# Patient Record
Sex: MALE | Race: BLACK OR AFRICAN AMERICAN | Employment: OTHER | URBAN - METROPOLITAN AREA
[De-identification: names, ages, dates, MRNs, and addresses within clinical notes are randomized per-mention and may not be internally consistent; named-entity substitution may affect disease eponyms.]

---

## 2017-01-12 RX ORDER — ISOSORBIDE MONONITRATE 30 MG/1
TABLET, EXTENDED RELEASE ORAL
Qty: 60 TABLET | Refills: 0 | Status: SHIPPED | OUTPATIENT
Start: 2017-01-12 | End: 2017-02-13 | Stop reason: SDUPTHER

## 2017-01-12 RX ORDER — ASCORBIC ACID, THIAMINE MONONITRATE,RIBOFLAVIN, NIACINAMIDE, PYRIDOXINE HYDROCHLORIDE, FOLIC ACID, CYANOCOBALAMIN, BIOTIN, CALCIUM PANTOTHENATE, 100; 1.5; 1.7; 20; 10; 1; 6000; 150000; 5 MG/1; MG/1; MG/1; MG/1; MG/1; MG/1; UG/1; UG/1; MG/1
CAPSULE, LIQUID FILLED ORAL
Qty: 90 CAPSULE | Refills: 0 | Status: SHIPPED | OUTPATIENT
Start: 2017-01-12 | End: 2017-04-17 | Stop reason: SDUPTHER

## 2017-01-12 RX ORDER — CARVEDILOL 25 MG/1
TABLET ORAL
Qty: 60 TABLET | Refills: 0 | Status: SHIPPED | OUTPATIENT
Start: 2017-01-12 | End: 2017-02-13 | Stop reason: SDUPTHER

## 2017-02-06 DIAGNOSIS — E78.00 PURE HYPERCHOLESTEROLEMIA: ICD-10-CM

## 2017-02-06 DIAGNOSIS — I25.9 ISCHEMIC HEART DISEASE: Chronic | ICD-10-CM

## 2017-02-06 RX ORDER — ATORVASTATIN CALCIUM 40 MG/1
TABLET, FILM COATED ORAL
Qty: 30 TABLET | Refills: 2 | Status: SHIPPED | OUTPATIENT
Start: 2017-02-06 | End: 2017-05-16 | Stop reason: SDUPTHER

## 2017-02-07 ENCOUNTER — TELEPHONE (OUTPATIENT)
Dept: PRIMARY CARE CLINIC | Age: 72
End: 2017-02-07

## 2017-02-13 ENCOUNTER — OFFICE VISIT (OUTPATIENT)
Dept: PRIMARY CARE CLINIC | Age: 72
End: 2017-02-13

## 2017-02-13 VITALS
OXYGEN SATURATION: 96 % | SYSTOLIC BLOOD PRESSURE: 140 MMHG | BODY MASS INDEX: 28.2 KG/M2 | DIASTOLIC BLOOD PRESSURE: 72 MMHG | HEART RATE: 63 BPM | WEIGHT: 237.8 LBS

## 2017-02-13 DIAGNOSIS — Z99.2 DIALYSIS PATIENT (HCC): ICD-10-CM

## 2017-02-13 DIAGNOSIS — J45.40 REACTIVE AIRWAY DISEASE, MODERATE PERSISTENT, UNCOMPLICATED: Primary | ICD-10-CM

## 2017-02-13 DIAGNOSIS — E34.9 INCREASED PTH LEVEL: ICD-10-CM

## 2017-02-13 DIAGNOSIS — N18.5 CHRONIC KIDNEY DISEASE, STAGE V (VERY SEVERE) (HCC): ICD-10-CM

## 2017-02-13 DIAGNOSIS — Z86.79 ATRIAL FIBRILLATION, CURRENTLY IN SINUS RHYTHM: ICD-10-CM

## 2017-02-13 DIAGNOSIS — E11.319 DIABETIC RETINOPATHY WITHOUT MACULAR EDEMA ASSOCIATED WITH TYPE 2 DIABETES MELLITUS, UNSPECIFIED RETINOPATHY SEVERITY: ICD-10-CM

## 2017-02-13 DIAGNOSIS — E11.21 DIABETIC NEPHROPATHY ASSOCIATED WITH TYPE 2 DIABETES MELLITUS (HCC): ICD-10-CM

## 2017-02-13 PROCEDURE — 99214 OFFICE O/P EST MOD 30 MIN: CPT | Performed by: FAMILY MEDICINE

## 2017-02-13 RX ORDER — ALBUTEROL SULFATE 90 UG/1
2 AEROSOL, METERED RESPIRATORY (INHALATION) EVERY 6 HOURS PRN
Qty: 1 INHALER | Refills: 3 | Status: SHIPPED | OUTPATIENT
Start: 2017-02-13 | End: 2017-09-19

## 2017-02-13 RX ORDER — CARVEDILOL 25 MG/1
TABLET ORAL
Qty: 60 TABLET | Refills: 0 | Status: SHIPPED | OUTPATIENT
Start: 2017-02-13 | End: 2017-03-18 | Stop reason: SDUPTHER

## 2017-02-13 RX ORDER — ISOSORBIDE MONONITRATE 30 MG/1
TABLET, EXTENDED RELEASE ORAL
Qty: 60 TABLET | Refills: 0 | Status: SHIPPED | OUTPATIENT
Start: 2017-02-13 | End: 2017-03-18 | Stop reason: SDUPTHER

## 2017-02-13 RX ORDER — BUDESONIDE AND FORMOTEROL FUMARATE DIHYDRATE 80; 4.5 UG/1; UG/1
2 AEROSOL RESPIRATORY (INHALATION) 2 TIMES DAILY
Qty: 1 INHALER | Refills: 3 | Status: SHIPPED | OUTPATIENT
Start: 2017-02-13 | End: 2017-09-19

## 2017-02-13 ASSESSMENT — ENCOUNTER SYMPTOMS
BACK PAIN: 0
WHEEZING: 0
VOICE CHANGE: 0
SINUS PRESSURE: 0
COUGH: 0
BLOOD IN STOOL: 0
ANAL BLEEDING: 0
CHOKING: 0
EYE DISCHARGE: 0
SHORTNESS OF BREATH: 0
SORE THROAT: 0
RECTAL PAIN: 0
EYE REDNESS: 0
NAUSEA: 0
APNEA: 0
FACIAL SWELLING: 0
CONSTIPATION: 0
ABDOMINAL PAIN: 0
EYE PAIN: 0
COLOR CHANGE: 0
CHEST TIGHTNESS: 0
EYE ITCHING: 0
VOMITING: 0
PHOTOPHOBIA: 0
TROUBLE SWALLOWING: 0

## 2017-03-21 RX ORDER — ISOSORBIDE MONONITRATE 30 MG/1
TABLET, EXTENDED RELEASE ORAL
Qty: 60 TABLET | Refills: 0 | Status: SHIPPED | OUTPATIENT
Start: 2017-03-21 | End: 2017-04-24 | Stop reason: SDUPTHER

## 2017-03-21 RX ORDER — CARVEDILOL 25 MG/1
TABLET ORAL
Qty: 60 TABLET | Refills: 0 | Status: SHIPPED | OUTPATIENT
Start: 2017-03-21 | End: 2017-04-24 | Stop reason: SDUPTHER

## 2017-04-25 RX ORDER — ISOSORBIDE MONONITRATE 30 MG/1
TABLET, EXTENDED RELEASE ORAL
Qty: 60 TABLET | Refills: 0 | Status: SHIPPED | OUTPATIENT
Start: 2017-04-25 | End: 2017-05-19 | Stop reason: SDUPTHER

## 2017-04-25 RX ORDER — CARVEDILOL 25 MG/1
TABLET ORAL
Qty: 60 TABLET | Refills: 0 | Status: SHIPPED | OUTPATIENT
Start: 2017-04-25 | End: 2017-05-19 | Stop reason: SDUPTHER

## 2017-05-16 DIAGNOSIS — E78.00 PURE HYPERCHOLESTEROLEMIA: ICD-10-CM

## 2017-05-16 DIAGNOSIS — I25.9 ISCHEMIC HEART DISEASE: Chronic | ICD-10-CM

## 2017-05-16 RX ORDER — ATORVASTATIN CALCIUM 40 MG/1
TABLET, FILM COATED ORAL
Qty: 30 TABLET | Refills: 1 | Status: SHIPPED | OUTPATIENT
Start: 2017-05-16 | End: 2017-07-25 | Stop reason: SDUPTHER

## 2017-05-30 ENCOUNTER — OFFICE VISIT (OUTPATIENT)
Dept: PRIMARY CARE CLINIC | Age: 72
End: 2017-05-30

## 2017-05-30 VITALS
TEMPERATURE: 97.3 F | DIASTOLIC BLOOD PRESSURE: 93 MMHG | OXYGEN SATURATION: 94 % | RESPIRATION RATE: 18 BRPM | HEART RATE: 71 BPM | BODY MASS INDEX: 28.46 KG/M2 | SYSTOLIC BLOOD PRESSURE: 159 MMHG | WEIGHT: 240 LBS

## 2017-05-30 DIAGNOSIS — N25.81 SECONDARY HYPERPARATHYROIDISM (HCC): ICD-10-CM

## 2017-05-30 DIAGNOSIS — R05.9 COUGH: Primary | ICD-10-CM

## 2017-05-30 DIAGNOSIS — M79.604 PAIN OF RIGHT LOWER EXTREMITY: ICD-10-CM

## 2017-05-30 DIAGNOSIS — I10 ESSENTIAL HYPERTENSION: ICD-10-CM

## 2017-05-30 PROCEDURE — 99214 OFFICE O/P EST MOD 30 MIN: CPT | Performed by: FAMILY MEDICINE

## 2017-05-30 RX ORDER — BENZONATATE 100 MG/1
100 CAPSULE ORAL 3 TIMES DAILY PRN
Qty: 30 CAPSULE | Refills: 1 | Status: SHIPPED | OUTPATIENT
Start: 2017-05-30 | End: 2017-06-06

## 2017-05-30 ASSESSMENT — ENCOUNTER SYMPTOMS
EYE DISCHARGE: 0
CHOKING: 0
SORE THROAT: 0
BLOOD IN STOOL: 0
CHEST TIGHTNESS: 0
ANAL BLEEDING: 0
NAUSEA: 0
BACK PAIN: 0
SINUS PRESSURE: 0
EYE ITCHING: 0
EYE PAIN: 0
VOMITING: 0
RECTAL PAIN: 0
EYE REDNESS: 0
PHOTOPHOBIA: 0
COUGH: 1
ABDOMINAL PAIN: 0
APNEA: 0
CONSTIPATION: 0
TROUBLE SWALLOWING: 0
VOICE CHANGE: 0
FACIAL SWELLING: 0
WHEEZING: 0
SHORTNESS OF BREATH: 0
COLOR CHANGE: 0

## 2017-06-17 RX ORDER — INSULIN GLARGINE 100 [IU]/ML
INJECTION, SOLUTION SUBCUTANEOUS
Qty: 15 ML | Refills: 3 | Status: SHIPPED | OUTPATIENT
Start: 2017-06-17 | End: 2017-12-19 | Stop reason: SDUPTHER

## 2017-08-01 ENCOUNTER — OFFICE VISIT (OUTPATIENT)
Dept: PRIMARY CARE CLINIC | Age: 72
End: 2017-08-01

## 2017-08-01 VITALS
HEART RATE: 64 BPM | WEIGHT: 244 LBS | HEIGHT: 77 IN | TEMPERATURE: 97.6 F | SYSTOLIC BLOOD PRESSURE: 139 MMHG | OXYGEN SATURATION: 94 % | DIASTOLIC BLOOD PRESSURE: 71 MMHG | BODY MASS INDEX: 28.81 KG/M2

## 2017-08-01 DIAGNOSIS — M79.604 RIGHT LEG PAIN: ICD-10-CM

## 2017-08-01 DIAGNOSIS — I73.9 INTERMITTENT CLAUDICATION (HCC): ICD-10-CM

## 2017-08-01 DIAGNOSIS — E11.9 CONTROLLED TYPE 2 DIABETES MELLITUS WITHOUT COMPLICATION, WITH LONG-TERM CURRENT USE OF INSULIN (HCC): ICD-10-CM

## 2017-08-01 DIAGNOSIS — Z23 NEED FOR PROPHYLACTIC VACCINATION AND INOCULATION AGAINST VARICELLA: Primary | ICD-10-CM

## 2017-08-01 DIAGNOSIS — Z79.4 CONTROLLED TYPE 2 DIABETES MELLITUS WITHOUT COMPLICATION, WITH LONG-TERM CURRENT USE OF INSULIN (HCC): ICD-10-CM

## 2017-08-01 PROCEDURE — 99214 OFFICE O/P EST MOD 30 MIN: CPT | Performed by: FAMILY MEDICINE

## 2017-08-01 RX ORDER — SENNOSIDES 8.6 MG
650 CAPSULE ORAL 2 TIMES DAILY
COMMUNITY
End: 2017-12-19 | Stop reason: SDUPTHER

## 2017-08-01 ASSESSMENT — ENCOUNTER SYMPTOMS
BACK PAIN: 0
BLOOD IN STOOL: 0
COLOR CHANGE: 0
EYE DISCHARGE: 0
EYE ITCHING: 0
SORE THROAT: 0
RECTAL PAIN: 0
ABDOMINAL PAIN: 0
ANAL BLEEDING: 0
CHOKING: 0
CONSTIPATION: 0
COUGH: 0
APNEA: 0
TROUBLE SWALLOWING: 0
PHOTOPHOBIA: 0
EYE REDNESS: 0
WHEEZING: 0
NAUSEA: 0
FACIAL SWELLING: 0
SINUS PRESSURE: 0
SHORTNESS OF BREATH: 0
VOMITING: 0
VOICE CHANGE: 0
CHEST TIGHTNESS: 0
EYE PAIN: 0

## 2017-08-02 LAB
ESTIMATED AVERAGE GLUCOSE: 145.6 MG/DL
HBA1C MFR BLD: 6.7 %

## 2017-08-04 RX ORDER — AMIODARONE HYDROCHLORIDE 200 MG/1
TABLET ORAL
Qty: 30 TABLET | Refills: 4 | Status: SHIPPED | OUTPATIENT
Start: 2017-08-04 | End: 2017-12-19 | Stop reason: SDUPTHER

## 2017-08-28 RX ORDER — CARVEDILOL 25 MG/1
TABLET ORAL
Qty: 60 TABLET | Refills: 2 | Status: SHIPPED | OUTPATIENT
Start: 2017-08-28 | End: 2017-12-06 | Stop reason: SDUPTHER

## 2017-08-28 RX ORDER — ISOSORBIDE MONONITRATE 30 MG/1
TABLET, EXTENDED RELEASE ORAL
Qty: 60 TABLET | Refills: 2 | Status: SHIPPED | OUTPATIENT
Start: 2017-08-28 | End: 2017-12-06 | Stop reason: SDUPTHER

## 2017-09-19 ENCOUNTER — OFFICE VISIT (OUTPATIENT)
Dept: PRIMARY CARE CLINIC | Age: 72
End: 2017-09-19

## 2017-09-19 VITALS
HEIGHT: 77 IN | DIASTOLIC BLOOD PRESSURE: 76 MMHG | TEMPERATURE: 97.7 F | SYSTOLIC BLOOD PRESSURE: 153 MMHG | HEART RATE: 61 BPM | OXYGEN SATURATION: 96 % | BODY MASS INDEX: 27.39 KG/M2 | WEIGHT: 232 LBS

## 2017-09-19 DIAGNOSIS — N18.6 ESRD (END STAGE RENAL DISEASE) (HCC): ICD-10-CM

## 2017-09-19 DIAGNOSIS — D64.9 ANEMIA, UNSPECIFIED TYPE: ICD-10-CM

## 2017-09-19 DIAGNOSIS — Z79.4 CONTROLLED TYPE 2 DIABETES MELLITUS WITHOUT COMPLICATION, WITH LONG-TERM CURRENT USE OF INSULIN (HCC): ICD-10-CM

## 2017-09-19 DIAGNOSIS — D64.9 ANEMIA, UNSPECIFIED TYPE: Primary | ICD-10-CM

## 2017-09-19 DIAGNOSIS — I25.9 ISCHEMIC HEART DISEASE: ICD-10-CM

## 2017-09-19 DIAGNOSIS — E11.9 CONTROLLED TYPE 2 DIABETES MELLITUS WITHOUT COMPLICATION, WITH LONG-TERM CURRENT USE OF INSULIN (HCC): ICD-10-CM

## 2017-09-19 LAB
A/G RATIO: 1.4 (ref 1.1–2.2)
ALBUMIN SERPL-MCNC: 4.6 G/DL (ref 3.4–5)
ALP BLD-CCNC: 107 U/L (ref 40–129)
ALT SERPL-CCNC: 16 U/L (ref 10–40)
ANION GAP SERPL CALCULATED.3IONS-SCNC: 20 MMOL/L (ref 3–16)
AST SERPL-CCNC: 11 U/L (ref 15–37)
BASOPHILS ABSOLUTE: 0.1 K/UL (ref 0–0.2)
BASOPHILS RELATIVE PERCENT: 1.3 %
BILIRUB SERPL-MCNC: 0.3 MG/DL (ref 0–1)
BUN BLDV-MCNC: 38 MG/DL (ref 7–20)
CALCIUM SERPL-MCNC: 9.9 MG/DL (ref 8.3–10.6)
CHLORIDE BLD-SCNC: 99 MMOL/L (ref 99–110)
CO2: 25 MMOL/L (ref 21–32)
CREAT SERPL-MCNC: 9.6 MG/DL (ref 0.8–1.3)
EOSINOPHILS ABSOLUTE: 0.1 K/UL (ref 0–0.6)
EOSINOPHILS RELATIVE PERCENT: 2.7 %
GFR AFRICAN AMERICAN: 7
GFR NON-AFRICAN AMERICAN: 5
GLOBULIN: 3.3 G/DL
GLUCOSE BLD-MCNC: 113 MG/DL (ref 70–99)
HBA1C MFR BLD: 6.1 %
HCT VFR BLD CALC: 33.2 % (ref 40.5–52.5)
HEMOGLOBIN: 10.6 G/DL (ref 13.5–17.5)
LYMPHOCYTES ABSOLUTE: 1.6 K/UL (ref 1–5.1)
LYMPHOCYTES RELATIVE PERCENT: 30.5 %
MCH RBC QN AUTO: 28 PG (ref 26–34)
MCHC RBC AUTO-ENTMCNC: 32 G/DL (ref 31–36)
MCV RBC AUTO: 87.4 FL (ref 80–100)
MONOCYTES ABSOLUTE: 0.5 K/UL (ref 0–1.3)
MONOCYTES RELATIVE PERCENT: 9.3 %
NEUTROPHILS ABSOLUTE: 2.9 K/UL (ref 1.7–7.7)
NEUTROPHILS RELATIVE PERCENT: 56.2 %
PDW BLD-RTO: 16.7 % (ref 12.4–15.4)
PLATELET # BLD: 153 K/UL (ref 135–450)
PMV BLD AUTO: 9.3 FL (ref 5–10.5)
POTASSIUM SERPL-SCNC: 5.2 MMOL/L (ref 3.5–5.1)
RBC # BLD: 3.8 M/UL (ref 4.2–5.9)
SODIUM BLD-SCNC: 144 MMOL/L (ref 136–145)
TOTAL PROTEIN: 7.9 G/DL (ref 6.4–8.2)
WBC # BLD: 5.2 K/UL (ref 4–11)

## 2017-09-19 PROCEDURE — 83036 HEMOGLOBIN GLYCOSYLATED A1C: CPT | Performed by: FAMILY MEDICINE

## 2017-09-19 PROCEDURE — 99214 OFFICE O/P EST MOD 30 MIN: CPT | Performed by: FAMILY MEDICINE

## 2017-09-19 ASSESSMENT — ENCOUNTER SYMPTOMS
VISUAL CHANGE: 0
ABDOMINAL PAIN: 0
SORE THROAT: 0
BLURRED VISION: 0
COUGH: 0
CHANGE IN BOWEL HABIT: 0
NAUSEA: 0
VOMITING: 0
SWOLLEN GLANDS: 0

## 2017-12-05 NOTE — TELEPHONE ENCOUNTER
Patient daughter calling patient totally out of medication need a call to [facility] to refill imdur,coreg please advise

## 2017-12-07 RX ORDER — CARVEDILOL 25 MG/1
TABLET ORAL
Qty: 60 TABLET | Refills: 2 | Status: SHIPPED | OUTPATIENT
Start: 2017-12-07 | End: 2017-12-19 | Stop reason: SDUPTHER

## 2017-12-07 RX ORDER — ISOSORBIDE MONONITRATE 30 MG/1
TABLET, EXTENDED RELEASE ORAL
Qty: 60 TABLET | Refills: 2 | Status: SHIPPED | OUTPATIENT
Start: 2017-12-07 | End: 2017-12-19 | Stop reason: SDUPTHER

## 2017-12-19 ENCOUNTER — OFFICE VISIT (OUTPATIENT)
Dept: PRIMARY CARE CLINIC | Age: 72
End: 2017-12-19

## 2017-12-19 VITALS
HEART RATE: 64 BPM | HEIGHT: 77 IN | WEIGHT: 238 LBS | SYSTOLIC BLOOD PRESSURE: 140 MMHG | BODY MASS INDEX: 28.1 KG/M2 | DIASTOLIC BLOOD PRESSURE: 80 MMHG | OXYGEN SATURATION: 97 %

## 2017-12-19 DIAGNOSIS — E78.1 PURE HYPERGLYCERIDEMIA: ICD-10-CM

## 2017-12-19 DIAGNOSIS — I10 ESSENTIAL HYPERTENSION: ICD-10-CM

## 2017-12-19 DIAGNOSIS — N18.6 CKD (CHRONIC KIDNEY DISEASE) STAGE V REQUIRING CHRONIC DIALYSIS (HCC): ICD-10-CM

## 2017-12-19 DIAGNOSIS — G89.4 CHRONIC PAIN SYNDROME: ICD-10-CM

## 2017-12-19 DIAGNOSIS — E11.8 TYPE 2 DIABETES MELLITUS WITH COMPLICATION, UNSPECIFIED LONG TERM INSULIN USE STATUS: Primary | ICD-10-CM

## 2017-12-19 DIAGNOSIS — E78.00 PURE HYPERCHOLESTEROLEMIA: ICD-10-CM

## 2017-12-19 DIAGNOSIS — Z99.2 CKD (CHRONIC KIDNEY DISEASE) STAGE V REQUIRING CHRONIC DIALYSIS (HCC): ICD-10-CM

## 2017-12-19 DIAGNOSIS — I25.9 ISCHEMIC HEART DISEASE: Chronic | ICD-10-CM

## 2017-12-19 LAB
A/G RATIO: 1.5 (ref 1.1–2.2)
ALBUMIN SERPL-MCNC: 4.5 G/DL (ref 3.4–5)
ALP BLD-CCNC: 120 U/L (ref 40–129)
ALT SERPL-CCNC: 20 U/L (ref 10–40)
ANION GAP SERPL CALCULATED.3IONS-SCNC: 17 MMOL/L (ref 3–16)
AST SERPL-CCNC: 15 U/L (ref 15–37)
BASOPHILS ABSOLUTE: 0.1 K/UL (ref 0–0.2)
BASOPHILS RELATIVE PERCENT: 0.9 %
BILIRUB SERPL-MCNC: 0.3 MG/DL (ref 0–1)
BUN BLDV-MCNC: 29 MG/DL (ref 7–20)
CALCIUM SERPL-MCNC: 9.5 MG/DL (ref 8.3–10.6)
CHLORIDE BLD-SCNC: 97 MMOL/L (ref 99–110)
CHOLESTEROL, TOTAL: 163 MG/DL (ref 0–199)
CO2: 27 MMOL/L (ref 21–32)
CREAT SERPL-MCNC: 8.2 MG/DL (ref 0.8–1.3)
EOSINOPHILS ABSOLUTE: 0.2 K/UL (ref 0–0.6)
EOSINOPHILS RELATIVE PERCENT: 2.7 %
GFR AFRICAN AMERICAN: 8
GFR NON-AFRICAN AMERICAN: 6
GLOBULIN: 3 G/DL
GLUCOSE BLD-MCNC: 107 MG/DL (ref 70–99)
HBA1C MFR BLD: 6.2 %
HCT VFR BLD CALC: 33.4 % (ref 40.5–52.5)
HDLC SERPL-MCNC: 60 MG/DL (ref 40–60)
HEMOGLOBIN: 10.6 G/DL (ref 13.5–17.5)
LDL CHOLESTEROL CALCULATED: 87 MG/DL
LYMPHOCYTES ABSOLUTE: 1.6 K/UL (ref 1–5.1)
LYMPHOCYTES RELATIVE PERCENT: 28.6 %
MCH RBC QN AUTO: 27.3 PG (ref 26–34)
MCHC RBC AUTO-ENTMCNC: 31.7 G/DL (ref 31–36)
MCV RBC AUTO: 86 FL (ref 80–100)
MONOCYTES ABSOLUTE: 0.6 K/UL (ref 0–1.3)
MONOCYTES RELATIVE PERCENT: 10.4 %
NEUTROPHILS ABSOLUTE: 3.3 K/UL (ref 1.7–7.7)
NEUTROPHILS RELATIVE PERCENT: 57.4 %
PDW BLD-RTO: 18.6 % (ref 12.4–15.4)
PLATELET # BLD: 187 K/UL (ref 135–450)
PMV BLD AUTO: 9.3 FL (ref 5–10.5)
POTASSIUM SERPL-SCNC: 5.2 MMOL/L (ref 3.5–5.1)
RBC # BLD: 3.88 M/UL (ref 4.2–5.9)
SODIUM BLD-SCNC: 141 MMOL/L (ref 136–145)
TOTAL PROTEIN: 7.5 G/DL (ref 6.4–8.2)
TRIGL SERPL-MCNC: 78 MG/DL (ref 0–150)
TSH SERPL DL<=0.05 MIU/L-ACNC: 3.76 UIU/ML (ref 0.27–4.2)
VLDLC SERPL CALC-MCNC: 16 MG/DL
WBC # BLD: 5.7 K/UL (ref 4–11)

## 2017-12-19 PROCEDURE — 1123F ACP DISCUSS/DSCN MKR DOCD: CPT | Performed by: FAMILY MEDICINE

## 2017-12-19 PROCEDURE — 4040F PNEUMOC VAC/ADMIN/RCVD: CPT | Performed by: FAMILY MEDICINE

## 2017-12-19 PROCEDURE — G8484 FLU IMMUNIZE NO ADMIN: HCPCS | Performed by: FAMILY MEDICINE

## 2017-12-19 PROCEDURE — G8427 DOCREV CUR MEDS BY ELIG CLIN: HCPCS | Performed by: FAMILY MEDICINE

## 2017-12-19 PROCEDURE — 1036F TOBACCO NON-USER: CPT | Performed by: FAMILY MEDICINE

## 2017-12-19 PROCEDURE — G8598 ASA/ANTIPLAT THER USED: HCPCS | Performed by: FAMILY MEDICINE

## 2017-12-19 PROCEDURE — 83036 HEMOGLOBIN GLYCOSYLATED A1C: CPT | Performed by: FAMILY MEDICINE

## 2017-12-19 PROCEDURE — 99214 OFFICE O/P EST MOD 30 MIN: CPT | Performed by: FAMILY MEDICINE

## 2017-12-19 PROCEDURE — G8417 CALC BMI ABV UP PARAM F/U: HCPCS | Performed by: FAMILY MEDICINE

## 2017-12-19 PROCEDURE — 3017F COLORECTAL CA SCREEN DOC REV: CPT | Performed by: FAMILY MEDICINE

## 2017-12-19 RX ORDER — ATORVASTATIN CALCIUM 40 MG/1
TABLET, FILM COATED ORAL
Qty: 90 TABLET | Refills: 2 | Status: SHIPPED | OUTPATIENT
Start: 2017-12-19 | End: 2018-10-09 | Stop reason: SDUPTHER

## 2017-12-19 RX ORDER — ISOSORBIDE MONONITRATE 30 MG/1
TABLET, EXTENDED RELEASE ORAL
Qty: 180 TABLET | Refills: 2 | Status: SHIPPED | OUTPATIENT
Start: 2017-12-19 | End: 2018-10-09 | Stop reason: SDUPTHER

## 2017-12-19 RX ORDER — CARVEDILOL 25 MG/1
TABLET ORAL
Qty: 180 TABLET | Refills: 2 | Status: SHIPPED | OUTPATIENT
Start: 2017-12-19 | End: 2018-10-09 | Stop reason: SDUPTHER

## 2017-12-19 RX ORDER — AMIODARONE HYDROCHLORIDE 200 MG/1
TABLET ORAL
Qty: 90 TABLET | Refills: 4 | Status: SHIPPED | OUTPATIENT
Start: 2017-12-19 | End: 2018-10-09 | Stop reason: SDUPTHER

## 2017-12-19 RX ORDER — ASPIRIN 81 MG/1
81 TABLET, CHEWABLE ORAL DAILY
Qty: 90 TABLET | Refills: 2 | Status: SHIPPED | OUTPATIENT
Start: 2017-12-19 | End: 2018-10-09 | Stop reason: SDUPTHER

## 2017-12-19 RX ORDER — SENNOSIDES 8.6 MG
650 CAPSULE ORAL 2 TIMES DAILY
Qty: 180 TABLET | Refills: 5 | Status: SHIPPED | OUTPATIENT
Start: 2017-12-19 | End: 2018-10-09 | Stop reason: SDUPTHER

## 2017-12-19 ASSESSMENT — ENCOUNTER SYMPTOMS
COLOR CHANGE: 0
APNEA: 0
SINUS PRESSURE: 0
TROUBLE SWALLOWING: 0
VOICE CHANGE: 0
NAUSEA: 0
EYE ITCHING: 0
BACK PAIN: 0
ANAL BLEEDING: 0
RECTAL PAIN: 0
EYE REDNESS: 0
EYE DISCHARGE: 0
FACIAL SWELLING: 0
VOMITING: 0
EYE PAIN: 0
CHOKING: 0
CHEST TIGHTNESS: 0
SORE THROAT: 0
SHORTNESS OF BREATH: 0
WHEEZING: 0
PHOTOPHOBIA: 0
BLOOD IN STOOL: 0
CONSTIPATION: 0
ABDOMINAL PAIN: 0
COUGH: 0

## 2017-12-19 NOTE — PROGRESS NOTES
Negative for agitation, behavioral problems, confusion, decreased concentration, dysphoric mood, hallucinations, self-injury, sleep disturbance and suicidal ideas. The patient is not nervous/anxious and is not hyperactive. Objective:   Physical Exam   Constitutional: He is oriented to person, place, and time. He appears well-developed and well-nourished. No distress. HENT:   Head: Normocephalic and atraumatic. Right Ear: External ear normal.   Left Ear: External ear normal.   Nose: Nose normal.   Mouth/Throat: Oropharynx is clear and moist. No oropharyngeal exudate. Eyes: Conjunctivae and EOM are normal. Pupils are equal, round, and reactive to light. Right eye exhibits no discharge. Left eye exhibits no discharge. No scleral icterus. Neck: Normal range of motion. Neck supple. No JVD present. No tracheal deviation present. No thyromegaly present. Cardiovascular: Normal rate, regular rhythm, normal heart sounds and intact distal pulses. Exam reveals no friction rub. No murmur heard. Pulses:       Carotid pulses are 2+ on the right side, and 2+ on the left side. Radial pulses are 2+ on the right side, and 2+ on the left side. Femoral pulses are 2+ on the right side, and 2+ on the left side. Popliteal pulses are 2+ on the right side, and 2+ on the left side. Dorsalis pedis pulses are 2+ on the right side, and 2+ on the left side. Posterior tibial pulses are 2+ on the right side, and 2+ on the left side. Pulmonary/Chest: Effort normal and breath sounds normal. No stridor. No respiratory distress. He has no wheezes. He has no rales. He exhibits no tenderness. Abdominal: Soft. Bowel sounds are normal. He exhibits no distension and no mass. There is no tenderness. There is no rebound and no guarding. Musculoskeletal: Normal range of motion. He exhibits no edema or tenderness. Lymphadenopathy:     He has no cervical adenopathy.    Neurological: He is oriented to person, place, and time. He displays normal reflexes. No cranial nerve deficit. He exhibits normal muscle tone. Coordination normal.   Skin: Skin is warm and dry. No rash noted. He is not diaphoretic. No pallor. Psychiatric: He has a normal mood and affect. His behavior is normal. Judgment and thought content normal.   Nursing note and vitals reviewed. Assessment:      1. Diabetic retinopathy associated with diabetes mellitus due to underlying condition, macular edema presence unspecified, unspecified laterality, unspecified retinopathy severity (HCC)    - POCT glycosylated hemoglobin (Hb A1C)  - TSH without Reflex  - insulin glargine (LANTUS SOLOSTAR) 100 UNIT/ML injection pen; INJECT 8 UNITS SUBCUTANEOUSLY twice daily  Dispense: 15 mL; Refill: 3    2. Essential hypertension  Goal BP <130/80  Cont. Treatment  Ck labs  - TSH without Reflex  - aspirin 81 MG chewable tablet; Take 1 tablet by mouth daily  Dispense: 90 tablet; Refill: 2  - Comprehensive Metabolic Panel  - CBC Auto Differential; Future  - Lipid Panel    3. Pure hyperglyceridemia  On statin  Ck labs  - TSH without Reflex  - Lipid Panel    4. Ischemic heart disease  No sxs  BB, asa, statin  - carvedilol (COREG) 25 MG tablet; TAKE ONE TABLET BY MOUTH TWICE A DAY  Dispense: 180 tablet; Refill: 2  - isosorbide mononitrate (IMDUR) 30 MG extended release tablet; TAKE ONE TABLET BY MOUTH TWICE A DAY  Dispense: 180 tablet; Refill: 2  - amiodarone (CORDARONE) 200 MG tablet; TAKE ONE TABLET BY MOUTH DAILY  Dispense: 90 tablet; Refill: 4  - atorvastatin (LIPITOR) 40 MG tablet; TAKE ONE TABLET BY MOUTH DAILY. STOP PRAVASTATIN  Dispense: 90 tablet; Refill: 2    5. Pure hypercholesterolemia  See 3 above  - atorvastatin (LIPITOR) 40 MG tablet; TAKE ONE TABLET BY MOUTH DAILY. STOP PRAVASTATIN  Dispense: 90 tablet; Refill: 2    6. Chronic pain syndrome  Tylenol prn  - acetaminophen (TYLENOL ARTHRITIS PAIN) 650 MG extended release tablet;  Take 1 tablet by mouth 2 times daily  Dispense: 180 tablet; Refill: 5    7.  CKD stage V requires HD 3 times a week      Plan:         see me in 3 months

## 2017-12-20 ENCOUNTER — TELEPHONE (OUTPATIENT)
Dept: PRIMARY CARE CLINIC | Age: 72
End: 2017-12-20

## 2018-01-06 DIAGNOSIS — E11.29 DIABETES MELLITUS WITH RENAL MANIFESTATION (HCC): ICD-10-CM

## 2018-01-08 RX ORDER — ASCORBIC ACID, THIAMINE MONONITRATE,RIBOFLAVIN, NIACINAMIDE, PYRIDOXINE HYDROCHLORIDE, FOLIC ACID, CYANOCOBALAMIN, BIOTIN, CALCIUM PANTOTHENATE, 100; 1.5; 1.7; 20; 10; 1; 6000; 150000; 5 MG/1; MG/1; MG/1; MG/1; MG/1; MG/1; UG/1; UG/1; MG/1
CAPSULE, LIQUID FILLED ORAL
Qty: 90 CAPSULE | Refills: 1 | Status: SHIPPED | OUTPATIENT
Start: 2018-01-08 | End: 2018-07-11 | Stop reason: SDUPTHER

## 2018-03-20 ENCOUNTER — OFFICE VISIT (OUTPATIENT)
Dept: PRIMARY CARE CLINIC | Age: 73
End: 2018-03-20

## 2018-03-20 VITALS
HEIGHT: 77 IN | OXYGEN SATURATION: 99 % | TEMPERATURE: 97.7 F | SYSTOLIC BLOOD PRESSURE: 140 MMHG | HEART RATE: 62 BPM | BODY MASS INDEX: 28.34 KG/M2 | DIASTOLIC BLOOD PRESSURE: 90 MMHG | WEIGHT: 240 LBS

## 2018-03-20 DIAGNOSIS — Z79.4 CONTROLLED TYPE 2 DIABETES MELLITUS WITHOUT COMPLICATION, WITH LONG-TERM CURRENT USE OF INSULIN (HCC): ICD-10-CM

## 2018-03-20 DIAGNOSIS — E11.319 DIABETIC RETINOPATHY OF BOTH EYES ASSOCIATED WITH TYPE 2 DIABETES MELLITUS, MACULAR EDEMA PRESENCE UNSPECIFIED, UNSPECIFIED RETINOPATHY SEVERITY (HCC): Primary | ICD-10-CM

## 2018-03-20 DIAGNOSIS — E11.9 CONTROLLED TYPE 2 DIABETES MELLITUS WITHOUT COMPLICATION, WITH LONG-TERM CURRENT USE OF INSULIN (HCC): ICD-10-CM

## 2018-03-20 DIAGNOSIS — E11.21 DM KIDNEY DISEASE (HCC): ICD-10-CM

## 2018-03-20 DIAGNOSIS — Z99.2 STATUS POST DIALYSIS (HCC): ICD-10-CM

## 2018-03-20 DIAGNOSIS — Z23 NEED FOR PROPHYLACTIC VACCINATION AND INOCULATION AGAINST VARICELLA: ICD-10-CM

## 2018-03-20 DIAGNOSIS — Z23 NEED FOR PROPHYLACTIC VACCINATION AGAINST DIPHTHERIA-TETANUS-PERTUSSIS (DTP): ICD-10-CM

## 2018-03-20 DIAGNOSIS — N18.5 CHRONIC KIDNEY DISEASE, STAGE V (VERY SEVERE) (HCC): ICD-10-CM

## 2018-03-20 DIAGNOSIS — I10 ESSENTIAL HYPERTENSION: ICD-10-CM

## 2018-03-20 DIAGNOSIS — I48.91 ATRIAL FIBRILLATION, UNSPECIFIED TYPE (HCC): ICD-10-CM

## 2018-03-20 DIAGNOSIS — I25.9 ISCHEMIC HEART DISEASE: ICD-10-CM

## 2018-03-20 LAB — TSH SERPL DL<=0.05 MIU/L-ACNC: 3.11 UIU/ML (ref 0.27–4.2)

## 2018-03-20 PROCEDURE — 1036F TOBACCO NON-USER: CPT | Performed by: FAMILY MEDICINE

## 2018-03-20 PROCEDURE — 3046F HEMOGLOBIN A1C LEVEL >9.0%: CPT | Performed by: FAMILY MEDICINE

## 2018-03-20 PROCEDURE — G8417 CALC BMI ABV UP PARAM F/U: HCPCS | Performed by: FAMILY MEDICINE

## 2018-03-20 PROCEDURE — 4040F PNEUMOC VAC/ADMIN/RCVD: CPT | Performed by: FAMILY MEDICINE

## 2018-03-20 PROCEDURE — 99214 OFFICE O/P EST MOD 30 MIN: CPT | Performed by: FAMILY MEDICINE

## 2018-03-20 PROCEDURE — G8482 FLU IMMUNIZE ORDER/ADMIN: HCPCS | Performed by: FAMILY MEDICINE

## 2018-03-20 PROCEDURE — G8598 ASA/ANTIPLAT THER USED: HCPCS | Performed by: FAMILY MEDICINE

## 2018-03-20 PROCEDURE — G8427 DOCREV CUR MEDS BY ELIG CLIN: HCPCS | Performed by: FAMILY MEDICINE

## 2018-03-20 PROCEDURE — 1123F ACP DISCUSS/DSCN MKR DOCD: CPT | Performed by: FAMILY MEDICINE

## 2018-03-20 PROCEDURE — 3017F COLORECTAL CA SCREEN DOC REV: CPT | Performed by: FAMILY MEDICINE

## 2018-03-20 RX ORDER — NITROGLYCERIN 0.4 MG/1
0.4 TABLET SUBLINGUAL
COMMUNITY
Start: 2015-01-26 | End: 2018-03-20 | Stop reason: SDUPTHER

## 2018-03-20 RX ORDER — CINACALCET 30 MG/1
30 TABLET, FILM COATED ORAL
COMMUNITY
End: 2018-10-09 | Stop reason: SDUPTHER

## 2018-03-20 ASSESSMENT — ENCOUNTER SYMPTOMS
CHEST TIGHTNESS: 0
BLURRED VISION: 0
SINUS PRESSURE: 0
NAUSEA: 0
BACK PAIN: 0
TROUBLE SWALLOWING: 0
BLOOD IN STOOL: 0
COUGH: 0
EYE DISCHARGE: 0
EYE PAIN: 0
VISUAL CHANGE: 0
VOMITING: 0
CHOKING: 0
SHORTNESS OF BREATH: 0
ORTHOPNEA: 0
FACIAL SWELLING: 0
RECTAL PAIN: 0
CONSTIPATION: 0
PHOTOPHOBIA: 0
VOICE CHANGE: 0
ABDOMINAL PAIN: 0
COLOR CHANGE: 0
EYE REDNESS: 0
WHEEZING: 0
EYE ITCHING: 0
SORE THROAT: 0
ANAL BLEEDING: 0
APNEA: 0

## 2018-03-20 ASSESSMENT — PATIENT HEALTH QUESTIONNAIRE - PHQ9
2. FEELING DOWN, DEPRESSED OR HOPELESS: 0
SUM OF ALL RESPONSES TO PHQ QUESTIONS 1-9: 0
SUM OF ALL RESPONSES TO PHQ9 QUESTIONS 1 & 2: 0
1. LITTLE INTEREST OR PLEASURE IN DOING THINGS: 0

## 2018-03-20 NOTE — PROGRESS NOTES
treatments include beta blockers. The current treatment provides significant improvement. There are no compliance problems. Hypertensive end-organ damage includes kidney disease, CAD/MI and left ventricular hypertrophy. There is no history of angina, CVA, heart failure, PVD, renovascular disease or retinopathy. Identifiable causes of hypertension include chronic renal disease. There is no history of coarctation of the aorta, hyperaldosteronism, hypercortisolism, pheochromocytoma or sleep apnea. He is fu for multiple medical problems including ischemic heart disease without current cp or sob or leg edema. End stage renal on HD 3 times  A week, [facility]. No fatigue or lightheadedness . Hypertension without headaches or sob. Has chronic pain responds to tylenol. He is a diabetic  AM glucose around 90's am no hypogycemia    He currently has not chest pain, sob, leg edema  Sees Dr ASHLEY Murillo at [facility] for heart follow up    h/o atrial fib  Last ekg 2016, sinus bradycardia      Review of Systems   Constitutional: Negative for activity change, appetite change, chills, diaphoresis, fatigue, fever, malaise/fatigue and unexpected weight change. HENT: Negative for congestion, dental problem, drooling, ear discharge, ear pain, facial swelling, hearing loss, mouth sores, nosebleeds, postnasal drip, sinus pressure, sneezing, sore throat, tinnitus, trouble swallowing and voice change. Eyes: Negative for blurred vision, photophobia, pain, discharge, redness, itching and visual disturbance. Respiratory: Negative for apnea, cough, choking, chest tightness, shortness of breath and wheezing. Cardiovascular: Negative for chest pain, palpitations, orthopnea, leg swelling and PND. Gastrointestinal: Negative for abdominal pain, anal bleeding, blood in stool, constipation, nausea, rectal pain and vomiting. Endocrine: Negative for polydipsia, polyphagia and polyuria.    Genitourinary: Negative for decreased urine volume, difficulty urinating, discharge, dysuria, enuresis, flank pain, frequency, hematuria, impotence, penile swelling, scrotal swelling, testicular pain and urgency. Musculoskeletal: Negative for arthralgias, back pain, gait problem, joint swelling, myalgias, neck pain and neck stiffness. Skin: Negative for color change, pallor, rash and wound. Neurological: Negative for dizziness, tremors, seizures, syncope, facial asymmetry, speech difficulty, weakness, light-headedness, numbness and headaches. Hematological: Negative for adenopathy. Does not bruise/bleed easily. Psychiatric/Behavioral: Negative for agitation, behavioral problems, confusion, decreased concentration, dysphoric mood, hallucinations, self-injury, sleep disturbance and suicidal ideas. The patient is not nervous/anxious and is not hyperactive. Objective:   Physical Exam   Constitutional: He is oriented to person, place, and time. He appears well-developed and well-nourished. No distress. HENT:   Head: Normocephalic and atraumatic. Right Ear: External ear normal.   Left Ear: External ear normal.   Nose: Nose normal.   Mouth/Throat: Oropharynx is clear and moist. No oropharyngeal exudate. Eyes: Conjunctivae and EOM are normal. Pupils are equal, round, and reactive to light. Right eye exhibits no discharge. Left eye exhibits no discharge. No scleral icterus. Neck: Normal range of motion. Neck supple. No JVD present. No tracheal deviation present. No thyromegaly present. Cardiovascular: Normal rate, regular rhythm, normal heart sounds and intact distal pulses. Exam reveals no friction rub. No murmur heard. Pulses:       Carotid pulses are 2+ on the right side, and 2+ on the left side. Radial pulses are 2+ on the right side, and 2+ on the left side. Femoral pulses are 2+ on the right side, and 2+ on the left side. Popliteal pulses are 2+ on the right side, and 2+ on the left side.         Dorsalis

## 2018-06-19 ENCOUNTER — OFFICE VISIT (OUTPATIENT)
Dept: PRIMARY CARE CLINIC | Age: 73
End: 2018-06-19

## 2018-06-19 VITALS
OXYGEN SATURATION: 97 % | WEIGHT: 240 LBS | DIASTOLIC BLOOD PRESSURE: 68 MMHG | HEART RATE: 61 BPM | SYSTOLIC BLOOD PRESSURE: 136 MMHG | TEMPERATURE: 97.9 F | HEIGHT: 77 IN | BODY MASS INDEX: 28.34 KG/M2

## 2018-06-19 DIAGNOSIS — N18.4 CKD (CHRONIC KIDNEY DISEASE) STAGE 4, GFR 15-29 ML/MIN (HCC): ICD-10-CM

## 2018-06-19 DIAGNOSIS — I25.9 ISCHEMIC HEART DISEASE: ICD-10-CM

## 2018-06-19 DIAGNOSIS — E13.21 DIABETIC NEPHROPATHY ASSOCIATED WITH OTHER SPECIFIED DIABETES MELLITUS (HCC): ICD-10-CM

## 2018-06-19 DIAGNOSIS — I10 ESSENTIAL HYPERTENSION: ICD-10-CM

## 2018-06-19 DIAGNOSIS — R29.898 WEAKNESS OF RIGHT LOWER EXTREMITY: ICD-10-CM

## 2018-06-19 DIAGNOSIS — Z23 NEED FOR PROPHYLACTIC VACCINATION AND INOCULATION AGAINST VARICELLA: Primary | ICD-10-CM

## 2018-06-19 DIAGNOSIS — E11.319 DIABETIC RETINOPATHY OF BOTH EYES WITHOUT MACULAR EDEMA ASSOCIATED WITH TYPE 2 DIABETES MELLITUS, UNSPECIFIED RETINOPATHY SEVERITY (HCC): ICD-10-CM

## 2018-06-19 DIAGNOSIS — Z23 NEED FOR PROPHYLACTIC VACCINATION AGAINST DIPHTHERIA-TETANUS-PERTUSSIS (DTP): ICD-10-CM

## 2018-06-19 DIAGNOSIS — Z79.4 TYPE 2 DIABETES MELLITUS WITHOUT COMPLICATION, WITH LONG-TERM CURRENT USE OF INSULIN (HCC): ICD-10-CM

## 2018-06-19 DIAGNOSIS — E11.9 TYPE 2 DIABETES MELLITUS WITHOUT COMPLICATION, WITH LONG-TERM CURRENT USE OF INSULIN (HCC): ICD-10-CM

## 2018-06-19 DIAGNOSIS — I48.91 ATRIAL FIBRILLATION, UNSPECIFIED TYPE (HCC): ICD-10-CM

## 2018-06-19 LAB — HBA1C MFR BLD: 6.2 %

## 2018-06-19 PROCEDURE — G8427 DOCREV CUR MEDS BY ELIG CLIN: HCPCS | Performed by: FAMILY MEDICINE

## 2018-06-19 PROCEDURE — 1123F ACP DISCUSS/DSCN MKR DOCD: CPT | Performed by: FAMILY MEDICINE

## 2018-06-19 PROCEDURE — G8598 ASA/ANTIPLAT THER USED: HCPCS | Performed by: FAMILY MEDICINE

## 2018-06-19 PROCEDURE — 1036F TOBACCO NON-USER: CPT | Performed by: FAMILY MEDICINE

## 2018-06-19 PROCEDURE — 2022F DILAT RTA XM EVC RTNOPTHY: CPT | Performed by: FAMILY MEDICINE

## 2018-06-19 PROCEDURE — 99214 OFFICE O/P EST MOD 30 MIN: CPT | Performed by: FAMILY MEDICINE

## 2018-06-19 PROCEDURE — 3017F COLORECTAL CA SCREEN DOC REV: CPT | Performed by: FAMILY MEDICINE

## 2018-06-19 PROCEDURE — 4040F PNEUMOC VAC/ADMIN/RCVD: CPT | Performed by: FAMILY MEDICINE

## 2018-06-19 PROCEDURE — G8417 CALC BMI ABV UP PARAM F/U: HCPCS | Performed by: FAMILY MEDICINE

## 2018-06-19 PROCEDURE — 83036 HEMOGLOBIN GLYCOSYLATED A1C: CPT | Performed by: FAMILY MEDICINE

## 2018-06-19 PROCEDURE — 3046F HEMOGLOBIN A1C LEVEL >9.0%: CPT | Performed by: FAMILY MEDICINE

## 2018-06-19 ASSESSMENT — ENCOUNTER SYMPTOMS
EYE REDNESS: 0
COLOR CHANGE: 0
EYE DISCHARGE: 0
VOICE CHANGE: 0
SORE THROAT: 0
VISUAL CHANGE: 0
ABDOMINAL PAIN: 0
NAUSEA: 0
ANAL BLEEDING: 0
CHEST TIGHTNESS: 0
BACK PAIN: 0
BLURRED VISION: 0
BLOOD IN STOOL: 0
FACIAL SWELLING: 0
EYE PAIN: 0
WHEEZING: 0
APNEA: 0
CHOKING: 0
VOMITING: 0
RECTAL PAIN: 0
TROUBLE SWALLOWING: 0
COUGH: 0
PHOTOPHOBIA: 0
EYE ITCHING: 0
CONSTIPATION: 0
SINUS PRESSURE: 0
ORTHOPNEA: 0
SHORTNESS OF BREATH: 0

## 2018-07-11 DIAGNOSIS — E11.29 DIABETES MELLITUS WITH RENAL MANIFESTATION (HCC): ICD-10-CM

## 2018-07-13 RX ORDER — ASCORBIC ACID, THIAMINE MONONITRATE,RIBOFLAVIN, NIACINAMIDE, PYRIDOXINE HYDROCHLORIDE, FOLIC ACID, CYANOCOBALAMIN, BIOTIN, CALCIUM PANTOTHENATE, 100; 1.5; 1.7; 20; 10; 1; 6000; 150000; 5 MG/1; MG/1; MG/1; MG/1; MG/1; MG/1; UG/1; UG/1; MG/1
CAPSULE, LIQUID FILLED ORAL
Qty: 90 CAPSULE | Refills: 3 | Status: SHIPPED | OUTPATIENT
Start: 2018-07-13 | End: 2018-10-09 | Stop reason: SDUPTHER

## 2018-10-09 ENCOUNTER — OFFICE VISIT (OUTPATIENT)
Dept: PRIMARY CARE CLINIC | Age: 73
End: 2018-10-09
Payer: MEDICARE

## 2018-10-09 VITALS
DIASTOLIC BLOOD PRESSURE: 78 MMHG | HEART RATE: 64 BPM | TEMPERATURE: 98.1 F | WEIGHT: 234.8 LBS | SYSTOLIC BLOOD PRESSURE: 120 MMHG | BODY MASS INDEX: 27.84 KG/M2 | OXYGEN SATURATION: 98 %

## 2018-10-09 DIAGNOSIS — D64.9 ANEMIA, UNSPECIFIED TYPE: ICD-10-CM

## 2018-10-09 DIAGNOSIS — I25.9 ISCHEMIC HEART DISEASE: Chronic | ICD-10-CM

## 2018-10-09 DIAGNOSIS — Z12.11 SCREEN FOR COLON CANCER: ICD-10-CM

## 2018-10-09 DIAGNOSIS — Z23 NEEDS FLU SHOT: ICD-10-CM

## 2018-10-09 DIAGNOSIS — I10 ESSENTIAL HYPERTENSION: ICD-10-CM

## 2018-10-09 DIAGNOSIS — Z79.4 TYPE 2 DIABETES MELLITUS WITH OTHER DIABETIC KIDNEY COMPLICATION, WITH LONG-TERM CURRENT USE OF INSULIN (HCC): ICD-10-CM

## 2018-10-09 DIAGNOSIS — N18.6 ESRD (END STAGE RENAL DISEASE) (HCC): ICD-10-CM

## 2018-10-09 DIAGNOSIS — Z23 NEED FOR DIPHTHERIA-TETANUS-PERTUSSIS (TDAP) VACCINE: ICD-10-CM

## 2018-10-09 DIAGNOSIS — E11.29 TYPE 2 DIABETES MELLITUS WITH OTHER DIABETIC KIDNEY COMPLICATION, WITH LONG-TERM CURRENT USE OF INSULIN (HCC): ICD-10-CM

## 2018-10-09 DIAGNOSIS — N18.6 ESRD (END STAGE RENAL DISEASE) (HCC): Primary | ICD-10-CM

## 2018-10-09 DIAGNOSIS — L60.8 ACQUIRED DEFORMITY OF TOENAIL: ICD-10-CM

## 2018-10-09 DIAGNOSIS — E78.00 PURE HYPERCHOLESTEROLEMIA: ICD-10-CM

## 2018-10-09 DIAGNOSIS — G89.4 CHRONIC PAIN SYNDROME: ICD-10-CM

## 2018-10-09 DIAGNOSIS — Z23 NEED FOR SHINGLES VACCINE: ICD-10-CM

## 2018-10-09 LAB
HCT VFR BLD CALC: 34 % (ref 40.5–52.5)
HEMOGLOBIN: 10.3 G/DL (ref 13.5–17.5)
MCH RBC QN AUTO: 25.9 PG (ref 26–34)
MCHC RBC AUTO-ENTMCNC: 30.3 G/DL (ref 31–36)
MCV RBC AUTO: 85.6 FL (ref 80–100)
PDW BLD-RTO: 19.7 % (ref 12.4–15.4)
PLATELET # BLD: 208 K/UL (ref 135–450)
PMV BLD AUTO: 9.5 FL (ref 5–10.5)
RBC # BLD: 3.96 M/UL (ref 4.2–5.9)
WBC # BLD: 5.5 K/UL (ref 4–11)

## 2018-10-09 PROCEDURE — 90662 IIV NO PRSV INCREASED AG IM: CPT | Performed by: FAMILY MEDICINE

## 2018-10-09 PROCEDURE — 2022F DILAT RTA XM EVC RTNOPTHY: CPT | Performed by: FAMILY MEDICINE

## 2018-10-09 PROCEDURE — G8598 ASA/ANTIPLAT THER USED: HCPCS | Performed by: FAMILY MEDICINE

## 2018-10-09 PROCEDURE — G8428 CUR MEDS NOT DOCUMENT: HCPCS | Performed by: FAMILY MEDICINE

## 2018-10-09 PROCEDURE — 3017F COLORECTAL CA SCREEN DOC REV: CPT | Performed by: FAMILY MEDICINE

## 2018-10-09 PROCEDURE — 1036F TOBACCO NON-USER: CPT | Performed by: FAMILY MEDICINE

## 2018-10-09 PROCEDURE — 99215 OFFICE O/P EST HI 40 MIN: CPT | Performed by: FAMILY MEDICINE

## 2018-10-09 PROCEDURE — G8484 FLU IMMUNIZE NO ADMIN: HCPCS | Performed by: FAMILY MEDICINE

## 2018-10-09 PROCEDURE — G8417 CALC BMI ABV UP PARAM F/U: HCPCS | Performed by: FAMILY MEDICINE

## 2018-10-09 PROCEDURE — 4040F PNEUMOC VAC/ADMIN/RCVD: CPT | Performed by: FAMILY MEDICINE

## 2018-10-09 PROCEDURE — G0008 ADMIN INFLUENZA VIRUS VAC: HCPCS | Performed by: FAMILY MEDICINE

## 2018-10-09 PROCEDURE — 1123F ACP DISCUSS/DSCN MKR DOCD: CPT | Performed by: FAMILY MEDICINE

## 2018-10-09 PROCEDURE — 3044F HG A1C LEVEL LT 7.0%: CPT | Performed by: FAMILY MEDICINE

## 2018-10-09 PROCEDURE — 1101F PT FALLS ASSESS-DOCD LE1/YR: CPT | Performed by: FAMILY MEDICINE

## 2018-10-09 RX ORDER — CARVEDILOL 25 MG/1
TABLET ORAL
Qty: 180 TABLET | Refills: 2 | Status: ON HOLD | OUTPATIENT
Start: 2018-10-09 | End: 2019-01-24 | Stop reason: HOSPADM

## 2018-10-09 RX ORDER — ISOSORBIDE MONONITRATE 30 MG/1
TABLET, EXTENDED RELEASE ORAL
Qty: 180 TABLET | Refills: 2 | Status: SHIPPED | OUTPATIENT
Start: 2018-10-09 | End: 2019-12-05 | Stop reason: CLARIF

## 2018-10-09 RX ORDER — ATORVASTATIN CALCIUM 40 MG/1
TABLET, FILM COATED ORAL
Qty: 90 TABLET | Refills: 2 | Status: SHIPPED | OUTPATIENT
Start: 2018-10-09 | End: 2018-10-10 | Stop reason: SDUPTHER

## 2018-10-09 RX ORDER — SENNOSIDES 8.6 MG
650 CAPSULE ORAL 2 TIMES DAILY
Qty: 180 TABLET | Refills: 5 | Status: SHIPPED | OUTPATIENT
Start: 2018-10-09 | End: 2019-08-21

## 2018-10-09 RX ORDER — AMIODARONE HYDROCHLORIDE 200 MG/1
TABLET ORAL
Qty: 90 TABLET | Refills: 4 | Status: SHIPPED | OUTPATIENT
Start: 2018-10-09 | End: 2019-12-05 | Stop reason: CLARIF

## 2018-10-09 RX ORDER — CINACALCET 30 MG/1
30 TABLET, FILM COATED ORAL DAILY
Qty: 30 TABLET | Refills: 2 | Status: SHIPPED | OUTPATIENT
Start: 2018-10-09 | End: 2019-12-05 | Stop reason: CLARIF

## 2018-10-09 RX ORDER — ASCORBIC ACID, THIAMINE MONONITRATE,RIBOFLAVIN, NIACINAMIDE, PYRIDOXINE HYDROCHLORIDE, FOLIC ACID, CYANOCOBALAMIN, BIOTIN, CALCIUM PANTOTHENATE, 100; 1.5; 1.7; 20; 10; 1; 6000; 150000; 5 MG/1; MG/1; MG/1; MG/1; MG/1; MG/1; UG/1; UG/1; MG/1
CAPSULE, LIQUID FILLED ORAL
Qty: 90 CAPSULE | Refills: 3 | Status: SHIPPED | OUTPATIENT
Start: 2018-10-09 | End: 2019-01-03 | Stop reason: CLARIF

## 2018-10-09 RX ORDER — ASPIRIN 81 MG/1
81 TABLET, CHEWABLE ORAL DAILY
Qty: 90 TABLET | Refills: 2 | Status: SHIPPED | OUTPATIENT
Start: 2018-10-09 | End: 2018-12-04 | Stop reason: DRUGHIGH

## 2018-10-09 ASSESSMENT — ENCOUNTER SYMPTOMS
CHOKING: 0
ORTHOPNEA: 0
BACK PAIN: 0
EYE ITCHING: 0
VOICE CHANGE: 0
COUGH: 0
SORE THROAT: 0
RECTAL PAIN: 0
FACIAL SWELLING: 0
NAUSEA: 0
BLOOD IN STOOL: 0
VISUAL CHANGE: 0
ANAL BLEEDING: 0
SINUS PRESSURE: 0
VOMITING: 0
CONSTIPATION: 0
PHOTOPHOBIA: 0
ABDOMINAL PAIN: 0
TROUBLE SWALLOWING: 0
BLURRED VISION: 0
EYE REDNESS: 0
EYE DISCHARGE: 0
WHEEZING: 0
SHORTNESS OF BREATH: 0
COLOR CHANGE: 0
EYE PAIN: 0
CHEST TIGHTNESS: 0
APNEA: 0

## 2018-10-09 NOTE — PROGRESS NOTES
Femoral pulses are 2+ on the right side, and 2+ on the left side. Popliteal pulses are 2+ on the right side, and 2+ on the left side. Dorsalis pedis pulses are 2+ on the right side, and 2+ on the left side. Posterior tibial pulses are 2+ on the right side, and 2+ on the left side. Pulmonary/Chest: Effort normal and breath sounds normal. No stridor. No respiratory distress. He has no wheezes. He has no rales. He exhibits no tenderness. Abdominal: Soft. Bowel sounds are normal. He exhibits no distension and no mass. There is no tenderness. There is no rebound and no guarding. Musculoskeletal: Normal range of motion. He exhibits no edema or tenderness. Lymphadenopathy:     He has no cervical adenopathy. Neurological: He is oriented to person, place, and time. He displays normal reflexes. No cranial nerve deficit. He exhibits normal muscle tone. Coordination normal.   Skin: Skin is warm and dry. No rash noted. He is not diaphoretic. No pallor. Psychiatric: He has a normal mood and affect. His behavior is normal. Judgment and thought content normal.   Nursing note and vitals reviewed. Visual inspection:  Deformity/amputation: absent  Skin lesions/pre-ulcerative calluses: absent  Edema: right- negative, left- negative    Sensory exam:  Monofilament sensation: normal  (minimum of 5 random plantar locations tested, avoiding callused areas - > 1 area with absence of sensation is + for neuropathy)    Plus at least one of the following:  Pulses: normal,   Pinprick: Intact  Proprioception: Intact  Vibration (128 Hz): Intact      Assessment:      1. Essential hypertension    - aspirin 81 MG chewable tablet; Take 1 tablet by mouth daily  Dispense: 90 tablet; Refill: 2  - Insulin Pen Needle (NOVOFINE) 32G X 6 MM MISC; Use as directed with Novolog Flexpen, 10 units in am and 10 units in pm  Dispense: 100 each; Refill: 10  - Comprehensive Metabolic Panel; Future    2.  Type 2 diabetes mellitus with

## 2018-10-09 NOTE — TELEPHONE ENCOUNTER
Requested Prescriptions     Pending Prescriptions Disp Refills    atorvastatin (LIPITOR) 40 MG tablet [Pharmacy Med Name: ATORVASTATIN 40 MG TABLET] 90 tablet 1     Sig: TAKE ONE TABLET BY MOUTH DAILY. STOP PRAVASTATIN   .     LOV     6/19/18  FOV    10/9/18

## 2018-10-10 LAB
A/G RATIO: 1.5 (ref 1.1–2.2)
ALBUMIN SERPL-MCNC: 4.7 G/DL (ref 3.4–5)
ALP BLD-CCNC: 89 U/L (ref 40–129)
ALT SERPL-CCNC: 21 U/L (ref 10–40)
ANION GAP SERPL CALCULATED.3IONS-SCNC: 18 MMOL/L (ref 3–16)
AST SERPL-CCNC: 17 U/L (ref 15–37)
BILIRUB SERPL-MCNC: 0.3 MG/DL (ref 0–1)
BUN BLDV-MCNC: 30 MG/DL (ref 7–20)
CALCIUM SERPL-MCNC: 10.3 MG/DL (ref 8.3–10.6)
CHLORIDE BLD-SCNC: 97 MMOL/L (ref 99–110)
CO2: 26 MMOL/L (ref 21–32)
CREAT SERPL-MCNC: 8.6 MG/DL (ref 0.8–1.3)
GFR AFRICAN AMERICAN: 7
GFR NON-AFRICAN AMERICAN: 6
GLOBULIN: 3.1 G/DL
GLUCOSE BLD-MCNC: 100 MG/DL (ref 70–99)
POTASSIUM SERPL-SCNC: 5.1 MMOL/L (ref 3.5–5.1)
SODIUM BLD-SCNC: 141 MMOL/L (ref 136–145)
TOTAL PROTEIN: 7.8 G/DL (ref 6.4–8.2)

## 2018-10-10 RX ORDER — ATORVASTATIN CALCIUM 40 MG/1
TABLET, FILM COATED ORAL
Qty: 90 TABLET | Refills: 1 | Status: SHIPPED | OUTPATIENT
Start: 2018-10-10 | End: 2018-12-04 | Stop reason: DRUGHIGH

## 2018-10-15 ENCOUNTER — TELEPHONE (OUTPATIENT)
Dept: PRIMARY CARE CLINIC | Age: 73
End: 2018-10-15

## 2018-10-15 DIAGNOSIS — Z23 NEED FOR SHINGLES VACCINE: ICD-10-CM

## 2018-10-31 ENCOUNTER — TELEPHONE (OUTPATIENT)
Dept: FAMILY MEDICINE CLINIC | Age: 73
End: 2018-10-31

## 2018-11-07 ENCOUNTER — TELEPHONE (OUTPATIENT)
Dept: PRIMARY CARE CLINIC | Age: 73
End: 2018-11-07

## 2018-11-07 ENCOUNTER — NURSE TRIAGE (OUTPATIENT)
Dept: OTHER | Facility: CLINIC | Age: 73
End: 2018-11-07

## 2018-11-07 NOTE — TELEPHONE ENCOUNTER
Reason for Disposition   Patient already left for the hospital/clinic     Pt left to go to dialysis via medicare transport. Protocols used: NO CONTACT OR DUPLICATE CONTACT CALL-ADULT-[place]      Retrieved VM from RN [employer] mailbox and returned call per [facility] message request.  This writer called number provided [phone]) and spoke with Paresh Rosenthal, who states she is pt's caregiver [phone] years. She states that the visiting nurse called the PCP office to report leg swelling and /100. This writer returning phone call, but Paresh Rosenthal states he's being picked up by Medicare transport to be taken to dialysis and can not speak. Paresh Rosenthal was informed to have Raul Montalvo call the office back so needs can be addressed. Jinny further explains that Raul Montalvo can not appropriately talk over the phone because he has memory issues and does not make his own decisions. This writer encouraged a call back, regarding issues above, Jinny verbalizes understanding.

## 2018-11-07 NOTE — TELEPHONE ENCOUNTER
I spoke with pt. He states he feels fine. Pt taking all meds as prescribed. Routing to Dr. Jerri Jules to advise.

## 2018-11-09 ENCOUNTER — TELEPHONE (OUTPATIENT)
Dept: PRIMARY CARE CLINIC | Age: 73
End: 2018-11-09

## 2018-11-21 ENCOUNTER — TELEPHONE (OUTPATIENT)
Dept: PRIMARY CARE CLINIC | Age: 73
End: 2018-11-21

## 2018-11-29 ENCOUNTER — TELEPHONE (OUTPATIENT)
Dept: PRIMARY CARE CLINIC | Age: 73
End: 2018-11-29

## 2018-11-29 DIAGNOSIS — E11.9 TYPE 2 DIABETES MELLITUS WITHOUT COMPLICATION, WITH LONG-TERM CURRENT USE OF INSULIN (HCC): Primary | ICD-10-CM

## 2018-11-29 DIAGNOSIS — Z79.4 TYPE 2 DIABETES MELLITUS WITHOUT COMPLICATION, WITH LONG-TERM CURRENT USE OF INSULIN (HCC): Primary | ICD-10-CM

## 2018-11-29 NOTE — TELEPHONE ENCOUNTER
[facility] call asking that a new glucose monitor be called in the pharmacy [facility] the [facility] [phone]

## 2018-11-30 RX ORDER — BLOOD-GLUCOSE METER
KIT MISCELLANEOUS
Qty: 1 KIT | Refills: 0 | Status: SHIPPED | OUTPATIENT
Start: 2018-11-30 | End: 2019-01-03 | Stop reason: CLARIF

## 2018-11-30 RX ORDER — GLUCOSAMINE HCL/CHONDROITIN SU 500-400 MG
CAPSULE ORAL
Qty: 100 STRIP | Refills: 11 | Status: SHIPPED | OUTPATIENT
Start: 2018-11-30 | End: 2019-01-03 | Stop reason: CLARIF

## 2018-12-04 ENCOUNTER — OFFICE VISIT (OUTPATIENT)
Dept: PRIMARY CARE CLINIC | Age: 73
End: 2018-12-04
Payer: MEDICARE

## 2018-12-04 VITALS
OXYGEN SATURATION: 98 % | WEIGHT: 229 LBS | HEIGHT: 77 IN | TEMPERATURE: 97.3 F | SYSTOLIC BLOOD PRESSURE: 130 MMHG | BODY MASS INDEX: 27.04 KG/M2 | HEART RATE: 57 BPM | DIASTOLIC BLOOD PRESSURE: 80 MMHG

## 2018-12-04 DIAGNOSIS — M79.604 PAIN IN BOTH LOWER EXTREMITIES: ICD-10-CM

## 2018-12-04 DIAGNOSIS — R60.0 LEG EDEMA: ICD-10-CM

## 2018-12-04 DIAGNOSIS — Z23 NEED FOR PROPHYLACTIC VACCINATION AND INOCULATION AGAINST VARICELLA: ICD-10-CM

## 2018-12-04 DIAGNOSIS — M79.605 PAIN IN BOTH LOWER EXTREMITIES: ICD-10-CM

## 2018-12-04 DIAGNOSIS — Z23 NEED FOR PROPHYLACTIC VACCINATION AGAINST DIPHTHERIA-TETANUS-PERTUSSIS (DTP): ICD-10-CM

## 2018-12-04 DIAGNOSIS — E11.9 TYPE 2 DIABETES MELLITUS WITHOUT COMPLICATION, WITH LONG-TERM CURRENT USE OF INSULIN (HCC): ICD-10-CM

## 2018-12-04 DIAGNOSIS — I10 ESSENTIAL HYPERTENSION: ICD-10-CM

## 2018-12-04 DIAGNOSIS — Z79.4 TYPE 2 DIABETES MELLITUS WITHOUT COMPLICATION, WITH LONG-TERM CURRENT USE OF INSULIN (HCC): ICD-10-CM

## 2018-12-04 DIAGNOSIS — I21.4 NON-STEMI (NON-ST ELEVATED MYOCARDIAL INFARCTION) (HCC): Primary | ICD-10-CM

## 2018-12-04 PROBLEM — I21.02 ACUTE ST ELEVATION MYOCARDIAL INFARCTION (STEMI) INVOLVING LEFT ANTERIOR DESCENDING (LAD) CORONARY ARTERY (HCC): Status: ACTIVE | Noted: 2018-12-04

## 2018-12-04 PROBLEM — I21.09 ACUTE ANTEROLATERAL WALL MI (HCC): Status: ACTIVE | Noted: 2018-12-04

## 2018-12-04 LAB
GLUCOSE BLD-MCNC: 89 MG/DL
HBA1C MFR BLD: 6.1 %

## 2018-12-04 PROCEDURE — 4040F PNEUMOC VAC/ADMIN/RCVD: CPT | Performed by: FAMILY MEDICINE

## 2018-12-04 PROCEDURE — G8598 ASA/ANTIPLAT THER USED: HCPCS | Performed by: FAMILY MEDICINE

## 2018-12-04 PROCEDURE — 1123F ACP DISCUSS/DSCN MKR DOCD: CPT | Performed by: FAMILY MEDICINE

## 2018-12-04 PROCEDURE — 3044F HG A1C LEVEL LT 7.0%: CPT | Performed by: FAMILY MEDICINE

## 2018-12-04 PROCEDURE — 82962 GLUCOSE BLOOD TEST: CPT | Performed by: FAMILY MEDICINE

## 2018-12-04 PROCEDURE — 83036 HEMOGLOBIN GLYCOSYLATED A1C: CPT | Performed by: FAMILY MEDICINE

## 2018-12-04 PROCEDURE — 2022F DILAT RTA XM EVC RTNOPTHY: CPT | Performed by: FAMILY MEDICINE

## 2018-12-04 PROCEDURE — 3017F COLORECTAL CA SCREEN DOC REV: CPT | Performed by: FAMILY MEDICINE

## 2018-12-04 PROCEDURE — 1101F PT FALLS ASSESS-DOCD LE1/YR: CPT | Performed by: FAMILY MEDICINE

## 2018-12-04 PROCEDURE — 1036F TOBACCO NON-USER: CPT | Performed by: FAMILY MEDICINE

## 2018-12-04 PROCEDURE — G8482 FLU IMMUNIZE ORDER/ADMIN: HCPCS | Performed by: FAMILY MEDICINE

## 2018-12-04 PROCEDURE — G8417 CALC BMI ABV UP PARAM F/U: HCPCS | Performed by: FAMILY MEDICINE

## 2018-12-04 PROCEDURE — 99214 OFFICE O/P EST MOD 30 MIN: CPT | Performed by: FAMILY MEDICINE

## 2018-12-04 PROCEDURE — G8427 DOCREV CUR MEDS BY ELIG CLIN: HCPCS | Performed by: FAMILY MEDICINE

## 2018-12-04 RX ORDER — CARVEDILOL 25 MG/1
25 TABLET ORAL
COMMUNITY
Start: 2013-11-09 | End: 2018-12-13

## 2018-12-04 RX ORDER — CINACALCET 30 MG/1
30 TABLET, FILM COATED ORAL
COMMUNITY
End: 2018-12-13

## 2018-12-04 RX ORDER — LOSARTAN POTASSIUM 50 MG/1
50 TABLET ORAL DAILY
Qty: 30 TABLET | Refills: 3 | Status: ON HOLD | OUTPATIENT
Start: 2018-12-04 | End: 2019-01-24 | Stop reason: HOSPADM

## 2018-12-04 RX ORDER — LOSARTAN POTASSIUM 25 MG/1
50 TABLET ORAL DAILY
COMMUNITY
End: 2018-12-04 | Stop reason: DRUGHIGH

## 2018-12-04 RX ORDER — ASPIRIN 325 MG
325 TABLET, DELAYED RELEASE (ENTERIC COATED) ORAL DAILY
Qty: 30 TABLET | Refills: 3 | Status: SHIPPED | OUTPATIENT
Start: 2018-12-04 | End: 2019-08-21 | Stop reason: DRUGHIGH

## 2018-12-04 RX ORDER — AMIODARONE HYDROCHLORIDE 200 MG/1
200 TABLET ORAL
COMMUNITY
Start: 2015-01-26 | End: 2018-12-13

## 2018-12-04 RX ORDER — PRASUGREL 10 MG/1
10 TABLET, FILM COATED ORAL DAILY
Qty: 30 TABLET | Refills: 2 | Status: SHIPPED | OUTPATIENT
Start: 2018-12-04 | End: 2019-01-03 | Stop reason: ALTCHOICE

## 2018-12-04 RX ORDER — CLOPIDOGREL BISULFATE 75 MG/1
75 TABLET ORAL DAILY
COMMUNITY
Start: 2018-11-28 | End: 2019-12-05 | Stop reason: CLARIF

## 2018-12-04 RX ORDER — ATORVASTATIN CALCIUM 80 MG/1
80 TABLET, FILM COATED ORAL NIGHTLY
COMMUNITY
End: 2019-12-05 | Stop reason: CLARIF

## 2018-12-04 ASSESSMENT — ENCOUNTER SYMPTOMS
BACK PAIN: 0
SORE THROAT: 0
BLOOD IN STOOL: 0
EYE ITCHING: 0
VOMITING: 0
EYE DISCHARGE: 0
FACIAL SWELLING: 0
CONSTIPATION: 0
RECTAL PAIN: 0
PHOTOPHOBIA: 0
CHOKING: 0
COLOR CHANGE: 0
APNEA: 0
ABDOMINAL PAIN: 0
VOICE CHANGE: 0
SINUS PRESSURE: 0
ANAL BLEEDING: 0
TROUBLE SWALLOWING: 0
CHEST TIGHTNESS: 0
NAUSEA: 0
COUGH: 0
EYE REDNESS: 0
SHORTNESS OF BREATH: 0
EYE PAIN: 0
WHEEZING: 0

## 2018-12-04 NOTE — PATIENT INSTRUCTIONS
sodium\" may still have too much sodium. Be sure to read the label to see how much sodium you are getting. · Buy fresh vegetables, or frozen vegetables without added sauces. Buy low-sodium versions of canned vegetables, soups, and other canned goods. Prepare low-sodium meals  · Cut back on the amount of salt you use in cooking. This will help you adjust to the taste. Do not add salt after cooking. One teaspoon of salt has about 2,300 mg of sodium. · Take the salt shaker off the table. · Flavor your food with garlic, lemon juice, onion, vinegar, herbs, and spices. Do not use soy sauce, lite soy sauce, steak sauce, onion salt, garlic salt, celery salt, mustard, or ketchup on your food. · Use low-sodium salad dressings, sauces, and ketchup. Or make your own salad dressings and sauces without adding salt. · Use less salt (or none) when recipes call for it. You can often use half the salt a recipe calls for without losing flavor. Other foods such as rice, pasta, and grains do not need added salt. · Rinse canned vegetables, and cook them in fresh water. This removes some--but not all--of the salt. · Avoid water that is naturally high in sodium or that has been treated with water softeners, which add sodium. Call your local water company to find out the sodium content of your water supply. If you buy bottled water, read the label and choose a sodium-free brand. Avoid high-sodium foods  · Avoid eating:  ? Smoked, cured, salted, and canned meat, fish, and poultry. ? Ham, batista, hot dogs, and luncheon meats. ? Regular, hard, and processed cheese and regular peanut butter. ? Crackers with salted tops, and other salted snack foods such as pretzels, chips, and salted popcorn. ? Frozen prepared meals, unless labeled low-sodium. ? Canned and dried soups, broths, and bouillon, unless labeled sodium-free or low-sodium. ? Canned vegetables, unless labeled sodium-free or low-sodium. ?  [place] fries, pizza, tacos, and

## 2018-12-13 ENCOUNTER — HOSPITAL ENCOUNTER (OUTPATIENT)
Dept: WOUND CARE | Age: 73
Discharge: HOME | End: 2018-12-13
Payer: MEDICARE

## 2018-12-13 VITALS
TEMPERATURE: 98.2 F | SYSTOLIC BLOOD PRESSURE: 170 MMHG | HEART RATE: 63 BPM | BODY MASS INDEX: 31.06 KG/M2 | RESPIRATION RATE: 20 BRPM | DIASTOLIC BLOOD PRESSURE: 79 MMHG | HEIGHT: 72 IN

## 2018-12-13 DIAGNOSIS — L97.514 RIGHT SECOND TOE ULCER, WITH NECROSIS OF BONE (HCC): ICD-10-CM

## 2018-12-13 DIAGNOSIS — S91.301A OPEN WOUND OF RIGHT FOOT, INITIAL ENCOUNTER: ICD-10-CM

## 2018-12-13 DIAGNOSIS — I73.9 PAD (PERIPHERAL ARTERY DISEASE) (HCC): Primary | ICD-10-CM

## 2018-12-13 LAB
GLUCOSE BLD-MCNC: 138 MG/DL (ref 70–99)
PERFORMED ON: ABNORMAL

## 2018-12-13 PROCEDURE — 87186 SC STD MICRODIL/AGAR DIL: CPT

## 2018-12-13 PROCEDURE — 11044 DBRDMT BONE 1ST 20 SQ CM/<: CPT

## 2018-12-13 PROCEDURE — 87070 CULTURE OTHR SPECIMN AEROBIC: CPT

## 2018-12-13 PROCEDURE — 87205 SMEAR GRAM STAIN: CPT

## 2018-12-13 PROCEDURE — 99213 OFFICE O/P EST LOW 20 MIN: CPT

## 2018-12-13 PROCEDURE — 87077 CULTURE AEROBIC IDENTIFY: CPT

## 2018-12-13 RX ORDER — CIPROFLOXACIN 500 MG/1
500 TABLET, FILM COATED ORAL 2 TIMES DAILY
Qty: 20 TABLET | Refills: 0 | Status: SHIPPED | OUTPATIENT
Start: 2018-12-13 | End: 2018-12-23

## 2018-12-13 RX ORDER — LIDOCAINE HYDROCHLORIDE 40 MG/ML
2.5 SOLUTION TOPICAL ONCE
Status: DISCONTINUED | OUTPATIENT
Start: 2018-12-13 | End: 2018-12-14 | Stop reason: HOSPADM

## 2018-12-15 LAB
GRAM STAIN RESULT: ABNORMAL
ORGANISM: ABNORMAL
WOUND/ABSCESS: ABNORMAL
WOUND/ABSCESS: ABNORMAL

## 2018-12-17 ENCOUNTER — TELEPHONE (OUTPATIENT)
Dept: VASCULAR SURGERY | Age: 73
End: 2018-12-17

## 2018-12-17 ENCOUNTER — HOSPITAL ENCOUNTER (OUTPATIENT)
Dept: VASCULAR LAB | Age: 73
Discharge: HOME | End: 2018-12-17
Payer: MEDICARE

## 2018-12-17 DIAGNOSIS — S91.301A OPEN WOUND OF RIGHT FOOT, INITIAL ENCOUNTER: ICD-10-CM

## 2018-12-17 DIAGNOSIS — I73.9 PAD (PERIPHERAL ARTERY DISEASE) (HCC): ICD-10-CM

## 2018-12-17 PROBLEM — L97.514 RIGHT SECOND TOE ULCER, WITH NECROSIS OF BONE (HCC): Status: ACTIVE | Noted: 2018-12-17

## 2018-12-17 PROCEDURE — 93925 LOWER EXTREMITY STUDY: CPT

## 2018-12-17 NOTE — TELEPHONE ENCOUNTER
----- Message from [facility] sent at 12/17/2018 12:54 PM EST -----  Saini Showers is scheduled to see Dr. Kartik Serna as a new patient on 12- 11:45 am. The patient was referred for PAD (peripheral artery disease) [facility]) and other reasons that are listed on the referral. The patient was referred by Neva Dean MD. The referral is in [address] [place] [address] Rd.

## 2018-12-20 ENCOUNTER — HOSPITAL ENCOUNTER (OUTPATIENT)
Dept: WOUND CARE | Age: 73
Discharge: HOME | End: 2018-12-20
Payer: MEDICARE

## 2018-12-20 VITALS
TEMPERATURE: 97.8 F | HEART RATE: 74 BPM | RESPIRATION RATE: 18 BRPM | DIASTOLIC BLOOD PRESSURE: 76 MMHG | SYSTOLIC BLOOD PRESSURE: 175 MMHG

## 2018-12-20 PROCEDURE — 6370000000 HC RX 637 (ALT 250 FOR IP): Performed by: PODIATRIST

## 2018-12-20 PROCEDURE — 11043 DBRDMT MUSC&/FSCA 1ST 20/<: CPT

## 2018-12-20 RX ORDER — LIDOCAINE HYDROCHLORIDE 40 MG/ML
2.5 SOLUTION TOPICAL ONCE
Status: COMPLETED | OUTPATIENT
Start: 2018-12-20 | End: 2018-12-20

## 2018-12-20 RX ADMIN — LIDOCAINE HYDROCHLORIDE 2.5 ML: 40 SOLUTION TOPICAL at 09:03

## 2018-12-20 NOTE — PROGRESS NOTES
studies 4 days ago- they revealed an CK of 0.47 on the R side; I debrided wound on 2nd toe R and will have agency nurse see him at home; he has an appt with Dr Lili Hartley on 12-27; I suggest he continue with wound care, Cipro and see in 1 week; I recommend we hold off on the amputation of the 2nd toe R until after the vascular consult; they are ok with the plan. Plan:       Treatment Note please see attached Discharge Instructions    New Medication(s) at this visit:   New Prescriptions    No medications on file       Smoking Cessation: Counseling given: Not Answered        In my professional opinion this patient would benefit from HBO Therapy: Yes       Patient is to return to wound care center in:  1 week(s)    Written patient dismissal instructions given to patient and signed by patient or POA. Discharge [facility] Physician Orders and Discharge Instructions  Wayne Grover [id number] [facility], [address] [place] [address]  Telephone: [phone]    NAME:  Bakari Tena OF BIRTH:  1945  MEDICAL RECORD NUMBER:  [id number]  DATE:  12/20/2018    Wash hands with soap and water prior to and after every dressing change. Wound Cleansing:   · Do not scrub or use excessive force. · With each dressing change, rinse wounds with 0.9% Saline. (May use wound wash or soft contact solution. Both can be purchased at a local drug store). · If unable to obtain saline, may use a gentle soap and water. · Keep wounds dry in the shower unless otherwise instructed by the physician. Topical Treatments:  Do not apply lotions, creams, or ointments to wound bed unless directed. [] Apply moisturizing lotion to skin surrounding the wound prior to dressing change.  [] Apply antifungal ointment to skin surrounding the wound prior to dressing change.  [] Apply thin film of moisture barrier ointment to skin immediately around wound.   [] patient and/or responsible adult, by my health care provider(s). I had the opportunity to ask questions regarding this information.      [] Patient unable to sign Discharge Instructions given to ECF/Transportation/POA              Electronically signed by Aline Aguila DPM on 12/20/2018 at 3:04 PM

## 2018-12-27 ENCOUNTER — HOSPITAL ENCOUNTER (OUTPATIENT)
Dept: WOUND CARE | Age: 73
Discharge: HOME | End: 2018-12-27
Payer: MEDICARE

## 2018-12-27 ENCOUNTER — OFFICE VISIT (OUTPATIENT)
Dept: VASCULAR SURGERY | Age: 73
End: 2018-12-27
Payer: MEDICARE

## 2018-12-27 ENCOUNTER — HOSPITAL ENCOUNTER (OUTPATIENT)
Dept: WOUND CARE | Age: 73
Discharge: HOME | End: 2018-12-27

## 2018-12-27 VITALS
WEIGHT: 230.6 LBS | DIASTOLIC BLOOD PRESSURE: 73 MMHG | SYSTOLIC BLOOD PRESSURE: 174 MMHG | BODY MASS INDEX: 27.23 KG/M2 | HEIGHT: 77 IN | TEMPERATURE: 99.2 F

## 2018-12-27 DIAGNOSIS — L97.514 RIGHT SECOND TOE ULCER, WITH NECROSIS OF BONE (HCC): Primary | ICD-10-CM

## 2018-12-27 PROCEDURE — G8598 ASA/ANTIPLAT THER USED: HCPCS | Performed by: SURGERY

## 2018-12-27 PROCEDURE — G8427 DOCREV CUR MEDS BY ELIG CLIN: HCPCS | Performed by: SURGERY

## 2018-12-27 PROCEDURE — 1036F TOBACCO NON-USER: CPT | Performed by: SURGERY

## 2018-12-27 PROCEDURE — G8482 FLU IMMUNIZE ORDER/ADMIN: HCPCS | Performed by: SURGERY

## 2018-12-27 PROCEDURE — 1101F PT FALLS ASSESS-DOCD LE1/YR: CPT | Performed by: SURGERY

## 2018-12-27 PROCEDURE — 3017F COLORECTAL CA SCREEN DOC REV: CPT | Performed by: SURGERY

## 2018-12-27 PROCEDURE — 99205 OFFICE O/P NEW HI 60 MIN: CPT | Performed by: SURGERY

## 2018-12-27 PROCEDURE — 99213 OFFICE O/P EST LOW 20 MIN: CPT

## 2018-12-27 PROCEDURE — 4040F PNEUMOC VAC/ADMIN/RCVD: CPT | Performed by: SURGERY

## 2018-12-27 PROCEDURE — G8417 CALC BMI ABV UP PARAM F/U: HCPCS | Performed by: SURGERY

## 2018-12-27 PROCEDURE — 1123F ACP DISCUSS/DSCN MKR DOCD: CPT | Performed by: SURGERY

## 2018-12-27 NOTE — PROGRESS NOTES
Encounters:   12/27/18 230 lb 9.6 oz (104.6 kg)   12/04/18 229 lb (103.9 kg)   10/09/18 234 lb 12.8 oz (106.5 kg)       PHYSICAL EXAM    General Appearance: alert and oriented to person, place and time, well developed and well- nourished, in no acute distress    Pedal pulses non palpable. Protective sensations are absent. Right 2nd toe is necrotic, but dry to the bone distally. Toe is black in appearance. There is no ascending streaking, warmth, edema, or erythema. Assessment:      Patient Active Problem List   Diagnosis Code    HTN (hypertension) I10    Diabetes mellitus ([facility] E11.9    Anemia D64.9    Diabetic retinopathy ([facility] E11.319    Cataracts, bilateral H26.9    Mixed hyperlipidemia E78.2    Atrial fibrillation ([facility]) I48.91    ESRD (end stage renal disease) ([facility] N18.6    Ischemic heart disease I25.9    Acute anterolateral wall MI ([facility]) I21.09    Acute ST elevation myocardial infarction (STEMI) involving left anterior descending (LAD) coronary artery ([facility]) I21.02    CKD (chronic kidney disease) stage 4, GFR 15-29 ml/min ([facility]) N18.4    DM (diabetes mellitus) type II uncontrolled with renal manifestation E11.29, E11.65    Fungal infection of foot B35.3    Hemodialysis patient ([facility] Z99.2    Malignant essential hypertension 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 Discharge [facility] Physician Orders and Discharge Instructions  Wayne Grover [address] [place] [address]  Telephone: [phone]    NAME:  Emilie George OF BIRTH:  1945  MEDICAL RECORD NUMBER:  [id number]  DATE:  12/27/2018    Wash hands with soap and water prior to and after every dressing change. Wound Cleansing:   · Do not scrub or use excessive force. · With each dressing change, rinse wounds with 0.9% Saline. (May use wound wash or soft contact solution. Both can be purchased at a local drug store). · If unable to obtain saline, may use a gentle soap and water. · Keep wounds dry in the shower unless otherwise instructed by the physician. Topical Treatments:  Do not apply lotions, creams, or ointments to wound bed unless directed. [] Apply moisturizing lotion to skin surrounding the wound prior to dressing change.  [] Apply antifungal ointment to skin surrounding the wound prior to dressing change.  [] Apply thin film of moisture barrier ointment to skin immediately around wound. [] Other:      Dressings:           Wound Location: Right 2nd toe     [x] Apply Primary Dressing to wound:       [] Foam/Foam with Border(i.e Mepilex) [] Non-adherent (i.e.Mepitel)   [] Alginate with Silver (i.e. Silvercel)  [] Alginate (i.e. Aquacel)   [] Collagen (i.e. Puracol)   [] Collagen with Silver(i.e. Juany)     [] Hydrocolloid    [] Hydrafera Blue moistened with saline   [] MediHoney Paste/Gel   [] Hydrogel    [] Endoform      [] Santyl covered with gauze moisten with saline    [] Bactroban/Mupirocin   [] Polysporin/Neosporin  [x] Other: Betadine paint   Pack wound loosely with: [] Iodoform   [] Plain Packing  [] Saline \"wet to dry\"      [x] Cover and Secure with:     [x] Dry Gauze  [] ABD [] Cast padding [x] Kerlix or Jaylene rolled gauze   [] Coban [] Ace Wrap  [] Ace Wrap from forefoot to just below the knee  [] Paper Tape [] Medipore Tape  [] Other:    Avoid contact of tape with skin if possible.     [x] Change dressing: [x] Daily    [] Every Other

## 2018-12-28 ENCOUNTER — TELEPHONE (OUTPATIENT)
Dept: VASCULAR SURGERY | Age: 73
End: 2018-12-28

## 2019-01-02 ENCOUNTER — NURSE TRIAGE (OUTPATIENT)
Dept: OTHER | Facility: CLINIC | Age: 74
End: 2019-01-02

## 2019-01-02 ENCOUNTER — TELEPHONE (OUTPATIENT)
Dept: VASCULAR SURGERY | Age: 74
End: 2019-01-02

## 2019-01-03 ENCOUNTER — APPOINTMENT (OUTPATIENT)
Dept: GENERAL RADIOLOGY | Age: 74
DRG: 239 | End: 2019-01-03
Payer: MEDICARE

## 2019-01-03 ENCOUNTER — HOSPITAL ENCOUNTER (OUTPATIENT)
Dept: WOUND CARE | Age: 74
Discharge: HOME | End: 2019-01-03
Payer: MEDICARE

## 2019-01-03 ENCOUNTER — APPOINTMENT (OUTPATIENT)
Dept: CT IMAGING | Age: 74
DRG: 239 | End: 2019-01-03
Payer: MEDICARE

## 2019-01-03 ENCOUNTER — HOSPITAL ENCOUNTER (INPATIENT)
Age: 74
LOS: 21 days | Discharge: NURSING, REHAB OR LONG-TERM CARE | DRG: 239 | End: 2019-01-24
Attending: EMERGENCY MEDICINE | Admitting: INTERNAL MEDICINE
Payer: MEDICARE

## 2019-01-03 DIAGNOSIS — L97.518 DIABETIC ULCER OF TOE OF RIGHT FOOT ASSOCIATED WITH TYPE 2 DIABETES MELLITUS, WITH OTHER ULCER SEVERITY (HCC): ICD-10-CM

## 2019-01-03 DIAGNOSIS — I96 GANGRENE OF RIGHT FOOT (HCC): ICD-10-CM

## 2019-01-03 DIAGNOSIS — A41.9 SEPSIS DUE TO PNEUMONIA (HCC): Primary | ICD-10-CM

## 2019-01-03 DIAGNOSIS — E11.621 DIABETIC ULCER OF TOE OF RIGHT FOOT ASSOCIATED WITH TYPE 2 DIABETES MELLITUS, WITH OTHER ULCER SEVERITY (HCC): ICD-10-CM

## 2019-01-03 DIAGNOSIS — R40.0 SOMNOLENCE: ICD-10-CM

## 2019-01-03 DIAGNOSIS — J18.9 SEPSIS DUE TO PNEUMONIA (HCC): Primary | ICD-10-CM

## 2019-01-03 DIAGNOSIS — E87.5 HYPERKALEMIA: ICD-10-CM

## 2019-01-03 LAB
ANION GAP SERPL CALCULATED.3IONS-SCNC: 19 MMOL/L (ref 3–16)
ANISOCYTOSIS: ABNORMAL
BASE EXCESS VENOUS: 2 (ref -3–3)
BASOPHILS ABSOLUTE: 0.1 K/UL (ref 0–0.2)
BASOPHILS RELATIVE PERCENT: 0.6 %
BUN BLDV-MCNC: 89 MG/DL (ref 7–20)
CALCIUM SERPL-MCNC: 9.9 MG/DL (ref 8.3–10.6)
CHLORIDE BLD-SCNC: 92 MMOL/L (ref 99–110)
CO2: 21 MMOL/L (ref 21–32)
CREAT SERPL-MCNC: 12.2 MG/DL (ref 0.8–1.3)
EOSINOPHILS ABSOLUTE: 0.1 K/UL (ref 0–0.6)
EOSINOPHILS RELATIVE PERCENT: 0.3 %
GFR AFRICAN AMERICAN: 5
GFR NON-AFRICAN AMERICAN: 4
GLUCOSE BLD-MCNC: 106 MG/DL (ref 70–99)
HCO3 VENOUS: 25.9 MMOL/L (ref 23–29)
HCT VFR BLD CALC: 31.8 % (ref 40.5–52.5)
HEMOGLOBIN: 9.3 G/DL (ref 13.5–17.5)
LACTATE: 0.92 MMOL/L (ref 0.4–2)
LYMPHOCYTES ABSOLUTE: 1.6 K/UL (ref 1–5.1)
LYMPHOCYTES RELATIVE PERCENT: 7.8 %
MCH RBC QN AUTO: 21.8 PG (ref 26–34)
MCHC RBC AUTO-ENTMCNC: 29.3 G/DL (ref 31–36)
MCV RBC AUTO: 74.4 FL (ref 80–100)
MONOCYTES ABSOLUTE: 1.7 K/UL (ref 0–1.3)
MONOCYTES RELATIVE PERCENT: 8.3 %
NEUTROPHILS ABSOLUTE: 16.5 K/UL (ref 1.7–7.7)
NEUTROPHILS RELATIVE PERCENT: 83 %
O2 SAT, VEN: 67 %
PCO2, VEN: 36.7 MM HG (ref 40–50)
PDW BLD-RTO: 22.1 % (ref 12.4–15.4)
PERFORMED ON: ABNORMAL
PH VENOUS: 7.46 (ref 7.35–7.45)
PLATELET # BLD: 318 K/UL (ref 135–450)
PMV BLD AUTO: 8.5 FL (ref 5–10.5)
PO2, VEN: 33 MM HG
POC SAMPLE TYPE: ABNORMAL
POTASSIUM REFLEX MAGNESIUM: 6.3 MMOL/L (ref 3.5–5.1)
PRO-BNP: ABNORMAL PG/ML (ref 0–449)
RBC # BLD: 4.28 M/UL (ref 4.2–5.9)
SLIDE REVIEW: ABNORMAL
SODIUM BLD-SCNC: 132 MMOL/L (ref 136–145)
TCO2 CALC VENOUS: 27 MMOL/L
TROPONIN: 0.1 NG/ML
WBC # BLD: 19.9 K/UL (ref 4–11)

## 2019-01-03 PROCEDURE — 93005 ELECTROCARDIOGRAM TRACING: CPT | Performed by: PHYSICIAN ASSISTANT

## 2019-01-03 PROCEDURE — 6360000002 HC RX W HCPCS

## 2019-01-03 PROCEDURE — 83880 ASSAY OF NATRIURETIC PEPTIDE: CPT

## 2019-01-03 PROCEDURE — 85025 COMPLETE CBC W/AUTO DIFF WBC: CPT

## 2019-01-03 PROCEDURE — 2580000003 HC RX 258: Performed by: PHYSICIAN ASSISTANT

## 2019-01-03 PROCEDURE — 83605 ASSAY OF LACTIC ACID: CPT

## 2019-01-03 PROCEDURE — 87040 BLOOD CULTURE FOR BACTERIA: CPT

## 2019-01-03 PROCEDURE — 36415 COLL VENOUS BLD VENIPUNCTURE: CPT

## 2019-01-03 PROCEDURE — 80048 BASIC METABOLIC PNL TOTAL CA: CPT

## 2019-01-03 PROCEDURE — 90937 HEMODIALYSIS REPEATED EVAL: CPT

## 2019-01-03 PROCEDURE — 70450 CT HEAD/BRAIN W/O DYE: CPT

## 2019-01-03 PROCEDURE — 87340 HEPATITIS B SURFACE AG IA: CPT

## 2019-01-03 PROCEDURE — 71046 X-RAY EXAM CHEST 2 VIEWS: CPT

## 2019-01-03 PROCEDURE — 5A1D70Z PERFORMANCE OF URINARY FILTRATION, INTERMITTENT, LESS THAN 6 HOURS PER DAY: ICD-10-PCS | Performed by: INTERNAL MEDICINE

## 2019-01-03 PROCEDURE — 2000000000 HC ICU R&B

## 2019-01-03 PROCEDURE — 82803 BLOOD GASES ANY COMBINATION: CPT

## 2019-01-03 PROCEDURE — 96374 THER/PROPH/DIAG INJ IV PUSH: CPT

## 2019-01-03 PROCEDURE — 84484 ASSAY OF TROPONIN QUANT: CPT

## 2019-01-03 PROCEDURE — 99285 EMERGENCY DEPT VISIT HI MDM: CPT

## 2019-01-03 PROCEDURE — 6360000002 HC RX W HCPCS: Performed by: PHYSICIAN ASSISTANT

## 2019-01-03 PROCEDURE — 6370000000 HC RX 637 (ALT 250 FOR IP): Performed by: PHYSICIAN ASSISTANT

## 2019-01-03 PROCEDURE — 96375 TX/PRO/DX INJ NEW DRUG ADDON: CPT

## 2019-01-03 RX ORDER — CALCIUM GLUCONATE 94 MG/ML
1 INJECTION, SOLUTION INTRAVENOUS ONCE
Status: COMPLETED | OUTPATIENT
Start: 2019-01-03 | End: 2019-01-03

## 2019-01-03 RX ORDER — SODIUM CHLORIDE 0.9 % (FLUSH) 0.9 %
10 SYRINGE (ML) INJECTION PRN
Status: DISCONTINUED | OUTPATIENT
Start: 2019-01-03 | End: 2019-01-25 | Stop reason: HOSPADM

## 2019-01-03 RX ORDER — AMIODARONE HYDROCHLORIDE 200 MG/1
200 TABLET ORAL DAILY
Status: DISCONTINUED | OUTPATIENT
Start: 2019-01-04 | End: 2019-01-25 | Stop reason: HOSPADM

## 2019-01-03 RX ORDER — CALCIUM GLUCONATE 94 MG/ML
INJECTION, SOLUTION INTRAVENOUS
Status: COMPLETED
Start: 2019-01-03 | End: 2019-01-03

## 2019-01-03 RX ORDER — CINACALCET 30 MG/1
30 TABLET, FILM COATED ORAL DAILY
Status: DISCONTINUED | OUTPATIENT
Start: 2019-01-04 | End: 2019-01-25 | Stop reason: HOSPADM

## 2019-01-03 RX ORDER — CARVEDILOL 25 MG/1
25 TABLET ORAL 2 TIMES DAILY
Status: DISCONTINUED | OUTPATIENT
Start: 2019-01-03 | End: 2019-01-10

## 2019-01-03 RX ORDER — LOSARTAN POTASSIUM 50 MG/1
50 TABLET ORAL DAILY
Status: DISCONTINUED | OUTPATIENT
Start: 2019-01-04 | End: 2019-01-05

## 2019-01-03 RX ORDER — CHOLECALCIFEROL (VITAMIN D3) 10 MCG
1 TABLET ORAL DAILY
Status: DISCONTINUED | OUTPATIENT
Start: 2019-01-04 | End: 2019-01-25 | Stop reason: HOSPADM

## 2019-01-03 RX ORDER — DEXTROSE MONOHYDRATE 25 G/50ML
12.5 INJECTION, SOLUTION INTRAVENOUS PRN
Status: DISCONTINUED | OUTPATIENT
Start: 2019-01-03 | End: 2019-01-25 | Stop reason: HOSPADM

## 2019-01-03 RX ORDER — DEXTROSE MONOHYDRATE 25 G/50ML
50 INJECTION, SOLUTION INTRAVENOUS ONCE
Status: COMPLETED | OUTPATIENT
Start: 2019-01-03 | End: 2019-01-03

## 2019-01-03 RX ORDER — ALBUTEROL SULFATE 2.5 MG/3ML
2.5 SOLUTION RESPIRATORY (INHALATION) ONCE
Status: DISCONTINUED | OUTPATIENT
Start: 2019-01-03 | End: 2019-01-03

## 2019-01-03 RX ORDER — ACETAMINOPHEN 650 MG/1
650 SUPPOSITORY RECTAL EVERY 4 HOURS PRN
Status: DISCONTINUED | OUTPATIENT
Start: 2019-01-03 | End: 2019-01-11

## 2019-01-03 RX ORDER — HEPARIN SODIUM 5000 [USP'U]/ML
5000 INJECTION, SOLUTION INTRAVENOUS; SUBCUTANEOUS EVERY 8 HOURS SCHEDULED
Status: DISCONTINUED | OUTPATIENT
Start: 2019-01-03 | End: 2019-01-05

## 2019-01-03 RX ORDER — NICOTINE POLACRILEX 4 MG
15 LOZENGE BUCCAL PRN
Status: DISCONTINUED | OUTPATIENT
Start: 2019-01-03 | End: 2019-01-25 | Stop reason: HOSPADM

## 2019-01-03 RX ORDER — DEXTROSE MONOHYDRATE 50 MG/ML
100 INJECTION, SOLUTION INTRAVENOUS PRN
Status: DISCONTINUED | OUTPATIENT
Start: 2019-01-03 | End: 2019-01-25 | Stop reason: HOSPADM

## 2019-01-03 RX ORDER — ASCORBIC ACID, THIAMINE MONONITRATE,RIBOFLAVIN, NIACINAMIDE, PYRIDOXINE HYDROCHLORIDE, FOLIC ACID, CYANOCOBALAMIN, BIOTIN, CALCIUM PANTOTHENATE, 100; 1.5; 1.7; 20; 10; 1; 6000; 150000; 5 MG/1; MG/1; MG/1; MG/1; MG/1; MG/1; UG/1; UG/1; MG/1
1 CAPSULE, LIQUID FILLED ORAL DAILY
COMMUNITY
End: 2019-10-08

## 2019-01-03 RX ORDER — CLOPIDOGREL BISULFATE 75 MG/1
75 TABLET ORAL DAILY
Status: DISCONTINUED | OUTPATIENT
Start: 2019-01-04 | End: 2019-01-10

## 2019-01-03 RX ORDER — LEVOFLOXACIN 5 MG/ML
750 INJECTION, SOLUTION INTRAVENOUS ONCE
Status: COMPLETED | OUTPATIENT
Start: 2019-01-03 | End: 2019-01-03

## 2019-01-03 RX ORDER — SODIUM CHLORIDE 0.9 % (FLUSH) 0.9 %
10 SYRINGE (ML) INJECTION EVERY 12 HOURS SCHEDULED
Status: DISCONTINUED | OUTPATIENT
Start: 2019-01-03 | End: 2019-01-25 | Stop reason: HOSPADM

## 2019-01-03 RX ORDER — ISOSORBIDE MONONITRATE 30 MG/1
30 TABLET, EXTENDED RELEASE ORAL 2 TIMES DAILY
Status: DISCONTINUED | OUTPATIENT
Start: 2019-01-03 | End: 2019-01-25 | Stop reason: HOSPADM

## 2019-01-03 RX ORDER — ONDANSETRON 2 MG/ML
4 INJECTION INTRAMUSCULAR; INTRAVENOUS EVERY 6 HOURS PRN
Status: DISCONTINUED | OUTPATIENT
Start: 2019-01-03 | End: 2019-01-25 | Stop reason: HOSPADM

## 2019-01-03 RX ORDER — ATORVASTATIN CALCIUM 80 MG/1
80 TABLET, FILM COATED ORAL DAILY
Status: DISCONTINUED | OUTPATIENT
Start: 2019-01-04 | End: 2019-01-25 | Stop reason: HOSPADM

## 2019-01-03 RX ADMIN — CALCIUM GLUCONATE 1 G: 98 INJECTION, SOLUTION INTRAVENOUS at 20:59

## 2019-01-03 RX ADMIN — LEVOFLOXACIN 750 MG: 5 INJECTION, SOLUTION INTRAVENOUS at 21:10

## 2019-01-03 RX ADMIN — CEFEPIME HYDROCHLORIDE 2 G: 2 INJECTION, POWDER, FOR SOLUTION INTRAVENOUS at 21:11

## 2019-01-03 RX ADMIN — CALCIUM GLUCONATE 1 G: 94 INJECTION, SOLUTION INTRAVENOUS at 20:59

## 2019-01-03 RX ADMIN — DEXTROSE MONOHYDRATE 50 ML: 500 INJECTION PARENTERAL at 20:34

## 2019-01-03 RX ADMIN — INSULIN HUMAN 10 UNITS: 100 INJECTION, SOLUTION PARENTERAL at 20:33

## 2019-01-03 ASSESSMENT — ENCOUNTER SYMPTOMS
VOMITING: 0
WHEEZING: 0
COUGH: 0
ABDOMINAL DISTENTION: 0
COLOR CHANGE: 0
SHORTNESS OF BREATH: 0
ABDOMINAL PAIN: 0
CONSTIPATION: 0
NAUSEA: 0
SORE THROAT: 0
CHOKING: 0
CHEST TIGHTNESS: 0
DIARRHEA: 0

## 2019-01-03 ASSESSMENT — PAIN DESCRIPTION - PAIN TYPE: TYPE: CHRONIC PAIN

## 2019-01-03 ASSESSMENT — PAIN DESCRIPTION - DESCRIPTORS: DESCRIPTORS: SHARP

## 2019-01-03 ASSESSMENT — PAIN DESCRIPTION - ONSET: ONSET: ON-GOING

## 2019-01-03 ASSESSMENT — PAIN DESCRIPTION - LOCATION: LOCATION: FOOT

## 2019-01-03 ASSESSMENT — PAIN SCALES - GENERAL: PAINLEVEL_OUTOF10: 5

## 2019-01-03 ASSESSMENT — PAIN DESCRIPTION - ORIENTATION: ORIENTATION: LEFT

## 2019-01-03 ASSESSMENT — PAIN DESCRIPTION - FREQUENCY: FREQUENCY: CONTINUOUS

## 2019-01-03 ASSESSMENT — PAIN DESCRIPTION - PROGRESSION: CLINICAL_PROGRESSION: NOT CHANGED

## 2019-01-04 ENCOUNTER — ANESTHESIA (OUTPATIENT)
Dept: OPERATING ROOM | Age: 74
DRG: 239 | End: 2019-01-04
Payer: MEDICARE

## 2019-01-04 ENCOUNTER — ANESTHESIA EVENT (OUTPATIENT)
Dept: OPERATING ROOM | Age: 74
DRG: 239 | End: 2019-01-04
Payer: MEDICARE

## 2019-01-04 ENCOUNTER — APPOINTMENT (OUTPATIENT)
Dept: GENERAL RADIOLOGY | Age: 74
DRG: 239 | End: 2019-01-04
Payer: MEDICARE

## 2019-01-04 ENCOUNTER — APPOINTMENT (OUTPATIENT)
Dept: VASCULAR LAB | Age: 74
DRG: 239 | End: 2019-01-04
Payer: MEDICARE

## 2019-01-04 VITALS — TEMPERATURE: 100.4 F | OXYGEN SATURATION: 98 % | SYSTOLIC BLOOD PRESSURE: 92 MMHG | DIASTOLIC BLOOD PRESSURE: 51 MMHG

## 2019-01-04 PROBLEM — I25.10 CAD (CORONARY ARTERY DISEASE): Status: ACTIVE | Noted: 2019-01-04

## 2019-01-04 PROBLEM — I96 GANGRENE OF RIGHT FOOT (HCC): Status: ACTIVE | Noted: 2019-01-04

## 2019-01-04 PROBLEM — G93.41 ACUTE METABOLIC ENCEPHALOPATHY: Status: ACTIVE | Noted: 2019-01-04

## 2019-01-04 LAB
ABO/RH: NORMAL
ANION GAP SERPL CALCULATED.3IONS-SCNC: 18 MMOL/L (ref 3–16)
ANTIBODY SCREEN: NORMAL
BASOPHILS ABSOLUTE: 0 K/UL (ref 0–0.2)
BASOPHILS RELATIVE PERCENT: 0.2 %
BUN BLDV-MCNC: 55 MG/DL (ref 7–20)
CALCIUM SERPL-MCNC: 9.5 MG/DL (ref 8.3–10.6)
CHLORIDE BLD-SCNC: 94 MMOL/L (ref 99–110)
CO2: 23 MMOL/L (ref 21–32)
CREAT SERPL-MCNC: 8.6 MG/DL (ref 0.8–1.3)
EKG ATRIAL RATE: 76 BPM
EKG DIAGNOSIS: NORMAL
EKG P AXIS: 22 DEGREES
EKG P-R INTERVAL: 212 MS
EKG Q-T INTERVAL: 398 MS
EKG QRS DURATION: 96 MS
EKG QTC CALCULATION (BAZETT): 447 MS
EKG R AXIS: 33 DEGREES
EKG T AXIS: 50 DEGREES
EKG VENTRICULAR RATE: 76 BPM
EOSINOPHILS ABSOLUTE: 0 K/UL (ref 0–0.6)
EOSINOPHILS RELATIVE PERCENT: 0.2 %
GFR AFRICAN AMERICAN: 7
GFR NON-AFRICAN AMERICAN: 6
GLUCOSE BLD-MCNC: 79 MG/DL (ref 70–99)
GLUCOSE BLD-MCNC: 84 MG/DL (ref 70–99)
GLUCOSE BLD-MCNC: 85 MG/DL (ref 70–99)
GLUCOSE BLD-MCNC: 86 MG/DL (ref 70–99)
GLUCOSE BLD-MCNC: 91 MG/DL (ref 70–99)
GLUCOSE BLD-MCNC: 98 MG/DL (ref 70–99)
HCT VFR BLD CALC: 28.8 % (ref 40.5–52.5)
HEMOGLOBIN: 8.7 G/DL (ref 13.5–17.5)
HEPATITIS B SURFACE ANTIGEN INTERPRETATION: NORMAL
INR BLD: 1.44 (ref 0.86–1.14)
LYMPHOCYTES ABSOLUTE: 1.3 K/UL (ref 1–5.1)
LYMPHOCYTES RELATIVE PERCENT: 7.7 %
MCH RBC QN AUTO: 22 PG (ref 26–34)
MCHC RBC AUTO-ENTMCNC: 30.4 G/DL (ref 31–36)
MCV RBC AUTO: 72.4 FL (ref 80–100)
MONOCYTES ABSOLUTE: 1.4 K/UL (ref 0–1.3)
MONOCYTES RELATIVE PERCENT: 8.5 %
NEUTROPHILS ABSOLUTE: 13.6 K/UL (ref 1.7–7.7)
NEUTROPHILS RELATIVE PERCENT: 83.4 %
PDW BLD-RTO: 21.8 % (ref 12.4–15.4)
PERFORMED ON: NORMAL
PLATELET # BLD: 284 K/UL (ref 135–450)
PMV BLD AUTO: 8.2 FL (ref 5–10.5)
POTASSIUM REFLEX MAGNESIUM: 4.8 MMOL/L (ref 3.5–5.1)
PROCALCITONIN: 10.17 NG/ML (ref 0–0.15)
PROTHROMBIN TIME: 16.4 SEC (ref 9.8–13)
RAPID INFLUENZA  B AGN: NEGATIVE
RAPID INFLUENZA A AGN: NEGATIVE
RBC # BLD: 3.97 M/UL (ref 4.2–5.9)
SODIUM BLD-SCNC: 135 MMOL/L (ref 136–145)
VANCOMYCIN RANDOM: 15.5 UG/ML
WBC # BLD: 16.4 K/UL (ref 4–11)

## 2019-01-04 PROCEDURE — 6360000002 HC RX W HCPCS

## 2019-01-04 PROCEDURE — 2580000003 HC RX 258: Performed by: PODIATRIST

## 2019-01-04 PROCEDURE — 97166 OT EVAL MOD COMPLEX 45 MIN: CPT

## 2019-01-04 PROCEDURE — 93925 LOWER EXTREMITY STUDY: CPT

## 2019-01-04 PROCEDURE — 0Y6M0ZB DETACHMENT AT RIGHT FOOT, PARTIAL 2ND RAY, OPEN APPROACH: ICD-10-PCS | Performed by: PODIATRIST

## 2019-01-04 PROCEDURE — 3700000001 HC ADD 15 MINUTES (ANESTHESIA): Performed by: PODIATRIST

## 2019-01-04 PROCEDURE — 0Y6M0Z9 DETACHMENT AT RIGHT FOOT, PARTIAL 1ST RAY, OPEN APPROACH: ICD-10-PCS | Performed by: PODIATRIST

## 2019-01-04 PROCEDURE — 2580000003 HC RX 258: Performed by: PHYSICIAN ASSISTANT

## 2019-01-04 PROCEDURE — 3600000004 HC SURGERY LEVEL 4 BASE: Performed by: PODIATRIST

## 2019-01-04 PROCEDURE — 88311 DECALCIFY TISSUE: CPT

## 2019-01-04 PROCEDURE — 6370000000 HC RX 637 (ALT 250 FOR IP): Performed by: STUDENT IN AN ORGANIZED HEALTH CARE EDUCATION/TRAINING PROGRAM

## 2019-01-04 PROCEDURE — 87186 SC STD MICRODIL/AGAR DIL: CPT

## 2019-01-04 PROCEDURE — 87205 SMEAR GRAM STAIN: CPT

## 2019-01-04 PROCEDURE — 86900 BLOOD TYPING SEROLOGIC ABO: CPT

## 2019-01-04 PROCEDURE — 1200000000 HC SEMI PRIVATE

## 2019-01-04 PROCEDURE — 2580000003 HC RX 258

## 2019-01-04 PROCEDURE — 80048 BASIC METABOLIC PNL TOTAL CA: CPT

## 2019-01-04 PROCEDURE — 87070 CULTURE OTHR SPECIMN AEROBIC: CPT

## 2019-01-04 PROCEDURE — 3700000000 HC ANESTHESIA ATTENDED CARE: Performed by: PODIATRIST

## 2019-01-04 PROCEDURE — 0Y6M0ZF DETACHMENT AT RIGHT FOOT, PARTIAL 5TH RAY, OPEN APPROACH: ICD-10-PCS | Performed by: PODIATRIST

## 2019-01-04 PROCEDURE — 6360000002 HC RX W HCPCS: Performed by: NURSE ANESTHETIST, CERTIFIED REGISTERED

## 2019-01-04 PROCEDURE — 94664 DEMO&/EVAL PT USE INHALER: CPT

## 2019-01-04 PROCEDURE — 6360000002 HC RX W HCPCS: Performed by: PHYSICIAN ASSISTANT

## 2019-01-04 PROCEDURE — G8978 MOBILITY CURRENT STATUS: HCPCS

## 2019-01-04 PROCEDURE — 2580000003 HC RX 258: Performed by: STUDENT IN AN ORGANIZED HEALTH CARE EDUCATION/TRAINING PROGRAM

## 2019-01-04 PROCEDURE — 2709999900 HC NON-CHARGEABLE SUPPLY: Performed by: PODIATRIST

## 2019-01-04 PROCEDURE — G8988 SELF CARE GOAL STATUS: HCPCS

## 2019-01-04 PROCEDURE — 84145 PROCALCITONIN (PCT): CPT

## 2019-01-04 PROCEDURE — 2580000003 HC RX 258: Performed by: NURSE ANESTHETIST, CERTIFIED REGISTERED

## 2019-01-04 PROCEDURE — 87176 TISSUE HOMOGENIZATION CULTR: CPT

## 2019-01-04 PROCEDURE — 2500000003 HC RX 250 WO HCPCS: Performed by: NURSE ANESTHETIST, CERTIFIED REGISTERED

## 2019-01-04 PROCEDURE — 99291 CRITICAL CARE FIRST HOUR: CPT | Performed by: INTERNAL MEDICINE

## 2019-01-04 PROCEDURE — 87804 INFLUENZA ASSAY W/OPTIC: CPT

## 2019-01-04 PROCEDURE — 87077 CULTURE AEROBIC IDENTIFY: CPT

## 2019-01-04 PROCEDURE — 94761 N-INVAS EAR/PLS OXIMETRY MLT: CPT

## 2019-01-04 PROCEDURE — S0028 INJECTION, FAMOTIDINE, 20 MG: HCPCS | Performed by: STUDENT IN AN ORGANIZED HEALTH CARE EDUCATION/TRAINING PROGRAM

## 2019-01-04 PROCEDURE — 6360000002 HC RX W HCPCS: Performed by: STUDENT IN AN ORGANIZED HEALTH CARE EDUCATION/TRAINING PROGRAM

## 2019-01-04 PROCEDURE — 0Y6M0ZC DETACHMENT AT RIGHT FOOT, PARTIAL 3RD RAY, OPEN APPROACH: ICD-10-PCS | Performed by: PODIATRIST

## 2019-01-04 PROCEDURE — 85610 PROTHROMBIN TIME: CPT

## 2019-01-04 PROCEDURE — 86850 RBC ANTIBODY SCREEN: CPT

## 2019-01-04 PROCEDURE — 99221 1ST HOSP IP/OBS SF/LOW 40: CPT | Performed by: NURSE PRACTITIONER

## 2019-01-04 PROCEDURE — G8987 SELF CARE CURRENT STATUS: HCPCS

## 2019-01-04 PROCEDURE — 97530 THERAPEUTIC ACTIVITIES: CPT

## 2019-01-04 PROCEDURE — 87075 CULTR BACTERIA EXCEPT BLOOD: CPT

## 2019-01-04 PROCEDURE — 88304 TISSUE EXAM BY PATHOLOGIST: CPT

## 2019-01-04 PROCEDURE — 97163 PT EVAL HIGH COMPLEX 45 MIN: CPT

## 2019-01-04 PROCEDURE — 0Y6M0ZD DETACHMENT AT RIGHT FOOT, PARTIAL 4TH RAY, OPEN APPROACH: ICD-10-PCS | Performed by: PODIATRIST

## 2019-01-04 PROCEDURE — 90937 HEMODIALYSIS REPEATED EVAL: CPT

## 2019-01-04 PROCEDURE — 80202 ASSAY OF VANCOMYCIN: CPT

## 2019-01-04 PROCEDURE — 85025 COMPLETE CBC W/AUTO DIFF WBC: CPT

## 2019-01-04 PROCEDURE — 97535 SELF CARE MNGMENT TRAINING: CPT

## 2019-01-04 PROCEDURE — 73630 X-RAY EXAM OF FOOT: CPT

## 2019-01-04 PROCEDURE — G8979 MOBILITY GOAL STATUS: HCPCS

## 2019-01-04 PROCEDURE — 6360000002 HC RX W HCPCS: Performed by: PODIATRIST

## 2019-01-04 PROCEDURE — 3600000014 HC SURGERY LEVEL 4 ADDTL 15MIN: Performed by: PODIATRIST

## 2019-01-04 PROCEDURE — 89220 SPUTUM SPECIMEN COLLECTION: CPT

## 2019-01-04 PROCEDURE — 86901 BLOOD TYPING SEROLOGIC RH(D): CPT

## 2019-01-04 RX ORDER — ONDANSETRON 2 MG/ML
4 INJECTION INTRAMUSCULAR; INTRAVENOUS
Status: DISCONTINUED | OUTPATIENT
Start: 2019-01-04 | End: 2019-01-04 | Stop reason: HOSPADM

## 2019-01-04 RX ORDER — FENTANYL CITRATE 50 UG/ML
25 INJECTION, SOLUTION INTRAMUSCULAR; INTRAVENOUS EVERY 5 MIN PRN
Status: DISCONTINUED | OUTPATIENT
Start: 2019-01-04 | End: 2019-01-04 | Stop reason: HOSPADM

## 2019-01-04 RX ORDER — ROCURONIUM BROMIDE 10 MG/ML
INJECTION, SOLUTION INTRAVENOUS PRN
Status: DISCONTINUED | OUTPATIENT
Start: 2019-01-04 | End: 2019-01-04 | Stop reason: SDUPTHER

## 2019-01-04 RX ORDER — PROMETHAZINE HYDROCHLORIDE 25 MG/ML
6.25 INJECTION, SOLUTION INTRAMUSCULAR; INTRAVENOUS
Status: DISCONTINUED | OUTPATIENT
Start: 2019-01-04 | End: 2019-01-04 | Stop reason: HOSPADM

## 2019-01-04 RX ORDER — ONDANSETRON 2 MG/ML
INJECTION INTRAMUSCULAR; INTRAVENOUS PRN
Status: DISCONTINUED | OUTPATIENT
Start: 2019-01-04 | End: 2019-01-04 | Stop reason: SDUPTHER

## 2019-01-04 RX ORDER — FENTANYL CITRATE 50 UG/ML
INJECTION, SOLUTION INTRAMUSCULAR; INTRAVENOUS PRN
Status: DISCONTINUED | OUTPATIENT
Start: 2019-01-04 | End: 2019-01-04 | Stop reason: SDUPTHER

## 2019-01-04 RX ORDER — HYDRALAZINE HYDROCHLORIDE 20 MG/ML
10 INJECTION INTRAMUSCULAR; INTRAVENOUS ONCE
Status: COMPLETED | OUTPATIENT
Start: 2019-01-04 | End: 2019-01-04

## 2019-01-04 RX ORDER — LIDOCAINE HYDROCHLORIDE 20 MG/ML
INJECTION, SOLUTION EPIDURAL; INFILTRATION; INTRACAUDAL; PERINEURAL PRN
Status: DISCONTINUED | OUTPATIENT
Start: 2019-01-04 | End: 2019-01-04 | Stop reason: SDUPTHER

## 2019-01-04 RX ORDER — PROPOFOL 10 MG/ML
INJECTION, EMULSION INTRAVENOUS PRN
Status: DISCONTINUED | OUTPATIENT
Start: 2019-01-04 | End: 2019-01-04 | Stop reason: SDUPTHER

## 2019-01-04 RX ORDER — SODIUM CHLORIDE 9 MG/ML
INJECTION, SOLUTION INTRAVENOUS CONTINUOUS PRN
Status: DISCONTINUED | OUTPATIENT
Start: 2019-01-04 | End: 2019-01-04 | Stop reason: SDUPTHER

## 2019-01-04 RX ORDER — MAGNESIUM HYDROXIDE 1200 MG/15ML
LIQUID ORAL CONTINUOUS PRN
Status: COMPLETED | OUTPATIENT
Start: 2019-01-04 | End: 2019-01-04

## 2019-01-04 RX ORDER — DEXAMETHASONE SODIUM PHOSPHATE 4 MG/ML
INJECTION, SOLUTION INTRA-ARTICULAR; INTRALESIONAL; INTRAMUSCULAR; INTRAVENOUS; SOFT TISSUE PRN
Status: DISCONTINUED | OUTPATIENT
Start: 2019-01-04 | End: 2019-01-04 | Stop reason: SDUPTHER

## 2019-01-04 RX ORDER — FENTANYL CITRATE 50 UG/ML
50 INJECTION, SOLUTION INTRAMUSCULAR; INTRAVENOUS EVERY 5 MIN PRN
Status: DISCONTINUED | OUTPATIENT
Start: 2019-01-04 | End: 2019-01-04 | Stop reason: HOSPADM

## 2019-01-04 RX ADMIN — ATORVASTATIN CALCIUM 80 MG: 80 TABLET, FILM COATED ORAL at 08:21

## 2019-01-04 RX ADMIN — ISOSORBIDE MONONITRATE 30 MG: 30 TABLET, EXTENDED RELEASE ORAL at 08:21

## 2019-01-04 RX ADMIN — LIDOCAINE HYDROCHLORIDE 100 MG: 20 INJECTION, SOLUTION EPIDURAL; INFILTRATION; INTRACAUDAL; PERINEURAL at 12:02

## 2019-01-04 RX ADMIN — INSULIN GLARGINE 4 UNITS: 100 INJECTION, SOLUTION SUBCUTANEOUS at 10:05

## 2019-01-04 RX ADMIN — SUGAMMADEX 196 MG: 100 INJECTION, SOLUTION INTRAVENOUS at 12:46

## 2019-01-04 RX ADMIN — AMIODARONE HYDROCHLORIDE 200 MG: 200 TABLET ORAL at 06:00

## 2019-01-04 RX ADMIN — CARVEDILOL 25 MG: 25 TABLET, FILM COATED ORAL at 05:00

## 2019-01-04 RX ADMIN — VANCOMYCIN HYDROCHLORIDE 1000 MG: 10 INJECTION, POWDER, LYOPHILIZED, FOR SOLUTION INTRAVENOUS at 17:59

## 2019-01-04 RX ADMIN — Medication 10 ML: at 00:30

## 2019-01-04 RX ADMIN — Medication 10 ML: at 08:21

## 2019-01-04 RX ADMIN — SODIUM CHLORIDE: 900 INJECTION, SOLUTION INTRAVENOUS at 11:58

## 2019-01-04 RX ADMIN — HEPARIN SODIUM 5000 UNITS: 5000 INJECTION INTRAVENOUS; SUBCUTANEOUS at 05:59

## 2019-01-04 RX ADMIN — PROPOFOL 140 MG: 10 INJECTION, EMULSION INTRAVENOUS at 12:02

## 2019-01-04 RX ADMIN — HEPARIN SODIUM 5000 UNITS: 5000 INJECTION INTRAVENOUS; SUBCUTANEOUS at 17:59

## 2019-01-04 RX ADMIN — MUPIROCIN: 20 OINTMENT TOPICAL at 20:53

## 2019-01-04 RX ADMIN — FAMOTIDINE 20 MG: 10 INJECTION, SOLUTION INTRAVENOUS at 08:21

## 2019-01-04 RX ADMIN — FENTANYL CITRATE 100 MCG: 50 INJECTION INTRAMUSCULAR; INTRAVENOUS at 12:02

## 2019-01-04 RX ADMIN — CLOPIDOGREL BISULFATE 75 MG: 75 TABLET ORAL at 08:21

## 2019-01-04 RX ADMIN — DEXAMETHASONE SODIUM PHOSPHATE 4 MG: 4 INJECTION, SOLUTION INTRAMUSCULAR; INTRAVENOUS at 12:02

## 2019-01-04 RX ADMIN — CARVEDILOL 25 MG: 25 TABLET, FILM COATED ORAL at 20:46

## 2019-01-04 RX ADMIN — ISOSORBIDE MONONITRATE 30 MG: 30 TABLET, EXTENDED RELEASE ORAL at 00:10

## 2019-01-04 RX ADMIN — NEPHROCAP 1 MG: 1 CAP ORAL at 08:21

## 2019-01-04 RX ADMIN — HYDRALAZINE HYDROCHLORIDE 10 MG: 20 INJECTION INTRAMUSCULAR; INTRAVENOUS at 03:48

## 2019-01-04 RX ADMIN — PIPERACILLIN SODIUM,TAZOBACTAM SODIUM 3.38 G: 3; .375 INJECTION, POWDER, FOR SOLUTION INTRAVENOUS at 08:55

## 2019-01-04 RX ADMIN — ISOSORBIDE MONONITRATE 30 MG: 30 TABLET, EXTENDED RELEASE ORAL at 20:46

## 2019-01-04 RX ADMIN — VANCOMYCIN HYDROCHLORIDE 1500 MG: 10 INJECTION, POWDER, LYOPHILIZED, FOR SOLUTION INTRAVENOUS at 00:46

## 2019-01-04 RX ADMIN — Medication 10 ML: at 20:47

## 2019-01-04 RX ADMIN — ONDANSETRON 4 MG: 2 INJECTION INTRAMUSCULAR; INTRAVENOUS at 12:02

## 2019-01-04 RX ADMIN — PHENYLEPHRINE HYDROCHLORIDE 120 MCG: 10 INJECTION, SOLUTION INTRAMUSCULAR; INTRAVENOUS; SUBCUTANEOUS at 12:35

## 2019-01-04 RX ADMIN — CINACALCET HYDROCHLORIDE 30 MG: 30 TABLET, COATED ORAL at 08:22

## 2019-01-04 RX ADMIN — HEPARIN SODIUM 5000 UNITS: 5000 INJECTION INTRAVENOUS; SUBCUTANEOUS at 00:16

## 2019-01-04 RX ADMIN — ASPIRIN 325 MG: 325 TABLET, DELAYED RELEASE ORAL at 08:21

## 2019-01-04 RX ADMIN — LOSARTAN POTASSIUM 50 MG: 50 TABLET ORAL at 08:21

## 2019-01-04 RX ADMIN — PHENYLEPHRINE HYDROCHLORIDE 80 MCG: 10 INJECTION, SOLUTION INTRAMUSCULAR; INTRAVENOUS; SUBCUTANEOUS at 12:23

## 2019-01-04 RX ADMIN — HEPARIN SODIUM 5000 UNITS: 5000 INJECTION INTRAVENOUS; SUBCUTANEOUS at 20:46

## 2019-01-04 RX ADMIN — CARVEDILOL 25 MG: 25 TABLET, FILM COATED ORAL at 00:09

## 2019-01-04 RX ADMIN — MUPIROCIN: 20 OINTMENT TOPICAL at 08:55

## 2019-01-04 RX ADMIN — PIPERACILLIN SODIUM,TAZOBACTAM SODIUM 3.38 G: 3; .375 INJECTION, POWDER, FOR SOLUTION INTRAVENOUS at 20:46

## 2019-01-04 RX ADMIN — PHENYLEPHRINE HYDROCHLORIDE 80 MCG: 10 INJECTION, SOLUTION INTRAMUSCULAR; INTRAVENOUS; SUBCUTANEOUS at 12:32

## 2019-01-04 RX ADMIN — ROCURONIUM BROMIDE 50 MG: 10 INJECTION, SOLUTION INTRAVENOUS at 12:02

## 2019-01-04 RX ADMIN — INSULIN GLARGINE 8 UNITS: 100 INJECTION, SOLUTION SUBCUTANEOUS at 00:16

## 2019-01-04 ASSESSMENT — PULMONARY FUNCTION TESTS
PIF_VALUE: 1
PIF_VALUE: 18
PIF_VALUE: 0
PIF_VALUE: 1
PIF_VALUE: 18
PIF_VALUE: 2
PIF_VALUE: 1
PIF_VALUE: 18
PIF_VALUE: 2
PIF_VALUE: 2
PIF_VALUE: 18
PIF_VALUE: 21
PIF_VALUE: 1
PIF_VALUE: 0
PIF_VALUE: 18
PIF_VALUE: 1
PIF_VALUE: 18
PIF_VALUE: 1
PIF_VALUE: 18
PIF_VALUE: 2
PIF_VALUE: 18
PIF_VALUE: 18
PIF_VALUE: 14
PIF_VALUE: 0
PIF_VALUE: 18
PIF_VALUE: 0
PIF_VALUE: 0
PIF_VALUE: 18
PIF_VALUE: 2
PIF_VALUE: 0
PIF_VALUE: 18
PIF_VALUE: 19
PIF_VALUE: 19
PIF_VALUE: 18
PIF_VALUE: 19
PIF_VALUE: 18
PIF_VALUE: 0
PIF_VALUE: 18
PIF_VALUE: 2

## 2019-01-04 ASSESSMENT — PAIN SCALES - GENERAL
PAINLEVEL_OUTOF10: 0
PAINLEVEL_OUTOF10: 4
PAINLEVEL_OUTOF10: 0

## 2019-01-04 ASSESSMENT — PAIN DESCRIPTION - DESCRIPTORS: DESCRIPTORS: SHARP

## 2019-01-04 ASSESSMENT — PAIN DESCRIPTION - ORIENTATION: ORIENTATION: RIGHT

## 2019-01-04 ASSESSMENT — PAIN DESCRIPTION - PAIN TYPE
TYPE: CHRONIC PAIN;ACUTE PAIN
TYPE: CHRONIC PAIN

## 2019-01-04 ASSESSMENT — PAIN DESCRIPTION - LOCATION
LOCATION: FOOT
LOCATION: FOOT

## 2019-01-05 ENCOUNTER — APPOINTMENT (OUTPATIENT)
Dept: GENERAL RADIOLOGY | Age: 74
DRG: 239 | End: 2019-01-05
Payer: MEDICARE

## 2019-01-05 LAB
ANION GAP SERPL CALCULATED.3IONS-SCNC: 15 MMOL/L (ref 3–16)
ANISOCYTOSIS: ABNORMAL
ATYPICAL LYMPHOCYTE RELATIVE PERCENT: 1 % (ref 0–6)
BASOPHILS ABSOLUTE: 0 K/UL (ref 0–0.2)
BASOPHILS RELATIVE PERCENT: 0 %
BUN BLDV-MCNC: 38 MG/DL (ref 7–20)
CALCIUM SERPL-MCNC: 8.9 MG/DL (ref 8.3–10.6)
CHLORIDE BLD-SCNC: 95 MMOL/L (ref 99–110)
CO2: 26 MMOL/L (ref 21–32)
CREAT SERPL-MCNC: 6.7 MG/DL (ref 0.8–1.3)
EOSINOPHILS ABSOLUTE: 0.2 K/UL (ref 0–0.6)
EOSINOPHILS RELATIVE PERCENT: 1 %
FERRITIN: 1499 NG/ML (ref 30–400)
GFR AFRICAN AMERICAN: 10
GFR NON-AFRICAN AMERICAN: 8
GLUCOSE BLD-MCNC: 125 MG/DL (ref 70–99)
GLUCOSE BLD-MCNC: 131 MG/DL (ref 70–99)
GLUCOSE BLD-MCNC: 95 MG/DL (ref 70–99)
GLUCOSE BLD-MCNC: 96 MG/DL (ref 70–99)
GLUCOSE BLD-MCNC: 98 MG/DL (ref 70–99)
HCT VFR BLD CALC: 27.2 % (ref 40.5–52.5)
HCT VFR BLD CALC: 27.4 % (ref 40.5–52.5)
HEMOGLOBIN: 8 G/DL (ref 13.5–17.5)
HEMOGLOBIN: 8.3 G/DL (ref 13.5–17.5)
IRON SATURATION: 18 % (ref 20–50)
IRON: 17 UG/DL (ref 59–158)
LYMPHOCYTES ABSOLUTE: 2.1 K/UL (ref 1–5.1)
LYMPHOCYTES RELATIVE PERCENT: 11 %
MCH RBC QN AUTO: 22 PG (ref 26–34)
MCHC RBC AUTO-ENTMCNC: 30.5 G/DL (ref 31–36)
MCV RBC AUTO: 72.2 FL (ref 80–100)
MONOCYTES ABSOLUTE: 1.7 K/UL (ref 0–1.3)
MONOCYTES RELATIVE PERCENT: 10 %
NEUTROPHILS ABSOLUTE: 13.3 K/UL (ref 1.7–7.7)
NEUTROPHILS RELATIVE PERCENT: 77 %
OCCULT BLOOD DIAGNOSTIC: NORMAL
PARATHYROID HORMONE INTACT: 187.7 PG/ML (ref 14–72)
PDW BLD-RTO: 22.1 % (ref 12.4–15.4)
PERFORMED ON: ABNORMAL
PERFORMED ON: ABNORMAL
PERFORMED ON: NORMAL
PERFORMED ON: NORMAL
PHOSPHORUS: 4 MG/DL (ref 2.5–4.9)
PLATELET # BLD: 281 K/UL (ref 135–450)
PMV BLD AUTO: 8.2 FL (ref 5–10.5)
POIKILOCYTES: ABNORMAL
POTASSIUM REFLEX MAGNESIUM: 4.7 MMOL/L (ref 3.5–5.1)
RBC # BLD: 3.8 M/UL (ref 4.2–5.9)
SEDIMENTATION RATE, ERYTHROCYTE: 115 MM/HR (ref 0–20)
SODIUM BLD-SCNC: 136 MMOL/L (ref 136–145)
TOTAL IRON BINDING CAPACITY: 96 UG/DL (ref 260–445)
WBC # BLD: 17.3 K/UL (ref 4–11)

## 2019-01-05 PROCEDURE — 92611 MOTION FLUOROSCOPY/SWALLOW: CPT

## 2019-01-05 PROCEDURE — 6360000002 HC RX W HCPCS

## 2019-01-05 PROCEDURE — 84100 ASSAY OF PHOSPHORUS: CPT

## 2019-01-05 PROCEDURE — 6370000000 HC RX 637 (ALT 250 FOR IP): Performed by: STUDENT IN AN ORGANIZED HEALTH CARE EDUCATION/TRAINING PROGRAM

## 2019-01-05 PROCEDURE — 83550 IRON BINDING TEST: CPT

## 2019-01-05 PROCEDURE — 36415 COLL VENOUS BLD VENIPUNCTURE: CPT

## 2019-01-05 PROCEDURE — G8997 SWALLOW GOAL STATUS: HCPCS

## 2019-01-05 PROCEDURE — 85014 HEMATOCRIT: CPT

## 2019-01-05 PROCEDURE — 6360000002 HC RX W HCPCS: Performed by: PODIATRIST

## 2019-01-05 PROCEDURE — S0028 INJECTION, FAMOTIDINE, 20 MG: HCPCS | Performed by: STUDENT IN AN ORGANIZED HEALTH CARE EDUCATION/TRAINING PROGRAM

## 2019-01-05 PROCEDURE — 82728 ASSAY OF FERRITIN: CPT

## 2019-01-05 PROCEDURE — 2580000003 HC RX 258: Performed by: STUDENT IN AN ORGANIZED HEALTH CARE EDUCATION/TRAINING PROGRAM

## 2019-01-05 PROCEDURE — 85652 RBC SED RATE AUTOMATED: CPT

## 2019-01-05 PROCEDURE — 83540 ASSAY OF IRON: CPT

## 2019-01-05 PROCEDURE — 92610 EVALUATE SWALLOWING FUNCTION: CPT

## 2019-01-05 PROCEDURE — G0328 FECAL BLOOD SCRN IMMUNOASSAY: HCPCS

## 2019-01-05 PROCEDURE — 73610 X-RAY EXAM OF ANKLE: CPT

## 2019-01-05 PROCEDURE — G8996 SWALLOW CURRENT STATUS: HCPCS

## 2019-01-05 PROCEDURE — 73630 X-RAY EXAM OF FOOT: CPT

## 2019-01-05 PROCEDURE — 85018 HEMOGLOBIN: CPT

## 2019-01-05 PROCEDURE — 6360000002 HC RX W HCPCS: Performed by: STUDENT IN AN ORGANIZED HEALTH CARE EDUCATION/TRAINING PROGRAM

## 2019-01-05 PROCEDURE — 99233 SBSQ HOSP IP/OBS HIGH 50: CPT | Performed by: INTERNAL MEDICINE

## 2019-01-05 PROCEDURE — 85025 COMPLETE CBC W/AUTO DIFF WBC: CPT

## 2019-01-05 PROCEDURE — C9113 INJ PANTOPRAZOLE SODIUM, VIA: HCPCS | Performed by: STUDENT IN AN ORGANIZED HEALTH CARE EDUCATION/TRAINING PROGRAM

## 2019-01-05 PROCEDURE — 99222 1ST HOSP IP/OBS MODERATE 55: CPT | Performed by: SURGERY

## 2019-01-05 PROCEDURE — 1200000000 HC SEMI PRIVATE

## 2019-01-05 PROCEDURE — 94760 N-INVAS EAR/PLS OXIMETRY 1: CPT

## 2019-01-05 PROCEDURE — 83970 ASSAY OF PARATHORMONE: CPT

## 2019-01-05 PROCEDURE — 2580000003 HC RX 258

## 2019-01-05 PROCEDURE — 80048 BASIC METABOLIC PNL TOTAL CA: CPT

## 2019-01-05 RX ORDER — PANTOPRAZOLE SODIUM 40 MG/10ML
40 INJECTION, POWDER, LYOPHILIZED, FOR SOLUTION INTRAVENOUS 2 TIMES DAILY
Status: DISCONTINUED | OUTPATIENT
Start: 2019-01-05 | End: 2019-01-07

## 2019-01-05 RX ORDER — SACCHAROMYCES BOULARDII 250 MG
250 CAPSULE ORAL 2 TIMES DAILY
Status: DISCONTINUED | OUTPATIENT
Start: 2019-01-05 | End: 2019-01-25 | Stop reason: HOSPADM

## 2019-01-05 RX ORDER — LOSARTAN POTASSIUM 100 MG/1
100 TABLET ORAL DAILY
Status: DISCONTINUED | OUTPATIENT
Start: 2019-01-06 | End: 2019-01-10

## 2019-01-05 RX ORDER — FAMOTIDINE 20 MG/1
20 TABLET, FILM COATED ORAL DAILY
Status: DISCONTINUED | OUTPATIENT
Start: 2019-01-06 | End: 2019-01-05

## 2019-01-05 RX ADMIN — PIPERACILLIN SODIUM,TAZOBACTAM SODIUM 3.38 G: 3; .375 INJECTION, POWDER, FOR SOLUTION INTRAVENOUS at 20:27

## 2019-01-05 RX ADMIN — CLOPIDOGREL BISULFATE 75 MG: 75 TABLET ORAL at 08:03

## 2019-01-05 RX ADMIN — CINACALCET HYDROCHLORIDE 30 MG: 30 TABLET, COATED ORAL at 08:03

## 2019-01-05 RX ADMIN — MUPIROCIN: 20 OINTMENT TOPICAL at 20:23

## 2019-01-05 RX ADMIN — FAMOTIDINE 20 MG: 10 INJECTION, SOLUTION INTRAVENOUS at 08:03

## 2019-01-05 RX ADMIN — Medication 10 ML: at 20:27

## 2019-01-05 RX ADMIN — HYDROMORPHONE HYDROCHLORIDE 0.5 MG: 1 INJECTION, SOLUTION INTRAMUSCULAR; INTRAVENOUS; SUBCUTANEOUS at 07:13

## 2019-01-05 RX ADMIN — NEPHROCAP 1 MG: 1 CAP ORAL at 08:02

## 2019-01-05 RX ADMIN — MUPIROCIN: 20 OINTMENT TOPICAL at 08:03

## 2019-01-05 RX ADMIN — HEPARIN SODIUM 5000 UNITS: 5000 INJECTION INTRAVENOUS; SUBCUTANEOUS at 06:14

## 2019-01-05 RX ADMIN — ISOSORBIDE MONONITRATE 30 MG: 30 TABLET, EXTENDED RELEASE ORAL at 20:24

## 2019-01-05 RX ADMIN — Medication 250 MG: at 23:15

## 2019-01-05 RX ADMIN — CARVEDILOL 25 MG: 25 TABLET, FILM COATED ORAL at 08:03

## 2019-01-05 RX ADMIN — LOSARTAN POTASSIUM 50 MG: 50 TABLET ORAL at 08:03

## 2019-01-05 RX ADMIN — Medication 10 ML: at 08:03

## 2019-01-05 RX ADMIN — ISOSORBIDE MONONITRATE 30 MG: 30 TABLET, EXTENDED RELEASE ORAL at 08:02

## 2019-01-05 RX ADMIN — ATORVASTATIN CALCIUM 80 MG: 80 TABLET, FILM COATED ORAL at 08:02

## 2019-01-05 RX ADMIN — AMIODARONE HYDROCHLORIDE 200 MG: 200 TABLET ORAL at 08:02

## 2019-01-05 RX ADMIN — CARVEDILOL 25 MG: 25 TABLET, FILM COATED ORAL at 20:25

## 2019-01-05 RX ADMIN — INSULIN GLARGINE 4 UNITS: 100 INJECTION, SOLUTION SUBCUTANEOUS at 20:27

## 2019-01-05 RX ADMIN — PANTOPRAZOLE SODIUM 40 MG: 40 INJECTION, POWDER, FOR SOLUTION INTRAVENOUS at 23:18

## 2019-01-05 RX ADMIN — ASPIRIN 325 MG: 325 TABLET, DELAYED RELEASE ORAL at 08:03

## 2019-01-05 RX ADMIN — PIPERACILLIN SODIUM,TAZOBACTAM SODIUM 3.38 G: 3; .375 INJECTION, POWDER, FOR SOLUTION INTRAVENOUS at 08:03

## 2019-01-05 ASSESSMENT — PAIN SCALES - GENERAL
PAINLEVEL_OUTOF10: 0

## 2019-01-06 ENCOUNTER — APPOINTMENT (OUTPATIENT)
Dept: GENERAL RADIOLOGY | Age: 74
DRG: 239 | End: 2019-01-06
Payer: MEDICARE

## 2019-01-06 LAB
ANION GAP SERPL CALCULATED.3IONS-SCNC: 18 MMOL/L (ref 3–16)
ANISOCYTOSIS: ABNORMAL
BASOPHILS ABSOLUTE: 0 K/UL (ref 0–0.2)
BASOPHILS RELATIVE PERCENT: 0 %
BUN BLDV-MCNC: 65 MG/DL (ref 7–20)
CALCIUM SERPL-MCNC: 9.1 MG/DL (ref 8.3–10.6)
CHLORIDE BLD-SCNC: 92 MMOL/L (ref 99–110)
CO2: 23 MMOL/L (ref 21–32)
CREAT SERPL-MCNC: 8.9 MG/DL (ref 0.8–1.3)
EOSINOPHILS ABSOLUTE: 0 K/UL (ref 0–0.6)
EOSINOPHILS RELATIVE PERCENT: 0 %
GFR AFRICAN AMERICAN: 7
GFR NON-AFRICAN AMERICAN: 6
GLUCOSE BLD-MCNC: 152 MG/DL (ref 70–99)
GLUCOSE BLD-MCNC: 189 MG/DL (ref 70–99)
GLUCOSE BLD-MCNC: 198 MG/DL (ref 70–99)
GLUCOSE BLD-MCNC: 207 MG/DL (ref 70–99)
GLUCOSE BLD-MCNC: 233 MG/DL (ref 70–99)
HCT VFR BLD CALC: 24.9 % (ref 40.5–52.5)
HCT VFR BLD CALC: 25 % (ref 40.5–52.5)
HCT VFR BLD CALC: 26.1 % (ref 40.5–52.5)
HCT VFR BLD CALC: 26.6 % (ref 40.5–52.5)
HEMOGLOBIN: 7.5 G/DL (ref 13.5–17.5)
HEMOGLOBIN: 7.5 G/DL (ref 13.5–17.5)
HEMOGLOBIN: 7.6 G/DL (ref 13.5–17.5)
HEMOGLOBIN: 7.8 G/DL (ref 13.5–17.5)
HYPOCHROMIA: ABNORMAL
LYMPHOCYTES ABSOLUTE: 1.1 K/UL (ref 1–5.1)
LYMPHOCYTES RELATIVE PERCENT: 7 %
MCH RBC QN AUTO: 21.8 PG (ref 26–34)
MCHC RBC AUTO-ENTMCNC: 30.1 G/DL (ref 31–36)
MCV RBC AUTO: 72.6 FL (ref 80–100)
METAMYELOCYTES RELATIVE PERCENT: 2 %
MONOCYTES ABSOLUTE: 1.7 K/UL (ref 0–1.3)
MONOCYTES RELATIVE PERCENT: 11 %
MYELOCYTE PERCENT: 1 %
NEUTROPHILS ABSOLUTE: 12.9 K/UL (ref 1.7–7.7)
NEUTROPHILS RELATIVE PERCENT: 79 %
PDW BLD-RTO: 22 % (ref 12.4–15.4)
PERFORMED ON: ABNORMAL
PLATELET # BLD: 262 K/UL (ref 135–450)
PMV BLD AUTO: 8.1 FL (ref 5–10.5)
POTASSIUM REFLEX MAGNESIUM: 4.6 MMOL/L (ref 3.5–5.1)
RBC # BLD: 3.43 M/UL (ref 4.2–5.9)
SODIUM BLD-SCNC: 133 MMOL/L (ref 136–145)
WBC # BLD: 15.7 K/UL (ref 4–11)

## 2019-01-06 PROCEDURE — 2580000003 HC RX 258

## 2019-01-06 PROCEDURE — 85018 HEMOGLOBIN: CPT

## 2019-01-06 PROCEDURE — 2580000003 HC RX 258: Performed by: STUDENT IN AN ORGANIZED HEALTH CARE EDUCATION/TRAINING PROGRAM

## 2019-01-06 PROCEDURE — 6370000000 HC RX 637 (ALT 250 FOR IP): Performed by: STUDENT IN AN ORGANIZED HEALTH CARE EDUCATION/TRAINING PROGRAM

## 2019-01-06 PROCEDURE — 6360000002 HC RX W HCPCS: Performed by: STUDENT IN AN ORGANIZED HEALTH CARE EDUCATION/TRAINING PROGRAM

## 2019-01-06 PROCEDURE — 6360000002 HC RX W HCPCS

## 2019-01-06 PROCEDURE — 80048 BASIC METABOLIC PNL TOTAL CA: CPT

## 2019-01-06 PROCEDURE — 85014 HEMATOCRIT: CPT

## 2019-01-06 PROCEDURE — 71045 X-RAY EXAM CHEST 1 VIEW: CPT

## 2019-01-06 PROCEDURE — 85025 COMPLETE CBC W/AUTO DIFF WBC: CPT

## 2019-01-06 PROCEDURE — C9113 INJ PANTOPRAZOLE SODIUM, VIA: HCPCS | Performed by: STUDENT IN AN ORGANIZED HEALTH CARE EDUCATION/TRAINING PROGRAM

## 2019-01-06 PROCEDURE — 6360000002 HC RX W HCPCS: Performed by: PODIATRIST

## 2019-01-06 PROCEDURE — 6370000000 HC RX 637 (ALT 250 FOR IP): Performed by: INTERNAL MEDICINE

## 2019-01-06 PROCEDURE — 36415 COLL VENOUS BLD VENIPUNCTURE: CPT

## 2019-01-06 PROCEDURE — 1200000000 HC SEMI PRIVATE

## 2019-01-06 PROCEDURE — 92526 ORAL FUNCTION THERAPY: CPT

## 2019-01-06 RX ORDER — SODIUM CHLORIDE 9 MG/ML
INJECTION, SOLUTION INTRAVENOUS CONTINUOUS
Status: DISCONTINUED | OUTPATIENT
Start: 2019-01-07 | End: 2019-01-07

## 2019-01-06 RX ADMIN — Medication 250 MG: at 07:56

## 2019-01-06 RX ADMIN — CLOPIDOGREL BISULFATE 75 MG: 75 TABLET ORAL at 07:56

## 2019-01-06 RX ADMIN — INSULIN LISPRO 2 UNITS: 100 INJECTION, SOLUTION INTRAVENOUS; SUBCUTANEOUS at 18:32

## 2019-01-06 RX ADMIN — INSULIN GLARGINE 4 UNITS: 100 INJECTION, SOLUTION SUBCUTANEOUS at 21:13

## 2019-01-06 RX ADMIN — ASPIRIN 325 MG: 325 TABLET, DELAYED RELEASE ORAL at 07:56

## 2019-01-06 RX ADMIN — PANTOPRAZOLE SODIUM 40 MG: 40 INJECTION, POWDER, FOR SOLUTION INTRAVENOUS at 21:00

## 2019-01-06 RX ADMIN — MUPIROCIN: 20 OINTMENT TOPICAL at 23:24

## 2019-01-06 RX ADMIN — AMIODARONE HYDROCHLORIDE 200 MG: 200 TABLET ORAL at 07:56

## 2019-01-06 RX ADMIN — HYDROMORPHONE HYDROCHLORIDE 0.5 MG: 1 INJECTION, SOLUTION INTRAMUSCULAR; INTRAVENOUS; SUBCUTANEOUS at 09:31

## 2019-01-06 RX ADMIN — NEPHROCAP 1 MG: 1 CAP ORAL at 07:56

## 2019-01-06 RX ADMIN — CINACALCET HYDROCHLORIDE 30 MG: 30 TABLET, COATED ORAL at 07:58

## 2019-01-06 RX ADMIN — LOSARTAN POTASSIUM 100 MG: 100 TABLET ORAL at 07:56

## 2019-01-06 RX ADMIN — INSULIN LISPRO 1 UNITS: 100 INJECTION, SOLUTION INTRAVENOUS; SUBCUTANEOUS at 09:33

## 2019-01-06 RX ADMIN — PIPERACILLIN SODIUM,TAZOBACTAM SODIUM 3.38 G: 3; .375 INJECTION, POWDER, FOR SOLUTION INTRAVENOUS at 20:59

## 2019-01-06 RX ADMIN — INSULIN LISPRO 2 UNITS: 100 INJECTION, SOLUTION INTRAVENOUS; SUBCUTANEOUS at 15:08

## 2019-01-06 RX ADMIN — ISOSORBIDE MONONITRATE 30 MG: 30 TABLET, EXTENDED RELEASE ORAL at 20:59

## 2019-01-06 RX ADMIN — PIPERACILLIN SODIUM,TAZOBACTAM SODIUM 3.38 G: 3; .375 INJECTION, POWDER, FOR SOLUTION INTRAVENOUS at 07:57

## 2019-01-06 RX ADMIN — Medication 10 ML: at 21:00

## 2019-01-06 RX ADMIN — MUPIROCIN: 20 OINTMENT TOPICAL at 07:57

## 2019-01-06 RX ADMIN — PANTOPRAZOLE SODIUM 40 MG: 40 INJECTION, POWDER, FOR SOLUTION INTRAVENOUS at 07:57

## 2019-01-06 RX ADMIN — ATORVASTATIN CALCIUM 80 MG: 80 TABLET, FILM COATED ORAL at 07:56

## 2019-01-06 RX ADMIN — ONDANSETRON 4 MG: 2 INJECTION INTRAMUSCULAR; INTRAVENOUS at 23:23

## 2019-01-06 RX ADMIN — CARVEDILOL 25 MG: 25 TABLET, FILM COATED ORAL at 07:56

## 2019-01-06 RX ADMIN — ISOSORBIDE MONONITRATE 30 MG: 30 TABLET, EXTENDED RELEASE ORAL at 07:56

## 2019-01-06 RX ADMIN — Medication 250 MG: at 20:58

## 2019-01-06 RX ADMIN — INSULIN LISPRO 1 UNITS: 100 INJECTION, SOLUTION INTRAVENOUS; SUBCUTANEOUS at 21:13

## 2019-01-06 RX ADMIN — CARVEDILOL 25 MG: 25 TABLET, FILM COATED ORAL at 20:59

## 2019-01-06 ASSESSMENT — PAIN SCALES - GENERAL
PAINLEVEL_OUTOF10: 0
PAINLEVEL_OUTOF10: 9

## 2019-01-06 ASSESSMENT — PAIN DESCRIPTION - DESCRIPTORS: DESCRIPTORS: SHOOTING;THROBBING

## 2019-01-06 ASSESSMENT — PAIN DESCRIPTION - PROGRESSION: CLINICAL_PROGRESSION: NOT CHANGED

## 2019-01-06 ASSESSMENT — PAIN DESCRIPTION - LOCATION: LOCATION: FOOT

## 2019-01-06 ASSESSMENT — PAIN DESCRIPTION - PAIN TYPE: TYPE: SURGICAL PAIN

## 2019-01-06 ASSESSMENT — PAIN DESCRIPTION - ONSET: ONSET: ON-GOING

## 2019-01-06 ASSESSMENT — PAIN DESCRIPTION - ORIENTATION: ORIENTATION: RIGHT

## 2019-01-06 ASSESSMENT — PAIN DESCRIPTION - FREQUENCY: FREQUENCY: CONTINUOUS

## 2019-01-07 ENCOUNTER — APPOINTMENT (OUTPATIENT)
Dept: GENERAL RADIOLOGY | Age: 74
DRG: 239 | End: 2019-01-07
Payer: MEDICARE

## 2019-01-07 ENCOUNTER — TELEPHONE (OUTPATIENT)
Dept: PRIMARY CARE CLINIC | Age: 74
End: 2019-01-07

## 2019-01-07 PROBLEM — A48.0 GAS GANGRENE (HCC): Status: ACTIVE | Noted: 2019-01-07

## 2019-01-07 LAB
ALBUMIN SERPL-MCNC: 2.8 G/DL (ref 3.4–5)
ALP BLD-CCNC: 88 U/L (ref 40–129)
ALT SERPL-CCNC: 14 U/L (ref 10–40)
ANION GAP SERPL CALCULATED.3IONS-SCNC: 23 MMOL/L (ref 3–16)
ANISOCYTOSIS: ABNORMAL
AST SERPL-CCNC: 14 U/L (ref 15–37)
BASOPHILS ABSOLUTE: 0 K/UL (ref 0–0.2)
BASOPHILS RELATIVE PERCENT: 0 %
BILIRUB SERPL-MCNC: 0.3 MG/DL (ref 0–1)
BILIRUBIN DIRECT: <0.2 MG/DL (ref 0–0.3)
BILIRUBIN, INDIRECT: ABNORMAL MG/DL (ref 0–1)
BUN BLDV-MCNC: 86 MG/DL (ref 7–20)
C DIFFICILE TOXIN, EIA: NORMAL
CALCIUM SERPL-MCNC: 8.8 MG/DL (ref 8.3–10.6)
CHLORIDE BLD-SCNC: 92 MMOL/L (ref 99–110)
CO2: 20 MMOL/L (ref 21–32)
CREAT SERPL-MCNC: 11.3 MG/DL (ref 0.8–1.3)
EOSINOPHILS ABSOLUTE: 0.3 K/UL (ref 0–0.6)
EOSINOPHILS RELATIVE PERCENT: 2 %
GFR AFRICAN AMERICAN: 5
GFR NON-AFRICAN AMERICAN: 4
GLUCOSE BLD-MCNC: 120 MG/DL (ref 70–99)
GLUCOSE BLD-MCNC: 125 MG/DL (ref 70–99)
GLUCOSE BLD-MCNC: 132 MG/DL (ref 70–99)
GLUCOSE BLD-MCNC: 133 MG/DL (ref 70–99)
GLUCOSE BLD-MCNC: 183 MG/DL (ref 70–99)
HCT VFR BLD CALC: 23.3 % (ref 40.5–52.5)
HCT VFR BLD CALC: 24.2 % (ref 40.5–52.5)
HCT VFR BLD CALC: 25 % (ref 40.5–52.5)
HCT VFR BLD CALC: 25.3 % (ref 40.5–52.5)
HCT VFR BLD CALC: 25.3 % (ref 40.5–52.5)
HCT VFR BLD CALC: 26.2 % (ref 40.5–52.5)
HCT VFR BLD CALC: 27.2 % (ref 40.5–52.5)
HEMOGLOBIN: 6.9 G/DL (ref 13.5–17.5)
HEMOGLOBIN: 7.2 G/DL (ref 13.5–17.5)
HEMOGLOBIN: 7.4 G/DL (ref 13.5–17.5)
HEMOGLOBIN: 7.6 G/DL (ref 13.5–17.5)
HEMOGLOBIN: 7.6 G/DL (ref 13.5–17.5)
HEMOGLOBIN: 7.8 G/DL (ref 13.5–17.5)
HEMOGLOBIN: 8.1 G/DL (ref 13.5–17.5)
INR BLD: 1.31 (ref 0.86–1.14)
LYMPHOCYTES ABSOLUTE: 1.4 K/UL (ref 1–5.1)
LYMPHOCYTES RELATIVE PERCENT: 9 %
MCH RBC QN AUTO: 21.6 PG (ref 26–34)
MCHC RBC AUTO-ENTMCNC: 30 G/DL (ref 31–36)
MCV RBC AUTO: 72.1 FL (ref 80–100)
MICROCYTES: ABNORMAL
MONOCYTES ABSOLUTE: 0 K/UL (ref 0–1.3)
MONOCYTES RELATIVE PERCENT: 0 %
NEUTROPHILS ABSOLUTE: 14 K/UL (ref 1.7–7.7)
NEUTROPHILS RELATIVE PERCENT: 89 %
OVALOCYTES: ABNORMAL
PDW BLD-RTO: 22.4 % (ref 12.4–15.4)
PERFORMED ON: ABNORMAL
PLATELET # BLD: 287 K/UL (ref 135–450)
PMV BLD AUTO: 8 FL (ref 5–10.5)
POTASSIUM REFLEX MAGNESIUM: 4.7 MMOL/L (ref 3.5–5.1)
PROTHROMBIN TIME: 14.9 SEC (ref 9.8–13)
RBC # BLD: 3.5 M/UL (ref 4.2–5.9)
SODIUM BLD-SCNC: 135 MMOL/L (ref 136–145)
STOMATOCYTES: ABNORMAL
TEAR DROP CELLS: ABNORMAL
TOTAL PROTEIN: 7.2 G/DL (ref 6.4–8.2)
VANCOMYCIN RANDOM: 20.6 UG/ML
WBC # BLD: 15.7 K/UL (ref 4–11)

## 2019-01-07 PROCEDURE — 85018 HEMOGLOBIN: CPT

## 2019-01-07 PROCEDURE — 87324 CLOSTRIDIUM AG IA: CPT

## 2019-01-07 PROCEDURE — 85610 PROTHROMBIN TIME: CPT

## 2019-01-07 PROCEDURE — 85014 HEMATOCRIT: CPT

## 2019-01-07 PROCEDURE — 2580000003 HC RX 258: Performed by: STUDENT IN AN ORGANIZED HEALTH CARE EDUCATION/TRAINING PROGRAM

## 2019-01-07 PROCEDURE — 6370000000 HC RX 637 (ALT 250 FOR IP): Performed by: STUDENT IN AN ORGANIZED HEALTH CARE EDUCATION/TRAINING PROGRAM

## 2019-01-07 PROCEDURE — 80076 HEPATIC FUNCTION PANEL: CPT

## 2019-01-07 PROCEDURE — 6360000002 HC RX W HCPCS

## 2019-01-07 PROCEDURE — 36415 COLL VENOUS BLD VENIPUNCTURE: CPT

## 2019-01-07 PROCEDURE — 2580000003 HC RX 258

## 2019-01-07 PROCEDURE — 74018 RADEX ABDOMEN 1 VIEW: CPT

## 2019-01-07 PROCEDURE — 87449 NOS EACH ORGANISM AG IA: CPT

## 2019-01-07 PROCEDURE — 6370000000 HC RX 637 (ALT 250 FOR IP): Performed by: INTERNAL MEDICINE

## 2019-01-07 PROCEDURE — 1200000000 HC SEMI PRIVATE

## 2019-01-07 PROCEDURE — 90937 HEMODIALYSIS REPEATED EVAL: CPT

## 2019-01-07 PROCEDURE — 80048 BASIC METABOLIC PNL TOTAL CA: CPT

## 2019-01-07 PROCEDURE — 99223 1ST HOSP IP/OBS HIGH 75: CPT | Performed by: INTERNAL MEDICINE

## 2019-01-07 PROCEDURE — 80202 ASSAY OF VANCOMYCIN: CPT

## 2019-01-07 PROCEDURE — 6360000002 HC RX W HCPCS: Performed by: STUDENT IN AN ORGANIZED HEALTH CARE EDUCATION/TRAINING PROGRAM

## 2019-01-07 PROCEDURE — 85025 COMPLETE CBC W/AUTO DIFF WBC: CPT

## 2019-01-07 RX ORDER — SODIUM CHLORIDE 9 MG/ML
INJECTION, SOLUTION INTRAVENOUS CONTINUOUS
Status: DISCONTINUED | OUTPATIENT
Start: 2019-01-08 | End: 2019-01-08

## 2019-01-07 RX ORDER — PANTOPRAZOLE SODIUM 40 MG/1
40 TABLET, DELAYED RELEASE ORAL
Status: DISCONTINUED | OUTPATIENT
Start: 2019-01-07 | End: 2019-01-10

## 2019-01-07 RX ADMIN — ISOSORBIDE MONONITRATE 30 MG: 30 TABLET, EXTENDED RELEASE ORAL at 13:25

## 2019-01-07 RX ADMIN — Medication 250 MG: at 20:52

## 2019-01-07 RX ADMIN — LOSARTAN POTASSIUM 100 MG: 100 TABLET ORAL at 13:24

## 2019-01-07 RX ADMIN — NEPHROCAP 1 MG: 1 CAP ORAL at 14:56

## 2019-01-07 RX ADMIN — ISOSORBIDE MONONITRATE 30 MG: 30 TABLET, EXTENDED RELEASE ORAL at 20:52

## 2019-01-07 RX ADMIN — INSULIN GLARGINE 4 UNITS: 100 INJECTION, SOLUTION SUBCUTANEOUS at 20:56

## 2019-01-07 RX ADMIN — CARVEDILOL 25 MG: 25 TABLET, FILM COATED ORAL at 13:25

## 2019-01-07 RX ADMIN — Medication 10 ML: at 21:17

## 2019-01-07 RX ADMIN — CLOPIDOGREL BISULFATE 75 MG: 75 TABLET ORAL at 13:25

## 2019-01-07 RX ADMIN — ATORVASTATIN CALCIUM 80 MG: 80 TABLET, FILM COATED ORAL at 14:56

## 2019-01-07 RX ADMIN — PANTOPRAZOLE SODIUM 40 MG: 40 TABLET, DELAYED RELEASE ORAL at 13:25

## 2019-01-07 RX ADMIN — PROCHLORPERAZINE EDISYLATE 5 MG: 5 INJECTION INTRAMUSCULAR; INTRAVENOUS at 04:52

## 2019-01-07 RX ADMIN — ASPIRIN 325 MG: 325 TABLET, DELAYED RELEASE ORAL at 14:57

## 2019-01-07 RX ADMIN — PIPERACILLIN SODIUM,TAZOBACTAM SODIUM 3.38 G: 3; .375 INJECTION, POWDER, FOR SOLUTION INTRAVENOUS at 13:26

## 2019-01-07 RX ADMIN — SODIUM CHLORIDE: 9 INJECTION, SOLUTION INTRAVENOUS at 01:47

## 2019-01-07 RX ADMIN — Medication 10 ML: at 15:05

## 2019-01-07 RX ADMIN — VANCOMYCIN HYDROCHLORIDE 750 MG: 10 INJECTION, POWDER, LYOPHILIZED, FOR SOLUTION INTRAVENOUS at 10:44

## 2019-01-07 RX ADMIN — PIPERACILLIN SODIUM,TAZOBACTAM SODIUM 3.38 G: 3; .375 INJECTION, POWDER, FOR SOLUTION INTRAVENOUS at 21:18

## 2019-01-07 RX ADMIN — SODIUM CHLORIDE: 9 INJECTION, SOLUTION INTRAVENOUS at 23:27

## 2019-01-07 RX ADMIN — AMIODARONE HYDROCHLORIDE 200 MG: 200 TABLET ORAL at 13:25

## 2019-01-07 RX ADMIN — Medication 250 MG: at 14:57

## 2019-01-07 RX ADMIN — MUPIROCIN: 20 OINTMENT TOPICAL at 20:56

## 2019-01-07 RX ADMIN — CARVEDILOL 25 MG: 25 TABLET, FILM COATED ORAL at 20:52

## 2019-01-07 ASSESSMENT — PAIN SCALES - GENERAL
PAINLEVEL_OUTOF10: 0

## 2019-01-08 ENCOUNTER — HOSPITAL ENCOUNTER (OUTPATIENT)
Dept: INTERVENTIONAL RADIOLOGY/VASCULAR | Age: 74
Discharge: HOME | End: 2019-01-08
Payer: MEDICARE

## 2019-01-08 ENCOUNTER — ANESTHESIA EVENT (OUTPATIENT)
Dept: OPERATING ROOM | Age: 74
DRG: 239 | End: 2019-01-08
Payer: MEDICARE

## 2019-01-08 ENCOUNTER — APPOINTMENT (OUTPATIENT)
Dept: VASCULAR LAB | Age: 74
DRG: 239 | End: 2019-01-08
Payer: MEDICARE

## 2019-01-08 LAB
ABO/RH: NORMAL
ANION GAP SERPL CALCULATED.3IONS-SCNC: 18 MMOL/L (ref 3–16)
ANION GAP SERPL CALCULATED.3IONS-SCNC: 20 MMOL/L (ref 3–16)
ANISOCYTOSIS: ABNORMAL
ANTIBODY SCREEN: NORMAL
BASOPHILS ABSOLUTE: 0 K/UL (ref 0–0.2)
BASOPHILS RELATIVE PERCENT: 0 %
BLOOD CULTURE, ROUTINE: NORMAL
BUN BLDV-MCNC: 48 MG/DL (ref 7–20)
BUN BLDV-MCNC: 50 MG/DL (ref 7–20)
CALCIUM SERPL-MCNC: 9.3 MG/DL (ref 8.3–10.6)
CALCIUM SERPL-MCNC: 9.4 MG/DL (ref 8.3–10.6)
CHLORIDE BLD-SCNC: 97 MMOL/L (ref 99–110)
CHLORIDE BLD-SCNC: 98 MMOL/L (ref 99–110)
CO2: 19 MMOL/L (ref 21–32)
CO2: 21 MMOL/L (ref 21–32)
CREAT SERPL-MCNC: 7.7 MG/DL (ref 0.8–1.3)
CREAT SERPL-MCNC: 7.8 MG/DL (ref 0.8–1.3)
CULTURE, BLOOD 2: NORMAL
EOSINOPHILS ABSOLUTE: 0 K/UL (ref 0–0.6)
EOSINOPHILS RELATIVE PERCENT: 0 %
GFR AFRICAN AMERICAN: 8
GFR AFRICAN AMERICAN: 8
GFR NON-AFRICAN AMERICAN: 7
GFR NON-AFRICAN AMERICAN: 7
GLUCOSE BLD-MCNC: 107 MG/DL (ref 70–99)
GLUCOSE BLD-MCNC: 109 MG/DL (ref 70–99)
GLUCOSE BLD-MCNC: 122 MG/DL (ref 70–99)
GLUCOSE BLD-MCNC: 170 MG/DL (ref 70–99)
GLUCOSE BLD-MCNC: 212 MG/DL (ref 70–99)
HCT VFR BLD CALC: 26.6 % (ref 40.5–52.5)
HCT VFR BLD CALC: 26.7 % (ref 40.5–52.5)
HCT VFR BLD CALC: 27.2 % (ref 40.5–52.5)
HEMOGLOBIN: 8 G/DL (ref 13.5–17.5)
HEMOGLOBIN: 8 G/DL (ref 13.5–17.5)
HEMOGLOBIN: 8.1 G/DL (ref 13.5–17.5)
LYMPHOCYTES ABSOLUTE: 3.2 K/UL (ref 1–5.1)
LYMPHOCYTES RELATIVE PERCENT: 18 %
MCH RBC QN AUTO: 22.4 PG (ref 26–34)
MCHC RBC AUTO-ENTMCNC: 30.4 G/DL (ref 31–36)
MCV RBC AUTO: 73.6 FL (ref 80–100)
MICROCYTES: ABNORMAL
MONOCYTES ABSOLUTE: 0.5 K/UL (ref 0–1.3)
MONOCYTES RELATIVE PERCENT: 3 %
NEUTROPHILS ABSOLUTE: 13.8 K/UL (ref 1.7–7.7)
NEUTROPHILS RELATIVE PERCENT: 79 %
OVALOCYTES: ABNORMAL
PDW BLD-RTO: 22.8 % (ref 12.4–15.4)
PERFORMED ON: ABNORMAL
PLATELET # BLD: 239 K/UL (ref 135–450)
PMV BLD AUTO: 8.4 FL (ref 5–10.5)
POTASSIUM REFLEX MAGNESIUM: 6 MMOL/L (ref 3.5–5.1)
POTASSIUM SERPL-SCNC: 4.5 MMOL/L (ref 3.5–5.1)
RBC # BLD: 3.62 M/UL (ref 4.2–5.9)
SODIUM BLD-SCNC: 136 MMOL/L (ref 136–145)
SODIUM BLD-SCNC: 137 MMOL/L (ref 136–145)
TEAR DROP CELLS: ABNORMAL
WBC # BLD: 17.5 K/UL (ref 4–11)

## 2019-01-08 PROCEDURE — 85014 HEMATOCRIT: CPT

## 2019-01-08 PROCEDURE — P9016 RBC LEUKOCYTES REDUCED: HCPCS

## 2019-01-08 PROCEDURE — 6370000000 HC RX 637 (ALT 250 FOR IP): Performed by: STUDENT IN AN ORGANIZED HEALTH CARE EDUCATION/TRAINING PROGRAM

## 2019-01-08 PROCEDURE — 93971 EXTREMITY STUDY: CPT

## 2019-01-08 PROCEDURE — C1887 CATHETER, GUIDING: HCPCS

## 2019-01-08 PROCEDURE — 2709999900 HC NON-CHARGEABLE SUPPLY

## 2019-01-08 PROCEDURE — 2580000003 HC RX 258: Performed by: STUDENT IN AN ORGANIZED HEALTH CARE EDUCATION/TRAINING PROGRAM

## 2019-01-08 PROCEDURE — 99152 MOD SED SAME PHYS/QHP 5/>YRS: CPT | Performed by: SURGERY

## 2019-01-08 PROCEDURE — 86901 BLOOD TYPING SEROLOGIC RH(D): CPT

## 2019-01-08 PROCEDURE — 6360000002 HC RX W HCPCS: Performed by: INTERNAL MEDICINE

## 2019-01-08 PROCEDURE — 6360000002 HC RX W HCPCS

## 2019-01-08 PROCEDURE — 1200000000 HC SEMI PRIVATE

## 2019-01-08 PROCEDURE — 86923 COMPATIBILITY TEST ELECTRIC: CPT

## 2019-01-08 PROCEDURE — C1725 CATH, TRANSLUMIN NON-LASER: HCPCS

## 2019-01-08 PROCEDURE — 99153 MOD SED SAME PHYS/QHP EA: CPT | Performed by: SURGERY

## 2019-01-08 PROCEDURE — 75625 CONTRAST EXAM ABDOMINL AORTA: CPT | Performed by: SURGERY

## 2019-01-08 PROCEDURE — 75710 ARTERY X-RAYS ARM/LEG: CPT | Performed by: SURGERY

## 2019-01-08 PROCEDURE — C1769 GUIDE WIRE: HCPCS

## 2019-01-08 PROCEDURE — 2580000003 HC RX 258

## 2019-01-08 PROCEDURE — C1894 INTRO/SHEATH, NON-LASER: HCPCS

## 2019-01-08 PROCEDURE — 80048 BASIC METABOLIC PNL TOTAL CA: CPT

## 2019-01-08 PROCEDURE — 6370000000 HC RX 637 (ALT 250 FOR IP): Performed by: INTERNAL MEDICINE

## 2019-01-08 PROCEDURE — B41F1ZZ FLUOROSCOPY OF RIGHT LOWER EXTREMITY ARTERIES USING LOW OSMOLAR CONTRAST: ICD-10-PCS | Performed by: SURGERY

## 2019-01-08 PROCEDURE — 37224 PR REVSC OPN/PRG FEM/POP W/ANGIOPLASTY UNI: CPT | Performed by: SURGERY

## 2019-01-08 PROCEDURE — 36415 COLL VENOUS BLD VENIPUNCTURE: CPT

## 2019-01-08 PROCEDURE — 047K3ZZ DILATION OF RIGHT FEMORAL ARTERY, PERCUTANEOUS APPROACH: ICD-10-PCS | Performed by: SURGERY

## 2019-01-08 PROCEDURE — 85018 HEMOGLOBIN: CPT

## 2019-01-08 PROCEDURE — C1760 CLOSURE DEV, VASC: HCPCS

## 2019-01-08 PROCEDURE — 75774 ARTERY X-RAY EACH VESSEL: CPT | Performed by: SURGERY

## 2019-01-08 PROCEDURE — 86850 RBC ANTIBODY SCREEN: CPT

## 2019-01-08 PROCEDURE — 99232 SBSQ HOSP IP/OBS MODERATE 35: CPT | Performed by: INTERNAL MEDICINE

## 2019-01-08 PROCEDURE — 37224 HC FEM POP TERRITORY PLASTY: CPT | Performed by: SURGERY

## 2019-01-08 PROCEDURE — 86900 BLOOD TYPING SEROLOGIC ABO: CPT

## 2019-01-08 PROCEDURE — 85025 COMPLETE CBC W/AUTO DIFF WBC: CPT

## 2019-01-08 PROCEDURE — B4101ZZ FLUOROSCOPY OF ABDOMINAL AORTA USING LOW OSMOLAR CONTRAST: ICD-10-PCS | Performed by: SURGERY

## 2019-01-08 RX ORDER — LACTOBACILLUS RHAMNOSUS GG 10B CELL
1 CAPSULE ORAL DAILY
Status: DISCONTINUED | OUTPATIENT
Start: 2019-01-08 | End: 2019-01-25 | Stop reason: HOSPADM

## 2019-01-08 RX ORDER — HYDRALAZINE HYDROCHLORIDE 20 MG/ML
10 INJECTION INTRAMUSCULAR; INTRAVENOUS EVERY 6 HOURS PRN
Status: DISCONTINUED | OUTPATIENT
Start: 2019-01-08 | End: 2019-01-25 | Stop reason: HOSPADM

## 2019-01-08 RX ORDER — SODIUM CHLORIDE 9 MG/ML
INJECTION, SOLUTION INTRAVENOUS CONTINUOUS
Status: DISCONTINUED | OUTPATIENT
Start: 2019-01-09 | End: 2019-01-09

## 2019-01-08 RX ORDER — 0.9 % SODIUM CHLORIDE 0.9 %
250 INTRAVENOUS SOLUTION INTRAVENOUS ONCE
Status: COMPLETED | OUTPATIENT
Start: 2019-01-08 | End: 2019-01-08

## 2019-01-08 RX ADMIN — CARVEDILOL 25 MG: 25 TABLET, FILM COATED ORAL at 09:25

## 2019-01-08 RX ADMIN — HYDRALAZINE HYDROCHLORIDE 10 MG: 20 INJECTION INTRAMUSCULAR; INTRAVENOUS at 18:39

## 2019-01-08 RX ADMIN — ISOSORBIDE MONONITRATE 30 MG: 30 TABLET, EXTENDED RELEASE ORAL at 22:24

## 2019-01-08 RX ADMIN — PIPERACILLIN SODIUM,TAZOBACTAM SODIUM 3.38 G: 3; .375 INJECTION, POWDER, FOR SOLUTION INTRAVENOUS at 09:21

## 2019-01-08 RX ADMIN — PIPERACILLIN SODIUM,TAZOBACTAM SODIUM 3.38 G: 3; .375 INJECTION, POWDER, FOR SOLUTION INTRAVENOUS at 22:24

## 2019-01-08 RX ADMIN — INSULIN LISPRO 1 UNITS: 100 INJECTION, SOLUTION INTRAVENOUS; SUBCUTANEOUS at 22:29

## 2019-01-08 RX ADMIN — SODIUM CHLORIDE: 900 INJECTION, SOLUTION INTRAVENOUS at 23:54

## 2019-01-08 RX ADMIN — PANTOPRAZOLE SODIUM 40 MG: 40 TABLET, DELAYED RELEASE ORAL at 17:47

## 2019-01-08 RX ADMIN — Medication 10 ML: at 22:26

## 2019-01-08 RX ADMIN — CLOPIDOGREL BISULFATE 75 MG: 75 TABLET ORAL at 10:22

## 2019-01-08 RX ADMIN — MUPIROCIN: 20 OINTMENT TOPICAL at 10:25

## 2019-01-08 RX ADMIN — CARVEDILOL 25 MG: 25 TABLET, FILM COATED ORAL at 22:24

## 2019-01-08 RX ADMIN — SODIUM CHLORIDE 250 ML: 9 INJECTION, SOLUTION INTRAVENOUS at 03:03

## 2019-01-08 RX ADMIN — CINACALCET HYDROCHLORIDE 30 MG: 30 TABLET, COATED ORAL at 10:23

## 2019-01-08 RX ADMIN — Medication 250 MG: at 22:26

## 2019-01-08 RX ADMIN — PANTOPRAZOLE SODIUM 40 MG: 40 TABLET, DELAYED RELEASE ORAL at 05:17

## 2019-01-08 RX ADMIN — LOSARTAN POTASSIUM 100 MG: 100 TABLET ORAL at 09:24

## 2019-01-08 RX ADMIN — ASPIRIN 325 MG: 325 TABLET, DELAYED RELEASE ORAL at 10:22

## 2019-01-08 RX ADMIN — ISOSORBIDE MONONITRATE 30 MG: 30 TABLET, EXTENDED RELEASE ORAL at 09:24

## 2019-01-08 RX ADMIN — ATORVASTATIN CALCIUM 80 MG: 80 TABLET, FILM COATED ORAL at 10:22

## 2019-01-08 RX ADMIN — INSULIN LISPRO 1 UNITS: 100 INJECTION, SOLUTION INTRAVENOUS; SUBCUTANEOUS at 17:49

## 2019-01-08 RX ADMIN — INSULIN GLARGINE 4 UNITS: 100 INJECTION, SOLUTION SUBCUTANEOUS at 22:29

## 2019-01-08 RX ADMIN — Medication 1 CAPSULE: at 10:22

## 2019-01-08 RX ADMIN — AMIODARONE HYDROCHLORIDE 200 MG: 200 TABLET ORAL at 09:24

## 2019-01-08 RX ADMIN — NEPHROCAP 1 MG: 1 CAP ORAL at 10:27

## 2019-01-08 RX ADMIN — MUPIROCIN: 20 OINTMENT TOPICAL at 22:37

## 2019-01-08 ASSESSMENT — PAIN SCALES - GENERAL
PAINLEVEL_OUTOF10: 0

## 2019-01-09 ENCOUNTER — ANESTHESIA (OUTPATIENT)
Dept: OPERATING ROOM | Age: 74
DRG: 239 | End: 2019-01-09
Payer: MEDICARE

## 2019-01-09 VITALS — OXYGEN SATURATION: 97 % | DIASTOLIC BLOOD PRESSURE: 62 MMHG | TEMPERATURE: 97.2 F | SYSTOLIC BLOOD PRESSURE: 132 MMHG

## 2019-01-09 LAB
ALBUMIN SERPL-MCNC: 2.7 G/DL (ref 3.4–5)
ANAEROBIC CULTURE: ABNORMAL
ANION GAP SERPL CALCULATED.3IONS-SCNC: 20 MMOL/L (ref 3–16)
ANION GAP SERPL CALCULATED.3IONS-SCNC: 22 MMOL/L (ref 3–16)
ANISOCYTOSIS: ABNORMAL
ANISOCYTOSIS: ABNORMAL
BASOPHILS ABSOLUTE: 0 K/UL (ref 0–0.2)
BASOPHILS ABSOLUTE: 0 K/UL (ref 0–0.2)
BASOPHILS ABSOLUTE: 0.1 K/UL (ref 0–0.2)
BASOPHILS RELATIVE PERCENT: 0 %
BASOPHILS RELATIVE PERCENT: 0 %
BASOPHILS RELATIVE PERCENT: 0.3 %
BLOOD BANK DISPENSE STATUS: NORMAL
BLOOD BANK PRODUCT CODE: NORMAL
BPU ID: NORMAL
BUN BLDV-MCNC: 63 MG/DL (ref 7–20)
BUN BLDV-MCNC: 68 MG/DL (ref 7–20)
CALCIUM SERPL-MCNC: 8.7 MG/DL (ref 8.3–10.6)
CALCIUM SERPL-MCNC: 8.9 MG/DL (ref 8.3–10.6)
CHLORIDE BLD-SCNC: 100 MMOL/L (ref 99–110)
CHLORIDE BLD-SCNC: 95 MMOL/L (ref 99–110)
CO2: 17 MMOL/L (ref 21–32)
CO2: 19 MMOL/L (ref 21–32)
CREAT SERPL-MCNC: 9.5 MG/DL (ref 0.8–1.3)
CREAT SERPL-MCNC: 9.9 MG/DL (ref 0.8–1.3)
CULTURE SURGICAL: ABNORMAL
DESCRIPTION BLOOD BANK: NORMAL
EOSINOPHILS ABSOLUTE: 0 K/UL (ref 0–0.6)
EOSINOPHILS ABSOLUTE: 0 K/UL (ref 0–0.6)
EOSINOPHILS ABSOLUTE: 0.2 K/UL (ref 0–0.6)
EOSINOPHILS RELATIVE PERCENT: 0 %
EOSINOPHILS RELATIVE PERCENT: 0 %
EOSINOPHILS RELATIVE PERCENT: 1 %
GFR AFRICAN AMERICAN: 6
GFR AFRICAN AMERICAN: 7
GFR NON-AFRICAN AMERICAN: 5
GFR NON-AFRICAN AMERICAN: 5
GLUCOSE BLD-MCNC: 138 MG/DL (ref 70–99)
GLUCOSE BLD-MCNC: 148 MG/DL (ref 70–99)
GLUCOSE BLD-MCNC: 150 MG/DL (ref 70–99)
GLUCOSE BLD-MCNC: 200 MG/DL (ref 70–99)
GLUCOSE BLD-MCNC: 206 MG/DL (ref 70–99)
GRAM STAIN RESULT: ABNORMAL
HCT VFR BLD CALC: 25.3 % (ref 40.5–52.5)
HCT VFR BLD CALC: 25.7 % (ref 40.5–52.5)
HCT VFR BLD CALC: 26 % (ref 40.5–52.5)
HCT VFR BLD CALC: 27.9 % (ref 40.5–52.5)
HCT VFR BLD CALC: 28 % (ref 40.5–52.5)
HEMOGLOBIN: 7.6 G/DL (ref 13.5–17.5)
HEMOGLOBIN: 7.7 G/DL (ref 13.5–17.5)
HEMOGLOBIN: 7.7 G/DL (ref 13.5–17.5)
HEMOGLOBIN: 8.6 G/DL (ref 13.5–17.5)
HEMOGLOBIN: 8.6 G/DL (ref 13.5–17.5)
HYPOCHROMIA: ABNORMAL
INR BLD: 1.35 (ref 0.86–1.14)
INR BLD: 1.4 (ref 0.86–1.14)
LACTIC ACID: 0.9 MMOL/L (ref 0.4–2)
LYMPHOCYTES ABSOLUTE: 0.5 K/UL (ref 1–5.1)
LYMPHOCYTES ABSOLUTE: 1.6 K/UL (ref 1–5.1)
LYMPHOCYTES ABSOLUTE: 1.7 K/UL (ref 1–5.1)
LYMPHOCYTES RELATIVE PERCENT: 10 %
LYMPHOCYTES RELATIVE PERCENT: 3 %
LYMPHOCYTES RELATIVE PERCENT: 9 %
MAGNESIUM: 2.6 MG/DL (ref 1.8–2.4)
MCH RBC QN AUTO: 22.5 PG (ref 26–34)
MCH RBC QN AUTO: 22.6 PG (ref 26–34)
MCH RBC QN AUTO: 24.4 PG (ref 26–34)
MCHC RBC AUTO-ENTMCNC: 30 G/DL (ref 31–36)
MCHC RBC AUTO-ENTMCNC: 30.1 G/DL (ref 31–36)
MCHC RBC AUTO-ENTMCNC: 30.7 G/DL (ref 31–36)
MCV RBC AUTO: 74.8 FL (ref 80–100)
MCV RBC AUTO: 75.2 FL (ref 80–100)
MCV RBC AUTO: 79.3 FL (ref 80–100)
METAMYELOCYTES RELATIVE PERCENT: 2 %
MICROCYTES: ABNORMAL
MICROCYTES: ABNORMAL
MONOCYTES ABSOLUTE: 0.2 K/UL (ref 0–1.3)
MONOCYTES ABSOLUTE: 0.7 K/UL (ref 0–1.3)
MONOCYTES ABSOLUTE: 1 K/UL (ref 0–1.3)
MONOCYTES RELATIVE PERCENT: 1.2 %
MONOCYTES RELATIVE PERCENT: 4 %
MONOCYTES RELATIVE PERCENT: 6 %
NEUTROPHILS ABSOLUTE: 13.4 K/UL (ref 1.7–7.7)
NEUTROPHILS ABSOLUTE: 14.9 K/UL (ref 1.7–7.7)
NEUTROPHILS ABSOLUTE: 16.1 K/UL (ref 1.7–7.7)
NEUTROPHILS RELATIVE PERCENT: 81 %
NEUTROPHILS RELATIVE PERCENT: 87 %
NEUTROPHILS RELATIVE PERCENT: 95.5 %
ORGANISM: ABNORMAL
PDW BLD-RTO: 21.6 % (ref 12.4–15.4)
PDW BLD-RTO: 22.4 % (ref 12.4–15.4)
PDW BLD-RTO: 22.7 % (ref 12.4–15.4)
PERFORMED ON: ABNORMAL
PHOSPHORUS: 10.6 MG/DL (ref 2.5–4.9)
PLATELET # BLD: 212 K/UL (ref 135–450)
PLATELET # BLD: 230 K/UL (ref 135–450)
PLATELET # BLD: 277 K/UL (ref 135–450)
PMV BLD AUTO: 7.8 FL (ref 5–10.5)
PMV BLD AUTO: 8 FL (ref 5–10.5)
PMV BLD AUTO: 8.1 FL (ref 5–10.5)
POTASSIUM REFLEX MAGNESIUM: 4.2 MMOL/L (ref 3.5–5.1)
POTASSIUM SERPL-SCNC: 5.4 MMOL/L (ref 3.5–5.1)
PROTHROMBIN TIME: 15.4 SEC (ref 9.8–13)
PROTHROMBIN TIME: 16 SEC (ref 9.8–13)
RBC # BLD: 3.38 M/UL (ref 4.2–5.9)
RBC # BLD: 3.41 M/UL (ref 4.2–5.9)
RBC # BLD: 3.53 M/UL (ref 4.2–5.9)
SODIUM BLD-SCNC: 134 MMOL/L (ref 136–145)
SODIUM BLD-SCNC: 139 MMOL/L (ref 136–145)
WBC # BLD: 15.7 K/UL (ref 4–11)
WBC # BLD: 16.1 K/UL (ref 4–11)
WBC # BLD: 18.5 K/UL (ref 4–11)

## 2019-01-09 PROCEDURE — 2709999900 HC NON-CHARGEABLE SUPPLY: Performed by: SURGERY

## 2019-01-09 PROCEDURE — 2580000003 HC RX 258

## 2019-01-09 PROCEDURE — 36415 COLL VENOUS BLD VENIPUNCTURE: CPT

## 2019-01-09 PROCEDURE — P9017 PLASMA 1 DONOR FRZ W/IN 8 HR: HCPCS

## 2019-01-09 PROCEDURE — 83605 ASSAY OF LACTIC ACID: CPT

## 2019-01-09 PROCEDURE — 85018 HEMOGLOBIN: CPT

## 2019-01-09 PROCEDURE — 6360000002 HC RX W HCPCS: Performed by: NURSE ANESTHETIST, CERTIFIED REGISTERED

## 2019-01-09 PROCEDURE — 2500000003 HC RX 250 WO HCPCS: Performed by: NURSE ANESTHETIST, CERTIFIED REGISTERED

## 2019-01-09 PROCEDURE — 2580000003 HC RX 258: Performed by: STUDENT IN AN ORGANIZED HEALTH CARE EDUCATION/TRAINING PROGRAM

## 2019-01-09 PROCEDURE — 85025 COMPLETE CBC W/AUTO DIFF WBC: CPT

## 2019-01-09 PROCEDURE — P9016 RBC LEUKOCYTES REDUCED: HCPCS

## 2019-01-09 PROCEDURE — 2000000000 HC ICU R&B

## 2019-01-09 PROCEDURE — 3600000014 HC SURGERY LEVEL 4 ADDTL 15MIN: Performed by: SURGERY

## 2019-01-09 PROCEDURE — 83735 ASSAY OF MAGNESIUM: CPT

## 2019-01-09 PROCEDURE — 85730 THROMBOPLASTIN TIME PARTIAL: CPT

## 2019-01-09 PROCEDURE — 041K09N BYPASS RIGHT FEMORAL ARTERY TO POSTERIOR TIBIAL ARTERY WITH AUTOLOGOUS VENOUS TISSUE, OPEN APPROACH: ICD-10-PCS | Performed by: SURGERY

## 2019-01-09 PROCEDURE — 6360000002 HC RX W HCPCS: Performed by: SURGERY

## 2019-01-09 PROCEDURE — 2580000003 HC RX 258: Performed by: ANESTHESIOLOGY

## 2019-01-09 PROCEDURE — 3600000004 HC SURGERY LEVEL 4 BASE: Performed by: SURGERY

## 2019-01-09 PROCEDURE — 85610 PROTHROMBIN TIME: CPT

## 2019-01-09 PROCEDURE — 6360000002 HC RX W HCPCS: Performed by: ANESTHESIOLOGY

## 2019-01-09 PROCEDURE — 35585 VEIN BYP FEM-TIBIAL PERONEAL: CPT | Performed by: SURGERY

## 2019-01-09 PROCEDURE — C1757 CATH, THROMBECTOMY/EMBOLECT: HCPCS | Performed by: SURGERY

## 2019-01-09 PROCEDURE — 2580000003 HC RX 258: Performed by: SURGERY

## 2019-01-09 PROCEDURE — 2580000003 HC RX 258: Performed by: NURSE ANESTHETIST, CERTIFIED REGISTERED

## 2019-01-09 PROCEDURE — 6360000002 HC RX W HCPCS

## 2019-01-09 PROCEDURE — 7100000001 HC PACU RECOVERY - ADDTL 15 MIN: Performed by: SURGERY

## 2019-01-09 PROCEDURE — 6360000002 HC RX W HCPCS: Performed by: INTERNAL MEDICINE

## 2019-01-09 PROCEDURE — 80069 RENAL FUNCTION PANEL: CPT

## 2019-01-09 PROCEDURE — 3700000001 HC ADD 15 MINUTES (ANESTHESIA): Performed by: SURGERY

## 2019-01-09 PROCEDURE — 3700000000 HC ANESTHESIA ATTENDED CARE: Performed by: SURGERY

## 2019-01-09 PROCEDURE — 6370000000 HC RX 637 (ALT 250 FOR IP): Performed by: SURGERY

## 2019-01-09 PROCEDURE — 2500000003 HC RX 250 WO HCPCS: Performed by: SURGERY

## 2019-01-09 PROCEDURE — 2720000010 HC SURG SUPPLY STERILE: Performed by: SURGERY

## 2019-01-09 PROCEDURE — 90937 HEMODIALYSIS REPEATED EVAL: CPT

## 2019-01-09 PROCEDURE — 7100000000 HC PACU RECOVERY - FIRST 15 MIN: Performed by: SURGERY

## 2019-01-09 PROCEDURE — 86923 COMPATIBILITY TEST ELECTRIC: CPT

## 2019-01-09 PROCEDURE — 85014 HEMATOCRIT: CPT

## 2019-01-09 RX ORDER — 0.9 % SODIUM CHLORIDE 0.9 %
250 INTRAVENOUS SOLUTION INTRAVENOUS ONCE
Status: DISCONTINUED | OUTPATIENT
Start: 2019-01-09 | End: 2019-01-10

## 2019-01-09 RX ORDER — PROPOFOL 10 MG/ML
INJECTION, EMULSION INTRAVENOUS PRN
Status: DISCONTINUED | OUTPATIENT
Start: 2019-01-09 | End: 2019-01-09 | Stop reason: SDUPTHER

## 2019-01-09 RX ORDER — ONDANSETRON 2 MG/ML
4 INJECTION INTRAMUSCULAR; INTRAVENOUS
Status: DISCONTINUED | OUTPATIENT
Start: 2019-01-09 | End: 2019-01-09 | Stop reason: HOSPADM

## 2019-01-09 RX ORDER — ROCURONIUM BROMIDE 10 MG/ML
INJECTION, SOLUTION INTRAVENOUS PRN
Status: DISCONTINUED | OUTPATIENT
Start: 2019-01-09 | End: 2019-01-09 | Stop reason: SDUPTHER

## 2019-01-09 RX ORDER — ONDANSETRON 2 MG/ML
INJECTION INTRAMUSCULAR; INTRAVENOUS PRN
Status: DISCONTINUED | OUTPATIENT
Start: 2019-01-09 | End: 2019-01-09 | Stop reason: SDUPTHER

## 2019-01-09 RX ORDER — LABETALOL HYDROCHLORIDE 5 MG/ML
10 INJECTION, SOLUTION INTRAVENOUS EVERY 4 HOURS PRN
Status: DISCONTINUED | OUTPATIENT
Start: 2019-01-09 | End: 2019-01-25 | Stop reason: HOSPADM

## 2019-01-09 RX ORDER — LABETALOL HYDROCHLORIDE 5 MG/ML
5 INJECTION, SOLUTION INTRAVENOUS EVERY 10 MIN PRN
Status: DISCONTINUED | OUTPATIENT
Start: 2019-01-09 | End: 2019-01-09 | Stop reason: HOSPADM

## 2019-01-09 RX ORDER — FENTANYL CITRATE 50 UG/ML
25 INJECTION, SOLUTION INTRAMUSCULAR; INTRAVENOUS EVERY 5 MIN PRN
Status: DISCONTINUED | OUTPATIENT
Start: 2019-01-09 | End: 2019-01-09 | Stop reason: HOSPADM

## 2019-01-09 RX ORDER — HEPARIN SODIUM 1000 [USP'U]/ML
INJECTION, SOLUTION INTRAVENOUS; SUBCUTANEOUS PRN
Status: DISCONTINUED | OUTPATIENT
Start: 2019-01-09 | End: 2019-01-09 | Stop reason: SDUPTHER

## 2019-01-09 RX ORDER — GLYCOPYRROLATE 0.2 MG/ML
INJECTION INTRAMUSCULAR; INTRAVENOUS PRN
Status: DISCONTINUED | OUTPATIENT
Start: 2019-01-09 | End: 2019-01-09 | Stop reason: SDUPTHER

## 2019-01-09 RX ORDER — HYDROMORPHONE HCL 110MG/55ML
PATIENT CONTROLLED ANALGESIA SYRINGE INTRAVENOUS PRN
Status: DISCONTINUED | OUTPATIENT
Start: 2019-01-09 | End: 2019-01-09 | Stop reason: SDUPTHER

## 2019-01-09 RX ORDER — LIDOCAINE HYDROCHLORIDE 20 MG/ML
INJECTION, SOLUTION INFILTRATION; PERINEURAL PRN
Status: DISCONTINUED | OUTPATIENT
Start: 2019-01-09 | End: 2019-01-09 | Stop reason: SDUPTHER

## 2019-01-09 RX ORDER — HYDRALAZINE HYDROCHLORIDE 25 MG/1
25 TABLET, FILM COATED ORAL EVERY 8 HOURS SCHEDULED
Status: DISCONTINUED | OUTPATIENT
Start: 2019-01-09 | End: 2019-01-13

## 2019-01-09 RX ORDER — FENTANYL CITRATE 50 UG/ML
INJECTION, SOLUTION INTRAMUSCULAR; INTRAVENOUS PRN
Status: DISCONTINUED | OUTPATIENT
Start: 2019-01-09 | End: 2019-01-09 | Stop reason: SDUPTHER

## 2019-01-09 RX ORDER — HYDRALAZINE HYDROCHLORIDE 20 MG/ML
5 INJECTION INTRAMUSCULAR; INTRAVENOUS EVERY 10 MIN PRN
Status: DISCONTINUED | OUTPATIENT
Start: 2019-01-09 | End: 2019-01-09 | Stop reason: HOSPADM

## 2019-01-09 RX ADMIN — ROCURONIUM BROMIDE 50 MG: 10 INJECTION, SOLUTION INTRAVENOUS at 16:41

## 2019-01-09 RX ADMIN — PHENYLEPHRINE HYDROCHLORIDE 80 MCG: 10 INJECTION, SOLUTION INTRAMUSCULAR; INTRAVENOUS; SUBCUTANEOUS at 14:00

## 2019-01-09 RX ADMIN — PHENYLEPHRINE HYDROCHLORIDE 160 MCG: 10 INJECTION, SOLUTION INTRAMUSCULAR; INTRAVENOUS; SUBCUTANEOUS at 12:36

## 2019-01-09 RX ADMIN — HYDRALAZINE HYDROCHLORIDE 10 MG: 20 INJECTION INTRAMUSCULAR; INTRAVENOUS at 00:00

## 2019-01-09 RX ADMIN — PHENYLEPHRINE HYDROCHLORIDE 80 MCG: 10 INJECTION, SOLUTION INTRAMUSCULAR; INTRAVENOUS; SUBCUTANEOUS at 12:48

## 2019-01-09 RX ADMIN — SODIUM CHLORIDE: 900 INJECTION, SOLUTION INTRAVENOUS at 18:53

## 2019-01-09 RX ADMIN — PIPERACILLIN SODIUM,TAZOBACTAM SODIUM 3.38 G: 3; .375 INJECTION, POWDER, FOR SOLUTION INTRAVENOUS at 08:42

## 2019-01-09 RX ADMIN — HYDROMORPHONE HYDROCHLORIDE 0.5 MG: 2 INJECTION, SOLUTION INTRAMUSCULAR; INTRAVENOUS; SUBCUTANEOUS at 19:08

## 2019-01-09 RX ADMIN — FENTANYL CITRATE 100 MCG: 50 INJECTION INTRAMUSCULAR; INTRAVENOUS at 12:17

## 2019-01-09 RX ADMIN — PROPOFOL 200 MG: 10 INJECTION, EMULSION INTRAVENOUS at 12:17

## 2019-01-09 RX ADMIN — DESMOPRESSIN ACETATE 29.48 MCG: 4 SOLUTION INTRAVENOUS at 20:51

## 2019-01-09 RX ADMIN — PHENYLEPHRINE HYDROCHLORIDE 20 MCG/MIN: 10 INJECTION, SOLUTION INTRAMUSCULAR; INTRAVENOUS; SUBCUTANEOUS at 14:02

## 2019-01-09 RX ADMIN — Medication 10 ML: at 23:27

## 2019-01-09 RX ADMIN — FENTANYL CITRATE 25 MCG: 50 INJECTION INTRAMUSCULAR; INTRAVENOUS at 17:35

## 2019-01-09 RX ADMIN — HYDRALAZINE HYDROCHLORIDE 10 MG: 20 INJECTION INTRAMUSCULAR; INTRAVENOUS at 09:36

## 2019-01-09 RX ADMIN — ROCURONIUM BROMIDE 40 MG: 10 INJECTION, SOLUTION INTRAVENOUS at 14:04

## 2019-01-09 RX ADMIN — DARBEPOETIN ALFA 60 MCG: 60 INJECTION, SOLUTION INTRAVENOUS; SUBCUTANEOUS at 09:37

## 2019-01-09 RX ADMIN — DESMOPRESSIN ACETATE 29.48 MCG: 4 SOLUTION INTRAVENOUS at 18:22

## 2019-01-09 RX ADMIN — FENTANYL CITRATE 50 MCG: 50 INJECTION INTRAMUSCULAR; INTRAVENOUS at 14:54

## 2019-01-09 RX ADMIN — PHENYLEPHRINE HYDROCHLORIDE 80 MCG: 10 INJECTION, SOLUTION INTRAMUSCULAR; INTRAVENOUS; SUBCUTANEOUS at 12:53

## 2019-01-09 RX ADMIN — HEPARIN SODIUM 2000 UNITS: 1000 INJECTION, SOLUTION INTRAVENOUS; SUBCUTANEOUS at 16:59

## 2019-01-09 RX ADMIN — LIDOCAINE HYDROCHLORIDE 100 MG: 20 INJECTION, SOLUTION INFILTRATION; PERINEURAL at 13:21

## 2019-01-09 RX ADMIN — FENTANYL CITRATE 25 MCG: 50 INJECTION INTRAMUSCULAR; INTRAVENOUS at 19:08

## 2019-01-09 RX ADMIN — ROCURONIUM BROMIDE 60 MG: 10 INJECTION, SOLUTION INTRAVENOUS at 12:17

## 2019-01-09 RX ADMIN — HEPARIN SODIUM 7000 UNITS: 1000 INJECTION, SOLUTION INTRAVENOUS; SUBCUTANEOUS at 15:02

## 2019-01-09 RX ADMIN — SUGAMMADEX 200 MG: 100 INJECTION, SOLUTION INTRAVENOUS at 19:02

## 2019-01-09 RX ADMIN — GLYCOPYRROLATE 0.1 MG: 0.2 INJECTION INTRAMUSCULAR; INTRAVENOUS at 13:40

## 2019-01-09 RX ADMIN — GLYCOPYRROLATE 0.1 MG: 0.2 INJECTION INTRAMUSCULAR; INTRAVENOUS at 12:50

## 2019-01-09 RX ADMIN — FENTANYL CITRATE 50 MCG: 50 INJECTION INTRAMUSCULAR; INTRAVENOUS at 13:21

## 2019-01-09 RX ADMIN — ONDANSETRON 8 MG: 2 INJECTION INTRAMUSCULAR; INTRAVENOUS at 12:17

## 2019-01-09 ASSESSMENT — PULMONARY FUNCTION TESTS
PIF_VALUE: 20
PIF_VALUE: 19
PIF_VALUE: 20
PIF_VALUE: 18
PIF_VALUE: 19
PIF_VALUE: 13
PIF_VALUE: 18
PIF_VALUE: 4
PIF_VALUE: 20
PIF_VALUE: 19
PIF_VALUE: 20
PIF_VALUE: 19
PIF_VALUE: 19
PIF_VALUE: 20
PIF_VALUE: 19
PIF_VALUE: 20
PIF_VALUE: 19
PIF_VALUE: 19
PIF_VALUE: 20
PIF_VALUE: 19
PIF_VALUE: 20
PIF_VALUE: 19
PIF_VALUE: 20
PIF_VALUE: 19
PIF_VALUE: 18
PIF_VALUE: 19
PIF_VALUE: 19
PIF_VALUE: 20
PIF_VALUE: 19
PIF_VALUE: 3
PIF_VALUE: 4
PIF_VALUE: 20
PIF_VALUE: 19
PIF_VALUE: 19
PIF_VALUE: 18
PIF_VALUE: 20
PIF_VALUE: 19
PIF_VALUE: 20
PIF_VALUE: 19
PIF_VALUE: 18
PIF_VALUE: 19
PIF_VALUE: 20
PIF_VALUE: 19
PIF_VALUE: 20
PIF_VALUE: 20
PIF_VALUE: 19
PIF_VALUE: 1
PIF_VALUE: 19
PIF_VALUE: 1
PIF_VALUE: 18
PIF_VALUE: 23
PIF_VALUE: 19
PIF_VALUE: 0
PIF_VALUE: 4
PIF_VALUE: 19
PIF_VALUE: 19
PIF_VALUE: 3
PIF_VALUE: 20
PIF_VALUE: 19
PIF_VALUE: 20
PIF_VALUE: 19
PIF_VALUE: 20
PIF_VALUE: 19
PIF_VALUE: 19
PIF_VALUE: 20
PIF_VALUE: 19
PIF_VALUE: 20
PIF_VALUE: 4
PIF_VALUE: 19
PIF_VALUE: 20
PIF_VALUE: 20
PIF_VALUE: 19
PIF_VALUE: 1
PIF_VALUE: 20
PIF_VALUE: 19
PIF_VALUE: 20
PIF_VALUE: 19
PIF_VALUE: 1
PIF_VALUE: 19
PIF_VALUE: 18
PIF_VALUE: 20
PIF_VALUE: 19
PIF_VALUE: 19
PIF_VALUE: 20
PIF_VALUE: 19
PIF_VALUE: 25
PIF_VALUE: 19
PIF_VALUE: 19
PIF_VALUE: 1
PIF_VALUE: 18
PIF_VALUE: 19
PIF_VALUE: 20
PIF_VALUE: 1
PIF_VALUE: 19
PIF_VALUE: 18
PIF_VALUE: 27
PIF_VALUE: 20
PIF_VALUE: 18
PIF_VALUE: 19
PIF_VALUE: 19
PIF_VALUE: 20
PIF_VALUE: 20
PIF_VALUE: 19
PIF_VALUE: 20
PIF_VALUE: 20
PIF_VALUE: 19
PIF_VALUE: 20
PIF_VALUE: 19
PIF_VALUE: 18
PIF_VALUE: 19
PIF_VALUE: 18
PIF_VALUE: 19
PIF_VALUE: 19
PIF_VALUE: 20
PIF_VALUE: 19
PIF_VALUE: 4
PIF_VALUE: 19
PIF_VALUE: 20
PIF_VALUE: 19
PIF_VALUE: 19
PIF_VALUE: 20
PIF_VALUE: 19
PIF_VALUE: 19
PIF_VALUE: 1
PIF_VALUE: 1
PIF_VALUE: 16
PIF_VALUE: 20
PIF_VALUE: 1
PIF_VALUE: 19
PIF_VALUE: 18
PIF_VALUE: 19
PIF_VALUE: 19
PIF_VALUE: 3
PIF_VALUE: 19
PIF_VALUE: 18
PIF_VALUE: 20
PIF_VALUE: 19
PIF_VALUE: 20
PIF_VALUE: 16
PIF_VALUE: 19
PIF_VALUE: 19
PIF_VALUE: 20
PIF_VALUE: 19
PIF_VALUE: 20
PIF_VALUE: 16
PIF_VALUE: 19
PIF_VALUE: 20
PIF_VALUE: 19
PIF_VALUE: 19
PIF_VALUE: 18
PIF_VALUE: 20
PIF_VALUE: 20
PIF_VALUE: 19
PIF_VALUE: 20
PIF_VALUE: 19
PIF_VALUE: 20
PIF_VALUE: 18
PIF_VALUE: 19
PIF_VALUE: 20
PIF_VALUE: 18
PIF_VALUE: 19
PIF_VALUE: 20
PIF_VALUE: 19
PIF_VALUE: 20
PIF_VALUE: 19
PIF_VALUE: 20
PIF_VALUE: 19
PIF_VALUE: 3
PIF_VALUE: 19
PIF_VALUE: 20
PIF_VALUE: 19
PIF_VALUE: 20
PIF_VALUE: 20
PIF_VALUE: 19
PIF_VALUE: 20
PIF_VALUE: 19
PIF_VALUE: 19
PIF_VALUE: 20
PIF_VALUE: 16
PIF_VALUE: 19
PIF_VALUE: 20
PIF_VALUE: 19
PIF_VALUE: 20
PIF_VALUE: 19
PIF_VALUE: 19
PIF_VALUE: 18
PIF_VALUE: 20
PIF_VALUE: 19
PIF_VALUE: 19
PIF_VALUE: 18
PIF_VALUE: 19
PIF_VALUE: 20
PIF_VALUE: 19
PIF_VALUE: 19
PIF_VALUE: 20
PIF_VALUE: 20
PIF_VALUE: 19
PIF_VALUE: 20
PIF_VALUE: 16
PIF_VALUE: 19
PIF_VALUE: 18
PIF_VALUE: 19
PIF_VALUE: 19
PIF_VALUE: 3
PIF_VALUE: 1
PIF_VALUE: 1
PIF_VALUE: 19
PIF_VALUE: 20
PIF_VALUE: 19
PIF_VALUE: 20
PIF_VALUE: 19
PIF_VALUE: 20
PIF_VALUE: 20
PIF_VALUE: 19
PIF_VALUE: 19
PIF_VALUE: 3
PIF_VALUE: 19
PIF_VALUE: 19
PIF_VALUE: 18
PIF_VALUE: 19
PIF_VALUE: 20
PIF_VALUE: 19
PIF_VALUE: 19
PIF_VALUE: 20
PIF_VALUE: 19
PIF_VALUE: 20
PIF_VALUE: 19
PIF_VALUE: 19
PIF_VALUE: 4
PIF_VALUE: 20
PIF_VALUE: 20
PIF_VALUE: 19
PIF_VALUE: 19
PIF_VALUE: 16
PIF_VALUE: 19
PIF_VALUE: 19
PIF_VALUE: 20
PIF_VALUE: 19
PIF_VALUE: 20
PIF_VALUE: 16
PIF_VALUE: 19
PIF_VALUE: 19
PIF_VALUE: 16
PIF_VALUE: 3
PIF_VALUE: 18
PIF_VALUE: 18
PIF_VALUE: 20
PIF_VALUE: 19
PIF_VALUE: 19
PIF_VALUE: 4
PIF_VALUE: 18
PIF_VALUE: 20
PIF_VALUE: 19
PIF_VALUE: 20
PIF_VALUE: 19
PIF_VALUE: 19
PIF_VALUE: 16
PIF_VALUE: 19
PIF_VALUE: 26
PIF_VALUE: 20
PIF_VALUE: 19
PIF_VALUE: 19
PIF_VALUE: 16
PIF_VALUE: 19
PIF_VALUE: 20
PIF_VALUE: 19
PIF_VALUE: 20
PIF_VALUE: 19
PIF_VALUE: 20
PIF_VALUE: 19
PIF_VALUE: 20
PIF_VALUE: 20
PIF_VALUE: 19
PIF_VALUE: 20
PIF_VALUE: 20
PIF_VALUE: 19
PIF_VALUE: 20
PIF_VALUE: 19
PIF_VALUE: 18
PIF_VALUE: 19
PIF_VALUE: 20
PIF_VALUE: 19
PIF_VALUE: 20
PIF_VALUE: 18
PIF_VALUE: 20
PIF_VALUE: 20
PIF_VALUE: 19
PIF_VALUE: 20
PIF_VALUE: 19
PIF_VALUE: 28
PIF_VALUE: 3
PIF_VALUE: 19
PIF_VALUE: 3
PIF_VALUE: 1
PIF_VALUE: 19
PIF_VALUE: 1
PIF_VALUE: 18
PIF_VALUE: 20
PIF_VALUE: 19
PIF_VALUE: 20
PIF_VALUE: 20
PIF_VALUE: 19
PIF_VALUE: 20

## 2019-01-09 ASSESSMENT — PAIN SCALES - GENERAL
PAINLEVEL_OUTOF10: 0

## 2019-01-10 LAB
ANION GAP SERPL CALCULATED.3IONS-SCNC: 22 MMOL/L (ref 3–16)
APTT: 28.2 SEC (ref 26–36)
BASOPHILS ABSOLUTE: 0 K/UL (ref 0–0.2)
BASOPHILS ABSOLUTE: 0.1 K/UL (ref 0–0.2)
BASOPHILS RELATIVE PERCENT: 0.1 %
BASOPHILS RELATIVE PERCENT: 0.3 %
BUN BLDV-MCNC: 46 MG/DL (ref 7–20)
CALCIUM SERPL-MCNC: 8.8 MG/DL (ref 8.3–10.6)
CHLORIDE BLD-SCNC: 99 MMOL/L (ref 99–110)
CO2: 18 MMOL/L (ref 21–32)
CREAT SERPL-MCNC: 6.4 MG/DL (ref 0.8–1.3)
EOSINOPHILS ABSOLUTE: 0 K/UL (ref 0–0.6)
EOSINOPHILS ABSOLUTE: 0.1 K/UL (ref 0–0.6)
EOSINOPHILS RELATIVE PERCENT: 0 %
EOSINOPHILS RELATIVE PERCENT: 0.3 %
GFR AFRICAN AMERICAN: 10
GFR NON-AFRICAN AMERICAN: 9
GLUCOSE BLD-MCNC: 132 MG/DL (ref 70–99)
GLUCOSE BLD-MCNC: 133 MG/DL (ref 70–99)
GLUCOSE BLD-MCNC: 197 MG/DL (ref 70–99)
GLUCOSE BLD-MCNC: 200 MG/DL (ref 70–99)
GLUCOSE BLD-MCNC: 283 MG/DL (ref 70–99)
GLUCOSE BLD-MCNC: 316 MG/DL (ref 70–99)
HCT VFR BLD CALC: 20.7 % (ref 40.5–52.5)
HCT VFR BLD CALC: 24 % (ref 40.5–52.5)
HCT VFR BLD CALC: 28.6 % (ref 40.5–52.5)
HEMOGLOBIN: 6.5 G/DL (ref 13.5–17.5)
HEMOGLOBIN: 7.2 G/DL (ref 13.5–17.5)
HEMOGLOBIN: 8.8 G/DL (ref 13.5–17.5)
LACTIC ACID: 0.9 MMOL/L (ref 0.4–2)
LYMPHOCYTES ABSOLUTE: 0.9 K/UL (ref 1–5.1)
LYMPHOCYTES ABSOLUTE: 1.3 K/UL (ref 1–5.1)
LYMPHOCYTES RELATIVE PERCENT: 4.6 %
LYMPHOCYTES RELATIVE PERCENT: 7.4 %
MCH RBC QN AUTO: 24.1 PG (ref 26–34)
MCH RBC QN AUTO: 24.3 PG (ref 26–34)
MCHC RBC AUTO-ENTMCNC: 30 G/DL (ref 31–36)
MCHC RBC AUTO-ENTMCNC: 30.8 G/DL (ref 31–36)
MCV RBC AUTO: 78.7 FL (ref 80–100)
MCV RBC AUTO: 80.4 FL (ref 80–100)
MONOCYTES ABSOLUTE: 0.7 K/UL (ref 0–1.3)
MONOCYTES ABSOLUTE: 1.2 K/UL (ref 0–1.3)
MONOCYTES RELATIVE PERCENT: 4 %
MONOCYTES RELATIVE PERCENT: 6.5 %
NEUTROPHILS ABSOLUTE: 15.2 K/UL (ref 1.7–7.7)
NEUTROPHILS ABSOLUTE: 16.9 K/UL (ref 1.7–7.7)
NEUTROPHILS RELATIVE PERCENT: 85.7 %
NEUTROPHILS RELATIVE PERCENT: 91.1 %
OCCULT BLOOD DIAGNOSTIC: ABNORMAL
PDW BLD-RTO: 21.2 % (ref 12.4–15.4)
PDW BLD-RTO: 21.6 % (ref 12.4–15.4)
PERFORMED ON: ABNORMAL
PLATELET # BLD: 183 K/UL (ref 135–450)
PLATELET # BLD: 186 K/UL (ref 135–450)
PMV BLD AUTO: 8.1 FL (ref 5–10.5)
PMV BLD AUTO: 8.2 FL (ref 5–10.5)
POTASSIUM REFLEX MAGNESIUM: 4.4 MMOL/L (ref 3.5–5.1)
RBC # BLD: 2.99 M/UL (ref 4.2–5.9)
RBC # BLD: 3.64 M/UL (ref 4.2–5.9)
SODIUM BLD-SCNC: 139 MMOL/L (ref 136–145)
WBC # BLD: 17.7 K/UL (ref 4–11)
WBC # BLD: 18.6 K/UL (ref 4–11)

## 2019-01-10 PROCEDURE — 85014 HEMATOCRIT: CPT

## 2019-01-10 PROCEDURE — 2580000003 HC RX 258: Performed by: STUDENT IN AN ORGANIZED HEALTH CARE EDUCATION/TRAINING PROGRAM

## 2019-01-10 PROCEDURE — 85018 HEMOGLOBIN: CPT

## 2019-01-10 PROCEDURE — 6370000000 HC RX 637 (ALT 250 FOR IP): Performed by: STUDENT IN AN ORGANIZED HEALTH CARE EDUCATION/TRAINING PROGRAM

## 2019-01-10 PROCEDURE — 2000000000 HC ICU R&B

## 2019-01-10 PROCEDURE — 85025 COMPLETE CBC W/AUTO DIFF WBC: CPT

## 2019-01-10 PROCEDURE — 80048 BASIC METABOLIC PNL TOTAL CA: CPT

## 2019-01-10 PROCEDURE — 6360000002 HC RX W HCPCS

## 2019-01-10 PROCEDURE — 36415 COLL VENOUS BLD VENIPUNCTURE: CPT

## 2019-01-10 PROCEDURE — G0328 FECAL BLOOD SCRN IMMUNOASSAY: HCPCS

## 2019-01-10 PROCEDURE — 2580000003 HC RX 258

## 2019-01-10 PROCEDURE — 6360000002 HC RX W HCPCS: Performed by: STUDENT IN AN ORGANIZED HEALTH CARE EDUCATION/TRAINING PROGRAM

## 2019-01-10 PROCEDURE — 90937 HEMODIALYSIS REPEATED EVAL: CPT

## 2019-01-10 PROCEDURE — 83605 ASSAY OF LACTIC ACID: CPT

## 2019-01-10 PROCEDURE — 6370000000 HC RX 637 (ALT 250 FOR IP): Performed by: INTERNAL MEDICINE

## 2019-01-10 PROCEDURE — 51702 INSERT TEMP BLADDER CATH: CPT

## 2019-01-10 PROCEDURE — 99232 SBSQ HOSP IP/OBS MODERATE 35: CPT | Performed by: INTERNAL MEDICINE

## 2019-01-10 RX ORDER — 0.9 % SODIUM CHLORIDE 0.9 %
500 INTRAVENOUS SOLUTION INTRAVENOUS ONCE
Status: DISCONTINUED | OUTPATIENT
Start: 2019-01-10 | End: 2019-01-10

## 2019-01-10 RX ORDER — 0.9 % SODIUM CHLORIDE 0.9 %
250 INTRAVENOUS SOLUTION INTRAVENOUS ONCE
Status: DISCONTINUED | OUTPATIENT
Start: 2019-01-10 | End: 2019-01-10

## 2019-01-10 RX ADMIN — NEPHROCAP 1 MG: 1 CAP ORAL at 10:17

## 2019-01-10 RX ADMIN — INSULIN GLARGINE 4 UNITS: 100 INJECTION, SOLUTION SUBCUTANEOUS at 20:42

## 2019-01-10 RX ADMIN — INSULIN LISPRO 3 UNITS: 100 INJECTION, SOLUTION INTRAVENOUS; SUBCUTANEOUS at 17:05

## 2019-01-10 RX ADMIN — INSULIN LISPRO 1 UNITS: 100 INJECTION, SOLUTION INTRAVENOUS; SUBCUTANEOUS at 12:41

## 2019-01-10 RX ADMIN — Medication 1 CAPSULE: at 10:16

## 2019-01-10 RX ADMIN — Medication 250 MG: at 20:48

## 2019-01-10 RX ADMIN — CARVEDILOL 25 MG: 25 TABLET, FILM COATED ORAL at 10:17

## 2019-01-10 RX ADMIN — LOSARTAN POTASSIUM 100 MG: 100 TABLET ORAL at 10:17

## 2019-01-10 RX ADMIN — ASPIRIN 325 MG: 325 TABLET, DELAYED RELEASE ORAL at 17:04

## 2019-01-10 RX ADMIN — ATORVASTATIN CALCIUM 80 MG: 80 TABLET, FILM COATED ORAL at 11:29

## 2019-01-10 RX ADMIN — CLOPIDOGREL BISULFATE 75 MG: 75 TABLET ORAL at 17:04

## 2019-01-10 RX ADMIN — PIPERACILLIN SODIUM,TAZOBACTAM SODIUM 3.38 G: 3; .375 INJECTION, POWDER, FOR SOLUTION INTRAVENOUS at 20:40

## 2019-01-10 RX ADMIN — PIPERACILLIN SODIUM,TAZOBACTAM SODIUM 3.38 G: 3; .375 INJECTION, POWDER, FOR SOLUTION INTRAVENOUS at 00:11

## 2019-01-10 RX ADMIN — ISOSORBIDE MONONITRATE 30 MG: 30 TABLET, EXTENDED RELEASE ORAL at 10:17

## 2019-01-10 RX ADMIN — PANTOPRAZOLE SODIUM 40 MG: 40 TABLET, DELAYED RELEASE ORAL at 06:34

## 2019-01-10 RX ADMIN — PANTOPRAZOLE SODIUM 40 MG: 40 TABLET, DELAYED RELEASE ORAL at 17:04

## 2019-01-10 RX ADMIN — AMIODARONE HYDROCHLORIDE 200 MG: 200 TABLET ORAL at 10:16

## 2019-01-10 RX ADMIN — Medication 10 ML: at 10:18

## 2019-01-10 RX ADMIN — INSULIN LISPRO 2 UNITS: 100 INJECTION, SOLUTION INTRAVENOUS; SUBCUTANEOUS at 20:41

## 2019-01-10 RX ADMIN — CINACALCET HYDROCHLORIDE 30 MG: 30 TABLET, COATED ORAL at 10:18

## 2019-01-10 RX ADMIN — Medication 10 ML: at 20:48

## 2019-01-10 RX ADMIN — PIPERACILLIN SODIUM,TAZOBACTAM SODIUM 3.38 G: 3; .375 INJECTION, POWDER, FOR SOLUTION INTRAVENOUS at 10:18

## 2019-01-10 RX ADMIN — Medication 250 MG: at 10:16

## 2019-01-10 RX ADMIN — VANCOMYCIN HYDROCHLORIDE 750 MG: 10 INJECTION, POWDER, LYOPHILIZED, FOR SOLUTION INTRAVENOUS at 10:38

## 2019-01-10 ASSESSMENT — PAIN DESCRIPTION - FREQUENCY
FREQUENCY: CONTINUOUS
FREQUENCY: CONTINUOUS

## 2019-01-10 ASSESSMENT — PAIN DESCRIPTION - LOCATION
LOCATION: FOOT
LOCATION: FOOT

## 2019-01-10 ASSESSMENT — PAIN DESCRIPTION - ORIENTATION
ORIENTATION: RIGHT
ORIENTATION: RIGHT

## 2019-01-10 ASSESSMENT — PAIN SCALES - GENERAL
PAINLEVEL_OUTOF10: 0
PAINLEVEL_OUTOF10: 5
PAINLEVEL_OUTOF10: 0

## 2019-01-10 ASSESSMENT — PAIN DESCRIPTION - ONSET
ONSET: ON-GOING
ONSET: ON-GOING

## 2019-01-10 ASSESSMENT — PAIN DESCRIPTION - PROGRESSION
CLINICAL_PROGRESSION: RESOLVED
CLINICAL_PROGRESSION: GRADUALLY WORSENING

## 2019-01-10 ASSESSMENT — PAIN DESCRIPTION - PAIN TYPE
TYPE: SURGICAL PAIN
TYPE: SURGICAL PAIN

## 2019-01-11 ENCOUNTER — APPOINTMENT (OUTPATIENT)
Dept: CT IMAGING | Age: 74
DRG: 239 | End: 2019-01-11
Payer: MEDICARE

## 2019-01-11 PROBLEM — E44.0 MODERATE MALNUTRITION (HCC): Chronic | Status: ACTIVE | Noted: 2019-01-11

## 2019-01-11 LAB
ANAEROBIC CULTURE: ABNORMAL
ANION GAP SERPL CALCULATED.3IONS-SCNC: 14 MMOL/L (ref 3–16)
BASOPHILS ABSOLUTE: 0 K/UL (ref 0–0.2)
BASOPHILS RELATIVE PERCENT: 0 %
BLOOD BANK DISPENSE STATUS: NORMAL
BLOOD BANK PRODUCT CODE: NORMAL
BPU ID: NORMAL
BUN BLDV-MCNC: 51 MG/DL (ref 7–20)
CALCIUM SERPL-MCNC: 9.1 MG/DL (ref 8.3–10.6)
CHLORIDE BLD-SCNC: 101 MMOL/L (ref 99–110)
CO2: 22 MMOL/L (ref 21–32)
CREAT SERPL-MCNC: 6.6 MG/DL (ref 0.8–1.3)
CULTURE SURGICAL: ABNORMAL
DESCRIPTION BLOOD BANK: NORMAL
EOSINOPHILS ABSOLUTE: 0.2 K/UL (ref 0–0.6)
EOSINOPHILS RELATIVE PERCENT: 1 %
GFR AFRICAN AMERICAN: 10
GFR NON-AFRICAN AMERICAN: 8
GLUCOSE BLD-MCNC: 141 MG/DL (ref 70–99)
GLUCOSE BLD-MCNC: 175 MG/DL (ref 70–99)
GLUCOSE BLD-MCNC: 178 MG/DL (ref 70–99)
GLUCOSE BLD-MCNC: 179 MG/DL (ref 70–99)
GLUCOSE BLD-MCNC: 212 MG/DL (ref 70–99)
GRAM STAIN RESULT: ABNORMAL
HCT VFR BLD CALC: 22.4 % (ref 40.5–52.5)
HCT VFR BLD CALC: 22.4 % (ref 40.5–52.5)
HCT VFR BLD CALC: 28.4 % (ref 40.5–52.5)
HEMOGLOBIN: 7 G/DL (ref 13.5–17.5)
HEMOGLOBIN: 7 G/DL (ref 13.5–17.5)
HEMOGLOBIN: 9.2 G/DL (ref 13.5–17.5)
INR BLD: 1.41 (ref 0.86–1.14)
LYMPHOCYTES ABSOLUTE: 1.8 K/UL (ref 1–5.1)
LYMPHOCYTES RELATIVE PERCENT: 10 %
MAGNESIUM: 2.4 MG/DL (ref 1.8–2.4)
MCH RBC QN AUTO: 24.4 PG (ref 26–34)
MCHC RBC AUTO-ENTMCNC: 31.2 G/DL (ref 31–36)
MCV RBC AUTO: 78.3 FL (ref 80–100)
MONOCYTES ABSOLUTE: 1.8 K/UL (ref 0–1.3)
MONOCYTES RELATIVE PERCENT: 10 %
NEUTROPHILS ABSOLUTE: 14.5 K/UL (ref 1.7–7.7)
NEUTROPHILS RELATIVE PERCENT: 79 %
ORGANISM: ABNORMAL
PDW BLD-RTO: 21.8 % (ref 12.4–15.4)
PERFORMED ON: ABNORMAL
PLATELET # BLD: 194 K/UL (ref 135–450)
PMV BLD AUTO: 8.3 FL (ref 5–10.5)
POTASSIUM REFLEX MAGNESIUM: 4.1 MMOL/L (ref 3.5–5.1)
PROTHROMBIN TIME: 16.1 SEC (ref 9.8–13)
RBC # BLD: 2.86 M/UL (ref 4.2–5.9)
SODIUM BLD-SCNC: 137 MMOL/L (ref 136–145)
VANCOMYCIN RANDOM: 21 UG/ML
WBC # BLD: 18.3 K/UL (ref 4–11)

## 2019-01-11 PROCEDURE — 6360000002 HC RX W HCPCS

## 2019-01-11 PROCEDURE — 80202 ASSAY OF VANCOMYCIN: CPT

## 2019-01-11 PROCEDURE — 6370000000 HC RX 637 (ALT 250 FOR IP): Performed by: STUDENT IN AN ORGANIZED HEALTH CARE EDUCATION/TRAINING PROGRAM

## 2019-01-11 PROCEDURE — 90937 HEMODIALYSIS REPEATED EVAL: CPT

## 2019-01-11 PROCEDURE — 99232 SBSQ HOSP IP/OBS MODERATE 35: CPT | Performed by: INTERNAL MEDICINE

## 2019-01-11 PROCEDURE — 85610 PROTHROMBIN TIME: CPT

## 2019-01-11 PROCEDURE — 80048 BASIC METABOLIC PNL TOTAL CA: CPT

## 2019-01-11 PROCEDURE — 83735 ASSAY OF MAGNESIUM: CPT

## 2019-01-11 PROCEDURE — 1200000000 HC SEMI PRIVATE

## 2019-01-11 PROCEDURE — 6370000000 HC RX 637 (ALT 250 FOR IP): Performed by: INTERNAL MEDICINE

## 2019-01-11 PROCEDURE — 6360000002 HC RX W HCPCS: Performed by: STUDENT IN AN ORGANIZED HEALTH CARE EDUCATION/TRAINING PROGRAM

## 2019-01-11 PROCEDURE — 74176 CT ABD & PELVIS W/O CONTRAST: CPT

## 2019-01-11 PROCEDURE — P9016 RBC LEUKOCYTES REDUCED: HCPCS

## 2019-01-11 PROCEDURE — 2580000003 HC RX 258: Performed by: STUDENT IN AN ORGANIZED HEALTH CARE EDUCATION/TRAINING PROGRAM

## 2019-01-11 PROCEDURE — 85025 COMPLETE CBC W/AUTO DIFF WBC: CPT

## 2019-01-11 PROCEDURE — 2580000003 HC RX 258

## 2019-01-11 PROCEDURE — 85018 HEMOGLOBIN: CPT

## 2019-01-11 PROCEDURE — 85014 HEMATOCRIT: CPT

## 2019-01-11 PROCEDURE — C9113 INJ PANTOPRAZOLE SODIUM, VIA: HCPCS | Performed by: STUDENT IN AN ORGANIZED HEALTH CARE EDUCATION/TRAINING PROGRAM

## 2019-01-11 PROCEDURE — 86923 COMPATIBILITY TEST ELECTRIC: CPT

## 2019-01-11 PROCEDURE — 36415 COLL VENOUS BLD VENIPUNCTURE: CPT

## 2019-01-11 RX ORDER — SODIUM CHLORIDE 0.9 % (FLUSH) 0.9 %
10 SYRINGE (ML) INJECTION PRN
Status: DISCONTINUED | OUTPATIENT
Start: 2019-01-11 | End: 2019-01-15 | Stop reason: SDUPTHER

## 2019-01-11 RX ORDER — CLOPIDOGREL BISULFATE 75 MG/1
75 TABLET ORAL DAILY
Status: DISCONTINUED | OUTPATIENT
Start: 2019-01-11 | End: 2019-01-25 | Stop reason: HOSPADM

## 2019-01-11 RX ORDER — 0.9 % SODIUM CHLORIDE 0.9 %
250 INTRAVENOUS SOLUTION INTRAVENOUS ONCE
Status: COMPLETED | OUTPATIENT
Start: 2019-01-11 | End: 2019-01-12

## 2019-01-11 RX ORDER — PANTOPRAZOLE SODIUM 40 MG/10ML
40 INJECTION, POWDER, LYOPHILIZED, FOR SOLUTION INTRAVENOUS 2 TIMES DAILY
Status: DISCONTINUED | OUTPATIENT
Start: 2019-01-11 | End: 2019-01-12

## 2019-01-11 RX ORDER — CLOPIDOGREL BISULFATE 75 MG/1
75 TABLET ORAL DAILY
Status: DISCONTINUED | OUTPATIENT
Start: 2019-01-11 | End: 2019-01-11

## 2019-01-11 RX ADMIN — HYDRALAZINE HYDROCHLORIDE 25 MG: 25 TABLET, FILM COATED ORAL at 22:15

## 2019-01-11 RX ADMIN — ISOSORBIDE MONONITRATE 30 MG: 30 TABLET, EXTENDED RELEASE ORAL at 22:15

## 2019-01-11 RX ADMIN — HYDRALAZINE HYDROCHLORIDE 25 MG: 25 TABLET, FILM COATED ORAL at 18:46

## 2019-01-11 RX ADMIN — INSULIN LISPRO 1 UNITS: 100 INJECTION, SOLUTION INTRAVENOUS; SUBCUTANEOUS at 08:57

## 2019-01-11 RX ADMIN — PIPERACILLIN SODIUM,TAZOBACTAM SODIUM 3.38 G: 3; .375 INJECTION, POWDER, FOR SOLUTION INTRAVENOUS at 08:37

## 2019-01-11 RX ADMIN — Medication 250 MG: at 22:17

## 2019-01-11 RX ADMIN — VANCOMYCIN HYDROCHLORIDE 750 MG: 10 INJECTION, POWDER, LYOPHILIZED, FOR SOLUTION INTRAVENOUS at 16:30

## 2019-01-11 RX ADMIN — PANTOPRAZOLE SODIUM 40 MG: 40 INJECTION, POWDER, FOR SOLUTION INTRAVENOUS at 22:19

## 2019-01-11 RX ADMIN — PANTOPRAZOLE SODIUM 40 MG: 40 INJECTION, POWDER, FOR SOLUTION INTRAVENOUS at 01:51

## 2019-01-11 RX ADMIN — SODIUM CHLORIDE 250 ML: 9 INJECTION, SOLUTION INTRAVENOUS at 12:30

## 2019-01-11 RX ADMIN — PANTOPRAZOLE SODIUM 40 MG: 40 INJECTION, POWDER, FOR SOLUTION INTRAVENOUS at 08:38

## 2019-01-11 RX ADMIN — ASPIRIN 325 MG: 325 TABLET, DELAYED RELEASE ORAL at 18:47

## 2019-01-11 RX ADMIN — CLOPIDOGREL BISULFATE 75 MG: 75 TABLET ORAL at 18:46

## 2019-01-11 RX ADMIN — Medication 10 ML: at 22:20

## 2019-01-11 RX ADMIN — PIPERACILLIN SODIUM,TAZOBACTAM SODIUM 3.38 G: 3; .375 INJECTION, POWDER, FOR SOLUTION INTRAVENOUS at 22:19

## 2019-01-11 RX ADMIN — Medication 10 ML: at 08:38

## 2019-01-11 RX ADMIN — INSULIN LISPRO 1 UNITS: 100 INJECTION, SOLUTION INTRAVENOUS; SUBCUTANEOUS at 12:18

## 2019-01-11 ASSESSMENT — PAIN SCALES - GENERAL
PAINLEVEL_OUTOF10: 0
PAINLEVEL_OUTOF10: 0
PAINLEVEL_OUTOF10: 5
PAINLEVEL_OUTOF10: 0

## 2019-01-12 LAB
ANION GAP SERPL CALCULATED.3IONS-SCNC: 13 MMOL/L (ref 3–16)
ANISOCYTOSIS: ABNORMAL
BASOPHILS ABSOLUTE: 0 K/UL (ref 0–0.2)
BASOPHILS RELATIVE PERCENT: 0 %
BUN BLDV-MCNC: 23 MG/DL (ref 7–20)
CALCIUM SERPL-MCNC: 9 MG/DL (ref 8.3–10.6)
CHLORIDE BLD-SCNC: 98 MMOL/L (ref 99–110)
CO2: 24 MMOL/L (ref 21–32)
CREAT SERPL-MCNC: 4.2 MG/DL (ref 0.8–1.3)
EOSINOPHILS ABSOLUTE: 0 K/UL (ref 0–0.6)
EOSINOPHILS RELATIVE PERCENT: 0 %
GFR AFRICAN AMERICAN: 17
GFR NON-AFRICAN AMERICAN: 14
GLUCOSE BLD-MCNC: 106 MG/DL (ref 70–99)
GLUCOSE BLD-MCNC: 133 MG/DL (ref 70–99)
GLUCOSE BLD-MCNC: 157 MG/DL (ref 70–99)
GLUCOSE BLD-MCNC: 196 MG/DL (ref 70–99)
GLUCOSE BLD-MCNC: 237 MG/DL (ref 70–99)
GLUCOSE BLD-MCNC: 261 MG/DL (ref 70–99)
HCT VFR BLD CALC: 28.8 % (ref 40.5–52.5)
HCT VFR BLD CALC: 29.8 % (ref 40.5–52.5)
HCT VFR BLD CALC: 30.2 % (ref 40.5–52.5)
HCT VFR BLD CALC: 30.8 % (ref 40.5–52.5)
HEMOGLOBIN: 9.2 G/DL (ref 13.5–17.5)
HEMOGLOBIN: 9.4 G/DL (ref 13.5–17.5)
HEMOGLOBIN: 9.6 G/DL (ref 13.5–17.5)
HEMOGLOBIN: 9.7 G/DL (ref 13.5–17.5)
LYMPHOCYTES ABSOLUTE: 1.8 K/UL (ref 1–5.1)
LYMPHOCYTES RELATIVE PERCENT: 10 %
MCH RBC QN AUTO: 25 PG (ref 26–34)
MCHC RBC AUTO-ENTMCNC: 31.8 G/DL (ref 31–36)
MCV RBC AUTO: 78.7 FL (ref 80–100)
MICROCYTES: ABNORMAL
MONOCYTES ABSOLUTE: 1.9 K/UL (ref 0–1.3)
MONOCYTES RELATIVE PERCENT: 11 %
NEUTROPHILS ABSOLUTE: 13.8 K/UL (ref 1.7–7.7)
NEUTROPHILS RELATIVE PERCENT: 79 %
OVALOCYTES: ABNORMAL
PDW BLD-RTO: 20.4 % (ref 12.4–15.4)
PERFORMED ON: ABNORMAL
PLATELET # BLD: 195 K/UL (ref 135–450)
PMV BLD AUTO: 8.1 FL (ref 5–10.5)
POLYCHROMASIA: ABNORMAL
POTASSIUM REFLEX MAGNESIUM: 3.7 MMOL/L (ref 3.5–5.1)
RBC # BLD: 3.84 M/UL (ref 4.2–5.9)
SODIUM BLD-SCNC: 135 MMOL/L (ref 136–145)
TEAR DROP CELLS: ABNORMAL
WBC # BLD: 17.5 K/UL (ref 4–11)

## 2019-01-12 PROCEDURE — 80048 BASIC METABOLIC PNL TOTAL CA: CPT

## 2019-01-12 PROCEDURE — 51798 US URINE CAPACITY MEASURE: CPT

## 2019-01-12 PROCEDURE — 6360000002 HC RX W HCPCS

## 2019-01-12 PROCEDURE — 85018 HEMOGLOBIN: CPT

## 2019-01-12 PROCEDURE — 85014 HEMATOCRIT: CPT

## 2019-01-12 PROCEDURE — 6370000000 HC RX 637 (ALT 250 FOR IP): Performed by: STUDENT IN AN ORGANIZED HEALTH CARE EDUCATION/TRAINING PROGRAM

## 2019-01-12 PROCEDURE — 6370000000 HC RX 637 (ALT 250 FOR IP): Performed by: INTERNAL MEDICINE

## 2019-01-12 PROCEDURE — 1200000000 HC SEMI PRIVATE

## 2019-01-12 PROCEDURE — 2580000003 HC RX 258

## 2019-01-12 PROCEDURE — 6360000002 HC RX W HCPCS: Performed by: STUDENT IN AN ORGANIZED HEALTH CARE EDUCATION/TRAINING PROGRAM

## 2019-01-12 PROCEDURE — C9113 INJ PANTOPRAZOLE SODIUM, VIA: HCPCS | Performed by: STUDENT IN AN ORGANIZED HEALTH CARE EDUCATION/TRAINING PROGRAM

## 2019-01-12 PROCEDURE — 2580000003 HC RX 258: Performed by: STUDENT IN AN ORGANIZED HEALTH CARE EDUCATION/TRAINING PROGRAM

## 2019-01-12 PROCEDURE — 85025 COMPLETE CBC W/AUTO DIFF WBC: CPT

## 2019-01-12 PROCEDURE — 36415 COLL VENOUS BLD VENIPUNCTURE: CPT

## 2019-01-12 RX ORDER — PANTOPRAZOLE SODIUM 40 MG/1
40 TABLET, DELAYED RELEASE ORAL
Status: DISCONTINUED | OUTPATIENT
Start: 2019-01-13 | End: 2019-01-25 | Stop reason: HOSPADM

## 2019-01-12 RX ADMIN — ISOSORBIDE MONONITRATE 30 MG: 30 TABLET, EXTENDED RELEASE ORAL at 10:13

## 2019-01-12 RX ADMIN — Medication 10 ML: at 09:22

## 2019-01-12 RX ADMIN — ATORVASTATIN CALCIUM 80 MG: 80 TABLET, FILM COATED ORAL at 10:13

## 2019-01-12 RX ADMIN — HYDRALAZINE HYDROCHLORIDE 25 MG: 25 TABLET, FILM COATED ORAL at 05:06

## 2019-01-12 RX ADMIN — PIPERACILLIN SODIUM,TAZOBACTAM SODIUM 3.38 G: 3; .375 INJECTION, POWDER, FOR SOLUTION INTRAVENOUS at 09:21

## 2019-01-12 RX ADMIN — AMIODARONE HYDROCHLORIDE 200 MG: 200 TABLET ORAL at 10:14

## 2019-01-12 RX ADMIN — ISOSORBIDE MONONITRATE 30 MG: 30 TABLET, EXTENDED RELEASE ORAL at 21:24

## 2019-01-12 RX ADMIN — PIPERACILLIN SODIUM,TAZOBACTAM SODIUM 3.38 G: 3; .375 INJECTION, POWDER, FOR SOLUTION INTRAVENOUS at 21:25

## 2019-01-12 RX ADMIN — INSULIN LISPRO 1 UNITS: 100 INJECTION, SOLUTION INTRAVENOUS; SUBCUTANEOUS at 00:15

## 2019-01-12 RX ADMIN — INSULIN GLARGINE 4 UNITS: 100 INJECTION, SOLUTION SUBCUTANEOUS at 22:26

## 2019-01-12 RX ADMIN — INSULIN LISPRO 2 UNITS: 100 INJECTION, SOLUTION INTRAVENOUS; SUBCUTANEOUS at 22:26

## 2019-01-12 RX ADMIN — INSULIN LISPRO 1 UNITS: 100 INJECTION, SOLUTION INTRAVENOUS; SUBCUTANEOUS at 14:54

## 2019-01-12 RX ADMIN — ASPIRIN 325 MG: 325 TABLET, DELAYED RELEASE ORAL at 10:14

## 2019-01-12 RX ADMIN — Medication 10 ML: at 21:25

## 2019-01-12 RX ADMIN — HYDRALAZINE HYDROCHLORIDE 25 MG: 25 TABLET, FILM COATED ORAL at 21:24

## 2019-01-12 RX ADMIN — HYDRALAZINE HYDROCHLORIDE 25 MG: 25 TABLET, FILM COATED ORAL at 14:54

## 2019-01-12 RX ADMIN — Medication 1 CAPSULE: at 10:13

## 2019-01-12 RX ADMIN — INSULIN GLARGINE 4 UNITS: 100 INJECTION, SOLUTION SUBCUTANEOUS at 00:15

## 2019-01-12 RX ADMIN — CINACALCET HYDROCHLORIDE 30 MG: 30 TABLET, COATED ORAL at 10:13

## 2019-01-12 RX ADMIN — Medication 250 MG: at 21:24

## 2019-01-12 RX ADMIN — CLOPIDOGREL BISULFATE 75 MG: 75 TABLET ORAL at 10:13

## 2019-01-12 RX ADMIN — PANTOPRAZOLE SODIUM 40 MG: 40 INJECTION, POWDER, FOR SOLUTION INTRAVENOUS at 09:21

## 2019-01-12 RX ADMIN — Medication 250 MG: at 10:13

## 2019-01-12 RX ADMIN — NEPHROCAP 1 MG: 1 CAP ORAL at 10:13

## 2019-01-12 ASSESSMENT — PAIN SCALES - GENERAL
PAINLEVEL_OUTOF10: 0

## 2019-01-13 LAB
ANION GAP SERPL CALCULATED.3IONS-SCNC: 16 MMOL/L (ref 3–16)
ANISOCYTOSIS: ABNORMAL
BASOPHILS ABSOLUTE: 0.3 K/UL (ref 0–0.2)
BASOPHILS RELATIVE PERCENT: 2 %
BUN BLDV-MCNC: 44 MG/DL (ref 7–20)
BURR CELLS: ABNORMAL
CALCIUM SERPL-MCNC: 8.9 MG/DL (ref 8.3–10.6)
CHLORIDE BLD-SCNC: 100 MMOL/L (ref 99–110)
CO2: 22 MMOL/L (ref 21–32)
CREAT SERPL-MCNC: 7 MG/DL (ref 0.8–1.3)
EOSINOPHILS ABSOLUTE: 0.3 K/UL (ref 0–0.6)
EOSINOPHILS RELATIVE PERCENT: 2 %
GFR AFRICAN AMERICAN: 9
GFR NON-AFRICAN AMERICAN: 8
GLUCOSE BLD-MCNC: 149 MG/DL (ref 70–99)
GLUCOSE BLD-MCNC: 157 MG/DL (ref 70–99)
GLUCOSE BLD-MCNC: 209 MG/DL (ref 70–99)
GLUCOSE BLD-MCNC: 221 MG/DL (ref 70–99)
HCT VFR BLD CALC: 26.8 % (ref 40.5–52.5)
HCT VFR BLD CALC: 27.3 % (ref 40.5–52.5)
HCT VFR BLD CALC: 27.8 % (ref 40.5–52.5)
HCT VFR BLD CALC: 29.1 % (ref 40.5–52.5)
HEMOGLOBIN: 8.4 G/DL (ref 13.5–17.5)
HEMOGLOBIN: 8.6 G/DL (ref 13.5–17.5)
HEMOGLOBIN: 8.8 G/DL (ref 13.5–17.5)
HEMOGLOBIN: 9.3 G/DL (ref 13.5–17.5)
LYMPHOCYTES ABSOLUTE: 2.9 K/UL (ref 1–5.1)
LYMPHOCYTES RELATIVE PERCENT: 18 %
MAGNESIUM: 2.3 MG/DL (ref 1.8–2.4)
MCH RBC QN AUTO: 25.2 PG (ref 26–34)
MCHC RBC AUTO-ENTMCNC: 31.4 G/DL (ref 31–36)
MCV RBC AUTO: 80.3 FL (ref 80–100)
METAMYELOCYTES RELATIVE PERCENT: 1 %
MICROCYTES: ABNORMAL
MONOCYTES ABSOLUTE: 0.5 K/UL (ref 0–1.3)
MONOCYTES RELATIVE PERCENT: 3 %
NEUTROPHILS ABSOLUTE: 11.9 K/UL (ref 1.7–7.7)
NEUTROPHILS RELATIVE PERCENT: 74 %
OVALOCYTES: ABNORMAL
PDW BLD-RTO: 21 % (ref 12.4–15.4)
PERFORMED ON: ABNORMAL
PLATELET # BLD: 182 K/UL (ref 135–450)
PMV BLD AUTO: 8.4 FL (ref 5–10.5)
POLYCHROMASIA: ABNORMAL
POTASSIUM REFLEX MAGNESIUM: 3.5 MMOL/L (ref 3.5–5.1)
RBC # BLD: 3.41 M/UL (ref 4.2–5.9)
SCHISTOCYTES: ABNORMAL
SODIUM BLD-SCNC: 138 MMOL/L (ref 136–145)
TEAR DROP CELLS: ABNORMAL
WBC # BLD: 15.9 K/UL (ref 4–11)

## 2019-01-13 PROCEDURE — 6370000000 HC RX 637 (ALT 250 FOR IP): Performed by: STUDENT IN AN ORGANIZED HEALTH CARE EDUCATION/TRAINING PROGRAM

## 2019-01-13 PROCEDURE — 85025 COMPLETE CBC W/AUTO DIFF WBC: CPT

## 2019-01-13 PROCEDURE — 2580000003 HC RX 258: Performed by: STUDENT IN AN ORGANIZED HEALTH CARE EDUCATION/TRAINING PROGRAM

## 2019-01-13 PROCEDURE — 99231 SBSQ HOSP IP/OBS SF/LOW 25: CPT | Performed by: INTERNAL MEDICINE

## 2019-01-13 PROCEDURE — 6370000000 HC RX 637 (ALT 250 FOR IP): Performed by: INTERNAL MEDICINE

## 2019-01-13 PROCEDURE — 92526 ORAL FUNCTION THERAPY: CPT

## 2019-01-13 PROCEDURE — 85018 HEMOGLOBIN: CPT

## 2019-01-13 PROCEDURE — 85014 HEMATOCRIT: CPT

## 2019-01-13 PROCEDURE — 6360000002 HC RX W HCPCS

## 2019-01-13 PROCEDURE — 80048 BASIC METABOLIC PNL TOTAL CA: CPT

## 2019-01-13 PROCEDURE — 2580000003 HC RX 258

## 2019-01-13 PROCEDURE — 83735 ASSAY OF MAGNESIUM: CPT

## 2019-01-13 PROCEDURE — 1200000000 HC SEMI PRIVATE

## 2019-01-13 PROCEDURE — 36415 COLL VENOUS BLD VENIPUNCTURE: CPT

## 2019-01-13 RX ORDER — HYDRALAZINE HYDROCHLORIDE 50 MG/1
50 TABLET, FILM COATED ORAL EVERY 8 HOURS SCHEDULED
Status: DISCONTINUED | OUTPATIENT
Start: 2019-01-13 | End: 2019-01-14

## 2019-01-13 RX ADMIN — HYDRALAZINE HYDROCHLORIDE 25 MG: 25 TABLET, FILM COATED ORAL at 05:33

## 2019-01-13 RX ADMIN — PIPERACILLIN SODIUM,TAZOBACTAM SODIUM 3.38 G: 3; .375 INJECTION, POWDER, FOR SOLUTION INTRAVENOUS at 08:34

## 2019-01-13 RX ADMIN — Medication 10 ML: at 20:34

## 2019-01-13 RX ADMIN — HYDRALAZINE HYDROCHLORIDE 50 MG: 50 TABLET, FILM COATED ORAL at 20:34

## 2019-01-13 RX ADMIN — ASPIRIN 325 MG: 325 TABLET, DELAYED RELEASE ORAL at 09:06

## 2019-01-13 RX ADMIN — Medication 250 MG: at 20:34

## 2019-01-13 RX ADMIN — ISOSORBIDE MONONITRATE 30 MG: 30 TABLET, EXTENDED RELEASE ORAL at 20:34

## 2019-01-13 RX ADMIN — CINACALCET HYDROCHLORIDE 30 MG: 30 TABLET, COATED ORAL at 09:06

## 2019-01-13 RX ADMIN — NEPHROCAP 1 MG: 1 CAP ORAL at 09:06

## 2019-01-13 RX ADMIN — Medication 250 MG: at 09:06

## 2019-01-13 RX ADMIN — Medication 1 CAPSULE: at 09:06

## 2019-01-13 RX ADMIN — CLOPIDOGREL BISULFATE 75 MG: 75 TABLET ORAL at 09:06

## 2019-01-13 RX ADMIN — PIPERACILLIN SODIUM,TAZOBACTAM SODIUM 3.38 G: 3; .375 INJECTION, POWDER, FOR SOLUTION INTRAVENOUS at 20:34

## 2019-01-13 RX ADMIN — Medication 10 ML: at 09:07

## 2019-01-13 RX ADMIN — AMIODARONE HYDROCHLORIDE 200 MG: 200 TABLET ORAL at 09:06

## 2019-01-13 RX ADMIN — ISOSORBIDE MONONITRATE 30 MG: 30 TABLET, EXTENDED RELEASE ORAL at 09:06

## 2019-01-13 RX ADMIN — ATORVASTATIN CALCIUM 80 MG: 80 TABLET, FILM COATED ORAL at 09:06

## 2019-01-13 RX ADMIN — INSULIN LISPRO 2 UNITS: 100 INJECTION, SOLUTION INTRAVENOUS; SUBCUTANEOUS at 13:53

## 2019-01-13 RX ADMIN — PANTOPRAZOLE SODIUM 40 MG: 40 TABLET, DELAYED RELEASE ORAL at 05:33

## 2019-01-13 RX ADMIN — HYDRALAZINE HYDROCHLORIDE 50 MG: 50 TABLET, FILM COATED ORAL at 16:13

## 2019-01-13 ASSESSMENT — PAIN SCALES - GENERAL
PAINLEVEL_OUTOF10: 0
PAINLEVEL_OUTOF10: 0

## 2019-01-14 ENCOUNTER — ANESTHESIA EVENT (OUTPATIENT)
Dept: OPERATING ROOM | Age: 74
DRG: 239 | End: 2019-01-14
Payer: MEDICARE

## 2019-01-14 ENCOUNTER — APPOINTMENT (OUTPATIENT)
Dept: GENERAL RADIOLOGY | Age: 74
DRG: 239 | End: 2019-01-14
Payer: MEDICARE

## 2019-01-14 ENCOUNTER — ANESTHESIA (OUTPATIENT)
Dept: OPERATING ROOM | Age: 74
DRG: 239 | End: 2019-01-14
Payer: MEDICARE

## 2019-01-14 VITALS — SYSTOLIC BLOOD PRESSURE: 136 MMHG | OXYGEN SATURATION: 100 % | DIASTOLIC BLOOD PRESSURE: 63 MMHG

## 2019-01-14 LAB
ABO/RH: NORMAL
ANION GAP SERPL CALCULATED.3IONS-SCNC: 17 MMOL/L (ref 3–16)
ANISOCYTOSIS: ABNORMAL
ANTIBODY SCREEN: NORMAL
BASOPHILS ABSOLUTE: 0 K/UL (ref 0–0.2)
BASOPHILS RELATIVE PERCENT: 0 %
BUN BLDV-MCNC: 68 MG/DL (ref 7–20)
CALCIUM SERPL-MCNC: 8.7 MG/DL (ref 8.3–10.6)
CHLORIDE BLD-SCNC: 99 MMOL/L (ref 99–110)
CO2: 22 MMOL/L (ref 21–32)
CREAT SERPL-MCNC: 9.5 MG/DL (ref 0.8–1.3)
EOSINOPHILS ABSOLUTE: 0.2 K/UL (ref 0–0.6)
EOSINOPHILS RELATIVE PERCENT: 1 %
GFR AFRICAN AMERICAN: 7
GFR NON-AFRICAN AMERICAN: 5
GLUCOSE BLD-MCNC: 103 MG/DL (ref 70–99)
GLUCOSE BLD-MCNC: 141 MG/DL (ref 70–99)
GLUCOSE BLD-MCNC: 143 MG/DL (ref 70–99)
GLUCOSE BLD-MCNC: 221 MG/DL (ref 70–99)
HCT VFR BLD CALC: 26.3 % (ref 40.5–52.5)
HCT VFR BLD CALC: 26.8 % (ref 40.5–52.5)
HCT VFR BLD CALC: 27.6 % (ref 40.5–52.5)
HEMOGLOBIN: 8.3 G/DL (ref 13.5–17.5)
HEMOGLOBIN: 8.3 G/DL (ref 13.5–17.5)
HEMOGLOBIN: 8.7 G/DL (ref 13.5–17.5)
INR BLD: 1.25 (ref 0.86–1.14)
LYMPHOCYTES ABSOLUTE: 2.1 K/UL (ref 1–5.1)
LYMPHOCYTES RELATIVE PERCENT: 13 %
MCH RBC QN AUTO: 25.3 PG (ref 26–34)
MCHC RBC AUTO-ENTMCNC: 31.6 G/DL (ref 31–36)
MCV RBC AUTO: 80 FL (ref 80–100)
MONOCYTES ABSOLUTE: 1 K/UL (ref 0–1.3)
MONOCYTES RELATIVE PERCENT: 6 %
NEUTROPHILS ABSOLUTE: 13.2 K/UL (ref 1.7–7.7)
NEUTROPHILS RELATIVE PERCENT: 80 %
PDW BLD-RTO: 21.7 % (ref 12.4–15.4)
PERFORMED ON: ABNORMAL
PHOSPHORUS: 5.6 MG/DL (ref 2.5–4.9)
PLATELET # BLD: 196 K/UL (ref 135–450)
PMV BLD AUTO: 8.3 FL (ref 5–10.5)
POLYCHROMASIA: ABNORMAL
POTASSIUM REFLEX MAGNESIUM: 4.3 MMOL/L (ref 3.5–5.1)
PROTHROMBIN TIME: 14.3 SEC (ref 9.8–13)
RBC # BLD: 3.29 M/UL (ref 4.2–5.9)
SODIUM BLD-SCNC: 138 MMOL/L (ref 136–145)
TEAR DROP CELLS: ABNORMAL
VANCOMYCIN RANDOM: 18.6 UG/ML
WBC # BLD: 16.5 K/UL (ref 4–11)

## 2019-01-14 PROCEDURE — 80048 BASIC METABOLIC PNL TOTAL CA: CPT

## 2019-01-14 PROCEDURE — 6360000002 HC RX W HCPCS: Performed by: NURSE ANESTHETIST, CERTIFIED REGISTERED

## 2019-01-14 PROCEDURE — 2580000003 HC RX 258: Performed by: PODIATRIST

## 2019-01-14 PROCEDURE — 6360000002 HC RX W HCPCS: Performed by: STUDENT IN AN ORGANIZED HEALTH CARE EDUCATION/TRAINING PROGRAM

## 2019-01-14 PROCEDURE — 73630 X-RAY EXAM OF FOOT: CPT

## 2019-01-14 PROCEDURE — 2580000003 HC RX 258: Performed by: STUDENT IN AN ORGANIZED HEALTH CARE EDUCATION/TRAINING PROGRAM

## 2019-01-14 PROCEDURE — 2500000003 HC RX 250 WO HCPCS: Performed by: PODIATRIST

## 2019-01-14 PROCEDURE — 86900 BLOOD TYPING SEROLOGIC ABO: CPT

## 2019-01-14 PROCEDURE — 6370000000 HC RX 637 (ALT 250 FOR IP): Performed by: STUDENT IN AN ORGANIZED HEALTH CARE EDUCATION/TRAINING PROGRAM

## 2019-01-14 PROCEDURE — 99232 SBSQ HOSP IP/OBS MODERATE 35: CPT | Performed by: INTERNAL MEDICINE

## 2019-01-14 PROCEDURE — 3600000003 HC SURGERY LEVEL 3 BASE: Performed by: PODIATRIST

## 2019-01-14 PROCEDURE — 6370000000 HC RX 637 (ALT 250 FOR IP): Performed by: INTERNAL MEDICINE

## 2019-01-14 PROCEDURE — 85610 PROTHROMBIN TIME: CPT

## 2019-01-14 PROCEDURE — 84100 ASSAY OF PHOSPHORUS: CPT

## 2019-01-14 PROCEDURE — 3700000000 HC ANESTHESIA ATTENDED CARE: Performed by: PODIATRIST

## 2019-01-14 PROCEDURE — 80202 ASSAY OF VANCOMYCIN: CPT

## 2019-01-14 PROCEDURE — 85014 HEMATOCRIT: CPT

## 2019-01-14 PROCEDURE — 2709999900 HC NON-CHARGEABLE SUPPLY: Performed by: PODIATRIST

## 2019-01-14 PROCEDURE — 2580000003 HC RX 258

## 2019-01-14 PROCEDURE — 2580000003 HC RX 258: Performed by: NURSE ANESTHETIST, CERTIFIED REGISTERED

## 2019-01-14 PROCEDURE — 3600000013 HC SURGERY LEVEL 3 ADDTL 15MIN: Performed by: PODIATRIST

## 2019-01-14 PROCEDURE — 90937 HEMODIALYSIS REPEATED EVAL: CPT

## 2019-01-14 PROCEDURE — 85025 COMPLETE CBC W/AUTO DIFF WBC: CPT

## 2019-01-14 PROCEDURE — 2500000003 HC RX 250 WO HCPCS: Performed by: NURSE ANESTHETIST, CERTIFIED REGISTERED

## 2019-01-14 PROCEDURE — 6360000002 HC RX W HCPCS

## 2019-01-14 PROCEDURE — 3700000001 HC ADD 15 MINUTES (ANESTHESIA): Performed by: PODIATRIST

## 2019-01-14 PROCEDURE — 0Y6M0Z0 DETACHMENT AT RIGHT FOOT, COMPLETE, OPEN APPROACH: ICD-10-PCS | Performed by: PODIATRIST

## 2019-01-14 PROCEDURE — 7100000000 HC PACU RECOVERY - FIRST 15 MIN: Performed by: PODIATRIST

## 2019-01-14 PROCEDURE — 1200000000 HC SEMI PRIVATE

## 2019-01-14 PROCEDURE — 86850 RBC ANTIBODY SCREEN: CPT

## 2019-01-14 PROCEDURE — 85018 HEMOGLOBIN: CPT

## 2019-01-14 PROCEDURE — 6360000002 HC RX W HCPCS: Performed by: PODIATRIST

## 2019-01-14 PROCEDURE — 7100000001 HC PACU RECOVERY - ADDTL 15 MIN: Performed by: PODIATRIST

## 2019-01-14 PROCEDURE — 86901 BLOOD TYPING SEROLOGIC RH(D): CPT

## 2019-01-14 RX ORDER — FENTANYL CITRATE 50 UG/ML
50 INJECTION, SOLUTION INTRAMUSCULAR; INTRAVENOUS EVERY 5 MIN PRN
Status: DISCONTINUED | OUTPATIENT
Start: 2019-01-14 | End: 2019-01-14 | Stop reason: HOSPADM

## 2019-01-14 RX ORDER — LIDOCAINE HYDROCHLORIDE 20 MG/ML
INJECTION, SOLUTION INFILTRATION; PERINEURAL PRN
Status: DISCONTINUED | OUTPATIENT
Start: 2019-01-14 | End: 2019-01-14 | Stop reason: SDUPTHER

## 2019-01-14 RX ORDER — FENTANYL CITRATE 50 UG/ML
INJECTION, SOLUTION INTRAMUSCULAR; INTRAVENOUS PRN
Status: DISCONTINUED | OUTPATIENT
Start: 2019-01-14 | End: 2019-01-14 | Stop reason: SDUPTHER

## 2019-01-14 RX ORDER — SODIUM CHLORIDE 9 MG/ML
INJECTION, SOLUTION INTRAVENOUS CONTINUOUS PRN
Status: DISCONTINUED | OUTPATIENT
Start: 2019-01-14 | End: 2019-01-14 | Stop reason: SDUPTHER

## 2019-01-14 RX ORDER — OXYCODONE HYDROCHLORIDE AND ACETAMINOPHEN 5; 325 MG/1; MG/1
1 TABLET ORAL
Status: DISCONTINUED | OUTPATIENT
Start: 2019-01-14 | End: 2019-01-14 | Stop reason: HOSPADM

## 2019-01-14 RX ORDER — MAGNESIUM HYDROXIDE 1200 MG/15ML
LIQUID ORAL CONTINUOUS PRN
Status: COMPLETED | OUTPATIENT
Start: 2019-01-14 | End: 2019-01-14

## 2019-01-14 RX ORDER — ONDANSETRON 2 MG/ML
4 INJECTION INTRAMUSCULAR; INTRAVENOUS
Status: DISCONTINUED | OUTPATIENT
Start: 2019-01-14 | End: 2019-01-14 | Stop reason: HOSPADM

## 2019-01-14 RX ORDER — HYDROCODONE BITARTRATE AND ACETAMINOPHEN 5; 325 MG/1; MG/1
1 TABLET ORAL EVERY 6 HOURS PRN
Status: DISCONTINUED | OUTPATIENT
Start: 2019-01-14 | End: 2019-01-25 | Stop reason: HOSPADM

## 2019-01-14 RX ORDER — LABETALOL HYDROCHLORIDE 5 MG/ML
5 INJECTION, SOLUTION INTRAVENOUS EVERY 10 MIN PRN
Status: DISCONTINUED | OUTPATIENT
Start: 2019-01-14 | End: 2019-01-14 | Stop reason: HOSPADM

## 2019-01-14 RX ORDER — LIDOCAINE HYDROCHLORIDE 20 MG/ML
INJECTION, SOLUTION EPIDURAL; INFILTRATION; INTRACAUDAL; PERINEURAL PRN
Status: DISCONTINUED | OUTPATIENT
Start: 2019-01-14 | End: 2019-01-14 | Stop reason: HOSPADM

## 2019-01-14 RX ORDER — ACETAMINOPHEN 325 MG/1
650 TABLET ORAL EVERY 4 HOURS PRN
Status: DISCONTINUED | OUTPATIENT
Start: 2019-01-14 | End: 2019-01-25 | Stop reason: HOSPADM

## 2019-01-14 RX ORDER — PROMETHAZINE HYDROCHLORIDE 25 MG/ML
6.25 INJECTION, SOLUTION INTRAMUSCULAR; INTRAVENOUS
Status: DISCONTINUED | OUTPATIENT
Start: 2019-01-14 | End: 2019-01-14 | Stop reason: HOSPADM

## 2019-01-14 RX ORDER — HYDRALAZINE HYDROCHLORIDE 100 MG/1
100 TABLET, FILM COATED ORAL EVERY 8 HOURS SCHEDULED
Status: DISCONTINUED | OUTPATIENT
Start: 2019-01-14 | End: 2019-01-19

## 2019-01-14 RX ORDER — HYDRALAZINE HYDROCHLORIDE 20 MG/ML
5 INJECTION INTRAMUSCULAR; INTRAVENOUS EVERY 10 MIN PRN
Status: DISCONTINUED | OUTPATIENT
Start: 2019-01-14 | End: 2019-01-14 | Stop reason: HOSPADM

## 2019-01-14 RX ORDER — FENTANYL CITRATE 50 UG/ML
25 INJECTION, SOLUTION INTRAMUSCULAR; INTRAVENOUS EVERY 5 MIN PRN
Status: DISCONTINUED | OUTPATIENT
Start: 2019-01-14 | End: 2019-01-14 | Stop reason: HOSPADM

## 2019-01-14 RX ORDER — PROPOFOL 10 MG/ML
INJECTION, EMULSION INTRAVENOUS PRN
Status: DISCONTINUED | OUTPATIENT
Start: 2019-01-14 | End: 2019-01-14 | Stop reason: SDUPTHER

## 2019-01-14 RX ADMIN — FENTANYL CITRATE 100 MCG: 50 INJECTION INTRAMUSCULAR; INTRAVENOUS at 17:05

## 2019-01-14 RX ADMIN — HYDRALAZINE HYDROCHLORIDE 100 MG: 100 TABLET, FILM COATED ORAL at 20:21

## 2019-01-14 RX ADMIN — PANTOPRAZOLE SODIUM 40 MG: 40 TABLET, DELAYED RELEASE ORAL at 05:55

## 2019-01-14 RX ADMIN — HYDRALAZINE HYDROCHLORIDE 50 MG: 50 TABLET, FILM COATED ORAL at 05:55

## 2019-01-14 RX ADMIN — PHENYLEPHRINE HYDROCHLORIDE 40 MCG: 10 INJECTION, SOLUTION INTRAMUSCULAR; INTRAVENOUS; SUBCUTANEOUS at 17:58

## 2019-01-14 RX ADMIN — PROPOFOL 100 MG: 10 INJECTION, EMULSION INTRAVENOUS at 17:16

## 2019-01-14 RX ADMIN — LIDOCAINE HYDROCHLORIDE 100 MG: 20 INJECTION, SOLUTION INFILTRATION; PERINEURAL at 17:16

## 2019-01-14 RX ADMIN — Medication 250 MG: at 20:24

## 2019-01-14 RX ADMIN — Medication 10 ML: at 20:24

## 2019-01-14 RX ADMIN — SODIUM CHLORIDE: 900 INJECTION, SOLUTION INTRAVENOUS at 17:16

## 2019-01-14 RX ADMIN — PHENYLEPHRINE HYDROCHLORIDE 80 MCG: 10 INJECTION, SOLUTION INTRAMUSCULAR; INTRAVENOUS; SUBCUTANEOUS at 18:05

## 2019-01-14 RX ADMIN — ISOSORBIDE MONONITRATE 30 MG: 30 TABLET, EXTENDED RELEASE ORAL at 20:21

## 2019-01-14 RX ADMIN — PHENYLEPHRINE HYDROCHLORIDE 80 MCG: 10 INJECTION, SOLUTION INTRAMUSCULAR; INTRAVENOUS; SUBCUTANEOUS at 18:15

## 2019-01-14 RX ADMIN — PHENYLEPHRINE HYDROCHLORIDE 80 MCG: 10 INJECTION, SOLUTION INTRAMUSCULAR; INTRAVENOUS; SUBCUTANEOUS at 18:25

## 2019-01-14 RX ADMIN — INSULIN LISPRO 1 UNITS: 100 INJECTION, SOLUTION INTRAVENOUS; SUBCUTANEOUS at 00:19

## 2019-01-14 RX ADMIN — VANCOMYCIN HYDROCHLORIDE 750 MG: 10 INJECTION, POWDER, LYOPHILIZED, FOR SOLUTION INTRAVENOUS at 09:43

## 2019-01-14 RX ADMIN — INSULIN GLARGINE 4 UNITS: 100 INJECTION, SOLUTION SUBCUTANEOUS at 00:18

## 2019-01-14 RX ADMIN — PROPOFOL 50 MG: 10 INJECTION, EMULSION INTRAVENOUS at 17:36

## 2019-01-14 RX ADMIN — PIPERACILLIN SODIUM,TAZOBACTAM SODIUM 3.38 G: 3; .375 INJECTION, POWDER, FOR SOLUTION INTRAVENOUS at 20:31

## 2019-01-14 ASSESSMENT — PULMONARY FUNCTION TESTS
PIF_VALUE: 4
PIF_VALUE: 3
PIF_VALUE: 4
PIF_VALUE: 4
PIF_VALUE: 0
PIF_VALUE: 0
PIF_VALUE: 1
PIF_VALUE: 4
PIF_VALUE: 2
PIF_VALUE: 4
PIF_VALUE: 0
PIF_VALUE: 4
PIF_VALUE: 0
PIF_VALUE: 4
PIF_VALUE: 3
PIF_VALUE: 4
PIF_VALUE: 3
PIF_VALUE: 1
PIF_VALUE: 4
PIF_VALUE: 4
PIF_VALUE: 0
PIF_VALUE: 0
PIF_VALUE: 4
PIF_VALUE: 0
PIF_VALUE: 4
PIF_VALUE: 18
PIF_VALUE: 4
PIF_VALUE: 4
PIF_VALUE: 1
PIF_VALUE: 4
PIF_VALUE: 0
PIF_VALUE: 3
PIF_VALUE: 4
PIF_VALUE: 0
PIF_VALUE: 1
PIF_VALUE: 4
PIF_VALUE: 7
PIF_VALUE: 5
PIF_VALUE: 4
PIF_VALUE: 0
PIF_VALUE: 4
PIF_VALUE: 4
PIF_VALUE: 0
PIF_VALUE: 3
PIF_VALUE: 8
PIF_VALUE: 3
PIF_VALUE: 4
PIF_VALUE: 1
PIF_VALUE: 4
PIF_VALUE: 4
PIF_VALUE: 1
PIF_VALUE: 3
PIF_VALUE: 2
PIF_VALUE: 4
PIF_VALUE: 0
PIF_VALUE: 0
PIF_VALUE: 4
PIF_VALUE: 0
PIF_VALUE: 4
PIF_VALUE: 0
PIF_VALUE: 2
PIF_VALUE: 1
PIF_VALUE: 4
PIF_VALUE: 4
PIF_VALUE: 5
PIF_VALUE: 4
PIF_VALUE: 0
PIF_VALUE: 4
PIF_VALUE: 4
PIF_VALUE: 3
PIF_VALUE: 4
PIF_VALUE: 1
PIF_VALUE: 4
PIF_VALUE: 3
PIF_VALUE: 4
PIF_VALUE: 2
PIF_VALUE: 3
PIF_VALUE: 5
PIF_VALUE: 1
PIF_VALUE: 4
PIF_VALUE: 4
PIF_VALUE: 3
PIF_VALUE: 4
PIF_VALUE: 3
PIF_VALUE: 4
PIF_VALUE: 3
PIF_VALUE: 11
PIF_VALUE: 3
PIF_VALUE: 1
PIF_VALUE: 0

## 2019-01-14 ASSESSMENT — PAIN SCALES - GENERAL
PAINLEVEL_OUTOF10: 0

## 2019-01-15 LAB
BASOPHILS ABSOLUTE: 0.1 K/UL (ref 0–0.2)
BASOPHILS RELATIVE PERCENT: 0.7 %
BLOOD BANK DISPENSE STATUS: NORMAL
BLOOD BANK DISPENSE STATUS: NORMAL
BLOOD BANK PRODUCT CODE: NORMAL
BLOOD BANK PRODUCT CODE: NORMAL
BPU ID: NORMAL
BPU ID: NORMAL
DESCRIPTION BLOOD BANK: NORMAL
DESCRIPTION BLOOD BANK: NORMAL
EOSINOPHILS ABSOLUTE: 0.1 K/UL (ref 0–0.6)
EOSINOPHILS RELATIVE PERCENT: 0.7 %
GLUCOSE BLD-MCNC: 112 MG/DL (ref 70–99)
GLUCOSE BLD-MCNC: 121 MG/DL (ref 70–99)
GLUCOSE BLD-MCNC: 181 MG/DL (ref 70–99)
GLUCOSE BLD-MCNC: 185 MG/DL (ref 70–99)
GLUCOSE BLD-MCNC: 205 MG/DL (ref 70–99)
HCT VFR BLD CALC: 22.2 % (ref 40.5–52.5)
HCT VFR BLD CALC: 24.8 % (ref 40.5–52.5)
HCT VFR BLD CALC: 27.7 % (ref 40.5–52.5)
HEMOGLOBIN: 7 G/DL (ref 13.5–17.5)
HEMOGLOBIN: 8 G/DL (ref 13.5–17.5)
HEMOGLOBIN: 8.7 G/DL (ref 13.5–17.5)
LYMPHOCYTES ABSOLUTE: 1.7 K/UL (ref 1–5.1)
LYMPHOCYTES RELATIVE PERCENT: 9 %
MCH RBC QN AUTO: 25.5 PG (ref 26–34)
MCHC RBC AUTO-ENTMCNC: 32.1 G/DL (ref 31–36)
MCV RBC AUTO: 79.6 FL (ref 80–100)
MONOCYTES ABSOLUTE: 1.3 K/UL (ref 0–1.3)
MONOCYTES RELATIVE PERCENT: 6.7 %
NEUTROPHILS ABSOLUTE: 15.8 K/UL (ref 1.7–7.7)
NEUTROPHILS RELATIVE PERCENT: 82.9 %
PDW BLD-RTO: 21.5 % (ref 12.4–15.4)
PERFORMED ON: ABNORMAL
PLATELET # BLD: 244 K/UL (ref 135–450)
PMV BLD AUTO: 8.1 FL (ref 5–10.5)
RBC # BLD: 3.12 M/UL (ref 4.2–5.9)
WBC # BLD: 19 K/UL (ref 4–11)

## 2019-01-15 PROCEDURE — 6370000000 HC RX 637 (ALT 250 FOR IP): Performed by: STUDENT IN AN ORGANIZED HEALTH CARE EDUCATION/TRAINING PROGRAM

## 2019-01-15 PROCEDURE — 36430 TRANSFUSION BLD/BLD COMPNT: CPT

## 2019-01-15 PROCEDURE — 85025 COMPLETE CBC W/AUTO DIFF WBC: CPT

## 2019-01-15 PROCEDURE — 85014 HEMATOCRIT: CPT

## 2019-01-15 PROCEDURE — 86923 COMPATIBILITY TEST ELECTRIC: CPT

## 2019-01-15 PROCEDURE — 2580000003 HC RX 258: Performed by: STUDENT IN AN ORGANIZED HEALTH CARE EDUCATION/TRAINING PROGRAM

## 2019-01-15 PROCEDURE — 99232 SBSQ HOSP IP/OBS MODERATE 35: CPT | Performed by: INTERNAL MEDICINE

## 2019-01-15 PROCEDURE — 36415 COLL VENOUS BLD VENIPUNCTURE: CPT

## 2019-01-15 PROCEDURE — 1200000000 HC SEMI PRIVATE

## 2019-01-15 PROCEDURE — 92526 ORAL FUNCTION THERAPY: CPT

## 2019-01-15 PROCEDURE — P9016 RBC LEUKOCYTES REDUCED: HCPCS

## 2019-01-15 PROCEDURE — 6360000002 HC RX W HCPCS

## 2019-01-15 PROCEDURE — 6370000000 HC RX 637 (ALT 250 FOR IP): Performed by: INTERNAL MEDICINE

## 2019-01-15 PROCEDURE — 85018 HEMOGLOBIN: CPT

## 2019-01-15 PROCEDURE — 2580000003 HC RX 258

## 2019-01-15 RX ORDER — 0.9 % SODIUM CHLORIDE 0.9 %
250 INTRAVENOUS SOLUTION INTRAVENOUS ONCE
Status: COMPLETED | OUTPATIENT
Start: 2019-01-15 | End: 2019-01-16

## 2019-01-15 RX ADMIN — NEPHROCAP 1 MG: 1 CAP ORAL at 08:22

## 2019-01-15 RX ADMIN — AMIODARONE HYDROCHLORIDE 200 MG: 200 TABLET ORAL at 08:23

## 2019-01-15 RX ADMIN — INSULIN LISPRO 1 UNITS: 100 INJECTION, SOLUTION INTRAVENOUS; SUBCUTANEOUS at 22:48

## 2019-01-15 RX ADMIN — ISOSORBIDE MONONITRATE 30 MG: 30 TABLET, EXTENDED RELEASE ORAL at 08:22

## 2019-01-15 RX ADMIN — HYDRALAZINE HYDROCHLORIDE 100 MG: 100 TABLET, FILM COATED ORAL at 06:24

## 2019-01-15 RX ADMIN — HYDRALAZINE HYDROCHLORIDE 100 MG: 100 TABLET, FILM COATED ORAL at 13:29

## 2019-01-15 RX ADMIN — ASPIRIN 325 MG: 325 TABLET, DELAYED RELEASE ORAL at 08:22

## 2019-01-15 RX ADMIN — INSULIN LISPRO 1 UNITS: 100 INJECTION, SOLUTION INTRAVENOUS; SUBCUTANEOUS at 18:06

## 2019-01-15 RX ADMIN — Medication 10 ML: at 08:23

## 2019-01-15 RX ADMIN — CINACALCET HYDROCHLORIDE 30 MG: 30 TABLET, COATED ORAL at 08:22

## 2019-01-15 RX ADMIN — HYDRALAZINE HYDROCHLORIDE 100 MG: 100 TABLET, FILM COATED ORAL at 20:15

## 2019-01-15 RX ADMIN — CLOPIDOGREL BISULFATE 75 MG: 75 TABLET ORAL at 08:22

## 2019-01-15 RX ADMIN — Medication 10 ML: at 20:16

## 2019-01-15 RX ADMIN — INSULIN GLARGINE 4 UNITS: 100 INJECTION, SOLUTION SUBCUTANEOUS at 00:39

## 2019-01-15 RX ADMIN — PANTOPRAZOLE SODIUM 40 MG: 40 TABLET, DELAYED RELEASE ORAL at 06:24

## 2019-01-15 RX ADMIN — INSULIN GLARGINE 4 UNITS: 100 INJECTION, SOLUTION SUBCUTANEOUS at 22:47

## 2019-01-15 RX ADMIN — INSULIN LISPRO 1 UNITS: 100 INJECTION, SOLUTION INTRAVENOUS; SUBCUTANEOUS at 13:31

## 2019-01-15 RX ADMIN — HYDROCODONE BITARTRATE AND ACETAMINOPHEN 1 TABLET: 5; 325 TABLET ORAL at 04:10

## 2019-01-15 RX ADMIN — ATORVASTATIN CALCIUM 80 MG: 80 TABLET, FILM COATED ORAL at 08:22

## 2019-01-15 RX ADMIN — Medication 250 MG: at 20:15

## 2019-01-15 RX ADMIN — SODIUM CHLORIDE 250 ML: 9 INJECTION, SOLUTION INTRAVENOUS at 17:45

## 2019-01-15 RX ADMIN — Medication 250 MG: at 08:22

## 2019-01-15 RX ADMIN — PIPERACILLIN SODIUM,TAZOBACTAM SODIUM 3.38 G: 3; .375 INJECTION, POWDER, FOR SOLUTION INTRAVENOUS at 08:15

## 2019-01-15 RX ADMIN — PIPERACILLIN SODIUM,TAZOBACTAM SODIUM 3.38 G: 3; .375 INJECTION, POWDER, FOR SOLUTION INTRAVENOUS at 20:14

## 2019-01-15 RX ADMIN — Medication 1 CAPSULE: at 08:22

## 2019-01-15 RX ADMIN — ISOSORBIDE MONONITRATE 30 MG: 30 TABLET, EXTENDED RELEASE ORAL at 20:15

## 2019-01-15 ASSESSMENT — PAIN DESCRIPTION - PROGRESSION
CLINICAL_PROGRESSION: GRADUALLY WORSENING

## 2019-01-15 ASSESSMENT — PAIN SCALES - GENERAL
PAINLEVEL_OUTOF10: 0
PAINLEVEL_OUTOF10: 7

## 2019-01-15 ASSESSMENT — PAIN DESCRIPTION - ONSET: ONSET: ON-GOING

## 2019-01-15 ASSESSMENT — PAIN DESCRIPTION - PAIN TYPE: TYPE: SURGICAL PAIN

## 2019-01-15 ASSESSMENT — PAIN DESCRIPTION - DESCRIPTORS: DESCRIPTORS: PATIENT UNABLE TO DESCRIBE

## 2019-01-15 ASSESSMENT — PAIN DESCRIPTION - LOCATION: LOCATION: FOOT

## 2019-01-15 ASSESSMENT — PAIN DESCRIPTION - ORIENTATION: ORIENTATION: RIGHT

## 2019-01-15 ASSESSMENT — PAIN DESCRIPTION - FREQUENCY: FREQUENCY: CONTINUOUS

## 2019-01-16 LAB
ALBUMIN SERPL-MCNC: 2.5 G/DL (ref 3.4–5)
ANION GAP SERPL CALCULATED.3IONS-SCNC: 14 MMOL/L (ref 3–16)
BASOPHILS ABSOLUTE: 0.1 K/UL (ref 0–0.2)
BASOPHILS RELATIVE PERCENT: 0.6 %
BUN BLDV-MCNC: 50 MG/DL (ref 7–20)
CALCIUM SERPL-MCNC: 8.6 MG/DL (ref 8.3–10.6)
CHLORIDE BLD-SCNC: 103 MMOL/L (ref 99–110)
CO2: 21 MMOL/L (ref 21–32)
CREAT SERPL-MCNC: 7.8 MG/DL (ref 0.8–1.3)
EOSINOPHILS ABSOLUTE: 0.1 K/UL (ref 0–0.6)
EOSINOPHILS RELATIVE PERCENT: 0.7 %
GFR AFRICAN AMERICAN: 8
GFR NON-AFRICAN AMERICAN: 7
GLUCOSE BLD-MCNC: 110 MG/DL (ref 70–99)
GLUCOSE BLD-MCNC: 132 MG/DL (ref 70–99)
GLUCOSE BLD-MCNC: 146 MG/DL (ref 70–99)
GLUCOSE BLD-MCNC: 180 MG/DL (ref 70–99)
HCT VFR BLD CALC: 27.1 % (ref 40.5–52.5)
HEMOGLOBIN: 8.6 G/DL (ref 13.5–17.5)
LYMPHOCYTES ABSOLUTE: 2.2 K/UL (ref 1–5.1)
LYMPHOCYTES RELATIVE PERCENT: 14.2 %
MCH RBC QN AUTO: 25.7 PG (ref 26–34)
MCHC RBC AUTO-ENTMCNC: 31.8 G/DL (ref 31–36)
MCV RBC AUTO: 81 FL (ref 80–100)
MONOCYTES ABSOLUTE: 1.2 K/UL (ref 0–1.3)
MONOCYTES RELATIVE PERCENT: 7.6 %
NEUTROPHILS ABSOLUTE: 12.1 K/UL (ref 1.7–7.7)
NEUTROPHILS RELATIVE PERCENT: 76.9 %
PDW BLD-RTO: 20.8 % (ref 12.4–15.4)
PERFORMED ON: ABNORMAL
PHOSPHORUS: 6.3 MG/DL (ref 2.5–4.9)
PLATELET # BLD: 215 K/UL (ref 135–450)
PMV BLD AUTO: 8.6 FL (ref 5–10.5)
POTASSIUM REFLEX MAGNESIUM: 4 MMOL/L (ref 3.5–5.1)
RBC # BLD: 3.35 M/UL (ref 4.2–5.9)
SODIUM BLD-SCNC: 138 MMOL/L (ref 136–145)
WBC # BLD: 15.8 K/UL (ref 4–11)

## 2019-01-16 PROCEDURE — 1200000000 HC SEMI PRIVATE

## 2019-01-16 PROCEDURE — 6370000000 HC RX 637 (ALT 250 FOR IP): Performed by: STUDENT IN AN ORGANIZED HEALTH CARE EDUCATION/TRAINING PROGRAM

## 2019-01-16 PROCEDURE — 6360000002 HC RX W HCPCS

## 2019-01-16 PROCEDURE — 6370000000 HC RX 637 (ALT 250 FOR IP): Performed by: INTERNAL MEDICINE

## 2019-01-16 PROCEDURE — 82040 ASSAY OF SERUM ALBUMIN: CPT

## 2019-01-16 PROCEDURE — 36415 COLL VENOUS BLD VENIPUNCTURE: CPT

## 2019-01-16 PROCEDURE — 2580000003 HC RX 258: Performed by: STUDENT IN AN ORGANIZED HEALTH CARE EDUCATION/TRAINING PROGRAM

## 2019-01-16 PROCEDURE — 85025 COMPLETE CBC W/AUTO DIFF WBC: CPT

## 2019-01-16 PROCEDURE — 84100 ASSAY OF PHOSPHORUS: CPT

## 2019-01-16 PROCEDURE — 6360000002 HC RX W HCPCS: Performed by: STUDENT IN AN ORGANIZED HEALTH CARE EDUCATION/TRAINING PROGRAM

## 2019-01-16 PROCEDURE — 2580000003 HC RX 258

## 2019-01-16 PROCEDURE — 99232 SBSQ HOSP IP/OBS MODERATE 35: CPT | Performed by: INTERNAL MEDICINE

## 2019-01-16 PROCEDURE — 6360000002 HC RX W HCPCS: Performed by: INTERNAL MEDICINE

## 2019-01-16 PROCEDURE — 80048 BASIC METABOLIC PNL TOTAL CA: CPT

## 2019-01-16 RX ORDER — SEVELAMER CARBONATE 800 MG/1
1600 TABLET, FILM COATED ORAL
Status: DISCONTINUED | OUTPATIENT
Start: 2019-01-16 | End: 2019-01-25 | Stop reason: HOSPADM

## 2019-01-16 RX ADMIN — SEVELAMER CARBONATE 1600 MG: 800 TABLET, FILM COATED ORAL at 14:27

## 2019-01-16 RX ADMIN — ISOSORBIDE MONONITRATE 30 MG: 30 TABLET, EXTENDED RELEASE ORAL at 14:28

## 2019-01-16 RX ADMIN — ISOSORBIDE MONONITRATE 30 MG: 30 TABLET, EXTENDED RELEASE ORAL at 21:51

## 2019-01-16 RX ADMIN — Medication 250 MG: at 21:51

## 2019-01-16 RX ADMIN — Medication 250 MG: at 14:27

## 2019-01-16 RX ADMIN — INSULIN GLARGINE 4 UNITS: 100 INJECTION, SOLUTION SUBCUTANEOUS at 21:53

## 2019-01-16 RX ADMIN — ASPIRIN 325 MG: 325 TABLET, DELAYED RELEASE ORAL at 14:28

## 2019-01-16 RX ADMIN — PANTOPRAZOLE SODIUM 40 MG: 40 TABLET, DELAYED RELEASE ORAL at 05:54

## 2019-01-16 RX ADMIN — CINACALCET HYDROCHLORIDE 30 MG: 30 TABLET, COATED ORAL at 14:26

## 2019-01-16 RX ADMIN — VANCOMYCIN HYDROCHLORIDE 750 MG: 10 INJECTION, POWDER, LYOPHILIZED, FOR SOLUTION INTRAVENOUS at 15:38

## 2019-01-16 RX ADMIN — HYDRALAZINE HYDROCHLORIDE 100 MG: 100 TABLET, FILM COATED ORAL at 21:51

## 2019-01-16 RX ADMIN — Medication 10 ML: at 22:01

## 2019-01-16 RX ADMIN — Medication 1 CAPSULE: at 14:26

## 2019-01-16 RX ADMIN — Medication 10 ML: at 14:29

## 2019-01-16 RX ADMIN — NEPHROCAP 1 MG: 1 CAP ORAL at 14:32

## 2019-01-16 RX ADMIN — CLOPIDOGREL BISULFATE 75 MG: 75 TABLET ORAL at 14:27

## 2019-01-16 RX ADMIN — SEVELAMER CARBONATE 1600 MG: 800 TABLET, FILM COATED ORAL at 18:35

## 2019-01-16 RX ADMIN — AMIODARONE HYDROCHLORIDE 200 MG: 200 TABLET ORAL at 14:27

## 2019-01-16 RX ADMIN — ATORVASTATIN CALCIUM 80 MG: 80 TABLET, FILM COATED ORAL at 14:26

## 2019-01-16 RX ADMIN — INSULIN LISPRO 1 UNITS: 100 INJECTION, SOLUTION INTRAVENOUS; SUBCUTANEOUS at 18:35

## 2019-01-16 RX ADMIN — PIPERACILLIN SODIUM,TAZOBACTAM SODIUM 3.38 G: 3; .375 INJECTION, POWDER, FOR SOLUTION INTRAVENOUS at 14:29

## 2019-01-16 RX ADMIN — INSULIN LISPRO 1 UNITS: 100 INJECTION, SOLUTION INTRAVENOUS; SUBCUTANEOUS at 21:52

## 2019-01-16 RX ADMIN — HYDRALAZINE HYDROCHLORIDE 100 MG: 100 TABLET, FILM COATED ORAL at 14:27

## 2019-01-16 RX ADMIN — DARBEPOETIN ALFA 60 MCG: 60 INJECTION, SOLUTION INTRAVENOUS; SUBCUTANEOUS at 13:04

## 2019-01-16 ASSESSMENT — PAIN SCALES - GENERAL
PAINLEVEL_OUTOF10: 0

## 2019-01-17 ENCOUNTER — ANESTHESIA EVENT (OUTPATIENT)
Dept: OPERATING ROOM | Age: 74
DRG: 239 | End: 2019-01-17
Payer: MEDICARE

## 2019-01-17 LAB
ANION GAP SERPL CALCULATED.3IONS-SCNC: 15 MMOL/L (ref 3–16)
BASOPHILS ABSOLUTE: 0.1 K/UL (ref 0–0.2)
BASOPHILS RELATIVE PERCENT: 0.6 %
BUN BLDV-MCNC: 29 MG/DL (ref 7–20)
CALCIUM SERPL-MCNC: 8.7 MG/DL (ref 8.3–10.6)
CHLORIDE BLD-SCNC: 100 MMOL/L (ref 99–110)
CO2: 24 MMOL/L (ref 21–32)
CREAT SERPL-MCNC: 5.1 MG/DL (ref 0.8–1.3)
EOSINOPHILS ABSOLUTE: 0.2 K/UL (ref 0–0.6)
EOSINOPHILS RELATIVE PERCENT: 1.1 %
GFR AFRICAN AMERICAN: 13
GFR NON-AFRICAN AMERICAN: 11
GLUCOSE BLD-MCNC: 122 MG/DL (ref 70–99)
GLUCOSE BLD-MCNC: 122 MG/DL (ref 70–99)
GLUCOSE BLD-MCNC: 127 MG/DL (ref 70–99)
GLUCOSE BLD-MCNC: 132 MG/DL (ref 70–99)
GLUCOSE BLD-MCNC: 232 MG/DL (ref 70–99)
HCT VFR BLD CALC: 25.4 % (ref 40.5–52.5)
HEMOGLOBIN: 8.2 G/DL (ref 13.5–17.5)
LYMPHOCYTES ABSOLUTE: 2.5 K/UL (ref 1–5.1)
LYMPHOCYTES RELATIVE PERCENT: 16.1 %
MCH RBC QN AUTO: 26.2 PG (ref 26–34)
MCHC RBC AUTO-ENTMCNC: 32.4 G/DL (ref 31–36)
MCV RBC AUTO: 80.8 FL (ref 80–100)
MONOCYTES ABSOLUTE: 1.2 K/UL (ref 0–1.3)
MONOCYTES RELATIVE PERCENT: 8 %
NEUTROPHILS ABSOLUTE: 11.5 K/UL (ref 1.7–7.7)
NEUTROPHILS RELATIVE PERCENT: 74.2 %
PDW BLD-RTO: 20.7 % (ref 12.4–15.4)
PERFORMED ON: ABNORMAL
PLATELET # BLD: 259 K/UL (ref 135–450)
PMV BLD AUTO: 8.7 FL (ref 5–10.5)
POTASSIUM REFLEX MAGNESIUM: 4.1 MMOL/L (ref 3.5–5.1)
RBC # BLD: 3.14 M/UL (ref 4.2–5.9)
SODIUM BLD-SCNC: 139 MMOL/L (ref 136–145)
WBC # BLD: 15.5 K/UL (ref 4–11)

## 2019-01-17 PROCEDURE — 6370000000 HC RX 637 (ALT 250 FOR IP): Performed by: STUDENT IN AN ORGANIZED HEALTH CARE EDUCATION/TRAINING PROGRAM

## 2019-01-17 PROCEDURE — 85025 COMPLETE CBC W/AUTO DIFF WBC: CPT

## 2019-01-17 PROCEDURE — 36415 COLL VENOUS BLD VENIPUNCTURE: CPT

## 2019-01-17 PROCEDURE — 99232 SBSQ HOSP IP/OBS MODERATE 35: CPT | Performed by: INTERNAL MEDICINE

## 2019-01-17 PROCEDURE — 1200000000 HC SEMI PRIVATE

## 2019-01-17 PROCEDURE — 6370000000 HC RX 637 (ALT 250 FOR IP): Performed by: INTERNAL MEDICINE

## 2019-01-17 PROCEDURE — 80048 BASIC METABOLIC PNL TOTAL CA: CPT

## 2019-01-17 PROCEDURE — 2580000003 HC RX 258: Performed by: STUDENT IN AN ORGANIZED HEALTH CARE EDUCATION/TRAINING PROGRAM

## 2019-01-17 RX ORDER — SODIUM CHLORIDE, SODIUM LACTATE, POTASSIUM CHLORIDE, CALCIUM CHLORIDE 600; 310; 30; 20 MG/100ML; MG/100ML; MG/100ML; MG/100ML
INJECTION, SOLUTION INTRAVENOUS CONTINUOUS
Status: DISCONTINUED | OUTPATIENT
Start: 2019-01-18 | End: 2019-01-19

## 2019-01-17 RX ADMIN — ASPIRIN 325 MG: 325 TABLET, DELAYED RELEASE ORAL at 08:36

## 2019-01-17 RX ADMIN — ISOSORBIDE MONONITRATE 30 MG: 30 TABLET, EXTENDED RELEASE ORAL at 08:35

## 2019-01-17 RX ADMIN — SEVELAMER CARBONATE 1600 MG: 800 TABLET, FILM COATED ORAL at 18:54

## 2019-01-17 RX ADMIN — Medication 10 ML: at 08:37

## 2019-01-17 RX ADMIN — Medication 250 MG: at 08:36

## 2019-01-17 RX ADMIN — Medication 10 ML: at 22:12

## 2019-01-17 RX ADMIN — ATORVASTATIN CALCIUM 80 MG: 80 TABLET, FILM COATED ORAL at 08:36

## 2019-01-17 RX ADMIN — CINACALCET HYDROCHLORIDE 30 MG: 30 TABLET, COATED ORAL at 08:36

## 2019-01-17 RX ADMIN — Medication 250 MG: at 22:11

## 2019-01-17 RX ADMIN — SEVELAMER CARBONATE 1600 MG: 800 TABLET, FILM COATED ORAL at 14:06

## 2019-01-17 RX ADMIN — AMIODARONE HYDROCHLORIDE 200 MG: 200 TABLET ORAL at 08:36

## 2019-01-17 RX ADMIN — HYDRALAZINE HYDROCHLORIDE 100 MG: 100 TABLET, FILM COATED ORAL at 22:11

## 2019-01-17 RX ADMIN — ISOSORBIDE MONONITRATE 30 MG: 30 TABLET, EXTENDED RELEASE ORAL at 22:11

## 2019-01-17 RX ADMIN — INSULIN LISPRO 1 UNITS: 100 INJECTION, SOLUTION INTRAVENOUS; SUBCUTANEOUS at 22:10

## 2019-01-17 RX ADMIN — PANTOPRAZOLE SODIUM 40 MG: 40 TABLET, DELAYED RELEASE ORAL at 05:59

## 2019-01-17 RX ADMIN — HYDRALAZINE HYDROCHLORIDE 100 MG: 100 TABLET, FILM COATED ORAL at 05:59

## 2019-01-17 RX ADMIN — Medication 1 CAPSULE: at 08:36

## 2019-01-17 RX ADMIN — NEPHROCAP 1 MG: 1 CAP ORAL at 08:36

## 2019-01-17 RX ADMIN — SEVELAMER CARBONATE 1600 MG: 800 TABLET, FILM COATED ORAL at 08:36

## 2019-01-17 RX ADMIN — HYDRALAZINE HYDROCHLORIDE 100 MG: 100 TABLET, FILM COATED ORAL at 14:06

## 2019-01-17 RX ADMIN — CLOPIDOGREL BISULFATE 75 MG: 75 TABLET ORAL at 08:36

## 2019-01-17 ASSESSMENT — PAIN SCALES - GENERAL
PAINLEVEL_OUTOF10: 0

## 2019-01-18 ENCOUNTER — ANESTHESIA (OUTPATIENT)
Dept: OPERATING ROOM | Age: 74
DRG: 239 | End: 2019-01-18
Payer: MEDICARE

## 2019-01-18 VITALS — DIASTOLIC BLOOD PRESSURE: 48 MMHG | OXYGEN SATURATION: 100 % | TEMPERATURE: 97.9 F | SYSTOLIC BLOOD PRESSURE: 90 MMHG

## 2019-01-18 LAB
ABO/RH: NORMAL
ALBUMIN SERPL-MCNC: 2.8 G/DL (ref 3.4–5)
ANION GAP SERPL CALCULATED.3IONS-SCNC: 17 MMOL/L (ref 3–16)
ANTIBODY SCREEN: NORMAL
BASOPHILS ABSOLUTE: 0.1 K/UL (ref 0–0.2)
BASOPHILS RELATIVE PERCENT: 1 %
BLOOD BANK DISPENSE STATUS: NORMAL
BLOOD BANK PRODUCT CODE: NORMAL
BPU ID: NORMAL
BUN BLDV-MCNC: 46 MG/DL (ref 7–20)
CALCIUM SERPL-MCNC: 8.7 MG/DL (ref 8.3–10.6)
CHLORIDE BLD-SCNC: 99 MMOL/L (ref 99–110)
CO2: 22 MMOL/L (ref 21–32)
CREAT SERPL-MCNC: 7.7 MG/DL (ref 0.8–1.3)
DESCRIPTION BLOOD BANK: NORMAL
EOSINOPHILS ABSOLUTE: 0.2 K/UL (ref 0–0.6)
EOSINOPHILS RELATIVE PERCENT: 1.6 %
GFR AFRICAN AMERICAN: 8
GFR NON-AFRICAN AMERICAN: 7
GLUCOSE BLD-MCNC: 100 MG/DL (ref 70–99)
GLUCOSE BLD-MCNC: 114 MG/DL (ref 70–99)
GLUCOSE BLD-MCNC: 121 MG/DL (ref 70–99)
GLUCOSE BLD-MCNC: 125 MG/DL (ref 70–99)
GLUCOSE BLD-MCNC: 194 MG/DL (ref 70–99)
GLUCOSE BLD-MCNC: 288 MG/DL (ref 70–99)
HCT VFR BLD CALC: 24.6 % (ref 40.5–52.5)
HCT VFR BLD CALC: 26.4 % (ref 40.5–52.5)
HEMOGLOBIN: 7.8 G/DL (ref 13.5–17.5)
HEMOGLOBIN: 8.5 G/DL (ref 13.5–17.5)
INR BLD: 1.16 (ref 0.86–1.14)
LYMPHOCYTES ABSOLUTE: 2.3 K/UL (ref 1–5.1)
LYMPHOCYTES RELATIVE PERCENT: 18.3 %
MAGNESIUM: 2.4 MG/DL (ref 1.8–2.4)
MCH RBC QN AUTO: 25.8 PG (ref 26–34)
MCHC RBC AUTO-ENTMCNC: 31.5 G/DL (ref 31–36)
MCV RBC AUTO: 81.8 FL (ref 80–100)
MONOCYTES ABSOLUTE: 0.9 K/UL (ref 0–1.3)
MONOCYTES RELATIVE PERCENT: 7.2 %
NEUTROPHILS ABSOLUTE: 9.1 K/UL (ref 1.7–7.7)
NEUTROPHILS RELATIVE PERCENT: 71.9 %
PDW BLD-RTO: 20.5 % (ref 12.4–15.4)
PERFORMED ON: ABNORMAL
PHOSPHORUS: 5.2 MG/DL (ref 2.5–4.9)
PLATELET # BLD: 283 K/UL (ref 135–450)
PMV BLD AUTO: 8.5 FL (ref 5–10.5)
POTASSIUM SERPL-SCNC: 3.8 MMOL/L (ref 3.5–5.1)
PROTHROMBIN TIME: 13.2 SEC (ref 9.8–13)
RBC # BLD: 3.01 M/UL (ref 4.2–5.9)
SODIUM BLD-SCNC: 138 MMOL/L (ref 136–145)
WBC # BLD: 12.6 K/UL (ref 4–11)

## 2019-01-18 PROCEDURE — 88307 TISSUE EXAM BY PATHOLOGIST: CPT

## 2019-01-18 PROCEDURE — 3600000014 HC SURGERY LEVEL 4 ADDTL 15MIN: Performed by: SURGERY

## 2019-01-18 PROCEDURE — 7100000001 HC PACU RECOVERY - ADDTL 15 MIN: Performed by: SURGERY

## 2019-01-18 PROCEDURE — 6360000002 HC RX W HCPCS: Performed by: NURSE ANESTHETIST, CERTIFIED REGISTERED

## 2019-01-18 PROCEDURE — 2580000003 HC RX 258: Performed by: NURSE ANESTHETIST, CERTIFIED REGISTERED

## 2019-01-18 PROCEDURE — 6360000002 HC RX W HCPCS: Performed by: SURGERY

## 2019-01-18 PROCEDURE — 36415 COLL VENOUS BLD VENIPUNCTURE: CPT

## 2019-01-18 PROCEDURE — 86900 BLOOD TYPING SEROLOGIC ABO: CPT

## 2019-01-18 PROCEDURE — 6360000002 HC RX W HCPCS: Performed by: ANESTHESIOLOGY

## 2019-01-18 PROCEDURE — 6360000002 HC RX W HCPCS: Performed by: STUDENT IN AN ORGANIZED HEALTH CARE EDUCATION/TRAINING PROGRAM

## 2019-01-18 PROCEDURE — 2580000003 HC RX 258: Performed by: STUDENT IN AN ORGANIZED HEALTH CARE EDUCATION/TRAINING PROGRAM

## 2019-01-18 PROCEDURE — 86901 BLOOD TYPING SEROLOGIC RH(D): CPT

## 2019-01-18 PROCEDURE — 1200000000 HC SEMI PRIVATE

## 2019-01-18 PROCEDURE — 6370000000 HC RX 637 (ALT 250 FOR IP): Performed by: STUDENT IN AN ORGANIZED HEALTH CARE EDUCATION/TRAINING PROGRAM

## 2019-01-18 PROCEDURE — 3600000004 HC SURGERY LEVEL 4 BASE: Performed by: SURGERY

## 2019-01-18 PROCEDURE — 90937 HEMODIALYSIS REPEATED EVAL: CPT

## 2019-01-18 PROCEDURE — 36430 TRANSFUSION BLD/BLD COMPNT: CPT

## 2019-01-18 PROCEDURE — P9016 RBC LEUKOCYTES REDUCED: HCPCS

## 2019-01-18 PROCEDURE — 2500000003 HC RX 250 WO HCPCS: Performed by: NURSE ANESTHETIST, CERTIFIED REGISTERED

## 2019-01-18 PROCEDURE — 86850 RBC ANTIBODY SCREEN: CPT

## 2019-01-18 PROCEDURE — 86923 COMPATIBILITY TEST ELECTRIC: CPT

## 2019-01-18 PROCEDURE — 80069 RENAL FUNCTION PANEL: CPT

## 2019-01-18 PROCEDURE — 6370000000 HC RX 637 (ALT 250 FOR IP): Performed by: INTERNAL MEDICINE

## 2019-01-18 PROCEDURE — 85018 HEMOGLOBIN: CPT

## 2019-01-18 PROCEDURE — 99232 SBSQ HOSP IP/OBS MODERATE 35: CPT | Performed by: INTERNAL MEDICINE

## 2019-01-18 PROCEDURE — 2709999900 HC NON-CHARGEABLE SUPPLY: Performed by: SURGERY

## 2019-01-18 PROCEDURE — 88311 DECALCIFY TISSUE: CPT

## 2019-01-18 PROCEDURE — 7100000000 HC PACU RECOVERY - FIRST 15 MIN: Performed by: SURGERY

## 2019-01-18 PROCEDURE — 2580000003 HC RX 258: Performed by: SURGERY

## 2019-01-18 PROCEDURE — 85610 PROTHROMBIN TIME: CPT

## 2019-01-18 PROCEDURE — 83735 ASSAY OF MAGNESIUM: CPT

## 2019-01-18 PROCEDURE — 85014 HEMATOCRIT: CPT

## 2019-01-18 PROCEDURE — 85025 COMPLETE CBC W/AUTO DIFF WBC: CPT

## 2019-01-18 PROCEDURE — 3700000001 HC ADD 15 MINUTES (ANESTHESIA): Performed by: SURGERY

## 2019-01-18 PROCEDURE — 0Y6H0Z1 DETACHMENT AT RIGHT LOWER LEG, HIGH, OPEN APPROACH: ICD-10-PCS | Performed by: SURGERY

## 2019-01-18 PROCEDURE — 3700000000 HC ANESTHESIA ATTENDED CARE: Performed by: SURGERY

## 2019-01-18 RX ORDER — GLYCOPYRROLATE 0.2 MG/ML
INJECTION INTRAMUSCULAR; INTRAVENOUS PRN
Status: DISCONTINUED | OUTPATIENT
Start: 2019-01-18 | End: 2019-01-18 | Stop reason: SDUPTHER

## 2019-01-18 RX ORDER — SODIUM CHLORIDE 9 MG/ML
INJECTION, SOLUTION INTRAVENOUS CONTINUOUS PRN
Status: DISCONTINUED | OUTPATIENT
Start: 2019-01-18 | End: 2019-01-18 | Stop reason: SDUPTHER

## 2019-01-18 RX ORDER — FENTANYL CITRATE 50 UG/ML
50 INJECTION, SOLUTION INTRAMUSCULAR; INTRAVENOUS EVERY 5 MIN PRN
Status: DISCONTINUED | OUTPATIENT
Start: 2019-01-18 | End: 2019-01-18 | Stop reason: HOSPADM

## 2019-01-18 RX ORDER — CEFAZOLIN SODIUM 1 G/3ML
INJECTION, POWDER, FOR SOLUTION INTRAMUSCULAR; INTRAVENOUS PRN
Status: DISCONTINUED | OUTPATIENT
Start: 2019-01-18 | End: 2019-01-18 | Stop reason: SDUPTHER

## 2019-01-18 RX ORDER — FENTANYL CITRATE 50 UG/ML
INJECTION, SOLUTION INTRAMUSCULAR; INTRAVENOUS PRN
Status: DISCONTINUED | OUTPATIENT
Start: 2019-01-18 | End: 2019-01-18 | Stop reason: SDUPTHER

## 2019-01-18 RX ORDER — ONDANSETRON 2 MG/ML
4 INJECTION INTRAMUSCULAR; INTRAVENOUS
Status: DISCONTINUED | OUTPATIENT
Start: 2019-01-18 | End: 2019-01-18 | Stop reason: HOSPADM

## 2019-01-18 RX ORDER — PROMETHAZINE HYDROCHLORIDE 25 MG/ML
6.25 INJECTION, SOLUTION INTRAMUSCULAR; INTRAVENOUS
Status: DISCONTINUED | OUTPATIENT
Start: 2019-01-18 | End: 2019-01-18 | Stop reason: HOSPADM

## 2019-01-18 RX ORDER — DEXAMETHASONE SODIUM PHOSPHATE 4 MG/ML
INJECTION, SOLUTION INTRA-ARTICULAR; INTRALESIONAL; INTRAMUSCULAR; INTRAVENOUS; SOFT TISSUE PRN
Status: DISCONTINUED | OUTPATIENT
Start: 2019-01-18 | End: 2019-01-18 | Stop reason: SDUPTHER

## 2019-01-18 RX ORDER — FENTANYL CITRATE 50 UG/ML
25 INJECTION, SOLUTION INTRAMUSCULAR; INTRAVENOUS EVERY 5 MIN PRN
Status: DISCONTINUED | OUTPATIENT
Start: 2019-01-18 | End: 2019-01-18 | Stop reason: HOSPADM

## 2019-01-18 RX ORDER — EPHEDRINE SULFATE 50 MG/ML
INJECTION INTRAVENOUS PRN
Status: DISCONTINUED | OUTPATIENT
Start: 2019-01-18 | End: 2019-01-18 | Stop reason: SDUPTHER

## 2019-01-18 RX ORDER — HYDRALAZINE HYDROCHLORIDE 20 MG/ML
5 INJECTION INTRAMUSCULAR; INTRAVENOUS EVERY 10 MIN PRN
Status: DISCONTINUED | OUTPATIENT
Start: 2019-01-18 | End: 2019-01-18 | Stop reason: HOSPADM

## 2019-01-18 RX ORDER — ONDANSETRON 2 MG/ML
INJECTION INTRAMUSCULAR; INTRAVENOUS PRN
Status: DISCONTINUED | OUTPATIENT
Start: 2019-01-18 | End: 2019-01-18 | Stop reason: SDUPTHER

## 2019-01-18 RX ORDER — 0.9 % SODIUM CHLORIDE 0.9 %
250 INTRAVENOUS SOLUTION INTRAVENOUS ONCE
Status: COMPLETED | OUTPATIENT
Start: 2019-01-18 | End: 2019-01-19

## 2019-01-18 RX ORDER — LIDOCAINE HYDROCHLORIDE 20 MG/ML
INJECTION, SOLUTION EPIDURAL; INFILTRATION; INTRACAUDAL; PERINEURAL PRN
Status: DISCONTINUED | OUTPATIENT
Start: 2019-01-18 | End: 2019-01-18 | Stop reason: SDUPTHER

## 2019-01-18 RX ORDER — LABETALOL HYDROCHLORIDE 5 MG/ML
5 INJECTION, SOLUTION INTRAVENOUS EVERY 10 MIN PRN
Status: DISCONTINUED | OUTPATIENT
Start: 2019-01-18 | End: 2019-01-18 | Stop reason: HOSPADM

## 2019-01-18 RX ORDER — POTASSIUM CHLORIDE 7.45 MG/ML
10 INJECTION INTRAVENOUS
Status: ACTIVE | OUTPATIENT
Start: 2019-01-18 | End: 2019-01-18

## 2019-01-18 RX ORDER — PROPOFOL 10 MG/ML
INJECTION, EMULSION INTRAVENOUS PRN
Status: DISCONTINUED | OUTPATIENT
Start: 2019-01-18 | End: 2019-01-18 | Stop reason: SDUPTHER

## 2019-01-18 RX ORDER — MORPHINE SULFATE 2 MG/ML
2 INJECTION, SOLUTION INTRAMUSCULAR; INTRAVENOUS
Status: DISPENSED | OUTPATIENT
Start: 2019-01-18 | End: 2019-01-20

## 2019-01-18 RX ORDER — MORPHINE SULFATE 4 MG/ML
4 INJECTION, SOLUTION INTRAMUSCULAR; INTRAVENOUS
Status: DISPENSED | OUTPATIENT
Start: 2019-01-18 | End: 2019-01-20

## 2019-01-18 RX ORDER — OXYCODONE HYDROCHLORIDE AND ACETAMINOPHEN 5; 325 MG/1; MG/1
1 TABLET ORAL
Status: DISCONTINUED | OUTPATIENT
Start: 2019-01-18 | End: 2019-01-18 | Stop reason: HOSPADM

## 2019-01-18 RX ORDER — ROCURONIUM BROMIDE 10 MG/ML
INJECTION, SOLUTION INTRAVENOUS PRN
Status: DISCONTINUED | OUTPATIENT
Start: 2019-01-18 | End: 2019-01-18 | Stop reason: SDUPTHER

## 2019-01-18 RX ADMIN — PHENYLEPHRINE HYDROCHLORIDE 160 MCG: 10 INJECTION, SOLUTION INTRAMUSCULAR; INTRAVENOUS; SUBCUTANEOUS at 14:28

## 2019-01-18 RX ADMIN — EPHEDRINE SULFATE 5 MG: 50 INJECTION INTRAVENOUS at 14:28

## 2019-01-18 RX ADMIN — GLYCOPYRROLATE 0.2 MG: 0.2 INJECTION INTRAMUSCULAR; INTRAVENOUS at 13:30

## 2019-01-18 RX ADMIN — HYDRALAZINE HYDROCHLORIDE 100 MG: 100 TABLET, FILM COATED ORAL at 21:26

## 2019-01-18 RX ADMIN — SODIUM CHLORIDE: 900 INJECTION, SOLUTION INTRAVENOUS at 15:10

## 2019-01-18 RX ADMIN — SUGAMMADEX 200 MG: 100 INJECTION, SOLUTION INTRAVENOUS at 15:10

## 2019-01-18 RX ADMIN — PHENYLEPHRINE HYDROCHLORIDE 100 MCG: 10 INJECTION, SOLUTION INTRAMUSCULAR; INTRAVENOUS; SUBCUTANEOUS at 14:45

## 2019-01-18 RX ADMIN — PHENYLEPHRINE HYDROCHLORIDE 120 MCG: 10 INJECTION, SOLUTION INTRAMUSCULAR; INTRAVENOUS; SUBCUTANEOUS at 14:19

## 2019-01-18 RX ADMIN — VANCOMYCIN HYDROCHLORIDE 750 MG: 10 INJECTION, POWDER, LYOPHILIZED, FOR SOLUTION INTRAVENOUS at 18:28

## 2019-01-18 RX ADMIN — PHENYLEPHRINE HYDROCHLORIDE 100 MCG: 10 INJECTION, SOLUTION INTRAMUSCULAR; INTRAVENOUS; SUBCUTANEOUS at 15:37

## 2019-01-18 RX ADMIN — ISOSORBIDE MONONITRATE 30 MG: 30 TABLET, EXTENDED RELEASE ORAL at 21:26

## 2019-01-18 RX ADMIN — INSULIN LISPRO 1 UNITS: 100 INJECTION, SOLUTION INTRAVENOUS; SUBCUTANEOUS at 18:29

## 2019-01-18 RX ADMIN — FENTANYL CITRATE 50 MCG: 50 INJECTION INTRAMUSCULAR; INTRAVENOUS at 17:37

## 2019-01-18 RX ADMIN — SODIUM CHLORIDE, POTASSIUM CHLORIDE, SODIUM LACTATE AND CALCIUM CHLORIDE: 600; 310; 30; 20 INJECTION, SOLUTION INTRAVENOUS at 12:05

## 2019-01-18 RX ADMIN — PHENYLEPHRINE HYDROCHLORIDE 160 MCG: 10 INJECTION, SOLUTION INTRAMUSCULAR; INTRAVENOUS; SUBCUTANEOUS at 13:15

## 2019-01-18 RX ADMIN — ROCURONIUM BROMIDE 50 MG: 10 INJECTION, SOLUTION INTRAVENOUS at 13:32

## 2019-01-18 RX ADMIN — PHENYLEPHRINE HYDROCHLORIDE 80 MCG: 10 INJECTION, SOLUTION INTRAMUSCULAR; INTRAVENOUS; SUBCUTANEOUS at 13:54

## 2019-01-18 RX ADMIN — PROPOFOL 20 MG: 10 INJECTION, EMULSION INTRAVENOUS at 15:11

## 2019-01-18 RX ADMIN — DEXAMETHASONE SODIUM PHOSPHATE 4 MG: 4 INJECTION, SOLUTION INTRAMUSCULAR; INTRAVENOUS at 13:32

## 2019-01-18 RX ADMIN — PROPOFOL 50 MG: 10 INJECTION, EMULSION INTRAVENOUS at 15:13

## 2019-01-18 RX ADMIN — Medication 250 MG: at 21:26

## 2019-01-18 RX ADMIN — LIDOCAINE HYDROCHLORIDE 100 MG: 20 INJECTION, SOLUTION EPIDURAL; INFILTRATION; INTRACAUDAL; PERINEURAL at 13:32

## 2019-01-18 RX ADMIN — PHENYLEPHRINE HYDROCHLORIDE 120 MCG: 10 INJECTION, SOLUTION INTRAMUSCULAR; INTRAVENOUS; SUBCUTANEOUS at 14:22

## 2019-01-18 RX ADMIN — Medication 10 ML: at 21:27

## 2019-01-18 RX ADMIN — FENTANYL CITRATE 50 MCG: 50 INJECTION INTRAMUSCULAR; INTRAVENOUS at 13:58

## 2019-01-18 RX ADMIN — FENTANYL CITRATE 50 MCG: 50 INJECTION INTRAMUSCULAR; INTRAVENOUS at 13:48

## 2019-01-18 RX ADMIN — DESMOPRESSIN ACETATE 36.72 MCG: 4 SOLUTION INTRAVENOUS at 14:56

## 2019-01-18 RX ADMIN — FENTANYL CITRATE 50 MCG: 50 INJECTION INTRAMUSCULAR; INTRAVENOUS at 15:27

## 2019-01-18 RX ADMIN — ONDANSETRON 4 MG: 2 INJECTION INTRAMUSCULAR; INTRAVENOUS at 13:32

## 2019-01-18 RX ADMIN — CEFAZOLIN SODIUM 2000 MG: 1 POWDER, FOR SOLUTION INTRAMUSCULAR; INTRAVENOUS at 13:46

## 2019-01-18 RX ADMIN — SODIUM CHLORIDE 250 ML: 9 INJECTION, SOLUTION INTRAVENOUS at 18:05

## 2019-01-18 RX ADMIN — DESMOPRESSIN ACETATE 36.72 MCG: 4 SOLUTION INTRAVENOUS at 14:57

## 2019-01-18 RX ADMIN — HYDRALAZINE HYDROCHLORIDE 100 MG: 100 TABLET, FILM COATED ORAL at 05:28

## 2019-01-18 RX ADMIN — PROPOFOL 150 MG: 10 INJECTION, EMULSION INTRAVENOUS at 13:32

## 2019-01-18 RX ADMIN — EPHEDRINE SULFATE 10 MG: 50 INJECTION INTRAVENOUS at 14:31

## 2019-01-18 RX ADMIN — FENTANYL CITRATE 50 MCG: 50 INJECTION INTRAMUSCULAR; INTRAVENOUS at 13:32

## 2019-01-18 RX ADMIN — PANTOPRAZOLE SODIUM 40 MG: 40 TABLET, DELAYED RELEASE ORAL at 05:28

## 2019-01-18 RX ADMIN — PROPOFOL 30 MG: 10 INJECTION, EMULSION INTRAVENOUS at 15:14

## 2019-01-18 RX ADMIN — ROCURONIUM BROMIDE 10 MG: 10 INJECTION, SOLUTION INTRAVENOUS at 14:31

## 2019-01-18 RX ADMIN — MORPHINE SULFATE 2 MG: 2 INJECTION, SOLUTION INTRAMUSCULAR; INTRAVENOUS at 21:32

## 2019-01-18 RX ADMIN — FENTANYL CITRATE 50 MCG: 50 INJECTION INTRAMUSCULAR; INTRAVENOUS at 14:10

## 2019-01-18 RX ADMIN — FENTANYL CITRATE 50 MCG: 50 INJECTION INTRAMUSCULAR; INTRAVENOUS at 15:20

## 2019-01-18 RX ADMIN — INSULIN LISPRO 2 UNITS: 100 INJECTION, SOLUTION INTRAVENOUS; SUBCUTANEOUS at 21:37

## 2019-01-18 RX ADMIN — SODIUM CHLORIDE: 900 INJECTION, SOLUTION INTRAVENOUS at 13:26

## 2019-01-18 ASSESSMENT — PULMONARY FUNCTION TESTS
PIF_VALUE: 16
PIF_VALUE: 18
PIF_VALUE: 15
PIF_VALUE: 15
PIF_VALUE: 16
PIF_VALUE: 1
PIF_VALUE: 16
PIF_VALUE: 14
PIF_VALUE: 15
PIF_VALUE: 16
PIF_VALUE: 15
PIF_VALUE: 16
PIF_VALUE: 14
PIF_VALUE: 16
PIF_VALUE: 4
PIF_VALUE: 15
PIF_VALUE: 15
PIF_VALUE: 0
PIF_VALUE: 13
PIF_VALUE: 16
PIF_VALUE: 15
PIF_VALUE: 16
PIF_VALUE: 3
PIF_VALUE: 4
PIF_VALUE: 16
PIF_VALUE: 16
PIF_VALUE: 1
PIF_VALUE: 16
PIF_VALUE: 14
PIF_VALUE: 16
PIF_VALUE: 16
PIF_VALUE: 2
PIF_VALUE: 16
PIF_VALUE: 17
PIF_VALUE: 16
PIF_VALUE: 17
PIF_VALUE: 17
PIF_VALUE: 16
PIF_VALUE: 16
PIF_VALUE: 3
PIF_VALUE: 16
PIF_VALUE: 15
PIF_VALUE: 3
PIF_VALUE: 16
PIF_VALUE: 15
PIF_VALUE: 16
PIF_VALUE: 0
PIF_VALUE: 16
PIF_VALUE: 15
PIF_VALUE: 15
PIF_VALUE: 26
PIF_VALUE: 14
PIF_VALUE: 0
PIF_VALUE: 16
PIF_VALUE: 15
PIF_VALUE: 16
PIF_VALUE: 17
PIF_VALUE: 1
PIF_VALUE: 17
PIF_VALUE: 16
PIF_VALUE: 16
PIF_VALUE: 4
PIF_VALUE: 17
PIF_VALUE: 15
PIF_VALUE: 17
PIF_VALUE: 7
PIF_VALUE: 15
PIF_VALUE: 16
PIF_VALUE: 4
PIF_VALUE: 17
PIF_VALUE: 14
PIF_VALUE: 0
PIF_VALUE: 16
PIF_VALUE: 15
PIF_VALUE: 0
PIF_VALUE: 15
PIF_VALUE: 16
PIF_VALUE: 23
PIF_VALUE: 21
PIF_VALUE: 4
PIF_VALUE: 15
PIF_VALUE: 17
PIF_VALUE: 15
PIF_VALUE: 16
PIF_VALUE: 15
PIF_VALUE: 16
PIF_VALUE: 17
PIF_VALUE: 15
PIF_VALUE: 16

## 2019-01-18 ASSESSMENT — PAIN SCALES - GENERAL
PAINLEVEL_OUTOF10: 0
PAINLEVEL_OUTOF10: 6
PAINLEVEL_OUTOF10: 0
PAINLEVEL_OUTOF10: 7

## 2019-01-18 ASSESSMENT — PAIN DESCRIPTION - DESCRIPTORS: DESCRIPTORS: SHARP

## 2019-01-18 ASSESSMENT — PAIN DESCRIPTION - PAIN TYPE
TYPE: ACUTE PAIN;SURGICAL PAIN
TYPE: SURGICAL PAIN

## 2019-01-18 ASSESSMENT — PAIN DESCRIPTION - LOCATION
LOCATION: KNEE
LOCATION: LEG

## 2019-01-18 ASSESSMENT — PAIN DESCRIPTION - ORIENTATION: ORIENTATION: RIGHT

## 2019-01-18 ASSESSMENT — PAIN DESCRIPTION - PROGRESSION: CLINICAL_PROGRESSION: GRADUALLY WORSENING

## 2019-01-18 ASSESSMENT — PAIN DESCRIPTION - FREQUENCY: FREQUENCY: CONTINUOUS

## 2019-01-18 ASSESSMENT — PAIN DESCRIPTION - ONSET: ONSET: ON-GOING

## 2019-01-19 LAB
ALBUMIN SERPL-MCNC: 2.7 G/DL (ref 3.4–5)
ANION GAP SERPL CALCULATED.3IONS-SCNC: 17 MMOL/L (ref 3–16)
BASOPHILS ABSOLUTE: 0.1 K/UL (ref 0–0.2)
BASOPHILS RELATIVE PERCENT: 0.4 %
BUN BLDV-MCNC: 38 MG/DL (ref 7–20)
CALCIUM SERPL-MCNC: 8.7 MG/DL (ref 8.3–10.6)
CHLORIDE BLD-SCNC: 100 MMOL/L (ref 99–110)
CO2: 21 MMOL/L (ref 21–32)
CREAT SERPL-MCNC: 6.7 MG/DL (ref 0.8–1.3)
EOSINOPHILS ABSOLUTE: 0 K/UL (ref 0–0.6)
EOSINOPHILS RELATIVE PERCENT: 0.1 %
GFR AFRICAN AMERICAN: 10
GFR NON-AFRICAN AMERICAN: 8
GLUCOSE BLD-MCNC: 136 MG/DL (ref 70–99)
GLUCOSE BLD-MCNC: 180 MG/DL (ref 70–99)
GLUCOSE BLD-MCNC: 184 MG/DL (ref 70–99)
GLUCOSE BLD-MCNC: 188 MG/DL (ref 70–99)
GLUCOSE BLD-MCNC: 191 MG/DL (ref 70–99)
GLUCOSE BLD-MCNC: 240 MG/DL (ref 70–99)
HCT VFR BLD CALC: 24.9 % (ref 40.5–52.5)
HEMOGLOBIN: 8 G/DL (ref 13.5–17.5)
LYMPHOCYTES ABSOLUTE: 1.6 K/UL (ref 1–5.1)
LYMPHOCYTES RELATIVE PERCENT: 8.7 %
MAGNESIUM: 2.3 MG/DL (ref 1.8–2.4)
MCH RBC QN AUTO: 26.6 PG (ref 26–34)
MCHC RBC AUTO-ENTMCNC: 32.1 G/DL (ref 31–36)
MCV RBC AUTO: 83.1 FL (ref 80–100)
MONOCYTES ABSOLUTE: 1.2 K/UL (ref 0–1.3)
MONOCYTES RELATIVE PERCENT: 6.6 %
NEUTROPHILS ABSOLUTE: 15.8 K/UL (ref 1.7–7.7)
NEUTROPHILS RELATIVE PERCENT: 84.2 %
PDW BLD-RTO: 20.2 % (ref 12.4–15.4)
PERFORMED ON: ABNORMAL
PHOSPHORUS: 6.3 MG/DL (ref 2.5–4.9)
PLATELET # BLD: 330 K/UL (ref 135–450)
PMV BLD AUTO: 8.4 FL (ref 5–10.5)
POTASSIUM SERPL-SCNC: 5.3 MMOL/L (ref 3.5–5.1)
RBC # BLD: 3 M/UL (ref 4.2–5.9)
SODIUM BLD-SCNC: 138 MMOL/L (ref 136–145)
WBC # BLD: 18.7 K/UL (ref 4–11)

## 2019-01-19 PROCEDURE — 1200000000 HC SEMI PRIVATE

## 2019-01-19 PROCEDURE — 6360000002 HC RX W HCPCS: Performed by: SURGERY

## 2019-01-19 PROCEDURE — 6370000000 HC RX 637 (ALT 250 FOR IP): Performed by: STUDENT IN AN ORGANIZED HEALTH CARE EDUCATION/TRAINING PROGRAM

## 2019-01-19 PROCEDURE — 85025 COMPLETE CBC W/AUTO DIFF WBC: CPT

## 2019-01-19 PROCEDURE — 2580000003 HC RX 258: Performed by: STUDENT IN AN ORGANIZED HEALTH CARE EDUCATION/TRAINING PROGRAM

## 2019-01-19 PROCEDURE — 80069 RENAL FUNCTION PANEL: CPT

## 2019-01-19 PROCEDURE — 6370000000 HC RX 637 (ALT 250 FOR IP): Performed by: INTERNAL MEDICINE

## 2019-01-19 PROCEDURE — 90937 HEMODIALYSIS REPEATED EVAL: CPT

## 2019-01-19 PROCEDURE — 36415 COLL VENOUS BLD VENIPUNCTURE: CPT

## 2019-01-19 PROCEDURE — 83735 ASSAY OF MAGNESIUM: CPT

## 2019-01-19 RX ORDER — HYDRALAZINE HYDROCHLORIDE 25 MG/1
25 TABLET, FILM COATED ORAL EVERY 8 HOURS SCHEDULED
Status: DISCONTINUED | OUTPATIENT
Start: 2019-01-19 | End: 2019-01-20

## 2019-01-19 RX ADMIN — MORPHINE SULFATE 4 MG: 4 INJECTION INTRAVENOUS at 17:25

## 2019-01-19 RX ADMIN — INSULIN GLARGINE 8 UNITS: 100 INJECTION, SOLUTION SUBCUTANEOUS at 21:13

## 2019-01-19 RX ADMIN — Medication 250 MG: at 21:06

## 2019-01-19 RX ADMIN — INSULIN LISPRO 1 UNITS: 100 INJECTION, SOLUTION INTRAVENOUS; SUBCUTANEOUS at 17:26

## 2019-01-19 RX ADMIN — PANTOPRAZOLE SODIUM 40 MG: 40 TABLET, DELAYED RELEASE ORAL at 05:02

## 2019-01-19 RX ADMIN — MORPHINE SULFATE 4 MG: 4 INJECTION INTRAVENOUS at 00:33

## 2019-01-19 RX ADMIN — Medication 1 CAPSULE: at 13:40

## 2019-01-19 RX ADMIN — SEVELAMER CARBONATE 1600 MG: 800 TABLET, FILM COATED ORAL at 13:41

## 2019-01-19 RX ADMIN — ISOSORBIDE MONONITRATE 30 MG: 30 TABLET, EXTENDED RELEASE ORAL at 21:05

## 2019-01-19 RX ADMIN — AMIODARONE HYDROCHLORIDE 200 MG: 200 TABLET ORAL at 13:40

## 2019-01-19 RX ADMIN — NEPHROCAP 1 MG: 1 CAP ORAL at 13:40

## 2019-01-19 RX ADMIN — ASPIRIN 325 MG: 325 TABLET, DELAYED RELEASE ORAL at 13:41

## 2019-01-19 RX ADMIN — HYDRALAZINE HYDROCHLORIDE 25 MG: 25 TABLET, FILM COATED ORAL at 21:05

## 2019-01-19 RX ADMIN — Medication 10 ML: at 13:42

## 2019-01-19 RX ADMIN — Medication 250 MG: at 13:40

## 2019-01-19 RX ADMIN — MORPHINE SULFATE 4 MG: 4 INJECTION INTRAVENOUS at 05:02

## 2019-01-19 RX ADMIN — ATORVASTATIN CALCIUM 80 MG: 80 TABLET, FILM COATED ORAL at 13:40

## 2019-01-19 RX ADMIN — CLOPIDOGREL BISULFATE 75 MG: 75 TABLET ORAL at 13:41

## 2019-01-19 RX ADMIN — Medication 10 ML: at 21:07

## 2019-01-19 RX ADMIN — INSULIN LISPRO 1 UNITS: 100 INJECTION, SOLUTION INTRAVENOUS; SUBCUTANEOUS at 21:12

## 2019-01-19 RX ADMIN — SEVELAMER CARBONATE 1600 MG: 800 TABLET, FILM COATED ORAL at 17:25

## 2019-01-19 RX ADMIN — CINACALCET HYDROCHLORIDE 30 MG: 30 TABLET, COATED ORAL at 13:40

## 2019-01-19 ASSESSMENT — PAIN DESCRIPTION - PAIN TYPE
TYPE: ACUTE PAIN;SURGICAL PAIN
TYPE: ACUTE PAIN;SURGICAL PAIN

## 2019-01-19 ASSESSMENT — PAIN SCALES - GENERAL
PAINLEVEL_OUTOF10: 0
PAINLEVEL_OUTOF10: 8
PAINLEVEL_OUTOF10: 0
PAINLEVEL_OUTOF10: 9
PAINLEVEL_OUTOF10: 8
PAINLEVEL_OUTOF10: 0
PAINLEVEL_OUTOF10: 9
PAINLEVEL_OUTOF10: 0

## 2019-01-19 ASSESSMENT — PAIN DESCRIPTION - DESCRIPTORS
DESCRIPTORS: SHARP
DESCRIPTORS: ACHING

## 2019-01-19 ASSESSMENT — PAIN DESCRIPTION - LOCATION
LOCATION: LEG
LOCATION: LEG

## 2019-01-19 ASSESSMENT — PAIN - FUNCTIONAL ASSESSMENT: PAIN_FUNCTIONAL_ASSESSMENT: PREVENTS OR INTERFERES WITH ALL ACTIVE AND SOME PASSIVE ACTIVITIES

## 2019-01-19 ASSESSMENT — PAIN DESCRIPTION - ONSET
ONSET: ON-GOING
ONSET: ON-GOING

## 2019-01-19 ASSESSMENT — PAIN DESCRIPTION - PROGRESSION
CLINICAL_PROGRESSION: GRADUALLY WORSENING
CLINICAL_PROGRESSION: GRADUALLY WORSENING

## 2019-01-19 ASSESSMENT — PAIN DESCRIPTION - FREQUENCY
FREQUENCY: CONTINUOUS
FREQUENCY: CONTINUOUS

## 2019-01-19 ASSESSMENT — PAIN DESCRIPTION - ORIENTATION
ORIENTATION: RIGHT
ORIENTATION: RIGHT

## 2019-01-20 LAB
ALBUMIN SERPL-MCNC: 2.6 G/DL (ref 3.4–5)
ANION GAP SERPL CALCULATED.3IONS-SCNC: 10 MMOL/L (ref 3–16)
BASOPHILS ABSOLUTE: 0.1 K/UL (ref 0–0.2)
BASOPHILS RELATIVE PERCENT: 1 %
BUN BLDV-MCNC: 24 MG/DL (ref 7–20)
CALCIUM SERPL-MCNC: 8.8 MG/DL (ref 8.3–10.6)
CHLORIDE BLD-SCNC: 100 MMOL/L (ref 99–110)
CO2: 25 MMOL/L (ref 21–32)
CREAT SERPL-MCNC: 4.1 MG/DL (ref 0.8–1.3)
EOSINOPHILS ABSOLUTE: 0.2 K/UL (ref 0–0.6)
EOSINOPHILS RELATIVE PERCENT: 1.2 %
GFR AFRICAN AMERICAN: 17
GFR NON-AFRICAN AMERICAN: 14
GLUCOSE BLD-MCNC: 129 MG/DL (ref 70–99)
GLUCOSE BLD-MCNC: 130 MG/DL (ref 70–99)
GLUCOSE BLD-MCNC: 137 MG/DL (ref 70–99)
GLUCOSE BLD-MCNC: 140 MG/DL (ref 70–99)
GLUCOSE BLD-MCNC: 149 MG/DL (ref 70–99)
HCT VFR BLD CALC: 23.5 % (ref 40.5–52.5)
HEMOGLOBIN: 7.4 G/DL (ref 13.5–17.5)
LYMPHOCYTES ABSOLUTE: 1.7 K/UL (ref 1–5.1)
LYMPHOCYTES RELATIVE PERCENT: 11.6 %
MAGNESIUM: 2.2 MG/DL (ref 1.8–2.4)
MCH RBC QN AUTO: 26.5 PG (ref 26–34)
MCHC RBC AUTO-ENTMCNC: 31.7 G/DL (ref 31–36)
MCV RBC AUTO: 83.4 FL (ref 80–100)
MONOCYTES ABSOLUTE: 1.2 K/UL (ref 0–1.3)
MONOCYTES RELATIVE PERCENT: 8.2 %
NEUTROPHILS ABSOLUTE: 11.6 K/UL (ref 1.7–7.7)
NEUTROPHILS RELATIVE PERCENT: 78 %
PDW BLD-RTO: 20.3 % (ref 12.4–15.4)
PERFORMED ON: ABNORMAL
PHOSPHORUS: 4.1 MG/DL (ref 2.5–4.9)
PLATELET # BLD: 285 K/UL (ref 135–450)
PMV BLD AUTO: 8.3 FL (ref 5–10.5)
POTASSIUM SERPL-SCNC: 4.5 MMOL/L (ref 3.5–5.1)
RBC # BLD: 2.81 M/UL (ref 4.2–5.9)
SODIUM BLD-SCNC: 135 MMOL/L (ref 136–145)
VANCOMYCIN RANDOM: 16.5 UG/ML
WBC # BLD: 14.8 K/UL (ref 4–11)

## 2019-01-20 PROCEDURE — 80202 ASSAY OF VANCOMYCIN: CPT

## 2019-01-20 PROCEDURE — 83735 ASSAY OF MAGNESIUM: CPT

## 2019-01-20 PROCEDURE — 2580000003 HC RX 258: Performed by: STUDENT IN AN ORGANIZED HEALTH CARE EDUCATION/TRAINING PROGRAM

## 2019-01-20 PROCEDURE — 6370000000 HC RX 637 (ALT 250 FOR IP): Performed by: STUDENT IN AN ORGANIZED HEALTH CARE EDUCATION/TRAINING PROGRAM

## 2019-01-20 PROCEDURE — 36415 COLL VENOUS BLD VENIPUNCTURE: CPT

## 2019-01-20 PROCEDURE — 6370000000 HC RX 637 (ALT 250 FOR IP): Performed by: INTERNAL MEDICINE

## 2019-01-20 PROCEDURE — 99024 POSTOP FOLLOW-UP VISIT: CPT | Performed by: SURGERY

## 2019-01-20 PROCEDURE — 80069 RENAL FUNCTION PANEL: CPT

## 2019-01-20 PROCEDURE — 1200000000 HC SEMI PRIVATE

## 2019-01-20 PROCEDURE — 85025 COMPLETE CBC W/AUTO DIFF WBC: CPT

## 2019-01-20 RX ORDER — HYDRALAZINE HYDROCHLORIDE 50 MG/1
50 TABLET, FILM COATED ORAL EVERY 8 HOURS SCHEDULED
Status: DISCONTINUED | OUTPATIENT
Start: 2019-01-20 | End: 2019-01-24

## 2019-01-20 RX ADMIN — Medication 1 CAPSULE: at 10:01

## 2019-01-20 RX ADMIN — CINACALCET HYDROCHLORIDE 30 MG: 30 TABLET, COATED ORAL at 10:00

## 2019-01-20 RX ADMIN — NEPHROCAP 1 MG: 1 CAP ORAL at 10:01

## 2019-01-20 RX ADMIN — ISOSORBIDE MONONITRATE 30 MG: 30 TABLET, EXTENDED RELEASE ORAL at 10:01

## 2019-01-20 RX ADMIN — HYDRALAZINE HYDROCHLORIDE 25 MG: 25 TABLET, FILM COATED ORAL at 05:30

## 2019-01-20 RX ADMIN — Medication 250 MG: at 10:01

## 2019-01-20 RX ADMIN — HYDRALAZINE HYDROCHLORIDE 50 MG: 50 TABLET, FILM COATED ORAL at 23:02

## 2019-01-20 RX ADMIN — Medication 10 ML: at 20:52

## 2019-01-20 RX ADMIN — ISOSORBIDE MONONITRATE 30 MG: 30 TABLET, EXTENDED RELEASE ORAL at 20:51

## 2019-01-20 RX ADMIN — AMIODARONE HYDROCHLORIDE 200 MG: 200 TABLET ORAL at 10:01

## 2019-01-20 RX ADMIN — PANTOPRAZOLE SODIUM 40 MG: 40 TABLET, DELAYED RELEASE ORAL at 05:31

## 2019-01-20 RX ADMIN — CLOPIDOGREL BISULFATE 75 MG: 75 TABLET ORAL at 10:01

## 2019-01-20 RX ADMIN — HYDRALAZINE HYDROCHLORIDE 50 MG: 50 TABLET, FILM COATED ORAL at 15:06

## 2019-01-20 RX ADMIN — SEVELAMER CARBONATE 1600 MG: 800 TABLET, FILM COATED ORAL at 10:00

## 2019-01-20 RX ADMIN — ASPIRIN 325 MG: 325 TABLET, DELAYED RELEASE ORAL at 10:02

## 2019-01-20 RX ADMIN — Medication 250 MG: at 20:51

## 2019-01-20 RX ADMIN — ATORVASTATIN CALCIUM 80 MG: 80 TABLET, FILM COATED ORAL at 10:01

## 2019-01-20 ASSESSMENT — PAIN SCALES - GENERAL
PAINLEVEL_OUTOF10: 0

## 2019-01-21 LAB
ALBUMIN SERPL-MCNC: 2.9 G/DL (ref 3.4–5)
ANION GAP SERPL CALCULATED.3IONS-SCNC: 15 MMOL/L (ref 3–16)
BASOPHILS ABSOLUTE: 0.1 K/UL (ref 0–0.2)
BASOPHILS RELATIVE PERCENT: 0.8 %
BUN BLDV-MCNC: 41 MG/DL (ref 7–20)
CALCIUM SERPL-MCNC: 8.8 MG/DL (ref 8.3–10.6)
CHLORIDE BLD-SCNC: 102 MMOL/L (ref 99–110)
CO2: 24 MMOL/L (ref 21–32)
CREAT SERPL-MCNC: 6.7 MG/DL (ref 0.8–1.3)
EOSINOPHILS ABSOLUTE: 0.2 K/UL (ref 0–0.6)
EOSINOPHILS RELATIVE PERCENT: 1.7 %
GFR AFRICAN AMERICAN: 10
GFR NON-AFRICAN AMERICAN: 8
GLUCOSE BLD-MCNC: 112 MG/DL (ref 70–99)
GLUCOSE BLD-MCNC: 118 MG/DL (ref 70–99)
GLUCOSE BLD-MCNC: 135 MG/DL (ref 70–99)
GLUCOSE BLD-MCNC: 142 MG/DL (ref 70–99)
HBV SURFACE AB TITR SER: <3.5 MIU/ML
HCT VFR BLD CALC: 22.3 % (ref 40.5–52.5)
HCT VFR BLD CALC: 29.3 % (ref 40.5–52.5)
HEMOGLOBIN: 7.3 G/DL (ref 13.5–17.5)
HEMOGLOBIN: 9.2 G/DL (ref 13.5–17.5)
LYMPHOCYTES ABSOLUTE: 1.6 K/UL (ref 1–5.1)
LYMPHOCYTES RELATIVE PERCENT: 12.7 %
MAGNESIUM: 2.4 MG/DL (ref 1.8–2.4)
MCH RBC QN AUTO: 27.1 PG (ref 26–34)
MCHC RBC AUTO-ENTMCNC: 32.6 G/DL (ref 31–36)
MCV RBC AUTO: 83.1 FL (ref 80–100)
MONOCYTES ABSOLUTE: 1 K/UL (ref 0–1.3)
MONOCYTES RELATIVE PERCENT: 7.5 %
NEUTROPHILS ABSOLUTE: 9.8 K/UL (ref 1.7–7.7)
NEUTROPHILS RELATIVE PERCENT: 77.3 %
PDW BLD-RTO: 20.2 % (ref 12.4–15.4)
PERFORMED ON: ABNORMAL
PHOSPHORUS: 5.3 MG/DL (ref 2.5–4.9)
PLATELET # BLD: 276 K/UL (ref 135–450)
PMV BLD AUTO: 8.2 FL (ref 5–10.5)
POTASSIUM SERPL-SCNC: 4.4 MMOL/L (ref 3.5–5.1)
RBC # BLD: 2.68 M/UL (ref 4.2–5.9)
SODIUM BLD-SCNC: 141 MMOL/L (ref 136–145)
WBC # BLD: 12.7 K/UL (ref 4–11)

## 2019-01-21 PROCEDURE — 6370000000 HC RX 637 (ALT 250 FOR IP): Performed by: STUDENT IN AN ORGANIZED HEALTH CARE EDUCATION/TRAINING PROGRAM

## 2019-01-21 PROCEDURE — 85025 COMPLETE CBC W/AUTO DIFF WBC: CPT

## 2019-01-21 PROCEDURE — 97168 OT RE-EVAL EST PLAN CARE: CPT

## 2019-01-21 PROCEDURE — 1200000000 HC SEMI PRIVATE

## 2019-01-21 PROCEDURE — 85014 HEMATOCRIT: CPT

## 2019-01-21 PROCEDURE — 97110 THERAPEUTIC EXERCISES: CPT

## 2019-01-21 PROCEDURE — 6370000000 HC RX 637 (ALT 250 FOR IP): Performed by: INTERNAL MEDICINE

## 2019-01-21 PROCEDURE — 85018 HEMOGLOBIN: CPT

## 2019-01-21 PROCEDURE — 97163 PT EVAL HIGH COMPLEX 45 MIN: CPT

## 2019-01-21 PROCEDURE — 2580000003 HC RX 258: Performed by: INTERNAL MEDICINE

## 2019-01-21 PROCEDURE — 86704 HEP B CORE ANTIBODY TOTAL: CPT

## 2019-01-21 PROCEDURE — 99232 SBSQ HOSP IP/OBS MODERATE 35: CPT | Performed by: INTERNAL MEDICINE

## 2019-01-21 PROCEDURE — 80069 RENAL FUNCTION PANEL: CPT

## 2019-01-21 PROCEDURE — 36415 COLL VENOUS BLD VENIPUNCTURE: CPT

## 2019-01-21 PROCEDURE — 2580000003 HC RX 258: Performed by: STUDENT IN AN ORGANIZED HEALTH CARE EDUCATION/TRAINING PROGRAM

## 2019-01-21 PROCEDURE — 86706 HEP B SURFACE ANTIBODY: CPT

## 2019-01-21 PROCEDURE — 90937 HEMODIALYSIS REPEATED EVAL: CPT

## 2019-01-21 PROCEDURE — 83735 ASSAY OF MAGNESIUM: CPT

## 2019-01-21 PROCEDURE — 6370000000 HC RX 637 (ALT 250 FOR IP): Performed by: PSYCHIATRY & NEUROLOGY

## 2019-01-21 PROCEDURE — 6360000002 HC RX W HCPCS: Performed by: INTERNAL MEDICINE

## 2019-01-21 PROCEDURE — 97530 THERAPEUTIC ACTIVITIES: CPT

## 2019-01-21 RX ORDER — MIRTAZAPINE 15 MG/1
15 TABLET, FILM COATED ORAL NIGHTLY
Status: DISCONTINUED | OUTPATIENT
Start: 2019-01-21 | End: 2019-01-25 | Stop reason: HOSPADM

## 2019-01-21 RX ADMIN — Medication 10 ML: at 21:43

## 2019-01-21 RX ADMIN — PANTOPRAZOLE SODIUM 40 MG: 40 TABLET, DELAYED RELEASE ORAL at 05:01

## 2019-01-21 RX ADMIN — ISOSORBIDE MONONITRATE 30 MG: 30 TABLET, EXTENDED RELEASE ORAL at 21:41

## 2019-01-21 RX ADMIN — NEPHROCAP 1 MG: 1 CAP ORAL at 13:15

## 2019-01-21 RX ADMIN — Medication 1 CAPSULE: at 13:16

## 2019-01-21 RX ADMIN — HYDROCODONE BITARTRATE AND ACETAMINOPHEN 1 TABLET: 5; 325 TABLET ORAL at 07:14

## 2019-01-21 RX ADMIN — SEVELAMER CARBONATE 1600 MG: 800 TABLET, FILM COATED ORAL at 13:14

## 2019-01-21 RX ADMIN — MIRTAZAPINE 15 MG: 15 TABLET, FILM COATED ORAL at 21:42

## 2019-01-21 RX ADMIN — VANCOMYCIN HYDROCHLORIDE 750 MG: 10 INJECTION, POWDER, LYOPHILIZED, FOR SOLUTION INTRAVENOUS at 13:19

## 2019-01-21 RX ADMIN — Medication 250 MG: at 21:42

## 2019-01-21 RX ADMIN — Medication 10 ML: at 13:16

## 2019-01-21 RX ADMIN — HYDRALAZINE HYDROCHLORIDE 50 MG: 50 TABLET, FILM COATED ORAL at 13:27

## 2019-01-21 RX ADMIN — ASPIRIN 325 MG: 325 TABLET, DELAYED RELEASE ORAL at 13:15

## 2019-01-21 RX ADMIN — HYDRALAZINE HYDROCHLORIDE 50 MG: 50 TABLET, FILM COATED ORAL at 21:41

## 2019-01-21 RX ADMIN — CINACALCET HYDROCHLORIDE 30 MG: 30 TABLET, COATED ORAL at 13:15

## 2019-01-21 RX ADMIN — ATORVASTATIN CALCIUM 80 MG: 80 TABLET, FILM COATED ORAL at 13:14

## 2019-01-21 RX ADMIN — Medication 250 MG: at 13:14

## 2019-01-21 RX ADMIN — AMIODARONE HYDROCHLORIDE 200 MG: 200 TABLET ORAL at 13:14

## 2019-01-21 RX ADMIN — CLOPIDOGREL BISULFATE 75 MG: 75 TABLET ORAL at 13:15

## 2019-01-21 RX ADMIN — HYDRALAZINE HYDROCHLORIDE 50 MG: 50 TABLET, FILM COATED ORAL at 05:01

## 2019-01-21 ASSESSMENT — PAIN SCALES - GENERAL
PAINLEVEL_OUTOF10: 0
PAINLEVEL_OUTOF10: 7
PAINLEVEL_OUTOF10: 0

## 2019-01-22 LAB
ALBUMIN SERPL-MCNC: 3.3 G/DL (ref 3.4–5)
ANION GAP SERPL CALCULATED.3IONS-SCNC: 15 MMOL/L (ref 3–16)
BASOPHILS ABSOLUTE: 0.1 K/UL (ref 0–0.2)
BASOPHILS RELATIVE PERCENT: 0.8 %
BUN BLDV-MCNC: 28 MG/DL (ref 7–20)
CALCIUM SERPL-MCNC: 9.6 MG/DL (ref 8.3–10.6)
CHLORIDE BLD-SCNC: 99 MMOL/L (ref 99–110)
CO2: 24 MMOL/L (ref 21–32)
CREAT SERPL-MCNC: 4.7 MG/DL (ref 0.8–1.3)
EOSINOPHILS ABSOLUTE: 0.3 K/UL (ref 0–0.6)
EOSINOPHILS RELATIVE PERCENT: 2 %
GFR AFRICAN AMERICAN: 15
GFR NON-AFRICAN AMERICAN: 12
GLUCOSE BLD-MCNC: 104 MG/DL (ref 70–99)
GLUCOSE BLD-MCNC: 107 MG/DL (ref 70–99)
GLUCOSE BLD-MCNC: 151 MG/DL (ref 70–99)
GLUCOSE BLD-MCNC: 221 MG/DL (ref 70–99)
GLUCOSE BLD-MCNC: 252 MG/DL (ref 70–99)
HCT VFR BLD CALC: 26 % (ref 40.5–52.5)
HEMOGLOBIN: 8.2 G/DL (ref 13.5–17.5)
HEPATITIS B CORE TOTAL ANTIBODY: NEGATIVE
LYMPHOCYTES ABSOLUTE: 2.3 K/UL (ref 1–5.1)
LYMPHOCYTES RELATIVE PERCENT: 17.5 %
MAGNESIUM: 2.4 MG/DL (ref 1.8–2.4)
MCH RBC QN AUTO: 26.7 PG (ref 26–34)
MCHC RBC AUTO-ENTMCNC: 31.7 G/DL (ref 31–36)
MCV RBC AUTO: 84.4 FL (ref 80–100)
MONOCYTES ABSOLUTE: 1 K/UL (ref 0–1.3)
MONOCYTES RELATIVE PERCENT: 7.7 %
NEUTROPHILS ABSOLUTE: 9.5 K/UL (ref 1.7–7.7)
NEUTROPHILS RELATIVE PERCENT: 72 %
PDW BLD-RTO: 19.7 % (ref 12.4–15.4)
PERFORMED ON: ABNORMAL
PHOSPHORUS: 4.3 MG/DL (ref 2.5–4.9)
PLATELET # BLD: 313 K/UL (ref 135–450)
PMV BLD AUTO: 8.4 FL (ref 5–10.5)
POTASSIUM SERPL-SCNC: 4.9 MMOL/L (ref 3.5–5.1)
RBC # BLD: 3.07 M/UL (ref 4.2–5.9)
SODIUM BLD-SCNC: 138 MMOL/L (ref 136–145)
WBC # BLD: 13.2 K/UL (ref 4–11)

## 2019-01-22 PROCEDURE — 6370000000 HC RX 637 (ALT 250 FOR IP): Performed by: STUDENT IN AN ORGANIZED HEALTH CARE EDUCATION/TRAINING PROGRAM

## 2019-01-22 PROCEDURE — 6370000000 HC RX 637 (ALT 250 FOR IP): Performed by: INTERNAL MEDICINE

## 2019-01-22 PROCEDURE — 83735 ASSAY OF MAGNESIUM: CPT

## 2019-01-22 PROCEDURE — 6370000000 HC RX 637 (ALT 250 FOR IP): Performed by: PSYCHIATRY & NEUROLOGY

## 2019-01-22 PROCEDURE — 80069 RENAL FUNCTION PANEL: CPT

## 2019-01-22 PROCEDURE — 85025 COMPLETE CBC W/AUTO DIFF WBC: CPT

## 2019-01-22 PROCEDURE — 1200000000 HC SEMI PRIVATE

## 2019-01-22 PROCEDURE — 36415 COLL VENOUS BLD VENIPUNCTURE: CPT

## 2019-01-22 PROCEDURE — 2580000003 HC RX 258: Performed by: STUDENT IN AN ORGANIZED HEALTH CARE EDUCATION/TRAINING PROGRAM

## 2019-01-22 PROCEDURE — 6360000002 HC RX W HCPCS: Performed by: STUDENT IN AN ORGANIZED HEALTH CARE EDUCATION/TRAINING PROGRAM

## 2019-01-22 RX ORDER — HEPARIN SODIUM 5000 [USP'U]/ML
5000 INJECTION, SOLUTION INTRAVENOUS; SUBCUTANEOUS EVERY 8 HOURS
Status: DISCONTINUED | OUTPATIENT
Start: 2019-01-22 | End: 2019-01-25 | Stop reason: HOSPADM

## 2019-01-22 RX ORDER — HYDRALAZINE HYDROCHLORIDE 50 MG/1
50 TABLET, FILM COATED ORAL EVERY 8 HOURS SCHEDULED
Qty: 90 TABLET | Refills: 3 | Status: CANCELLED | OUTPATIENT
Start: 2019-01-22

## 2019-01-22 RX ADMIN — SEVELAMER CARBONATE 1600 MG: 800 TABLET, FILM COATED ORAL at 09:53

## 2019-01-22 RX ADMIN — SEVELAMER CARBONATE 1600 MG: 800 TABLET, FILM COATED ORAL at 17:42

## 2019-01-22 RX ADMIN — ISOSORBIDE MONONITRATE 30 MG: 30 TABLET, EXTENDED RELEASE ORAL at 09:53

## 2019-01-22 RX ADMIN — CINACALCET HYDROCHLORIDE 30 MG: 30 TABLET, COATED ORAL at 09:53

## 2019-01-22 RX ADMIN — AMIODARONE HYDROCHLORIDE 200 MG: 200 TABLET ORAL at 09:54

## 2019-01-22 RX ADMIN — NEPHROCAP 1 MG: 1 CAP ORAL at 09:53

## 2019-01-22 RX ADMIN — Medication 250 MG: at 21:48

## 2019-01-22 RX ADMIN — HEPARIN SODIUM 5000 UNITS: 5000 INJECTION INTRAVENOUS; SUBCUTANEOUS at 17:42

## 2019-01-22 RX ADMIN — INSULIN LISPRO 3 UNITS: 100 INJECTION, SOLUTION INTRAVENOUS; SUBCUTANEOUS at 14:29

## 2019-01-22 RX ADMIN — ATORVASTATIN CALCIUM 80 MG: 80 TABLET, FILM COATED ORAL at 09:53

## 2019-01-22 RX ADMIN — ISOSORBIDE MONONITRATE 30 MG: 30 TABLET, EXTENDED RELEASE ORAL at 21:48

## 2019-01-22 RX ADMIN — Medication 250 MG: at 09:53

## 2019-01-22 RX ADMIN — INSULIN LISPRO 2 UNITS: 100 INJECTION, SOLUTION INTRAVENOUS; SUBCUTANEOUS at 17:43

## 2019-01-22 RX ADMIN — Medication 1 CAPSULE: at 09:54

## 2019-01-22 RX ADMIN — Medication 10 ML: at 21:59

## 2019-01-22 RX ADMIN — Medication 10 ML: at 09:54

## 2019-01-22 RX ADMIN — HYDRALAZINE HYDROCHLORIDE 50 MG: 50 TABLET, FILM COATED ORAL at 06:27

## 2019-01-22 RX ADMIN — ASPIRIN 325 MG: 325 TABLET, DELAYED RELEASE ORAL at 09:54

## 2019-01-22 RX ADMIN — INSULIN LISPRO 1 UNITS: 100 INJECTION, SOLUTION INTRAVENOUS; SUBCUTANEOUS at 21:53

## 2019-01-22 RX ADMIN — MIRTAZAPINE 15 MG: 15 TABLET, FILM COATED ORAL at 21:48

## 2019-01-22 RX ADMIN — CLOPIDOGREL BISULFATE 75 MG: 75 TABLET ORAL at 09:54

## 2019-01-22 RX ADMIN — HYDRALAZINE HYDROCHLORIDE 50 MG: 50 TABLET, FILM COATED ORAL at 14:28

## 2019-01-22 RX ADMIN — HYDRALAZINE HYDROCHLORIDE 50 MG: 50 TABLET, FILM COATED ORAL at 21:48

## 2019-01-22 RX ADMIN — HEPARIN SODIUM 5000 UNITS: 5000 INJECTION INTRAVENOUS; SUBCUTANEOUS at 09:57

## 2019-01-22 RX ADMIN — PANTOPRAZOLE SODIUM 40 MG: 40 TABLET, DELAYED RELEASE ORAL at 06:27

## 2019-01-22 ASSESSMENT — PAIN SCALES - GENERAL
PAINLEVEL_OUTOF10: 0

## 2019-01-23 LAB
ALBUMIN SERPL-MCNC: 3.3 G/DL (ref 3.4–5)
ANION GAP SERPL CALCULATED.3IONS-SCNC: 17 MMOL/L (ref 3–16)
BASOPHILS ABSOLUTE: 0.1 K/UL (ref 0–0.2)
BASOPHILS RELATIVE PERCENT: 1.1 %
BUN BLDV-MCNC: 50 MG/DL (ref 7–20)
CALCIUM SERPL-MCNC: 9.2 MG/DL (ref 8.3–10.6)
CHLORIDE BLD-SCNC: 96 MMOL/L (ref 99–110)
CO2: 23 MMOL/L (ref 21–32)
CREAT SERPL-MCNC: 7 MG/DL (ref 0.8–1.3)
EOSINOPHILS ABSOLUTE: 0.3 K/UL (ref 0–0.6)
EOSINOPHILS RELATIVE PERCENT: 2.3 %
GFR AFRICAN AMERICAN: 9
GFR NON-AFRICAN AMERICAN: 8
GLUCOSE BLD-MCNC: 118 MG/DL (ref 70–99)
GLUCOSE BLD-MCNC: 129 MG/DL (ref 70–99)
GLUCOSE BLD-MCNC: 129 MG/DL (ref 70–99)
GLUCOSE BLD-MCNC: 140 MG/DL (ref 70–99)
GLUCOSE BLD-MCNC: 156 MG/DL (ref 70–99)
HCT VFR BLD CALC: 25.1 % (ref 40.5–52.5)
HEMOGLOBIN: 7.8 G/DL (ref 13.5–17.5)
LYMPHOCYTES ABSOLUTE: 2.2 K/UL (ref 1–5.1)
LYMPHOCYTES RELATIVE PERCENT: 17.6 %
MAGNESIUM: 2.7 MG/DL (ref 1.8–2.4)
MCH RBC QN AUTO: 25.9 PG (ref 26–34)
MCHC RBC AUTO-ENTMCNC: 31.3 G/DL (ref 31–36)
MCV RBC AUTO: 82.8 FL (ref 80–100)
MONOCYTES ABSOLUTE: 0.9 K/UL (ref 0–1.3)
MONOCYTES RELATIVE PERCENT: 7.2 %
NEUTROPHILS ABSOLUTE: 9 K/UL (ref 1.7–7.7)
NEUTROPHILS RELATIVE PERCENT: 71.8 %
PDW BLD-RTO: 19.8 % (ref 12.4–15.4)
PERFORMED ON: ABNORMAL
PHOSPHORUS: 5.1 MG/DL (ref 2.5–4.9)
PLATELET # BLD: 331 K/UL (ref 135–450)
PMV BLD AUTO: 8.3 FL (ref 5–10.5)
POTASSIUM SERPL-SCNC: 5 MMOL/L (ref 3.5–5.1)
RBC # BLD: 3.03 M/UL (ref 4.2–5.9)
SODIUM BLD-SCNC: 136 MMOL/L (ref 136–145)
WBC # BLD: 12.6 K/UL (ref 4–11)

## 2019-01-23 PROCEDURE — 6360000002 HC RX W HCPCS: Performed by: INTERNAL MEDICINE

## 2019-01-23 PROCEDURE — 36415 COLL VENOUS BLD VENIPUNCTURE: CPT

## 2019-01-23 PROCEDURE — 6370000000 HC RX 637 (ALT 250 FOR IP): Performed by: STUDENT IN AN ORGANIZED HEALTH CARE EDUCATION/TRAINING PROGRAM

## 2019-01-23 PROCEDURE — 2580000003 HC RX 258: Performed by: STUDENT IN AN ORGANIZED HEALTH CARE EDUCATION/TRAINING PROGRAM

## 2019-01-23 PROCEDURE — 90937 HEMODIALYSIS REPEATED EVAL: CPT

## 2019-01-23 PROCEDURE — 6360000002 HC RX W HCPCS: Performed by: STUDENT IN AN ORGANIZED HEALTH CARE EDUCATION/TRAINING PROGRAM

## 2019-01-23 PROCEDURE — 6370000000 HC RX 637 (ALT 250 FOR IP): Performed by: INTERNAL MEDICINE

## 2019-01-23 PROCEDURE — 6370000000 HC RX 637 (ALT 250 FOR IP): Performed by: PSYCHIATRY & NEUROLOGY

## 2019-01-23 PROCEDURE — 1200000000 HC SEMI PRIVATE

## 2019-01-23 PROCEDURE — 80069 RENAL FUNCTION PANEL: CPT

## 2019-01-23 PROCEDURE — 83735 ASSAY OF MAGNESIUM: CPT

## 2019-01-23 PROCEDURE — 85025 COMPLETE CBC W/AUTO DIFF WBC: CPT

## 2019-01-23 RX ADMIN — AMIODARONE HYDROCHLORIDE 200 MG: 200 TABLET ORAL at 14:39

## 2019-01-23 RX ADMIN — HYDRALAZINE HYDROCHLORIDE 50 MG: 50 TABLET, FILM COATED ORAL at 14:41

## 2019-01-23 RX ADMIN — ISOSORBIDE MONONITRATE 30 MG: 30 TABLET, EXTENDED RELEASE ORAL at 21:38

## 2019-01-23 RX ADMIN — CLOPIDOGREL BISULFATE 75 MG: 75 TABLET ORAL at 14:38

## 2019-01-23 RX ADMIN — DARBEPOETIN ALFA 100 MCG: 100 INJECTION, SOLUTION INTRAVENOUS; SUBCUTANEOUS at 10:43

## 2019-01-23 RX ADMIN — Medication 10 ML: at 14:38

## 2019-01-23 RX ADMIN — INSULIN GLARGINE 8 UNITS: 100 INJECTION, SOLUTION SUBCUTANEOUS at 21:40

## 2019-01-23 RX ADMIN — SEVELAMER CARBONATE 1600 MG: 800 TABLET, FILM COATED ORAL at 14:39

## 2019-01-23 RX ADMIN — MIRTAZAPINE 15 MG: 15 TABLET, FILM COATED ORAL at 21:38

## 2019-01-23 RX ADMIN — HEPARIN SODIUM 5000 UNITS: 5000 INJECTION INTRAVENOUS; SUBCUTANEOUS at 01:53

## 2019-01-23 RX ADMIN — SEVELAMER CARBONATE 1600 MG: 800 TABLET, FILM COATED ORAL at 18:15

## 2019-01-23 RX ADMIN — Medication 250 MG: at 21:38

## 2019-01-23 RX ADMIN — CINACALCET HYDROCHLORIDE 30 MG: 30 TABLET, COATED ORAL at 14:40

## 2019-01-23 RX ADMIN — PANTOPRAZOLE SODIUM 40 MG: 40 TABLET, DELAYED RELEASE ORAL at 06:13

## 2019-01-23 RX ADMIN — HEPARIN SODIUM 5000 UNITS: 5000 INJECTION INTRAVENOUS; SUBCUTANEOUS at 14:47

## 2019-01-23 RX ADMIN — INSULIN LISPRO 1 UNITS: 100 INJECTION, SOLUTION INTRAVENOUS; SUBCUTANEOUS at 14:47

## 2019-01-23 RX ADMIN — NEPHROCAP 1 MG: 1 CAP ORAL at 14:40

## 2019-01-23 RX ADMIN — ASPIRIN 325 MG: 325 TABLET, DELAYED RELEASE ORAL at 14:41

## 2019-01-23 RX ADMIN — Medication 1 CAPSULE: at 14:41

## 2019-01-23 RX ADMIN — Medication 250 MG: at 14:39

## 2019-01-23 RX ADMIN — INSULIN LISPRO 1 UNITS: 100 INJECTION, SOLUTION INTRAVENOUS; SUBCUTANEOUS at 21:40

## 2019-01-23 RX ADMIN — Medication 10 ML: at 21:38

## 2019-01-23 RX ADMIN — HYDRALAZINE HYDROCHLORIDE 50 MG: 50 TABLET, FILM COATED ORAL at 06:13

## 2019-01-23 RX ADMIN — HEPARIN SODIUM 5000 UNITS: 5000 INJECTION INTRAVENOUS; SUBCUTANEOUS at 18:16

## 2019-01-23 RX ADMIN — ISOSORBIDE MONONITRATE 30 MG: 30 TABLET, EXTENDED RELEASE ORAL at 14:40

## 2019-01-23 RX ADMIN — ATORVASTATIN CALCIUM 80 MG: 80 TABLET, FILM COATED ORAL at 14:40

## 2019-01-23 ASSESSMENT — PAIN SCALES - GENERAL
PAINLEVEL_OUTOF10: 0

## 2019-01-24 VITALS
HEART RATE: 77 BPM | DIASTOLIC BLOOD PRESSURE: 57 MMHG | TEMPERATURE: 98.3 F | SYSTOLIC BLOOD PRESSURE: 95 MMHG | BODY MASS INDEX: 24.18 KG/M2 | RESPIRATION RATE: 18 BRPM | WEIGHT: 204.81 LBS | OXYGEN SATURATION: 99 % | HEIGHT: 77 IN

## 2019-01-24 LAB
ALBUMIN SERPL-MCNC: 3.3 G/DL (ref 3.4–5)
ANION GAP SERPL CALCULATED.3IONS-SCNC: 18 MMOL/L (ref 3–16)
BASOPHILS ABSOLUTE: 0.2 K/UL (ref 0–0.2)
BASOPHILS RELATIVE PERCENT: 1.4 %
BUN BLDV-MCNC: 26 MG/DL (ref 7–20)
CALCIUM SERPL-MCNC: 9.5 MG/DL (ref 8.3–10.6)
CHLORIDE BLD-SCNC: 100 MMOL/L (ref 99–110)
CO2: 23 MMOL/L (ref 21–32)
CREAT SERPL-MCNC: 4.2 MG/DL (ref 0.8–1.3)
EOSINOPHILS ABSOLUTE: 0.2 K/UL (ref 0–0.6)
EOSINOPHILS RELATIVE PERCENT: 1.4 %
GFR AFRICAN AMERICAN: 17
GFR NON-AFRICAN AMERICAN: 14
GLUCOSE BLD-MCNC: 109 MG/DL (ref 70–99)
GLUCOSE BLD-MCNC: 113 MG/DL (ref 70–99)
GLUCOSE BLD-MCNC: 123 MG/DL (ref 70–99)
GLUCOSE BLD-MCNC: 133 MG/DL (ref 70–99)
GLUCOSE BLD-MCNC: 137 MG/DL (ref 70–99)
HCT VFR BLD CALC: 27.3 % (ref 40.5–52.5)
HEMOGLOBIN: 8.9 G/DL (ref 13.5–17.5)
LYMPHOCYTES ABSOLUTE: 2.4 K/UL (ref 1–5.1)
LYMPHOCYTES RELATIVE PERCENT: 18.9 %
MAGNESIUM: 2.5 MG/DL (ref 1.8–2.4)
MCH RBC QN AUTO: 26.9 PG (ref 26–34)
MCHC RBC AUTO-ENTMCNC: 32.5 G/DL (ref 31–36)
MCV RBC AUTO: 82.9 FL (ref 80–100)
MONOCYTES ABSOLUTE: 1 K/UL (ref 0–1.3)
MONOCYTES RELATIVE PERCENT: 8.1 %
NEUTROPHILS ABSOLUTE: 9 K/UL (ref 1.7–7.7)
NEUTROPHILS RELATIVE PERCENT: 70.2 %
PDW BLD-RTO: 19.2 % (ref 12.4–15.4)
PERFORMED ON: ABNORMAL
PHOSPHORUS: 3.2 MG/DL (ref 2.5–4.9)
PLATELET # BLD: 365 K/UL (ref 135–450)
PMV BLD AUTO: 8.4 FL (ref 5–10.5)
POTASSIUM SERPL-SCNC: 4.3 MMOL/L (ref 3.5–5.1)
RBC # BLD: 3.29 M/UL (ref 4.2–5.9)
SODIUM BLD-SCNC: 141 MMOL/L (ref 136–145)
WBC # BLD: 12.9 K/UL (ref 4–11)

## 2019-01-24 PROCEDURE — 6370000000 HC RX 637 (ALT 250 FOR IP): Performed by: INTERNAL MEDICINE

## 2019-01-24 PROCEDURE — 97112 NEUROMUSCULAR REEDUCATION: CPT

## 2019-01-24 PROCEDURE — 97110 THERAPEUTIC EXERCISES: CPT

## 2019-01-24 PROCEDURE — 97530 THERAPEUTIC ACTIVITIES: CPT

## 2019-01-24 PROCEDURE — 6360000002 HC RX W HCPCS: Performed by: STUDENT IN AN ORGANIZED HEALTH CARE EDUCATION/TRAINING PROGRAM

## 2019-01-24 PROCEDURE — 6370000000 HC RX 637 (ALT 250 FOR IP): Performed by: PSYCHIATRY & NEUROLOGY

## 2019-01-24 PROCEDURE — 80069 RENAL FUNCTION PANEL: CPT

## 2019-01-24 PROCEDURE — 36415 COLL VENOUS BLD VENIPUNCTURE: CPT

## 2019-01-24 PROCEDURE — 6370000000 HC RX 637 (ALT 250 FOR IP): Performed by: STUDENT IN AN ORGANIZED HEALTH CARE EDUCATION/TRAINING PROGRAM

## 2019-01-24 PROCEDURE — 83735 ASSAY OF MAGNESIUM: CPT

## 2019-01-24 PROCEDURE — 92523 SPEECH SOUND LANG COMPREHEN: CPT

## 2019-01-24 PROCEDURE — 2580000003 HC RX 258: Performed by: STUDENT IN AN ORGANIZED HEALTH CARE EDUCATION/TRAINING PROGRAM

## 2019-01-24 PROCEDURE — 99232 SBSQ HOSP IP/OBS MODERATE 35: CPT | Performed by: NURSE PRACTITIONER

## 2019-01-24 PROCEDURE — 85025 COMPLETE CBC W/AUTO DIFF WBC: CPT

## 2019-01-24 RX ORDER — MIRTAZAPINE 15 MG/1
15 TABLET, FILM COATED ORAL NIGHTLY
Qty: 30 TABLET | Refills: 1 | Status: SHIPPED | OUTPATIENT
Start: 2019-01-24 | End: 2019-12-05 | Stop reason: CLARIF

## 2019-01-24 RX ORDER — HYDROCODONE BITARTRATE AND ACETAMINOPHEN 5; 325 MG/1; MG/1
1 TABLET ORAL EVERY 6 HOURS PRN
Qty: 10 TABLET | Refills: 0 | Status: CANCELLED | OUTPATIENT
Start: 2019-01-24 | End: 2019-01-27

## 2019-01-24 RX ORDER — CASTOR OIL AND BALSAM, PERU 788; 87 MG/G; MG/G
OINTMENT TOPICAL 2 TIMES DAILY
Status: DISCONTINUED | OUTPATIENT
Start: 2019-01-24 | End: 2019-01-25 | Stop reason: HOSPADM

## 2019-01-24 RX ORDER — SEVELAMER CARBONATE 800 MG/1
1600 TABLET, FILM COATED ORAL
Qty: 90 TABLET | Refills: 1 | Status: SHIPPED | OUTPATIENT
Start: 2019-01-24 | End: 2019-08-06 | Stop reason: SDUPTHER

## 2019-01-24 RX ORDER — HYDROCODONE BITARTRATE AND ACETAMINOPHEN 5; 325 MG/1; MG/1
1 TABLET ORAL EVERY 8 HOURS PRN
Qty: 6 TABLET | Refills: 0 | Status: SHIPPED | OUTPATIENT
Start: 2019-01-24 | End: 2019-01-27

## 2019-01-24 RX ADMIN — HEPARIN SODIUM 5000 UNITS: 5000 INJECTION INTRAVENOUS; SUBCUTANEOUS at 09:32

## 2019-01-24 RX ADMIN — NEPHROCAP 1 MG: 1 CAP ORAL at 09:32

## 2019-01-24 RX ADMIN — CINACALCET HYDROCHLORIDE 30 MG: 30 TABLET, COATED ORAL at 09:32

## 2019-01-24 RX ADMIN — Medication 10 ML: at 20:40

## 2019-01-24 RX ADMIN — Medication 1 CAPSULE: at 09:32

## 2019-01-24 RX ADMIN — Medication 250 MG: at 20:40

## 2019-01-24 RX ADMIN — SEVELAMER CARBONATE 1600 MG: 800 TABLET, FILM COATED ORAL at 09:32

## 2019-01-24 RX ADMIN — MIRTAZAPINE 15 MG: 15 TABLET, FILM COATED ORAL at 20:40

## 2019-01-24 RX ADMIN — CLOPIDOGREL BISULFATE 75 MG: 75 TABLET ORAL at 09:32

## 2019-01-24 RX ADMIN — ISOSORBIDE MONONITRATE 30 MG: 30 TABLET, EXTENDED RELEASE ORAL at 09:32

## 2019-01-24 RX ADMIN — ASPIRIN 325 MG: 325 TABLET, DELAYED RELEASE ORAL at 09:32

## 2019-01-24 RX ADMIN — HYDROCODONE BITARTRATE AND ACETAMINOPHEN 1 TABLET: 5; 325 TABLET ORAL at 13:18

## 2019-01-24 RX ADMIN — INSULIN GLARGINE 8 UNITS: 100 INJECTION, SOLUTION SUBCUTANEOUS at 20:44

## 2019-01-24 RX ADMIN — HEPARIN SODIUM 5000 UNITS: 5000 INJECTION INTRAVENOUS; SUBCUTANEOUS at 17:38

## 2019-01-24 RX ADMIN — Medication 250 MG: at 09:32

## 2019-01-24 RX ADMIN — ISOSORBIDE MONONITRATE 30 MG: 30 TABLET, EXTENDED RELEASE ORAL at 20:40

## 2019-01-24 RX ADMIN — PANTOPRAZOLE SODIUM 40 MG: 40 TABLET, DELAYED RELEASE ORAL at 05:30

## 2019-01-24 RX ADMIN — HEPARIN SODIUM 5000 UNITS: 5000 INJECTION INTRAVENOUS; SUBCUTANEOUS at 00:39

## 2019-01-24 RX ADMIN — ATORVASTATIN CALCIUM 80 MG: 80 TABLET, FILM COATED ORAL at 09:32

## 2019-01-24 RX ADMIN — INSULIN GLARGINE 8 UNITS: 100 INJECTION, SOLUTION SUBCUTANEOUS at 09:43

## 2019-01-24 RX ADMIN — Medication 10 ML: at 09:33

## 2019-01-24 RX ADMIN — SEVELAMER CARBONATE 1600 MG: 800 TABLET, FILM COATED ORAL at 13:09

## 2019-01-24 RX ADMIN — AMIODARONE HYDROCHLORIDE 200 MG: 200 TABLET ORAL at 09:32

## 2019-01-24 RX ADMIN — CASTOR OIL AND BALSAM, PERU: 788; 87 OINTMENT TOPICAL at 20:44

## 2019-01-24 ASSESSMENT — PAIN DESCRIPTION - DESCRIPTORS: DESCRIPTORS: ACHING

## 2019-01-24 ASSESSMENT — PAIN DESCRIPTION - LOCATION: LOCATION: LEG

## 2019-01-24 ASSESSMENT — PAIN SCALES - GENERAL
PAINLEVEL_OUTOF10: 0
PAINLEVEL_OUTOF10: 0
PAINLEVEL_OUTOF10: 6
PAINLEVEL_OUTOF10: 0
PAINLEVEL_OUTOF10: 0

## 2019-01-24 ASSESSMENT — PAIN DESCRIPTION - FREQUENCY: FREQUENCY: CONTINUOUS

## 2019-01-24 ASSESSMENT — PAIN DESCRIPTION - ORIENTATION: ORIENTATION: LEFT

## 2019-01-24 ASSESSMENT — PAIN DESCRIPTION - ONSET: ONSET: ON-GOING

## 2019-01-24 ASSESSMENT — PAIN DESCRIPTION - PROGRESSION: CLINICAL_PROGRESSION: GRADUALLY WORSENING

## 2019-01-24 ASSESSMENT — PAIN DESCRIPTION - PAIN TYPE: TYPE: CHRONIC PAIN

## 2019-01-29 ENCOUNTER — TELEPHONE (OUTPATIENT)
Dept: PRIMARY CARE CLINIC | Age: 74
End: 2019-01-29

## 2019-02-06 ENCOUNTER — TELEPHONE (OUTPATIENT)
Dept: SURGERY | Age: 74
End: 2019-02-06

## 2019-02-07 ENCOUNTER — OFFICE VISIT (OUTPATIENT)
Dept: VASCULAR SURGERY | Age: 74
End: 2019-02-07

## 2019-02-07 VITALS
SYSTOLIC BLOOD PRESSURE: 102 MMHG | TEMPERATURE: 96.7 F | BODY MASS INDEX: 24.18 KG/M2 | WEIGHT: 204.8 LBS | HEART RATE: 94 BPM | HEIGHT: 77 IN | DIASTOLIC BLOOD PRESSURE: 57 MMHG

## 2019-02-07 DIAGNOSIS — L97.514 RIGHT SECOND TOE ULCER, WITH NECROSIS OF BONE (HCC): Primary | ICD-10-CM

## 2019-02-07 PROCEDURE — 99024 POSTOP FOLLOW-UP VISIT: CPT | Performed by: SURGERY

## 2019-02-14 ENCOUNTER — HOSPITAL ENCOUNTER (OUTPATIENT)
Dept: WOUND CARE | Age: 74
Discharge: HOME | End: 2019-02-14
Payer: MEDICARE

## 2019-02-14 VITALS
TEMPERATURE: 98 F | HEART RATE: 77 BPM | SYSTOLIC BLOOD PRESSURE: 120 MMHG | RESPIRATION RATE: 18 BRPM | DIASTOLIC BLOOD PRESSURE: 60 MMHG

## 2019-02-14 LAB
GLUCOSE BLD-MCNC: 105 MG/DL (ref 70–99)
PERFORMED ON: ABNORMAL

## 2019-02-14 PROCEDURE — 99213 OFFICE O/P EST LOW 20 MIN: CPT

## 2019-02-28 ENCOUNTER — TELEPHONE (OUTPATIENT)
Dept: SURGERY | Age: 74
End: 2019-02-28

## 2019-03-01 ENCOUNTER — TELEPHONE (OUTPATIENT)
Dept: SURGERY | Age: 74
End: 2019-03-01

## 2019-03-01 ENCOUNTER — TELEPHONE (OUTPATIENT)
Dept: PRIMARY CARE CLINIC | Age: 74
End: 2019-03-01

## 2019-05-02 ENCOUNTER — TELEPHONE (OUTPATIENT)
Dept: PHARMACY | Facility: CLINIC | Age: 74
End: 2019-05-02

## 2019-05-02 NOTE — TELEPHONE ENCOUNTER
CLINICAL PHARMACY CONSULT: MED RECONCILIATION/REVIEW ADDENDUM    For Pharmacy Admin Tracking Only    PHSO: Yes  Total # of Interventions Recommended: 1  - Maintenance Safety Lab Monitoring #: 1  - New Therapy Lab Monitoring #: 0  Recommended intervention potential cost savings: 0  Time Spent (min): 1000 [facility], [address] [place] [address]

## 2019-05-02 NOTE — TELEPHONE ENCOUNTER
CLINICAL PHARMACY: ADHERENCE REVIEW    Identified care gap per [employer]: Atorvastatin 80 mg adherence  Per records, appears 14-day supply last filled 3/18/19    Notes: Per Reconcile Medication Dispenses in Care Path: Atorvastatin 80 mg last filled on 2/21/19 for a 90-day supply billed thru patient's [place] insurance    Patient currently admitted to [facility]. Spoke to [facility] at [place]: [phone]: Patient currently taking Atorvastatin 80 mg Daily and there are no plans for discharge at this time. No patient out reach planned at this time.

## 2019-07-26 ENCOUNTER — TELEPHONE (OUTPATIENT)
Dept: PRIMARY CARE CLINIC | Age: 74
End: 2019-07-26

## 2019-08-02 ENCOUNTER — TELEPHONE (OUTPATIENT)
Dept: PHARMACY | Facility: CLINIC | Age: 74
End: 2019-08-02

## 2019-08-02 NOTE — TELEPHONE ENCOUNTER
CLINICAL PHARMACY: ADHERENCE REVIEW    Identified care gap per [employer]: atorvastatin adherence  Per records, appears 30-day supply last filled 6/1/19. Per [employer]:   Atorvastatin 14-day supply is currently ready to be picked up. Patient was discharged home from SNF on 7/28/19. Patient has OV with PCP on 8/6/19. Reached patient's wife to review. States that the patient is a dialysis currently. Will  medications tomorrow morning. Informed wife that he was only given a 2 week supply of his medication, so ask PCP at [facility] on 8/6/19 for refills. Wife states understanding and thankful for the call. Elo Gray, PharmD, [address] [facility]  Direct: [phone]  Department, toll free [phone], option 7          For Pharmacy Admin Tracking Only    PHSO: Yes  Total # of Interventions Recommended: 1  - New Order #: 1 New Medication Order Reason(s):  Adherence  - Maintenance Safety Lab Monitoring #: 1  - New Therapy Lab Monitoring #: 1  Recommended intervention potential cost savings: 1  Total Interventions Accepted: 1  Time Spent (min): 15

## 2019-08-06 ENCOUNTER — OFFICE VISIT (OUTPATIENT)
Dept: PRIMARY CARE CLINIC | Age: 74
End: 2019-08-06
Payer: MEDICARE

## 2019-08-06 VITALS
BODY MASS INDEX: 24.29 KG/M2 | HEIGHT: 77 IN | DIASTOLIC BLOOD PRESSURE: 59 MMHG | SYSTOLIC BLOOD PRESSURE: 104 MMHG | HEART RATE: 79 BPM | OXYGEN SATURATION: 98 %

## 2019-08-06 DIAGNOSIS — Z89.619 S/P LOWER LIMB AMPUTATION (HCC): ICD-10-CM

## 2019-08-06 DIAGNOSIS — I10 ESSENTIAL HYPERTENSION: ICD-10-CM

## 2019-08-06 DIAGNOSIS — Z79.4 CONTROLLED TYPE 2 DIABETES MELLITUS WITH OTHER CIRCULATORY COMPLICATION, WITH LONG-TERM CURRENT USE OF INSULIN (HCC): Primary | ICD-10-CM

## 2019-08-06 DIAGNOSIS — E11.59 CONTROLLED TYPE 2 DIABETES MELLITUS WITH OTHER CIRCULATORY COMPLICATION, WITH LONG-TERM CURRENT USE OF INSULIN (HCC): ICD-10-CM

## 2019-08-06 DIAGNOSIS — Z99.2 HEMODIALYSIS PATIENT (HCC): ICD-10-CM

## 2019-08-06 DIAGNOSIS — Z79.4 CONTROLLED TYPE 2 DIABETES MELLITUS WITH OTHER CIRCULATORY COMPLICATION, WITH LONG-TERM CURRENT USE OF INSULIN (HCC): ICD-10-CM

## 2019-08-06 DIAGNOSIS — I25.9 ISCHEMIC HEART DISEASE: ICD-10-CM

## 2019-08-06 DIAGNOSIS — N18.6 ESRD ON DIALYSIS (HCC): ICD-10-CM

## 2019-08-06 DIAGNOSIS — Z99.2 ESRD ON DIALYSIS (HCC): ICD-10-CM

## 2019-08-06 DIAGNOSIS — I73.9 PAD (PERIPHERAL ARTERY DISEASE) (HCC): ICD-10-CM

## 2019-08-06 DIAGNOSIS — E11.59 CONTROLLED TYPE 2 DIABETES MELLITUS WITH OTHER CIRCULATORY COMPLICATION, WITH LONG-TERM CURRENT USE OF INSULIN (HCC): Primary | ICD-10-CM

## 2019-08-06 DIAGNOSIS — E11.319 DIABETIC RETINOPATHY OF BOTH EYES ASSOCIATED WITH TYPE 2 DIABETES MELLITUS, MACULAR EDEMA PRESENCE UNSPECIFIED, UNSPECIFIED RETINOPATHY SEVERITY (HCC): ICD-10-CM

## 2019-08-06 DIAGNOSIS — Z91.81 AT HIGH RISK FOR FALLS: ICD-10-CM

## 2019-08-06 LAB
HBA1C MFR BLD: 6.1 %
HCT VFR BLD CALC: 35.4 % (ref 40.5–52.5)
HEMOGLOBIN: 11.2 G/DL (ref 13.5–17.5)
MCH RBC QN AUTO: 26 PG (ref 26–34)
MCHC RBC AUTO-ENTMCNC: 31.8 G/DL (ref 31–36)
MCV RBC AUTO: 82 FL (ref 80–100)
PDW BLD-RTO: 25.3 % (ref 12.4–15.4)
PLATELET # BLD: 198 K/UL (ref 135–450)
PLATELET SLIDE REVIEW: ADEQUATE
PMV BLD AUTO: 9.5 FL (ref 5–10.5)
RBC # BLD: 4.31 M/UL (ref 4.2–5.9)
SLIDE REVIEW: ABNORMAL
WBC # BLD: 11.3 K/UL (ref 4–11)

## 2019-08-06 PROCEDURE — 4040F PNEUMOC VAC/ADMIN/RCVD: CPT | Performed by: FAMILY MEDICINE

## 2019-08-06 PROCEDURE — 3044F HG A1C LEVEL LT 7.0%: CPT | Performed by: FAMILY MEDICINE

## 2019-08-06 PROCEDURE — G8427 DOCREV CUR MEDS BY ELIG CLIN: HCPCS | Performed by: FAMILY MEDICINE

## 2019-08-06 PROCEDURE — 2022F DILAT RTA XM EVC RTNOPTHY: CPT | Performed by: FAMILY MEDICINE

## 2019-08-06 PROCEDURE — 1123F ACP DISCUSS/DSCN MKR DOCD: CPT | Performed by: FAMILY MEDICINE

## 2019-08-06 PROCEDURE — G8598 ASA/ANTIPLAT THER USED: HCPCS | Performed by: FAMILY MEDICINE

## 2019-08-06 PROCEDURE — 1036F TOBACCO NON-USER: CPT | Performed by: FAMILY MEDICINE

## 2019-08-06 PROCEDURE — 99215 OFFICE O/P EST HI 40 MIN: CPT | Performed by: FAMILY MEDICINE

## 2019-08-06 PROCEDURE — G8420 CALC BMI NORM PARAMETERS: HCPCS | Performed by: FAMILY MEDICINE

## 2019-08-06 PROCEDURE — 83036 HEMOGLOBIN GLYCOSYLATED A1C: CPT | Performed by: FAMILY MEDICINE

## 2019-08-06 PROCEDURE — 3017F COLORECTAL CA SCREEN DOC REV: CPT | Performed by: FAMILY MEDICINE

## 2019-08-06 RX ORDER — SEVELAMER CARBONATE 800 MG/1
1600 TABLET, FILM COATED ORAL
Qty: 90 TABLET | Refills: 1 | Status: SHIPPED | OUTPATIENT
Start: 2019-08-06 | End: 2019-08-21

## 2019-08-06 ASSESSMENT — ENCOUNTER SYMPTOMS
PHOTOPHOBIA: 0
BLOOD IN STOOL: 0
RECTAL PAIN: 0
BACK PAIN: 0
EYE PAIN: 0
EYE ITCHING: 0
WHEEZING: 0
APNEA: 0
TROUBLE SWALLOWING: 0
SORE THROAT: 0
ANAL BLEEDING: 0
COLOR CHANGE: 0
COUGH: 0
ABDOMINAL PAIN: 0
SHORTNESS OF BREATH: 0
EYE REDNESS: 0
CONSTIPATION: 0
FACIAL SWELLING: 0
CHEST TIGHTNESS: 0
SINUS PRESSURE: 0
NAUSEA: 0
CHOKING: 0
EYE DISCHARGE: 0
VOMITING: 0
VOICE CHANGE: 0

## 2019-08-06 ASSESSMENT — PATIENT HEALTH QUESTIONNAIRE - PHQ9
1. LITTLE INTEREST OR PLEASURE IN DOING THINGS: 0
2. FEELING DOWN, DEPRESSED OR HOPELESS: 0
SUM OF ALL RESPONSES TO PHQ9 QUESTIONS 1 & 2: 0
SUM OF ALL RESPONSES TO PHQ QUESTIONS 1-9: 0
SUM OF ALL RESPONSES TO PHQ QUESTIONS 1-9: 0

## 2019-08-06 NOTE — PROGRESS NOTES
mouth daily      aspirin 325 MG EC tablet Take 1 tablet by mouth daily 30 tablet 3    clopidogrel (PLAVIX) 75 MG tablet Take 75 mg by mouth daily       acetaminophen (TYLENOL ARTHRITIS PAIN) 650 MG extended release tablet Take 1 tablet by mouth 2 times daily 180 tablet 5    amiodarone (CORDARONE) 200 MG tablet TAKE ONE TABLET BY MOUTH DAILY 90 tablet 4    isosorbide mononitrate (IMDUR) 30 MG extended release tablet TAKE ONE TABLET BY MOUTH TWICE A  tablet 2    insulin glargine (LANTUS SOLOSTAR) 100 UNIT/ML injection pen INJECT 8 UNITS SUBCUTANEOUSLY twice daily 15 mL 3    cinacalcet (SENSIPAR) 30 MG tablet Take 1 tablet by mouth daily 30 tablet 2        Medications patient taking as of now reconciled against medications ordered at time of hospital discharge: Yes    Chief Complaint   Patient presents with    Follow-Up from [address] [place] [address] Getachew Papa morena-1/24/2019-7/28/2019. [facility] 1/3/19 JW with sepsis 2/2 to gangrene of the toes of the R foot. Eventually BKA done and patient was  Transferred to [facility] on 1/22/19. Prosthesis fitted while in rebab.   walking with use of walker/wheelchair  D/c from rehab 7/28/19, back [facility]. Plans for PT, skilled nurse/OT/home aide,   following patient    He has cad s/p stents, dm-2, on insulin, hypertension, and PAD    Inpatient course: Discharge summary reviewed- see chart. Interval history/Current status:  osp 1/3/19 JW with sepsis 2/2 to gangrene of the toes of the R foot. Eventually BKA done and patient was  Transferred to [facility] on 1/22/19. Prosthesis fitted while in rebab.   walking with use of walker/wheelchair  D/c from rehab 7/28/19, back [facility].   Plans for PT, skilled nurse/OT/home aide,   following patient    He has cad s/p stents, dm-2, on insulin, hypertension, and PAD      Review of Systems   Constitutional: Negative for activity change, appetite change, 102/57       Physical Exam   Constitutional: He is oriented to person, place, and time. He appears well-developed and well-nourished. No distress. HENT:   Head: Normocephalic and atraumatic. Right Ear: External ear normal.   Left Ear: External ear normal.   Nose: Nose normal.   Mouth/Throat: Oropharynx is clear and moist. No oropharyngeal exudate. Eyes: Pupils are equal, round, and reactive to light. Conjunctivae and EOM are normal. Right eye exhibits no discharge. Left eye exhibits no discharge. No scleral icterus. Neck: Normal range of motion. Neck supple. No JVD present. No tracheal deviation present. No thyromegaly present. Cardiovascular: Normal rate, regular rhythm, normal heart sounds and intact distal pulses. Exam reveals no friction rub. No murmur heard. Pulses:       Carotid pulses are 2+ on the right side, and 2+ on the left side. Radial pulses are 2+ on the right side, and 2+ on the left side. Femoral pulses are 2+ on the right side, and 2+ on the left side. Popliteal pulses are 2+ on the right side, and 2+ on the left side. Dorsalis pedis pulses are 2+ on the right side, and 2+ on the left side. Posterior tibial pulses are 2+ on the right side, and 2+ on the left side. Pulmonary/Chest: Effort normal and breath sounds normal. No stridor. No respiratory distress. He has no wheezes. He has no rales. He exhibits no tenderness. Abdominal: Soft. Bowel sounds are normal. He exhibits no distension and no mass. There is no tenderness. There is no rebound and no guarding. Musculoskeletal: Normal range of motion. He exhibits no edema or tenderness. S/p BKA right side   Lymphadenopathy:     He has no cervical adenopathy. Neurological: He is oriented to person, place, and time. He displays normal reflexes. No cranial nerve deficit. He exhibits normal muscle tone. Coordination normal.   Skin: Skin is warm and dry. No rash noted. He is not diaphoretic. No pallor.

## 2019-08-07 ENCOUNTER — TELEPHONE (OUTPATIENT)
Dept: PRIMARY CARE CLINIC | Age: 74
End: 2019-08-07

## 2019-08-07 LAB
A/G RATIO: 1.2 (ref 1.1–2.2)
ALBUMIN SERPL-MCNC: 4.4 G/DL (ref 3.4–5)
ALP BLD-CCNC: 142 U/L (ref 40–129)
ALT SERPL-CCNC: 21 U/L (ref 10–40)
ANION GAP SERPL CALCULATED.3IONS-SCNC: 25 MMOL/L (ref 3–16)
AST SERPL-CCNC: 24 U/L (ref 15–37)
BILIRUB SERPL-MCNC: 0.3 MG/DL (ref 0–1)
BUN BLDV-MCNC: 41 MG/DL (ref 7–20)
CALCIUM SERPL-MCNC: 10.1 MG/DL (ref 8.3–10.6)
CHLORIDE BLD-SCNC: 96 MMOL/L (ref 99–110)
CO2: 18 MMOL/L (ref 21–32)
CREAT SERPL-MCNC: 8.2 MG/DL (ref 0.8–1.3)
GFR AFRICAN AMERICAN: 8
GFR NON-AFRICAN AMERICAN: 6
GLOBULIN: 3.8 G/DL
GLUCOSE BLD-MCNC: 110 MG/DL (ref 70–99)
POTASSIUM SERPL-SCNC: 5 MMOL/L (ref 3.5–5.1)
SODIUM BLD-SCNC: 139 MMOL/L (ref 136–145)
TOTAL PROTEIN: 8.2 G/DL (ref 6.4–8.2)

## 2019-08-21 ENCOUNTER — APPOINTMENT (OUTPATIENT)
Dept: MRI IMAGING | Age: 74
DRG: 239 | End: 2019-08-21
Payer: MEDICARE

## 2019-08-21 ENCOUNTER — HOSPITAL ENCOUNTER (INPATIENT)
Age: 74
LOS: 8 days | Discharge: NURSING, REHAB OR LONG-TERM CARE | DRG: 239 | End: 2019-08-29
Attending: EMERGENCY MEDICINE | Admitting: INTERNAL MEDICINE
Payer: MEDICARE

## 2019-08-21 ENCOUNTER — APPOINTMENT (OUTPATIENT)
Dept: GENERAL RADIOLOGY | Age: 74
DRG: 239 | End: 2019-08-21
Payer: MEDICARE

## 2019-08-21 DIAGNOSIS — N18.6 ESRD NEEDING DIALYSIS (HCC): ICD-10-CM

## 2019-08-21 DIAGNOSIS — Z99.2 ESRD NEEDING DIALYSIS (HCC): ICD-10-CM

## 2019-08-21 DIAGNOSIS — I96 NECROTIC TOES (HCC): Primary | ICD-10-CM

## 2019-08-21 PROBLEM — L02.612 ABSCESS OR CELLULITIS OF TOE, LEFT: Status: ACTIVE | Noted: 2019-08-21

## 2019-08-21 PROBLEM — L03.032 ABSCESS OR CELLULITIS OF TOE, LEFT: Status: ACTIVE | Noted: 2019-08-21

## 2019-08-21 PROBLEM — I73.9 PVD (PERIPHERAL VASCULAR DISEASE) (HCC): Status: ACTIVE | Noted: 2019-08-21

## 2019-08-21 LAB
ALBUMIN SERPL-MCNC: 2.7 G/DL (ref 3.4–5)
ANION GAP SERPL CALCULATED.3IONS-SCNC: 20 MMOL/L (ref 3–16)
BASE EXCESS VENOUS: 1 (ref -3–3)
BASOPHILS ABSOLUTE: 0.1 K/UL (ref 0–0.2)
BASOPHILS RELATIVE PERCENT: 0.4 %
BUN BLDV-MCNC: 108 MG/DL (ref 7–20)
C-REACTIVE PROTEIN: 266.9 MG/L (ref 0–5.1)
CALCIUM SERPL-MCNC: 9.6 MG/DL (ref 8.3–10.6)
CHLORIDE BLD-SCNC: 93 MMOL/L (ref 99–110)
CO2: 21 MMOL/L (ref 21–32)
CREAT SERPL-MCNC: 13.6 MG/DL (ref 0.8–1.3)
EOSINOPHILS ABSOLUTE: 0.2 K/UL (ref 0–0.6)
EOSINOPHILS RELATIVE PERCENT: 1.2 %
GFR AFRICAN AMERICAN: 4
GFR NON-AFRICAN AMERICAN: 4
GLUCOSE BLD-MCNC: 94 MG/DL (ref 70–99)
HCO3 VENOUS: 24.6 MMOL/L (ref 23–29)
HCT VFR BLD CALC: 31.1 % (ref 40.5–52.5)
HEMOGLOBIN: 9.4 G/DL (ref 13.5–17.5)
LACTATE: 0.89 MMOL/L (ref 0.4–2)
LYMPHOCYTES ABSOLUTE: 1.6 K/UL (ref 1–5.1)
LYMPHOCYTES RELATIVE PERCENT: 10.1 %
MCH RBC QN AUTO: 25.2 PG (ref 26–34)
MCHC RBC AUTO-ENTMCNC: 30.3 G/DL (ref 31–36)
MCV RBC AUTO: 83.3 FL (ref 80–100)
MONOCYTES ABSOLUTE: 1.1 K/UL (ref 0–1.3)
MONOCYTES RELATIVE PERCENT: 6.6 %
NEUTROPHILS ABSOLUTE: 13.1 K/UL (ref 1.7–7.7)
NEUTROPHILS RELATIVE PERCENT: 81.7 %
O2 SAT, VEN: 94 %
PCO2, VEN: 32.9 MM HG (ref 40–50)
PDW BLD-RTO: 21.8 % (ref 12.4–15.4)
PERFORMED ON: ABNORMAL
PH VENOUS: 7.48 (ref 7.35–7.45)
PHOSPHORUS: 4.5 MG/DL (ref 2.5–4.9)
PLATELET # BLD: 246 K/UL (ref 135–450)
PMV BLD AUTO: 8.2 FL (ref 5–10.5)
PO2, VEN: 63 MM HG
POC SAMPLE TYPE: ABNORMAL
POTASSIUM REFLEX MAGNESIUM: 4.8 MMOL/L (ref 3.5–5.1)
RBC # BLD: 3.74 M/UL (ref 4.2–5.9)
SEDIMENTATION RATE, ERYTHROCYTE: >120 MM/HR (ref 0–20)
SODIUM BLD-SCNC: 134 MMOL/L (ref 136–145)
TCO2 CALC VENOUS: 26 MMOL/L
WBC # BLD: 16 K/UL (ref 4–11)

## 2019-08-21 PROCEDURE — 82040 ASSAY OF SERUM ALBUMIN: CPT

## 2019-08-21 PROCEDURE — 85652 RBC SED RATE AUTOMATED: CPT

## 2019-08-21 PROCEDURE — 84100 ASSAY OF PHOSPHORUS: CPT

## 2019-08-21 PROCEDURE — 36415 COLL VENOUS BLD VENIPUNCTURE: CPT

## 2019-08-21 PROCEDURE — 6370000000 HC RX 637 (ALT 250 FOR IP): Performed by: PODIATRIST

## 2019-08-21 PROCEDURE — 83036 HEMOGLOBIN GLYCOSYLATED A1C: CPT

## 2019-08-21 PROCEDURE — 85025 COMPLETE CBC W/AUTO DIFF WBC: CPT

## 2019-08-21 PROCEDURE — 6360000002 HC RX W HCPCS: Performed by: STUDENT IN AN ORGANIZED HEALTH CARE EDUCATION/TRAINING PROGRAM

## 2019-08-21 PROCEDURE — 73718 MRI LOWER EXTREMITY W/O DYE: CPT

## 2019-08-21 PROCEDURE — 73620 X-RAY EXAM OF FOOT: CPT

## 2019-08-21 PROCEDURE — 87077 CULTURE AEROBIC IDENTIFY: CPT

## 2019-08-21 PROCEDURE — 2580000003 HC RX 258: Performed by: PODIATRIST

## 2019-08-21 PROCEDURE — 6360000002 HC RX W HCPCS: Performed by: PODIATRIST

## 2019-08-21 PROCEDURE — 1200000000 HC SEMI PRIVATE

## 2019-08-21 PROCEDURE — 99285 EMERGENCY DEPT VISIT HI MDM: CPT

## 2019-08-21 PROCEDURE — 2580000003 HC RX 258: Performed by: INTERNAL MEDICINE

## 2019-08-21 PROCEDURE — 87205 SMEAR GRAM STAIN: CPT

## 2019-08-21 PROCEDURE — 86140 C-REACTIVE PROTEIN: CPT

## 2019-08-21 PROCEDURE — 87040 BLOOD CULTURE FOR BACTERIA: CPT

## 2019-08-21 PROCEDURE — 87070 CULTURE OTHR SPECIMN AEROBIC: CPT

## 2019-08-21 PROCEDURE — 99223 1ST HOSP IP/OBS HIGH 75: CPT | Performed by: INTERNAL MEDICINE

## 2019-08-21 PROCEDURE — 80048 BASIC METABOLIC PNL TOTAL CA: CPT

## 2019-08-21 PROCEDURE — 6360000002 HC RX W HCPCS: Performed by: INTERNAL MEDICINE

## 2019-08-21 PROCEDURE — 6370000000 HC RX 637 (ALT 250 FOR IP): Performed by: INTERNAL MEDICINE

## 2019-08-21 PROCEDURE — 5A1D70Z PERFORMANCE OF URINARY FILTRATION, INTERMITTENT, LESS THAN 6 HOURS PER DAY: ICD-10-PCS | Performed by: INTERNAL MEDICINE

## 2019-08-21 PROCEDURE — 99222 1ST HOSP IP/OBS MODERATE 55: CPT | Performed by: SURGERY

## 2019-08-21 PROCEDURE — 83605 ASSAY OF LACTIC ACID: CPT

## 2019-08-21 PROCEDURE — 82803 BLOOD GASES ANY COMBINATION: CPT

## 2019-08-21 PROCEDURE — 90935 HEMODIALYSIS ONE EVALUATION: CPT

## 2019-08-21 PROCEDURE — 87186 SC STD MICRODIL/AGAR DIL: CPT

## 2019-08-21 RX ORDER — HEPARIN SODIUM 1000 [USP'U]/ML
3000 INJECTION, SOLUTION INTRAVENOUS; SUBCUTANEOUS PRN
Status: DISCONTINUED | OUTPATIENT
Start: 2019-08-21 | End: 2019-08-29 | Stop reason: HOSPADM

## 2019-08-21 RX ORDER — ASPIRIN 81 MG/1
81 TABLET, CHEWABLE ORAL DAILY
Status: DISCONTINUED | OUTPATIENT
Start: 2019-08-21 | End: 2019-08-29 | Stop reason: HOSPADM

## 2019-08-21 RX ORDER — MIRTAZAPINE 15 MG/1
15 TABLET, FILM COATED ORAL NIGHTLY
Status: DISCONTINUED | OUTPATIENT
Start: 2019-08-21 | End: 2019-08-29 | Stop reason: HOSPADM

## 2019-08-21 RX ORDER — CINACALCET 30 MG/1
30 TABLET, FILM COATED ORAL DAILY
Status: DISCONTINUED | OUTPATIENT
Start: 2019-08-21 | End: 2019-08-29 | Stop reason: HOSPADM

## 2019-08-21 RX ORDER — NICOTINE POLACRILEX 4 MG
15 LOZENGE BUCCAL PRN
Status: DISCONTINUED | OUTPATIENT
Start: 2019-08-21 | End: 2019-08-29 | Stop reason: HOSPADM

## 2019-08-21 RX ORDER — SODIUM CHLORIDE 0.9 % (FLUSH) 0.9 %
10 SYRINGE (ML) INJECTION EVERY 12 HOURS SCHEDULED
Status: DISCONTINUED | OUTPATIENT
Start: 2019-08-21 | End: 2019-08-23 | Stop reason: SDUPTHER

## 2019-08-21 RX ORDER — ATORVASTATIN CALCIUM 80 MG/1
80 TABLET, FILM COATED ORAL NIGHTLY
Status: DISCONTINUED | OUTPATIENT
Start: 2019-08-21 | End: 2019-08-29 | Stop reason: HOSPADM

## 2019-08-21 RX ORDER — AMIODARONE HYDROCHLORIDE 200 MG/1
200 TABLET ORAL DAILY
Status: DISCONTINUED | OUTPATIENT
Start: 2019-08-21 | End: 2019-08-29 | Stop reason: HOSPADM

## 2019-08-21 RX ORDER — DEXTROSE MONOHYDRATE 25 G/50ML
12.5 INJECTION, SOLUTION INTRAVENOUS PRN
Status: DISCONTINUED | OUTPATIENT
Start: 2019-08-21 | End: 2019-08-29 | Stop reason: HOSPADM

## 2019-08-21 RX ORDER — ISOSORBIDE MONONITRATE 30 MG/1
30 TABLET, EXTENDED RELEASE ORAL 2 TIMES DAILY
Status: DISCONTINUED | OUTPATIENT
Start: 2019-08-21 | End: 2019-08-29 | Stop reason: HOSPADM

## 2019-08-21 RX ORDER — NITROGLYCERIN 0.4 MG/1
0.4 TABLET SUBLINGUAL EVERY 5 MIN PRN
Status: DISCONTINUED | OUTPATIENT
Start: 2019-08-21 | End: 2019-08-29 | Stop reason: HOSPADM

## 2019-08-21 RX ORDER — CARVEDILOL 25 MG/1
25 TABLET ORAL 2 TIMES DAILY
Status: ON HOLD | COMMUNITY
End: 2019-08-28 | Stop reason: HOSPADM

## 2019-08-21 RX ORDER — DEXTROSE MONOHYDRATE 50 MG/ML
100 INJECTION, SOLUTION INTRAVENOUS PRN
Status: DISCONTINUED | OUTPATIENT
Start: 2019-08-21 | End: 2019-08-29 | Stop reason: HOSPADM

## 2019-08-21 RX ORDER — HEPARIN SODIUM 5000 [USP'U]/ML
5000 INJECTION, SOLUTION INTRAVENOUS; SUBCUTANEOUS EVERY 8 HOURS SCHEDULED
Status: DISPENSED | OUTPATIENT
Start: 2019-08-21 | End: 2019-08-27

## 2019-08-21 RX ORDER — LOSARTAN POTASSIUM 50 MG/1
50 TABLET ORAL DAILY
Status: DISCONTINUED | OUTPATIENT
Start: 2019-08-21 | End: 2019-08-25

## 2019-08-21 RX ORDER — NITROGLYCERIN 0.4 MG/1
0.4 TABLET SUBLINGUAL EVERY 5 MIN PRN
Status: ON HOLD | COMMUNITY
End: 2022-02-03 | Stop reason: HOSPADM

## 2019-08-21 RX ORDER — CARVEDILOL 25 MG/1
25 TABLET ORAL 2 TIMES DAILY
Status: DISCONTINUED | OUTPATIENT
Start: 2019-08-21 | End: 2019-08-25

## 2019-08-21 RX ORDER — CHOLECALCIFEROL (VITAMIN D3) 10 MCG
1 TABLET ORAL DAILY
Status: DISCONTINUED | OUTPATIENT
Start: 2019-08-21 | End: 2019-08-29 | Stop reason: HOSPADM

## 2019-08-21 RX ORDER — ONDANSETRON 2 MG/ML
4 INJECTION INTRAMUSCULAR; INTRAVENOUS EVERY 6 HOURS PRN
Status: DISCONTINUED | OUTPATIENT
Start: 2019-08-21 | End: 2019-08-23 | Stop reason: SDUPTHER

## 2019-08-21 RX ORDER — CLOPIDOGREL BISULFATE 75 MG/1
75 TABLET ORAL DAILY
Status: DISCONTINUED | OUTPATIENT
Start: 2019-08-21 | End: 2019-08-29 | Stop reason: HOSPADM

## 2019-08-21 RX ORDER — SODIUM CHLORIDE 0.9 % (FLUSH) 0.9 %
10 SYRINGE (ML) INJECTION PRN
Status: DISCONTINUED | OUTPATIENT
Start: 2019-08-21 | End: 2019-08-23 | Stop reason: SDUPTHER

## 2019-08-21 RX ORDER — LOSARTAN POTASSIUM 50 MG/1
50 TABLET ORAL DAILY
Status: ON HOLD | COMMUNITY
End: 2019-08-28 | Stop reason: HOSPADM

## 2019-08-21 RX ADMIN — DARBEPOETIN ALFA 60 MCG: 60 INJECTION, SOLUTION INTRAVENOUS; SUBCUTANEOUS at 21:58

## 2019-08-21 RX ADMIN — COLLAGENASE SANTYL: 250 OINTMENT TOPICAL at 11:59

## 2019-08-21 RX ADMIN — ISOSORBIDE MONONITRATE 30 MG: 30 TABLET, EXTENDED RELEASE ORAL at 15:08

## 2019-08-21 RX ADMIN — CHOLECALCIFEROL TAB 25 MCG (1000 UNIT) 1000 UNITS: 25 TAB at 15:08

## 2019-08-21 RX ADMIN — INSULIN LISPRO 1 UNITS: 100 INJECTION, SOLUTION INTRAVENOUS; SUBCUTANEOUS at 23:42

## 2019-08-21 RX ADMIN — ONDANSETRON 4 MG: 2 INJECTION INTRAMUSCULAR; INTRAVENOUS at 15:37

## 2019-08-21 RX ADMIN — VANCOMYCIN HYDROCHLORIDE 1500 MG: 10 INJECTION, POWDER, LYOPHILIZED, FOR SOLUTION INTRAVENOUS at 15:08

## 2019-08-21 RX ADMIN — MIRTAZAPINE 15 MG: 15 TABLET, FILM COATED ORAL at 23:40

## 2019-08-21 RX ADMIN — CLOPIDOGREL BISULFATE 75 MG: 75 TABLET ORAL at 15:08

## 2019-08-21 RX ADMIN — CINACALCET HYDROCHLORIDE 30 MG: 30 TABLET, FILM COATED ORAL at 15:08

## 2019-08-21 RX ADMIN — LOSARTAN POTASSIUM 50 MG: 50 TABLET ORAL at 15:08

## 2019-08-21 RX ADMIN — CARVEDILOL 25 MG: 25 TABLET, FILM COATED ORAL at 15:08

## 2019-08-21 RX ADMIN — PIPERACILLIN SODIUM,TAZOBACTAM SODIUM 3.38 G: 3; .375 INJECTION, POWDER, FOR SOLUTION INTRAVENOUS at 19:07

## 2019-08-21 RX ADMIN — HEPARIN SODIUM 3000 UNITS: 1000 INJECTION, SOLUTION INTRAVENOUS; SUBCUTANEOUS at 19:25

## 2019-08-21 RX ADMIN — CEFEPIME HYDROCHLORIDE 1 G: 1 INJECTION, POWDER, FOR SOLUTION INTRAMUSCULAR; INTRAVENOUS at 12:58

## 2019-08-21 RX ADMIN — HEPARIN SODIUM 5000 UNITS: 5000 INJECTION INTRAVENOUS; SUBCUTANEOUS at 19:07

## 2019-08-21 RX ADMIN — INSULIN GLARGINE 8 UNITS: 100 INJECTION, SOLUTION SUBCUTANEOUS at 23:41

## 2019-08-21 RX ADMIN — HEPARIN SODIUM 5000 UNITS: 5000 INJECTION INTRAVENOUS; SUBCUTANEOUS at 23:41

## 2019-08-21 RX ADMIN — CARVEDILOL 25 MG: 25 TABLET, FILM COATED ORAL at 23:40

## 2019-08-21 RX ADMIN — ASPIRIN 81 MG 81 MG: 81 TABLET ORAL at 15:08

## 2019-08-21 RX ADMIN — NEPHROCAP 1 MG: 1 CAP ORAL at 15:08

## 2019-08-21 RX ADMIN — AMIODARONE HYDROCHLORIDE 200 MG: 200 TABLET ORAL at 15:08

## 2019-08-21 RX ADMIN — ISOSORBIDE MONONITRATE 30 MG: 30 TABLET, EXTENDED RELEASE ORAL at 23:41

## 2019-08-21 RX ADMIN — ATORVASTATIN CALCIUM 80 MG: 80 TABLET, FILM COATED ORAL at 23:40

## 2019-08-21 SDOH — HEALTH STABILITY: MENTAL HEALTH: HOW OFTEN DO YOU HAVE A DRINK CONTAINING ALCOHOL?: 4 OR MORE TIMES A WEEK

## 2019-08-21 ASSESSMENT — PAIN - FUNCTIONAL ASSESSMENT: PAIN_FUNCTIONAL_ASSESSMENT: ACTIVITIES ARE NOT PREVENTED

## 2019-08-21 ASSESSMENT — PAIN DESCRIPTION - LOCATION
LOCATION: TOE (COMMENT WHICH ONE)
LOCATION: TOE (COMMENT WHICH ONE)

## 2019-08-21 ASSESSMENT — PAIN SCALES - GENERAL
PAINLEVEL_OUTOF10: 5
PAINLEVEL_OUTOF10: 4
PAINLEVEL_OUTOF10: 0

## 2019-08-21 ASSESSMENT — PAIN DESCRIPTION - ONSET: ONSET: ON-GOING

## 2019-08-21 ASSESSMENT — PAIN DESCRIPTION - PAIN TYPE
TYPE: ACUTE PAIN
TYPE: ACUTE PAIN

## 2019-08-21 ASSESSMENT — PAIN DESCRIPTION - DESCRIPTORS
DESCRIPTORS: ACHING
DESCRIPTORS: ACHING

## 2019-08-21 ASSESSMENT — PAIN DESCRIPTION - PROGRESSION
CLINICAL_PROGRESSION: NOT CHANGED
CLINICAL_PROGRESSION: NOT CHANGED

## 2019-08-21 ASSESSMENT — PAIN DESCRIPTION - FREQUENCY
FREQUENCY: CONTINUOUS
FREQUENCY: CONTINUOUS

## 2019-08-21 ASSESSMENT — PAIN DESCRIPTION - ORIENTATION
ORIENTATION: LEFT
ORIENTATION: LEFT

## 2019-08-21 NOTE — CARE COORDINATION
Case Management Assessment           Initial Evaluation                Date / Time of Evaluation: 8/21/2019 3:00 PM                 Assessment Completed by: Nithin Beach    Patient Name: Lul Whitt     YOB: 1945  Diagnosis: Abscess or cellulitis of toe, left [L03.032, L02.612]  Abscess or cellulitis of toe, left [L03.032, L02.612]     Date / Time: 8/21/2019  9:16 AM    Patient Admission Status: Inpatient    If patient is discharged prior to next notation, then this note serves as note for discharge by case management. Current PCP: Aslhey Flores MD  Clinic Patient: No    Chart Reviewed: Yes  Patient/ Family Interviewed: Yes    Initial assessment completed at bedside with: daughter, Edgar Tapia, pt. And significant other    Hospitalization in the last 30 days: No    Emergency Contacts:  Extended Emergency Contact Information  Primary Emergency Contact: [address] [place] [address] Phone: [phone]  Relation: Child  Secondary Emergency Contact: Jinny Schwarz   [address] Phone: [phone]  Relation: Domestic Partner    Advance Directives: No  Code Status: Full Code      Financial  Payor: William Case / Plan: [place] / Product Type: *No Product type* /     Pre-cert required for SNF: Yes    Pharmacy    [address]  [address] [place] [address]  Phone: [phone] Fax: [phone]    [address]) [phone] [facility] [phone]  [address] [place] [address]  Phone: [phone] Fax: [phone]      Potential assistance Purchasing Medications: Potential Assistance Purchasing Medications: No  Does Patient want to participate in local refill/ meds to beds program?: Not Assessed    Meds To Beds General Rules:  1. Can ONLY be done Monday- Friday between 8:30am-5pm  2.  Prescription(s) must be in

## 2019-08-21 NOTE — CONSULTS
RIGHT FEMORAL POSTERIOR TIBIAL BYPASS performed by Dorita Jeronimo MD at [facility] Right 1/4/2019    INCISION AND DRAINAGE, TRANSMETATARSAL AMPUTATION ALL ON RIGHT FOOT performed by Marty New DPM at [address] [facility] Right 1/14/2019    REVISION OF TRANSMETATARSAL AMPUTATION RIGHT FOOT performed by Marty New DPM at [address] Right 1/18/2019    RIGHT BELOW THE KNEE AMPUTATION performed by Dorita Jeronimo MD at [facility] OR       Current Medications:     vancomycin  1,500 mg Intravenous Once    amiodarone  200 mg Oral Daily    aspirin  81 mg Oral Daily    atorvastatin  80 mg Oral Nightly    b complex-C-folic acid  1 mg Oral Daily    carvedilol  25 mg Oral BID    cinacalcet  30 mg Oral Daily    clopidogrel  75 mg Oral Daily    insulin glargine  8 Units Subcutaneous BID    isosorbide mononitrate  30 mg Oral BID    losartan  50 mg Oral Daily    mirtazapine  15 mg Oral Nightly    vitamin D  1 tablet Oral Daily    sodium chloride flush  10 mL Intravenous 2 times per day    piperacillin-tazobactam  3.375 g Intravenous Q12H    insulin lispro  0-6 Units Subcutaneous TID WC    insulin lispro  0-3 Units Subcutaneous Nightly    vancomycin (VANCOCIN) intermittent dosing (placeholder)   Other See Admin Instructions    darbepoetin paulina-polysorbate  60 mcg Intravenous Q7 Days    heparin (porcine)  5,000 Units Subcutaneous 3 times per day       Allergies:  No known allergies    Social History:    TOBACCO:    None   ETOH:    None   DRUGS:   None   MARITAL STATUS:   Single   OCCUPATION:   None     Family History:   No immunodeficiency    REVIEW OF SYSTEMS:    No fever / chills / sweats. No weight loss. No visual change, eye pain, eye discharge. No oral lesion, sore throat, dysphagia. Denies cough / sputum. Denies chest pain, palpitations. Denies n / v / abd pain. No diarrhea. Denies dysuria or change in urinary function.   Denies joint swelling

## 2019-08-21 NOTE — CONSULTS
pruritus, hair loss, bruising, dry skin, petechiae  Head, Face, Neck   headaches, swelling,  cervical adenopathy  Respiratory: shortness of breath, cough, or wheezing  Cardiovascular: chest pain, palpitations, dizzy, edema  Gastrointestinal: nausea, vomiting, diarrhea, constipation,belly pain    Yellow skin, blood in stool  Musculoskeletal:  back pain, muscle weakness, gait problems,       joint pain or swelling. Genitourinary:  dysuria, poor urine flow, flank pain, blood in urine  Neurologic:  vertigo, TIA'S, syncope, seizures, focal weakness  Psychosocial:  insomnia, anxiety, or depression. Additional positive findings:                          All other remaining systems are negative.         PMH/PSH/SH/Family History:     Past Medical History:   Diagnosis Date    Anemia     Atrial fibrillation ([facility] 11/4/2013    CAD (coronary artery disease)     Mi, stent    Chronic kidney disease     DVT (deep venous thrombosis) (HCC)     End stage renal disease (HCC)     Gas gangrene (HCC)     Hyperkalemia     Hyperlipidemia 5/30/2012    Hypertension     Hyperthyroidism     Ischemic heart disease 3/62/4239    Metabolic encephalopathy     MI (myocardial infarction) ([facility] 10/2018    Type II or unspecified type diabetes mellitus without mention of complication, not stated as uncontrolled        Past Surgical History:   Procedure Laterality Date    CARDIAC SURGERY      cardiac stent    FEMORAL-TIBIAL BYPASS GRAFT Right 1/9/2019    RIGHT FEMORAL POSTERIOR TIBIAL BYPASS performed by Melina Puente MD at [address] [facility] Right 1/4/2019    INCISION AND DRAINAGE, TRANSMETATARSAL AMPUTATION ALL ON RIGHT FOOT performed by Pepe Guerrero DPM at [address] [facility] Right 1/14/2019    REVISION OF TRANSMETATARSAL AMPUTATION RIGHT FOOT performed by Pepe Guerrero DPM at [address] [facility] Right 1/18/2019    RIGHT BELOW THE KNEE AMPUTATION performed by Melina Puente MD at [address]

## 2019-08-21 NOTE — CONSULTS
Clinical Pharmacy Progress Note    Admit date: 8/21/2019     Subjective/Objective:  Pt is a 76yom with PMHx that includes HTN, HLD, DM 2, ESRD on HD mon-wed-fri, CAD, neuropathy, and prior right BKA (1/2019) who is admitted with necrotic left 2nd toe with gangrene and left heel ulcer. Vascular surgery & podiatry are consulted for management and possible surgical intervention. Per ED notes, patient missed HD Monday 8/19. Pharmacy is consulted to dose Vancomycin per Dr. Alejo Soares    Current antibiotics:  Zosyn 3.375g IV EI q12h - day #1  Vancomycin - Pharmacy to dose - day #1   Intermittent doses based on levels (8/21 - current)    Date Vancomycin level Vancomycin dose   8/21  1.5g x1 ordered   8/22 pending             Recent Labs     08/21/19  1024   *   K 4.8   CL 93*   CO2 21   *   CREATININE 13.6*   GLUCOSE 94       CrCl is not estimated 2/2 ESRD on HD    Lab Results   Component Value Date    WBC 16.0 (H) 08/21/2019    HGB 9.4 (L) 08/21/2019    HCT 31.1 (L) 08/21/2019    MCV 83.3 08/21/2019     08/21/2019       Lab Results   Component Value Date    PROTIME 13.2 (H) 01/18/2019    INR 1.16 (H) 01/18/2019       Height:  6' 5\" (195.6 cm)  Weight:  202 lb 13.2 oz (92 kg)    Culture results:  Blood (8/21) = pending  Wound (8/21) = pending    Prophylaxis:  VTE:  SCD's  GI:  Not indicated    Assessment/Plan:  1)  Left 2nd toe gangrene and left heel ulcer:  Zosyn + Vancomycin - day #1  · Zosyn -   · Dose adjusted to 3.375g IV extended infusion q12h for patient with ESRD on HD per [facility] Zosyn Extended Infusion Protocol. · Vancomycin - Pharmacy to dose  · Due to ESRD on HD, will dose Vancomycin intermittently based on levels. · Vancomycin 1.5g IV x1 ordered in the ED - agree with loading dose. Will check random level in AM tomorrow, and will re-dose if level < 18-20. · Clinical condition will be monitored closely, and levels / doses will be ordered as appropriate.     Please call with

## 2019-08-21 NOTE — CONSULTS
Vascular Surgery   Resident Consult Note      Chief Complaint: L second toe pain    History obtained from: patient, wife and daughter. History of Present Illness :    Dhaval Hannon is a 75 y.o. male with a hx of a fib, CAD, ESRD, T2DM, peripheral neuropathy who presents with L second toe pain and discoloration that began roughly a week ago. Upon presentation the patient was found to have wet gangrene. The wound was debrided at the bedside by podiatry. It is of note that the patient has known peripheral arterial disease. In January of 2019 he underwent right femoral to tibial trunk bypass followed by BKA of the right lower extremity. At that time the patient had arterial doppler showing left lower extermity disease with an CK of 0.43 with proximal popliteal stenosis >50%. Denies cramping in the left leg with ambulation. Denies fevers, chills. He has not received his scheduled dialysis on Monday.        Past Medical History:        Diagnosis Date    Anemia     Atrial fibrillation ([facility] [address] [facility] 11/4/2013    CAD (coronary artery disease)     Mi, stent    Chronic kidney disease     [facility]) [facility]     End stage renal disease (HCC)     Gas gangrene (HCC)     Hyperkalemia     Hyperlipidemia 5/30/2012    Hypertension     Hyperthyroidism     Ischemic heart disease 6/52/3176    Metabolic encephalopathy     MI (myocardial infarction) ([facility] [address] 10/2018    Type II or unspecified type diabetes mellitus without mention of complication, not stated as uncontrolled        Past Surgical History:           Procedure Laterality Date    CARDIAC SURGERY      cardiac stent    FEMORAL-TIBIAL BYPASS GRAFT Right 1/9/2019    RIGHT FEMORAL POSTERIOR TIBIAL BYPASS performed by Shakeel Garcia MD at [address] [place] [address] [facility] 1/4/2019    INCISION AND DRAINAGE, TRANSMETATARSAL AMPUTATION ALL ON RIGHT FOOT performed by Db Rich DPM at [address] [place] [address] Right 1/14/2019    REVISION OF 1 mg Daily   carvedilol (COREG) tablet 25 mg BID   cinacalcet (SENSIPAR) tablet 30 mg Daily   clopidogrel (PLAVIX) tablet 75 mg Daily   insulin glargine (LANTUS) injection pen 8 Units BID   isosorbide mononitrate (IMDUR) extended release tablet 30 mg BID   losartan (COZAAR) tablet 50 mg Daily   mirtazapine (REMERON) tablet 15 mg Nightly   nitroGLYCERIN (NITROSTAT) SL tablet 0.4 mg Q5 Min PRN   vitamin D (CHOLECALCIFEROL) tablet 1,000 Units Daily   sodium chloride flush 0.9 % injection 10 mL 2 times per day   sodium chloride flush 0.9 % injection 10 mL PRN   magnesium hydroxide (MILK OF MAGNESIA) 400 MG/5ML suspension 30 mL Daily PRN   ondansetron (ZOFRAN) injection 4 mg Q6H PRN   piperacillin-tazobactam (ZOSYN) 3.375 g in dextrose 5 % 100 mL IVPB extended infusion (mini-bag) Q12H   glucose (GLUTOSE) 40 % oral gel 15 g PRN   dextrose 50 % IV solution PRN   glucagon (rDNA) injection 1 mg PRN   dextrose 5 % solution PRN   insulin lispro (HUMALOG) injection pen 0-6 Units TID WC   insulin lispro (HUMALOG) injection pen 0-3 Units Nightly   vancomycin (VANCOCIN) intermittent dosing (placeholder) See Admin Instructions       Family History:   Family History   Problem Relation Age of Onset    Arthritis Mother        Social History:   TOBACCO:   reports that he has never smoked. He has never used smokeless tobacco.  ETOH:   reports that he does not drink alcohol. DRUGS:   reports that he does not use drugs. ROS: A 10 point review of systems was conducted, significant findings as noted in HPI.     Physical exam:    Vitals:    08/21/19 1355   BP: (!) 144/71   Pulse: 88   Resp: 19   Temp: 97.3 °F (36.3 °C)   SpO2: 100%       General appearance: resting in bed, appears tired  Neuro: A&Ox3, responds appropriately  Neck: trachea midline, no JVD  Lungs: CTAB, no r/r/w  Heart: RRR, no m/r/g  Abdomen: soft, non-tender, non-distended, +BS  Extremities: no edema, L leg dry and scaling BKA on the R, L second toe with necrosis,

## 2019-08-21 NOTE — ED NOTES
Dr Christina Ventura aware of critical lab values  and Cr 13.6     Ranjith Mejía, [facility]  08/21/19 1110

## 2019-08-21 NOTE — PROGRESS NOTES
4 Eyes Admission Assessment     I agree as the admission nurse that 2 RN's have performed a thorough Head to Toe Skin Assessment on the patient. ALL assessment sites listed below have been assessed on admission. Areas assessed by both nurses: Cynthea China  [x]   Head, Face, and Ears   [x]   Shoulders, Back, and Chest  [x]   Arms, Elbows, and Hands   [x]   Coccyx, Sacrum, and Ischum  [x]   Legs, Feet, and Heels        Does the Patient have Skin Breakdown?   No         Freddie Prevention initiated:  Yes   Wound Care Orders initiated:  No      [facility] nurse consulted for Pressure Injury (Stage 3,4, Unstageable, DTI, NWPT, and Complex wounds):  No      Nurse 1 eSignature: Electronically signed by Edward Birmingham RN on 8/21/19 at 1:55 PM    **SHARE this note so that the co-signing nurse is able to place an eSignature**    Nurse 2 eSignature: Electronically signed by Byron Caputo RN on 8/21/19 at 2:12 PM

## 2019-08-21 NOTE — CONSULTS
monophasic on doppler, PT non-dopplerable. Capillary refill time is >3 seconds to digits of toes 1-5 on the left. 1+ pitting edema noted to the dorsum of the left foot and anterior tibial crest. Skin temp warm to cool from proximal to distal not within normal limits. Left 2nd and 3rd digits are cool to the touch. No varicosities present. Neurological: Gross light touch sensation absent at all pedal sites, left. Dermatologic:          - Left foot 2nd digit full-thickness ulceration with liquefactive necrotic tissue to the second digit with brown, serosanguinous drainage. Malodorous with fluctuance. Does not probe to bone, no purulent drainage, no erythema noted. - Left 3rd digit is dusky in appearance without any open lesions or drainage.      - Left foot heel decubitus ulceration with well adhered eschar to the wound bed with a mix 50% necrotic, 40% fibrotic, and 10% granular tissue with hyperkeratotic periwound. Erythema noted to the plantar heel surrounding the wound. Does not probe to bone, no tracking or tunneling, no drainage. Musculoskeletal: Muscle strength 3/5, left foot  Prominent medial eminence on the medial aspect of the 1st MPJ. No pain on palpation of left lower extremity. No pain on calf squeeze. IMAGING  Narrative   LEFT FOOT ON 8/21/2019       HISTORY: Concern for toe infection, diabetic foot ulcer       COMPARISON: None       FINDINGS:       2 views of the left foot show distal vascular calcification consistent with diabetes.       There is severe hallux valgus and hammertoe deformities noted.       No fracture or acute bony pathology can be identified.       There is no radiographic evidence for osteomyelitis on this limited exam.           Impression       No radiographic evidence for osteomyelitis. If this is a clinical evaluation with MRI is recommended if not contraindicated       ASSESSMENT:   - Gangrene, left 2nd toe;  Light 4  - Decubitus ulceration, left plantar heel; Kyle Mcrae I  - DMII  - peripheral neuropathy 2/2 DMII    PLAN:  - VSS, leukocytosis (WBC 16)  - CRP elevated at 266.9  - ESR pending  - xray images reviewed, impression above  - MRI ordered, f/u results  - arterial duplex ordered, f/u results  - Vascular consulted; recs appreciated  - Excisional debridement with iris scissors down to and including dermal tissue with liquefactive necrotic tissue and brown serosanguineous drainage. Patient tolerated WOI. - wound culture obtained, f/u results  - Santyl ordered to room  - Left foot dressed with Santyl to heel ulceration, betadine soaked gauze applied to 2nd and 3rd digits, DSD, and tetranet  - NON-weight bearing to the left side  - IV Vanc/Cefepime  - Recommend medicine admission 2/2 co-morbidities    DISPO: F/u MRI and vascular recommendations for further evaluation and surgical level of amputation. Thank you for the opportunity to take part in the patient's care.    - The patient will be staffed with JOSE ANGEL Long DPM  Podiatric Resident PGY1  [phone]  8/21/2019, 1:49 PM

## 2019-08-21 NOTE — CARE COORDINATION
CM met with patient and family at bedside. Pt and family are in agreement that they prefer a different SNF upon discharge. They have picked three facilities: [address], [address], referral sent by internal fax. Will follow up for precert when accepted.       Melissa Reyes, RN, BSN,   4th Floor Progressive Care Unit  [phone]

## 2019-08-21 NOTE — H&P
Hospital Medicine History & Physical      PCP: Faraz Francis MD    Date of Admission: 2019    Date of Service: Pt seen/examined on 2019  9:16 AM and   Admitted to Inpatient with expected LOS greater than two midnights due to medical therapy.        Chief Complaint: Left second toe pain    History Of Present Illness:    75 y.o. male with PMHx significant for end-stage renal disease on hemodialysis, atrial fibrillation, coronary artery disease, type 2 diabetes mellitus, peripheral neuropathy, peripheral arterial disease admitted to the hospital complaining of left second toe pain and discoloration that has been roughly 4 weeks ago  As per patient and family members he had some low-grade temperature at around Lilly  1560 yesterday  There has been some purulence around the toe  He missed his dialysis on Monday  Denies any chest pain  No shortness of breath  No abdominal pain      Past Medical History:          Diagnosis Date    Anemia     Atrial fibrillation ([address] 2013    CAD (coronary artery disease)     Mi, stent    Chronic kidney disease     [facility] ([facility])     End stage renal disease ([address]     Gas gangrene ([address]     Hyperkalemia     Hyperlipidemia 2012    Hypertension     Hyperthyroidism     Ischemic heart disease 7/15/6927    Metabolic encephalopathy     MI (myocardial infarction) ([address] 10/2018    Type II or unspecified type diabetes mellitus without mention of complication, not stated as uncontrolled        Past Surgical History:          Procedure Laterality Date    CARDIAC SURGERY      cardiac stent    FEMORAL-TIBIAL BYPASS GRAFT Right 2019    RIGHT FEMORAL POSTERIOR TIBIAL BYPASS performed by Dorita Jeronimo MD at [address] [place] [address] Right 2019    INCISION AND DRAINAGE, TRANSMETATARSAL AMPUTATION ALL ON RIGHT FOOT performed by Marty New DPM at [address] [place] [address] Right 2019    REVISION OF TRANSMETATARSAL AMPUTATION RIGHT FOOT performed by Marty New DPM at [address] Right 2019    RIGHT BELOW THE KNEE AMPUTATION performed by Dorita Jeronimo MD at [address]       Medications Prior to Admission:      Prior to Admission medications    Medication Sig Start Date End Date Taking? Authorizing Provider   aspirin 81 MG chewable tablet Take 81 mg by mouth daily   Yes Historical Provider, MD   carvedilol (COREG) 25 MG tablet Take 25 mg by mouth 2 times daily   Yes Historical Provider, MD   losartan (COZAAR) 50 MG tablet Take 50 mg by mouth daily   Yes Historical Provider, MD   nitroGLYCERIN (NITROSTAT) 0.4 MG SL tablet Place 0.4 mg under the tongue every 5 minutes as needed for Chest pain up to max of 3 total doses. If no relief after 1 dose, call 911.    Yes Historical Provider, MD   Sucroferric Oxyhydroxide (VELPHORO) 500 MG CHEW Take 1 tablet by mouth 3 times daily   Yes Historical Provider, MD   vitamin D (CHOLECALCIFEROL) 1000 units TABS tablet Take 1 tablet by mouth daily   Yes Historical Provider, MD   mirtazapine (REMERON) 15 MG tablet Take 1 tablet by mouth nightly 19  Yes Vivienne Patel MD   B Complex-C-Folic Acid (RENAL) 1 MG CAPS Take 1 capsule by mouth daily    Yes Historical Provider, MD   atorvastatin (LIPITOR) 80 MG tablet Take 80 mg by mouth nightly    Yes Historical Provider, MD   clopidogrel (PLAVIX) 75 MG tablet Take 75 mg by mouth daily  18  Yes Historical Provider, MD   amiodarone (CORDARONE) 200 MG tablet TAKE ONE TABLET BY MOUTH DAILY 10/9/18  Yes Jose Juan Del Valle MD   isosorbide mononitrate (IMDUR) 30 MG extended release tablet TAKE ONE TABLET BY MOUTH TWICE A DAY 10/9/18  Yes Jose Juan Del Valle MD   insulin glargine (LANTUS SOLOSTAR) 100 UNIT/ML injection pen INJECT 8 UNITS SUBCUTANEOUSLY twice daily 10/9/18  Yes Jose Juan Del Valle MD   cinacalcet B[facility]) 30 MG tablet Take 1 tablet by mouth daily 10/9/18  Yes Jose Juan Del Valle MD       Allergies:  No known allergies    Social

## 2019-08-22 ENCOUNTER — APPOINTMENT (OUTPATIENT)
Dept: GENERAL RADIOLOGY | Age: 74
DRG: 239 | End: 2019-08-22
Payer: MEDICARE

## 2019-08-22 ENCOUNTER — APPOINTMENT (OUTPATIENT)
Dept: VASCULAR LAB | Age: 74
DRG: 239 | End: 2019-08-22
Payer: MEDICARE

## 2019-08-22 LAB
A/G RATIO: 0.4 (ref 1.1–2.2)
ABO/RH: NORMAL
ALBUMIN SERPL-MCNC: 2.3 G/DL (ref 3.4–5)
ALP BLD-CCNC: 113 U/L (ref 40–129)
ALT SERPL-CCNC: 24 U/L (ref 10–40)
ANION GAP SERPL CALCULATED.3IONS-SCNC: 17 MMOL/L (ref 3–16)
ANTIBODY SCREEN: NORMAL
AST SERPL-CCNC: 18 U/L (ref 15–37)
BASE EXCESS ARTERIAL: 1.2 MMOL/L (ref -3–3)
BASOPHILS ABSOLUTE: 0 K/UL (ref 0–0.2)
BASOPHILS RELATIVE PERCENT: 0.1 %
BILIRUB SERPL-MCNC: 0.3 MG/DL (ref 0–1)
BILIRUBIN DIRECT: <0.2 MG/DL (ref 0–0.3)
BILIRUBIN, INDIRECT: ABNORMAL MG/DL (ref 0–1)
BUN BLDV-MCNC: 59 MG/DL (ref 7–20)
CALCIUM SERPL-MCNC: 9.1 MG/DL (ref 8.3–10.6)
CARBOXYHEMOGLOBIN ARTERIAL: 2.5 % (ref 0–1.5)
CHLORIDE BLD-SCNC: 96 MMOL/L (ref 99–110)
CO2: 19 MMOL/L (ref 21–32)
CREAT SERPL-MCNC: 8.3 MG/DL (ref 0.8–1.3)
EKG ATRIAL RATE: 67 BPM
EKG DIAGNOSIS: NORMAL
EKG P AXIS: 61 DEGREES
EKG P-R INTERVAL: 216 MS
EKG Q-T INTERVAL: 446 MS
EKG QRS DURATION: 108 MS
EKG QTC CALCULATION (BAZETT): 471 MS
EKG R AXIS: -10 DEGREES
EKG T AXIS: 78 DEGREES
EKG VENTRICULAR RATE: 67 BPM
EOSINOPHILS ABSOLUTE: 0 K/UL (ref 0–0.6)
EOSINOPHILS RELATIVE PERCENT: 0.1 %
ESTIMATED AVERAGE GLUCOSE: 134.1 MG/DL
GFR AFRICAN AMERICAN: 8
GFR NON-AFRICAN AMERICAN: 6
GLOBULIN: 5.3 G/DL
GLUCOSE BLD-MCNC: 128 MG/DL (ref 70–99)
GLUCOSE BLD-MCNC: 135 MG/DL (ref 70–99)
GLUCOSE BLD-MCNC: 152 MG/DL (ref 70–99)
GLUCOSE BLD-MCNC: 153 MG/DL (ref 70–99)
GLUCOSE BLD-MCNC: 78 MG/DL (ref 70–99)
GLUCOSE BLD-MCNC: 88 MG/DL (ref 70–99)
GLUCOSE BLD-MCNC: 88 MG/DL (ref 70–99)
HBA1C MFR BLD: 6.3 %
HCO3 ARTERIAL: 25 MMOL/L (ref 21–29)
HCT VFR BLD CALC: 28.6 % (ref 40.5–52.5)
HEMOGLOBIN, ART, EXTENDED: 8.9 G/DL
HEMOGLOBIN: 8.9 G/DL (ref 13.5–17.5)
INR BLD: 1.22 (ref 0.86–1.14)
LYMPHOCYTES ABSOLUTE: 0.9 K/UL (ref 1–5.1)
LYMPHOCYTES RELATIVE PERCENT: 5.8 %
MAGNESIUM: 2.4 MG/DL (ref 1.8–2.4)
MCH RBC QN AUTO: 25.1 PG (ref 26–34)
MCHC RBC AUTO-ENTMCNC: 31.2 G/DL (ref 31–36)
MCV RBC AUTO: 80.5 FL (ref 80–100)
METHEMOGLOBIN ARTERIAL: 0.3 % (ref 0–1.4)
MONOCYTES ABSOLUTE: 0.8 K/UL (ref 0–1.3)
MONOCYTES RELATIVE PERCENT: 4.9 %
NEUTROPHILS ABSOLUTE: 14.5 K/UL (ref 1.7–7.7)
NEUTROPHILS RELATIVE PERCENT: 89.1 %
O2 SAT, ARTERIAL: 98 % (ref 93–100)
PCO2 ARTERIAL: 35.7 MMHG (ref 35–45)
PDW BLD-RTO: 21.7 % (ref 12.4–15.4)
PERFORMED ON: ABNORMAL
PERFORMED ON: NORMAL
PH ARTERIAL: 7.45 (ref 7.35–7.45)
PLATELET # BLD: 218 K/UL (ref 135–450)
PMV BLD AUTO: 8.2 FL (ref 5–10.5)
PO2 ARTERIAL: 88.4 MMHG (ref 75–108)
POTASSIUM REFLEX MAGNESIUM: 4.9 MMOL/L (ref 3.5–5.1)
PROTHROMBIN TIME: 13.9 SEC (ref 9.8–13)
RBC # BLD: 3.56 M/UL (ref 4.2–5.9)
SODIUM BLD-SCNC: 132 MMOL/L (ref 136–145)
TCO2 ARTERIAL: 26 MMOL/L
TOTAL PROTEIN: 7.6 G/DL (ref 6.4–8.2)
VANCOMYCIN RANDOM: 14.6 UG/ML
WBC # BLD: 16.3 K/UL (ref 4–11)

## 2019-08-22 PROCEDURE — 85025 COMPLETE CBC W/AUTO DIFF WBC: CPT

## 2019-08-22 PROCEDURE — 86923 COMPATIBILITY TEST ELECTRIC: CPT

## 2019-08-22 PROCEDURE — 1200000000 HC SEMI PRIVATE

## 2019-08-22 PROCEDURE — 36600 WITHDRAWAL OF ARTERIAL BLOOD: CPT

## 2019-08-22 PROCEDURE — 86900 BLOOD TYPING SEROLOGIC ABO: CPT

## 2019-08-22 PROCEDURE — 82803 BLOOD GASES ANY COMBINATION: CPT

## 2019-08-22 PROCEDURE — 94760 N-INVAS EAR/PLS OXIMETRY 1: CPT

## 2019-08-22 PROCEDURE — 86901 BLOOD TYPING SEROLOGIC RH(D): CPT

## 2019-08-22 PROCEDURE — 6360000002 HC RX W HCPCS: Performed by: INTERNAL MEDICINE

## 2019-08-22 PROCEDURE — 71045 X-RAY EXAM CHEST 1 VIEW: CPT

## 2019-08-22 PROCEDURE — 80202 ASSAY OF VANCOMYCIN: CPT

## 2019-08-22 PROCEDURE — 93010 ELECTROCARDIOGRAM REPORT: CPT | Performed by: INTERNAL MEDICINE

## 2019-08-22 PROCEDURE — 93005 ELECTROCARDIOGRAM TRACING: CPT | Performed by: STUDENT IN AN ORGANIZED HEALTH CARE EDUCATION/TRAINING PROGRAM

## 2019-08-22 PROCEDURE — 83735 ASSAY OF MAGNESIUM: CPT

## 2019-08-22 PROCEDURE — 85610 PROTHROMBIN TIME: CPT

## 2019-08-22 PROCEDURE — 80053 COMPREHEN METABOLIC PANEL: CPT

## 2019-08-22 PROCEDURE — 6370000000 HC RX 637 (ALT 250 FOR IP): Performed by: INTERNAL MEDICINE

## 2019-08-22 PROCEDURE — 2580000003 HC RX 258: Performed by: INTERNAL MEDICINE

## 2019-08-22 PROCEDURE — 99232 SBSQ HOSP IP/OBS MODERATE 35: CPT | Performed by: INTERNAL MEDICINE

## 2019-08-22 PROCEDURE — 86850 RBC ANTIBODY SCREEN: CPT

## 2019-08-22 PROCEDURE — 36415 COLL VENOUS BLD VENIPUNCTURE: CPT

## 2019-08-22 PROCEDURE — P9016 RBC LEUKOCYTES REDUCED: HCPCS

## 2019-08-22 PROCEDURE — 6360000002 HC RX W HCPCS: Performed by: STUDENT IN AN ORGANIZED HEALTH CARE EDUCATION/TRAINING PROGRAM

## 2019-08-22 RX ORDER — ACETAMINOPHEN 325 MG/1
650 TABLET ORAL EVERY 6 HOURS PRN
Status: DISCONTINUED | OUTPATIENT
Start: 2019-08-22 | End: 2019-08-23 | Stop reason: DRUGHIGH

## 2019-08-22 RX ADMIN — CARVEDILOL 25 MG: 25 TABLET, FILM COATED ORAL at 15:55

## 2019-08-22 RX ADMIN — HEPARIN SODIUM 5000 UNITS: 5000 INJECTION INTRAVENOUS; SUBCUTANEOUS at 05:36

## 2019-08-22 RX ADMIN — ISOSORBIDE MONONITRATE 30 MG: 30 TABLET, EXTENDED RELEASE ORAL at 21:41

## 2019-08-22 RX ADMIN — CHOLECALCIFEROL TAB 25 MCG (1000 UNIT) 1000 UNITS: 25 TAB at 15:55

## 2019-08-22 RX ADMIN — NEPHROCAP 1 MG: 1 CAP ORAL at 16:05

## 2019-08-22 RX ADMIN — PIPERACILLIN SODIUM,TAZOBACTAM SODIUM 3.38 G: 3; .375 INJECTION, POWDER, FOR SOLUTION INTRAVENOUS at 03:49

## 2019-08-22 RX ADMIN — ATORVASTATIN CALCIUM 80 MG: 80 TABLET, FILM COATED ORAL at 21:38

## 2019-08-22 RX ADMIN — ASPIRIN 81 MG 81 MG: 81 TABLET ORAL at 15:55

## 2019-08-22 RX ADMIN — AMIODARONE HYDROCHLORIDE 200 MG: 200 TABLET ORAL at 15:55

## 2019-08-22 RX ADMIN — CINACALCET HYDROCHLORIDE 30 MG: 30 TABLET, FILM COATED ORAL at 15:55

## 2019-08-22 RX ADMIN — Medication 10 ML: at 21:52

## 2019-08-22 RX ADMIN — MIRTAZAPINE 15 MG: 15 TABLET, FILM COATED ORAL at 21:38

## 2019-08-22 RX ADMIN — CLOPIDOGREL BISULFATE 75 MG: 75 TABLET ORAL at 15:54

## 2019-08-22 RX ADMIN — ACETAMINOPHEN 650 MG: 325 TABLET ORAL at 05:35

## 2019-08-22 RX ADMIN — PIPERACILLIN SODIUM,TAZOBACTAM SODIUM 3.38 G: 3; .375 INJECTION, POWDER, FOR SOLUTION INTRAVENOUS at 15:56

## 2019-08-22 RX ADMIN — ONDANSETRON 4 MG: 2 INJECTION INTRAMUSCULAR; INTRAVENOUS at 03:49

## 2019-08-22 RX ADMIN — VANCOMYCIN HYDROCHLORIDE 500 MG: 10 INJECTION, POWDER, LYOPHILIZED, FOR SOLUTION INTRAVENOUS at 13:29

## 2019-08-22 RX ADMIN — CARVEDILOL 25 MG: 25 TABLET, FILM COATED ORAL at 21:38

## 2019-08-22 RX ADMIN — INSULIN GLARGINE 8 UNITS: 100 INJECTION, SOLUTION SUBCUTANEOUS at 10:46

## 2019-08-22 RX ADMIN — HEPARIN SODIUM 5000 UNITS: 5000 INJECTION INTRAVENOUS; SUBCUTANEOUS at 21:38

## 2019-08-22 RX ADMIN — LOSARTAN POTASSIUM 50 MG: 50 TABLET ORAL at 15:55

## 2019-08-22 RX ADMIN — HEPARIN SODIUM 5000 UNITS: 5000 INJECTION INTRAVENOUS; SUBCUTANEOUS at 16:05

## 2019-08-22 RX ADMIN — INSULIN GLARGINE 8 UNITS: 100 INJECTION, SOLUTION SUBCUTANEOUS at 21:39

## 2019-08-22 RX ADMIN — ISOSORBIDE MONONITRATE 30 MG: 30 TABLET, EXTENDED RELEASE ORAL at 15:54

## 2019-08-22 ASSESSMENT — PAIN DESCRIPTION - FREQUENCY: FREQUENCY: CONTINUOUS

## 2019-08-22 ASSESSMENT — PAIN DESCRIPTION - PROGRESSION
CLINICAL_PROGRESSION: NOT CHANGED

## 2019-08-22 ASSESSMENT — PAIN - FUNCTIONAL ASSESSMENT: PAIN_FUNCTIONAL_ASSESSMENT: ACTIVITIES ARE NOT PREVENTED

## 2019-08-22 ASSESSMENT — PAIN DESCRIPTION - PAIN TYPE
TYPE: CHRONIC PAIN
TYPE: ACUTE PAIN

## 2019-08-22 ASSESSMENT — PAIN SCALES - GENERAL
PAINLEVEL_OUTOF10: 0
PAINLEVEL_OUTOF10: 5
PAINLEVEL_OUTOF10: 0
PAINLEVEL_OUTOF10: 3

## 2019-08-22 ASSESSMENT — PAIN DESCRIPTION - DESCRIPTORS
DESCRIPTORS: ACHING
DESCRIPTORS: DISCOMFORT

## 2019-08-22 ASSESSMENT — PAIN DESCRIPTION - LOCATION
LOCATION: BACK
LOCATION: FOOT

## 2019-08-22 ASSESSMENT — PAIN DESCRIPTION - ORIENTATION
ORIENTATION: MID
ORIENTATION: LEFT

## 2019-08-22 ASSESSMENT — PAIN DESCRIPTION - ONSET: ONSET: ON-GOING

## 2019-08-22 NOTE — PROGRESS NOTES
acid  1 mg Oral Daily    carvedilol  25 mg Oral BID    cinacalcet  30 mg Oral Daily    clopidogrel  75 mg Oral Daily    insulin glargine  8 Units Subcutaneous BID    isosorbide mononitrate  30 mg Oral BID    losartan  50 mg Oral Daily    mirtazapine  15 mg Oral Nightly    vitamin D  1 tablet Oral Daily    sodium chloride flush  10 mL Intravenous 2 times per day    piperacillin-tazobactam  3.375 g Intravenous Q12H    insulin lispro  0-6 Units Subcutaneous TID WC    insulin lispro  0-3 Units Subcutaneous Nightly    darbepoetin paulina-polysorbate  60 mcg Intravenous Q7 Days    heparin (porcine)  5,000 Units Subcutaneous 3 times per day       Continuous Infusions:   dextrose         PRN Meds:  acetaminophen, nitroGLYCERIN, sodium chloride flush, magnesium hydroxide, ondansetron, glucose, dextrose, glucagon (rDNA), dextrose, heparin (porcine)      Assessment:     74 yo man with DM, neuropathy, CRF on HD, AF, HTN, hx AF     Admit 1/3/19 - 1/21/19 with lethargy.  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 D/c on 1/24.     Pt had been at [facility] from 1/24 to 7/28. Readmitted to [facility] on 8/11.       Pt's wife reports pt's L 2nd toe had distal wound and dark on 8/11 (last times she saw foot). Pt was seen by wound care RN on 8/20, noted 2nd toe to be black, assoc drainage and odor. Pt had no symptoms - no pain, no fever / chills      In ED - afebrile, WBC 16.0. X-ray neg. . ESR >120. BC / wound cult sent   Seen by podiatry (reviewed consult). Seen by Vascular, plan for angio with intent to treat. On vancomycin + zosyn.       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

## 2019-08-22 NOTE — PROGRESS NOTES
[facility]    [facility]              [phone]                  Plan :     HD yesterday with 1100 ml removed  Started Aranesp  HD again today      Assessment :     ESRD: on hemodialysis  Dialysis center/schedule:   MWF at [facility]    Renal Imagin/19- CT b/l nephrolithiasis without obstruction    Volume status  Target Wt- will get from center  Appears euvolemic  2D Echo: - assymetrical LVH, normal EF    Calcification of the MV    Hypertension  BP: (135)/(62)  Pulse:  [78]   BP is better  Fluid off with dialysis  Continue home medication    Anemia of CKD  Hgb: 8.9     Start aranesp    Renal Osteodystrophy  Stable PO4    Dialysis Access  L- AVF    Nutrition on dialysis  Lab Results   Component Value Date    LABALBU 2.3 (L) 2019     Check albumin    Left toe gangrene, ulceration- seen by [facility] would like to thank Matt Lopez MD   for opportunity to serve this patient      Please call with questions at-   24 Hrs Answering service [phone] or  7 am- 5 pm via Perfect serve or cell phone  Toma Burnett          CC/reason for consult :     ESRD     HPI :     Lisseth Leal is a 75 y.o. male presented to   the hospital on 2019 with left foot wounds. He is sleepy, and lethargic, and unable to provide  Timpsonlin Sciara details. Per significant other. Has pressure  Ulcer on the list for a week, and discoloration of the  2nd toe. His wound is getting worse because of  Which he was sent to the ED by Nursing home. Seen by Podiatry,found to have gangrene left 2nd toe,  Decub ulcer, and septic symptoms. Plan for MRI and possible  Debridement. We are consulted for ESRD and related issues.      Interval History:     Non verbal and drowsy today    ROS:     Seen with- nurse    positives in bold   Not obtained         PMH/PSH/SH/Family History:     Past Medical History:   Diagnosis Date    Anemia     Atrial fibrillation ([address] 2013    CAD (coronary artery disease)     Mi, stent    Chronic kidney disease     DVT (deep venous thrombosis) (HCC)     End stage renal disease (HCC)     [facility]     Hyperkalemia     Hyperlipidemia 5/30/2012    Hypertension     Hyperthyroidism     Ischemic heart disease 5/73/6667    Metabolic encephalopathy     MI [facility]) ([facility] 10/2018    Type II or unspecified type diabetes mellitus without mention of complication, not stated as uncontrolled        Past Surgical History:   Procedure Laterality Date    CARDIAC SURGERY      cardiac stent    FEMORAL-TIBIAL BYPASS GRAFT Right 1/9/2019    RIGHT FEMORAL POSTERIOR TIBIAL BYPASS performed by Alissa Resendiz MD at [address] Right 1/4/2019    INCISION AND DRAINAGE, TRANSMETATARSAL AMPUTATION ALL ON RIGHT FOOT performed by Kenia Orosco DPM at [facility] [facility] Right 1/14/2019    REVISION OF TRANSMETATARSAL AMPUTATION RIGHT FOOT performed by Kenia Orosco DPM at [address] Right 1/18/2019    RIGHT BELOW THE KNEE AMPUTATION performed by Alissa Resendiz MD at [address] History     Socioeconomic History    Marital status: Single     Spouse name: Not on file    Number of children: Not on file    Years of education: Not on file    Highest education level: Not on file   Occupational History    Not on file   Social Needs    Financial resource strain: Not on file    Food insecurity:     Worry: Not on file     Inability: Not on file    Transportation needs:     Medical: Not on file     Non-medical: Not on file   Tobacco Use    Smoking status: Never Smoker    Smokeless tobacco: Never Used   Substance and Sexual Activity    Alcohol use: Yes     Frequency: 4 or more times a week    Drug use: No    Sexual activity: Not on file   Lifestyle    Physical activity:     Days per week: Not on file     Minutes per session: Not on file    Stress: Not on file   Relationships    Social connections:     Talks on phone: Not on file     Gets together: Not on file     Attends Latter-day service: Not on file     Active member of club or organization: Not on file     Attends meetings of clubs or organizations: Not on file     Relationship status: Not on file    Intimate partner violence:     Fear of current or ex partner: Not on file     Emotionally abused: Not on file     Physically abused: Not on file     Forced sexual activity: Not on file   Other Topics Concern    Not on file   Social History Narrative    Not on file           Problem Relation Age of Onset    Arthritis Mother           Medication:     Scheduled Meds:   amiodarone  200 mg Oral Daily    aspirin  81 mg Oral Daily    atorvastatin  80 mg Oral Nightly    b complex-C-folic acid  1 mg Oral Daily    carvedilol  25 mg Oral BID    cinacalcet  30 mg Oral Daily    clopidogrel  75 mg Oral Daily    insulin glargine  8 Units Subcutaneous BID    isosorbide mononitrate  30 mg Oral BID    losartan  50 mg Oral Daily    mirtazapine  15 mg Oral Nightly    vitamin D  1 tablet Oral Daily    sodium chloride flush  10 mL Intravenous 2 times per day    piperacillin-tazobactam  3.375 g Intravenous Q12H    insulin lispro  0-6 Units Subcutaneous TID WC    insulin lispro  0-3 Units Subcutaneous Nightly    vancomycin (VANCOCIN) intermittent dosing (placeholder)   Other See Admin Instructions    darbepoetin paulina-polysorbate  60 mcg Intravenous Q7 Days    heparin (porcine)  5,000 Units Subcutaneous 3 times per day     Continuous Infusions:   dextrose       PRN Meds:.acetaminophen, nitroGLYCERIN, sodium chloride flush, magnesium hydroxide, ondansetron, glucose, dextrose, glucagon (rDNA), dextrose, heparin (porcine)       Vitals :     Vitals:    08/22/19 0354   BP: 135/62   Pulse: 78   Resp: 16   Temp: 97.6 °F (36.4 °C)   SpO2: 99%       I & O :       Intake/Output Summary (Last 24 hours) at 8/22/2019 0831  Last data filed at 8/21/2019

## 2019-08-22 NOTE — PROGRESS NOTES
CMP:    Lab Results   Component Value Date     08/22/2019    K 4.9 08/22/2019    CL 96 08/22/2019    CO2 19 08/22/2019    BUN 59 08/22/2019    CREATININE 8.3 08/22/2019    GFRAA 8 08/22/2019    GFRAA 19 05/14/2013    AGRATIO 0.4 08/22/2019    LABGLOM 6 08/22/2019    LABGLOM 24 05/23/2012    GLUCOSE 135 08/22/2019    GLUCOSE 216 05/23/2012    PROT 7.6 08/22/2019    PROT 6.8 05/23/2012    LABALBU 2.3 08/22/2019    CALCIUM 9.1 08/22/2019    BILITOT 0.3 08/22/2019    ALKPHOS 113 08/22/2019    AST 18 08/22/2019    ALT 24 08/22/2019         LOWER EXTREMITY EXAMINATION    Dressing to left LE is intact. No strikethrough noted to the external dressing with minimal drainage noted to the internal layers of the dressing. Patient is more awake and responsive today. No signs of acute distress noted. Of note, patient has a previous right sided BKA.     Vascular: DP/PT pulses on the left are non-palpable and DP monophasic on doppler, PT non-dopplerable. Capillary refill time is >3 seconds to digits of toes 1-5 on the left. 1+ pitting edema noted to the dorsum of the left foot and anterior tibial crest. Skin temp cool to cool from proximal to distal, not within normal limits. Left 2nd and 3rd digits are cool to the touch. No varicosities present.     Neurological: Gross and epicritic sensation absent at all pedal sites, left. Protective sensation is diminished at all pedal sites, left.     Dermatologic:  - Left foot 2nd digit full-thickness ulceration with liquefactive necrotic tissue to the second digit with brown, serosanguinous drainage. Malodorous with fluctuance. Does not probe to bone, no purulent drainage, no erythema noted. - Left 3rd digit is dusky in appearance without any open lesions or drainage.    - Left foot heel decubitus ulceration with well adhered eschar to the wound bed with a mix 50% necrotic, 40% fibrotic, and 10% granular tissue with hyperkeratotic periwound.  Erythema noted to the plantar heel

## 2019-08-22 NOTE — FLOWSHEET NOTE
Treatment time: 3 hours  Net UF: 500 ml     Pre weight: 86.4 kg   Post weight: 86.0 kg  EDW: 91.5 kg      Access used: DAQUAN AVF  Access function: Good  with  ml/min     Medications or blood products given: Vancomycin 500mg IVPB     Regular outpatient schedule: MWF     Summary of response to treatment:   Tolerated and completed. Report called to [facility].   Copy of dialysis treatment record placed in chart, to be scanned into EMR.       08/22/19 1134 08/22/19 1430   Vital Signs   BP (!) 144/66 (!) 159/60   Temp 98.4 °F (36.9 °C) 98.2 °F (36.8 °C)   Pulse 76 78   Weight 190 lb 7.6 oz (86.4 kg) 189 lb 9.5 oz (86 kg)   Weight Method Bedside scale Actual;Bed scale   Dry Weight 201 lb 11.5 oz (91.5 kg)  --

## 2019-08-23 ENCOUNTER — APPOINTMENT (OUTPATIENT)
Dept: INTERVENTIONAL RADIOLOGY/VASCULAR | Age: 74
DRG: 239 | End: 2019-08-23
Payer: MEDICARE

## 2019-08-23 ENCOUNTER — ANESTHESIA EVENT (OUTPATIENT)
Dept: OPERATING ROOM | Age: 74
DRG: 239 | End: 2019-08-23
Payer: MEDICARE

## 2019-08-23 LAB
BASOPHILS ABSOLUTE: 0.1 K/UL (ref 0–0.2)
BASOPHILS RELATIVE PERCENT: 0.6 %
EOSINOPHILS ABSOLUTE: 0.1 K/UL (ref 0–0.6)
EOSINOPHILS RELATIVE PERCENT: 0.8 %
GLUCOSE BLD-MCNC: 111 MG/DL (ref 70–99)
GLUCOSE BLD-MCNC: 52 MG/DL (ref 70–99)
GLUCOSE BLD-MCNC: 78 MG/DL (ref 70–99)
GLUCOSE BLD-MCNC: 78 MG/DL (ref 70–99)
GLUCOSE BLD-MCNC: 92 MG/DL (ref 70–99)
HCT VFR BLD CALC: 26.5 % (ref 40.5–52.5)
HEMOGLOBIN: 8.3 G/DL (ref 13.5–17.5)
LYMPHOCYTES ABSOLUTE: 1.5 K/UL (ref 1–5.1)
LYMPHOCYTES RELATIVE PERCENT: 11.2 %
MCH RBC QN AUTO: 25.8 PG (ref 26–34)
MCHC RBC AUTO-ENTMCNC: 31.4 G/DL (ref 31–36)
MCV RBC AUTO: 82.2 FL (ref 80–100)
MONOCYTES ABSOLUTE: 1 K/UL (ref 0–1.3)
MONOCYTES RELATIVE PERCENT: 7.8 %
NEUTROPHILS ABSOLUTE: 10.7 K/UL (ref 1.7–7.7)
NEUTROPHILS RELATIVE PERCENT: 79.6 %
PDW BLD-RTO: 21.6 % (ref 12.4–15.4)
PERFORMED ON: ABNORMAL
PERFORMED ON: ABNORMAL
PERFORMED ON: NORMAL
PLATELET # BLD: 231 K/UL (ref 135–450)
PMV BLD AUTO: 8 FL (ref 5–10.5)
RBC # BLD: 3.22 M/UL (ref 4.2–5.9)
VANCOMYCIN RANDOM: 14.4 UG/ML
WBC # BLD: 13.4 K/UL (ref 4–11)

## 2019-08-23 PROCEDURE — C1725 CATH, TRANSLUMIN NON-LASER: HCPCS

## 2019-08-23 PROCEDURE — 2500000003 HC RX 250 WO HCPCS

## 2019-08-23 PROCEDURE — 37224 HC FEM POP TERRITORY PLASTY: CPT

## 2019-08-23 PROCEDURE — 36415 COLL VENOUS BLD VENIPUNCTURE: CPT

## 2019-08-23 PROCEDURE — 6360000004 HC RX CONTRAST MEDICATION: Performed by: SURGERY

## 2019-08-23 PROCEDURE — 90935 HEMODIALYSIS ONE EVALUATION: CPT

## 2019-08-23 PROCEDURE — 2060000000 HC ICU INTERMEDIATE R&B

## 2019-08-23 PROCEDURE — 6360000002 HC RX W HCPCS

## 2019-08-23 PROCEDURE — 99152 MOD SED SAME PHYS/QHP 5/>YRS: CPT

## 2019-08-23 PROCEDURE — 85025 COMPLETE CBC W/AUTO DIFF WBC: CPT

## 2019-08-23 PROCEDURE — 80202 ASSAY OF VANCOMYCIN: CPT

## 2019-08-23 PROCEDURE — 6360000002 HC RX W HCPCS: Performed by: INTERNAL MEDICINE

## 2019-08-23 PROCEDURE — C1760 CLOSURE DEV, VASC: HCPCS

## 2019-08-23 PROCEDURE — 6370000000 HC RX 637 (ALT 250 FOR IP): Performed by: INTERNAL MEDICINE

## 2019-08-23 PROCEDURE — 2580000003 HC RX 258: Performed by: SURGERY

## 2019-08-23 PROCEDURE — 047L3ZZ DILATION OF LEFT FEMORAL ARTERY, PERCUTANEOUS APPROACH: ICD-10-PCS | Performed by: SURGERY

## 2019-08-23 PROCEDURE — 94150 VITAL CAPACITY TEST: CPT

## 2019-08-23 PROCEDURE — C1769 GUIDE WIRE: HCPCS

## 2019-08-23 PROCEDURE — 99153 MOD SED SAME PHYS/QHP EA: CPT

## 2019-08-23 PROCEDURE — 2580000003 HC RX 258: Performed by: INTERNAL MEDICINE

## 2019-08-23 PROCEDURE — C1894 INTRO/SHEATH, NON-LASER: HCPCS

## 2019-08-23 PROCEDURE — 047S3ZZ DILATION OF LEFT POSTERIOR TIBIAL ARTERY, PERCUTANEOUS APPROACH: ICD-10-PCS | Performed by: SURGERY

## 2019-08-23 PROCEDURE — C1887 CATHETER, GUIDING: HCPCS

## 2019-08-23 PROCEDURE — 75710 ARTERY X-RAYS ARM/LEG: CPT

## 2019-08-23 PROCEDURE — 047N3ZZ DILATION OF LEFT POPLITEAL ARTERY, PERCUTANEOUS APPROACH: ICD-10-PCS | Performed by: SURGERY

## 2019-08-23 PROCEDURE — 99232 SBSQ HOSP IP/OBS MODERATE 35: CPT | Performed by: INTERNAL MEDICINE

## 2019-08-23 PROCEDURE — 75774 ARTERY X-RAY EACH VESSEL: CPT

## 2019-08-23 RX ORDER — IODIXANOL 320 MG/ML
150 INJECTION, SOLUTION INTRAVASCULAR
Status: COMPLETED | OUTPATIENT
Start: 2019-08-23 | End: 2019-08-23

## 2019-08-23 RX ORDER — CLONIDINE HYDROCHLORIDE 0.1 MG/1
0.1 TABLET ORAL
Status: DISCONTINUED | OUTPATIENT
Start: 2019-08-23 | End: 2019-08-29 | Stop reason: HOSPADM

## 2019-08-23 RX ORDER — ONDANSETRON 2 MG/ML
4 INJECTION INTRAMUSCULAR; INTRAVENOUS EVERY 6 HOURS PRN
Status: DISCONTINUED | OUTPATIENT
Start: 2019-08-23 | End: 2019-08-29 | Stop reason: HOSPADM

## 2019-08-23 RX ORDER — MORPHINE SULFATE 4 MG/ML
4 INJECTION, SOLUTION INTRAMUSCULAR; INTRAVENOUS
Status: DISCONTINUED | OUTPATIENT
Start: 2019-08-23 | End: 2019-08-29 | Stop reason: HOSPADM

## 2019-08-23 RX ORDER — ACETAMINOPHEN 325 MG/1
650 TABLET ORAL EVERY 4 HOURS PRN
Status: DISCONTINUED | OUTPATIENT
Start: 2019-08-23 | End: 2019-08-29 | Stop reason: HOSPADM

## 2019-08-23 RX ORDER — OXYCODONE HYDROCHLORIDE AND ACETAMINOPHEN 5; 325 MG/1; MG/1
1 TABLET ORAL EVERY 4 HOURS PRN
Status: DISCONTINUED | OUTPATIENT
Start: 2019-08-23 | End: 2019-08-29 | Stop reason: HOSPADM

## 2019-08-23 RX ORDER — LABETALOL 20 MG/4 ML (5 MG/ML) INTRAVENOUS SYRINGE
10
Status: DISCONTINUED | OUTPATIENT
Start: 2019-08-23 | End: 2019-08-29 | Stop reason: HOSPADM

## 2019-08-23 RX ORDER — MORPHINE SULFATE 2 MG/ML
2 INJECTION, SOLUTION INTRAMUSCULAR; INTRAVENOUS
Status: DISCONTINUED | OUTPATIENT
Start: 2019-08-23 | End: 2019-08-29 | Stop reason: HOSPADM

## 2019-08-23 RX ORDER — SODIUM CHLORIDE 0.9 % (FLUSH) 0.9 %
10 SYRINGE (ML) INJECTION PRN
Status: DISCONTINUED | OUTPATIENT
Start: 2019-08-23 | End: 2019-08-29 | Stop reason: HOSPADM

## 2019-08-23 RX ORDER — OXYCODONE HYDROCHLORIDE AND ACETAMINOPHEN 5; 325 MG/1; MG/1
2 TABLET ORAL EVERY 4 HOURS PRN
Status: DISCONTINUED | OUTPATIENT
Start: 2019-08-23 | End: 2019-08-29 | Stop reason: HOSPADM

## 2019-08-23 RX ORDER — SODIUM CHLORIDE 0.9 % (FLUSH) 0.9 %
10 SYRINGE (ML) INJECTION EVERY 12 HOURS SCHEDULED
Status: DISCONTINUED | OUTPATIENT
Start: 2019-08-23 | End: 2019-08-29 | Stop reason: HOSPADM

## 2019-08-23 RX ADMIN — CARVEDILOL 25 MG: 25 TABLET, FILM COATED ORAL at 22:07

## 2019-08-23 RX ADMIN — ATORVASTATIN CALCIUM 80 MG: 80 TABLET, FILM COATED ORAL at 22:07

## 2019-08-23 RX ADMIN — IODIXANOL 150 ML: 320 INJECTION, SOLUTION INTRAVASCULAR at 13:58

## 2019-08-23 RX ADMIN — ASPIRIN 81 MG 81 MG: 81 TABLET ORAL at 15:49

## 2019-08-23 RX ADMIN — MIRTAZAPINE 15 MG: 15 TABLET, FILM COATED ORAL at 22:07

## 2019-08-23 RX ADMIN — Medication 10 ML: at 22:14

## 2019-08-23 RX ADMIN — PIPERACILLIN SODIUM,TAZOBACTAM SODIUM 3.38 G: 3; .375 INJECTION, POWDER, FOR SOLUTION INTRAVENOUS at 15:30

## 2019-08-23 RX ADMIN — CLOPIDOGREL BISULFATE 75 MG: 75 TABLET ORAL at 15:49

## 2019-08-23 RX ADMIN — AMIODARONE HYDROCHLORIDE 200 MG: 200 TABLET ORAL at 15:50

## 2019-08-23 RX ADMIN — VANCOMYCIN HYDROCHLORIDE 1000 MG: 10 INJECTION, POWDER, LYOPHILIZED, FOR SOLUTION INTRAVENOUS at 11:35

## 2019-08-23 RX ADMIN — DEXTROSE 50 % IN WATER (D50W) INTRAVENOUS SYRINGE 12.5 G: at 07:18

## 2019-08-23 RX ADMIN — NEPHROCAP 1 MG: 1 CAP ORAL at 15:50

## 2019-08-23 RX ADMIN — LOSARTAN POTASSIUM 50 MG: 50 TABLET ORAL at 15:50

## 2019-08-23 RX ADMIN — ACETAMINOPHEN 650 MG: 325 TABLET ORAL at 00:22

## 2019-08-23 RX ADMIN — CHOLECALCIFEROL TAB 25 MCG (1000 UNIT) 1000 UNITS: 25 TAB at 15:50

## 2019-08-23 RX ADMIN — PIPERACILLIN SODIUM,TAZOBACTAM SODIUM 3.38 G: 3; .375 INJECTION, POWDER, FOR SOLUTION INTRAVENOUS at 04:00

## 2019-08-23 RX ADMIN — CINACALCET HYDROCHLORIDE 30 MG: 30 TABLET, FILM COATED ORAL at 15:49

## 2019-08-23 ASSESSMENT — PAIN SCALES - GENERAL
PAINLEVEL_OUTOF10: 0
PAINLEVEL_OUTOF10: 0

## 2019-08-23 ASSESSMENT — PAIN DESCRIPTION - PROGRESSION
CLINICAL_PROGRESSION: NOT CHANGED

## 2019-08-23 NOTE — CARE COORDINATION
MALA spoke with Artemio Arzola about facilities for placement, not going with [facility] due to payment for transport to dialysis. Maximiliano Saeed is interested in [facility] that is close to her home, call facility and faxed referral to [phone].      Karen Kunz, RN, BSN,   4th Floor Progressive Care Unit  [phone]

## 2019-08-23 NOTE — OP NOTE
hemostasis. The patient tolerated the procedure well and was taken to the post-operative care unit in stable condition.      Jossy Gutiérrez MD

## 2019-08-23 NOTE — BRIEF OP NOTE
Brief Postoperative Note  ______________________________________________________________    Patient: Elizabeth Ko  YOB: 1945  MRN: [id number]  Date of Procedure: 8/24/2019    Pre-Op Diagnosis: GANGRENE LEFT LOWER EXTREMITY    Post-Op Diagnosis: Same       Procedure(s):  1. Ultrasound guided access of right common femoral artery  2. Abdominal aortogram  3. Left lower extremity arteriogram with 3rd order selective catheterization  4. Left superficial femoral artery angioplast 6mm x 2cm [place]  5. Left popliteal and tibioperoneal trunk angioplasty 4mm x 100mm Karl  6. Left posterior tibial artery angioplasty with 2mm x 8cm Nanocross     Anesthesia: Moderate sedation    Surgeon:  Melina Puente MD     Estimated Blood Loss (mL): 10    Complications: None    Findings: see op note. At completion of procedure, patient had inline flow through one-vessel runoff to foot by posterior tibial artery.     Melina Puente MD  Date: 8/23/2019  Time: 1:57 PM

## 2019-08-23 NOTE — PROGRESS NOTES
4 Eyes Admission Assessment     I agree as the admission nurse that 2 RN's have performed a thorough Head to Toe Skin Assessment on the patient. ALL assessment sites listed below have been assessed on admission. Areas assessed by both nurses:   [x]   Head, Face, and Ears   [x]   Shoulders, Back, and Chest  [x]   Arms, Elbows, and Hands   [x]   Coccyx, Sacrum, and Ischum  [x]   Legs, Feet, and Heels        Does the Patient have Skin Breakdown?   Yes a wound was noted on the Admission Assessment and an LDA was Initiated documentation include the Krysta-wound, Wound Assessment, Measurements, Dressing Treatment, Drainage, and Color\",         -stage 3/partial unstageable L heel with eschar  -L foot surgical drsg- vascular following, unable to unwrap at this time  -R groin-old surg scars and new angio site with small blood blister  -L buttocks stage 2      Freddie Prevention initiated:  Yes   Wound Care Orders initiated:  Yes      [id number]  nurse consulted for Pressure Injury (Stage 3,4, Unstageable, DTI, NWPT, and Complex wounds): yes    Nurse 1 eSignature: Electronically signed by Victorino Villarreal RN on 8/23/19 at 3:04 PM    **SHARE this note so that the co-signing nurse is able to place an eSignature**    Nurse 2 eSignature: Electronically signed by Christiano Hester RN on 8/23/19 at 4:06 PM

## 2019-08-23 NOTE — ANESTHESIA PRE PROCEDURE
 Hyperkalemia     Hyperlipidemia 5/30/2012    Hypertension     Hyperthyroidism     Ischemic heart disease 8/53/9494    Metabolic encephalopathy     MI [facility]my[facility]) [facility] 10/2018    Type II or unspecified type diabetes mellitus without mention of complication, not stated as uncontrolled        Past Surgical History:        Procedure Laterality Date    CARDIAC SURGERY      cardiac stent    FEMORAL-TIBIAL BYPASS GRAFT Right 1/9/2019    RIGHT FEMORAL POSTERIOR TIBIAL BYPASS performed by Elysia Moser MD at [facility] [facility] Right 1/4/2019    INCISION AND DRAINAGE, TRANSMETATARSAL AMPUTATION ALL ON RIGHT FOOT performed by Yesi Cervantes DPM at [address] [facility] Right 1/14/2019    REVISION OF TRANSMETATARSAL AMPUTATION RIGHT FOOT performed by Yesi Cervantes DPM at [address] Right 1/18/2019    RIGHT BELOW THE KNEE AMPUTATION performed by Elysia Moser MD at [address] History:    Social History     Tobacco Use    Smoking status: Never Smoker    Smokeless tobacco: Never Used   Substance Use Topics    Alcohol use: Yes     Frequency: 4 or more times a week                                Counseling given: Not Answered      Vital Signs (Current): There were no vitals filed for this visit.                                            BP Readings from Last 3 Encounters:   08/23/19 129/62   08/06/19 (!) 104/59   02/14/19 120/60       NPO Status:                                                                                 BMI:   Wt Readings from Last 3 Encounters:   08/23/19 194 lb 0.1 oz (88 kg)   02/07/19 204 lb 12.8 oz (92.9 kg)   01/24/19 204 lb 12.9 oz (92.9 kg)     There is no height or weight on file to calculate BMI.    CBC:   Lab Results   Component Value Date    WBC 13.4 08/23/2019    RBC 3.22 08/23/2019    HGB 8.3 08/23/2019    HCT 26.5 08/23/2019    MCV 82.2 08/23/2019    RDW 21.6 08/23/2019     08/23/2019       CMP:   Lab

## 2019-08-23 NOTE — PLAN OF CARE
Problem: Risk for Impaired Skin Integrity  Goal: Tissue integrity - skin and mucous membranes  Description  Structural intactness and normal physiological function of skin and  mucous membranes. Outcome: Ongoing     Freddie precautions in place. Pt on specialty mattress. Turn q2. Sacral heart to coccyx stage 2, mepilex to pressure ulcer on L heel. See 4 eyes RN skin assessment. No signs of further skin breakdown. Will cont to monitor.

## 2019-08-23 NOTE — PROGRESS NOTES
Pt with small oozing from R groin site. Pressure held approx 10 mins, new tegaderm applied. VSS on RA. Family at bedside, updated on plan of care. Will cont to monitor.   Electronically signed by Claire Ambrosio RN on 8/23/2019 at 6:34 PM

## 2019-08-24 ENCOUNTER — APPOINTMENT (OUTPATIENT)
Dept: GENERAL RADIOLOGY | Age: 74
DRG: 239 | End: 2019-08-24
Payer: MEDICARE

## 2019-08-24 ENCOUNTER — ANESTHESIA (OUTPATIENT)
Dept: OPERATING ROOM | Age: 74
DRG: 239 | End: 2019-08-24
Payer: MEDICARE

## 2019-08-24 VITALS — DIASTOLIC BLOOD PRESSURE: 52 MMHG | OXYGEN SATURATION: 100 % | TEMPERATURE: 97 F | SYSTOLIC BLOOD PRESSURE: 94 MMHG

## 2019-08-24 LAB
GLUCOSE BLD-MCNC: 105 MG/DL (ref 70–99)
GLUCOSE BLD-MCNC: 174 MG/DL (ref 70–99)
GLUCOSE BLD-MCNC: 213 MG/DL (ref 70–99)
GLUCOSE BLD-MCNC: 91 MG/DL (ref 70–99)
GLUCOSE BLD-MCNC: 92 MG/DL (ref 70–99)
GRAM STAIN RESULT: ABNORMAL
GRAM STAIN RESULT: ABNORMAL
HCT VFR BLD CALC: 29.3 % (ref 40.5–52.5)
HEMOGLOBIN: 9.1 G/DL (ref 13.5–17.5)
MCH RBC QN AUTO: 25.6 PG (ref 26–34)
MCHC RBC AUTO-ENTMCNC: 30.9 G/DL (ref 31–36)
MCV RBC AUTO: 82.8 FL (ref 80–100)
ORGANISM: ABNORMAL
PDW BLD-RTO: 21.4 % (ref 12.4–15.4)
PERFORMED ON: ABNORMAL
PERFORMED ON: NORMAL
PERFORMED ON: NORMAL
PLATELET # BLD: 223 K/UL (ref 135–450)
PMV BLD AUTO: 8.1 FL (ref 5–10.5)
POTASSIUM SERPL-SCNC: 5 MMOL/L (ref 3.5–5.1)
RBC # BLD: 3.54 M/UL (ref 4.2–5.9)
WBC # BLD: 15.9 K/UL (ref 4–11)
WOUND/ABSCESS: ABNORMAL

## 2019-08-24 PROCEDURE — 3700000001 HC ADD 15 MINUTES (ANESTHESIA): Performed by: PODIATRIST

## 2019-08-24 PROCEDURE — 6360000002 HC RX W HCPCS: Performed by: STUDENT IN AN ORGANIZED HEALTH CARE EDUCATION/TRAINING PROGRAM

## 2019-08-24 PROCEDURE — 2060000000 HC ICU INTERMEDIATE R&B

## 2019-08-24 PROCEDURE — 94150 VITAL CAPACITY TEST: CPT

## 2019-08-24 PROCEDURE — 7100000001 HC PACU RECOVERY - ADDTL 15 MIN: Performed by: PODIATRIST

## 2019-08-24 PROCEDURE — 87205 SMEAR GRAM STAIN: CPT

## 2019-08-24 PROCEDURE — 2709999900 HC NON-CHARGEABLE SUPPLY: Performed by: PODIATRIST

## 2019-08-24 PROCEDURE — 84132 ASSAY OF SERUM POTASSIUM: CPT

## 2019-08-24 PROCEDURE — 94761 N-INVAS EAR/PLS OXIMETRY MLT: CPT

## 2019-08-24 PROCEDURE — 2580000003 HC RX 258: Performed by: ANESTHESIOLOGY

## 2019-08-24 PROCEDURE — 6370000000 HC RX 637 (ALT 250 FOR IP): Performed by: STUDENT IN AN ORGANIZED HEALTH CARE EDUCATION/TRAINING PROGRAM

## 2019-08-24 PROCEDURE — 3700000000 HC ANESTHESIA ATTENDED CARE: Performed by: PODIATRIST

## 2019-08-24 PROCEDURE — 2500000003 HC RX 250 WO HCPCS: Performed by: ANESTHESIOLOGY

## 2019-08-24 PROCEDURE — 6360000002 HC RX W HCPCS: Performed by: ANESTHESIOLOGY

## 2019-08-24 PROCEDURE — 2500000003 HC RX 250 WO HCPCS

## 2019-08-24 PROCEDURE — 73630 X-RAY EXAM OF FOOT: CPT

## 2019-08-24 PROCEDURE — 36415 COLL VENOUS BLD VENIPUNCTURE: CPT

## 2019-08-24 PROCEDURE — 3600000003 HC SURGERY LEVEL 3 BASE: Performed by: PODIATRIST

## 2019-08-24 PROCEDURE — 88307 TISSUE EXAM BY PATHOLOGIST: CPT

## 2019-08-24 PROCEDURE — 6360000002 HC RX W HCPCS: Performed by: INTERNAL MEDICINE

## 2019-08-24 PROCEDURE — 87186 SC STD MICRODIL/AGAR DIL: CPT

## 2019-08-24 PROCEDURE — 2580000003 HC RX 258: Performed by: INTERNAL MEDICINE

## 2019-08-24 PROCEDURE — 2580000003 HC RX 258: Performed by: PODIATRIST

## 2019-08-24 PROCEDURE — 87015 SPECIMEN INFECT AGNT CONCNTJ: CPT

## 2019-08-24 PROCEDURE — 7100000000 HC PACU RECOVERY - FIRST 15 MIN: Performed by: PODIATRIST

## 2019-08-24 PROCEDURE — 2580000003 HC RX 258: Performed by: STUDENT IN AN ORGANIZED HEALTH CARE EDUCATION/TRAINING PROGRAM

## 2019-08-24 PROCEDURE — 88311 DECALCIFY TISSUE: CPT

## 2019-08-24 PROCEDURE — 87075 CULTR BACTERIA EXCEPT BLOOD: CPT

## 2019-08-24 PROCEDURE — 87116 MYCOBACTERIA CULTURE: CPT

## 2019-08-24 PROCEDURE — 87206 SMEAR FLUORESCENT/ACID STAI: CPT

## 2019-08-24 PROCEDURE — 3600000013 HC SURGERY LEVEL 3 ADDTL 15MIN: Performed by: PODIATRIST

## 2019-08-24 PROCEDURE — 87077 CULTURE AEROBIC IDENTIFY: CPT

## 2019-08-24 PROCEDURE — 85027 COMPLETE CBC AUTOMATED: CPT

## 2019-08-24 PROCEDURE — 6370000000 HC RX 637 (ALT 250 FOR IP): Performed by: INTERNAL MEDICINE

## 2019-08-24 PROCEDURE — 87070 CULTURE OTHR SPECIMN AEROBIC: CPT

## 2019-08-24 PROCEDURE — 87102 FUNGUS ISOLATION CULTURE: CPT

## 2019-08-24 RX ORDER — FENTANYL CITRATE 50 UG/ML
50 INJECTION, SOLUTION INTRAMUSCULAR; INTRAVENOUS EVERY 5 MIN PRN
Status: DISCONTINUED | OUTPATIENT
Start: 2019-08-24 | End: 2019-08-24 | Stop reason: HOSPADM

## 2019-08-24 RX ORDER — EPHEDRINE SULFATE 50 MG/ML
INJECTION, SOLUTION INTRAVENOUS PRN
Status: DISCONTINUED | OUTPATIENT
Start: 2019-08-24 | End: 2019-08-24 | Stop reason: SDUPTHER

## 2019-08-24 RX ORDER — PROPOFOL 10 MG/ML
INJECTION, EMULSION INTRAVENOUS PRN
Status: DISCONTINUED | OUTPATIENT
Start: 2019-08-24 | End: 2019-08-24 | Stop reason: SDUPTHER

## 2019-08-24 RX ORDER — MAGNESIUM HYDROXIDE 1200 MG/15ML
LIQUID ORAL CONTINUOUS PRN
Status: COMPLETED | OUTPATIENT
Start: 2019-08-24 | End: 2019-08-24

## 2019-08-24 RX ORDER — OXYCODONE HYDROCHLORIDE AND ACETAMINOPHEN 5; 325 MG/1; MG/1
1 TABLET ORAL PRN
Status: DISCONTINUED | OUTPATIENT
Start: 2019-08-24 | End: 2019-08-24 | Stop reason: HOSPADM

## 2019-08-24 RX ORDER — EPHEDRINE SULFATE 50 MG/ML
INJECTION, SOLUTION INTRAVENOUS
Status: COMPLETED
Start: 2019-08-24 | End: 2019-08-24

## 2019-08-24 RX ORDER — FENTANYL CITRATE 50 UG/ML
INJECTION, SOLUTION INTRAMUSCULAR; INTRAVENOUS PRN
Status: DISCONTINUED | OUTPATIENT
Start: 2019-08-24 | End: 2019-08-24 | Stop reason: SDUPTHER

## 2019-08-24 RX ORDER — HYDRALAZINE HYDROCHLORIDE 20 MG/ML
5 INJECTION INTRAMUSCULAR; INTRAVENOUS EVERY 10 MIN PRN
Status: DISCONTINUED | OUTPATIENT
Start: 2019-08-24 | End: 2019-08-24 | Stop reason: HOSPADM

## 2019-08-24 RX ORDER — FENTANYL CITRATE 50 UG/ML
25 INJECTION, SOLUTION INTRAMUSCULAR; INTRAVENOUS EVERY 5 MIN PRN
Status: DISCONTINUED | OUTPATIENT
Start: 2019-08-24 | End: 2019-08-24 | Stop reason: HOSPADM

## 2019-08-24 RX ORDER — SODIUM CHLORIDE 9 MG/ML
INJECTION, SOLUTION INTRAVENOUS CONTINUOUS PRN
Status: DISCONTINUED | OUTPATIENT
Start: 2019-08-24 | End: 2019-08-24 | Stop reason: SDUPTHER

## 2019-08-24 RX ORDER — LABETALOL 20 MG/4 ML (5 MG/ML) INTRAVENOUS SYRINGE
5 EVERY 10 MIN PRN
Status: DISCONTINUED | OUTPATIENT
Start: 2019-08-24 | End: 2019-08-24 | Stop reason: HOSPADM

## 2019-08-24 RX ORDER — EPHEDRINE SULFATE 50 MG/ML
10 INJECTION, SOLUTION INTRAVENOUS ONCE
Status: COMPLETED | OUTPATIENT
Start: 2019-08-24 | End: 2019-08-24

## 2019-08-24 RX ORDER — ONDANSETRON 2 MG/ML
4 INJECTION INTRAMUSCULAR; INTRAVENOUS
Status: DISCONTINUED | OUTPATIENT
Start: 2019-08-24 | End: 2019-08-24 | Stop reason: HOSPADM

## 2019-08-24 RX ORDER — LIDOCAINE HYDROCHLORIDE 20 MG/ML
INJECTION, SOLUTION INTRAVENOUS PRN
Status: DISCONTINUED | OUTPATIENT
Start: 2019-08-24 | End: 2019-08-24 | Stop reason: SDUPTHER

## 2019-08-24 RX ORDER — OXYCODONE HYDROCHLORIDE AND ACETAMINOPHEN 5; 325 MG/1; MG/1
2 TABLET ORAL PRN
Status: DISCONTINUED | OUTPATIENT
Start: 2019-08-24 | End: 2019-08-24 | Stop reason: HOSPADM

## 2019-08-24 RX ADMIN — ASPIRIN 81 MG 81 MG: 81 TABLET ORAL at 08:49

## 2019-08-24 RX ADMIN — PIPERACILLIN SODIUM,TAZOBACTAM SODIUM 3.38 G: 3; .375 INJECTION, POWDER, FOR SOLUTION INTRAVENOUS at 05:43

## 2019-08-24 RX ADMIN — ATORVASTATIN CALCIUM 80 MG: 80 TABLET, FILM COATED ORAL at 21:41

## 2019-08-24 RX ADMIN — PIPERACILLIN SODIUM,TAZOBACTAM SODIUM 3.38 G: 3; .375 INJECTION, POWDER, FOR SOLUTION INTRAVENOUS at 15:59

## 2019-08-24 RX ADMIN — EPHEDRINE SULFATE 10 MG: 50 INJECTION, SOLUTION INTRAMUSCULAR; INTRAVENOUS; SUBCUTANEOUS at 13:00

## 2019-08-24 RX ADMIN — FENTANYL CITRATE 50 MCG: 50 INJECTION INTRAMUSCULAR; INTRAVENOUS at 12:43

## 2019-08-24 RX ADMIN — CHOLECALCIFEROL TAB 25 MCG (1000 UNIT) 1000 UNITS: 25 TAB at 08:49

## 2019-08-24 RX ADMIN — EPHEDRINE SULFATE 10 MG: 50 INJECTION, SOLUTION INTRAMUSCULAR; INTRAVENOUS; SUBCUTANEOUS at 12:55

## 2019-08-24 RX ADMIN — HEPARIN SODIUM 5000 UNITS: 5000 INJECTION INTRAVENOUS; SUBCUTANEOUS at 05:44

## 2019-08-24 RX ADMIN — CLOPIDOGREL BISULFATE 75 MG: 75 TABLET ORAL at 08:49

## 2019-08-24 RX ADMIN — PHENYLEPHRINE HYDROCHLORIDE 160 MCG: 10 INJECTION, SOLUTION INTRAMUSCULAR; INTRAVENOUS; SUBCUTANEOUS at 12:38

## 2019-08-24 RX ADMIN — EPHEDRINE SULFATE 10 MG: 50 INJECTION, SOLUTION INTRAMUSCULAR; INTRAVENOUS; SUBCUTANEOUS at 13:40

## 2019-08-24 RX ADMIN — LOSARTAN POTASSIUM 50 MG: 50 TABLET ORAL at 08:49

## 2019-08-24 RX ADMIN — ISOSORBIDE MONONITRATE 30 MG: 30 TABLET, EXTENDED RELEASE ORAL at 21:41

## 2019-08-24 RX ADMIN — CARVEDILOL 25 MG: 25 TABLET, FILM COATED ORAL at 08:49

## 2019-08-24 RX ADMIN — PHENYLEPHRINE HYDROCHLORIDE 200 MCG: 10 INJECTION, SOLUTION INTRAMUSCULAR; INTRAVENOUS; SUBCUTANEOUS at 12:41

## 2019-08-24 RX ADMIN — PHENYLEPHRINE HYDROCHLORIDE 200 MCG: 10 INJECTION, SOLUTION INTRAMUSCULAR; INTRAVENOUS; SUBCUTANEOUS at 12:44

## 2019-08-24 RX ADMIN — EPHEDRINE SULFATE 10 MG: 50 INJECTION, SOLUTION INTRAMUSCULAR; INTRAVENOUS; SUBCUTANEOUS at 13:33

## 2019-08-24 RX ADMIN — SODIUM CHLORIDE: 900 INJECTION, SOLUTION INTRAVENOUS at 12:16

## 2019-08-24 RX ADMIN — EPHEDRINE SULFATE 10 MG: 50 INJECTION, SOLUTION INTRAMUSCULAR; INTRAVENOUS; SUBCUTANEOUS at 12:52

## 2019-08-24 RX ADMIN — EPHEDRINE SULFATE 10 MG: 50 INJECTION, SOLUTION INTRAMUSCULAR; INTRAVENOUS; SUBCUTANEOUS at 12:48

## 2019-08-24 RX ADMIN — AMIODARONE HYDROCHLORIDE 200 MG: 200 TABLET ORAL at 08:49

## 2019-08-24 RX ADMIN — NEPHROCAP 1 MG: 1 CAP ORAL at 08:49

## 2019-08-24 RX ADMIN — CINACALCET HYDROCHLORIDE 30 MG: 30 TABLET, FILM COATED ORAL at 08:49

## 2019-08-24 RX ADMIN — ISOSORBIDE MONONITRATE 30 MG: 30 TABLET, EXTENDED RELEASE ORAL at 00:37

## 2019-08-24 RX ADMIN — PROPOFOL 150 MG: 10 INJECTION, EMULSION INTRAVENOUS at 12:22

## 2019-08-24 RX ADMIN — ISOSORBIDE MONONITRATE 30 MG: 30 TABLET, EXTENDED RELEASE ORAL at 08:48

## 2019-08-24 RX ADMIN — HEPARIN SODIUM 5000 UNITS: 5000 INJECTION INTRAVENOUS; SUBCUTANEOUS at 15:59

## 2019-08-24 RX ADMIN — MIRTAZAPINE 15 MG: 15 TABLET, FILM COATED ORAL at 21:42

## 2019-08-24 RX ADMIN — HEPARIN SODIUM 5000 UNITS: 5000 INJECTION INTRAVENOUS; SUBCUTANEOUS at 22:18

## 2019-08-24 RX ADMIN — LIDOCAINE HYDROCHLORIDE 5 MG: 20 INJECTION, SOLUTION INTRAVENOUS at 12:22

## 2019-08-24 RX ADMIN — Medication 10 ML: at 21:43

## 2019-08-24 RX ADMIN — EPHEDRINE SULFATE 10 MG: 50 INJECTION, SOLUTION INTRAMUSCULAR; INTRAVENOUS; SUBCUTANEOUS at 12:58

## 2019-08-24 ASSESSMENT — PAIN SCALES - GENERAL
PAINLEVEL_OUTOF10: 0

## 2019-08-24 ASSESSMENT — PULMONARY FUNCTION TESTS
PIF_VALUE: 1
PIF_VALUE: 2
PIF_VALUE: 3
PIF_VALUE: 3
PIF_VALUE: 4
PIF_VALUE: 27
PIF_VALUE: 3
PIF_VALUE: 3
PIF_VALUE: 2
PIF_VALUE: 3
PIF_VALUE: 2
PIF_VALUE: 0
PIF_VALUE: 6
PIF_VALUE: 5
PIF_VALUE: 4
PIF_VALUE: 3
PIF_VALUE: 0
PIF_VALUE: 9
PIF_VALUE: 3
PIF_VALUE: 2
PIF_VALUE: 3
PIF_VALUE: 4
PIF_VALUE: 3
PIF_VALUE: 5
PIF_VALUE: 3
PIF_VALUE: 4
PIF_VALUE: 3
PIF_VALUE: 4
PIF_VALUE: 1
PIF_VALUE: 3
PIF_VALUE: 9
PIF_VALUE: 3
PIF_VALUE: 5
PIF_VALUE: 9
PIF_VALUE: 3
PIF_VALUE: 3
PIF_VALUE: 2
PIF_VALUE: 3
PIF_VALUE: 5

## 2019-08-24 NOTE — PROGRESS NOTES
sensitivities. · Vancomycin - Pharmacy to dose  · Due to ESRD on HD, will dose Vancomycin intermittently based on levels. · Continue 1g IV 3x/week with HD Mon-Wed-Fri. Will check next level prior to HD on Monday 8/26. · Clinical condition will be monitored closely, and levels / doses will be ordered as appropriate.     Please call with questions--  Thanks--  Raheel Acosta, PharmD, [address] [facility], [facility], [facility] or [phone] (wireless)  8/24/2019 8:50 AM

## 2019-08-24 NOTE — PROGRESS NOTES
Occupational Therapy/Physical Therapy  HOLD  Approached for OT/PT evaluations. Pt is to have surgery this morning for possible 2nd 3rd digit amputation vs TMA. Will hold therapy today and follow up 8/25 for therapy evaluations. Discussed with RN.     Edward Alarcon, OT [phone], DPT [id number]

## 2019-08-24 NOTE — PROGRESS NOTES
Pt had oozing at angio site. Placed pressure dressing and held pressure for 10 minutes. Notified [facility] vascular surgical resident. Extended bedrest to overnight.

## 2019-08-24 NOTE — PROGRESS NOTES
Dr. Yury Goel made aware of patient's low blood pressure once he arrived in PACU. It was further confirmed by patient's floor nurse that he had gone to dialysis yesterday. Patient's wife remains at bedside while patient awaits his scheduled surgery for today.

## 2019-08-24 NOTE — PROGRESS NOTES
PACU Transfer Note    Vitals:    08/24/19 1430   BP: (!) 99/48   Pulse: 67   Resp: 14   Temp: 96.9 °F (36.1 °C)   SpO2: 100%     Patien'ts blood pressure was low on adm to PACU while awaiting surgery. Dr. Bharat Esteves aware. Received medications during surgery for low blood pressure. B/P is currently within 20% of his admission blood pressure. Patient received several blood pressure control medications on floor this morning prior to surgery.     In: 900 [I.V.:900]  Out: 0     Pain assessment:  none  Pain Level: 0    Report given to Receiving unit RN.    8/24/2019 2:36 PM

## 2019-08-24 NOTE — PROGRESS NOTES
DAILY PROGRESS NOTE    POST-OP DIAGNOSIS: Left foot gangrene     PROCEDURE(S): Ultrasound-guided access R CFA, abdominal aortogram, LLE arteriogram with 3rd order selective catheterization, L SFA angioplasty (6mm x 2cm Lind), LLE popliteal and tibioperoneal trunk angioplasty (4mm x 100mm Karl) and LLE posterior tibial artery angioplasty (2mm x 8cm Nanocross). SUBJECTIVE:   Denies pain, anuric (patient's baseline). Afebrile, hemodynamically stable. Was NPO since midnight for surgery today with podiatry. OBJECTIVE:    Physical Exam:  Vitals:   Vitals:    08/24/19 0240 08/24/19 0518 08/24/19 0537 08/24/19 0600   BP: (!) 105/54  132/72    Pulse:   85    Resp:   18    Temp:   98.7 °F (37.1 °C)    TempSrc:   Oral    SpO2:   100% 98%   Weight:  186 lb 15.2 oz (84.8 kg)     Height:           General appearance: alert, no acute distress  Neuro: No acute changes  Pulm: Breathing room air, no adventitious breath sounds, normal effort  Cardio: Regular rate and rhythm   Extremities: Right fem puncture site is dry, covered with dressing, no hematoma, palpable fem pulse at puncture site. LLE with PT doppler signals, dressing intact. Assessment and Plan: This is a 75y.o. year old male POD1 status post ultrasound-guided access R CFA, abdominal aortogram, LLE arteriogram with 3rd order selective catheterization, L SFA angioplasty (6mm x 2cm Lind), LLE popliteal and tibioperoneal trunk angioplasty (4mm x 100mm Karl) and LLE posterior tibial artery angioplasty (2mm x 8cm Nanocross) secondary to left foot gangrene . - OK for interventions deemed necessary by podiatry  - Continue ASA, Plavix, subcutaneous heparin   - No need for additional bedrest from Vascular Surgery standpoint; Out of bed to chair during the day, encourage frequent ambulation   - Please call us with further questions. PRN follow up with Dr. Webb Head.     Edward Bryant MD  General Surgery Resident  08/24/19 7:37

## 2019-08-24 NOTE — PROGRESS NOTES
[facility]    [facility]              [phone]                  Plan :     Scheduled for dialysis on Monday. He is status post ultrasound-guided aortogram and left lower extremity arteriogram with catheterization. He underwent left SFA angioplasty and left lower extremity popliteal and tibioperoneal trunk angioplasty. Appreciate surgical consult. No need for dialysis today     Assessment :     ESRD: on hemodialysis  Dialysis center/schedule:   MWF at [facility]    Renal Imagin/19- CT b/l nephrolithiasis without obstruction    Volume status  Target Wt- will get from center  Appears euvolemic  2D Echo: - assymetrical LVH, normal EF    Calcification of the MV    Hypertension  BP: ()/(38-72)  Pulse:  [61-85]   BP is better  Fluid off with dialysis  Continue home medication    Anemia of CKD  Hgb: 8.3     Continue Aranesp    Renal Osteodystrophy  Stable PO4    Dialysis Access  L- AVF    Nutrition on dialysis  Lab Results   Component Value Date    LABALBU 2.3 (L) 2019     Check albumin    Left toe gangrene, ulceration- seen by [facility] Nephrology would like to thank Breana Valle MD   for opportunity to serve this patient      Please call with questions at-   24 Hrs Answering service [phone] or  7 am- 5 pm via Perfect serve or cell phone  Ron SEXTON/reason for consult :     ESRD     HPI :     Elizabeth Ko is a 75 y.o. male presented to   the hospital on 2019 with left foot wounds. He is sleepy, and lethargic, and unable to provide  Moe chap details. Per significant other. Has pressure  Ulcer on the list for a week, and discoloration of the  2nd toe. His wound is getting worse because of  Which he was sent to the ED by Nursing home. Seen by Podiatry,found to have gangrene left 2nd toe,  Decub ulcer, and septic symptoms. Plan for MRI and possible  Debridement. We are consulted for ESRD and related issues. Interval History:     Seen and examined in the room. .    ROS:     Seen with-none    positives in bold   Not obtained         PMH/PSH/SH/Family History:     Past Medical History:   Diagnosis Date    Anemia     Atrial fibrillation ([facility] 11/4/2013    CAD (coronary artery disease)     Mi, stent    Chronic kidney disease     DVT (deep venous thrombosis) (HCC)     End stage renal disease (HCC)     Gas gangrene ([facility]     Hyperkalemia     Hyperlipidemia 5/30/2012    Hypertension     Hyperthyroidism     Ischemic heart disease 1/83/5988    Metabolic encephalopathy     MI (myocardial infarction) ([facility] 10/2018    Type II or unspecified type diabetes mellitus without mention of complication, not stated as uncontrolled        Past Surgical History:   Procedure Laterality Date    CARDIAC SURGERY      cardiac stent    FEMORAL-TIBIAL BYPASS GRAFT Right 1/9/2019    RIGHT FEMORAL POSTERIOR TIBIAL BYPASS performed by Wesly Carlton MD at [address] [place] [address] Right 1/4/2019    INCISION AND DRAINAGE, TRANSMETATARSAL AMPUTATION ALL ON RIGHT FOOT performed by Km Meadows DPM at [address] [place] [address] Right 1/14/2019    REVISION OF TRANSMETATARSAL AMPUTATION RIGHT FOOT performed by Km Meadows DPM at [facility] Right 1/18/2019    RIGHT BELOW THE KNEE AMPUTATION performed by Wesly Carlton MD at [facility] History     Socioeconomic History    Marital status: Single     Spouse name: Not on file    Number of children: Not on file    Years of education: Not on file    Highest education level: Not on file   Occupational History    Not on file   Social Needs    Financial resource strain: Not on file    Food insecurity:     Worry: Not on file     Inability: Not on file    Transportation needs:     Medical: Not on file     Non-medical: Not on file   Tobacco Use    Smoking status: Never Smoker    Smokeless tobacco: Never Used   Substance and Sexual Activity    labetalol, hydrALAZINE, sodium chloride, sodium chloride flush, acetaminophen, oxyCODONE-acetaminophen **OR** oxyCODONE-acetaminophen, morphine **OR** morphine, ondansetron, cloNIDine, labetalol, nitroGLYCERIN, magnesium hydroxide, glucose, dextrose, glucagon (rDNA), dextrose, heparin (porcine)       Vitals :     Vitals:    08/24/19 1200   BP: (!) 108/41   Pulse: 63   Resp: 14   Temp:    SpO2: 95%       I & O :       Intake/Output Summary (Last 24 hours) at 8/24/2019 1244  Last data filed at 8/24/2019 2102  Gross per 24 hour   Intake 440 ml   Output 0 ml   Net 440 ml        Physical Examination :     General appearance: Anxious- no, distressed- no, in good spirits- no  HEENT: Lips- normal, teeth- ok , oral mucosa- moist  Neck : Mass- no, appears symmetrical, JVD- not visible  Respiratory: Respiratory effort-  normal, wheeze- no, crackles - no  Cardiovascular:  Ausculation- No M/R/G, Edema no  Abdomen: visible mass- no, distention- no, scar- no, tenderness- no                            hepatosplenomegaly-  no  Musculoskeletal:  clubbing no,cyanosis- no , digital ischemia- no                           muscle strength- grossly normal , tone - grossly normal  Right AKA, left dressing on the foot  Skin: rashes- no , ulcers- no, induration- no, tightening - no  Psychiatric:  Judgement and insight- normal           AAO X 3     LABS:     Recent Labs     08/22/19  0656 08/23/19  0643 08/24/19  0526   WBC 16.3* 13.4* 15.9*   HGB 8.9* 8.3* 9.1*   HCT 28.6* 26.5* 29.3*    231 223     Recent Labs     08/22/19  0656 08/24/19  0526   *  --    K 4.9 5.0   CL 96*  --    CO2 19*  --    BUN 59*  --    CREATININE 8.3*  --    GLUCOSE 135*  --    MG 2.40  --         Nephrology  [address]au [address] [facility], [address]  Office: [phone]  Cell: [phone]  Fax: [phone]

## 2019-08-25 LAB
ALBUMIN SERPL-MCNC: 2.7 G/DL (ref 3.4–5)
ANION GAP SERPL CALCULATED.3IONS-SCNC: 15 MMOL/L (ref 3–16)
BASOPHILS ABSOLUTE: 0.1 K/UL (ref 0–0.2)
BASOPHILS RELATIVE PERCENT: 0.5 %
BLOOD BANK DISPENSE STATUS: NORMAL
BLOOD BANK PRODUCT CODE: NORMAL
BPU ID: NORMAL
BUN BLDV-MCNC: 51 MG/DL (ref 7–20)
CALCIUM SERPL-MCNC: 8.4 MG/DL (ref 8.3–10.6)
CHLORIDE BLD-SCNC: 97 MMOL/L (ref 99–110)
CO2: 25 MMOL/L (ref 21–32)
CREAT SERPL-MCNC: 7.2 MG/DL (ref 0.8–1.3)
DESCRIPTION BLOOD BANK: NORMAL
EOSINOPHILS ABSOLUTE: 0.2 K/UL (ref 0–0.6)
EOSINOPHILS RELATIVE PERCENT: 1.3 %
GFR AFRICAN AMERICAN: 9
GFR NON-AFRICAN AMERICAN: 7
GLUCOSE BLD-MCNC: 128 MG/DL (ref 70–99)
GLUCOSE BLD-MCNC: 146 MG/DL (ref 70–99)
GLUCOSE BLD-MCNC: 171 MG/DL (ref 70–99)
GLUCOSE BLD-MCNC: 186 MG/DL (ref 70–99)
GLUCOSE BLD-MCNC: 207 MG/DL (ref 70–99)
HCT VFR BLD CALC: 21.6 % (ref 40.5–52.5)
HCT VFR BLD CALC: 24.1 % (ref 40.5–52.5)
HCT VFR BLD CALC: 26.8 % (ref 40.5–52.5)
HEMOGLOBIN: 6.8 G/DL (ref 13.5–17.5)
HEMOGLOBIN: 7.6 G/DL (ref 13.5–17.5)
HEMOGLOBIN: 8.4 G/DL (ref 13.5–17.5)
LYMPHOCYTES ABSOLUTE: 1.5 K/UL (ref 1–5.1)
LYMPHOCYTES RELATIVE PERCENT: 11.7 %
MCH RBC QN AUTO: 26.1 PG (ref 26–34)
MCHC RBC AUTO-ENTMCNC: 31.7 G/DL (ref 31–36)
MCV RBC AUTO: 82.4 FL (ref 80–100)
MONOCYTES ABSOLUTE: 1 K/UL (ref 0–1.3)
MONOCYTES RELATIVE PERCENT: 8.1 %
NEUTROPHILS ABSOLUTE: 10.1 K/UL (ref 1.7–7.7)
NEUTROPHILS RELATIVE PERCENT: 78.4 %
PDW BLD-RTO: 21.6 % (ref 12.4–15.4)
PERFORMED ON: ABNORMAL
PHOSPHORUS: 4.7 MG/DL (ref 2.5–4.9)
PLATELET # BLD: 219 K/UL (ref 135–450)
PMV BLD AUTO: 8 FL (ref 5–10.5)
POTASSIUM SERPL-SCNC: 4.4 MMOL/L (ref 3.5–5.1)
RBC # BLD: 2.93 M/UL (ref 4.2–5.9)
SODIUM BLD-SCNC: 137 MMOL/L (ref 136–145)
WBC # BLD: 12.8 K/UL (ref 4–11)

## 2019-08-25 PROCEDURE — 97166 OT EVAL MOD COMPLEX 45 MIN: CPT

## 2019-08-25 PROCEDURE — 6370000000 HC RX 637 (ALT 250 FOR IP): Performed by: STUDENT IN AN ORGANIZED HEALTH CARE EDUCATION/TRAINING PROGRAM

## 2019-08-25 PROCEDURE — 97530 THERAPEUTIC ACTIVITIES: CPT

## 2019-08-25 PROCEDURE — 36430 TRANSFUSION BLD/BLD COMPNT: CPT

## 2019-08-25 PROCEDURE — 36415 COLL VENOUS BLD VENIPUNCTURE: CPT

## 2019-08-25 PROCEDURE — 97162 PT EVAL MOD COMPLEX 30 MIN: CPT

## 2019-08-25 PROCEDURE — 2580000003 HC RX 258: Performed by: STUDENT IN AN ORGANIZED HEALTH CARE EDUCATION/TRAINING PROGRAM

## 2019-08-25 PROCEDURE — 2580000003 HC RX 258: Performed by: INTERNAL MEDICINE

## 2019-08-25 PROCEDURE — 85018 HEMOGLOBIN: CPT

## 2019-08-25 PROCEDURE — 99232 SBSQ HOSP IP/OBS MODERATE 35: CPT | Performed by: INTERNAL MEDICINE

## 2019-08-25 PROCEDURE — 2060000000 HC ICU INTERMEDIATE R&B

## 2019-08-25 PROCEDURE — 85014 HEMATOCRIT: CPT

## 2019-08-25 PROCEDURE — 80069 RENAL FUNCTION PANEL: CPT

## 2019-08-25 PROCEDURE — 6370000000 HC RX 637 (ALT 250 FOR IP): Performed by: INTERNAL MEDICINE

## 2019-08-25 PROCEDURE — 94150 VITAL CAPACITY TEST: CPT

## 2019-08-25 PROCEDURE — P9016 RBC LEUKOCYTES REDUCED: HCPCS

## 2019-08-25 PROCEDURE — 6360000002 HC RX W HCPCS: Performed by: STUDENT IN AN ORGANIZED HEALTH CARE EDUCATION/TRAINING PROGRAM

## 2019-08-25 PROCEDURE — 85025 COMPLETE CBC W/AUTO DIFF WBC: CPT

## 2019-08-25 RX ORDER — CARVEDILOL 6.25 MG/1
6.25 TABLET ORAL 2 TIMES DAILY
Status: DISCONTINUED | OUTPATIENT
Start: 2019-08-25 | End: 2019-08-29 | Stop reason: HOSPADM

## 2019-08-25 RX ORDER — 0.9 % SODIUM CHLORIDE 0.9 %
500 INTRAVENOUS SOLUTION INTRAVENOUS ONCE
Status: COMPLETED | OUTPATIENT
Start: 2019-08-25 | End: 2019-08-25

## 2019-08-25 RX ORDER — 0.9 % SODIUM CHLORIDE 0.9 %
250 INTRAVENOUS SOLUTION INTRAVENOUS ONCE
Status: COMPLETED | OUTPATIENT
Start: 2019-08-25 | End: 2019-08-25

## 2019-08-25 RX ADMIN — INSULIN LISPRO 1 UNITS: 100 INJECTION, SOLUTION INTRAVENOUS; SUBCUTANEOUS at 12:13

## 2019-08-25 RX ADMIN — INSULIN GLARGINE 8 UNITS: 100 INJECTION, SOLUTION SUBCUTANEOUS at 09:42

## 2019-08-25 RX ADMIN — SODIUM CHLORIDE 250 ML: 9 INJECTION, SOLUTION INTRAVENOUS at 15:25

## 2019-08-25 RX ADMIN — CLOPIDOGREL BISULFATE 75 MG: 75 TABLET ORAL at 09:31

## 2019-08-25 RX ADMIN — ATORVASTATIN CALCIUM 80 MG: 80 TABLET, FILM COATED ORAL at 21:54

## 2019-08-25 RX ADMIN — INSULIN LISPRO 1 UNITS: 100 INJECTION, SOLUTION INTRAVENOUS; SUBCUTANEOUS at 21:55

## 2019-08-25 RX ADMIN — ASPIRIN 81 MG 81 MG: 81 TABLET ORAL at 09:31

## 2019-08-25 RX ADMIN — INSULIN LISPRO 1 UNITS: 100 INJECTION, SOLUTION INTRAVENOUS; SUBCUTANEOUS at 00:00

## 2019-08-25 RX ADMIN — LOSARTAN POTASSIUM 50 MG: 50 TABLET ORAL at 09:31

## 2019-08-25 RX ADMIN — INSULIN GLARGINE 8 UNITS: 100 INJECTION, SOLUTION SUBCUTANEOUS at 00:01

## 2019-08-25 RX ADMIN — CARVEDILOL 25 MG: 25 TABLET, FILM COATED ORAL at 09:31

## 2019-08-25 RX ADMIN — NEPHROCAP 1 MG: 1 CAP ORAL at 09:31

## 2019-08-25 RX ADMIN — PIPERACILLIN SODIUM,TAZOBACTAM SODIUM 3.38 G: 3; .375 INJECTION, POWDER, FOR SOLUTION INTRAVENOUS at 05:36

## 2019-08-25 RX ADMIN — ISOSORBIDE MONONITRATE 30 MG: 30 TABLET, EXTENDED RELEASE ORAL at 09:31

## 2019-08-25 RX ADMIN — CHOLECALCIFEROL TAB 25 MCG (1000 UNIT) 1000 UNITS: 25 TAB at 09:31

## 2019-08-25 RX ADMIN — INSULIN LISPRO 1 UNITS: 100 INJECTION, SOLUTION INTRAVENOUS; SUBCUTANEOUS at 09:30

## 2019-08-25 RX ADMIN — SODIUM CHLORIDE 500 ML: 9 INJECTION, SOLUTION INTRAVENOUS at 13:26

## 2019-08-25 RX ADMIN — CINACALCET HYDROCHLORIDE 30 MG: 30 TABLET, FILM COATED ORAL at 09:31

## 2019-08-25 RX ADMIN — HEPARIN SODIUM 5000 UNITS: 5000 INJECTION INTRAVENOUS; SUBCUTANEOUS at 05:36

## 2019-08-25 RX ADMIN — INSULIN GLARGINE 8 UNITS: 100 INJECTION, SOLUTION SUBCUTANEOUS at 21:56

## 2019-08-25 RX ADMIN — Medication 10 ML: at 09:31

## 2019-08-25 RX ADMIN — AMIODARONE HYDROCHLORIDE 200 MG: 200 TABLET ORAL at 09:31

## 2019-08-25 RX ADMIN — PIPERACILLIN SODIUM,TAZOBACTAM SODIUM 3.38 G: 3; .375 INJECTION, POWDER, FOR SOLUTION INTRAVENOUS at 17:36

## 2019-08-25 RX ADMIN — MIRTAZAPINE 15 MG: 15 TABLET, FILM COATED ORAL at 21:54

## 2019-08-25 RX ADMIN — Medication 10 ML: at 21:54

## 2019-08-25 RX ADMIN — OXYCODONE HYDROCHLORIDE AND ACETAMINOPHEN 2 TABLET: 5; 325 TABLET ORAL at 09:11

## 2019-08-25 RX ADMIN — ISOSORBIDE MONONITRATE 30 MG: 30 TABLET, EXTENDED RELEASE ORAL at 21:54

## 2019-08-25 RX ADMIN — CARVEDILOL 6.25 MG: 6.25 TABLET, FILM COATED ORAL at 21:54

## 2019-08-25 ASSESSMENT — PAIN SCALES - GENERAL
PAINLEVEL_OUTOF10: 0
PAINLEVEL_OUTOF10: 1
PAINLEVEL_OUTOF10: 0
PAINLEVEL_OUTOF10: 8

## 2019-08-25 NOTE — PROGRESS NOTES
[facility]    [facility]              [phone]                  Plan :     Scheduled for dialysis on Monday. He is status post ultrasound-guided aortogram and left lower extremity arteriogram with catheterization. He underwent left SFA angioplasty and left lower extremity popliteal and tibioperoneal trunk angioplasty. He is status post TMA of the left foot on 2019  Discontinue losartan. Decrease carvedilol. Assessment :     ESRD: on hemodialysis  Dialysis center/schedule:   MWF at [facility]    Renal Imagin/19- CT b/l nephrolithiasis without obstruction    Volume status  Target Wt- will get from center  Appears euvolemic  2D Echo: - assymetrical LVH, normal EF    Calcification of the MV    Hypertension  BP: ()/(50-63)  Pulse:  [66-83]   BP is better  Fluid off with dialysis  Continue home medication    Anemia of CKD  Hgb: 8.3     Continue Aranesp    Renal Osteodystrophy  Stable PO4    Dialysis Access  L- AVF    Nutrition on dialysis  Lab Results   Component Value Date    LABALBU 2.7 (L) 2019     Check albumin    Left toe gangrene, ulceration- seen by [facility] Nephrology would like to thank Katie Saldana MD   for opportunity to serve this patient      Please call with questions at-   24 Hrs Answering service [phone] or  7 am- 5 pm via Perfect serve or cell phone  [employer] [place]          CC/reason for consult :     ESRD     HPI :     Basia Freire is a 75 y.o. male presented to   the hospital on 2019 with left foot wounds. He is sleepy, and lethargic, and unable to provide  [employer] details. Per significant other. Has pressure  Ulcer on the list for a week, and discoloration of the  2nd toe. His wound is getting worse because of  Which he was sent to the ED by Nursing home. Seen by Podiatry,found to have gangrene left 2nd toe,  Decub ulcer, and septic symptoms. Plan for MRI and possible  Debridement.     We are consulted for ESRD and related issues. Interval History:     Seen and examined in the room. Blood pressure is low.     ROS:     Seen with-none    positives in bold   Not obtained         PMH/PSH/SH/Family History:     Past Medical History:   Diagnosis Date    Anemia     Atrial fibrillation ([facility] 11/4/2013    CAD (coronary artery disease)     Mi, stent    Chronic kidney disease     DVT (deep venous thrombosis) (HCC)     End stage renal disease (HCC)     Gas gangrene ([facility]     Hyperkalemia     Hyperlipidemia 5/30/2012    Hypertension     Hyperthyroidism     Ischemic heart disease 8/63/1701    Metabolic encephalopathy     MI (myocardial infarction) ([facility] 10/2018    Type II or unspecified type diabetes mellitus without mention of complication, not stated as uncontrolled        Past Surgical History:   Procedure Laterality Date    CARDIAC SURGERY      cardiac stent    FEMORAL-TIBIAL BYPASS GRAFT Right 1/9/2019    RIGHT FEMORAL POSTERIOR TIBIAL BYPASS performed by Faustino Connor MD at [address] Right 1/4/2019    INCISION AND DRAINAGE, TRANSMETATARSAL AMPUTATION ALL ON RIGHT FOOT performed by Rissa Martin DPM at [facility] [facility] Right 1/14/2019    REVISION OF TRANSMETATARSAL AMPUTATION RIGHT FOOT performed by Rissa Martin DPM at [address] Right 1/18/2019    RIGHT BELOW THE KNEE AMPUTATION performed by Faustino Connor MD at [address] History     Socioeconomic History    Marital status: Single     Spouse name: Not on file    Number of children: Not on file    Years of education: Not on file    Highest education level: Not on file   Occupational History    Not on file   Social Needs    Financial resource strain: Not on file    Food insecurity:     Worry: Not on file     Inability: Not on file    Transportation needs:     Medical: Not on file     Non-medical: Not on file   Tobacco Use    Smoking status: Never Smoker   

## 2019-08-25 NOTE — PROGRESS NOTES
pertinent to this visit. has a past medical history of Anemia, Atrial fibrillation ([facility] CAD (coronary artery disease), Chronic kidney disease, DVT (deep venous thrombosis) ([facility] End stage renal disease ([facility] Gas gangrene ([facility] Hyperkalemia, Hyperlipidemia, Hypertension, Hyperthyroidism, Ischemic heart disease, Metabolic encephalopathy, MI (myocardial infarction) ([facility] and Type II or unspecified type diabetes mellitus without mention of complication, not stated as uncontrolled. has a past surgical history that includes Cardiac surgery; Foot Amputation (Right, 1/4/2019); Femoral-tibial Bypass Graft (Right, 1/9/2019); Foot Amputation (Right, 1/14/2019); and Leg amputation below knee (Right, 1/18/2019). Treatment Diagnosis: impaired ADLs and transfers, UB strength      Restrictions  Position Activity Restriction  Other position/activity restrictions: bedrest - discussed with Dr. Owen Briscoe - remain on BR., NWB LLE per podiatry note, contact isolation. Subjective   General  Chart Reviewed: Yes  Additional Pertinent Hx: 75 y.o. M to ED 8/21 w/ c/o left toe pain and a wound. Hospital Course:  MRI L foot: no evidence of osteomyelitis; angiogram on Friday 8/23; OR 8/24 for LEFT FOOT TRANSMETATARSAL AMPUTATION. PMH:  atrial fibrillation, CAD, previous DVT, hypertension, hyperlipidemia, diabetes, end-stage renal disease on hemodialysis Monday Wednesday and Friday, status post right BKA  Family / Caregiver Present: No  Referring Practitioner: Bayron Mendoza  Diagnosis: Abscess or Cellulitis of L Toe  Subjective  Subjective: Supine in bed on entry. \"I don't know that I'm ready for this yet. \"  Patient Currently in Pain: Yes(8/10  RN notified)    Social/Functional History  Social/Functional History  Lives With: Alone  Type of Home: Apartment  Home Layout: One level  Home Access: Level entry  Home Equipment: 4 wheeled walker  ADL Assistance: Needs assistance  Homemaking Assistance: Needs assistance  Ambulation Assistance: Needs assistance(using rollator (always w/ Marleny Beyer - girlfriend))  Transfer Assistance: Needs assistance  IADL Comments: came from nursing facility , has been receiving PT/OT, using walker for transfers/ ambulating with prosthesis. Additional Comments: sleeps in a recliner; NOTE: above information obtained from January admission (admitted from SNF this admission)       Objective   Vision: Within Functional Limits  Hearing: Within functional limits    Orientation  Overall Orientation Status: Within Functional Limits  Observation/Palpation  Observation: Left positioning boot  Balance  Sitting Balance: Supervision(x 20 minutes)  Standing Balance: Unable to assess(comment)(NWB LLE; R BKA)  ADL  Feeding: Independent  Grooming: Independent  Tone RUE  RUE Tone: Normotonic  Tone LUE  LUE Tone: Normotonic  Coordination  Movements Are Fluid And Coordinated: Yes     Bed mobility  Rolling to Left: Moderate assistance  Rolling to Right: Moderate assistance  Supine to Sit: 2 Person assistance(Mod A for trunk; Min A for legs)  Scooting: Moderate assistance(to EOB; Max A of 2 (to scoot up in bed; pt holding onto HOB bedframe to assist w/ scooting))        Cognition  Overall Cognitive Status: WFL  Cognition Comment: pleasant, cooperative, follows directions consistently        Sensation  Overall Sensation Status: WFL(denies numbness or tingling)        LUE AROM (degrees)  LUE AROM : WFL  Left Hand AROM (degrees)  Left Hand AROM: WFL  RUE AROM (degrees)  RUE AROM : WFL  Right Hand AROM (degrees)  Right Hand AROM: WFL  LUE Strength  Gross LUE Strength: WFL  RUE Strength  Gross RUE Strength: WFL           Pt seen by OT for eval and treat. Treatment included:  Bed mobility, unsupported sitting, feeding            Plan  This note will serve as a discharge summary in the event the patient is discharged prior to next treatment session.     Plan  Times per week: 2-5  Times per day: Daily  Current Treatment Recommendations: Strengthening, Endurance Training, Self-Care / ADL                                                      AM-PAC Score        AM-PAC Inpatient Daily Activity Raw Score: 18 (08/25/19 0953)  AM-PAC Inpatient ADL T-Scale Score : 38.66 (08/25/19 0953)  ADL Inpatient CMS 0-100% Score: 46.65 (08/25/19 0953)  ADL Inpatient CMS G-Code Modifier : CK (08/25/19 0953)    Goals  Short term goals  Time Frame for Short term goals: Discharge  Short term goal 1: independence with theraband exercise program  Short term goal 2: supine to sit w/ Min A  Patient Goals   Patient goals : no goal stated       Therapy Time   Individual Concurrent Group Co-treatment   Time In 0825         Time Out 0907         Minutes 42              Timed Code Treatment Minutes:   27    Total Treatment Minutes:  79 Zehra Chatman OTR/L #[id number]

## 2019-08-25 NOTE — PROGRESS NOTES
08/25/2019    CO2 25 08/25/2019    BUN 51 08/25/2019    CREATININE 7.2 08/25/2019    GFRAA 9 08/25/2019    GFRAA 19 05/14/2013    AGRATIO 0.4 08/22/2019    LABGLOM 7 08/25/2019    LABGLOM 24 05/23/2012    GLUCOSE 186 08/25/2019    GLUCOSE 216 05/23/2012    PROT 7.6 08/22/2019    PROT 6.8 05/23/2012    LABALBU 2.7 08/25/2019    CALCIUM 8.4 08/25/2019    BILITOT 0.3 08/22/2019    ALKPHOS 113 08/22/2019    AST 18 08/22/2019    ALT 24 08/22/2019         LOWER EXTREMITY EXAMINATION    LLE dressing with sanguinous strike through that is wet having just had his foot in a dependent position as he spoke with PT/OT.,     Vasc: DP non palpable, PT is dopplerable, both dorsal and plantar flaps with good cap refill, slight dusky edges dorsally, wound bed bleeding this am.     Derm: skin edges slightly dusky on the dorsum, no purulence, no fluctuance, tender with palpation of medial arch area, no foul odor noted. Packing was saturated with sanguinous drainage no purulence. Plantar space is smaller today with new granulation tissue present, dorsal space was unchanged. Neuro: loss of protective sensation, loss of epicritic sensation, deep pain still noted to surgical site. MSK: LLE S/P TMA open with packing. IMAGING:  Narrative 8/21/19   LEFT FOOT       HISTORY: Concern for toe infection, diabetic foot ulcer       COMPARISON: None       FINDINGS:       2 views of the left foot show distal vascular calcification consistent with diabetes.       There is severe hallux valgus and hammertoe deformities noted.       No fracture or acute bony pathology can be identified.       There is no radiographic evidence for osteomyelitis on this limited exam.           Impression       No radiographic evidence for osteomyelitis.  If this is a clinical evaluation with MRI is recommended if not contraindicated       Narrative 8/21/19   EXAMINATION: Magnetic resonance imaging (MRI) of the left foot without contrast       HISTORY: left side  - plan for return to OR Monday pm or Tuesday am for repeat wash out and possibly definitive closure.        DISPO: Patient admitted with Gangrenous foot 2/2 ischemia, now s/p angiography of the PT with single vessel runnoff to the foot, and S/P TMA packed open,  plan to take back to OR early this week for repeat washout, and hopefully definitive closure  .        Patient assessment and plan will be discussed with Dr. Bogdan Conner DPM   Podiatric Resident, PGY-3  Pager: [phone]  8/25/2019, 9:21 AM

## 2019-08-25 NOTE — PROGRESS NOTES
Physical Therapy    Facility/Department: [place] [address] [facility]  Initial Assessment and Treatment    NAME: Kirit Abdullahi  : 1945  MRN: [id number]    Date of Service: 2019    Discharge Recommendations:    Kirit Abdullahi scored a  on the AM-PAC short mobility form. Current research shows that an AM-PAC score of 17 or less is typically not associated with a discharge to the patient's home setting. Based on the patients AM-PAC score and their current functional mobility deficits, it is recommended that the patient have 3-5 sessions per week of Physical Therapy at d/c to increase the patients independence. PT Equipment Recommendations  Equipment Needed: No    Assessment   Body structures, Functions, Activity limitations: Decreased functional mobility ; Decreased strength;Decreased ROM  Assessment: Pt has been participating in therapies at [facility] and will benefit from continued therapies to maximize his functional mobility  Treatment Diagnosis: Decreased functional mobility post left TMA  Prognosis: Good  Decision Making: Medium Complexity  PT Education: PT Role;Goals;Plan of Care;General Safety  Barriers to Learning: none noted  REQUIRES PT FOLLOW UP: Yes  Activity Tolerance  Activity Tolerance: Patient Tolerated treatment well       Patient Diagnosis(es): The primary encounter diagnosis was Necrotic toes ([facility] A diagnosis of ESRD needing dialysis [facility]) was also pertinent to this visit. has a past medical history of Anemia, Atrial fibrillation ([address] CAD (coronary artery disease), Chronic kidney disease, DVT (deep venous thrombosis) ([address] End stage renal disease ([address] Gas gangrene ([address] Hyperkalemia, Hyperlipidemia, Hypertension, Hyperthyroidism, Ischemic heart disease, Metabolic encephalopathy, MI (myocardial infarction) ([address] and Type II or unspecified type diabetes mellitus without mention of complication, not stated as uncontrolled. has a past surgical history that includes Cardiac surgery;  Foot General AROM: CG for mobility in supine  AROM LLE (degrees)  LLE General AROM: min assist for mobility in supine  Strength RLE  Comment: unable to lift in supine  Strength LLE  Comment: unable to lift in supine        Bed mobility  Rolling to Left: Moderate assistance  Rolling to Right: Moderate assistance  Supine to Sit: Moderate assistance;2 Person assistance;Minimal assistance(mod of one for trunk, min of one for LE's)  Scooting: Maximal assistance;2 Person assistance(pt holding onto upper bed rame to pull)  Transfers  Comment: not attempted,  Ambulation  Ambulation?: No     Balance  Sitting - Static: Good  Comments: good static sitting balance, sat on eob 15 min to eat breakfast    Education  Patient educated regarding safety with functional mobility. Pt will benefit from reinforcement. Pt also educated regarding use of call light for safety with mobility. Pt expresses understanding. Treatment rendered and includes bed mobility, sitting balance. and education regarding safety with functional mobility.       Plan   Plan  Times per week: 2-5  Times per day: Daily  Current Treatment Recommendations: Strengthening, ROM, Functional Mobility Training, Transfer Training  Safety Devices  Type of devices: Call light within reach, Bed alarm in place, Left in bed, Nurse notified(left with DPM for dressing change)           AM-PAC Score  AM-PAC Inpatient Mobility Raw Score : 8 (08/25/19 0952)  AM-PAC Inpatient T-Scale Score : 28.52 (08/25/19 0952)  Mobility Inpatient CMS 0-100% Score: 86.62 (08/25/19 0952)  Mobility Inpatient CMS G-Code Modifier : CM (08/25/19 9730)          Goals  Short term goals  Time Frame for Short term goals: Discharge  Short term goal 1:  roll to right and left with min assist  Short term goal 2: supine to sit and back with min assist  Short term goal 3: Participate in OOB to chair transfers  Patient Goals   Patient goals : \" to get stronger\"       Therapy Time   Individual Concurrent Group

## 2019-08-25 NOTE — PROGRESS NOTES
MRSA, light Ps aeruginosa   Wound: Cult heavy GBS, heavy MRSA, light Ps aeruginosa  Staph aureus mrsa   Antibiotic Interpretation MADI Status    ciprofloxacin Resistant >=8 mcg/mL     clindamycin Sensitive <=0.25 mcg/mL     erythromycin Sensitive <=0.25 mcg/mL     oxacillin Resistant >=4 mcg/mL     tetracycline Sensitive <=1 mcg/mL     trimethoprim-sulfamethoxazole Sensitive <=10 mcg/mL     vancomycin Sensitive 1 mcg/mL       Pseudomonas aeruginosa   Antibiotic Interpretation MADI Status    cefepime Sensitive 8 mcg/mL     ciprofloxacin Sensitive <=1 mcg/mL     gentamicin Sensitive <=4 mcg/mL     meropenem Sensitive <=1 mcg/mL     piperacillin-tazobactam Sensitive <=16 mcg/mL     tobramycin Sensitive <=4 mcg/mL        BC no growth to date     Imagin/21 L foot x-ray:   No radiographic evidence for osteomyelitis       Scheduled Meds:   carvedilol  6.25 mg Oral BID    sodium chloride  250 mL Intravenous Once    sodium chloride flush  10 mL Intravenous 2 times per day    vancomycin  1,000 mg Intravenous Once per day on     amiodarone  200 mg Oral Daily    aspirin  81 mg Oral Daily    atorvastatin  80 mg Oral Nightly    b complex-C-folic acid  1 mg Oral Daily    cinacalcet  30 mg Oral Daily    clopidogrel  75 mg Oral Daily    insulin glargine  8 Units Subcutaneous BID    isosorbide mononitrate  30 mg Oral BID    mirtazapine  15 mg Oral Nightly    vitamin D  1 tablet Oral Daily    piperacillin-tazobactam  3.375 g Intravenous Q12H    insulin lispro  0-6 Units Subcutaneous TID WC    insulin lispro  0-3 Units Subcutaneous Nightly    darbepoetin paulina-polysorbate  60 mcg Intravenous Q7 Days    [Held by provider] heparin (porcine)  5,000 Units Subcutaneous 3 times per day       Continuous Infusions:   dextrose         PRN Meds:  sodium chloride flush, acetaminophen, oxyCODONE-acetaminophen **OR** oxyCODONE-acetaminophen, morphine **OR** morphine, ondansetron, cloNIDine, labetalol,

## 2019-08-25 NOTE — PROGRESS NOTES
Hospitalist Progress Note      PCP: Adolph Hardy MD    Date of Admission: 8/21/2019    Chief Complaint: Left toe cellulitis    Hospital Course:     Bleeding from surgical site  Dressing is currently soaked in blood  No chest pain  No shortness of breath  Blood pressure on the low end of normal  Status post transmetatarsal resection    Medications:  Reviewed      Exam:    BP (!) 89/50   Pulse 66   Temp 97.1 °F (36.2 °C) (Oral)   Resp 18   Ht 6' 5\" (1.956 m)   Wt 204 lb 9.4 oz (92.8 kg)   SpO2 99%   BMI 24.26 kg/m²     General appearance: No apparent distress, appears stated age and cooperative. HEENT: Pupils equal, round, and reactive to light. Conjunctivae/corneas clear. Neck: Supple, with full range of motion. No jugular venous distention. Trachea midline. Respiratory:  Normal respiratory effort. Clear to auscultation, bilaterally without RALES/WHEEZES/Rhonchi. Cardiovascular: Regular rate and rhythm with normal S1/S2 without MURMURS, rubs or gallops. Abdomen: Soft, non-tender, non-distended with normal bowel sounds. Musculoskeletal: No clubbing, cyanosis or EDEMA bilaterally. Full range of motion without deformity. Skin: Skin color, texture, turgor normal.  No rashes or lesions. Neurologic:  Neurovascularly intact without any focal sensory/motor deficits. Cranial nerves: II-XII intact, grossly non-focal.  Left foot dressed  No change in physical exam from 8/24      Labs:   Recent Labs     08/23/19  0643 08/24/19  0526 08/25/19  0543   WBC 13.4* 15.9* 12.8*   HGB 8.3* 9.1* 7.6*   HCT 26.5* 29.3* 24.1*    223 219     Recent Labs     08/24/19  0526 08/25/19  0543   NA  --  137   K 5.0 4.4   CL  --  97*   CO2  --  25   BUN  --  51*   CREATININE  --  7.2*   CALCIUM  --  8.4   PHOS  --  4.7     No results for input(s): AST, ALT, BILIDIR, BILITOT, ALKPHOS in the last 72 hours. No results for input(s): INR in the last 72 hours.   No results for input(s): [place]  in the last 72

## 2019-08-26 ENCOUNTER — ANESTHESIA EVENT (OUTPATIENT)
Dept: OPERATING ROOM | Age: 74
DRG: 239 | End: 2019-08-26
Payer: MEDICARE

## 2019-08-26 LAB
ABO/RH: NORMAL
ANTIBODY SCREEN: NORMAL
BASOPHILS ABSOLUTE: 0.1 K/UL (ref 0–0.2)
BASOPHILS RELATIVE PERCENT: 0.7 %
BLOOD BANK DISPENSE STATUS: NORMAL
BLOOD BANK DISPENSE STATUS: NORMAL
BLOOD BANK PRODUCT CODE: NORMAL
BLOOD BANK PRODUCT CODE: NORMAL
BLOOD CULTURE, ROUTINE: NORMAL
BODY FLUID CULTURE, STERILE: ABNORMAL
BPU ID: NORMAL
BPU ID: NORMAL
CULTURE, BLOOD 2: NORMAL
DESCRIPTION BLOOD BANK: NORMAL
DESCRIPTION BLOOD BANK: NORMAL
EOSINOPHILS ABSOLUTE: 0.2 K/UL (ref 0–0.6)
EOSINOPHILS RELATIVE PERCENT: 1.3 %
GLUCOSE BLD-MCNC: 112 MG/DL (ref 70–99)
GLUCOSE BLD-MCNC: 120 MG/DL (ref 70–99)
GLUCOSE BLD-MCNC: 93 MG/DL (ref 70–99)
GRAM STAIN RESULT: ABNORMAL
HCT VFR BLD CALC: 25.5 % (ref 40.5–52.5)
HEMOGLOBIN: 8 G/DL (ref 13.5–17.5)
LYMPHOCYTES ABSOLUTE: 1.5 K/UL (ref 1–5.1)
LYMPHOCYTES RELATIVE PERCENT: 12.1 %
MCH RBC QN AUTO: 26.6 PG (ref 26–34)
MCHC RBC AUTO-ENTMCNC: 31.6 G/DL (ref 31–36)
MCV RBC AUTO: 84.2 FL (ref 80–100)
MONOCYTES ABSOLUTE: 1 K/UL (ref 0–1.3)
MONOCYTES RELATIVE PERCENT: 8.1 %
NEUTROPHILS ABSOLUTE: 9.3 K/UL (ref 1.7–7.7)
NEUTROPHILS RELATIVE PERCENT: 77.8 %
ORGANISM: ABNORMAL
PDW BLD-RTO: 21.3 % (ref 12.4–15.4)
PERFORMED ON: ABNORMAL
PERFORMED ON: ABNORMAL
PERFORMED ON: NORMAL
PLATELET # BLD: 218 K/UL (ref 135–450)
PMV BLD AUTO: 8 FL (ref 5–10.5)
RBC # BLD: 3.02 M/UL (ref 4.2–5.9)
VANCOMYCIN RANDOM: 20.7 UG/ML
WBC # BLD: 12 K/UL (ref 4–11)

## 2019-08-26 PROCEDURE — 6360000002 HC RX W HCPCS: Performed by: INTERNAL MEDICINE

## 2019-08-26 PROCEDURE — 2060000000 HC ICU INTERMEDIATE R&B

## 2019-08-26 PROCEDURE — 6370000000 HC RX 637 (ALT 250 FOR IP): Performed by: STUDENT IN AN ORGANIZED HEALTH CARE EDUCATION/TRAINING PROGRAM

## 2019-08-26 PROCEDURE — 86900 BLOOD TYPING SEROLOGIC ABO: CPT

## 2019-08-26 PROCEDURE — 6360000002 HC RX W HCPCS: Performed by: STUDENT IN AN ORGANIZED HEALTH CARE EDUCATION/TRAINING PROGRAM

## 2019-08-26 PROCEDURE — 94150 VITAL CAPACITY TEST: CPT

## 2019-08-26 PROCEDURE — 80202 ASSAY OF VANCOMYCIN: CPT

## 2019-08-26 PROCEDURE — 85025 COMPLETE CBC W/AUTO DIFF WBC: CPT

## 2019-08-26 PROCEDURE — 94799 UNLISTED PULMONARY SVC/PX: CPT

## 2019-08-26 PROCEDURE — 90935 HEMODIALYSIS ONE EVALUATION: CPT

## 2019-08-26 PROCEDURE — 99232 SBSQ HOSP IP/OBS MODERATE 35: CPT | Performed by: INTERNAL MEDICINE

## 2019-08-26 PROCEDURE — 6370000000 HC RX 637 (ALT 250 FOR IP): Performed by: INTERNAL MEDICINE

## 2019-08-26 PROCEDURE — 36415 COLL VENOUS BLD VENIPUNCTURE: CPT

## 2019-08-26 PROCEDURE — 86901 BLOOD TYPING SEROLOGIC RH(D): CPT

## 2019-08-26 PROCEDURE — 86850 RBC ANTIBODY SCREEN: CPT

## 2019-08-26 PROCEDURE — 2580000003 HC RX 258: Performed by: STUDENT IN AN ORGANIZED HEALTH CARE EDUCATION/TRAINING PROGRAM

## 2019-08-26 PROCEDURE — 2580000003 HC RX 258: Performed by: INTERNAL MEDICINE

## 2019-08-26 RX ORDER — POLYETHYLENE GLYCOL 3350 17 G/17G
17 POWDER, FOR SOLUTION ORAL 2 TIMES DAILY
Status: DISCONTINUED | OUTPATIENT
Start: 2019-08-26 | End: 2019-08-29 | Stop reason: HOSPADM

## 2019-08-26 RX ADMIN — HEPARIN SODIUM 5000 UNITS: 5000 INJECTION INTRAVENOUS; SUBCUTANEOUS at 22:42

## 2019-08-26 RX ADMIN — CARVEDILOL 6.25 MG: 6.25 TABLET, FILM COATED ORAL at 22:38

## 2019-08-26 RX ADMIN — ATORVASTATIN CALCIUM 80 MG: 80 TABLET, FILM COATED ORAL at 22:38

## 2019-08-26 RX ADMIN — MIRTAZAPINE 15 MG: 15 TABLET, FILM COATED ORAL at 22:38

## 2019-08-26 RX ADMIN — ISOSORBIDE MONONITRATE 30 MG: 30 TABLET, EXTENDED RELEASE ORAL at 18:13

## 2019-08-26 RX ADMIN — PIPERACILLIN SODIUM,TAZOBACTAM SODIUM 3.38 G: 3; .375 INJECTION, POWDER, FOR SOLUTION INTRAVENOUS at 05:20

## 2019-08-26 RX ADMIN — CARVEDILOL 6.25 MG: 6.25 TABLET, FILM COATED ORAL at 18:12

## 2019-08-26 RX ADMIN — PIPERACILLIN SODIUM,TAZOBACTAM SODIUM 3.38 G: 3; .375 INJECTION, POWDER, FOR SOLUTION INTRAVENOUS at 18:14

## 2019-08-26 RX ADMIN — VANCOMYCIN HYDROCHLORIDE 750 MG: 10 INJECTION, POWDER, LYOPHILIZED, FOR SOLUTION INTRAVENOUS at 13:50

## 2019-08-26 RX ADMIN — ISOSORBIDE MONONITRATE 30 MG: 30 TABLET, EXTENDED RELEASE ORAL at 22:39

## 2019-08-26 RX ADMIN — NEPHROCAP 1 MG: 1 CAP ORAL at 18:13

## 2019-08-26 RX ADMIN — POLYETHYLENE GLYCOL 3350 17 G: 17 POWDER, FOR SOLUTION ORAL at 18:12

## 2019-08-26 RX ADMIN — CLOPIDOGREL BISULFATE 75 MG: 75 TABLET ORAL at 18:13

## 2019-08-26 RX ADMIN — CINACALCET HYDROCHLORIDE 30 MG: 30 TABLET, FILM COATED ORAL at 18:13

## 2019-08-26 RX ADMIN — INSULIN GLARGINE 8 UNITS: 100 INJECTION, SOLUTION SUBCUTANEOUS at 22:42

## 2019-08-26 RX ADMIN — CHOLECALCIFEROL TAB 25 MCG (1000 UNIT) 1000 UNITS: 25 TAB at 18:12

## 2019-08-26 RX ADMIN — AMIODARONE HYDROCHLORIDE 200 MG: 200 TABLET ORAL at 18:13

## 2019-08-26 RX ADMIN — Medication 10 ML: at 22:40

## 2019-08-26 RX ADMIN — INSULIN LISPRO 1 UNITS: 100 INJECTION, SOLUTION INTRAVENOUS; SUBCUTANEOUS at 18:11

## 2019-08-26 ASSESSMENT — PAIN SCALES - GENERAL
PAINLEVEL_OUTOF10: 0

## 2019-08-26 NOTE — PROGRESS NOTES
HTN, hx AF     Admit 1/3/19 - 1/21/19 with lethargy.  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 D/c on 1/24.     Pt had been at [facility] from 1/24 to 7/28. Readmitted to [facility] on 8/11.       Pt's wife reports pt's L 2nd toe had distal wound and dark on 8/11 (last times she saw foot). Pt was seen by wound care RN on 8/20, noted 2nd toe to be black, assoc drainage and odor. Pt had no symptoms - no pain, no fever / chills      In ED - afebrile, WBC 16.0. X-ray neg. . ESR >120. BC / wound cult sent   Seen by podiatry (reviewed consult). Seen by Vascular, plan for angio with intent to treat. On vancomycin + zosyn.       IMP/  DM, neuropathy, CRF on HD, AF, HTN, hx AF  R BKA 1/2019     L 2nd toe gangrene / DFI  POD#2 angio with L SFA, popliteal, tibioperoneal trunk and posterior tibial artery angioplasty  POD#2 open TMA ('liquifactie necrosis')    Plan:     Cont vancomycin, dose at HD - 1 gm given today  Cont Zosyn  Vascular f/u   Wound care and return to OR 8/27 per Podiatry      Discussed with pt, RN  Violeta Guaman MD

## 2019-08-26 NOTE — PROGRESS NOTES
decision to ID. · Vancomycin - Pharmacy to dose  · Due to ESRD on HD, will dose Vancomycin intermittently based on levels. · Vancomycin level = 20.7 mcg/mL -- will adjust vancomycin to 750 mg IV 3x weekly with HD on Mon-Wed-Fri. Will give adjusted dose with HD today. · Clinical condition will be monitored closely, and levels / doses will be ordered as appropriate.     Please call with questions--  Thanks--  Isac AlbaradoD, BCPS  Wireless: [phone]  or [phone]  8/26/2019 9:13 AM

## 2019-08-26 NOTE — CARE COORDINATION
Case Management Assessment           Daily Note                 Date/ Time of Note: 8/26/2019 12:45 PM         Note completed by: Genetta Musca Day    Patient Name: Lety Gandara  YOB: 1945    Diagnosis:Abscess or cellulitis of toe, left [L03.032, L02.612]  Abscess or cellulitis of toe, left [L03.032, L02.612]  Patient Admission Status: Inpatient    Date of Admission:8/21/2019  9:16 AM Length of Stay: 5 GLOS:        Current Plan of Care: Plan for return to OR Tuesday with Podiatry. Goal of wash out and close if possible.   ________________________________________________________________________________________  PT AM-PAC: 8 / 24 per last evaluation on: 8/25    OT AM-PAC: 18 / 24 per last evaluation on: 8/25    DME Needs for discharge: Defer  ________________________________________________________________________________________  Discharge Plan: SNF: [facility]    Tentative discharge date:  8/27 vs. 8/28     Current barriers to discharge: Medical Clearance, new placement, pre-cert needed     Referrals completed: SNF: [facility], [address], [address], [place],     Resources/ information provided: SNF List  ________________________________________________________________________________________  Case Management Notes:Patient has been off floor since initial rounds at 10:08 am. SW reviewed chart. Family was not interested in returning skilled to [address] [place] [address]. [address] is willing to accept, however family would have to cover 20% for transport to/from dialysis (family has declined that at this time). [facility] can accept patient for short-term rehab, but has no long-term beds available to transition into and patient would have to move. [facility] does not provide transportation to/from dialysis and that would be families responsibility. [facility] referral was made by team on Friday.        Patient was previously leaving at home with [facility]

## 2019-08-26 NOTE — PROGRESS NOTES
possible  Debridement. We are consulted for ESRD and related issues. Interval History:     Seen and examined in the room. Blood pressure is low.     ROS:     Seen with-none    positives in bold   Not obtained         PMH/PSH/SH/Family History:     Past Medical History:   Diagnosis Date    Anemia     Atrial fibrillation ([facility] 11/4/2013    CAD (coronary artery disease)     Mi, stent    Chronic kidney disease     DVT (deep venous thrombosis) (HCC)     End stage renal disease (HCC)     Gas gangrene ([facility]     Hyperkalemia     Hyperlipidemia 5/30/2012    Hypertension     Hyperthyroidism     Ischemic heart disease 1/69/0682    Metabolic encephalopathy     MI (myocardial infarction) ([facility] 10/2018    Type II or unspecified type diabetes mellitus without mention of complication, not stated as uncontrolled        Past Surgical History:   Procedure Laterality Date    CARDIAC SURGERY      cardiac stent    FEMORAL-TIBIAL BYPASS GRAFT Right 1/9/2019    RIGHT FEMORAL POSTERIOR TIBIAL BYPASS performed by Jossy Gutiérrez MD at [address] [place] [address] Right 1/4/2019    INCISION AND DRAINAGE, TRANSMETATARSAL AMPUTATION ALL ON RIGHT FOOT performed by Lisa Kessler DPM at [address] [place] [address] Right 1/14/2019    REVISION OF TRANSMETATARSAL AMPUTATION RIGHT FOOT performed by Lisa Kessler DPM at [address] [place] [address] Left 08/24/2019    TMA    LEG AMPUTATION BELOW KNEE Right 1/18/2019    RIGHT BELOW THE KNEE AMPUTATION performed by Jossy Gutiérrez MD at [address]  History     Socioeconomic History    Marital status: Single     Spouse name: Not on file    Number of children: Not on file    Years of education: Not on file    Highest education level: Not on file   Occupational History    Not on file   Social Needs    Financial resource strain: Not on file    Food insecurity:     Worry: Not on file     Inability: Not on file    Transportation needs:     Medical: Not on file

## 2019-08-26 NOTE — FLOWSHEET NOTE
08/26/19 1025 08/26/19 1434   Vital Signs   BP (!) 147/67 (!) 171/92   Temp 99.1 °F (37.3 °C) 98.4 °F (36.9 °C)   Pulse 67 99   Resp 16 16   Weight 190 lb 11.2 oz (86.5 kg) 186 lb 4.6 oz (84.5 kg)   Weight Method Bed scale Bed scale   Dry Weight 201 lb 11.5 oz (91.5 kg) 201 lb 11.5 oz (91.5 kg)     tx tolerated well, vanc given, 2500 ml removed, under dry wt new dry wt needs established, flowsheet printed and put in pt chart for EMR

## 2019-08-26 NOTE — PROGRESS NOTES
by vascular surgery had angiogram and angioplasty done on the left lower extremity continue aspirin and Plavix     End-stage renal disease on hemodialysis  Nephrology consulted  For dialysis today     Type 2 diabetes mellitus  Continue Lantus 8 units twice daily with sliding scale insulin     Hypertension  Cont  home medications    Bleeding from wound site  Will let podiatry no  Hold heparin for now  Aspirin Plavix continued given angiogram and angioplasty    Anemia acute on chronic secondary to postoperative blood loss  Status post 1 unit of blood transfusion  Appropriate rise in hemoglobin    Constipation  Start on a bowel regimen             DVT Prophylaxis: sc heparin   Diet: Dietary Nutrition Supplements: Low Calorie High Protein Supplement  DIET CARB CONTROL;   Diet NPO, After Midnight  Code Status: Full Code      Dispo - once acute medical processes have resolved    Jenise Claude, MD

## 2019-08-26 NOTE — PLAN OF CARE
Problem: Falls - Risk of:  Goal: Will remain free from falls  Description  Will remain free from falls  Outcome: Ongoing  Note:   Sonia aHmeed remained safe and free of falls during this shift, he called appropriately with his call light when he needed assistance. Goal: Absence of physical injury  Description  Absence of physical injury  Outcome: Ongoing     Problem: Risk for Impaired Skin Integrity  Goal: Tissue integrity - skin and mucous membranes  Description  Structural intactness and normal physiological function of skin and  mucous membranes. 8/26/2019 0221 by Leobardo Maldonado RN  Outcome: Ongoing  Note:   Staff attempted to turn Sonia Hameed, but he refused to be turned. He remained on a RAO mattress and his foot remained in a heel protector boot.    8/25/2019 1737 by Yesenia Soto RN  Outcome: Ongoing     Problem: Nutrition  Goal: Optimal nutrition therapy  Outcome: Ongoing     Problem: Pain:  Goal: Pain level will decrease  Description  Pain level will decrease  Outcome: Ongoing  Goal: Control of acute pain  Description  Control of acute pain  Outcome: Ongoing  Goal: Control of chronic pain  Description  Control of chronic pain  Outcome: Ongoing

## 2019-08-26 NOTE — OP NOTE
the dorsal and plantar flap were noted to be bleeding adequately. The plantar flap was then rotated dorsally and the redundant and extra tissue removed using a #15 blade. The skin was then closed using 2-0 and 3-0 prolene in an alternating horizontal mattress and simple interrupted suture technique. Details of Procedure #2:  At this time, attention was directed to patients right posterior Achilles tendon region. At this time with the use of an #15 blade, three stab incisions were made along the course of the Achilles tendon, aron-transecting the tendon. The foot was then dorsiflexed and allowed the tendon to slide upon itself in a more lengthened position. Upon completion of this, the previously noted plantar flexor contracture was markedly reduced. Attention was then directed toward the remainder of the procedure. Surgical site was flushed with copious amounts of sterile saline after the procedure. The incision closure commenced with 4-0 nylon suture in a simple interrupted fashion for recoapting the skin. Details of Procedure #3:  At this time, a local anesthetic was injected in a regional block fashion about the patients surgical sites for the patients postoperative comfort and soft dry sterile dressing was applied. Next, copious amounts of cast padding was applied to the patient's right lower extremity from the metatarsal heads to approximately 3 finger breaths distal to the head and neck of the fibula. Next, using moistened padded splint material, a posterior splint was applied from the plantar foot posteriorly up the leg to approximately the midcalf area. ACE compression was then applied from distal to proximal to secure the posterior splint in place and provide compression to prevent post-operative edema. At this time, the foot was then held at 90 degrees to the leg to prevent acquired equinus deformity and relieve tension on the surgical repair.     END OF PROCEDURE: The patient tolerated the procedure and anesthesia well and was transported from the operating room to the PACU with vital signs stable and vascular status intact to all aspects of the patient's left lower extremity and digital capillary refill time immediate to the digits of the left  foot. Following a period of post-operative monitoring, the patient will be transferred back to the floor for further monitoring.        Dictated on behalf of Dr. Robina Haywood, DPM   Podiatric Resident, PGY-3  Pager: [phone]  9/4/2019, 11:50 AM

## 2019-08-27 ENCOUNTER — APPOINTMENT (OUTPATIENT)
Dept: GENERAL RADIOLOGY | Age: 74
DRG: 239 | End: 2019-08-27
Payer: MEDICARE

## 2019-08-27 ENCOUNTER — ANESTHESIA (OUTPATIENT)
Dept: OPERATING ROOM | Age: 74
DRG: 239 | End: 2019-08-27
Payer: MEDICARE

## 2019-08-27 VITALS — OXYGEN SATURATION: 100 % | DIASTOLIC BLOOD PRESSURE: 68 MMHG | SYSTOLIC BLOOD PRESSURE: 115 MMHG

## 2019-08-27 LAB
BASOPHILS ABSOLUTE: 0.1 K/UL (ref 0–0.2)
BASOPHILS RELATIVE PERCENT: 0.7 %
EOSINOPHILS ABSOLUTE: 0.1 K/UL (ref 0–0.6)
EOSINOPHILS RELATIVE PERCENT: 1 %
GLUCOSE BLD-MCNC: 126 MG/DL (ref 70–99)
GLUCOSE BLD-MCNC: 137 MG/DL (ref 70–99)
GLUCOSE BLD-MCNC: 88 MG/DL (ref 70–99)
GLUCOSE BLD-MCNC: 94 MG/DL (ref 70–99)
HCT VFR BLD CALC: 24.3 % (ref 40.5–52.5)
HEMOGLOBIN: 7.7 G/DL (ref 13.5–17.5)
LYMPHOCYTES ABSOLUTE: 1.7 K/UL (ref 1–5.1)
LYMPHOCYTES RELATIVE PERCENT: 14.7 %
MCH RBC QN AUTO: 26.8 PG (ref 26–34)
MCHC RBC AUTO-ENTMCNC: 31.8 G/DL (ref 31–36)
MCV RBC AUTO: 84.5 FL (ref 80–100)
MONOCYTES ABSOLUTE: 0.9 K/UL (ref 0–1.3)
MONOCYTES RELATIVE PERCENT: 8 %
NEUTROPHILS ABSOLUTE: 9 K/UL (ref 1.7–7.7)
NEUTROPHILS RELATIVE PERCENT: 75.6 %
PDW BLD-RTO: 21.5 % (ref 12.4–15.4)
PERFORMED ON: ABNORMAL
PERFORMED ON: ABNORMAL
PERFORMED ON: NORMAL
PERFORMED ON: NORMAL
PLATELET # BLD: 238 K/UL (ref 135–450)
PMV BLD AUTO: 7.9 FL (ref 5–10.5)
RBC # BLD: 2.88 M/UL (ref 4.2–5.9)
WBC # BLD: 11.8 K/UL (ref 4–11)

## 2019-08-27 PROCEDURE — 0Y6N0Z9 DETACHMENT AT LEFT FOOT, PARTIAL 1ST RAY, OPEN APPROACH: ICD-10-PCS | Performed by: PODIATRIST

## 2019-08-27 PROCEDURE — 3600000002 HC SURGERY LEVEL 2 BASE: Performed by: PODIATRIST

## 2019-08-27 PROCEDURE — 99232 SBSQ HOSP IP/OBS MODERATE 35: CPT | Performed by: INTERNAL MEDICINE

## 2019-08-27 PROCEDURE — 6370000000 HC RX 637 (ALT 250 FOR IP): Performed by: INTERNAL MEDICINE

## 2019-08-27 PROCEDURE — 7100000001 HC PACU RECOVERY - ADDTL 15 MIN: Performed by: PODIATRIST

## 2019-08-27 PROCEDURE — 3600000012 HC SURGERY LEVEL 2 ADDTL 15MIN: Performed by: PODIATRIST

## 2019-08-27 PROCEDURE — 94761 N-INVAS EAR/PLS OXIMETRY MLT: CPT

## 2019-08-27 PROCEDURE — 73620 X-RAY EXAM OF FOOT: CPT

## 2019-08-27 PROCEDURE — 2580000003 HC RX 258: Performed by: INTERNAL MEDICINE

## 2019-08-27 PROCEDURE — 0Y6N0ZC DETACHMENT AT LEFT FOOT, PARTIAL 3RD RAY, OPEN APPROACH: ICD-10-PCS | Performed by: PODIATRIST

## 2019-08-27 PROCEDURE — 97530 THERAPEUTIC ACTIVITIES: CPT

## 2019-08-27 PROCEDURE — 2580000003 HC RX 258: Performed by: NURSE ANESTHETIST, CERTIFIED REGISTERED

## 2019-08-27 PROCEDURE — 85025 COMPLETE CBC W/AUTO DIFF WBC: CPT

## 2019-08-27 PROCEDURE — 0Y6N0ZF DETACHMENT AT LEFT FOOT, PARTIAL 5TH RAY, OPEN APPROACH: ICD-10-PCS | Performed by: PODIATRIST

## 2019-08-27 PROCEDURE — 3700000000 HC ANESTHESIA ATTENDED CARE: Performed by: PODIATRIST

## 2019-08-27 PROCEDURE — 0Y6N0ZD DETACHMENT AT LEFT FOOT, PARTIAL 4TH RAY, OPEN APPROACH: ICD-10-PCS | Performed by: PODIATRIST

## 2019-08-27 PROCEDURE — 6360000002 HC RX W HCPCS: Performed by: STUDENT IN AN ORGANIZED HEALTH CARE EDUCATION/TRAINING PROGRAM

## 2019-08-27 PROCEDURE — 6360000002 HC RX W HCPCS: Performed by: NURSE ANESTHETIST, CERTIFIED REGISTERED

## 2019-08-27 PROCEDURE — 73630 X-RAY EXAM OF FOOT: CPT

## 2019-08-27 PROCEDURE — 2500000003 HC RX 250 WO HCPCS: Performed by: NURSE ANESTHETIST, CERTIFIED REGISTERED

## 2019-08-27 PROCEDURE — 2700000000 HC OXYGEN THERAPY PER DAY

## 2019-08-27 PROCEDURE — 6360000002 HC RX W HCPCS: Performed by: INTERNAL MEDICINE

## 2019-08-27 PROCEDURE — 2580000003 HC RX 258: Performed by: STUDENT IN AN ORGANIZED HEALTH CARE EDUCATION/TRAINING PROGRAM

## 2019-08-27 PROCEDURE — 2500000003 HC RX 250 WO HCPCS: Performed by: PODIATRIST

## 2019-08-27 PROCEDURE — 3700000001 HC ADD 15 MINUTES (ANESTHESIA): Performed by: PODIATRIST

## 2019-08-27 PROCEDURE — 6370000000 HC RX 637 (ALT 250 FOR IP): Performed by: STUDENT IN AN ORGANIZED HEALTH CARE EDUCATION/TRAINING PROGRAM

## 2019-08-27 PROCEDURE — 2580000003 HC RX 258: Performed by: PODIATRIST

## 2019-08-27 PROCEDURE — 2709999900 HC NON-CHARGEABLE SUPPLY: Performed by: PODIATRIST

## 2019-08-27 PROCEDURE — 2060000000 HC ICU INTERMEDIATE R&B

## 2019-08-27 PROCEDURE — 36415 COLL VENOUS BLD VENIPUNCTURE: CPT

## 2019-08-27 PROCEDURE — 0Y6N0ZB DETACHMENT AT LEFT FOOT, PARTIAL 2ND RAY, OPEN APPROACH: ICD-10-PCS | Performed by: PODIATRIST

## 2019-08-27 PROCEDURE — 7100000000 HC PACU RECOVERY - FIRST 15 MIN: Performed by: PODIATRIST

## 2019-08-27 RX ORDER — IBUPROFEN 400 MG/1
400 TABLET ORAL ONCE
Status: COMPLETED | OUTPATIENT
Start: 2019-08-27 | End: 2019-08-27

## 2019-08-27 RX ORDER — PROMETHAZINE HYDROCHLORIDE 25 MG/ML
6.25 INJECTION, SOLUTION INTRAMUSCULAR; INTRAVENOUS
Status: DISCONTINUED | OUTPATIENT
Start: 2019-08-27 | End: 2019-08-27 | Stop reason: HOSPADM

## 2019-08-27 RX ORDER — FENTANYL CITRATE 50 UG/ML
INJECTION, SOLUTION INTRAMUSCULAR; INTRAVENOUS PRN
Status: DISCONTINUED | OUTPATIENT
Start: 2019-08-27 | End: 2019-08-27 | Stop reason: SDUPTHER

## 2019-08-27 RX ORDER — BUPIVACAINE HYDROCHLORIDE 5 MG/ML
INJECTION, SOLUTION EPIDURAL; INTRACAUDAL PRN
Status: DISCONTINUED | OUTPATIENT
Start: 2019-08-27 | End: 2019-08-27 | Stop reason: ALTCHOICE

## 2019-08-27 RX ORDER — MAGNESIUM HYDROXIDE 1200 MG/15ML
LIQUID ORAL CONTINUOUS PRN
Status: COMPLETED | OUTPATIENT
Start: 2019-08-27 | End: 2019-08-27

## 2019-08-27 RX ORDER — FENTANYL CITRATE 50 UG/ML
25 INJECTION, SOLUTION INTRAMUSCULAR; INTRAVENOUS EVERY 5 MIN PRN
Status: DISCONTINUED | OUTPATIENT
Start: 2019-08-27 | End: 2019-08-27 | Stop reason: HOSPADM

## 2019-08-27 RX ORDER — BUTALBITAL, ACETAMINOPHEN AND CAFFEINE 50; 325; 40 MG/1; MG/1; MG/1
1 TABLET ORAL EVERY 12 HOURS PRN
Status: DISCONTINUED | OUTPATIENT
Start: 2019-08-27 | End: 2019-08-27

## 2019-08-27 RX ORDER — ONDANSETRON 2 MG/ML
4 INJECTION INTRAMUSCULAR; INTRAVENOUS
Status: DISCONTINUED | OUTPATIENT
Start: 2019-08-27 | End: 2019-08-27 | Stop reason: HOSPADM

## 2019-08-27 RX ORDER — LIDOCAINE HYDROCHLORIDE 20 MG/ML
INJECTION, SOLUTION INTRAVENOUS PRN
Status: DISCONTINUED | OUTPATIENT
Start: 2019-08-27 | End: 2019-08-27 | Stop reason: SDUPTHER

## 2019-08-27 RX ORDER — PROPOFOL 10 MG/ML
INJECTION, EMULSION INTRAVENOUS PRN
Status: DISCONTINUED | OUTPATIENT
Start: 2019-08-27 | End: 2019-08-27 | Stop reason: SDUPTHER

## 2019-08-27 RX ORDER — SODIUM CHLORIDE 9 MG/ML
INJECTION, SOLUTION INTRAVENOUS CONTINUOUS PRN
Status: DISCONTINUED | OUTPATIENT
Start: 2019-08-27 | End: 2019-08-27 | Stop reason: SDUPTHER

## 2019-08-27 RX ORDER — ONDANSETRON 2 MG/ML
INJECTION INTRAMUSCULAR; INTRAVENOUS PRN
Status: DISCONTINUED | OUTPATIENT
Start: 2019-08-27 | End: 2019-08-27 | Stop reason: SDUPTHER

## 2019-08-27 RX ORDER — FENTANYL CITRATE 50 UG/ML
50 INJECTION, SOLUTION INTRAMUSCULAR; INTRAVENOUS EVERY 5 MIN PRN
Status: DISCONTINUED | OUTPATIENT
Start: 2019-08-27 | End: 2019-08-27 | Stop reason: HOSPADM

## 2019-08-27 RX ORDER — POVIDONE-IODINE 10 MG/G
OINTMENT TOPICAL PRN
Status: DISCONTINUED | OUTPATIENT
Start: 2019-08-27 | End: 2019-08-27 | Stop reason: ALTCHOICE

## 2019-08-27 RX ADMIN — SODIUM CHLORIDE: 900 INJECTION, SOLUTION INTRAVENOUS at 07:47

## 2019-08-27 RX ADMIN — CINACALCET HYDROCHLORIDE 30 MG: 30 TABLET, FILM COATED ORAL at 10:45

## 2019-08-27 RX ADMIN — CLOPIDOGREL BISULFATE 75 MG: 75 TABLET ORAL at 10:45

## 2019-08-27 RX ADMIN — PROPOFOL 50 MG: 10 INJECTION, EMULSION INTRAVENOUS at 07:53

## 2019-08-27 RX ADMIN — PROPOFOL 25 MG: 10 INJECTION, EMULSION INTRAVENOUS at 07:55

## 2019-08-27 RX ADMIN — ATORVASTATIN CALCIUM 80 MG: 80 TABLET, FILM COATED ORAL at 22:18

## 2019-08-27 RX ADMIN — CARVEDILOL 6.25 MG: 6.25 TABLET, FILM COATED ORAL at 20:12

## 2019-08-27 RX ADMIN — NEPHROCAP 1 MG: 1 CAP ORAL at 10:45

## 2019-08-27 RX ADMIN — FENTANYL CITRATE 25 MCG: 50 INJECTION INTRAMUSCULAR; INTRAVENOUS at 07:52

## 2019-08-27 RX ADMIN — INSULIN GLARGINE 8 UNITS: 100 INJECTION, SOLUTION SUBCUTANEOUS at 20:18

## 2019-08-27 RX ADMIN — MIRTAZAPINE 15 MG: 15 TABLET, FILM COATED ORAL at 20:12

## 2019-08-27 RX ADMIN — CHOLECALCIFEROL TAB 25 MCG (1000 UNIT) 1000 UNITS: 25 TAB at 10:45

## 2019-08-27 RX ADMIN — PIPERACILLIN SODIUM,TAZOBACTAM SODIUM 3.38 G: 3; .375 INJECTION, POWDER, FOR SOLUTION INTRAVENOUS at 06:09

## 2019-08-27 RX ADMIN — IBUPROFEN 400 MG: 400 TABLET, FILM COATED ORAL at 23:17

## 2019-08-27 RX ADMIN — PROPOFOL 25 MG: 10 INJECTION, EMULSION INTRAVENOUS at 08:05

## 2019-08-27 RX ADMIN — FENTANYL CITRATE 25 MCG: 50 INJECTION INTRAMUSCULAR; INTRAVENOUS at 07:55

## 2019-08-27 RX ADMIN — FAMOTIDINE 20 MG: 10 INJECTION, SOLUTION INTRAVENOUS at 07:50

## 2019-08-27 RX ADMIN — ONDANSETRON 4 MG: 2 INJECTION INTRAMUSCULAR; INTRAVENOUS at 07:50

## 2019-08-27 RX ADMIN — FENTANYL CITRATE 25 MCG: 50 INJECTION INTRAMUSCULAR; INTRAVENOUS at 08:06

## 2019-08-27 RX ADMIN — PROPOFOL 50 MG: 10 INJECTION, EMULSION INTRAVENOUS at 07:56

## 2019-08-27 RX ADMIN — ACETAMINOPHEN 650 MG: 325 TABLET ORAL at 20:12

## 2019-08-27 RX ADMIN — FENTANYL CITRATE 25 MCG: 50 INJECTION INTRAMUSCULAR; INTRAVENOUS at 08:11

## 2019-08-27 RX ADMIN — ISOSORBIDE MONONITRATE 30 MG: 30 TABLET, EXTENDED RELEASE ORAL at 20:12

## 2019-08-27 RX ADMIN — PROPOFOL 25 MG: 10 INJECTION, EMULSION INTRAVENOUS at 08:03

## 2019-08-27 RX ADMIN — Medication 10 ML: at 10:45

## 2019-08-27 RX ADMIN — LIDOCAINE HYDROCHLORIDE 20 MG: 20 INJECTION, SOLUTION INTRAVENOUS at 07:53

## 2019-08-27 RX ADMIN — PROPOFOL 25 MG: 10 INJECTION, EMULSION INTRAVENOUS at 08:07

## 2019-08-27 RX ADMIN — PROPOFOL 25 MG: 10 INJECTION, EMULSION INTRAVENOUS at 07:58

## 2019-08-27 RX ADMIN — ASPIRIN 81 MG 81 MG: 81 TABLET ORAL at 10:45

## 2019-08-27 RX ADMIN — PIPERACILLIN SODIUM,TAZOBACTAM SODIUM 3.38 G: 3; .375 INJECTION, POWDER, FOR SOLUTION INTRAVENOUS at 17:55

## 2019-08-27 RX ADMIN — POLYETHYLENE GLYCOL 3350 17 G: 17 POWDER, FOR SOLUTION ORAL at 10:44

## 2019-08-27 RX ADMIN — CEFEPIME HYDROCHLORIDE 2 G: 2 INJECTION, POWDER, FOR SOLUTION INTRAVENOUS at 17:14

## 2019-08-27 RX ADMIN — POLYETHYLENE GLYCOL 3350 17 G: 17 POWDER, FOR SOLUTION ORAL at 20:12

## 2019-08-27 RX ADMIN — AMIODARONE HYDROCHLORIDE 200 MG: 200 TABLET ORAL at 10:45

## 2019-08-27 ASSESSMENT — PULMONARY FUNCTION TESTS
PIF_VALUE: 1
PIF_VALUE: 0
PIF_VALUE: 1
PIF_VALUE: 0
PIF_VALUE: 1
PIF_VALUE: 0
PIF_VALUE: 1
PIF_VALUE: 0
PIF_VALUE: 1
PIF_VALUE: 0
PIF_VALUE: 1
PIF_VALUE: 0
PIF_VALUE: 1
PIF_VALUE: 0
PIF_VALUE: 1
PIF_VALUE: 0
PIF_VALUE: 1
PIF_VALUE: 0
PIF_VALUE: 0
PIF_VALUE: 1
PIF_VALUE: 0
PIF_VALUE: 1
PIF_VALUE: 1
PIF_VALUE: 0
PIF_VALUE: 1
PIF_VALUE: 0
PIF_VALUE: 1
PIF_VALUE: 1
PIF_VALUE: 0

## 2019-08-27 ASSESSMENT — PAIN SCALES - GENERAL
PAINLEVEL_OUTOF10: 0

## 2019-08-27 NOTE — PROGRESS NOTES
PACU Transfer Note    Current Allergies: No known allergies    Pt meets criteria as per Rena Score and ASPAN Standards to transfer to next phase of care. Vitals:    08/27/19 1000   BP: (!) 100/44   Pulse: 61   Resp: 16   Temp: 97.2 °F (36.2 °C)   SpO2: 98%     BP within   20% of pt's admitting BP as per RENA SCORE    SpO2: 98 %    O2 Flow Rate (L/min): 0 L/min      Intake/Output Summary (Last 24 hours) at 8/27/2019 1018  Last data filed at 8/27/2019 1000  Gross per 24 hour   Intake 225 ml   Output 0 ml   Net 225 ml       Pain assessment:  none    Pain Level: 0  No undocumented skin issues noted. Is patient incontinent:  Pt with dry diaper and bedding. Checked before traveling to room      Handoff report given at bedside.    No family to update available or located in waiting area      8/27/2019 10:16 AM

## 2019-08-27 NOTE — PROGRESS NOTES
Inability: Not on file    Transportation needs:     Medical: Not on file     Non-medical: Not on file   Tobacco Use    Smoking status: Never Smoker    Smokeless tobacco: Never Used   Substance and Sexual Activity    Alcohol use: Yes     Frequency: 4 or more times a week    Drug use: No    Sexual activity: Not on file   Lifestyle    Physical activity:     Days per week: Not on file     Minutes per session: Not on file    Stress: Not on file   Relationships    Social connections:     Talks on phone: Not on file     Gets together: Not on file     Attends Congregation service: Not on file     Active member of club or organization: Not on file     Attends meetings of clubs or organizations: Not on file     Relationship status: Not on file    Intimate partner violence:     Fear of current or ex partner: Not on file     Emotionally abused: Not on file     Physically abused: Not on file     Forced sexual activity: Not on file   Other Topics Concern    Not on file   Social History Narrative    Not on file           Problem Relation Age of Onset    Arthritis Mother           Medication:     Scheduled Meds:   vancomycin  750 mg Intravenous Once per day on Mon Wed Fri    polyethylene glycol  17 g Oral BID    carvedilol  6.25 mg Oral BID    sodium chloride flush  10 mL Intravenous 2 times per day    amiodarone  200 mg Oral Daily    aspirin  81 mg Oral Daily    atorvastatin  80 mg Oral Nightly    b complex-C-folic acid  1 mg Oral Daily    cinacalcet  30 mg Oral Daily    clopidogrel  75 mg Oral Daily    insulin glargine  8 Units Subcutaneous BID    isosorbide mononitrate  30 mg Oral BID    mirtazapine  15 mg Oral Nightly    vitamin D  1 tablet Oral Daily    piperacillin-tazobactam  3.375 g Intravenous Q12H    insulin lispro  0-6 Units Subcutaneous TID WC    insulin lispro  0-3 Units Subcutaneous Nightly    darbepoetin paulina-polysorbate  60 mcg Intravenous Q7 Days     Continuous Infusions:   sodium chloride      dextrose       PRN Meds:.bupivacaine (PF), sodium chloride, povidone-iodine, sodium chloride flush, acetaminophen, oxyCODONE-acetaminophen **OR** oxyCODONE-acetaminophen, morphine **OR** morphine, ondansetron, cloNIDine, labetalol, nitroGLYCERIN, magnesium hydroxide, glucose, dextrose, glucagon (rDNA), dextrose, heparin (porcine)       Vitals :     Vitals:    08/27/19 0443   BP: (!) 105/51   Pulse: 73   Resp: 16   Temp: 98.7 °F (37.1 °C)   SpO2: 99%       I & O :       Intake/Output Summary (Last 24 hours) at 8/27/2019 1963  Last data filed at 8/27/2019 0502  Gross per 24 hour   Intake 100 ml   Output 0 ml   Net 100 ml        Physical Examination :     General appearance: Anxious- no, distressed- no, in good spirits- no  HEENT: Lips- normal, teeth- ok , oral mucosa- moist  Neck : Mass- no, appears symmetrical, JVD- not visible  Respiratory: Respiratory effort-  normal, wheeze- no, crackles - no  Cardiovascular: Ausculation- No M/R/G, Edema no  Abdomen: visible mass- no, distention- no, scar- no, tenderness- no                            hepatosplenomegaly-  no  Musculoskeletal:  clubbing no,cyanosis- no , digital ischemia- no                           muscle strength- grossly normal , tone - grossly normal  Right AKA, left dressing on the foot  Skin: rashes- no , ulcers- no, induration- no, tightening - no  Psychiatric:  Judgement and insight- normal           AAO X 3     LABS:     Recent Labs     08/25/19  0543  08/25/19  1950 08/26/19  0518 08/27/19  0521   WBC 12.8*  --   --  12.0* 11.8*   HGB 7.6*   < > 8.4* 8.0* 7.7*   HCT 24.1*   < > 26.8* 25.5* 24.3*     --   --  218 238    < > = values in this interval not displayed.      Recent Labs     08/25/19  0543      K 4.4   CL 97*   CO2 25   BUN 51*   CREATININE 7.2*   GLUCOSE 186*   PHOS 4.7        Nephrology  [facility] [address] [address]  Office: [phone]  Cell: [phone]  Fax: [phone]

## 2019-08-27 NOTE — BRIEF OP NOTE
Brief Postoperative Note  ______________________________________________________________    Patient: Basia Freire  YOB: 1945  MRN: [id number]  Date of Procedure: 8/27/2019    Pre-Op Diagnosis: GANGRENE LEFT FOOT    Post-Op Diagnosis: Same       Procedure(s):  REPEAT INCISION AND DRAINAGE, REVISION OF LEFT FOOT TRANSMETATARSAL AMPUTATION    Anesthesia: General    Surgeon(s):  iRssa Hernandez DPM    Assistant: Idalia Lewis PGY1  Sola Ovalle PGY3  Syd Richter MS-IV    Injectables: pre-op 10 cc 0.5% Marcaine plain, post-op 10 cc 0.5% Marcaine plain    Hemostasis: anatomic dissection    Materials: 3-0 Vicryl, 2-0 Nylon, 3-0 Nylon      Estimated Blood Loss: less than 10 cc    Complications: None    Specimens: Was Not Obtained    Findings: Fibrotic and necrotic tissue noted to the previous TMA site. No further purulent drainage noted, healthy bleeding skin edges, and healthy bleeding bone. DISPO: Status post revisional TMA with delayed primary closure, left foot. OK for D/C from podiatry standpoint. Will follow up with Dr. Sujatha De Leon DPM upon discharge.       Idalia Lewis DPM  Date: 8/27/2019  Time: 9:07 AM

## 2019-08-27 NOTE — PROGRESS NOTES
1/18/2019); Foot Amputation (Left, 08/24/2019); Foot Amputation (Left, 8/24/2019); and Foot Amputation (Left, 8/27/2019). Restrictions  Position Activity Restriction  Other position/activity restrictions: Up with assistance / NWB LLE -   Subjective   General  Chart Reviewed: Yes  Additional Pertinent Hx: 74yom with PMHx that includes HTN, HLD, DM 2, ESRD on HD mon-wed-fri, CAD, neuropathy, and prior right BKA (1/2019) who is admitted with necrotic left 2nd toe with gangrene and left heel ulcer. Pt s/p I&D and revision L foot TMA 8/27. Subjective  Subjective: Pt supine in  bed upon PT entry, agreeable to working with PT. Pain Screening  Patient Currently in Pain: Yes(L foot, not rated, RN aware)  Vital Signs  Patient Currently in Pain: Yes(L foot, not rated, RN aware)       Orientation     Cognition      Objective   Bed mobility  Supine to Sit: Moderate assistance(HOB elevated)  Sit to Supine: Moderate assistance  Scooting: Dependent/Total(lateral scooting up toward HOB with mod assist x 2, cues/assist to maintain NWB LLE)              Exercises  Straight Leg Raise: independent RLE x 5 reps, min to mod assist x 5 reps LLE  Quad Sets: independent x 5 reps BLE with max cues for technique  Hip Abduction: independent x 5 reps RLE  Comments: Pt instructed on performing LE exercises throughout the day.   Verbalized partial understanding - will need reinforcement                        G-Code     OutComes Score                                                     AM-PAC Score  AM-PAC Inpatient Mobility Raw Score : 8 (08/27/19 1431)  AM-PAC Inpatient T-Scale Score : 28.52 (08/27/19 1431)  Mobility Inpatient CMS 0-100% Score: 86.62 (08/27/19 1431)  Mobility Inpatient CMS G-Code Modifier : CM (08/27/19 1431)          Goals  Short term goals  Time Frame for Short term goals: Discharge  Short term goal 1:  roll to right and left with min assist.  Ongoing  Short term goal 2: supine to sit and back with min assist. Ongoing  Short term goal 3: Participate in OOB to chair transfers. Ongoing  Short term goal 4: Pt will perform LE HEP independently x 10 reps ea  Patient Goals   Patient goals : \" to get s tronger\"    Plan    Plan  Times per week: 2-5  Times per day: Daily  Current Treatment Recommendations: Strengthening, ROM, Functional Mobility Training, Transfer Training  Safety Devices  Type of devices: Call light within reach, Bed alarm in place, Left in bed, Nurse notified     Therapy Time   Individual Concurrent Group Co-treatment   Time In 1325         Time Out 1348         Minutes 23               Timed Code Treatment Minutes:23       Total Treatment Minutes:  23  If pt d/c'd prior to next treatment, this note serves as a discharge note.       Danis Liang, PT

## 2019-08-27 NOTE — PROGRESS NOTES
Clinical Pharmacy Progress Note    Admit date: 8/21/2019     Subjective/Objective:  Pt is a 76yom with PMHx that includes HTN, HLD, DM 2, ESRD on HD mon-wed-fri, CAD, neuropathy, and prior right BKA (1/2019) who is admitted with necrotic left 2nd toe with gangrene and left heel ulcer. Pt is s/p LLE angiogram with angioplasty (done 8/23). Interval update: Pt to return to OR today for repeat washout and revision with possible closure. S/p HD 8/26. Pharmacy is consulted to dose Vancomycin per Dr. Jet Segundo    Current antibiotics:  Zosyn 3.375g IV EI q12h - day #7  Vancomycin - Pharmacy to dose - day #7   Intermittent doses based on levels (8/21 - 8/23)   1g with HD on Mon-Wed-Fri (8/23 - current)    Date Vancomycin level Vancomycin dose   8/21  1.5g   8/22 14.6 500mg   8/23 14.4 1g   8/24  --   8/25  --   8/26 20.7 750 mg       Recent Labs     08/25/19  0543      K 4.4   CL 97*   CO2 25   BUN 51*   CREATININE 7.2*   GLUCOSE 186*       CrCl is not estimated 2/2 ESRD on HD    Lab Results   Component Value Date    WBC 11.8 (H) 08/27/2019    HGB 7.7 (L) 08/27/2019    HCT 24.3 (L) 08/27/2019    MCV 84.5 08/27/2019     08/27/2019       Lab Results   Component Value Date    PROTIME 13.9 (H) 08/22/2019    INR 1.22 (H) 08/22/2019       Height:  6' 5\" (195.6 cm)  Weight:  188 lb 4.4 oz (85.4 kg)    Culture results:  Blood (8/21) = No growth to date  Wound (8/21) = Group B Strep, reliably sensitive to B-lactams, others       MRSA, Vanc MADI = 1       Pseudomonas, sensitive to all tested    Prophylaxis:  VTE:  SCD's  GI:  Not indicated    Assessment/Plan:  1)  Left 2nd toe gangrene and left heel ulcer:  Zosyn + Vancomycin - day #7  · Zosyn -   · Dose adjusted to 3.375g IV extended infusion q12h for patient with ESRD on HD per [facility] Zosyn Extended Infusion Protocol. · May consider de-escalating to Cefepime 2g IV qMWF with dialysis based on Pseudomonas sensitivities - would defer decision to ID.   · Vancomycin -

## 2019-08-27 NOTE — PROGRESS NOTES
disease continues to follow the patient  Wound culture positive for Pseudomonas and MRSA  Status post definitive closure       Peripheral arterial disease status post angiogram and angioplasty  Seems to have poor Dopplers of his left lower extremity  Vascular surgery consulted  Was taken to the OR by vascular surgery had angiogram and angioplasty done on the left lower extremity continue aspirin and Plavix     End-stage renal disease on hemodialysis  Nephrology consulted  For dialysis today     Type 2 diabetes mellitus  Continue Lantus 8 units twice daily with sliding scale insulin     Hypertension  Losartan has been discontinued and Coreg dosage decreased    Bleeding from wound site  Will let podiatry no  Hold heparin for now  Aspirin Plavix continued given angiogram and angioplasty    Anemia acute on chronic secondary to postoperative blood loss  Status post 1 unit of blood transfusion  Appropriate rise in hemoglobin    Constipation  Cont  bowel regimen             DVT Prophylaxis: sc heparin   Diet: Dietary Nutrition Supplements: Low Calorie High Protein Supplement  DIET CARB CONTROL;  Code Status: Full Code      Dispo - once acute medical processes have resolved    Jenise Claude, MD

## 2019-08-27 NOTE — PROGRESS NOTES
Minutes 26              Timed Code Treatment Minutes:  26 mins     Total Treatment Minutes:  26 mins       Bonnie Calix, OT

## 2019-08-27 NOTE — DISCHARGE INSTR - COC
by Vane Matos RN on 8/29/19 at 12:40 PM    CASE MANAGEMENT/SOCIAL WORK SECTION    Inpatient Status Date: 8/21/2019    Readmission Risk Assessment Score:  Readmission Risk              Risk of Unplanned Readmission:        31           Discharging to Facility/ Agency   Jaymie Nieto, [facility] Durbin  Report: [phone]   Fax: [phone]     / signature: Electronically signed by ADELINA Holder on 8/27/19 at 3:36 PM    PHYSICIAN SECTION    Prognosis: Fair    Condition at Discharge: Stable    Rehab Potential (if transferring to Rehab): Fair    Recommended Labs or Other Treatments After Discharge: Patients post op dressing is remain clean, dry , and in tact, patient is non weight bearing to the left foot, patient is to have follow up appointment with Dr. Anita Coelho in one week. Dose vancomycin 750 mg and cefepime 2 gm at HD x 2 weeks, through 9/25      Physician Certification: I certify the above information and transfer of Darin Garcia  is necessary for the continuing treatment of the diagnosis listed and that he requires [facility] for greater 30 days.      Update Admission H&P: No change in H&P    PHYSICIAN SIGNATURE:  Electronically signed by Alirio Puga MD on 8/28/19 at 10:22 AM

## 2019-08-27 NOTE — CARE COORDINATION
Case Management Assessment           Daily Note                 Date/ Time of Note: 8/27/2019 3:32 PM         Note completed by: Stephan Boot Day    Patient Name: Tori Crisostomo  YOB: 1945    Diagnosis:Abscess or cellulitis of toe, left [L03.032, L02.612]  Abscess or cellulitis of toe, left [L03.032, L02.612]  Patient Admission Status: Inpatient    Date of Admission:8/21/2019  9:16 AM Length of Stay: 6 GLOS:        Current Plan of Care: Plan for return to [facility] when medically ready. POD0 from wound closure with podiatry. ________________________________________________________________________________________  PT AM-PAC: 8 / 24 per last evaluation on: 8/27    OT AM-PAC: 15 / 24 per last evaluation on: 8/27    DME Needs for discharge: Defer  ________________________________________________________________________________________  Discharge Plan: SNF: [facility] [address], [place], [facility]  Report: [phone]   Fax: [phone]     Tentative discharge date: 8/29    Current barriers to discharge: Medical Clearance, transportation     Referrals completed: SN[facility]: [facility]    Resources/ information provided: SNF List  ________________________________________________________________________________________  Case Management Notes: SW met with patient and girlfriend, German Magallanes. SW updated patient and Kamila Perrin on discharge plans and referrals that were sent and response. After discussions patient and Kamila Perrin also agreeable to return to [address] [place] [address] at this time. Patient and significant other are waiting on medicaid and may explore a long-term care option closer to family support on the [facility] side of [facility]. SW awaiting clearance from podiatry for discharge. SW continues to follow. SW provided contact information to patient/family and placed information on white board in room should needs arise.  No other needs noted at

## 2019-08-28 ENCOUNTER — APPOINTMENT (OUTPATIENT)
Dept: GENERAL RADIOLOGY | Age: 74
DRG: 239 | End: 2019-08-28
Payer: MEDICARE

## 2019-08-28 LAB
ANION GAP SERPL CALCULATED.3IONS-SCNC: 15 MMOL/L (ref 3–16)
BASOPHILS ABSOLUTE: 0.1 K/UL (ref 0–0.2)
BASOPHILS RELATIVE PERCENT: 0.6 %
BUN BLDV-MCNC: 51 MG/DL (ref 7–20)
CALCIUM SERPL-MCNC: 8 MG/DL (ref 8.3–10.6)
CHLORIDE BLD-SCNC: 103 MMOL/L (ref 99–110)
CO2: 20 MMOL/L (ref 21–32)
CREAT SERPL-MCNC: 7.6 MG/DL (ref 0.8–1.3)
EOSINOPHILS ABSOLUTE: 0.2 K/UL (ref 0–0.6)
EOSINOPHILS RELATIVE PERCENT: 1.1 %
GFR AFRICAN AMERICAN: 8
GFR NON-AFRICAN AMERICAN: 7
GLUCOSE BLD-MCNC: 113 MG/DL (ref 70–99)
GLUCOSE BLD-MCNC: 127 MG/DL (ref 70–99)
GLUCOSE BLD-MCNC: 166 MG/DL (ref 70–99)
GLUCOSE BLD-MCNC: 189 MG/DL (ref 70–99)
HCT VFR BLD CALC: 23.7 % (ref 40.5–52.5)
HEMOGLOBIN: 7.5 G/DL (ref 13.5–17.5)
LYMPHOCYTES ABSOLUTE: 1.7 K/UL (ref 1–5.1)
LYMPHOCYTES RELATIVE PERCENT: 12.3 %
MCH RBC QN AUTO: 26.8 PG (ref 26–34)
MCHC RBC AUTO-ENTMCNC: 31.5 G/DL (ref 31–36)
MCV RBC AUTO: 85.1 FL (ref 80–100)
MONOCYTES ABSOLUTE: 1.1 K/UL (ref 0–1.3)
MONOCYTES RELATIVE PERCENT: 7.8 %
NEUTROPHILS ABSOLUTE: 10.7 K/UL (ref 1.7–7.7)
NEUTROPHILS RELATIVE PERCENT: 78.2 %
PDW BLD-RTO: 21.8 % (ref 12.4–15.4)
PERFORMED ON: ABNORMAL
PLATELET # BLD: 252 K/UL (ref 135–450)
PMV BLD AUTO: 8.1 FL (ref 5–10.5)
POTASSIUM SERPL-SCNC: 4.8 MMOL/L (ref 3.5–5.1)
RBC # BLD: 2.79 M/UL (ref 4.2–5.9)
SODIUM BLD-SCNC: 138 MMOL/L (ref 136–145)
VANCOMYCIN RANDOM: 18.5 UG/ML
WBC # BLD: 13.7 K/UL (ref 4–11)

## 2019-08-28 PROCEDURE — 2580000003 HC RX 258: Performed by: INTERNAL MEDICINE

## 2019-08-28 PROCEDURE — 99232 SBSQ HOSP IP/OBS MODERATE 35: CPT | Performed by: INTERNAL MEDICINE

## 2019-08-28 PROCEDURE — 85025 COMPLETE CBC W/AUTO DIFF WBC: CPT

## 2019-08-28 PROCEDURE — 80048 BASIC METABOLIC PNL TOTAL CA: CPT

## 2019-08-28 PROCEDURE — 87040 BLOOD CULTURE FOR BACTERIA: CPT

## 2019-08-28 PROCEDURE — 94150 VITAL CAPACITY TEST: CPT

## 2019-08-28 PROCEDURE — 6370000000 HC RX 637 (ALT 250 FOR IP): Performed by: INTERNAL MEDICINE

## 2019-08-28 PROCEDURE — 6370000000 HC RX 637 (ALT 250 FOR IP): Performed by: STUDENT IN AN ORGANIZED HEALTH CARE EDUCATION/TRAINING PROGRAM

## 2019-08-28 PROCEDURE — 6360000002 HC RX W HCPCS: Performed by: INTERNAL MEDICINE

## 2019-08-28 PROCEDURE — 90935 HEMODIALYSIS ONE EVALUATION: CPT

## 2019-08-28 PROCEDURE — 71046 X-RAY EXAM CHEST 2 VIEWS: CPT

## 2019-08-28 PROCEDURE — 2060000000 HC ICU INTERMEDIATE R&B

## 2019-08-28 PROCEDURE — 6360000002 HC RX W HCPCS: Performed by: PODIATRIST

## 2019-08-28 PROCEDURE — 94799 UNLISTED PULMONARY SVC/PX: CPT

## 2019-08-28 PROCEDURE — 36415 COLL VENOUS BLD VENIPUNCTURE: CPT

## 2019-08-28 PROCEDURE — 6360000002 HC RX W HCPCS: Performed by: STUDENT IN AN ORGANIZED HEALTH CARE EDUCATION/TRAINING PROGRAM

## 2019-08-28 PROCEDURE — 80202 ASSAY OF VANCOMYCIN: CPT

## 2019-08-28 RX ORDER — HEPARIN SODIUM 5000 [USP'U]/ML
5000 INJECTION, SOLUTION INTRAVENOUS; SUBCUTANEOUS EVERY 8 HOURS SCHEDULED
Status: DISCONTINUED | OUTPATIENT
Start: 2019-08-28 | End: 2019-08-29 | Stop reason: HOSPADM

## 2019-08-28 RX ORDER — OXYCODONE HYDROCHLORIDE AND ACETAMINOPHEN 5; 325 MG/1; MG/1
1 TABLET ORAL EVERY 4 HOURS PRN
Qty: 30 TABLET | Refills: 0 | Status: SHIPPED | OUTPATIENT
Start: 2019-08-28 | End: 2019-08-29

## 2019-08-28 RX ORDER — CARVEDILOL 6.25 MG/1
6.25 TABLET ORAL 2 TIMES DAILY
Qty: 60 TABLET | Refills: 3 | Status: SHIPPED | OUTPATIENT
Start: 2019-08-28 | End: 2019-12-05 | Stop reason: CLARIF

## 2019-08-28 RX ORDER — POLYETHYLENE GLYCOL 3350 17 G/17G
17 POWDER, FOR SOLUTION ORAL 2 TIMES DAILY
Qty: 527 G | Refills: 1 | Status: SHIPPED | OUTPATIENT
Start: 2019-08-28 | End: 2019-09-27

## 2019-08-28 RX ADMIN — INSULIN GLARGINE 8 UNITS: 100 INJECTION, SOLUTION SUBCUTANEOUS at 21:18

## 2019-08-28 RX ADMIN — CARVEDILOL 6.25 MG: 6.25 TABLET, FILM COATED ORAL at 21:14

## 2019-08-28 RX ADMIN — CLOPIDOGREL BISULFATE 75 MG: 75 TABLET ORAL at 13:03

## 2019-08-28 RX ADMIN — CEFEPIME HYDROCHLORIDE 2 G: 2 INJECTION, POWDER, FOR SOLUTION INTRAVENOUS at 11:09

## 2019-08-28 RX ADMIN — HEPARIN SODIUM 5000 UNITS: 5000 INJECTION INTRAVENOUS; SUBCUTANEOUS at 21:15

## 2019-08-28 RX ADMIN — VANCOMYCIN HYDROCHLORIDE 750 MG: 10 INJECTION, POWDER, LYOPHILIZED, FOR SOLUTION INTRAVENOUS at 11:10

## 2019-08-28 RX ADMIN — PIPERACILLIN SODIUM,TAZOBACTAM SODIUM 3.38 G: 3; .375 INJECTION, POWDER, FOR SOLUTION INTRAVENOUS at 17:29

## 2019-08-28 RX ADMIN — ISOSORBIDE MONONITRATE 30 MG: 30 TABLET, EXTENDED RELEASE ORAL at 21:14

## 2019-08-28 RX ADMIN — AMIODARONE HYDROCHLORIDE 200 MG: 200 TABLET ORAL at 13:03

## 2019-08-28 RX ADMIN — MIRTAZAPINE 15 MG: 15 TABLET, FILM COATED ORAL at 21:14

## 2019-08-28 RX ADMIN — NEPHROCAP 1 MG: 1 CAP ORAL at 13:03

## 2019-08-28 RX ADMIN — CHOLECALCIFEROL TAB 25 MCG (1000 UNIT) 1000 UNITS: 25 TAB at 13:03

## 2019-08-28 RX ADMIN — HEPARIN SODIUM 5000 UNITS: 5000 INJECTION INTRAVENOUS; SUBCUTANEOUS at 13:30

## 2019-08-28 RX ADMIN — ASPIRIN 81 MG 81 MG: 81 TABLET ORAL at 13:03

## 2019-08-28 RX ADMIN — PIPERACILLIN SODIUM,TAZOBACTAM SODIUM 3.38 G: 3; .375 INJECTION, POWDER, FOR SOLUTION INTRAVENOUS at 05:25

## 2019-08-28 RX ADMIN — CINACALCET HYDROCHLORIDE 30 MG: 30 TABLET, FILM COATED ORAL at 13:03

## 2019-08-28 RX ADMIN — INSULIN LISPRO 1 UNITS: 100 INJECTION, SOLUTION INTRAVENOUS; SUBCUTANEOUS at 21:18

## 2019-08-28 RX ADMIN — ATORVASTATIN CALCIUM 80 MG: 80 TABLET, FILM COATED ORAL at 21:14

## 2019-08-28 RX ADMIN — INSULIN LISPRO 1 UNITS: 100 INJECTION, SOLUTION INTRAVENOUS; SUBCUTANEOUS at 17:29

## 2019-08-28 RX ADMIN — DARBEPOETIN ALFA 60 MCG: 60 INJECTION, SOLUTION INTRAVENOUS; SUBCUTANEOUS at 11:58

## 2019-08-28 ASSESSMENT — PAIN SCALES - GENERAL
PAINLEVEL_OUTOF10: 0

## 2019-08-28 NOTE — PROGRESS NOTES
 Not on file   Social Needs    Financial resource strain: Not on file    Food insecurity:     Worry: Not on file     Inability: Not on file    Transportation needs:     Medical: Not on file     Non-medical: Not on file   Tobacco Use    Smoking status: Never Smoker    Smokeless tobacco: Never Used   Substance and Sexual Activity    Alcohol use: Yes     Frequency: 4 or more times a week    Drug use: No    Sexual activity: Not on file   Lifestyle    Physical activity:     Days per week: Not on file     Minutes per session: Not on file    Stress: Not on file   Relationships    Social connections:     Talks on phone: Not on file     Gets together: Not on file     Attends Voodoo service: Not on file     Active member of club or organization: Not on file     Attends meetings of clubs or organizations: Not on file     Relationship status: Not on file    Intimate partner violence:     Fear of current or ex partner: Not on file     Emotionally abused: Not on file     Physically abused: Not on file     Forced sexual activity: Not on file   Other Topics Concern    Not on file   Social History Narrative    Not on file           Problem Relation Age of Onset    Arthritis Mother           Medication:     Scheduled Meds:   heparin (porcine)  5,000 Units Subcutaneous 3 times per day    cefepime  2 g Intravenous Once per day on Mon Wed Fri    vancomycin  750 mg Intravenous Once per day on Mon Wed Fri    polyethylene glycol  17 g Oral BID    carvedilol  6.25 mg Oral BID    sodium chloride flush  10 mL Intravenous 2 times per day    amiodarone  200 mg Oral Daily    aspirin  81 mg Oral Daily    atorvastatin  80 mg Oral Nightly    b complex-C-folic acid  1 mg Oral Daily    cinacalcet  30 mg Oral Daily    clopidogrel  75 mg Oral Daily    insulin glargine  8 Units Subcutaneous BID    isosorbide mononitrate  30 mg Oral BID    mirtazapine  15 mg Oral Nightly    vitamin D  1 tablet Oral Daily   

## 2019-08-28 NOTE — PROGRESS NOTES
Hospitalist Progress Note      PCP: Paco Frias MD    Date of Admission: 8/21/2019    Chief Complaint: Left toe cellulitis    Hospital Course:     Feels well  Spiked temperature last night and this morning  No cough  No abdominal pain nausea  No chest pain      Medications:  Reviewed      Exam:    BP (!) 87/45   Pulse 73   Temp 98.8 °F (37.1 °C)   Resp 17   Ht 6' 5\" (1.956 m)   Wt 200 lb 13.4 oz (91.1 kg)   SpO2 100%   BMI 23.82 kg/m²     General appearance: No apparent distress, appears stated age and cooperative. HEENT: Pupils equal, round, and reactive to light. Conjunctivae/corneas clear. Neck: Supple, with full range of motion. No jugular venous distention. Trachea midline. Respiratory:  Normal respiratory effort. Clear to auscultation, bilaterally without RALES/WHEEZES/Rhonchi. Cardiovascular: Regular rate and rhythm with normal S1/S2 without MURMURS, rubs or gallops. Abdomen: Soft, non-tender, non-distended with normal bowel sounds. Musculoskeletal: No clubbing, cyanosis or EDEMA bilaterally. Full range of motion without deformity. Skin: Skin color, texture, turgor normal.  No rashes or lesions. Neurologic:  Neurovascularly intact without any focal sensory/motor deficits. Cranial nerves: II-XII intact, grossly non-focal.  Left foot dressed  No change in physical exam from 8/27      Labs:   Recent Labs     08/26/19  0518 08/27/19  0521 08/28/19  0538   WBC 12.0* 11.8* 13.7*   HGB 8.0* 7.7* 7.5*   HCT 25.5* 24.3* 23.7*    238 252     Recent Labs     08/28/19  0538      K 4.8      CO2 20*   BUN 51*   CREATININE 7.6*   CALCIUM 8.0*     No results for input(s): AST, ALT, BILIDIR, BILITOT, ALKPHOS in the last 72 hours. No results for input(s): INR in the last 72 hours. No results for input(s): Julisa Nic in the last 72 hours.     Urinalysis:      Lab Results   Component Value Date    NITRU NEGATIVE 04/17/2013    RBCUA 3-5 04/17/2013    BLOODU NEGATIVE 04/17/2013

## 2019-08-28 NOTE — PLAN OF CARE
Nutrition Problem: Increased nutrient needs  Intervention: Food and/or Nutrient Delivery: Continue current diet, Modify current ONS  Nutritional Goals: Patient will tolerate and consume 50% or greater of all meals and supplements

## 2019-08-28 NOTE — DISCHARGE SUMMARY
dressings     Peripheral arterial disease status post angiogram and angioplasty  Vascular surgery consulted  Was taken to the OR by vascular surgery had angiogram and angioplasty done on the left lower extremity continue aspirin and Plavix     End-stage renal disease on hemodialysis  Nephrology consulted  HD as outpatient.      Type 2 diabetes mellitus  Continue Lantus 8 units twice daily with sliding scale insulin     Hypertension  Losartan has been discontinued and Coreg dosage decreased  Blood pressure stable        Anemia acute on chronic secondary to postoperative blood loss  Status post 1 unit of blood transfusion  Appropriate rise in hemoglobin      Consults: Nephrology, infectious disease, podiatry      Discharge Exam:  BP (!) 166/72   Pulse 74   Temp 98 °F (36.7 °C) (Oral)   Resp 16   Ht 6' 5\" (1.956 m)   Wt 191 lb 2.2 oz (86.7 kg)   SpO2 98%   BMI 22.67 kg/m²   General appearance: alert, appears stated age and cooperative  Lungs: clear to auscultation bilaterally  Heart: regular rate and rhythm, S1, S2 normal, no murmur, click, rub or gallop  Abdomen: soft, non-tender; bowel sounds normal; no masses,  no organomegaly  Neurologic: Grossly normal    Disposition: long term care facility    In process/preliminary results:  Outstanding Order Results     Date and Time Order Name Status Description    8/24/2019 1248 Acid Fast Culture With Smear In process     8/24/2019 1248 Fungus Culture In process     8/24/2019 1248 Anaerobic Culture Preliminary     8/23/2019 1417 IR ANGIOGRAM EXTREMITY LEFT In process           Patient Instructions:   Current Discharge Medication List      START taking these medications    Details   oxyCODONE-acetaminophen (PERCOCET) 5-325 MG per tablet Take 1 tablet by mouth every 4 hours as needed for Pain for up to 5 days.   Qty: 30 tablet, Refills: 0    Comments: Reduce doses taken as pain becomes manageable  Associated Diagnoses: Necrotic toes (HCC)      polyethylene glycol [facility]) packet Take 17 g by mouth 2 times daily  Qty: 527 g, Refills: 1         CONTINUE these medications which have CHANGED    Details   carvedilol (COREG) 6.25 MG tablet Take 1 tablet by mouth 2 times daily  Qty: 60 tablet, Refills: 3         CONTINUE these medications which have NOT CHANGED    Details   aspirin 81 MG chewable tablet Take 81 mg by mouth daily      nitroGLYCERIN (NITROSTAT) 0.4 MG SL tablet Place 0.4 mg under the tongue every 5 minutes as needed for Chest pain up to max of 3 total doses. If no relief after 1 dose, call 911.       Sucroferric Oxyhydroxide (VELPHORO) 500 MG CHEW Take 1 tablet by mouth 3 times daily      vitamin D (CHOLECALCIFEROL) 1000 units TABS tablet Take 1 tablet by mouth daily      mirtazapine (REMERON) 15 MG tablet Take 1 tablet by mouth nightly  Qty: 30 tablet, Refills: 1      B Complex-C-Folic Acid (RENAL) 1 MG CAPS Take 1 capsule by mouth daily       atorvastatin (LIPITOR) 80 MG tablet Take 80 mg by mouth nightly       clopidogrel (PLAVIX) 75 MG tablet Take 75 mg by mouth daily       amiodarone (CORDARONE) 200 MG tablet TAKE ONE TABLET BY MOUTH DAILY  Qty: 90 tablet, Refills: 4    Associated Diagnoses: Ischemic heart disease      isosorbide mononitrate (IMDUR) 30 MG extended release tablet TAKE ONE TABLET BY MOUTH TWICE A DAY  Qty: 180 tablet, Refills: 2    Associated Diagnoses: Ischemic heart disease      insulin glargine (LANTUS SOLOSTAR) 100 UNIT/ML injection pen INJECT 8 UNITS SUBCUTANEOUSLY twice daily  Qty: 15 mL, Refills: 3    Associated Diagnoses: Type 2 diabetes mellitus with other diabetic kidney complication, with long-term current use of insulin (HCC)      cinacalcet (SENSIPAR) 30 MG tablet Take 1 tablet by mouth daily  Qty: 30 tablet, Refills: 2    Associated Diagnoses: ESRD (end stage renal disease) ([facility])         STOP taking these medications       losartan (COZAAR) 50 MG tablet Comments:   Reason for Stopping:         sevelamer (RENVELA) 800 MG tablet

## 2019-08-28 NOTE — PROGRESS NOTES
aeruginosa  Staph aureus mrsa   Antibiotic Interpretation MADI Status    ciprofloxacin Resistant >=8 mcg/mL     clindamycin Sensitive <=0.25 mcg/mL     erythromycin Sensitive <=0.25 mcg/mL     oxacillin Resistant >=4 mcg/mL     tetracycline Sensitive <=1 mcg/mL     trimethoprim-sulfamethoxazole Sensitive <=10 mcg/mL     vancomycin Sensitive 1 mcg/mL       Pseudomonas aeruginosa   Antibiotic Interpretation MADI Status    cefepime Sensitive 8 mcg/mL     ciprofloxacin Sensitive <=1 mcg/mL     gentamicin Sensitive <=4 mcg/mL     meropenem Sensitive <=1 mcg/mL     piperacillin-tazobactam Sensitive <=16 mcg/mL     tobramycin Sensitive <=4 mcg/mL        BC no growth     Imagin/21 L foot x-ray:   No radiographic evidence for osteomyelitis       Scheduled Meds:   heparin (porcine)  5,000 Units Subcutaneous 3 times per day    cefepime  2 g Intravenous Once per day on     vancomycin  750 mg Intravenous Once per day on     polyethylene glycol  17 g Oral BID    carvedilol  6.25 mg Oral BID    sodium chloride flush  10 mL Intravenous 2 times per day    amiodarone  200 mg Oral Daily    aspirin  81 mg Oral Daily    atorvastatin  80 mg Oral Nightly    b complex-C-folic acid  1 mg Oral Daily    cinacalcet  30 mg Oral Daily    clopidogrel  75 mg Oral Daily    insulin glargine  8 Units Subcutaneous BID    isosorbide mononitrate  30 mg Oral BID    mirtazapine  15 mg Oral Nightly    vitamin D  1 tablet Oral Daily    piperacillin-tazobactam  3.375 g Intravenous Q12H    insulin lispro  0-6 Units Subcutaneous TID WC    insulin lispro  0-3 Units Subcutaneous Nightly    darbepoetin paulina-polysorbate  60 mcg Intravenous Q7 Days       Continuous Infusions:   dextrose         PRN Meds:  sodium chloride flush, acetaminophen, oxyCODONE-acetaminophen **OR** oxyCODONE-acetaminophen, morphine **OR** morphine, ondansetron, cloNIDine, labetalol, nitroGLYCERIN, magnesium hydroxide, glucose, dextrose, glucagon (rDNA), dextrose, heparin (porcine)      Assessment:     76 yo man with DM, neuropathy, CRF on HD, AF, HTN, hx AF     Admit 1/3/19 - 1/21/19 with lethargy.  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 D/c on 1/24.     Pt had been at [facility] from 1/24 to 7/28. Readmitted to [facility] on 8/11.       Pt's wife reports pt's L 2nd toe had distal wound and dark on 8/11 (last times she saw foot). Pt was seen by wound care RN on 8/20, noted 2nd toe to be black, assoc drainage and odor. Pt had no symptoms - no pain, no fever / chills      In ED - afebrile, WBC 16.0. X-ray neg. . ESR >120. BC / wound cult sent   Seen by podiatry (reviewed consult). Seen by Vascular, plan for angio with intent to treat. On vancomycin + zosyn. IMP/  DM, neuropathy, CRF on HD, AF, HTN, hx AF  R BKA 1/2019     L 2nd toe gangrene / DFI  POD#3 angio with L SFA, popliteal, tibioperoneal trunk and posterior tibial artery angioplasty  POD#3 open TMA ('liquifactie necrosis')  POD#0 debridement, further resection MT bones, closed.   Felt to be 'definitive' per Dr Kim Díaz:     Cont vancomycin, dose at HD  Dose cefepime at HD  Vascular f/u   Wound care and f/u per Podiatry      Dose vancomycin 750 mg and cefepime 2 gm at HD x 2 weeks, through 9/25  [Renal to give antibiotic order to HD Center]    Discussed with pt  Will sign off, call with further ID issues  Renetta Duarte MD

## 2019-08-28 NOTE — PROGRESS NOTES
Nutrition Assessment    Type and Reason for Visit: Reassess    Nutrition Recommendations:      1. Continue current carb control diet, monitor need to add Renal to diet order  2. Modify Ensure HP to once daily and add Renal ONS once daily to aid in meeting increased nutrient needs, monitor acceptance  3. Monitor weight (dry wt), labs (renal and glucose) and clinical progress  4. Monitor, record and encourage adequate PO intake at all meals through admission especially protein  5. Continue Nephrocaps for wound healing      Nutrition Assessment: Follow-up for po intake and ONS acceptance. Pt is improving from a nutritional standpoint AEB increased po intake (was variable) and consuming 1 ONS/day per pt report. Pt remains at nutritional risk d/t ongoing increased nutrient needs for ESRD on HD and wound healing, s/p repeat I&D and revision of left foot TMA on 8/27. RD encouraged adequate protein intake. Pt also interested in trying Renal ONS. Will modify ONS per pt preference, continue to monitor intake adequacy, nutrition status and need to modify diet (add Renal to current diet order). Malnutrition Assessment:  · Malnutrition Status: At risk for malnutrition  · Context: Chronic illness  · Findings of the 6 clinical characteristics of malnutrition (Minimum of 2 out of 6 clinical characteristics is required to make the diagnosis of moderate or severe Protein Calorie Malnutrition based on AND/ASPEN Guidelines):  1. Energy Intake-(variable, but now improving), Greater than or equal to 7 days    2. Weight Loss-Unable to assess(r/t fluid fluctuations on HD),    3. Fat Loss-Mild subcutaneous fat loss, Triceps  4. Muscle Loss-No significant muscle mass loss(likely some age related loss),    5. Fluid Accumulation-No significant fluid accumulation,    6.  Strength-Not measured    Nutrition Risk Level:  Moderate    Nutrient Needs:  · Estimated Daily Total Kcal: 7777-4316 (30-32)  · Estimated Daily Protein (g): 103-129

## 2019-08-28 NOTE — CARE COORDINATION
[address] Transitions Interview     2019    Patient: Monica Mojica Patient : 1945   MRN: [id number]   Reason for Admission: Abscess of Cellulitis of Left Toe      RARS: Readmission Risk Score: 32         Spoke with: Monica Mojica and significant other Jinny at bedside       Readmission Risk  Patient Active Problem List   Diagnosis    HTN (hypertension)    Diabetes mellitus ([address]    Anemia    Diabetic retinopathy ([address]    Cataracts, bilateral    Mixed hyperlipidemia    Atrial fibrillation ([address]    ESRD (end stage renal disease) ([address]    Ischemic heart disease    Acute anterolateral wall MI ([address]    Acute ST elevation myocardial infarction (STEMI) involving left anterior descending (LAD) coronary artery ([address]    CKD (chronic kidney disease) stage 4, GFR 15-29 ml/min ([facility]    DM (diabetes mellitus) type II uncontrolled with renal manifestation    Fungal infection of foot    Hemodialysis patient ([address]    Malignant essential hypertension    Right second toe ulcer, with necrosis of bone (HCC)    Hyperkalemia    CAD (coronary artery disease) s/p stent to LAD and RCA 10/2018    Acute metabolic encephalopathy    Pain in right foot    Somnolence    Encounter for palliative care    Advance directive discussed with patient    Gangrene of right foot ([address]    Gas gangrene ([address]    Sepsis due to pneumonia ([address]    Advance care planning    Abscess or cellulitis of toe, left    Gangrene of toe of left foot ([address]    PVD (peripheral vascular disease) [facility])       Inpatient Assessment  Care Transitions Summary    Care Transitions Inpatient Review  Medication Review  Are you able to afford your medications?:  Yes  How often do you have difficulty taking your medications?:  I always take them as prescribed.   Housing Review  Who do you live with?:  Alone  Are you an active caregiver in your home?:  No  Social Support  Do you have a ?:  No  Do you have a [facility]

## 2019-08-29 VITALS
HEIGHT: 77 IN | RESPIRATION RATE: 16 BRPM | OXYGEN SATURATION: 98 % | SYSTOLIC BLOOD PRESSURE: 166 MMHG | TEMPERATURE: 98 F | HEART RATE: 74 BPM | BODY MASS INDEX: 22.57 KG/M2 | DIASTOLIC BLOOD PRESSURE: 72 MMHG | WEIGHT: 191.14 LBS

## 2019-08-29 PROBLEM — R50.9 FEVER: Status: ACTIVE | Noted: 2019-08-29

## 2019-08-29 PROBLEM — Z79.4 TYPE 2 DIABETES MELLITUS, WITH LONG-TERM CURRENT USE OF INSULIN (HCC): Status: ACTIVE | Noted: 2019-08-29

## 2019-08-29 PROBLEM — E11.9 TYPE 2 DIABETES MELLITUS, WITH LONG-TERM CURRENT USE OF INSULIN (HCC): Status: ACTIVE | Noted: 2019-08-29

## 2019-08-29 LAB
ANAEROBIC CULTURE: NORMAL
ANION GAP SERPL CALCULATED.3IONS-SCNC: 11 MMOL/L (ref 3–16)
BASOPHILS ABSOLUTE: 0.1 K/UL (ref 0–0.2)
BASOPHILS RELATIVE PERCENT: 0.7 %
BUN BLDV-MCNC: 26 MG/DL (ref 7–20)
CALCIUM SERPL-MCNC: 8.4 MG/DL (ref 8.3–10.6)
CHLORIDE BLD-SCNC: 104 MMOL/L (ref 99–110)
CO2: 22 MMOL/L (ref 21–32)
CREAT SERPL-MCNC: 4.7 MG/DL (ref 0.8–1.3)
EOSINOPHILS ABSOLUTE: 0.1 K/UL (ref 0–0.6)
EOSINOPHILS RELATIVE PERCENT: 1.2 %
GFR AFRICAN AMERICAN: 15
GFR NON-AFRICAN AMERICAN: 12
GLUCOSE BLD-MCNC: 103 MG/DL (ref 70–99)
GLUCOSE BLD-MCNC: 91 MG/DL (ref 70–99)
HCT VFR BLD CALC: 29.7 % (ref 40.5–52.5)
HEMOGLOBIN: 8.5 G/DL (ref 13.5–17.5)
LYMPHOCYTES ABSOLUTE: 1.7 K/UL (ref 1–5.1)
LYMPHOCYTES RELATIVE PERCENT: 13.8 %
MCH RBC QN AUTO: 26.6 PG (ref 26–34)
MCHC RBC AUTO-ENTMCNC: 28.5 G/DL (ref 31–36)
MCV RBC AUTO: 93.3 FL (ref 80–100)
MONOCYTES ABSOLUTE: 1 K/UL (ref 0–1.3)
MONOCYTES RELATIVE PERCENT: 8.4 %
NEUTROPHILS ABSOLUTE: 9.3 K/UL (ref 1.7–7.7)
NEUTROPHILS RELATIVE PERCENT: 75.9 %
PDW BLD-RTO: 22.5 % (ref 12.4–15.4)
PERFORMED ON: NORMAL
PLATELET # BLD: 236 K/UL (ref 135–450)
PMV BLD AUTO: 7.9 FL (ref 5–10.5)
POTASSIUM SERPL-SCNC: 4.6 MMOL/L (ref 3.5–5.1)
RBC # BLD: 3.18 M/UL (ref 4.2–5.9)
SODIUM BLD-SCNC: 137 MMOL/L (ref 136–145)
WBC # BLD: 12.3 K/UL (ref 4–11)

## 2019-08-29 PROCEDURE — 6370000000 HC RX 637 (ALT 250 FOR IP): Performed by: INTERNAL MEDICINE

## 2019-08-29 PROCEDURE — 80048 BASIC METABOLIC PNL TOTAL CA: CPT

## 2019-08-29 PROCEDURE — 36415 COLL VENOUS BLD VENIPUNCTURE: CPT

## 2019-08-29 PROCEDURE — 85025 COMPLETE CBC W/AUTO DIFF WBC: CPT

## 2019-08-29 PROCEDURE — 6370000000 HC RX 637 (ALT 250 FOR IP): Performed by: STUDENT IN AN ORGANIZED HEALTH CARE EDUCATION/TRAINING PROGRAM

## 2019-08-29 PROCEDURE — 6360000002 HC RX W HCPCS: Performed by: PODIATRIST

## 2019-08-29 PROCEDURE — 2580000003 HC RX 258: Performed by: STUDENT IN AN ORGANIZED HEALTH CARE EDUCATION/TRAINING PROGRAM

## 2019-08-29 RX ORDER — OXYCODONE HYDROCHLORIDE AND ACETAMINOPHEN 5; 325 MG/1; MG/1
1 TABLET ORAL EVERY 4 HOURS PRN
Qty: 30 TABLET | Refills: 0 | Status: SHIPPED | OUTPATIENT
Start: 2019-08-29 | End: 2019-09-03

## 2019-08-29 RX ADMIN — CHOLECALCIFEROL TAB 25 MCG (1000 UNIT) 1000 UNITS: 25 TAB at 09:59

## 2019-08-29 RX ADMIN — INSULIN GLARGINE 8 UNITS: 100 INJECTION, SOLUTION SUBCUTANEOUS at 10:04

## 2019-08-29 RX ADMIN — AMIODARONE HYDROCHLORIDE 200 MG: 200 TABLET ORAL at 09:56

## 2019-08-29 RX ADMIN — CLOPIDOGREL BISULFATE 75 MG: 75 TABLET ORAL at 10:04

## 2019-08-29 RX ADMIN — HEPARIN SODIUM 5000 UNITS: 5000 INJECTION INTRAVENOUS; SUBCUTANEOUS at 05:28

## 2019-08-29 RX ADMIN — Medication 10 ML: at 09:59

## 2019-08-29 RX ADMIN — NEPHROCAP 1 MG: 1 CAP ORAL at 09:55

## 2019-08-29 RX ADMIN — POLYETHYLENE GLYCOL 3350 17 G: 17 POWDER, FOR SOLUTION ORAL at 09:58

## 2019-08-29 RX ADMIN — ISOSORBIDE MONONITRATE 30 MG: 30 TABLET, EXTENDED RELEASE ORAL at 10:04

## 2019-08-29 RX ADMIN — ASPIRIN 81 MG 81 MG: 81 TABLET ORAL at 09:56

## 2019-08-29 RX ADMIN — CINACALCET HYDROCHLORIDE 30 MG: 30 TABLET, FILM COATED ORAL at 09:57

## 2019-08-29 RX ADMIN — CARVEDILOL 6.25 MG: 6.25 TABLET, FILM COATED ORAL at 09:57

## 2019-08-29 ASSESSMENT — PAIN SCALES - GENERAL
PAINLEVEL_OUTOF10: 0
PAINLEVEL_OUTOF10: 0

## 2019-08-29 NOTE — PROGRESS NOTES
[facility]    [facility]. [facility]              [phone]                  Plan :     Next HD is in am  Vanc and cefeoime till . Informed HD clinic  OK to DC today    He is status post TMA of the left foot on 2019  Discontinue losartan. Assessment :     ESRD: on hemodialysis  Dialysis center/schedule:   MWF at [facility]    Renal Imagin/19- CT b/l nephrolithiasis without obstruction    Volume status  Target Wt- will get from center  Appears euvolemic  2D Echo: - assymetrical LVH, normal EF    Calcification of the MV    Hypertension  BP: (154-166)/(70-72)  Pulse:  [74-88]   BP is better  Fluid off with dialysis  Continue home medication    Anemia of CKD  Hgb: 8.5  Continue Aranesp    Renal Osteodystrophy  Stable PO4    Dialysis Access  L- AVF    Nutrition on dialysis  Lab Results   Component Value Date    LABALBU 2.7 (L) 2019       Left toe gangrene, ulceration- seen by Podiatry  He is status post ultrasound-guided aortogram and left lower extremity arteriogram with catheterization. He underwent left SFA angioplasty and left lower extremity popliteal and tibioperoneal trunk angioplasty. [facility] would like to thank Rebbeca Frankel, MD   for opportunity to serve this patient      Please call with questions at-   24 Hrs Answering service [phone] or  7 am- 5 pm via Perfect serve or cell phone  Ron Schaeffer          CC/reason for consult :     ESRD     HPI :     Elizabeth Ko is a 75 y.o. male presented to   the hospital on 2019 with left foot wounds. He is sleepy, and lethargic, and unable to provide  [place] details. Per significant other. Has pressure  Ulcer on the list for a week, and discoloration of the  2nd toe. His wound is getting worse because of  Which he was sent to the ED by Nursing home. Seen by Podiatry,found to have gangrene left 2nd toe,  Decub ulcer, and septic symptoms. Plan for MRI and possible  Debridement.     We are consulted for ESRD and related issues.      Interval History:     Seen and examined in the room  Blood pressure is better    ROS:     Seen with-DW Dr Ely Benavides  positives in bold   Not obtained         PMH/PSH/SH/Family History:     Past Medical History:   Diagnosis Date    Anemia     Atrial fibrillation ([facility] 2013    CAD (coronary artery disease)     Mi, stent    Chronic kidney disease     [facility] thrombosis[facility]     End stage renal disease ([facility]     Gas gangrene ([facility]     Hyperkalemia     Hyperlipidemia 2012    Hypertension     Hyperthyroidism     Ischemic heart disease     Metabolic encephalopathy     MI (myocardial infarction) ([facility] 10/2018    Type II or unspecified type diabetes mellitus without mention of complication, not stated as uncontrolled        Past Surgical History:   Procedure Laterality Date    CARDIAC SURGERY      cardiac stent    FEMORAL-TIBIAL BYPASS GRAFT Right 2019    RIGHT FEMORAL POSTERIOR TIBIAL BYPASS performed by Kofi Gutierrez MD at [address] [place] [address] Right 2019    INCISION AND DRAINAGE, TRANSMETATARSAL AMPUTATION ALL ON RIGHT FOOT performed by Vish Queen DPM at [address] [place] [address] Right 2019    REVISION OF TRANSMETATARSAL AMPUTATION RIGHT FOOT performed by Vish Queen DPM at [address] [place] [address] Left 2019    TMA    FOOT AMPUTATION Left 2019    LEFT FOOT TRANSMETATARSAL AMPUTATION performed by Joie Apgar, DPM at [address] [place] [address] Left 2019    REPEAT INCISION AND DRAINAGE, REVISION OF LEFT FOOT TRANSMETATARSAL AMPUTATION performed by Joie Apgar, DPM at [address] [facility] Right 2019    RIGHT BELOW THE KNEE AMPUTATION performed by Kofi Gutierrez MD at [address] [facility] History     Socioeconomic History    Marital status: Single     Spouse name: Not on file    Number of children: Not on file    Years of education: Not on file    Highest education level: Not on file   Occupational History    Not on file   Social Needs    Financial resource strain: Not on file    Food insecurity:     Worry: Not on file     Inability: Not on file    Transportation needs:     Medical: Not on file     Non-medical: Not on file   Tobacco Use    Smoking status: Never Smoker    Smokeless tobacco: Never Used   Substance and Sexual Activity    Alcohol use: Yes     Frequency: 4 or more times a week    Drug use: No    Sexual activity: Not on file   Lifestyle    Physical activity:     Days per week: Not on file     Minutes per session: Not on file    Stress: Not on file   Relationships    Social connections:     Talks on phone: Not on file     Gets together: Not on file     Attends [employer] service: Not on file     Active member of club or organization: Not on file     Attends meetings of clubs or organizations: Not on file     Relationship status: Not on file    Intimate partner violence:     Fear of current or ex partner: Not on file     Emotionally abused: Not on file     Physically abused: Not on file     Forced sexual activity: Not on file   Other Topics Concern    Not on file   Social History Narrative    Not on file           Problem Relation Age of Onset    Arthritis Mother           Medication:     Scheduled Meds:   heparin (porcine)  5,000 Units Subcutaneous 3 times per day    cefepime  2 g Intravenous Once per day on Mon Wed Fri    vancomycin  750 mg Intravenous Once per day on Mon Wed Fri    polyethylene glycol  17 g Oral BID    carvedilol  6.25 mg Oral BID    sodium chloride flush  10 mL Intravenous 2 times per day    amiodarone  200 mg Oral Daily    aspirin  81 mg Oral Daily    atorvastatin  80 mg Oral Nightly    b complex-C-folic acid  1 mg Oral Daily    cinacalcet  30 mg Oral Daily    clopidogrel  75 mg Oral Daily    insulin glargine  8 Units Subcutaneous BID    isosorbide mononitrate  30 mg Oral BID    mirtazapine  15 mg Oral

## 2019-08-29 NOTE — CARE COORDINATION
Case Management Assessment            Discharge Note                    Date / Time of Note: 8/29/2019 11:59 AM                  Discharge Note Completed by: Sigifredo Lora    Patient Name: Sandrine Berger   YOB: 1945  Diagnosis: Abscess or cellulitis of toe, left [L03.032, L02.612]  Abscess or cellulitis of toe, left [L03.032, L02.612]   Date / Time: 8/21/2019  9:16 AM    Current PCP: Dominic Tellez MD  Clinic patient: No    Hospitalization in the last 30 days: No    Advance Directives:  Code Status: Full Code  [place] DNR form completed and on chart: Not Indicated    Financial:  Payor: [address] / Plan: [place] / Product Type: *No Product type* /      Pharmacy:    [address] [address]  Phone: [phone] Fax: [phone]    [facility] [address] KELSEY BARLOW [address]) [phone] [facility] [phone]  [address] [address]  Phone: [phone] Fax: [phone]      Assistance purchasing medications?: Potential Assistance Purchasing Medications: No  Assistance provided by Case Management: None at this time    Does patient want to participate in local refill/ meds to beds program?: Not Assessed    Meds To Beds General Rules:  1. Can ONLY be done Monday- Friday between 8:30am-5pm  2. Prescription(s) must be in pharmacy by 3pm to be filled same day  3. Copy of patient's insurance/ prescription drug card and patient face sheet must be sent along with the prescription(s)  4. Cost of Rx cannot be added to hospital bill. If financial assistance is needed, please contact unit  or ;  or  CANNOT provide pharmacy voucher for patients co-pays  5.  Patients can then  the prescription on their way out of the hospital at discharge, or pharmacy can deliver to the bedside if staff is available. (payment due

## 2019-09-02 LAB
BLOOD CULTURE, ROUTINE: NORMAL
CULTURE, BLOOD 2: NORMAL

## 2019-09-23 LAB
FUNGUS (MYCOLOGY) CULTURE: NORMAL
FUNGUS STAIN: NORMAL

## 2019-10-08 ENCOUNTER — APPOINTMENT (OUTPATIENT)
Dept: GENERAL RADIOLOGY | Age: 74
DRG: 856 | End: 2019-10-08
Payer: MEDICARE

## 2019-10-08 ENCOUNTER — HOSPITAL ENCOUNTER (INPATIENT)
Age: 74
LOS: 7 days | Discharge: NURSING, REHAB OR LONG-TERM CARE | DRG: 856 | End: 2019-10-16
Attending: EMERGENCY MEDICINE | Admitting: INTERNAL MEDICINE
Payer: MEDICARE

## 2019-10-08 DIAGNOSIS — T81.89XA NON-HEALING SURGICAL WOUND, INITIAL ENCOUNTER: Primary | ICD-10-CM

## 2019-10-08 LAB
AFB CULTURE (MYCOBACTERIA): NORMAL
AFB SMEAR: NORMAL
ANION GAP SERPL CALCULATED.3IONS-SCNC: 14 MMOL/L (ref 3–16)
BASOPHILS ABSOLUTE: 0.1 K/UL (ref 0–0.2)
BASOPHILS RELATIVE PERCENT: 1.9 %
BUN BLDV-MCNC: 43 MG/DL (ref 7–20)
C-REACTIVE PROTEIN: 34.4 MG/L (ref 0–5.1)
CALCIUM SERPL-MCNC: 9.1 MG/DL (ref 8.3–10.6)
CHLORIDE BLD-SCNC: 96 MMOL/L (ref 99–110)
CO2: 25 MMOL/L (ref 21–32)
CREAT SERPL-MCNC: 7.8 MG/DL (ref 0.8–1.3)
EOSINOPHILS ABSOLUTE: 0.2 K/UL (ref 0–0.6)
EOSINOPHILS RELATIVE PERCENT: 3.4 %
GFR AFRICAN AMERICAN: 8
GFR NON-AFRICAN AMERICAN: 7
GLUCOSE BLD-MCNC: 150 MG/DL (ref 70–99)
HCT VFR BLD CALC: 31.9 % (ref 40.5–52.5)
HEMOGLOBIN: 9.8 G/DL (ref 13.5–17.5)
LACTATE: 1.64 MMOL/L (ref 0.4–2)
LYMPHOCYTES ABSOLUTE: 1.8 K/UL (ref 1–5.1)
LYMPHOCYTES RELATIVE PERCENT: 26.7 %
MCH RBC QN AUTO: 26.5 PG (ref 26–34)
MCHC RBC AUTO-ENTMCNC: 30.8 G/DL (ref 31–36)
MCV RBC AUTO: 86 FL (ref 80–100)
MONOCYTES ABSOLUTE: 0.6 K/UL (ref 0–1.3)
MONOCYTES RELATIVE PERCENT: 8.3 %
NEUTROPHILS ABSOLUTE: 4.1 K/UL (ref 1.7–7.7)
NEUTROPHILS RELATIVE PERCENT: 59.7 %
PDW BLD-RTO: 22.3 % (ref 12.4–15.4)
PERFORMED ON: NORMAL
PLATELET # BLD: 219 K/UL (ref 135–450)
PMV BLD AUTO: 9 FL (ref 5–10.5)
POC SAMPLE TYPE: NORMAL
POTASSIUM REFLEX MAGNESIUM: 7.1 MMOL/L (ref 3.5–5.1)
POTASSIUM SERPL-SCNC: 4.4 MMOL/L (ref 3.5–5.1)
RBC # BLD: 3.71 M/UL (ref 4.2–5.9)
SEDIMENTATION RATE, ERYTHROCYTE: 60 MM/HR (ref 0–20)
SODIUM BLD-SCNC: 135 MMOL/L (ref 136–145)
WBC # BLD: 6.9 K/UL (ref 4–11)

## 2019-10-08 PROCEDURE — 6360000002 HC RX W HCPCS: Performed by: PHYSICIAN ASSISTANT

## 2019-10-08 PROCEDURE — 87077 CULTURE AEROBIC IDENTIFY: CPT

## 2019-10-08 PROCEDURE — 87186 SC STD MICRODIL/AGAR DIL: CPT

## 2019-10-08 PROCEDURE — 83605 ASSAY OF LACTIC ACID: CPT

## 2019-10-08 PROCEDURE — 96365 THER/PROPH/DIAG IV INF INIT: CPT

## 2019-10-08 PROCEDURE — 2580000003 HC RX 258: Performed by: PHYSICIAN ASSISTANT

## 2019-10-08 PROCEDURE — 84132 ASSAY OF SERUM POTASSIUM: CPT

## 2019-10-08 PROCEDURE — 85652 RBC SED RATE AUTOMATED: CPT

## 2019-10-08 PROCEDURE — 85025 COMPLETE CBC W/AUTO DIFF WBC: CPT

## 2019-10-08 PROCEDURE — 87070 CULTURE OTHR SPECIMN AEROBIC: CPT

## 2019-10-08 PROCEDURE — 80048 BASIC METABOLIC PNL TOTAL CA: CPT

## 2019-10-08 PROCEDURE — 86140 C-REACTIVE PROTEIN: CPT

## 2019-10-08 PROCEDURE — 99285 EMERGENCY DEPT VISIT HI MDM: CPT

## 2019-10-08 PROCEDURE — 87205 SMEAR GRAM STAIN: CPT

## 2019-10-08 PROCEDURE — 73630 X-RAY EXAM OF FOOT: CPT

## 2019-10-08 PROCEDURE — 87040 BLOOD CULTURE FOR BACTERIA: CPT

## 2019-10-08 RX ORDER — CHOLECALCIFEROL (VITAMIN D3) 10 MCG
1 TABLET ORAL DAILY
COMMUNITY
Start: 2019-08-21 | End: 2019-12-05 | Stop reason: CLARIF

## 2019-10-08 RX ORDER — ONDANSETRON 2 MG/ML
4 INJECTION INTRAMUSCULAR; INTRAVENOUS EVERY 30 MIN PRN
Status: DISCONTINUED | OUTPATIENT
Start: 2019-10-08 | End: 2019-10-16 | Stop reason: HOSPADM

## 2019-10-08 RX ORDER — MORPHINE SULFATE 4 MG/ML
4 INJECTION, SOLUTION INTRAMUSCULAR; INTRAVENOUS
Status: COMPLETED | OUTPATIENT
Start: 2019-10-08 | End: 2019-10-14

## 2019-10-08 RX ORDER — COLLAGENASE SANTYL 250 [ARB'U]/G
OINTMENT TOPICAL
COMMUNITY
Start: 2019-09-26 | End: 2019-10-08

## 2019-10-08 RX ADMIN — CEFEPIME HYDROCHLORIDE 1 G: 1 INJECTION, POWDER, FOR SOLUTION INTRAMUSCULAR; INTRAVENOUS at 23:39

## 2019-10-09 ENCOUNTER — APPOINTMENT (OUTPATIENT)
Dept: VASCULAR LAB | Age: 74
DRG: 856 | End: 2019-10-09
Payer: MEDICARE

## 2019-10-09 ENCOUNTER — CARE COORDINATION (OUTPATIENT)
Dept: CASE MANAGEMENT | Age: 74
End: 2019-10-09

## 2019-10-09 PROBLEM — T14.8XXA WOUND, OPEN: Status: ACTIVE | Noted: 2019-10-09

## 2019-10-09 LAB
ANION GAP SERPL CALCULATED.3IONS-SCNC: 14 MMOL/L (ref 3–16)
BASOPHILS ABSOLUTE: 0.1 K/UL (ref 0–0.2)
BASOPHILS RELATIVE PERCENT: 1.3 %
BUN BLDV-MCNC: 52 MG/DL (ref 7–20)
CALCIUM SERPL-MCNC: 9.3 MG/DL (ref 8.3–10.6)
CHLORIDE BLD-SCNC: 99 MMOL/L (ref 99–110)
CO2: 27 MMOL/L (ref 21–32)
CREAT SERPL-MCNC: 8.8 MG/DL (ref 0.8–1.3)
EOSINOPHILS ABSOLUTE: 0.3 K/UL (ref 0–0.6)
EOSINOPHILS RELATIVE PERCENT: 4.2 %
FERRITIN: 869.3 NG/ML (ref 30–400)
GFR AFRICAN AMERICAN: 7
GFR NON-AFRICAN AMERICAN: 6
GLUCOSE BLD-MCNC: 131 MG/DL (ref 70–99)
GLUCOSE BLD-MCNC: 41 MG/DL (ref 70–99)
GLUCOSE BLD-MCNC: 50 MG/DL (ref 70–99)
GLUCOSE BLD-MCNC: 57 MG/DL (ref 70–99)
GLUCOSE BLD-MCNC: 67 MG/DL (ref 70–99)
GLUCOSE BLD-MCNC: 75 MG/DL (ref 70–99)
GLUCOSE BLD-MCNC: 77 MG/DL (ref 70–99)
GLUCOSE BLD-MCNC: 79 MG/DL (ref 70–99)
HCT VFR BLD CALC: 29.7 % (ref 40.5–52.5)
HEMOGLOBIN: 9.3 G/DL (ref 13.5–17.5)
IRON SATURATION: 17 % (ref 20–50)
IRON: 24 UG/DL (ref 59–158)
LYMPHOCYTES ABSOLUTE: 2.3 K/UL (ref 1–5.1)
LYMPHOCYTES RELATIVE PERCENT: 34.1 %
MCH RBC QN AUTO: 27 PG (ref 26–34)
MCHC RBC AUTO-ENTMCNC: 31.2 G/DL (ref 31–36)
MCV RBC AUTO: 86.8 FL (ref 80–100)
MONOCYTES ABSOLUTE: 0.7 K/UL (ref 0–1.3)
MONOCYTES RELATIVE PERCENT: 10.6 %
NEUTROPHILS ABSOLUTE: 3.3 K/UL (ref 1.7–7.7)
NEUTROPHILS RELATIVE PERCENT: 49.8 %
PDW BLD-RTO: 22.2 % (ref 12.4–15.4)
PERFORMED ON: ABNORMAL
PERFORMED ON: NORMAL
PHOSPHORUS: 5.6 MG/DL (ref 2.5–4.9)
PLATELET # BLD: 188 K/UL (ref 135–450)
PMV BLD AUTO: 8.9 FL (ref 5–10.5)
POTASSIUM REFLEX MAGNESIUM: 4.5 MMOL/L (ref 3.5–5.1)
RBC # BLD: 3.42 M/UL (ref 4.2–5.9)
SODIUM BLD-SCNC: 140 MMOL/L (ref 136–145)
TOTAL IRON BINDING CAPACITY: 139 UG/DL (ref 260–445)
VANCOMYCIN RANDOM: 19.1 UG/ML
WBC # BLD: 6.7 K/UL (ref 4–11)

## 2019-10-09 PROCEDURE — 97163 PT EVAL HIGH COMPLEX 45 MIN: CPT

## 2019-10-09 PROCEDURE — 97530 THERAPEUTIC ACTIVITIES: CPT

## 2019-10-09 PROCEDURE — 90935 HEMODIALYSIS ONE EVALUATION: CPT

## 2019-10-09 PROCEDURE — 83540 ASSAY OF IRON: CPT

## 2019-10-09 PROCEDURE — 6370000000 HC RX 637 (ALT 250 FOR IP): Performed by: STUDENT IN AN ORGANIZED HEALTH CARE EDUCATION/TRAINING PROGRAM

## 2019-10-09 PROCEDURE — 36415 COLL VENOUS BLD VENIPUNCTURE: CPT

## 2019-10-09 PROCEDURE — 80202 ASSAY OF VANCOMYCIN: CPT

## 2019-10-09 PROCEDURE — 84100 ASSAY OF PHOSPHORUS: CPT

## 2019-10-09 PROCEDURE — 2580000003 HC RX 258: Performed by: STUDENT IN AN ORGANIZED HEALTH CARE EDUCATION/TRAINING PROGRAM

## 2019-10-09 PROCEDURE — 85025 COMPLETE CBC W/AUTO DIFF WBC: CPT

## 2019-10-09 PROCEDURE — 6360000002 HC RX W HCPCS: Performed by: STUDENT IN AN ORGANIZED HEALTH CARE EDUCATION/TRAINING PROGRAM

## 2019-10-09 PROCEDURE — 6360000002 HC RX W HCPCS: Performed by: PHYSICIAN ASSISTANT

## 2019-10-09 PROCEDURE — 82728 ASSAY OF FERRITIN: CPT

## 2019-10-09 PROCEDURE — 2580000003 HC RX 258: Performed by: PHYSICIAN ASSISTANT

## 2019-10-09 PROCEDURE — 5A1D70Z PERFORMANCE OF URINARY FILTRATION, INTERMITTENT, LESS THAN 6 HOURS PER DAY: ICD-10-PCS | Performed by: INTERNAL MEDICINE

## 2019-10-09 PROCEDURE — 80048 BASIC METABOLIC PNL TOTAL CA: CPT

## 2019-10-09 PROCEDURE — 93925 LOWER EXTREMITY STUDY: CPT

## 2019-10-09 PROCEDURE — 97166 OT EVAL MOD COMPLEX 45 MIN: CPT

## 2019-10-09 PROCEDURE — 83550 IRON BINDING TEST: CPT

## 2019-10-09 PROCEDURE — 1200000000 HC SEMI PRIVATE

## 2019-10-09 RX ORDER — ATORVASTATIN CALCIUM 80 MG/1
80 TABLET, FILM COATED ORAL NIGHTLY
Status: DISCONTINUED | OUTPATIENT
Start: 2019-10-09 | End: 2019-10-16 | Stop reason: HOSPADM

## 2019-10-09 RX ORDER — LABETALOL 20 MG/4 ML (5 MG/ML) INTRAVENOUS SYRINGE
10 ONCE
Status: DISCONTINUED | OUTPATIENT
Start: 2019-10-09 | End: 2019-10-09

## 2019-10-09 RX ORDER — ACETAMINOPHEN 325 MG/1
650 TABLET ORAL EVERY 4 HOURS PRN
Status: DISCONTINUED | OUTPATIENT
Start: 2019-10-09 | End: 2019-10-16 | Stop reason: HOSPADM

## 2019-10-09 RX ORDER — HYDRALAZINE HYDROCHLORIDE 25 MG/1
25 TABLET, FILM COATED ORAL EVERY 8 HOURS SCHEDULED
Status: DISCONTINUED | OUTPATIENT
Start: 2019-10-09 | End: 2019-10-13

## 2019-10-09 RX ORDER — HEPARIN SODIUM 5000 [USP'U]/ML
5000 INJECTION, SOLUTION INTRAVENOUS; SUBCUTANEOUS EVERY 8 HOURS SCHEDULED
Status: DISCONTINUED | OUTPATIENT
Start: 2019-10-09 | End: 2019-10-11

## 2019-10-09 RX ORDER — DEXTROSE MONOHYDRATE 50 MG/ML
100 INJECTION, SOLUTION INTRAVENOUS PRN
Status: DISCONTINUED | OUTPATIENT
Start: 2019-10-09 | End: 2019-10-16 | Stop reason: HOSPADM

## 2019-10-09 RX ORDER — AMIODARONE HYDROCHLORIDE 200 MG/1
200 TABLET ORAL DAILY
Status: DISCONTINUED | OUTPATIENT
Start: 2019-10-09 | End: 2019-10-16 | Stop reason: HOSPADM

## 2019-10-09 RX ORDER — LANOLIN ALCOHOL/MO/W.PET/CERES
CREAM (GRAM) TOPICAL 2 TIMES DAILY
Status: DISCONTINUED | OUTPATIENT
Start: 2019-10-09 | End: 2019-10-16 | Stop reason: HOSPADM

## 2019-10-09 RX ORDER — SODIUM CHLORIDE 0.9 % (FLUSH) 0.9 %
10 SYRINGE (ML) INJECTION EVERY 12 HOURS SCHEDULED
Status: DISCONTINUED | OUTPATIENT
Start: 2019-10-09 | End: 2019-10-16 | Stop reason: HOSPADM

## 2019-10-09 RX ORDER — ASPIRIN 81 MG/1
81 TABLET, CHEWABLE ORAL DAILY
Status: DISCONTINUED | OUTPATIENT
Start: 2019-10-09 | End: 2019-10-16 | Stop reason: HOSPADM

## 2019-10-09 RX ORDER — CLOPIDOGREL BISULFATE 75 MG/1
75 TABLET ORAL DAILY
Status: DISCONTINUED | OUTPATIENT
Start: 2019-10-09 | End: 2019-10-11

## 2019-10-09 RX ORDER — CINACALCET 30 MG/1
30 TABLET, FILM COATED ORAL DAILY
Status: DISCONTINUED | OUTPATIENT
Start: 2019-10-09 | End: 2019-10-16 | Stop reason: HOSPADM

## 2019-10-09 RX ORDER — ISOSORBIDE MONONITRATE 30 MG/1
30 TABLET, EXTENDED RELEASE ORAL 2 TIMES DAILY
Status: DISCONTINUED | OUTPATIENT
Start: 2019-10-09 | End: 2019-10-16 | Stop reason: HOSPADM

## 2019-10-09 RX ORDER — CARVEDILOL 6.25 MG/1
6.25 TABLET ORAL 2 TIMES DAILY
Status: DISCONTINUED | OUTPATIENT
Start: 2019-10-09 | End: 2019-10-12

## 2019-10-09 RX ORDER — NICOTINE POLACRILEX 4 MG
15 LOZENGE BUCCAL PRN
Status: DISCONTINUED | OUTPATIENT
Start: 2019-10-09 | End: 2019-10-16 | Stop reason: HOSPADM

## 2019-10-09 RX ORDER — DEXTROSE MONOHYDRATE 25 G/50ML
12.5 INJECTION, SOLUTION INTRAVENOUS PRN
Status: DISCONTINUED | OUTPATIENT
Start: 2019-10-09 | End: 2019-10-16 | Stop reason: HOSPADM

## 2019-10-09 RX ORDER — SODIUM CHLORIDE 0.9 % (FLUSH) 0.9 %
10 SYRINGE (ML) INJECTION PRN
Status: DISCONTINUED | OUTPATIENT
Start: 2019-10-09 | End: 2019-10-16 | Stop reason: HOSPADM

## 2019-10-09 RX ADMIN — HYDRALAZINE HYDROCHLORIDE 25 MG: 25 TABLET, FILM COATED ORAL at 18:30

## 2019-10-09 RX ADMIN — ACETAMINOPHEN 650 MG: 325 TABLET ORAL at 03:09

## 2019-10-09 RX ADMIN — CARVEDILOL 6.25 MG: 6.25 TABLET, FILM COATED ORAL at 10:04

## 2019-10-09 RX ADMIN — Medication 15 G: at 17:55

## 2019-10-09 RX ADMIN — CARVEDILOL 6.25 MG: 6.25 TABLET, FILM COATED ORAL at 02:21

## 2019-10-09 RX ADMIN — INSULIN GLARGINE 8 UNITS: 100 INJECTION, SOLUTION SUBCUTANEOUS at 21:43

## 2019-10-09 RX ADMIN — HEPARIN SODIUM 5000 UNITS: 5000 INJECTION INTRAVENOUS; SUBCUTANEOUS at 05:36

## 2019-10-09 RX ADMIN — Medication: at 10:05

## 2019-10-09 RX ADMIN — CINACALCET HYDROCHLORIDE 30 MG: 30 TABLET, FILM COATED ORAL at 10:04

## 2019-10-09 RX ADMIN — ISOSORBIDE MONONITRATE 30 MG: 30 TABLET, EXTENDED RELEASE ORAL at 02:21

## 2019-10-09 RX ADMIN — ISOSORBIDE MONONITRATE 30 MG: 30 TABLET, EXTENDED RELEASE ORAL at 21:43

## 2019-10-09 RX ADMIN — HEPARIN SODIUM 5000 UNITS: 5000 INJECTION INTRAVENOUS; SUBCUTANEOUS at 21:43

## 2019-10-09 RX ADMIN — Medication: at 21:48

## 2019-10-09 RX ADMIN — Medication 10 ML: at 21:43

## 2019-10-09 RX ADMIN — AMIODARONE HYDROCHLORIDE 200 MG: 200 TABLET ORAL at 10:04

## 2019-10-09 RX ADMIN — Medication 10 ML: at 10:05

## 2019-10-09 RX ADMIN — ASPIRIN 81 MG 81 MG: 81 TABLET ORAL at 10:04

## 2019-10-09 RX ADMIN — VANCOMYCIN HYDROCHLORIDE 1250 MG: 10 INJECTION, POWDER, LYOPHILIZED, FOR SOLUTION INTRAVENOUS at 00:13

## 2019-10-09 RX ADMIN — ISOSORBIDE MONONITRATE 30 MG: 30 TABLET, EXTENDED RELEASE ORAL at 10:04

## 2019-10-09 RX ADMIN — VANCOMYCIN HYDROCHLORIDE 750 MG: 10 INJECTION, POWDER, LYOPHILIZED, FOR SOLUTION INTRAVENOUS at 15:39

## 2019-10-09 RX ADMIN — CARVEDILOL 6.25 MG: 6.25 TABLET, FILM COATED ORAL at 21:43

## 2019-10-09 RX ADMIN — HYDRALAZINE HYDROCHLORIDE 25 MG: 25 TABLET, FILM COATED ORAL at 21:43

## 2019-10-09 RX ADMIN — CEFEPIME HYDROCHLORIDE 2 G: 2 INJECTION, POWDER, FOR SOLUTION INTRAVENOUS at 22:52

## 2019-10-09 RX ADMIN — INSULIN GLARGINE 8 UNITS: 100 INJECTION, SOLUTION SUBCUTANEOUS at 03:11

## 2019-10-09 RX ADMIN — INSULIN GLARGINE 8 UNITS: 100 INJECTION, SOLUTION SUBCUTANEOUS at 10:08

## 2019-10-09 RX ADMIN — MORPHINE SULFATE 4 MG: 4 INJECTION INTRAVENOUS at 11:13

## 2019-10-09 RX ADMIN — ATORVASTATIN CALCIUM 80 MG: 80 TABLET, FILM COATED ORAL at 21:43

## 2019-10-09 RX ADMIN — Medication 15 G: at 18:02

## 2019-10-09 RX ADMIN — CLOPIDOGREL BISULFATE 75 MG: 75 TABLET ORAL at 10:04

## 2019-10-09 RX ADMIN — ACETAMINOPHEN 650 MG: 325 TABLET ORAL at 13:38

## 2019-10-09 ASSESSMENT — PAIN SCALES - GENERAL
PAINLEVEL_OUTOF10: 9
PAINLEVEL_OUTOF10: 9
PAINLEVEL_OUTOF10: 4
PAINLEVEL_OUTOF10: 0
PAINLEVEL_OUTOF10: 5
PAINLEVEL_OUTOF10: 0

## 2019-10-09 ASSESSMENT — PAIN DESCRIPTION - DESCRIPTORS: DESCRIPTORS: ACHING;DISCOMFORT

## 2019-10-09 ASSESSMENT — PAIN - FUNCTIONAL ASSESSMENT: PAIN_FUNCTIONAL_ASSESSMENT: PREVENTS OR INTERFERES SOME ACTIVE ACTIVITIES AND ADLS

## 2019-10-09 ASSESSMENT — PAIN DESCRIPTION - ORIENTATION
ORIENTATION: LEFT
ORIENTATION: LEFT

## 2019-10-09 ASSESSMENT — PAIN DESCRIPTION - ONSET
ONSET: ON-GOING
ONSET: ON-GOING

## 2019-10-09 ASSESSMENT — PAIN DESCRIPTION - LOCATION
LOCATION: FOOT
LOCATION: FOOT

## 2019-10-09 ASSESSMENT — PAIN DESCRIPTION - PROGRESSION
CLINICAL_PROGRESSION: NOT CHANGED
CLINICAL_PROGRESSION: NOT CHANGED

## 2019-10-09 ASSESSMENT — PAIN DESCRIPTION - FREQUENCY
FREQUENCY: INTERMITTENT
FREQUENCY: INTERMITTENT

## 2019-10-09 ASSESSMENT — PAIN DESCRIPTION - PAIN TYPE
TYPE: ACUTE PAIN
TYPE: ACUTE PAIN;CHRONIC PAIN

## 2019-10-10 ENCOUNTER — APPOINTMENT (OUTPATIENT)
Dept: CT IMAGING | Age: 74
DRG: 856 | End: 2019-10-10
Payer: MEDICARE

## 2019-10-10 ENCOUNTER — APPOINTMENT (OUTPATIENT)
Dept: VASCULAR LAB | Age: 74
DRG: 856 | End: 2019-10-10
Payer: MEDICARE

## 2019-10-10 LAB
ANION GAP SERPL CALCULATED.3IONS-SCNC: 11 MMOL/L (ref 3–16)
BASOPHILS ABSOLUTE: 0.1 K/UL (ref 0–0.2)
BASOPHILS RELATIVE PERCENT: 1.3 %
BUN BLDV-MCNC: 26 MG/DL (ref 7–20)
CALCIUM SERPL-MCNC: 8.8 MG/DL (ref 8.3–10.6)
CHLORIDE BLD-SCNC: 100 MMOL/L (ref 99–110)
CO2: 26 MMOL/L (ref 21–32)
CREAT SERPL-MCNC: 5.8 MG/DL (ref 0.8–1.3)
EOSINOPHILS ABSOLUTE: 0.2 K/UL (ref 0–0.6)
EOSINOPHILS RELATIVE PERCENT: 3.7 %
GFR AFRICAN AMERICAN: 12
GFR NON-AFRICAN AMERICAN: 10
GLUCOSE BLD-MCNC: 105 MG/DL (ref 70–99)
GLUCOSE BLD-MCNC: 106 MG/DL (ref 70–99)
GLUCOSE BLD-MCNC: 107 MG/DL (ref 70–99)
GLUCOSE BLD-MCNC: 132 MG/DL (ref 70–99)
GLUCOSE BLD-MCNC: 49 MG/DL (ref 70–99)
GLUCOSE BLD-MCNC: 54 MG/DL (ref 70–99)
GLUCOSE BLD-MCNC: 63 MG/DL (ref 70–99)
GLUCOSE BLD-MCNC: 75 MG/DL (ref 70–99)
HCT VFR BLD CALC: 28.9 % (ref 40.5–52.5)
HEMOGLOBIN: 9 G/DL (ref 13.5–17.5)
LYMPHOCYTES ABSOLUTE: 1.7 K/UL (ref 1–5.1)
LYMPHOCYTES RELATIVE PERCENT: 25.4 %
MCH RBC QN AUTO: 26.7 PG (ref 26–34)
MCHC RBC AUTO-ENTMCNC: 31.2 G/DL (ref 31–36)
MCV RBC AUTO: 85.5 FL (ref 80–100)
MONOCYTES ABSOLUTE: 0.7 K/UL (ref 0–1.3)
MONOCYTES RELATIVE PERCENT: 10.6 %
NEUTROPHILS ABSOLUTE: 3.9 K/UL (ref 1.7–7.7)
NEUTROPHILS RELATIVE PERCENT: 59 %
PDW BLD-RTO: 22 % (ref 12.4–15.4)
PERFORMED ON: ABNORMAL
PERFORMED ON: NORMAL
PLATELET # BLD: 212 K/UL (ref 135–450)
PMV BLD AUTO: 8.7 FL (ref 5–10.5)
POTASSIUM REFLEX MAGNESIUM: 4.4 MMOL/L (ref 3.5–5.1)
RBC # BLD: 3.38 M/UL (ref 4.2–5.9)
SODIUM BLD-SCNC: 137 MMOL/L (ref 136–145)
WBC # BLD: 6.6 K/UL (ref 4–11)

## 2019-10-10 PROCEDURE — 1200000000 HC SEMI PRIVATE

## 2019-10-10 PROCEDURE — 93922 UPR/L XTREMITY ART 2 LEVELS: CPT

## 2019-10-10 PROCEDURE — 6360000004 HC RX CONTRAST MEDICATION: Performed by: INTERNAL MEDICINE

## 2019-10-10 PROCEDURE — 80048 BASIC METABOLIC PNL TOTAL CA: CPT

## 2019-10-10 PROCEDURE — 6360000002 HC RX W HCPCS: Performed by: STUDENT IN AN ORGANIZED HEALTH CARE EDUCATION/TRAINING PROGRAM

## 2019-10-10 PROCEDURE — 74170 CT ABD WO CNTRST FLWD CNTRST: CPT

## 2019-10-10 PROCEDURE — 6370000000 HC RX 637 (ALT 250 FOR IP): Performed by: STUDENT IN AN ORGANIZED HEALTH CARE EDUCATION/TRAINING PROGRAM

## 2019-10-10 PROCEDURE — 36415 COLL VENOUS BLD VENIPUNCTURE: CPT

## 2019-10-10 PROCEDURE — 6370000000 HC RX 637 (ALT 250 FOR IP): Performed by: INTERNAL MEDICINE

## 2019-10-10 PROCEDURE — 2580000003 HC RX 258: Performed by: STUDENT IN AN ORGANIZED HEALTH CARE EDUCATION/TRAINING PROGRAM

## 2019-10-10 PROCEDURE — 85025 COMPLETE CBC W/AUTO DIFF WBC: CPT

## 2019-10-10 RX ADMIN — HEPARIN SODIUM 5000 UNITS: 5000 INJECTION INTRAVENOUS; SUBCUTANEOUS at 21:04

## 2019-10-10 RX ADMIN — CARVEDILOL 6.25 MG: 6.25 TABLET, FILM COATED ORAL at 21:05

## 2019-10-10 RX ADMIN — CINACALCET HYDROCHLORIDE 30 MG: 30 TABLET, FILM COATED ORAL at 09:42

## 2019-10-10 RX ADMIN — HEPARIN SODIUM 5000 UNITS: 5000 INJECTION INTRAVENOUS; SUBCUTANEOUS at 05:55

## 2019-10-10 RX ADMIN — Medication: at 21:05

## 2019-10-10 RX ADMIN — HYDRALAZINE HYDROCHLORIDE 25 MG: 25 TABLET, FILM COATED ORAL at 14:39

## 2019-10-10 RX ADMIN — ISOSORBIDE MONONITRATE 30 MG: 30 TABLET, EXTENDED RELEASE ORAL at 21:05

## 2019-10-10 RX ADMIN — IOPAMIDOL 80 ML: 755 INJECTION, SOLUTION INTRAVENOUS at 10:10

## 2019-10-10 RX ADMIN — ATORVASTATIN CALCIUM 80 MG: 80 TABLET, FILM COATED ORAL at 21:05

## 2019-10-10 RX ADMIN — AMIODARONE HYDROCHLORIDE 200 MG: 200 TABLET ORAL at 09:42

## 2019-10-10 RX ADMIN — ASPIRIN 81 MG 81 MG: 81 TABLET ORAL at 09:42

## 2019-10-10 RX ADMIN — HEPARIN SODIUM 5000 UNITS: 5000 INJECTION INTRAVENOUS; SUBCUTANEOUS at 14:39

## 2019-10-10 RX ADMIN — HYDRALAZINE HYDROCHLORIDE 25 MG: 25 TABLET, FILM COATED ORAL at 05:55

## 2019-10-10 RX ADMIN — Medication: at 09:43

## 2019-10-10 RX ADMIN — CARVEDILOL 6.25 MG: 6.25 TABLET, FILM COATED ORAL at 09:42

## 2019-10-10 RX ADMIN — ACETAMINOPHEN 650 MG: 325 TABLET ORAL at 00:56

## 2019-10-10 RX ADMIN — Medication 10 ML: at 21:10

## 2019-10-10 RX ADMIN — ANORECTAL OINTMENT: 15.7; .44; 24; 20.6 OINTMENT TOPICAL at 21:10

## 2019-10-10 RX ADMIN — Medication 10 ML: at 09:43

## 2019-10-10 RX ADMIN — HYDRALAZINE HYDROCHLORIDE 25 MG: 25 TABLET, FILM COATED ORAL at 21:05

## 2019-10-10 RX ADMIN — ISOSORBIDE MONONITRATE 30 MG: 30 TABLET, EXTENDED RELEASE ORAL at 09:42

## 2019-10-10 ASSESSMENT — PAIN SCALES - GENERAL
PAINLEVEL_OUTOF10: 0

## 2019-10-11 ENCOUNTER — APPOINTMENT (OUTPATIENT)
Dept: GENERAL RADIOLOGY | Age: 74
DRG: 856 | End: 2019-10-11
Payer: MEDICARE

## 2019-10-11 ENCOUNTER — ANESTHESIA EVENT (OUTPATIENT)
Dept: OPERATING ROOM | Age: 74
DRG: 856 | End: 2019-10-11
Payer: MEDICARE

## 2019-10-11 ENCOUNTER — ANESTHESIA (OUTPATIENT)
Dept: OPERATING ROOM | Age: 74
DRG: 856 | End: 2019-10-11
Payer: MEDICARE

## 2019-10-11 VITALS — SYSTOLIC BLOOD PRESSURE: 172 MMHG | DIASTOLIC BLOOD PRESSURE: 77 MMHG | OXYGEN SATURATION: 100 % | TEMPERATURE: 96.8 F

## 2019-10-11 PROBLEM — L08.9 DIABETIC FOOT INFECTION (HCC): Status: ACTIVE | Noted: 2019-10-11

## 2019-10-11 PROBLEM — A49.8 PSEUDOMONAS INFECTION: Status: ACTIVE | Noted: 2019-10-11

## 2019-10-11 PROBLEM — E11.628 DIABETIC FOOT INFECTION (HCC): Status: ACTIVE | Noted: 2019-10-11

## 2019-10-11 PROBLEM — T81.31XA SURGICAL WOUND DEHISCENCE: Status: ACTIVE | Noted: 2019-10-11

## 2019-10-11 LAB
ALBUMIN SERPL-MCNC: 3.1 G/DL (ref 3.4–5)
ANION GAP SERPL CALCULATED.3IONS-SCNC: 14 MMOL/L (ref 3–16)
ANION GAP SERPL CALCULATED.3IONS-SCNC: 15 MMOL/L (ref 3–16)
BASOPHILS ABSOLUTE: 0.1 K/UL (ref 0–0.2)
BASOPHILS RELATIVE PERCENT: 0.9 %
BUN BLDV-MCNC: 42 MG/DL (ref 7–20)
BUN BLDV-MCNC: 42 MG/DL (ref 7–20)
CALCIUM SERPL-MCNC: 8.5 MG/DL (ref 8.3–10.6)
CALCIUM SERPL-MCNC: 8.5 MG/DL (ref 8.3–10.6)
CHLORIDE BLD-SCNC: 99 MMOL/L (ref 99–110)
CHLORIDE BLD-SCNC: 99 MMOL/L (ref 99–110)
CO2: 24 MMOL/L (ref 21–32)
CO2: 24 MMOL/L (ref 21–32)
CREAT SERPL-MCNC: 8.4 MG/DL (ref 0.8–1.3)
CREAT SERPL-MCNC: 8.4 MG/DL (ref 0.8–1.3)
EOSINOPHILS ABSOLUTE: 0.3 K/UL (ref 0–0.6)
EOSINOPHILS RELATIVE PERCENT: 4.3 %
GFR AFRICAN AMERICAN: 8
GFR AFRICAN AMERICAN: 8
GFR NON-AFRICAN AMERICAN: 6
GFR NON-AFRICAN AMERICAN: 6
GLUCOSE BLD-MCNC: 110 MG/DL (ref 70–99)
GLUCOSE BLD-MCNC: 124 MG/DL (ref 70–99)
GLUCOSE BLD-MCNC: 63 MG/DL (ref 70–99)
GLUCOSE BLD-MCNC: 71 MG/DL (ref 70–99)
GLUCOSE BLD-MCNC: 72 MG/DL (ref 70–99)
GLUCOSE BLD-MCNC: 74 MG/DL (ref 70–99)
GLUCOSE BLD-MCNC: 75 MG/DL (ref 70–99)
GLUCOSE BLD-MCNC: 79 MG/DL (ref 70–99)
HCT VFR BLD CALC: 27.2 % (ref 40.5–52.5)
HCT VFR BLD CALC: 28.8 % (ref 40.5–52.5)
HCT VFR BLD CALC: 30.7 % (ref 40.5–52.5)
HEMOGLOBIN: 8.5 G/DL (ref 13.5–17.5)
HEMOGLOBIN: 8.8 G/DL (ref 13.5–17.5)
HEMOGLOBIN: 9.3 G/DL (ref 13.5–17.5)
INR BLD: 1.13 (ref 0.86–1.14)
LYMPHOCYTES ABSOLUTE: 1.8 K/UL (ref 1–5.1)
LYMPHOCYTES RELATIVE PERCENT: 29.3 %
MCH RBC QN AUTO: 26.7 PG (ref 26–34)
MCH RBC QN AUTO: 26.7 PG (ref 26–34)
MCHC RBC AUTO-ENTMCNC: 30.6 G/DL (ref 31–36)
MCHC RBC AUTO-ENTMCNC: 31.3 G/DL (ref 31–36)
MCV RBC AUTO: 85.1 FL (ref 80–100)
MCV RBC AUTO: 87.4 FL (ref 80–100)
MONOCYTES ABSOLUTE: 0.6 K/UL (ref 0–1.3)
MONOCYTES RELATIVE PERCENT: 10.4 %
NEUTROPHILS ABSOLUTE: 3.4 K/UL (ref 1.7–7.7)
NEUTROPHILS RELATIVE PERCENT: 55.1 %
PDW BLD-RTO: 21.4 % (ref 12.4–15.4)
PDW BLD-RTO: 21.9 % (ref 12.4–15.4)
PERFORMED ON: ABNORMAL
PERFORMED ON: NORMAL
PHOSPHORUS: 5.8 MG/DL (ref 2.5–4.9)
PLATELET # BLD: 198 K/UL (ref 135–450)
PLATELET # BLD: 199 K/UL (ref 135–450)
PMV BLD AUTO: 8.8 FL (ref 5–10.5)
PMV BLD AUTO: 8.8 FL (ref 5–10.5)
POTASSIUM REFLEX MAGNESIUM: 5.4 MMOL/L (ref 3.5–5.1)
POTASSIUM SERPL-SCNC: 5.5 MMOL/L (ref 3.5–5.1)
PROTHROMBIN TIME: 12.9 SEC (ref 9.8–13)
RBC # BLD: 3.2 M/UL (ref 4.2–5.9)
RBC # BLD: 3.29 M/UL (ref 4.2–5.9)
SODIUM BLD-SCNC: 137 MMOL/L (ref 136–145)
SODIUM BLD-SCNC: 138 MMOL/L (ref 136–145)
VANCOMYCIN RANDOM: 17 UG/ML
WBC # BLD: 6.2 K/UL (ref 4–11)
WBC # BLD: 6.2 K/UL (ref 4–11)

## 2019-10-11 PROCEDURE — 2580000003 HC RX 258: Performed by: PODIATRIST

## 2019-10-11 PROCEDURE — 85014 HEMATOCRIT: CPT

## 2019-10-11 PROCEDURE — 0QBP0ZZ EXCISION OF LEFT METATARSAL, OPEN APPROACH: ICD-10-PCS | Performed by: PODIATRIST

## 2019-10-11 PROCEDURE — 87077 CULTURE AEROBIC IDENTIFY: CPT

## 2019-10-11 PROCEDURE — 6370000000 HC RX 637 (ALT 250 FOR IP): Performed by: STUDENT IN AN ORGANIZED HEALTH CARE EDUCATION/TRAINING PROGRAM

## 2019-10-11 PROCEDURE — 87206 SMEAR FLUORESCENT/ACID STAI: CPT

## 2019-10-11 PROCEDURE — 7100000001 HC PACU RECOVERY - ADDTL 15 MIN: Performed by: PODIATRIST

## 2019-10-11 PROCEDURE — 3700000000 HC ANESTHESIA ATTENDED CARE: Performed by: PODIATRIST

## 2019-10-11 PROCEDURE — 7100000000 HC PACU RECOVERY - FIRST 15 MIN: Performed by: PODIATRIST

## 2019-10-11 PROCEDURE — 85025 COMPLETE CBC W/AUTO DIFF WBC: CPT

## 2019-10-11 PROCEDURE — 2580000003 HC RX 258: Performed by: STUDENT IN AN ORGANIZED HEALTH CARE EDUCATION/TRAINING PROGRAM

## 2019-10-11 PROCEDURE — 87015 SPECIMEN INFECT AGNT CONCNTJ: CPT

## 2019-10-11 PROCEDURE — 36415 COLL VENOUS BLD VENIPUNCTURE: CPT

## 2019-10-11 PROCEDURE — 1200000000 HC SEMI PRIVATE

## 2019-10-11 PROCEDURE — 90935 HEMODIALYSIS ONE EVALUATION: CPT

## 2019-10-11 PROCEDURE — 87116 MYCOBACTERIA CULTURE: CPT

## 2019-10-11 PROCEDURE — 6360000002 HC RX W HCPCS: Performed by: STUDENT IN AN ORGANIZED HEALTH CARE EDUCATION/TRAINING PROGRAM

## 2019-10-11 PROCEDURE — 3700000001 HC ADD 15 MINUTES (ANESTHESIA): Performed by: PODIATRIST

## 2019-10-11 PROCEDURE — 85018 HEMOGLOBIN: CPT

## 2019-10-11 PROCEDURE — 87075 CULTR BACTERIA EXCEPT BLOOD: CPT

## 2019-10-11 PROCEDURE — 2500000003 HC RX 250 WO HCPCS: Performed by: NURSE ANESTHETIST, CERTIFIED REGISTERED

## 2019-10-11 PROCEDURE — 85610 PROTHROMBIN TIME: CPT

## 2019-10-11 PROCEDURE — 87102 FUNGUS ISOLATION CULTURE: CPT

## 2019-10-11 PROCEDURE — P9047 ALBUMIN (HUMAN), 25%, 50ML: HCPCS | Performed by: NURSE ANESTHETIST, CERTIFIED REGISTERED

## 2019-10-11 PROCEDURE — 2500000003 HC RX 250 WO HCPCS: Performed by: PODIATRIST

## 2019-10-11 PROCEDURE — 80202 ASSAY OF VANCOMYCIN: CPT

## 2019-10-11 PROCEDURE — 73630 X-RAY EXAM OF FOOT: CPT

## 2019-10-11 PROCEDURE — 87070 CULTURE OTHR SPECIMN AEROBIC: CPT

## 2019-10-11 PROCEDURE — 80069 RENAL FUNCTION PANEL: CPT

## 2019-10-11 PROCEDURE — 3600000012 HC SURGERY LEVEL 2 ADDTL 15MIN: Performed by: PODIATRIST

## 2019-10-11 PROCEDURE — 2709999900 HC NON-CHARGEABLE SUPPLY: Performed by: PODIATRIST

## 2019-10-11 PROCEDURE — 3600000002 HC SURGERY LEVEL 2 BASE: Performed by: PODIATRIST

## 2019-10-11 PROCEDURE — 88311 DECALCIFY TISSUE: CPT

## 2019-10-11 PROCEDURE — 87205 SMEAR GRAM STAIN: CPT

## 2019-10-11 PROCEDURE — 2580000003 HC RX 258: Performed by: NURSE ANESTHETIST, CERTIFIED REGISTERED

## 2019-10-11 PROCEDURE — 6360000002 HC RX W HCPCS: Performed by: NURSE ANESTHETIST, CERTIFIED REGISTERED

## 2019-10-11 PROCEDURE — 85027 COMPLETE CBC AUTOMATED: CPT

## 2019-10-11 PROCEDURE — 2580000003 HC RX 258: Performed by: ANESTHESIOLOGY

## 2019-10-11 PROCEDURE — 6360000002 HC RX W HCPCS: Performed by: PODIATRIST

## 2019-10-11 PROCEDURE — 6360000002 HC RX W HCPCS: Performed by: ANESTHESIOLOGY

## 2019-10-11 PROCEDURE — 88305 TISSUE EXAM BY PATHOLOGIST: CPT

## 2019-10-11 PROCEDURE — 99223 1ST HOSP IP/OBS HIGH 75: CPT | Performed by: INTERNAL MEDICINE

## 2019-10-11 RX ORDER — MIDAZOLAM HYDROCHLORIDE 1 MG/ML
INJECTION INTRAMUSCULAR; INTRAVENOUS PRN
Status: DISCONTINUED | OUTPATIENT
Start: 2019-10-11 | End: 2019-10-11 | Stop reason: SDUPTHER

## 2019-10-11 RX ORDER — MAGNESIUM HYDROXIDE 1200 MG/15ML
LIQUID ORAL CONTINUOUS PRN
Status: COMPLETED | OUTPATIENT
Start: 2019-10-11 | End: 2019-10-11

## 2019-10-11 RX ORDER — 0.9 % SODIUM CHLORIDE 0.9 %
500 INTRAVENOUS SOLUTION INTRAVENOUS
Status: DISCONTINUED | OUTPATIENT
Start: 2019-10-11 | End: 2019-10-11

## 2019-10-11 RX ORDER — PROPOFOL 10 MG/ML
INJECTION, EMULSION INTRAVENOUS PRN
Status: DISCONTINUED | OUTPATIENT
Start: 2019-10-11 | End: 2019-10-11 | Stop reason: SDUPTHER

## 2019-10-11 RX ORDER — DEXTROSE MONOHYDRATE 25 G/50ML
25 INJECTION, SOLUTION INTRAVENOUS PRN
Status: DISCONTINUED | OUTPATIENT
Start: 2019-10-11 | End: 2019-10-16 | Stop reason: HOSPADM

## 2019-10-11 RX ORDER — HYDROCODONE BITARTRATE AND ACETAMINOPHEN 5; 325 MG/1; MG/1
1 TABLET ORAL
Status: DISCONTINUED | OUTPATIENT
Start: 2019-10-11 | End: 2019-10-11

## 2019-10-11 RX ORDER — HEPARIN SODIUM 5000 [USP'U]/ML
5000 INJECTION, SOLUTION INTRAVENOUS; SUBCUTANEOUS EVERY 8 HOURS SCHEDULED
Status: COMPLETED | OUTPATIENT
Start: 2019-10-11 | End: 2019-10-16

## 2019-10-11 RX ORDER — BUPIVACAINE HYDROCHLORIDE 5 MG/ML
INJECTION, SOLUTION EPIDURAL; INTRACAUDAL PRN
Status: DISCONTINUED | OUTPATIENT
Start: 2019-10-11 | End: 2019-10-11 | Stop reason: ALTCHOICE

## 2019-10-11 RX ORDER — GLYCOPYRROLATE 1 MG/5 ML
SYRINGE (ML) INTRAVENOUS PRN
Status: DISCONTINUED | OUTPATIENT
Start: 2019-10-11 | End: 2019-10-11 | Stop reason: SDUPTHER

## 2019-10-11 RX ORDER — ONDANSETRON 2 MG/ML
4 INJECTION INTRAMUSCULAR; INTRAVENOUS
Status: DISCONTINUED | OUTPATIENT
Start: 2019-10-11 | End: 2019-10-11

## 2019-10-11 RX ORDER — CLOPIDOGREL BISULFATE 75 MG/1
75 TABLET ORAL DAILY
Status: DISCONTINUED | OUTPATIENT
Start: 2019-10-11 | End: 2019-10-16 | Stop reason: HOSPADM

## 2019-10-11 RX ORDER — PROCHLORPERAZINE EDISYLATE 5 MG/ML
5 INJECTION INTRAMUSCULAR; INTRAVENOUS
Status: DISCONTINUED | OUTPATIENT
Start: 2019-10-11 | End: 2019-10-11

## 2019-10-11 RX ORDER — SODIUM CHLORIDE 9 MG/ML
INJECTION, SOLUTION INTRAVENOUS CONTINUOUS PRN
Status: DISCONTINUED | OUTPATIENT
Start: 2019-10-11 | End: 2019-10-11 | Stop reason: SDUPTHER

## 2019-10-11 RX ORDER — ALBUMIN (HUMAN) 12.5 G/50ML
SOLUTION INTRAVENOUS PRN
Status: DISCONTINUED | OUTPATIENT
Start: 2019-10-11 | End: 2019-10-11 | Stop reason: SDUPTHER

## 2019-10-11 RX ORDER — HYDRALAZINE HYDROCHLORIDE 20 MG/ML
5 INJECTION INTRAMUSCULAR; INTRAVENOUS EVERY 10 MIN PRN
Status: DISCONTINUED | OUTPATIENT
Start: 2019-10-11 | End: 2019-10-11

## 2019-10-11 RX ORDER — FENTANYL CITRATE 50 UG/ML
INJECTION, SOLUTION INTRAMUSCULAR; INTRAVENOUS PRN
Status: DISCONTINUED | OUTPATIENT
Start: 2019-10-11 | End: 2019-10-11 | Stop reason: SDUPTHER

## 2019-10-11 RX ORDER — MEPERIDINE HYDROCHLORIDE 25 MG/ML
12.5 INJECTION INTRAMUSCULAR; INTRAVENOUS; SUBCUTANEOUS EVERY 5 MIN PRN
Status: DISCONTINUED | OUTPATIENT
Start: 2019-10-11 | End: 2019-10-11

## 2019-10-11 RX ORDER — DIPHENHYDRAMINE HYDROCHLORIDE 50 MG/ML
12.5 INJECTION INTRAMUSCULAR; INTRAVENOUS
Status: DISCONTINUED | OUTPATIENT
Start: 2019-10-11 | End: 2019-10-11

## 2019-10-11 RX ADMIN — ANORECTAL OINTMENT: 15.7; .44; 24; 20.6 OINTMENT TOPICAL at 20:21

## 2019-10-11 RX ADMIN — HYDRALAZINE HYDROCHLORIDE 25 MG: 25 TABLET, FILM COATED ORAL at 23:20

## 2019-10-11 RX ADMIN — CLOPIDOGREL BISULFATE 75 MG: 75 TABLET ORAL at 18:41

## 2019-10-11 RX ADMIN — PHENYLEPHRINE HYDROCHLORIDE 100 MCG: 10 INJECTION, SOLUTION INTRAMUSCULAR; INTRAVENOUS; SUBCUTANEOUS at 13:14

## 2019-10-11 RX ADMIN — PROPOFOL 120 MG: 10 INJECTION, EMULSION INTRAVENOUS at 12:49

## 2019-10-11 RX ADMIN — Medication: at 20:19

## 2019-10-11 RX ADMIN — Medication 0.2 MG: at 12:43

## 2019-10-11 RX ADMIN — ISOSORBIDE MONONITRATE 30 MG: 30 TABLET, EXTENDED RELEASE ORAL at 20:28

## 2019-10-11 RX ADMIN — Medication 10 ML: at 20:20

## 2019-10-11 RX ADMIN — MIDAZOLAM HYDROCHLORIDE 1 MG: 2 INJECTION, SOLUTION INTRAMUSCULAR; INTRAVENOUS at 12:43

## 2019-10-11 RX ADMIN — HEPARIN SODIUM 5000 UNITS: 5000 INJECTION INTRAVENOUS; SUBCUTANEOUS at 18:41

## 2019-10-11 RX ADMIN — FENTANYL CITRATE 25 MCG: 50 INJECTION INTRAMUSCULAR; INTRAVENOUS at 12:58

## 2019-10-11 RX ADMIN — SODIUM CHLORIDE: 900 INJECTION, SOLUTION INTRAVENOUS at 10:45

## 2019-10-11 RX ADMIN — FENTANYL CITRATE 25 MCG: 50 INJECTION INTRAMUSCULAR; INTRAVENOUS at 12:43

## 2019-10-11 RX ADMIN — PHENYLEPHRINE HYDROCHLORIDE 100 MCG: 10 INJECTION, SOLUTION INTRAMUSCULAR; INTRAVENOUS; SUBCUTANEOUS at 13:25

## 2019-10-11 RX ADMIN — PHENYLEPHRINE HYDROCHLORIDE 100 MCG: 10 INJECTION, SOLUTION INTRAMUSCULAR; INTRAVENOUS; SUBCUTANEOUS at 13:08

## 2019-10-11 RX ADMIN — Medication 10 ML: at 11:31

## 2019-10-11 RX ADMIN — VANCOMYCIN HYDROCHLORIDE 1250 MG: 10 INJECTION, POWDER, LYOPHILIZED, FOR SOLUTION INTRAVENOUS at 14:07

## 2019-10-11 RX ADMIN — MEROPENEM 500 MG: 500 INJECTION, POWDER, FOR SOLUTION INTRAVENOUS at 12:00

## 2019-10-11 RX ADMIN — DEXTROSE 50 % IN WATER (D50W) INTRAVENOUS SYRINGE 25 G: at 14:30

## 2019-10-11 RX ADMIN — ALBUMIN (HUMAN) 12.5 G: 0.25 INJECTION, SOLUTION INTRAVENOUS at 13:33

## 2019-10-11 RX ADMIN — ATORVASTATIN CALCIUM 80 MG: 80 TABLET, FILM COATED ORAL at 20:28

## 2019-10-11 RX ADMIN — ALBUMIN (HUMAN) 12.5 G: 0.25 INJECTION, SOLUTION INTRAVENOUS at 13:10

## 2019-10-11 RX ADMIN — HYDROMORPHONE HYDROCHLORIDE 0.25 MG: 1 INJECTION, SOLUTION INTRAMUSCULAR; INTRAVENOUS; SUBCUTANEOUS at 14:36

## 2019-10-11 RX ADMIN — CARVEDILOL 6.25 MG: 6.25 TABLET, FILM COATED ORAL at 20:28

## 2019-10-11 ASSESSMENT — PULMONARY FUNCTION TESTS
PIF_VALUE: 11
PIF_VALUE: 8
PIF_VALUE: 1
PIF_VALUE: 9
PIF_VALUE: 0
PIF_VALUE: 9
PIF_VALUE: 9
PIF_VALUE: 5
PIF_VALUE: 1
PIF_VALUE: 9
PIF_VALUE: 6
PIF_VALUE: 9
PIF_VALUE: 9
PIF_VALUE: 11
PIF_VALUE: 9
PIF_VALUE: 9
PIF_VALUE: 8
PIF_VALUE: 9
PIF_VALUE: 11
PIF_VALUE: 11
PIF_VALUE: 9
PIF_VALUE: 8
PIF_VALUE: 0
PIF_VALUE: 8
PIF_VALUE: 0
PIF_VALUE: 0
PIF_VALUE: 8
PIF_VALUE: 11
PIF_VALUE: 0
PIF_VALUE: 0
PIF_VALUE: 5
PIF_VALUE: 9
PIF_VALUE: 9
PIF_VALUE: 8
PIF_VALUE: 8
PIF_VALUE: 9
PIF_VALUE: 0
PIF_VALUE: 9
PIF_VALUE: 8
PIF_VALUE: 9
PIF_VALUE: 8
PIF_VALUE: 0
PIF_VALUE: 9
PIF_VALUE: 9
PIF_VALUE: 11
PIF_VALUE: 8
PIF_VALUE: 10
PIF_VALUE: 8
PIF_VALUE: 9
PIF_VALUE: 9
PIF_VALUE: 21
PIF_VALUE: 0
PIF_VALUE: 8
PIF_VALUE: 9
PIF_VALUE: 8
PIF_VALUE: 9
PIF_VALUE: 8
PIF_VALUE: 9
PIF_VALUE: 11
PIF_VALUE: 11
PIF_VALUE: 9
PIF_VALUE: 9
PIF_VALUE: 8
PIF_VALUE: 12
PIF_VALUE: 0
PIF_VALUE: 9
PIF_VALUE: 1
PIF_VALUE: 4

## 2019-10-11 ASSESSMENT — PAIN SCALES - GENERAL
PAINLEVEL_OUTOF10: 0
PAINLEVEL_OUTOF10: 0
PAINLEVEL_OUTOF10: 5
PAINLEVEL_OUTOF10: 0

## 2019-10-12 LAB
ANION GAP SERPL CALCULATED.3IONS-SCNC: 11 MMOL/L (ref 3–16)
BASOPHILS ABSOLUTE: 0.1 K/UL (ref 0–0.2)
BASOPHILS RELATIVE PERCENT: 0.9 %
BUN BLDV-MCNC: 31 MG/DL (ref 7–20)
CALCIUM SERPL-MCNC: 9.2 MG/DL (ref 8.3–10.6)
CHLORIDE BLD-SCNC: 98 MMOL/L (ref 99–110)
CO2: 26 MMOL/L (ref 21–32)
CREAT SERPL-MCNC: 7.3 MG/DL (ref 0.8–1.3)
EOSINOPHILS ABSOLUTE: 0.2 K/UL (ref 0–0.6)
EOSINOPHILS RELATIVE PERCENT: 2.3 %
GFR AFRICAN AMERICAN: 9
GFR NON-AFRICAN AMERICAN: 7
GLUCOSE BLD-MCNC: 105 MG/DL (ref 70–99)
GLUCOSE BLD-MCNC: 115 MG/DL (ref 70–99)
GLUCOSE BLD-MCNC: 116 MG/DL (ref 70–99)
GLUCOSE BLD-MCNC: 123 MG/DL (ref 70–99)
GLUCOSE BLD-MCNC: 132 MG/DL (ref 70–99)
GLUCOSE BLD-MCNC: 133 MG/DL (ref 70–99)
GRAM STAIN RESULT: ABNORMAL
HCT VFR BLD CALC: 28.3 % (ref 40.5–52.5)
HEMOGLOBIN: 8.8 G/DL (ref 13.5–17.5)
LYMPHOCYTES ABSOLUTE: 2.1 K/UL (ref 1–5.1)
LYMPHOCYTES RELATIVE PERCENT: 24.8 %
MCH RBC QN AUTO: 26.5 PG (ref 26–34)
MCHC RBC AUTO-ENTMCNC: 31.1 G/DL (ref 31–36)
MCV RBC AUTO: 85.3 FL (ref 80–100)
MONOCYTES ABSOLUTE: 1 K/UL (ref 0–1.3)
MONOCYTES RELATIVE PERCENT: 12.2 %
NEUTROPHILS ABSOLUTE: 5 K/UL (ref 1.7–7.7)
NEUTROPHILS RELATIVE PERCENT: 59.8 %
ORGANISM: ABNORMAL
PDW BLD-RTO: 21.8 % (ref 12.4–15.4)
PERFORMED ON: ABNORMAL
PLATELET # BLD: 208 K/UL (ref 135–450)
PMV BLD AUTO: 8.9 FL (ref 5–10.5)
POTASSIUM REFLEX MAGNESIUM: 5.5 MMOL/L (ref 3.5–5.1)
RBC # BLD: 3.31 M/UL (ref 4.2–5.9)
SODIUM BLD-SCNC: 135 MMOL/L (ref 136–145)
WBC # BLD: 8.4 K/UL (ref 4–11)
WOUND/ABSCESS: ABNORMAL

## 2019-10-12 PROCEDURE — 6370000000 HC RX 637 (ALT 250 FOR IP): Performed by: STUDENT IN AN ORGANIZED HEALTH CARE EDUCATION/TRAINING PROGRAM

## 2019-10-12 PROCEDURE — 80048 BASIC METABOLIC PNL TOTAL CA: CPT

## 2019-10-12 PROCEDURE — 1200000000 HC SEMI PRIVATE

## 2019-10-12 PROCEDURE — 6360000002 HC RX W HCPCS: Performed by: STUDENT IN AN ORGANIZED HEALTH CARE EDUCATION/TRAINING PROGRAM

## 2019-10-12 PROCEDURE — 36415 COLL VENOUS BLD VENIPUNCTURE: CPT

## 2019-10-12 PROCEDURE — 2580000003 HC RX 258: Performed by: STUDENT IN AN ORGANIZED HEALTH CARE EDUCATION/TRAINING PROGRAM

## 2019-10-12 PROCEDURE — 85025 COMPLETE CBC W/AUTO DIFF WBC: CPT

## 2019-10-12 PROCEDURE — 6370000000 HC RX 637 (ALT 250 FOR IP): Performed by: INTERNAL MEDICINE

## 2019-10-12 RX ORDER — CARVEDILOL 3.12 MG/1
3.12 TABLET ORAL 2 TIMES DAILY
Status: DISCONTINUED | OUTPATIENT
Start: 2019-10-12 | End: 2019-10-13

## 2019-10-12 RX ORDER — SODIUM POLYSTYRENE SULFONATE 15 G/60ML
30 SUSPENSION ORAL; RECTAL ONCE
Status: COMPLETED | OUTPATIENT
Start: 2019-10-12 | End: 2019-10-12

## 2019-10-12 RX ORDER — LINEZOLID 2 MG/ML
600 INJECTION, SOLUTION INTRAVENOUS EVERY 12 HOURS
Status: DISCONTINUED | OUTPATIENT
Start: 2019-10-12 | End: 2019-10-14

## 2019-10-12 RX ORDER — 0.9 % SODIUM CHLORIDE 0.9 %
250 INTRAVENOUS SOLUTION INTRAVENOUS ONCE
Status: COMPLETED | OUTPATIENT
Start: 2019-10-12 | End: 2019-10-12

## 2019-10-12 RX ADMIN — HEPARIN SODIUM 5000 UNITS: 5000 INJECTION INTRAVENOUS; SUBCUTANEOUS at 15:49

## 2019-10-12 RX ADMIN — CINACALCET HYDROCHLORIDE 30 MG: 30 TABLET, FILM COATED ORAL at 10:00

## 2019-10-12 RX ADMIN — HEPARIN SODIUM 5000 UNITS: 5000 INJECTION INTRAVENOUS; SUBCUTANEOUS at 06:55

## 2019-10-12 RX ADMIN — ANORECTAL OINTMENT: 15.7; .44; 24; 20.6 OINTMENT TOPICAL at 09:59

## 2019-10-12 RX ADMIN — Medication 10 ML: at 21:34

## 2019-10-12 RX ADMIN — Medication: at 10:00

## 2019-10-12 RX ADMIN — Medication 10 ML: at 10:00

## 2019-10-12 RX ADMIN — AMIODARONE HYDROCHLORIDE 200 MG: 200 TABLET ORAL at 10:00

## 2019-10-12 RX ADMIN — SODIUM CHLORIDE 250 ML: 900 INJECTION, SOLUTION INTRAVENOUS at 10:38

## 2019-10-12 RX ADMIN — CARVEDILOL 3.12 MG: 3.12 TABLET, FILM COATED ORAL at 21:43

## 2019-10-12 RX ADMIN — ISOSORBIDE MONONITRATE 30 MG: 30 TABLET, EXTENDED RELEASE ORAL at 10:00

## 2019-10-12 RX ADMIN — SODIUM POLYSTYRENE SULFONATE 30 G: 15 SUSPENSION ORAL; RECTAL at 15:44

## 2019-10-12 RX ADMIN — ATORVASTATIN CALCIUM 80 MG: 80 TABLET, FILM COATED ORAL at 21:43

## 2019-10-12 RX ADMIN — ISOSORBIDE MONONITRATE 30 MG: 30 TABLET, EXTENDED RELEASE ORAL at 21:43

## 2019-10-12 RX ADMIN — HYDRALAZINE HYDROCHLORIDE 25 MG: 25 TABLET, FILM COATED ORAL at 15:49

## 2019-10-12 RX ADMIN — HYDRALAZINE HYDROCHLORIDE 25 MG: 25 TABLET, FILM COATED ORAL at 21:43

## 2019-10-12 RX ADMIN — CLOPIDOGREL BISULFATE 75 MG: 75 TABLET ORAL at 10:00

## 2019-10-12 RX ADMIN — Medication: at 21:33

## 2019-10-12 RX ADMIN — ANORECTAL OINTMENT: 15.7; .44; 24; 20.6 OINTMENT TOPICAL at 21:34

## 2019-10-12 RX ADMIN — MEROPENEM 500 MG: 500 INJECTION, POWDER, FOR SOLUTION INTRAVENOUS at 12:51

## 2019-10-12 RX ADMIN — HYDRALAZINE HYDROCHLORIDE 25 MG: 25 TABLET, FILM COATED ORAL at 06:54

## 2019-10-12 RX ADMIN — HEPARIN SODIUM 5000 UNITS: 5000 INJECTION INTRAVENOUS; SUBCUTANEOUS at 21:43

## 2019-10-12 RX ADMIN — LINEZOLID 600 MG: 600 INJECTION, SOLUTION INTRAVENOUS at 12:53

## 2019-10-12 RX ADMIN — ASPIRIN 81 MG 81 MG: 81 TABLET ORAL at 10:00

## 2019-10-12 ASSESSMENT — PAIN SCALES - GENERAL
PAINLEVEL_OUTOF10: 0

## 2019-10-13 LAB
ANION GAP SERPL CALCULATED.3IONS-SCNC: 18 MMOL/L (ref 3–16)
BASOPHILS ABSOLUTE: 0.1 K/UL (ref 0–0.2)
BASOPHILS RELATIVE PERCENT: 1.5 %
BLOOD CULTURE, ROUTINE: NORMAL
BUN BLDV-MCNC: 46 MG/DL (ref 7–20)
CALCIUM SERPL-MCNC: 8.7 MG/DL (ref 8.3–10.6)
CHLORIDE BLD-SCNC: 95 MMOL/L (ref 99–110)
CO2: 21 MMOL/L (ref 21–32)
CREAT SERPL-MCNC: 9.3 MG/DL (ref 0.8–1.3)
CULTURE, BLOOD 2: NORMAL
EOSINOPHILS ABSOLUTE: 0.3 K/UL (ref 0–0.6)
EOSINOPHILS RELATIVE PERCENT: 5 %
GFR AFRICAN AMERICAN: 7
GFR NON-AFRICAN AMERICAN: 6
GLUCOSE BLD-MCNC: 106 MG/DL (ref 70–99)
GLUCOSE BLD-MCNC: 111 MG/DL (ref 70–99)
GLUCOSE BLD-MCNC: 118 MG/DL (ref 70–99)
GLUCOSE BLD-MCNC: 91 MG/DL (ref 70–99)
GLUCOSE BLD-MCNC: 91 MG/DL (ref 70–99)
HCT VFR BLD CALC: 25.9 % (ref 40.5–52.5)
HEMOGLOBIN: 8 G/DL (ref 13.5–17.5)
LYMPHOCYTES ABSOLUTE: 1.9 K/UL (ref 1–5.1)
LYMPHOCYTES RELATIVE PERCENT: 30 %
MCH RBC QN AUTO: 26.3 PG (ref 26–34)
MCHC RBC AUTO-ENTMCNC: 31.1 G/DL (ref 31–36)
MCV RBC AUTO: 84.5 FL (ref 80–100)
MONOCYTES ABSOLUTE: 0.7 K/UL (ref 0–1.3)
MONOCYTES RELATIVE PERCENT: 10.8 %
NEUTROPHILS ABSOLUTE: 3.4 K/UL (ref 1.7–7.7)
NEUTROPHILS RELATIVE PERCENT: 52.7 %
PDW BLD-RTO: 21.5 % (ref 12.4–15.4)
PERFORMED ON: ABNORMAL
PERFORMED ON: NORMAL
PLATELET # BLD: 200 K/UL (ref 135–450)
PMV BLD AUTO: 9.1 FL (ref 5–10.5)
POTASSIUM REFLEX MAGNESIUM: 5 MMOL/L (ref 3.5–5.1)
RBC # BLD: 3.06 M/UL (ref 4.2–5.9)
SODIUM BLD-SCNC: 134 MMOL/L (ref 136–145)
WBC # BLD: 6.5 K/UL (ref 4–11)

## 2019-10-13 PROCEDURE — 6360000002 HC RX W HCPCS: Performed by: STUDENT IN AN ORGANIZED HEALTH CARE EDUCATION/TRAINING PROGRAM

## 2019-10-13 PROCEDURE — 36415 COLL VENOUS BLD VENIPUNCTURE: CPT

## 2019-10-13 PROCEDURE — 85025 COMPLETE CBC W/AUTO DIFF WBC: CPT

## 2019-10-13 PROCEDURE — 2580000003 HC RX 258: Performed by: STUDENT IN AN ORGANIZED HEALTH CARE EDUCATION/TRAINING PROGRAM

## 2019-10-13 PROCEDURE — 1200000000 HC SEMI PRIVATE

## 2019-10-13 PROCEDURE — 6370000000 HC RX 637 (ALT 250 FOR IP): Performed by: INTERNAL MEDICINE

## 2019-10-13 PROCEDURE — 6370000000 HC RX 637 (ALT 250 FOR IP): Performed by: STUDENT IN AN ORGANIZED HEALTH CARE EDUCATION/TRAINING PROGRAM

## 2019-10-13 PROCEDURE — 80048 BASIC METABOLIC PNL TOTAL CA: CPT

## 2019-10-13 RX ORDER — CARVEDILOL 6.25 MG/1
6.25 TABLET ORAL 2 TIMES DAILY
Status: DISCONTINUED | OUTPATIENT
Start: 2019-10-13 | End: 2019-10-16 | Stop reason: HOSPADM

## 2019-10-13 RX ADMIN — HEPARIN SODIUM 5000 UNITS: 5000 INJECTION INTRAVENOUS; SUBCUTANEOUS at 14:38

## 2019-10-13 RX ADMIN — Medication 10 ML: at 08:44

## 2019-10-13 RX ADMIN — ATORVASTATIN CALCIUM 80 MG: 80 TABLET, FILM COATED ORAL at 20:43

## 2019-10-13 RX ADMIN — Medication: at 08:44

## 2019-10-13 RX ADMIN — Medication: at 20:28

## 2019-10-13 RX ADMIN — LINEZOLID 600 MG: 600 INJECTION, SOLUTION INTRAVENOUS at 00:02

## 2019-10-13 RX ADMIN — HYDRALAZINE HYDROCHLORIDE 25 MG: 25 TABLET, FILM COATED ORAL at 06:17

## 2019-10-13 RX ADMIN — ISOSORBIDE MONONITRATE 30 MG: 30 TABLET, EXTENDED RELEASE ORAL at 08:44

## 2019-10-13 RX ADMIN — HEPARIN SODIUM 5000 UNITS: 5000 INJECTION INTRAVENOUS; SUBCUTANEOUS at 06:17

## 2019-10-13 RX ADMIN — CINACALCET HYDROCHLORIDE 30 MG: 30 TABLET, FILM COATED ORAL at 08:44

## 2019-10-13 RX ADMIN — AMIODARONE HYDROCHLORIDE 200 MG: 200 TABLET ORAL at 08:44

## 2019-10-13 RX ADMIN — HEPARIN SODIUM 5000 UNITS: 5000 INJECTION INTRAVENOUS; SUBCUTANEOUS at 22:43

## 2019-10-13 RX ADMIN — LINEZOLID 600 MG: 600 INJECTION, SOLUTION INTRAVENOUS at 22:43

## 2019-10-13 RX ADMIN — Medication 10 ML: at 22:47

## 2019-10-13 RX ADMIN — LINEZOLID 600 MG: 600 INJECTION, SOLUTION INTRAVENOUS at 12:12

## 2019-10-13 RX ADMIN — ANORECTAL OINTMENT: 15.7; .44; 24; 20.6 OINTMENT TOPICAL at 20:28

## 2019-10-13 RX ADMIN — ISOSORBIDE MONONITRATE 30 MG: 30 TABLET, EXTENDED RELEASE ORAL at 20:43

## 2019-10-13 RX ADMIN — CLOPIDOGREL BISULFATE 75 MG: 75 TABLET ORAL at 08:44

## 2019-10-13 RX ADMIN — ANORECTAL OINTMENT: 15.7; .44; 24; 20.6 OINTMENT TOPICAL at 08:44

## 2019-10-13 RX ADMIN — ASPIRIN 81 MG 81 MG: 81 TABLET ORAL at 08:44

## 2019-10-13 RX ADMIN — CARVEDILOL 3.12 MG: 3.12 TABLET, FILM COATED ORAL at 08:44

## 2019-10-13 RX ADMIN — CARVEDILOL 6.25 MG: 6.25 TABLET, FILM COATED ORAL at 20:43

## 2019-10-13 RX ADMIN — MEROPENEM 500 MG: 500 INJECTION, POWDER, FOR SOLUTION INTRAVENOUS at 12:12

## 2019-10-13 ASSESSMENT — PAIN SCALES - GENERAL
PAINLEVEL_OUTOF10: 0

## 2019-10-13 ASSESSMENT — ENCOUNTER SYMPTOMS
RESPIRATORY NEGATIVE: 1
GASTROINTESTINAL NEGATIVE: 1

## 2019-10-14 LAB
ANION GAP SERPL CALCULATED.3IONS-SCNC: 20 MMOL/L (ref 3–16)
BASOPHILS ABSOLUTE: 0.1 K/UL (ref 0–0.2)
BASOPHILS RELATIVE PERCENT: 1 %
BUN BLDV-MCNC: 59 MG/DL (ref 7–20)
CALCIUM SERPL-MCNC: 8.1 MG/DL (ref 8.3–10.6)
CHLORIDE BLD-SCNC: 93 MMOL/L (ref 99–110)
CO2: 21 MMOL/L (ref 21–32)
CREAT SERPL-MCNC: 11.1 MG/DL (ref 0.8–1.3)
EOSINOPHILS ABSOLUTE: 0.3 K/UL (ref 0–0.6)
EOSINOPHILS RELATIVE PERCENT: 5.5 %
GFR AFRICAN AMERICAN: 5
GFR NON-AFRICAN AMERICAN: 5
GLUCOSE BLD-MCNC: 104 MG/DL (ref 70–99)
GLUCOSE BLD-MCNC: 118 MG/DL (ref 70–99)
GLUCOSE BLD-MCNC: 180 MG/DL (ref 70–99)
GLUCOSE BLD-MCNC: 87 MG/DL (ref 70–99)
GLUCOSE BLD-MCNC: 96 MG/DL (ref 70–99)
HCT VFR BLD CALC: 25.6 % (ref 40.5–52.5)
HCT VFR BLD CALC: 26.4 % (ref 40.5–52.5)
HEMOGLOBIN: 8.1 G/DL (ref 13.5–17.5)
HEMOGLOBIN: 8.4 G/DL (ref 13.5–17.5)
LYMPHOCYTES ABSOLUTE: 1.8 K/UL (ref 1–5.1)
LYMPHOCYTES RELATIVE PERCENT: 30.7 %
MCH RBC QN AUTO: 26.5 PG (ref 26–34)
MCHC RBC AUTO-ENTMCNC: 32 G/DL (ref 31–36)
MCV RBC AUTO: 82.8 FL (ref 80–100)
MONOCYTES ABSOLUTE: 0.5 K/UL (ref 0–1.3)
MONOCYTES RELATIVE PERCENT: 9.6 %
NEUTROPHILS ABSOLUTE: 3 K/UL (ref 1.7–7.7)
NEUTROPHILS RELATIVE PERCENT: 53.2 %
PDW BLD-RTO: 21.7 % (ref 12.4–15.4)
PERFORMED ON: ABNORMAL
PERFORMED ON: NORMAL
PLATELET # BLD: 209 K/UL (ref 135–450)
PMV BLD AUTO: 8.9 FL (ref 5–10.5)
POTASSIUM REFLEX MAGNESIUM: 5.4 MMOL/L (ref 3.5–5.1)
RBC # BLD: 3.19 M/UL (ref 4.2–5.9)
SODIUM BLD-SCNC: 134 MMOL/L (ref 136–145)
WBC # BLD: 5.7 K/UL (ref 4–11)

## 2019-10-14 PROCEDURE — 6360000002 HC RX W HCPCS: Performed by: STUDENT IN AN ORGANIZED HEALTH CARE EDUCATION/TRAINING PROGRAM

## 2019-10-14 PROCEDURE — 36415 COLL VENOUS BLD VENIPUNCTURE: CPT

## 2019-10-14 PROCEDURE — 1200000000 HC SEMI PRIVATE

## 2019-10-14 PROCEDURE — 80048 BASIC METABOLIC PNL TOTAL CA: CPT

## 2019-10-14 PROCEDURE — 6370000000 HC RX 637 (ALT 250 FOR IP): Performed by: INTERNAL MEDICINE

## 2019-10-14 PROCEDURE — 85018 HEMOGLOBIN: CPT

## 2019-10-14 PROCEDURE — 2580000003 HC RX 258: Performed by: STUDENT IN AN ORGANIZED HEALTH CARE EDUCATION/TRAINING PROGRAM

## 2019-10-14 PROCEDURE — 85025 COMPLETE CBC W/AUTO DIFF WBC: CPT

## 2019-10-14 PROCEDURE — 85014 HEMATOCRIT: CPT

## 2019-10-14 PROCEDURE — 99233 SBSQ HOSP IP/OBS HIGH 50: CPT | Performed by: INTERNAL MEDICINE

## 2019-10-14 PROCEDURE — 90935 HEMODIALYSIS ONE EVALUATION: CPT

## 2019-10-14 PROCEDURE — 6370000000 HC RX 637 (ALT 250 FOR IP): Performed by: STUDENT IN AN ORGANIZED HEALTH CARE EDUCATION/TRAINING PROGRAM

## 2019-10-14 RX ORDER — LINEZOLID 600 MG/1
600 TABLET, FILM COATED ORAL EVERY 12 HOURS SCHEDULED
Status: DISCONTINUED | OUTPATIENT
Start: 2019-10-14 | End: 2019-10-16 | Stop reason: HOSPADM

## 2019-10-14 RX ADMIN — HEPARIN SODIUM 5000 UNITS: 5000 INJECTION INTRAVENOUS; SUBCUTANEOUS at 20:29

## 2019-10-14 RX ADMIN — ANORECTAL OINTMENT: 15.7; .44; 24; 20.6 OINTMENT TOPICAL at 21:10

## 2019-10-14 RX ADMIN — ASPIRIN 81 MG 81 MG: 81 TABLET ORAL at 12:57

## 2019-10-14 RX ADMIN — MEROPENEM 500 MG: 500 INJECTION, POWDER, FOR SOLUTION INTRAVENOUS at 14:47

## 2019-10-14 RX ADMIN — LINEZOLID 600 MG: 600 INJECTION, SOLUTION INTRAVENOUS at 12:57

## 2019-10-14 RX ADMIN — Medication: at 20:28

## 2019-10-14 RX ADMIN — ISOSORBIDE MONONITRATE 30 MG: 30 TABLET, EXTENDED RELEASE ORAL at 12:56

## 2019-10-14 RX ADMIN — CINACALCET HYDROCHLORIDE 30 MG: 30 TABLET, FILM COATED ORAL at 12:56

## 2019-10-14 RX ADMIN — CARVEDILOL 6.25 MG: 6.25 TABLET, FILM COATED ORAL at 20:29

## 2019-10-14 RX ADMIN — HEPARIN SODIUM 5000 UNITS: 5000 INJECTION INTRAVENOUS; SUBCUTANEOUS at 14:47

## 2019-10-14 RX ADMIN — MORPHINE SULFATE 4 MG: 4 INJECTION INTRAVENOUS at 11:32

## 2019-10-14 RX ADMIN — Medication 10 ML: at 12:57

## 2019-10-14 RX ADMIN — CLOPIDOGREL BISULFATE 75 MG: 75 TABLET ORAL at 12:57

## 2019-10-14 RX ADMIN — ISOSORBIDE MONONITRATE 30 MG: 30 TABLET, EXTENDED RELEASE ORAL at 20:29

## 2019-10-14 RX ADMIN — Medication: at 13:04

## 2019-10-14 RX ADMIN — CARVEDILOL 6.25 MG: 6.25 TABLET, FILM COATED ORAL at 12:57

## 2019-10-14 RX ADMIN — ATORVASTATIN CALCIUM 80 MG: 80 TABLET, FILM COATED ORAL at 20:29

## 2019-10-14 RX ADMIN — ANORECTAL OINTMENT: 15.7; .44; 24; 20.6 OINTMENT TOPICAL at 13:05

## 2019-10-14 RX ADMIN — AMIODARONE HYDROCHLORIDE 200 MG: 200 TABLET ORAL at 12:57

## 2019-10-14 RX ADMIN — HEPARIN SODIUM 5000 UNITS: 5000 INJECTION INTRAVENOUS; SUBCUTANEOUS at 05:26

## 2019-10-14 RX ADMIN — INSULIN LISPRO 1 UNITS: 100 INJECTION, SOLUTION INTRAVENOUS; SUBCUTANEOUS at 17:13

## 2019-10-14 RX ADMIN — LINEZOLID 600 MG: 600 TABLET, FILM COATED ORAL at 20:29

## 2019-10-14 RX ADMIN — Medication 10 ML: at 21:20

## 2019-10-14 ASSESSMENT — PAIN SCALES - GENERAL
PAINLEVEL_OUTOF10: 10
PAINLEVEL_OUTOF10: 0

## 2019-10-14 ASSESSMENT — PAIN DESCRIPTION - ONSET: ONSET: ON-GOING

## 2019-10-14 ASSESSMENT — PAIN DESCRIPTION - ORIENTATION: ORIENTATION: RIGHT;UPPER

## 2019-10-14 ASSESSMENT — PAIN DESCRIPTION - DESCRIPTORS: DESCRIPTORS: THROBBING

## 2019-10-14 ASSESSMENT — PAIN DESCRIPTION - FREQUENCY: FREQUENCY: CONTINUOUS

## 2019-10-14 ASSESSMENT — PAIN DESCRIPTION - PROGRESSION: CLINICAL_PROGRESSION: NOT CHANGED

## 2019-10-14 ASSESSMENT — PAIN DESCRIPTION - PAIN TYPE: TYPE: ACUTE PAIN

## 2019-10-14 ASSESSMENT — PAIN DESCRIPTION - LOCATION: LOCATION: LEG

## 2019-10-15 LAB
ABO/RH: NORMAL
ANION GAP SERPL CALCULATED.3IONS-SCNC: 15 MMOL/L (ref 3–16)
ANTIBODY SCREEN: NORMAL
BASOPHILS ABSOLUTE: 0.1 K/UL (ref 0–0.2)
BASOPHILS RELATIVE PERCENT: 1.9 %
BUN BLDV-MCNC: 26 MG/DL (ref 7–20)
CALCIUM SERPL-MCNC: 8.8 MG/DL (ref 8.3–10.6)
CHLORIDE BLD-SCNC: 92 MMOL/L (ref 99–110)
CO2: 26 MMOL/L (ref 21–32)
CREAT SERPL-MCNC: 7 MG/DL (ref 0.8–1.3)
EOSINOPHILS ABSOLUTE: 0.3 K/UL (ref 0–0.6)
EOSINOPHILS RELATIVE PERCENT: 4.1 %
GFR AFRICAN AMERICAN: 9
GFR NON-AFRICAN AMERICAN: 8
GLUCOSE BLD-MCNC: 105 MG/DL (ref 70–99)
GLUCOSE BLD-MCNC: 145 MG/DL (ref 70–99)
GLUCOSE BLD-MCNC: 160 MG/DL (ref 70–99)
GLUCOSE BLD-MCNC: 94 MG/DL (ref 70–99)
GLUCOSE BLD-MCNC: 97 MG/DL (ref 70–99)
HCT VFR BLD CALC: 17 % (ref 40.5–52.5)
HCT VFR BLD CALC: 25.5 % (ref 40.5–52.5)
HCT VFR BLD CALC: 26.4 % (ref 40.5–52.5)
HEMOGLOBIN: 5.2 G/DL (ref 13.5–17.5)
HEMOGLOBIN: 7.9 G/DL (ref 13.5–17.5)
HEMOGLOBIN: 8.2 G/DL (ref 13.5–17.5)
LYMPHOCYTES ABSOLUTE: 1.9 K/UL (ref 1–5.1)
LYMPHOCYTES RELATIVE PERCENT: 29.9 %
MCH RBC QN AUTO: 26.1 PG (ref 26–34)
MCHC RBC AUTO-ENTMCNC: 31.2 G/DL (ref 31–36)
MCV RBC AUTO: 83.7 FL (ref 80–100)
MONOCYTES ABSOLUTE: 0.6 K/UL (ref 0–1.3)
MONOCYTES RELATIVE PERCENT: 9.9 %
NEUTROPHILS ABSOLUTE: 3.4 K/UL (ref 1.7–7.7)
NEUTROPHILS RELATIVE PERCENT: 54.2 %
PDW BLD-RTO: 21.3 % (ref 12.4–15.4)
PERFORMED ON: ABNORMAL
PERFORMED ON: NORMAL
PLATELET # BLD: 226 K/UL (ref 135–450)
PMV BLD AUTO: 8.8 FL (ref 5–10.5)
POTASSIUM REFLEX MAGNESIUM: 4.7 MMOL/L (ref 3.5–5.1)
RBC # BLD: 3.15 M/UL (ref 4.2–5.9)
SODIUM BLD-SCNC: 133 MMOL/L (ref 136–145)
WBC # BLD: 6.3 K/UL (ref 4–11)

## 2019-10-15 PROCEDURE — 97110 THERAPEUTIC EXERCISES: CPT

## 2019-10-15 PROCEDURE — 6370000000 HC RX 637 (ALT 250 FOR IP): Performed by: STUDENT IN AN ORGANIZED HEALTH CARE EDUCATION/TRAINING PROGRAM

## 2019-10-15 PROCEDURE — 36415 COLL VENOUS BLD VENIPUNCTURE: CPT

## 2019-10-15 PROCEDURE — 6370000000 HC RX 637 (ALT 250 FOR IP): Performed by: INTERNAL MEDICINE

## 2019-10-15 PROCEDURE — 6360000002 HC RX W HCPCS: Performed by: STUDENT IN AN ORGANIZED HEALTH CARE EDUCATION/TRAINING PROGRAM

## 2019-10-15 PROCEDURE — 99232 SBSQ HOSP IP/OBS MODERATE 35: CPT | Performed by: INTERNAL MEDICINE

## 2019-10-15 PROCEDURE — 85025 COMPLETE CBC W/AUTO DIFF WBC: CPT

## 2019-10-15 PROCEDURE — 2580000003 HC RX 258: Performed by: STUDENT IN AN ORGANIZED HEALTH CARE EDUCATION/TRAINING PROGRAM

## 2019-10-15 PROCEDURE — 85018 HEMOGLOBIN: CPT

## 2019-10-15 PROCEDURE — 86900 BLOOD TYPING SEROLOGIC ABO: CPT

## 2019-10-15 PROCEDURE — 86901 BLOOD TYPING SEROLOGIC RH(D): CPT

## 2019-10-15 PROCEDURE — 85014 HEMATOCRIT: CPT

## 2019-10-15 PROCEDURE — 86850 RBC ANTIBODY SCREEN: CPT

## 2019-10-15 PROCEDURE — 80048 BASIC METABOLIC PNL TOTAL CA: CPT

## 2019-10-15 PROCEDURE — 1200000000 HC SEMI PRIVATE

## 2019-10-15 RX ADMIN — LINEZOLID 600 MG: 600 TABLET, FILM COATED ORAL at 20:32

## 2019-10-15 RX ADMIN — HEPARIN SODIUM 5000 UNITS: 5000 INJECTION INTRAVENOUS; SUBCUTANEOUS at 14:21

## 2019-10-15 RX ADMIN — CLOPIDOGREL BISULFATE 75 MG: 75 TABLET ORAL at 09:17

## 2019-10-15 RX ADMIN — INSULIN LISPRO 1 UNITS: 100 INJECTION, SOLUTION INTRAVENOUS; SUBCUTANEOUS at 20:35

## 2019-10-15 RX ADMIN — HEPARIN SODIUM 5000 UNITS: 5000 INJECTION INTRAVENOUS; SUBCUTANEOUS at 06:34

## 2019-10-15 RX ADMIN — ANORECTAL OINTMENT: 15.7; .44; 24; 20.6 OINTMENT TOPICAL at 22:27

## 2019-10-15 RX ADMIN — AMIODARONE HYDROCHLORIDE 200 MG: 200 TABLET ORAL at 09:17

## 2019-10-15 RX ADMIN — CARVEDILOL 6.25 MG: 6.25 TABLET, FILM COATED ORAL at 09:18

## 2019-10-15 RX ADMIN — ANORECTAL OINTMENT: 15.7; .44; 24; 20.6 OINTMENT TOPICAL at 09:18

## 2019-10-15 RX ADMIN — ATORVASTATIN CALCIUM 80 MG: 80 TABLET, FILM COATED ORAL at 20:32

## 2019-10-15 RX ADMIN — Medication 10 ML: at 09:18

## 2019-10-15 RX ADMIN — ISOSORBIDE MONONITRATE 30 MG: 30 TABLET, EXTENDED RELEASE ORAL at 20:32

## 2019-10-15 RX ADMIN — HEPARIN SODIUM 5000 UNITS: 5000 INJECTION INTRAVENOUS; SUBCUTANEOUS at 22:26

## 2019-10-15 RX ADMIN — CARVEDILOL 6.25 MG: 6.25 TABLET, FILM COATED ORAL at 20:32

## 2019-10-15 RX ADMIN — Medication: at 20:33

## 2019-10-15 RX ADMIN — CINACALCET HYDROCHLORIDE 30 MG: 30 TABLET, FILM COATED ORAL at 09:17

## 2019-10-15 RX ADMIN — Medication: at 09:18

## 2019-10-15 RX ADMIN — LINEZOLID 600 MG: 600 TABLET, FILM COATED ORAL at 09:18

## 2019-10-15 RX ADMIN — Medication 10 ML: at 20:38

## 2019-10-15 RX ADMIN — MEROPENEM 500 MG: 500 INJECTION, POWDER, FOR SOLUTION INTRAVENOUS at 12:56

## 2019-10-15 RX ADMIN — ASPIRIN 81 MG 81 MG: 81 TABLET ORAL at 09:17

## 2019-10-15 RX ADMIN — INSULIN LISPRO 1 UNITS: 100 INJECTION, SOLUTION INTRAVENOUS; SUBCUTANEOUS at 17:45

## 2019-10-15 RX ADMIN — ISOSORBIDE MONONITRATE 30 MG: 30 TABLET, EXTENDED RELEASE ORAL at 09:17

## 2019-10-15 ASSESSMENT — PAIN SCALES - GENERAL
PAINLEVEL_OUTOF10: 0

## 2019-10-16 VITALS
RESPIRATION RATE: 16 BRPM | HEART RATE: 64 BPM | WEIGHT: 211.31 LBS | OXYGEN SATURATION: 98 % | SYSTOLIC BLOOD PRESSURE: 98 MMHG | DIASTOLIC BLOOD PRESSURE: 57 MMHG | BODY MASS INDEX: 24.95 KG/M2 | TEMPERATURE: 98.6 F | HEIGHT: 77 IN

## 2019-10-16 LAB
ALBUMIN SERPL-MCNC: 3.1 G/DL (ref 3.4–5)
ANAEROBIC CULTURE: ABNORMAL
ANION GAP SERPL CALCULATED.3IONS-SCNC: 19 MMOL/L (ref 3–16)
BASOPHILS ABSOLUTE: 0.1 K/UL (ref 0–0.2)
BASOPHILS RELATIVE PERCENT: 1.5 %
BUN BLDV-MCNC: 42 MG/DL (ref 7–20)
CALCIUM SERPL-MCNC: 8.7 MG/DL (ref 8.3–10.6)
CHLORIDE BLD-SCNC: 95 MMOL/L (ref 99–110)
CO2: 22 MMOL/L (ref 21–32)
CREAT SERPL-MCNC: 9.2 MG/DL (ref 0.8–1.3)
CULTURE SURGICAL: ABNORMAL
EOSINOPHILS ABSOLUTE: 0.2 K/UL (ref 0–0.6)
EOSINOPHILS RELATIVE PERCENT: 3.7 %
GFR AFRICAN AMERICAN: 7
GFR NON-AFRICAN AMERICAN: 6
GLUCOSE BLD-MCNC: 165 MG/DL (ref 70–99)
GLUCOSE BLD-MCNC: 88 MG/DL (ref 70–99)
GLUCOSE BLD-MCNC: 94 MG/DL (ref 70–99)
GLUCOSE BLD-MCNC: 97 MG/DL (ref 70–99)
GRAM STAIN RESULT: ABNORMAL
HCT VFR BLD CALC: 24.4 % (ref 40.5–52.5)
HEMOGLOBIN: 7.6 G/DL (ref 13.5–17.5)
INR BLD: 1.16 (ref 0.86–1.14)
LYMPHOCYTES ABSOLUTE: 2.3 K/UL (ref 1–5.1)
LYMPHOCYTES RELATIVE PERCENT: 34.8 %
MCH RBC QN AUTO: 25.8 PG (ref 26–34)
MCHC RBC AUTO-ENTMCNC: 31.3 G/DL (ref 31–36)
MCV RBC AUTO: 82.6 FL (ref 80–100)
MONOCYTES ABSOLUTE: 0.6 K/UL (ref 0–1.3)
MONOCYTES RELATIVE PERCENT: 9.3 %
NEUTROPHILS ABSOLUTE: 3.4 K/UL (ref 1.7–7.7)
NEUTROPHILS RELATIVE PERCENT: 50.7 %
ORGANISM: ABNORMAL
PDW BLD-RTO: 21.2 % (ref 12.4–15.4)
PERFORMED ON: ABNORMAL
PERFORMED ON: NORMAL
PERFORMED ON: NORMAL
PHOSPHORUS: 7.5 MG/DL (ref 2.5–4.9)
PLATELET # BLD: 217 K/UL (ref 135–450)
PMV BLD AUTO: 8.6 FL (ref 5–10.5)
POTASSIUM REFLEX MAGNESIUM: 5 MMOL/L (ref 3.5–5.1)
PROTHROMBIN TIME: 13.2 SEC (ref 9.8–13)
RBC # BLD: 2.96 M/UL (ref 4.2–5.9)
SODIUM BLD-SCNC: 136 MMOL/L (ref 136–145)
WBC # BLD: 6.6 K/UL (ref 4–11)

## 2019-10-16 PROCEDURE — 6370000000 HC RX 637 (ALT 250 FOR IP): Performed by: STUDENT IN AN ORGANIZED HEALTH CARE EDUCATION/TRAINING PROGRAM

## 2019-10-16 PROCEDURE — 80048 BASIC METABOLIC PNL TOTAL CA: CPT

## 2019-10-16 PROCEDURE — 85025 COMPLETE CBC W/AUTO DIFF WBC: CPT

## 2019-10-16 PROCEDURE — 6360000002 HC RX W HCPCS: Performed by: PODIATRIST

## 2019-10-16 PROCEDURE — 2580000003 HC RX 258: Performed by: STUDENT IN AN ORGANIZED HEALTH CARE EDUCATION/TRAINING PROGRAM

## 2019-10-16 PROCEDURE — 6370000000 HC RX 637 (ALT 250 FOR IP): Performed by: INTERNAL MEDICINE

## 2019-10-16 PROCEDURE — 85610 PROTHROMBIN TIME: CPT

## 2019-10-16 PROCEDURE — 82040 ASSAY OF SERUM ALBUMIN: CPT

## 2019-10-16 PROCEDURE — 6360000002 HC RX W HCPCS: Performed by: STUDENT IN AN ORGANIZED HEALTH CARE EDUCATION/TRAINING PROGRAM

## 2019-10-16 PROCEDURE — 84100 ASSAY OF PHOSPHORUS: CPT

## 2019-10-16 PROCEDURE — 90935 HEMODIALYSIS ONE EVALUATION: CPT

## 2019-10-16 PROCEDURE — 6360000002 HC RX W HCPCS: Performed by: INTERNAL MEDICINE

## 2019-10-16 PROCEDURE — 36415 COLL VENOUS BLD VENIPUNCTURE: CPT

## 2019-10-16 RX ORDER — HEPARIN SODIUM 1000 [USP'U]/ML
3000 INJECTION, SOLUTION INTRAVENOUS; SUBCUTANEOUS
Status: DISCONTINUED | OUTPATIENT
Start: 2019-10-16 | End: 2019-10-16 | Stop reason: HOSPADM

## 2019-10-16 RX ORDER — HEPARIN SODIUM 5000 [USP'U]/ML
5000 INJECTION, SOLUTION INTRAVENOUS; SUBCUTANEOUS EVERY 8 HOURS SCHEDULED
Status: DISCONTINUED | OUTPATIENT
Start: 2019-10-16 | End: 2019-10-16 | Stop reason: HOSPADM

## 2019-10-16 RX ORDER — LINEZOLID 600 MG/1
600 TABLET, FILM COATED ORAL EVERY 12 HOURS SCHEDULED
Qty: 56 TABLET | Refills: 0 | Status: SHIPPED | OUTPATIENT
Start: 2019-10-16 | End: 2019-11-13

## 2019-10-16 RX ORDER — CIPROFLOXACIN 500 MG/1
500 TABLET, FILM COATED ORAL 2 TIMES DAILY
Qty: 56 TABLET | Refills: 0 | Status: SHIPPED | OUTPATIENT
Start: 2019-10-16 | End: 2019-11-13

## 2019-10-16 RX ADMIN — MEROPENEM 500 MG: 500 INJECTION, POWDER, FOR SOLUTION INTRAVENOUS at 13:39

## 2019-10-16 RX ADMIN — HEPARIN SODIUM 5000 UNITS: 5000 INJECTION INTRAVENOUS; SUBCUTANEOUS at 05:39

## 2019-10-16 RX ADMIN — DARBEPOETIN ALFA 60 MCG: 60 INJECTION, SOLUTION INTRAVENOUS; SUBCUTANEOUS at 13:15

## 2019-10-16 RX ADMIN — ANORECTAL OINTMENT: 15.7; .44; 24; 20.6 OINTMENT TOPICAL at 13:22

## 2019-10-16 RX ADMIN — CLOPIDOGREL BISULFATE 75 MG: 75 TABLET ORAL at 13:21

## 2019-10-16 RX ADMIN — ISOSORBIDE MONONITRATE 30 MG: 30 TABLET, EXTENDED RELEASE ORAL at 13:22

## 2019-10-16 RX ADMIN — HEPARIN SODIUM 5000 UNITS: 5000 INJECTION, SOLUTION INTRAVENOUS; SUBCUTANEOUS at 14:54

## 2019-10-16 RX ADMIN — LINEZOLID 600 MG: 600 TABLET, FILM COATED ORAL at 13:22

## 2019-10-16 RX ADMIN — CINACALCET HYDROCHLORIDE 30 MG: 30 TABLET, FILM COATED ORAL at 13:22

## 2019-10-16 RX ADMIN — ASPIRIN 81 MG 81 MG: 81 TABLET ORAL at 13:22

## 2019-10-16 RX ADMIN — CARVEDILOL 6.25 MG: 6.25 TABLET, FILM COATED ORAL at 13:35

## 2019-10-16 RX ADMIN — Medication: at 13:23

## 2019-10-16 RX ADMIN — AMIODARONE HYDROCHLORIDE 200 MG: 200 TABLET ORAL at 13:21

## 2019-10-16 ASSESSMENT — ENCOUNTER SYMPTOMS
VOMITING: 0
EYE PAIN: 0
COLOR CHANGE: 0
COUGH: 0
SORE THROAT: 0
ABDOMINAL PAIN: 0
WHEEZING: 0
NAUSEA: 0
SHORTNESS OF BREATH: 0
TROUBLE SWALLOWING: 0
DIARRHEA: 0
CHEST TIGHTNESS: 0
RHINORRHEA: 0
ABDOMINAL DISTENTION: 0

## 2019-10-16 ASSESSMENT — PAIN SCALES - GENERAL
PAINLEVEL_OUTOF10: 0

## 2019-11-11 LAB
FUNGUS (MYCOLOGY) CULTURE: NORMAL
FUNGUS STAIN: NORMAL

## 2019-11-26 LAB
AFB CULTURE (MYCOBACTERIA): NORMAL
AFB SMEAR: NORMAL

## 2019-12-05 ENCOUNTER — HOSPITAL ENCOUNTER (INPATIENT)
Age: 74
LOS: 7 days | Discharge: NURSING, REHAB OR LONG-TERM CARE | DRG: 617 | End: 2019-12-12
Attending: EMERGENCY MEDICINE | Admitting: INTERNAL MEDICINE
Payer: MEDICARE

## 2019-12-05 ENCOUNTER — APPOINTMENT (OUTPATIENT)
Dept: GENERAL RADIOLOGY | Age: 74
DRG: 617 | End: 2019-12-05
Payer: MEDICARE

## 2019-12-05 DIAGNOSIS — I96 GANGRENE OF LEFT FOOT (HCC): ICD-10-CM

## 2019-12-05 DIAGNOSIS — T81.89XA NON-HEALING SURGICAL WOUND, INITIAL ENCOUNTER: Primary | ICD-10-CM

## 2019-12-05 PROBLEM — M86.9 OSTEOMYELITIS (HCC): Status: ACTIVE | Noted: 2019-12-05

## 2019-12-05 LAB
ANION GAP SERPL CALCULATED.3IONS-SCNC: 15 MMOL/L (ref 3–16)
BASOPHILS ABSOLUTE: 0.1 K/UL (ref 0–0.2)
BASOPHILS RELATIVE PERCENT: 0.8 %
BUN BLDV-MCNC: 24 MG/DL (ref 7–20)
C-REACTIVE PROTEIN: 127.8 MG/L (ref 0–5.1)
CALCIUM SERPL-MCNC: 9.3 MG/DL (ref 8.3–10.6)
CHLORIDE BLD-SCNC: 97 MMOL/L (ref 99–110)
CO2: 27 MMOL/L (ref 21–32)
CREAT SERPL-MCNC: 5.3 MG/DL (ref 0.8–1.3)
EKG ATRIAL RATE: 64 BPM
EKG DIAGNOSIS: NORMAL
EKG P AXIS: 41 DEGREES
EKG P-R INTERVAL: 192 MS
EKG Q-T INTERVAL: 488 MS
EKG QRS DURATION: 98 MS
EKG QTC CALCULATION (BAZETT): 503 MS
EKG R AXIS: 39 DEGREES
EKG T AXIS: 43 DEGREES
EKG VENTRICULAR RATE: 64 BPM
EOSINOPHILS ABSOLUTE: 0.1 K/UL (ref 0–0.6)
EOSINOPHILS RELATIVE PERCENT: 1.5 %
GFR AFRICAN AMERICAN: 13
GFR NON-AFRICAN AMERICAN: 11
GLUCOSE BLD-MCNC: 129 MG/DL (ref 70–99)
GLUCOSE BLD-MCNC: 168 MG/DL (ref 70–99)
GLUCOSE BLD-MCNC: 198 MG/DL (ref 70–99)
HCT VFR BLD CALC: 31.1 % (ref 40.5–52.5)
HEMOGLOBIN: 9.7 G/DL (ref 13.5–17.5)
INR BLD: 1.2 (ref 0.86–1.14)
LYMPHOCYTES ABSOLUTE: 1.3 K/UL (ref 1–5.1)
LYMPHOCYTES RELATIVE PERCENT: 14.5 %
MCH RBC QN AUTO: 26.4 PG (ref 26–34)
MCHC RBC AUTO-ENTMCNC: 31.3 G/DL (ref 31–36)
MCV RBC AUTO: 84.5 FL (ref 80–100)
MONOCYTES ABSOLUTE: 0.7 K/UL (ref 0–1.3)
MONOCYTES RELATIVE PERCENT: 8.2 %
NEUTROPHILS ABSOLUTE: 6.6 K/UL (ref 1.7–7.7)
NEUTROPHILS RELATIVE PERCENT: 75 %
PDW BLD-RTO: 19.7 % (ref 12.4–15.4)
PERFORMED ON: ABNORMAL
PERFORMED ON: ABNORMAL
PLATELET # BLD: 287 K/UL (ref 135–450)
PMV BLD AUTO: 8.4 FL (ref 5–10.5)
POTASSIUM REFLEX MAGNESIUM: 4.4 MMOL/L (ref 3.5–5.1)
PROTHROMBIN TIME: 13.9 SEC (ref 10–13.2)
RBC # BLD: 3.69 M/UL (ref 4.2–5.9)
SEDIMENTATION RATE, ERYTHROCYTE: 116 MM/HR (ref 0–20)
SODIUM BLD-SCNC: 139 MMOL/L (ref 136–145)
WBC # BLD: 8.8 K/UL (ref 4–11)

## 2019-12-05 PROCEDURE — 80048 BASIC METABOLIC PNL TOTAL CA: CPT

## 2019-12-05 PROCEDURE — 86140 C-REACTIVE PROTEIN: CPT

## 2019-12-05 PROCEDURE — 36415 COLL VENOUS BLD VENIPUNCTURE: CPT

## 2019-12-05 PROCEDURE — 85025 COMPLETE CBC W/AUTO DIFF WBC: CPT

## 2019-12-05 PROCEDURE — 99222 1ST HOSP IP/OBS MODERATE 55: CPT | Performed by: SURGERY

## 2019-12-05 PROCEDURE — 93005 ELECTROCARDIOGRAM TRACING: CPT | Performed by: PHYSICIAN ASSISTANT

## 2019-12-05 PROCEDURE — 73630 X-RAY EXAM OF FOOT: CPT

## 2019-12-05 PROCEDURE — 99285 EMERGENCY DEPT VISIT HI MDM: CPT

## 2019-12-05 PROCEDURE — 85652 RBC SED RATE AUTOMATED: CPT

## 2019-12-05 PROCEDURE — 85610 PROTHROMBIN TIME: CPT

## 2019-12-05 PROCEDURE — 6360000002 HC RX W HCPCS: Performed by: STUDENT IN AN ORGANIZED HEALTH CARE EDUCATION/TRAINING PROGRAM

## 2019-12-05 PROCEDURE — 87040 BLOOD CULTURE FOR BACTERIA: CPT

## 2019-12-05 PROCEDURE — 6370000000 HC RX 637 (ALT 250 FOR IP): Performed by: STUDENT IN AN ORGANIZED HEALTH CARE EDUCATION/TRAINING PROGRAM

## 2019-12-05 PROCEDURE — 1200000000 HC SEMI PRIVATE

## 2019-12-05 PROCEDURE — 2580000003 HC RX 258: Performed by: STUDENT IN AN ORGANIZED HEALTH CARE EDUCATION/TRAINING PROGRAM

## 2019-12-05 RX ORDER — MIRTAZAPINE 15 MG/1
15 TABLET, FILM COATED ORAL NIGHTLY
Status: DISCONTINUED | OUTPATIENT
Start: 2019-12-05 | End: 2019-12-05

## 2019-12-05 RX ORDER — MIRTAZAPINE 7.5 MG/1
7.5 TABLET, FILM COATED ORAL NIGHTLY
COMMUNITY
End: 2019-12-22

## 2019-12-05 RX ORDER — PRAVASTATIN SODIUM 20 MG
20 TABLET ORAL DAILY
Status: DISCONTINUED | OUTPATIENT
Start: 2019-12-06 | End: 2019-12-12 | Stop reason: HOSPADM

## 2019-12-05 RX ORDER — CARVEDILOL 6.25 MG/1
6.25 TABLET ORAL 2 TIMES DAILY
Status: DISCONTINUED | OUTPATIENT
Start: 2019-12-05 | End: 2019-12-05

## 2019-12-05 RX ORDER — CINACALCET 30 MG/1
30 TABLET, FILM COATED ORAL DAILY
Status: DISCONTINUED | OUTPATIENT
Start: 2019-12-06 | End: 2019-12-12 | Stop reason: HOSPADM

## 2019-12-05 RX ORDER — MIRTAZAPINE 15 MG/1
7.5 TABLET, FILM COATED ORAL NIGHTLY
Status: DISCONTINUED | OUTPATIENT
Start: 2019-12-05 | End: 2019-12-12 | Stop reason: HOSPADM

## 2019-12-05 RX ORDER — SODIUM CHLORIDE 0.9 % (FLUSH) 0.9 %
10 SYRINGE (ML) INJECTION EVERY 12 HOURS SCHEDULED
Status: DISCONTINUED | OUTPATIENT
Start: 2019-12-05 | End: 2019-12-12 | Stop reason: HOSPADM

## 2019-12-05 RX ORDER — PANTOPRAZOLE SODIUM 40 MG/1
40 TABLET, DELAYED RELEASE ORAL
Status: DISCONTINUED | OUTPATIENT
Start: 2019-12-06 | End: 2019-12-05

## 2019-12-05 RX ORDER — LINEZOLID 2 MG/ML
600 INJECTION, SOLUTION INTRAVENOUS EVERY 12 HOURS
Status: DISCONTINUED | OUTPATIENT
Start: 2019-12-05 | End: 2019-12-12 | Stop reason: HOSPADM

## 2019-12-05 RX ORDER — RENO CAPS 100; 1.5; 1.7; 20; 10; 1; 150; 5; 6 MG/1; MG/1; MG/1; MG/1; MG/1; MG/1; UG/1; MG/1; UG/1
1 CAPSULE ORAL DAILY
COMMUNITY
End: 2019-12-22

## 2019-12-05 RX ORDER — OXYCODONE HYDROCHLORIDE AND ACETAMINOPHEN 5; 325 MG/1; MG/1
1 TABLET ORAL EVERY 6 HOURS PRN
COMMUNITY
End: 2019-12-22

## 2019-12-05 RX ORDER — PRAZOSIN HYDROCHLORIDE 2 MG/1
2 CAPSULE ORAL NIGHTLY
COMMUNITY
End: 2019-12-22

## 2019-12-05 RX ORDER — CINACALCET 30 MG/1
30 TABLET, FILM COATED ORAL DAILY
COMMUNITY
End: 2019-12-22

## 2019-12-05 RX ORDER — CARVEDILOL 25 MG/1
25 TABLET ORAL 2 TIMES DAILY WITH MEALS
COMMUNITY
End: 2019-12-22

## 2019-12-05 RX ORDER — ACETAMINOPHEN 325 MG/1
650 TABLET ORAL EVERY 4 HOURS PRN
Status: DISCONTINUED | OUTPATIENT
Start: 2019-12-05 | End: 2019-12-12 | Stop reason: HOSPADM

## 2019-12-05 RX ORDER — CINACALCET 30 MG/1
30 TABLET, FILM COATED ORAL DAILY
Status: DISCONTINUED | OUTPATIENT
Start: 2019-12-06 | End: 2019-12-05

## 2019-12-05 RX ORDER — INSULIN LISPRO 100 [IU]/ML
0-3 INJECTION, SOLUTION INTRAVENOUS; SUBCUTANEOUS NIGHTLY
Status: DISCONTINUED | OUTPATIENT
Start: 2019-12-05 | End: 2019-12-12 | Stop reason: HOSPADM

## 2019-12-05 RX ORDER — CHOLECALCIFEROL (VITAMIN D3) 10 MCG
1 TABLET ORAL DAILY
Status: DISCONTINUED | OUTPATIENT
Start: 2019-12-06 | End: 2019-12-12 | Stop reason: HOSPADM

## 2019-12-05 RX ORDER — RENO CAPS 100; 1.5; 1.7; 20; 10; 1; 150; 5; 6 MG/1; MG/1; MG/1; MG/1; MG/1; MG/1; UG/1; MG/1; UG/1
1 CAPSULE ORAL DAILY
Status: DISCONTINUED | OUTPATIENT
Start: 2019-12-05 | End: 2019-12-05

## 2019-12-05 RX ORDER — HEPARIN SODIUM 5000 [USP'U]/ML
5000 INJECTION, SOLUTION INTRAVENOUS; SUBCUTANEOUS EVERY 8 HOURS SCHEDULED
Status: DISCONTINUED | OUTPATIENT
Start: 2019-12-05 | End: 2019-12-06

## 2019-12-05 RX ORDER — PRAVASTATIN SODIUM 20 MG
20 TABLET ORAL DAILY
COMMUNITY
End: 2019-12-22

## 2019-12-05 RX ORDER — AMIODARONE HYDROCHLORIDE 200 MG/1
1 TABLET ORAL DAILY
Status: DISCONTINUED | OUTPATIENT
Start: 2019-12-05 | End: 2019-12-05

## 2019-12-05 RX ORDER — INSULIN LISPRO 100 [IU]/ML
0-6 INJECTION, SOLUTION INTRAVENOUS; SUBCUTANEOUS
Status: DISCONTINUED | OUTPATIENT
Start: 2019-12-05 | End: 2019-12-12 | Stop reason: HOSPADM

## 2019-12-05 RX ORDER — PANTOPRAZOLE SODIUM 40 MG/1
40 TABLET, DELAYED RELEASE ORAL DAILY
Status: DISCONTINUED | OUTPATIENT
Start: 2019-12-06 | End: 2019-12-12 | Stop reason: HOSPADM

## 2019-12-05 RX ORDER — ONDANSETRON 2 MG/ML
4 INJECTION INTRAMUSCULAR; INTRAVENOUS EVERY 6 HOURS PRN
Status: DISCONTINUED | OUTPATIENT
Start: 2019-12-05 | End: 2019-12-12 | Stop reason: HOSPADM

## 2019-12-05 RX ORDER — SODIUM CHLORIDE 0.9 % (FLUSH) 0.9 %
10 SYRINGE (ML) INJECTION PRN
Status: DISCONTINUED | OUTPATIENT
Start: 2019-12-05 | End: 2019-12-12 | Stop reason: HOSPADM

## 2019-12-05 RX ORDER — PANTOPRAZOLE SODIUM 40 MG/1
40 TABLET, DELAYED RELEASE ORAL DAILY
COMMUNITY
End: 2019-12-22

## 2019-12-05 RX ORDER — ISOSORBIDE MONONITRATE 30 MG/1
1 TABLET, EXTENDED RELEASE ORAL 2 TIMES DAILY
Status: DISCONTINUED | OUTPATIENT
Start: 2019-12-05 | End: 2019-12-05

## 2019-12-05 RX ORDER — ATORVASTATIN CALCIUM 40 MG/1
80 TABLET, FILM COATED ORAL NIGHTLY
Status: DISCONTINUED | OUTPATIENT
Start: 2019-12-05 | End: 2019-12-05

## 2019-12-05 RX ADMIN — INSULIN LISPRO 1 UNITS: 100 INJECTION, SOLUTION INTRAVENOUS; SUBCUTANEOUS at 18:59

## 2019-12-05 RX ADMIN — INSULIN LISPRO 1 UNITS: 100 INJECTION, SOLUTION INTRAVENOUS; SUBCUTANEOUS at 21:13

## 2019-12-05 RX ADMIN — HEPARIN SODIUM 5000 UNITS: 5000 INJECTION INTRAVENOUS; SUBCUTANEOUS at 21:14

## 2019-12-05 RX ADMIN — LINEZOLID 600 MG: 600 INJECTION, SOLUTION INTRAVENOUS at 18:57

## 2019-12-05 RX ADMIN — MEROPENEM 500 MG: 500 INJECTION, POWDER, FOR SOLUTION INTRAVENOUS at 16:27

## 2019-12-05 RX ADMIN — MIRTAZAPINE 7.5 MG: 15 TABLET, FILM COATED ORAL at 21:12

## 2019-12-05 ASSESSMENT — ENCOUNTER SYMPTOMS
STRIDOR: 0
ABDOMINAL DISTENTION: 0
CHOKING: 0
SHORTNESS OF BREATH: 0
SORE THROAT: 0
BLOOD IN STOOL: 0
RHINORRHEA: 0
DIARRHEA: 0
VOMITING: 0
WHEEZING: 0
CONSTIPATION: 0
EYE PAIN: 0
EYE REDNESS: 0
ABDOMINAL PAIN: 0
FACIAL SWELLING: 0
CHEST TIGHTNESS: 0
COUGH: 0
NAUSEA: 0

## 2019-12-05 ASSESSMENT — PAIN SCALES - GENERAL
PAINLEVEL_OUTOF10: 0

## 2019-12-06 LAB
ALBUMIN SERPL-MCNC: 2.3 G/DL (ref 3.4–5)
ANION GAP SERPL CALCULATED.3IONS-SCNC: 15 MMOL/L (ref 3–16)
BASOPHILS ABSOLUTE: 0.1 K/UL (ref 0–0.2)
BASOPHILS RELATIVE PERCENT: 0.8 %
BUN BLDV-MCNC: 35 MG/DL (ref 7–20)
CALCIUM SERPL-MCNC: 9 MG/DL (ref 8.3–10.6)
CHLORIDE BLD-SCNC: 98 MMOL/L (ref 99–110)
CO2: 24 MMOL/L (ref 21–32)
CREAT SERPL-MCNC: 6.6 MG/DL (ref 0.8–1.3)
EKG ATRIAL RATE: 61 BPM
EKG DIAGNOSIS: NORMAL
EKG P AXIS: 99 DEGREES
EKG P-R INTERVAL: 186 MS
EKG Q-T INTERVAL: 448 MS
EKG QRS DURATION: 98 MS
EKG QTC CALCULATION (BAZETT): 450 MS
EKG R AXIS: -2 DEGREES
EKG T AXIS: 65 DEGREES
EKG VENTRICULAR RATE: 61 BPM
EOSINOPHILS ABSOLUTE: 0.1 K/UL (ref 0–0.6)
EOSINOPHILS RELATIVE PERCENT: 1.3 %
GFR AFRICAN AMERICAN: 10
GFR NON-AFRICAN AMERICAN: 8
GLUCOSE BLD-MCNC: 104 MG/DL (ref 70–99)
GLUCOSE BLD-MCNC: 105 MG/DL (ref 70–99)
GLUCOSE BLD-MCNC: 115 MG/DL (ref 70–99)
GLUCOSE BLD-MCNC: 92 MG/DL (ref 70–99)
GLUCOSE BLD-MCNC: 97 MG/DL (ref 70–99)
HCT VFR BLD CALC: 28.8 % (ref 40.5–52.5)
HEMOGLOBIN: 9 G/DL (ref 13.5–17.5)
LYMPHOCYTES ABSOLUTE: 1.6 K/UL (ref 1–5.1)
LYMPHOCYTES RELATIVE PERCENT: 17.6 %
MAGNESIUM: 2 MG/DL (ref 1.8–2.4)
MCH RBC QN AUTO: 26.3 PG (ref 26–34)
MCHC RBC AUTO-ENTMCNC: 31.4 G/DL (ref 31–36)
MCV RBC AUTO: 83.8 FL (ref 80–100)
MONOCYTES ABSOLUTE: 0.7 K/UL (ref 0–1.3)
MONOCYTES RELATIVE PERCENT: 7.6 %
NEUTROPHILS ABSOLUTE: 6.8 K/UL (ref 1.7–7.7)
NEUTROPHILS RELATIVE PERCENT: 72.7 %
PDW BLD-RTO: 19.5 % (ref 12.4–15.4)
PERFORMED ON: ABNORMAL
PERFORMED ON: ABNORMAL
PERFORMED ON: NORMAL
PERFORMED ON: NORMAL
PHOSPHORUS: 3.7 MG/DL (ref 2.5–4.9)
PLATELET # BLD: 261 K/UL (ref 135–450)
PMV BLD AUTO: 8.4 FL (ref 5–10.5)
POTASSIUM SERPL-SCNC: 4.6 MMOL/L (ref 3.5–5.1)
RBC # BLD: 3.43 M/UL (ref 4.2–5.9)
SODIUM BLD-SCNC: 137 MMOL/L (ref 136–145)
WBC # BLD: 9.3 K/UL (ref 4–11)

## 2019-12-06 PROCEDURE — 1200000000 HC SEMI PRIVATE

## 2019-12-06 PROCEDURE — 6370000000 HC RX 637 (ALT 250 FOR IP): Performed by: STUDENT IN AN ORGANIZED HEALTH CARE EDUCATION/TRAINING PROGRAM

## 2019-12-06 PROCEDURE — 93010 ELECTROCARDIOGRAM REPORT: CPT | Performed by: INTERNAL MEDICINE

## 2019-12-06 PROCEDURE — 93005 ELECTROCARDIOGRAM TRACING: CPT | Performed by: STUDENT IN AN ORGANIZED HEALTH CARE EDUCATION/TRAINING PROGRAM

## 2019-12-06 PROCEDURE — 83735 ASSAY OF MAGNESIUM: CPT

## 2019-12-06 PROCEDURE — 99223 1ST HOSP IP/OBS HIGH 75: CPT | Performed by: INTERNAL MEDICINE

## 2019-12-06 PROCEDURE — 90935 HEMODIALYSIS ONE EVALUATION: CPT

## 2019-12-06 PROCEDURE — 2580000003 HC RX 258: Performed by: STUDENT IN AN ORGANIZED HEALTH CARE EDUCATION/TRAINING PROGRAM

## 2019-12-06 PROCEDURE — 80069 RENAL FUNCTION PANEL: CPT

## 2019-12-06 PROCEDURE — 36415 COLL VENOUS BLD VENIPUNCTURE: CPT

## 2019-12-06 PROCEDURE — 85025 COMPLETE CBC W/AUTO DIFF WBC: CPT

## 2019-12-06 PROCEDURE — 6360000002 HC RX W HCPCS: Performed by: STUDENT IN AN ORGANIZED HEALTH CARE EDUCATION/TRAINING PROGRAM

## 2019-12-06 PROCEDURE — 5A1D70Z PERFORMANCE OF URINARY FILTRATION, INTERMITTENT, LESS THAN 6 HOURS PER DAY: ICD-10-PCS | Performed by: INTERNAL MEDICINE

## 2019-12-06 RX ORDER — HEPARIN SODIUM 5000 [USP'U]/ML
5000 INJECTION, SOLUTION INTRAVENOUS; SUBCUTANEOUS EVERY 8 HOURS SCHEDULED
Status: DISPENSED | OUTPATIENT
Start: 2019-12-06 | End: 2019-12-08

## 2019-12-06 RX ORDER — PRAZOSIN HYDROCHLORIDE 1 MG/1
2 CAPSULE ORAL NIGHTLY
Status: DISCONTINUED | OUTPATIENT
Start: 2019-12-06 | End: 2019-12-12 | Stop reason: HOSPADM

## 2019-12-06 RX ORDER — CARVEDILOL 25 MG/1
25 TABLET ORAL 2 TIMES DAILY WITH MEALS
Status: DISCONTINUED | OUTPATIENT
Start: 2019-12-06 | End: 2019-12-12 | Stop reason: HOSPADM

## 2019-12-06 RX ADMIN — LINEZOLID 600 MG: 600 INJECTION, SOLUTION INTRAVENOUS at 19:02

## 2019-12-06 RX ADMIN — HEPARIN SODIUM 5000 UNITS: 5000 INJECTION INTRAVENOUS; SUBCUTANEOUS at 22:35

## 2019-12-06 RX ADMIN — CINACALCET HYDROCHLORIDE 30 MG: 30 TABLET, FILM COATED ORAL at 10:47

## 2019-12-06 RX ADMIN — NEPHROCAP 1 MG: 1 CAP ORAL at 10:47

## 2019-12-06 RX ADMIN — Medication 10 ML: at 10:48

## 2019-12-06 RX ADMIN — HEPARIN SODIUM 5000 UNITS: 5000 INJECTION INTRAVENOUS; SUBCUTANEOUS at 06:49

## 2019-12-06 RX ADMIN — CARVEDILOL 25 MG: 25 TABLET, FILM COATED ORAL at 19:00

## 2019-12-06 RX ADMIN — LINEZOLID 600 MG: 600 INJECTION, SOLUTION INTRAVENOUS at 03:42

## 2019-12-06 RX ADMIN — PANTOPRAZOLE SODIUM 40 MG: 40 TABLET, DELAYED RELEASE ORAL at 10:47

## 2019-12-06 RX ADMIN — PRAZOSIN HYDROCHLORIDE 2 MG: 1 CAPSULE ORAL at 22:35

## 2019-12-06 RX ADMIN — MIRTAZAPINE 7.5 MG: 15 TABLET, FILM COATED ORAL at 22:35

## 2019-12-06 RX ADMIN — PRAVASTATIN SODIUM 20 MG: 20 TABLET ORAL at 10:47

## 2019-12-06 RX ADMIN — MEROPENEM 500 MG: 500 INJECTION, POWDER, FOR SOLUTION INTRAVENOUS at 22:32

## 2019-12-06 ASSESSMENT — PAIN SCALES - GENERAL
PAINLEVEL_OUTOF10: 0

## 2019-12-07 LAB
ALBUMIN SERPL-MCNC: 2.4 G/DL (ref 3.4–5)
ANION GAP SERPL CALCULATED.3IONS-SCNC: 11 MMOL/L (ref 3–16)
BASOPHILS ABSOLUTE: 0.1 K/UL (ref 0–0.2)
BASOPHILS RELATIVE PERCENT: 0.8 %
BUN BLDV-MCNC: 21 MG/DL (ref 7–20)
CALCIUM SERPL-MCNC: 8.8 MG/DL (ref 8.3–10.6)
CHLORIDE BLD-SCNC: 97 MMOL/L (ref 99–110)
CO2: 24 MMOL/L (ref 21–32)
CREAT SERPL-MCNC: 4.6 MG/DL (ref 0.8–1.3)
EOSINOPHILS ABSOLUTE: 0.1 K/UL (ref 0–0.6)
EOSINOPHILS RELATIVE PERCENT: 1.3 %
GFR AFRICAN AMERICAN: 15
GFR NON-AFRICAN AMERICAN: 13
GLUCOSE BLD-MCNC: 104 MG/DL (ref 70–99)
GLUCOSE BLD-MCNC: 116 MG/DL (ref 70–99)
GLUCOSE BLD-MCNC: 124 MG/DL (ref 70–99)
GLUCOSE BLD-MCNC: 127 MG/DL (ref 70–99)
GLUCOSE BLD-MCNC: 96 MG/DL (ref 70–99)
HCT VFR BLD CALC: 27.2 % (ref 40.5–52.5)
HEMOGLOBIN: 8.6 G/DL (ref 13.5–17.5)
LYMPHOCYTES ABSOLUTE: 1.5 K/UL (ref 1–5.1)
LYMPHOCYTES RELATIVE PERCENT: 19.1 %
MAGNESIUM: 1.9 MG/DL (ref 1.8–2.4)
MCH RBC QN AUTO: 26.5 PG (ref 26–34)
MCHC RBC AUTO-ENTMCNC: 31.5 G/DL (ref 31–36)
MCV RBC AUTO: 84.2 FL (ref 80–100)
MONOCYTES ABSOLUTE: 0.5 K/UL (ref 0–1.3)
MONOCYTES RELATIVE PERCENT: 6.1 %
NEUTROPHILS ABSOLUTE: 5.8 K/UL (ref 1.7–7.7)
NEUTROPHILS RELATIVE PERCENT: 72.7 %
PDW BLD-RTO: 18.9 % (ref 12.4–15.4)
PERFORMED ON: ABNORMAL
PERFORMED ON: NORMAL
PHOSPHORUS: 3.6 MG/DL (ref 2.5–4.9)
PLATELET # BLD: 246 K/UL (ref 135–450)
PMV BLD AUTO: 8.4 FL (ref 5–10.5)
POTASSIUM SERPL-SCNC: 4.6 MMOL/L (ref 3.5–5.1)
RBC # BLD: 3.23 M/UL (ref 4.2–5.9)
SODIUM BLD-SCNC: 132 MMOL/L (ref 136–145)
WBC # BLD: 7.9 K/UL (ref 4–11)

## 2019-12-07 PROCEDURE — 83735 ASSAY OF MAGNESIUM: CPT

## 2019-12-07 PROCEDURE — 6360000002 HC RX W HCPCS: Performed by: STUDENT IN AN ORGANIZED HEALTH CARE EDUCATION/TRAINING PROGRAM

## 2019-12-07 PROCEDURE — 85025 COMPLETE CBC W/AUTO DIFF WBC: CPT

## 2019-12-07 PROCEDURE — 36415 COLL VENOUS BLD VENIPUNCTURE: CPT

## 2019-12-07 PROCEDURE — 2580000003 HC RX 258: Performed by: STUDENT IN AN ORGANIZED HEALTH CARE EDUCATION/TRAINING PROGRAM

## 2019-12-07 PROCEDURE — 6370000000 HC RX 637 (ALT 250 FOR IP): Performed by: STUDENT IN AN ORGANIZED HEALTH CARE EDUCATION/TRAINING PROGRAM

## 2019-12-07 PROCEDURE — 1200000000 HC SEMI PRIVATE

## 2019-12-07 PROCEDURE — 80069 RENAL FUNCTION PANEL: CPT

## 2019-12-07 RX ORDER — DEXTROSE MONOHYDRATE 50 MG/ML
100 INJECTION, SOLUTION INTRAVENOUS PRN
Status: DISCONTINUED | OUTPATIENT
Start: 2019-12-07 | End: 2019-12-12 | Stop reason: HOSPADM

## 2019-12-07 RX ORDER — DEXTROSE MONOHYDRATE 25 G/50ML
12.5 INJECTION, SOLUTION INTRAVENOUS PRN
Status: DISCONTINUED | OUTPATIENT
Start: 2019-12-07 | End: 2019-12-12 | Stop reason: HOSPADM

## 2019-12-07 RX ORDER — NICOTINE POLACRILEX 4 MG
15 LOZENGE BUCCAL PRN
Status: DISCONTINUED | OUTPATIENT
Start: 2019-12-07 | End: 2019-12-12 | Stop reason: HOSPADM

## 2019-12-07 RX ADMIN — MIRTAZAPINE 7.5 MG: 15 TABLET, FILM COATED ORAL at 20:32

## 2019-12-07 RX ADMIN — Medication 10 ML: at 20:33

## 2019-12-07 RX ADMIN — LINEZOLID 600 MG: 600 INJECTION, SOLUTION INTRAVENOUS at 04:57

## 2019-12-07 RX ADMIN — Medication 10 ML: at 08:49

## 2019-12-07 RX ADMIN — PRAVASTATIN SODIUM 20 MG: 20 TABLET ORAL at 08:48

## 2019-12-07 RX ADMIN — CARVEDILOL 25 MG: 25 TABLET, FILM COATED ORAL at 17:43

## 2019-12-07 RX ADMIN — HEPARIN SODIUM 5000 UNITS: 5000 INJECTION INTRAVENOUS; SUBCUTANEOUS at 20:33

## 2019-12-07 RX ADMIN — PRAZOSIN HYDROCHLORIDE 2 MG: 1 CAPSULE ORAL at 20:31

## 2019-12-07 RX ADMIN — MEROPENEM 500 MG: 500 INJECTION, POWDER, FOR SOLUTION INTRAVENOUS at 17:00

## 2019-12-07 RX ADMIN — HEPARIN SODIUM 5000 UNITS: 5000 INJECTION INTRAVENOUS; SUBCUTANEOUS at 14:10

## 2019-12-07 RX ADMIN — Medication 10 ML: at 17:00

## 2019-12-07 RX ADMIN — PANTOPRAZOLE SODIUM 40 MG: 40 TABLET, DELAYED RELEASE ORAL at 07:02

## 2019-12-07 RX ADMIN — LINEZOLID 600 MG: 600 INJECTION, SOLUTION INTRAVENOUS at 17:43

## 2019-12-07 RX ADMIN — CINACALCET HYDROCHLORIDE 30 MG: 30 TABLET, FILM COATED ORAL at 08:48

## 2019-12-07 RX ADMIN — HEPARIN SODIUM 5000 UNITS: 5000 INJECTION INTRAVENOUS; SUBCUTANEOUS at 07:02

## 2019-12-07 RX ADMIN — NEPHROCAP 1 MG: 1 CAP ORAL at 08:48

## 2019-12-07 ASSESSMENT — PAIN SCALES - GENERAL
PAINLEVEL_OUTOF10: 0

## 2019-12-08 ENCOUNTER — ANESTHESIA EVENT (OUTPATIENT)
Dept: OPERATING ROOM | Age: 74
DRG: 617 | End: 2019-12-08
Payer: MEDICARE

## 2019-12-08 LAB
ALBUMIN SERPL-MCNC: 2.6 G/DL (ref 3.4–5)
ANION GAP SERPL CALCULATED.3IONS-SCNC: 14 MMOL/L (ref 3–16)
BASOPHILS ABSOLUTE: 0.1 K/UL (ref 0–0.2)
BASOPHILS RELATIVE PERCENT: 0.8 %
BUN BLDV-MCNC: 34 MG/DL (ref 7–20)
CALCIUM SERPL-MCNC: 8.8 MG/DL (ref 8.3–10.6)
CHLORIDE BLD-SCNC: 96 MMOL/L (ref 99–110)
CO2: 22 MMOL/L (ref 21–32)
CREAT SERPL-MCNC: 6.1 MG/DL (ref 0.8–1.3)
EOSINOPHILS ABSOLUTE: 0.2 K/UL (ref 0–0.6)
EOSINOPHILS RELATIVE PERCENT: 2.1 %
GFR AFRICAN AMERICAN: 11
GFR NON-AFRICAN AMERICAN: 9
GLUCOSE BLD-MCNC: 101 MG/DL (ref 70–99)
GLUCOSE BLD-MCNC: 104 MG/DL (ref 70–99)
GLUCOSE BLD-MCNC: 114 MG/DL (ref 70–99)
HCT VFR BLD CALC: 28.2 % (ref 40.5–52.5)
HEMOGLOBIN: 8.9 G/DL (ref 13.5–17.5)
INR BLD: 1.09 (ref 0.86–1.14)
LYMPHOCYTES ABSOLUTE: 1.6 K/UL (ref 1–5.1)
LYMPHOCYTES RELATIVE PERCENT: 20 %
MAGNESIUM: 2 MG/DL (ref 1.8–2.4)
MCH RBC QN AUTO: 26.7 PG (ref 26–34)
MCHC RBC AUTO-ENTMCNC: 31.5 G/DL (ref 31–36)
MCV RBC AUTO: 84.7 FL (ref 80–100)
MONOCYTES ABSOLUTE: 0.6 K/UL (ref 0–1.3)
MONOCYTES RELATIVE PERCENT: 7.5 %
NEUTROPHILS ABSOLUTE: 5.7 K/UL (ref 1.7–7.7)
NEUTROPHILS RELATIVE PERCENT: 69.6 %
PDW BLD-RTO: 19.2 % (ref 12.4–15.4)
PERFORMED ON: ABNORMAL
PHOSPHORUS: 3.9 MG/DL (ref 2.5–4.9)
PLATELET # BLD: 232 K/UL (ref 135–450)
PMV BLD AUTO: 8.5 FL (ref 5–10.5)
POTASSIUM SERPL-SCNC: 4.9 MMOL/L (ref 3.5–5.1)
PROTHROMBIN TIME: 12.7 SEC (ref 10–13.2)
RBC # BLD: 3.33 M/UL (ref 4.2–5.9)
SODIUM BLD-SCNC: 132 MMOL/L (ref 136–145)
WBC # BLD: 8.2 K/UL (ref 4–11)

## 2019-12-08 PROCEDURE — 1200000000 HC SEMI PRIVATE

## 2019-12-08 PROCEDURE — 6360000002 HC RX W HCPCS: Performed by: STUDENT IN AN ORGANIZED HEALTH CARE EDUCATION/TRAINING PROGRAM

## 2019-12-08 PROCEDURE — 83735 ASSAY OF MAGNESIUM: CPT

## 2019-12-08 PROCEDURE — 6370000000 HC RX 637 (ALT 250 FOR IP): Performed by: STUDENT IN AN ORGANIZED HEALTH CARE EDUCATION/TRAINING PROGRAM

## 2019-12-08 PROCEDURE — 80069 RENAL FUNCTION PANEL: CPT

## 2019-12-08 PROCEDURE — 85610 PROTHROMBIN TIME: CPT

## 2019-12-08 PROCEDURE — 85025 COMPLETE CBC W/AUTO DIFF WBC: CPT

## 2019-12-08 PROCEDURE — 36415 COLL VENOUS BLD VENIPUNCTURE: CPT

## 2019-12-08 PROCEDURE — 93005 ELECTROCARDIOGRAM TRACING: CPT | Performed by: INTERNAL MEDICINE

## 2019-12-08 PROCEDURE — 2580000003 HC RX 258: Performed by: STUDENT IN AN ORGANIZED HEALTH CARE EDUCATION/TRAINING PROGRAM

## 2019-12-08 RX ADMIN — PRAVASTATIN SODIUM 20 MG: 20 TABLET ORAL at 09:47

## 2019-12-08 RX ADMIN — Medication 10 ML: at 09:47

## 2019-12-08 RX ADMIN — CINACALCET HYDROCHLORIDE 30 MG: 30 TABLET, FILM COATED ORAL at 09:49

## 2019-12-08 RX ADMIN — MIRTAZAPINE 7.5 MG: 15 TABLET, FILM COATED ORAL at 21:49

## 2019-12-08 RX ADMIN — Medication 10 ML: at 15:35

## 2019-12-08 RX ADMIN — NEPHROCAP 1 MG: 1 CAP ORAL at 09:47

## 2019-12-08 RX ADMIN — Medication 10 ML: at 21:49

## 2019-12-08 RX ADMIN — CARVEDILOL 25 MG: 25 TABLET, FILM COATED ORAL at 09:47

## 2019-12-08 RX ADMIN — PRAZOSIN HYDROCHLORIDE 2 MG: 1 CAPSULE ORAL at 21:49

## 2019-12-08 RX ADMIN — LINEZOLID 600 MG: 600 INJECTION, SOLUTION INTRAVENOUS at 04:01

## 2019-12-08 RX ADMIN — HEPARIN SODIUM 5000 UNITS: 5000 INJECTION INTRAVENOUS; SUBCUTANEOUS at 06:44

## 2019-12-08 RX ADMIN — CARVEDILOL 25 MG: 25 TABLET, FILM COATED ORAL at 16:47

## 2019-12-08 RX ADMIN — PANTOPRAZOLE SODIUM 40 MG: 40 TABLET, DELAYED RELEASE ORAL at 09:47

## 2019-12-08 RX ADMIN — LINEZOLID 600 MG: 600 INJECTION, SOLUTION INTRAVENOUS at 16:41

## 2019-12-08 RX ADMIN — HEPARIN SODIUM 5000 UNITS: 5000 INJECTION INTRAVENOUS; SUBCUTANEOUS at 14:10

## 2019-12-08 RX ADMIN — HEPARIN SODIUM 5000 UNITS: 5000 INJECTION INTRAVENOUS; SUBCUTANEOUS at 21:49

## 2019-12-08 RX ADMIN — MEROPENEM 500 MG: 500 INJECTION, POWDER, FOR SOLUTION INTRAVENOUS at 15:35

## 2019-12-08 ASSESSMENT — PAIN SCALES - GENERAL
PAINLEVEL_OUTOF10: 0

## 2019-12-09 ENCOUNTER — ANESTHESIA (OUTPATIENT)
Dept: OPERATING ROOM | Age: 74
DRG: 617 | End: 2019-12-09
Payer: MEDICARE

## 2019-12-09 VITALS
DIASTOLIC BLOOD PRESSURE: 56 MMHG | OXYGEN SATURATION: 100 % | TEMPERATURE: 97.7 F | RESPIRATION RATE: 21 BRPM | SYSTOLIC BLOOD PRESSURE: 110 MMHG

## 2019-12-09 LAB
ABO/RH: NORMAL
ALBUMIN SERPL-MCNC: 2.7 G/DL (ref 3.4–5)
ANION GAP SERPL CALCULATED.3IONS-SCNC: 15 MMOL/L (ref 3–16)
ANTIBODY SCREEN: NORMAL
BASOPHILS ABSOLUTE: 0.1 K/UL (ref 0–0.2)
BASOPHILS RELATIVE PERCENT: 0.8 %
BLOOD CULTURE, ROUTINE: NORMAL
BUN BLDV-MCNC: 47 MG/DL (ref 7–20)
CALCIUM SERPL-MCNC: 8.6 MG/DL (ref 8.3–10.6)
CHLORIDE BLD-SCNC: 94 MMOL/L (ref 99–110)
CO2: 22 MMOL/L (ref 21–32)
CREAT SERPL-MCNC: 8.2 MG/DL (ref 0.8–1.3)
CULTURE, BLOOD 2: NORMAL
EKG ATRIAL RATE: 61 BPM
EKG DIAGNOSIS: NORMAL
EKG P AXIS: 113 DEGREES
EKG P-R INTERVAL: 210 MS
EKG Q-T INTERVAL: 476 MS
EKG QRS DURATION: 94 MS
EKG QTC CALCULATION (BAZETT): 479 MS
EKG R AXIS: 43 DEGREES
EKG T AXIS: 66 DEGREES
EKG VENTRICULAR RATE: 61 BPM
EOSINOPHILS ABSOLUTE: 0.2 K/UL (ref 0–0.6)
EOSINOPHILS RELATIVE PERCENT: 2.2 %
GFR AFRICAN AMERICAN: 8
GFR NON-AFRICAN AMERICAN: 6
GLUCOSE BLD-MCNC: 103 MG/DL (ref 70–99)
GLUCOSE BLD-MCNC: 112 MG/DL (ref 70–99)
GLUCOSE BLD-MCNC: 122 MG/DL (ref 70–99)
GLUCOSE BLD-MCNC: 195 MG/DL (ref 70–99)
GLUCOSE BLD-MCNC: 94 MG/DL (ref 70–99)
GLUCOSE BLD-MCNC: 98 MG/DL (ref 70–99)
HCT VFR BLD CALC: 26.3 % (ref 40.5–52.5)
HEMOGLOBIN: 8.3 G/DL (ref 13.5–17.5)
INR BLD: 1.07 (ref 0.86–1.14)
LYMPHOCYTES ABSOLUTE: 1.4 K/UL (ref 1–5.1)
LYMPHOCYTES RELATIVE PERCENT: 18.7 %
MAGNESIUM: 2.1 MG/DL (ref 1.8–2.4)
MCH RBC QN AUTO: 26.3 PG (ref 26–34)
MCHC RBC AUTO-ENTMCNC: 31.7 G/DL (ref 31–36)
MCV RBC AUTO: 83.1 FL (ref 80–100)
MONOCYTES ABSOLUTE: 0.5 K/UL (ref 0–1.3)
MONOCYTES RELATIVE PERCENT: 7 %
NEUTROPHILS ABSOLUTE: 5.3 K/UL (ref 1.7–7.7)
NEUTROPHILS RELATIVE PERCENT: 71.3 %
PDW BLD-RTO: 19.5 % (ref 12.4–15.4)
PERFORMED ON: ABNORMAL
PERFORMED ON: NORMAL
PERFORMED ON: NORMAL
PHOSPHORUS: 5.2 MG/DL (ref 2.5–4.9)
PLATELET # BLD: 236 K/UL (ref 135–450)
PMV BLD AUTO: 8.2 FL (ref 5–10.5)
POTASSIUM SERPL-SCNC: 5.6 MMOL/L (ref 3.5–5.1)
PROTHROMBIN TIME: 12.4 SEC (ref 10–13.2)
RBC # BLD: 3.17 M/UL (ref 4.2–5.9)
SODIUM BLD-SCNC: 131 MMOL/L (ref 136–145)
WBC # BLD: 7.5 K/UL (ref 4–11)

## 2019-12-09 PROCEDURE — 86850 RBC ANTIBODY SCREEN: CPT

## 2019-12-09 PROCEDURE — 6360000002 HC RX W HCPCS: Performed by: ANESTHESIOLOGY

## 2019-12-09 PROCEDURE — 93010 ELECTROCARDIOGRAM REPORT: CPT | Performed by: INTERNAL MEDICINE

## 2019-12-09 PROCEDURE — 27880 AMPUTATION OF LOWER LEG: CPT | Performed by: SURGERY

## 2019-12-09 PROCEDURE — 7100000000 HC PACU RECOVERY - FIRST 15 MIN: Performed by: SURGERY

## 2019-12-09 PROCEDURE — 3700000000 HC ANESTHESIA ATTENDED CARE: Performed by: SURGERY

## 2019-12-09 PROCEDURE — 85610 PROTHROMBIN TIME: CPT

## 2019-12-09 PROCEDURE — 6360000002 HC RX W HCPCS: Performed by: INTERNAL MEDICINE

## 2019-12-09 PROCEDURE — 6360000002 HC RX W HCPCS: Performed by: STUDENT IN AN ORGANIZED HEALTH CARE EDUCATION/TRAINING PROGRAM

## 2019-12-09 PROCEDURE — 6370000000 HC RX 637 (ALT 250 FOR IP): Performed by: STUDENT IN AN ORGANIZED HEALTH CARE EDUCATION/TRAINING PROGRAM

## 2019-12-09 PROCEDURE — 83735 ASSAY OF MAGNESIUM: CPT

## 2019-12-09 PROCEDURE — 7100000001 HC PACU RECOVERY - ADDTL 15 MIN: Performed by: SURGERY

## 2019-12-09 PROCEDURE — 86901 BLOOD TYPING SEROLOGIC RH(D): CPT

## 2019-12-09 PROCEDURE — 85025 COMPLETE CBC W/AUTO DIFF WBC: CPT

## 2019-12-09 PROCEDURE — 90935 HEMODIALYSIS ONE EVALUATION: CPT

## 2019-12-09 PROCEDURE — 1200000000 HC SEMI PRIVATE

## 2019-12-09 PROCEDURE — 80069 RENAL FUNCTION PANEL: CPT

## 2019-12-09 PROCEDURE — 88311 DECALCIFY TISSUE: CPT

## 2019-12-09 PROCEDURE — 2580000003 HC RX 258: Performed by: STUDENT IN AN ORGANIZED HEALTH CARE EDUCATION/TRAINING PROGRAM

## 2019-12-09 PROCEDURE — 0Y6J0Z1 DETACHMENT AT LEFT LOWER LEG, HIGH, OPEN APPROACH: ICD-10-PCS | Performed by: SURGERY

## 2019-12-09 PROCEDURE — 6370000000 HC RX 637 (ALT 250 FOR IP): Performed by: INTERNAL MEDICINE

## 2019-12-09 PROCEDURE — 6360000002 HC RX W HCPCS: Performed by: SURGERY

## 2019-12-09 PROCEDURE — 88307 TISSUE EXAM BY PATHOLOGIST: CPT

## 2019-12-09 PROCEDURE — 3600000014 HC SURGERY LEVEL 4 ADDTL 15MIN: Performed by: SURGERY

## 2019-12-09 PROCEDURE — 86900 BLOOD TYPING SEROLOGIC ABO: CPT

## 2019-12-09 PROCEDURE — 2500000003 HC RX 250 WO HCPCS: Performed by: NURSE ANESTHETIST, CERTIFIED REGISTERED

## 2019-12-09 PROCEDURE — 3700000001 HC ADD 15 MINUTES (ANESTHESIA): Performed by: SURGERY

## 2019-12-09 PROCEDURE — 2580000003 HC RX 258: Performed by: SURGERY

## 2019-12-09 PROCEDURE — 3600000004 HC SURGERY LEVEL 4 BASE: Performed by: SURGERY

## 2019-12-09 PROCEDURE — 2709999900 HC NON-CHARGEABLE SUPPLY: Performed by: SURGERY

## 2019-12-09 PROCEDURE — 6360000002 HC RX W HCPCS: Performed by: NURSE ANESTHETIST, CERTIFIED REGISTERED

## 2019-12-09 PROCEDURE — 2580000003 HC RX 258: Performed by: NURSE ANESTHETIST, CERTIFIED REGISTERED

## 2019-12-09 RX ORDER — ROCURONIUM BROMIDE 10 MG/ML
INJECTION, SOLUTION INTRAVENOUS PRN
Status: DISCONTINUED | OUTPATIENT
Start: 2019-12-09 | End: 2019-12-09 | Stop reason: SDUPTHER

## 2019-12-09 RX ORDER — ONDANSETRON 2 MG/ML
4 INJECTION INTRAMUSCULAR; INTRAVENOUS
Status: DISCONTINUED | OUTPATIENT
Start: 2019-12-09 | End: 2019-12-09 | Stop reason: HOSPADM

## 2019-12-09 RX ORDER — ONDANSETRON 2 MG/ML
INJECTION INTRAMUSCULAR; INTRAVENOUS PRN
Status: DISCONTINUED | OUTPATIENT
Start: 2019-12-09 | End: 2019-12-09 | Stop reason: SDUPTHER

## 2019-12-09 RX ORDER — ESMOLOL HYDROCHLORIDE 10 MG/ML
INJECTION INTRAVENOUS PRN
Status: DISCONTINUED | OUTPATIENT
Start: 2019-12-09 | End: 2019-12-09 | Stop reason: SDUPTHER

## 2019-12-09 RX ORDER — FENTANYL CITRATE 50 UG/ML
25 INJECTION, SOLUTION INTRAMUSCULAR; INTRAVENOUS EVERY 5 MIN PRN
Status: DISCONTINUED | OUTPATIENT
Start: 2019-12-09 | End: 2019-12-09 | Stop reason: HOSPADM

## 2019-12-09 RX ORDER — LIDOCAINE HYDROCHLORIDE 20 MG/ML
INJECTION, SOLUTION INTRAVENOUS PRN
Status: DISCONTINUED | OUTPATIENT
Start: 2019-12-09 | End: 2019-12-09 | Stop reason: SDUPTHER

## 2019-12-09 RX ORDER — MIDAZOLAM HYDROCHLORIDE 1 MG/ML
INJECTION INTRAMUSCULAR; INTRAVENOUS PRN
Status: DISCONTINUED | OUTPATIENT
Start: 2019-12-09 | End: 2019-12-09 | Stop reason: SDUPTHER

## 2019-12-09 RX ORDER — HEPARIN SODIUM 5000 [USP'U]/ML
5000 INJECTION, SOLUTION INTRAVENOUS; SUBCUTANEOUS EVERY 8 HOURS SCHEDULED
Status: COMPLETED | OUTPATIENT
Start: 2019-12-09 | End: 2019-12-12

## 2019-12-09 RX ORDER — PROPOFOL 10 MG/ML
INJECTION, EMULSION INTRAVENOUS PRN
Status: DISCONTINUED | OUTPATIENT
Start: 2019-12-09 | End: 2019-12-09 | Stop reason: SDUPTHER

## 2019-12-09 RX ORDER — LABETALOL 20 MG/4 ML (5 MG/ML) INTRAVENOUS SYRINGE
5 EVERY 10 MIN PRN
Status: DISCONTINUED | OUTPATIENT
Start: 2019-12-09 | End: 2019-12-09 | Stop reason: HOSPADM

## 2019-12-09 RX ORDER — MAGNESIUM HYDROXIDE 1200 MG/15ML
LIQUID ORAL CONTINUOUS PRN
Status: COMPLETED | OUTPATIENT
Start: 2019-12-09 | End: 2019-12-09

## 2019-12-09 RX ORDER — DEXAMETHASONE SODIUM PHOSPHATE 4 MG/ML
4 INJECTION, SOLUTION INTRA-ARTICULAR; INTRALESIONAL; INTRAMUSCULAR; INTRAVENOUS; SOFT TISSUE
Status: DISCONTINUED | OUTPATIENT
Start: 2019-12-09 | End: 2019-12-09 | Stop reason: HOSPADM

## 2019-12-09 RX ORDER — PHENYLEPHRINE HYDROCHLORIDE 10 MG/ML
INJECTION INTRAVENOUS PRN
Status: DISCONTINUED | OUTPATIENT
Start: 2019-12-09 | End: 2019-12-09 | Stop reason: SDUPTHER

## 2019-12-09 RX ORDER — SODIUM CHLORIDE 9 MG/ML
INJECTION, SOLUTION INTRAVENOUS CONTINUOUS PRN
Status: DISCONTINUED | OUTPATIENT
Start: 2019-12-09 | End: 2019-12-09 | Stop reason: SDUPTHER

## 2019-12-09 RX ORDER — LANOLIN ALCOHOL/MO/W.PET/CERES
CREAM (GRAM) TOPICAL 2 TIMES DAILY
Status: DISCONTINUED | OUTPATIENT
Start: 2019-12-09 | End: 2019-12-12 | Stop reason: HOSPADM

## 2019-12-09 RX ORDER — OXYCODONE HYDROCHLORIDE 5 MG/1
5 TABLET ORAL EVERY 4 HOURS PRN
Status: DISCONTINUED | OUTPATIENT
Start: 2019-12-09 | End: 2019-12-12 | Stop reason: HOSPADM

## 2019-12-09 RX ORDER — OXYCODONE HYDROCHLORIDE 5 MG/1
10 TABLET ORAL EVERY 4 HOURS PRN
Status: DISCONTINUED | OUTPATIENT
Start: 2019-12-09 | End: 2019-12-12 | Stop reason: HOSPADM

## 2019-12-09 RX ADMIN — PHENYLEPHRINE HYDROCHLORIDE 80 MCG: 10 INJECTION INTRAVENOUS at 08:28

## 2019-12-09 RX ADMIN — ESMOLOL HYDROCHLORIDE 30 MG: 10 INJECTION, SOLUTION INTRAVENOUS at 07:44

## 2019-12-09 RX ADMIN — PHENYLEPHRINE HYDROCHLORIDE 40 MCG: 10 INJECTION INTRAVENOUS at 07:44

## 2019-12-09 RX ADMIN — OXYCODONE HYDROCHLORIDE 5 MG: 5 TABLET ORAL at 17:24

## 2019-12-09 RX ADMIN — SODIUM CHLORIDE: 9 INJECTION, SOLUTION INTRAVENOUS at 07:32

## 2019-12-09 RX ADMIN — MEROPENEM 500 MG: 500 INJECTION, POWDER, FOR SOLUTION INTRAVENOUS at 22:07

## 2019-12-09 RX ADMIN — SUGAMMADEX 100 MG: 100 INJECTION, SOLUTION INTRAVENOUS at 09:04

## 2019-12-09 RX ADMIN — LIDOCAINE HYDROCHLORIDE 60 MG: 20 INJECTION, SOLUTION INTRAVENOUS at 07:44

## 2019-12-09 RX ADMIN — PHENYLEPHRINE HYDROCHLORIDE 80 MCG: 10 INJECTION INTRAVENOUS at 09:05

## 2019-12-09 RX ADMIN — ONDANSETRON 4 MG: 2 INJECTION INTRAMUSCULAR; INTRAVENOUS at 07:49

## 2019-12-09 RX ADMIN — LINEZOLID 600 MG: 600 INJECTION, SOLUTION INTRAVENOUS at 18:17

## 2019-12-09 RX ADMIN — DARBEPOETIN ALFA 100 MCG: 100 INJECTION, SOLUTION INTRAVENOUS; SUBCUTANEOUS at 15:49

## 2019-12-09 RX ADMIN — ROCURONIUM BROMIDE 50 MG: 10 INJECTION, SOLUTION INTRAVENOUS at 07:44

## 2019-12-09 RX ADMIN — LINEZOLID 600 MG: 600 INJECTION, SOLUTION INTRAVENOUS at 04:20

## 2019-12-09 RX ADMIN — FENTANYL CITRATE 50 MCG: 50 INJECTION INTRAMUSCULAR; INTRAVENOUS at 08:12

## 2019-12-09 RX ADMIN — INSULIN LISPRO 1 UNITS: 100 INJECTION, SOLUTION INTRAVENOUS; SUBCUTANEOUS at 22:09

## 2019-12-09 RX ADMIN — Medication: at 22:08

## 2019-12-09 RX ADMIN — HYDROMORPHONE HYDROCHLORIDE 0.5 MG: 1 INJECTION, SOLUTION INTRAMUSCULAR; INTRAVENOUS; SUBCUTANEOUS at 10:03

## 2019-12-09 RX ADMIN — PROPOFOL 120 MG: 10 INJECTION, EMULSION INTRAVENOUS at 07:44

## 2019-12-09 RX ADMIN — HYDROMORPHONE HYDROCHLORIDE 0.5 MG: 1 INJECTION, SOLUTION INTRAMUSCULAR; INTRAVENOUS; SUBCUTANEOUS at 09:50

## 2019-12-09 RX ADMIN — HEPARIN SODIUM 5000 UNITS: 5000 INJECTION INTRAVENOUS; SUBCUTANEOUS at 18:18

## 2019-12-09 RX ADMIN — MIDAZOLAM HYDROCHLORIDE 0.5 MG: 2 INJECTION, SOLUTION INTRAMUSCULAR; INTRAVENOUS at 07:41

## 2019-12-09 RX ADMIN — CARVEDILOL 25 MG: 25 TABLET, FILM COATED ORAL at 18:28

## 2019-12-09 RX ADMIN — MIRTAZAPINE 7.5 MG: 15 TABLET, FILM COATED ORAL at 22:07

## 2019-12-09 RX ADMIN — Medication 10 ML: at 22:07

## 2019-12-09 RX ADMIN — PRAZOSIN HYDROCHLORIDE 2 MG: 1 CAPSULE ORAL at 22:07

## 2019-12-09 RX ADMIN — OXYCODONE HYDROCHLORIDE 10 MG: 5 TABLET ORAL at 22:06

## 2019-12-09 RX ADMIN — FENTANYL CITRATE 50 MCG: 50 INJECTION INTRAMUSCULAR; INTRAVENOUS at 08:05

## 2019-12-09 RX ADMIN — PHENYLEPHRINE HYDROCHLORIDE 160 MCG: 10 INJECTION INTRAVENOUS at 08:32

## 2019-12-09 RX ADMIN — PRAVASTATIN SODIUM 20 MG: 20 TABLET ORAL at 22:06

## 2019-12-09 ASSESSMENT — PAIN DESCRIPTION - PAIN TYPE
TYPE: SURGICAL PAIN
TYPE: ACUTE PAIN

## 2019-12-09 ASSESSMENT — PULMONARY FUNCTION TESTS
PIF_VALUE: 0
PIF_VALUE: 16
PIF_VALUE: 16
PIF_VALUE: 1
PIF_VALUE: 3
PIF_VALUE: 17
PIF_VALUE: 16
PIF_VALUE: 17
PIF_VALUE: 16
PIF_VALUE: 17
PIF_VALUE: 17
PIF_VALUE: 16
PIF_VALUE: 17
PIF_VALUE: 3
PIF_VALUE: 0
PIF_VALUE: 16
PIF_VALUE: 16
PIF_VALUE: 18
PIF_VALUE: 3
PIF_VALUE: 17
PIF_VALUE: 16
PIF_VALUE: 17
PIF_VALUE: 16
PIF_VALUE: 2
PIF_VALUE: 17
PIF_VALUE: 16
PIF_VALUE: 0
PIF_VALUE: 18
PIF_VALUE: 13
PIF_VALUE: 15
PIF_VALUE: 3
PIF_VALUE: 17
PIF_VALUE: 16
PIF_VALUE: 13
PIF_VALUE: 16
PIF_VALUE: 2
PIF_VALUE: 16
PIF_VALUE: 3
PIF_VALUE: 18
PIF_VALUE: 13
PIF_VALUE: 16
PIF_VALUE: 13
PIF_VALUE: 16
PIF_VALUE: 1
PIF_VALUE: 16
PIF_VALUE: 4
PIF_VALUE: 16
PIF_VALUE: 16
PIF_VALUE: 17
PIF_VALUE: 16
PIF_VALUE: 13
PIF_VALUE: 17
PIF_VALUE: 16
PIF_VALUE: 13
PIF_VALUE: 16
PIF_VALUE: 17
PIF_VALUE: 16
PIF_VALUE: 17
PIF_VALUE: 16
PIF_VALUE: 17
PIF_VALUE: 2
PIF_VALUE: 15
PIF_VALUE: 17
PIF_VALUE: 13
PIF_VALUE: 16
PIF_VALUE: 17
PIF_VALUE: 13
PIF_VALUE: 5
PIF_VALUE: 16
PIF_VALUE: 17
PIF_VALUE: 17
PIF_VALUE: 13
PIF_VALUE: 17
PIF_VALUE: 16
PIF_VALUE: 4

## 2019-12-09 ASSESSMENT — PAIN SCALES - GENERAL
PAINLEVEL_OUTOF10: 6
PAINLEVEL_OUTOF10: 0
PAINLEVEL_OUTOF10: 7
PAINLEVEL_OUTOF10: 6
PAINLEVEL_OUTOF10: 0
PAINLEVEL_OUTOF10: 0

## 2019-12-09 ASSESSMENT — PAIN DESCRIPTION - ORIENTATION
ORIENTATION: LEFT
ORIENTATION: LEFT

## 2019-12-09 ASSESSMENT — PAIN DESCRIPTION - DESCRIPTORS
DESCRIPTORS: SORE
DESCRIPTORS: ACHING;DULL

## 2019-12-09 ASSESSMENT — PAIN DESCRIPTION - PROGRESSION: CLINICAL_PROGRESSION: NOT CHANGED

## 2019-12-09 ASSESSMENT — PAIN DESCRIPTION - LOCATION
LOCATION: KNEE
LOCATION: KNEE

## 2019-12-09 ASSESSMENT — PAIN - FUNCTIONAL ASSESSMENT
PAIN_FUNCTIONAL_ASSESSMENT: PREVENTS OR INTERFERES SOME ACTIVE ACTIVITIES AND ADLS
PAIN_FUNCTIONAL_ASSESSMENT: PREVENTS OR INTERFERES WITH MANY ACTIVE NOT PASSIVE ACTIVITIES

## 2019-12-09 ASSESSMENT — PAIN DESCRIPTION - FREQUENCY: FREQUENCY: CONTINUOUS

## 2019-12-09 ASSESSMENT — PAIN DESCRIPTION - ONSET
ONSET: ON-GOING
ONSET: ON-GOING

## 2019-12-10 LAB
ALBUMIN SERPL-MCNC: 2.5 G/DL (ref 3.4–5)
ANION GAP SERPL CALCULATED.3IONS-SCNC: 12 MMOL/L (ref 3–16)
BASOPHILS ABSOLUTE: 0.1 K/UL (ref 0–0.2)
BASOPHILS RELATIVE PERCENT: 1.1 %
BUN BLDV-MCNC: 18 MG/DL (ref 7–20)
CALCIUM SERPL-MCNC: 8.7 MG/DL (ref 8.3–10.6)
CHLORIDE BLD-SCNC: 101 MMOL/L (ref 99–110)
CO2: 24 MMOL/L (ref 21–32)
CREAT SERPL-MCNC: 4.6 MG/DL (ref 0.8–1.3)
EOSINOPHILS ABSOLUTE: 0.1 K/UL (ref 0–0.6)
EOSINOPHILS RELATIVE PERCENT: 1.7 %
GFR AFRICAN AMERICAN: 15
GFR NON-AFRICAN AMERICAN: 13
GLUCOSE BLD-MCNC: 102 MG/DL (ref 70–99)
GLUCOSE BLD-MCNC: 104 MG/DL (ref 70–99)
GLUCOSE BLD-MCNC: 129 MG/DL (ref 70–99)
GLUCOSE BLD-MCNC: 131 MG/DL (ref 70–99)
GLUCOSE BLD-MCNC: 148 MG/DL (ref 70–99)
HCT VFR BLD CALC: 24.1 % (ref 40.5–52.5)
HEMOGLOBIN: 7.8 G/DL (ref 13.5–17.5)
LYMPHOCYTES ABSOLUTE: 1.5 K/UL (ref 1–5.1)
LYMPHOCYTES RELATIVE PERCENT: 22.2 %
MAGNESIUM: 2 MG/DL (ref 1.8–2.4)
MCH RBC QN AUTO: 26.8 PG (ref 26–34)
MCHC RBC AUTO-ENTMCNC: 32.2 G/DL (ref 31–36)
MCV RBC AUTO: 83.4 FL (ref 80–100)
MONOCYTES ABSOLUTE: 0.5 K/UL (ref 0–1.3)
MONOCYTES RELATIVE PERCENT: 7.8 %
NEUTROPHILS ABSOLUTE: 4.6 K/UL (ref 1.7–7.7)
NEUTROPHILS RELATIVE PERCENT: 67.2 %
PDW BLD-RTO: 19.3 % (ref 12.4–15.4)
PERFORMED ON: ABNORMAL
PHOSPHORUS: 3.9 MG/DL (ref 2.5–4.9)
PLATELET # BLD: 208 K/UL (ref 135–450)
PMV BLD AUTO: 8.5 FL (ref 5–10.5)
POTASSIUM SERPL-SCNC: 4.7 MMOL/L (ref 3.5–5.1)
RBC # BLD: 2.89 M/UL (ref 4.2–5.9)
SODIUM BLD-SCNC: 137 MMOL/L (ref 136–145)
WBC # BLD: 6.9 K/UL (ref 4–11)

## 2019-12-10 PROCEDURE — 6370000000 HC RX 637 (ALT 250 FOR IP): Performed by: STUDENT IN AN ORGANIZED HEALTH CARE EDUCATION/TRAINING PROGRAM

## 2019-12-10 PROCEDURE — 6360000002 HC RX W HCPCS: Performed by: STUDENT IN AN ORGANIZED HEALTH CARE EDUCATION/TRAINING PROGRAM

## 2019-12-10 PROCEDURE — 2580000003 HC RX 258: Performed by: STUDENT IN AN ORGANIZED HEALTH CARE EDUCATION/TRAINING PROGRAM

## 2019-12-10 PROCEDURE — 83735 ASSAY OF MAGNESIUM: CPT

## 2019-12-10 PROCEDURE — 1200000000 HC SEMI PRIVATE

## 2019-12-10 PROCEDURE — 36415 COLL VENOUS BLD VENIPUNCTURE: CPT

## 2019-12-10 PROCEDURE — 85025 COMPLETE CBC W/AUTO DIFF WBC: CPT

## 2019-12-10 PROCEDURE — 80069 RENAL FUNCTION PANEL: CPT

## 2019-12-10 RX ADMIN — Medication: at 21:20

## 2019-12-10 RX ADMIN — NEPHROCAP 1 MG: 1 CAP ORAL at 10:16

## 2019-12-10 RX ADMIN — HEPARIN SODIUM 5000 UNITS: 5000 INJECTION INTRAVENOUS; SUBCUTANEOUS at 01:37

## 2019-12-10 RX ADMIN — Medication 10 ML: at 10:17

## 2019-12-10 RX ADMIN — Medication: at 10:17

## 2019-12-10 RX ADMIN — MEROPENEM 500 MG: 500 INJECTION, POWDER, FOR SOLUTION INTRAVENOUS at 17:39

## 2019-12-10 RX ADMIN — LINEZOLID 600 MG: 600 INJECTION, SOLUTION INTRAVENOUS at 04:52

## 2019-12-10 RX ADMIN — MIRTAZAPINE 7.5 MG: 15 TABLET, FILM COATED ORAL at 21:21

## 2019-12-10 RX ADMIN — HEPARIN SODIUM 5000 UNITS: 5000 INJECTION INTRAVENOUS; SUBCUTANEOUS at 19:38

## 2019-12-10 RX ADMIN — PRAZOSIN HYDROCHLORIDE 2 MG: 1 CAPSULE ORAL at 21:20

## 2019-12-10 RX ADMIN — CARVEDILOL 25 MG: 25 TABLET, FILM COATED ORAL at 17:36

## 2019-12-10 RX ADMIN — CINACALCET HYDROCHLORIDE 30 MG: 30 TABLET, FILM COATED ORAL at 10:17

## 2019-12-10 RX ADMIN — HEPARIN SODIUM 5000 UNITS: 5000 INJECTION INTRAVENOUS; SUBCUTANEOUS at 10:13

## 2019-12-10 RX ADMIN — OXYCODONE HYDROCHLORIDE 5 MG: 5 TABLET ORAL at 21:20

## 2019-12-10 RX ADMIN — CARVEDILOL 25 MG: 25 TABLET, FILM COATED ORAL at 10:16

## 2019-12-10 RX ADMIN — LINEZOLID 600 MG: 600 INJECTION, SOLUTION INTRAVENOUS at 16:46

## 2019-12-10 RX ADMIN — Medication 10 ML: at 21:20

## 2019-12-10 RX ADMIN — PRAVASTATIN SODIUM 20 MG: 20 TABLET ORAL at 10:16

## 2019-12-10 RX ADMIN — INSULIN LISPRO 1 UNITS: 100 INJECTION, SOLUTION INTRAVENOUS; SUBCUTANEOUS at 21:22

## 2019-12-10 RX ADMIN — PANTOPRAZOLE SODIUM 40 MG: 40 TABLET, DELAYED RELEASE ORAL at 10:17

## 2019-12-10 ASSESSMENT — PAIN DESCRIPTION - FREQUENCY: FREQUENCY: CONTINUOUS

## 2019-12-10 ASSESSMENT — PAIN SCALES - GENERAL
PAINLEVEL_OUTOF10: 0
PAINLEVEL_OUTOF10: 0
PAINLEVEL_OUTOF10: 6
PAINLEVEL_OUTOF10: 0

## 2019-12-10 ASSESSMENT — PAIN DESCRIPTION - LOCATION: LOCATION: KNEE

## 2019-12-10 ASSESSMENT — PAIN - FUNCTIONAL ASSESSMENT: PAIN_FUNCTIONAL_ASSESSMENT: PREVENTS OR INTERFERES WITH MANY ACTIVE NOT PASSIVE ACTIVITIES

## 2019-12-10 ASSESSMENT — PAIN DESCRIPTION - ONSET: ONSET: ON-GOING

## 2019-12-10 ASSESSMENT — PAIN DESCRIPTION - PAIN TYPE: TYPE: SURGICAL PAIN

## 2019-12-10 ASSESSMENT — PAIN DESCRIPTION - PROGRESSION: CLINICAL_PROGRESSION: NOT CHANGED

## 2019-12-10 ASSESSMENT — PAIN DESCRIPTION - ORIENTATION: ORIENTATION: LEFT

## 2019-12-10 ASSESSMENT — PAIN DESCRIPTION - DESCRIPTORS: DESCRIPTORS: SORE

## 2019-12-11 LAB
ALBUMIN SERPL-MCNC: 2.5 G/DL (ref 3.4–5)
ANION GAP SERPL CALCULATED.3IONS-SCNC: 13 MMOL/L (ref 3–16)
BASOPHILS ABSOLUTE: 0.1 K/UL (ref 0–0.2)
BASOPHILS RELATIVE PERCENT: 1.1 %
BUN BLDV-MCNC: 29 MG/DL (ref 7–20)
CALCIUM SERPL-MCNC: 8.6 MG/DL (ref 8.3–10.6)
CHLORIDE BLD-SCNC: 99 MMOL/L (ref 99–110)
CO2: 22 MMOL/L (ref 21–32)
CREAT SERPL-MCNC: 6.1 MG/DL (ref 0.8–1.3)
EOSINOPHILS ABSOLUTE: 0.2 K/UL (ref 0–0.6)
EOSINOPHILS RELATIVE PERCENT: 2.7 %
GFR AFRICAN AMERICAN: 11
GFR NON-AFRICAN AMERICAN: 9
GLUCOSE BLD-MCNC: 102 MG/DL (ref 70–99)
GLUCOSE BLD-MCNC: 112 MG/DL (ref 70–99)
GLUCOSE BLD-MCNC: 113 MG/DL (ref 70–99)
GLUCOSE BLD-MCNC: 94 MG/DL (ref 70–99)
HCT VFR BLD CALC: 23.3 % (ref 40.5–52.5)
HEMOGLOBIN: 7.4 G/DL (ref 13.5–17.5)
LYMPHOCYTES ABSOLUTE: 1.6 K/UL (ref 1–5.1)
LYMPHOCYTES RELATIVE PERCENT: 22.7 %
MAGNESIUM: 2 MG/DL (ref 1.8–2.4)
MCH RBC QN AUTO: 26.9 PG (ref 26–34)
MCHC RBC AUTO-ENTMCNC: 31.7 G/DL (ref 31–36)
MCV RBC AUTO: 84.8 FL (ref 80–100)
MONOCYTES ABSOLUTE: 0.6 K/UL (ref 0–1.3)
MONOCYTES RELATIVE PERCENT: 8.1 %
NEUTROPHILS ABSOLUTE: 4.5 K/UL (ref 1.7–7.7)
NEUTROPHILS RELATIVE PERCENT: 65.4 %
PDW BLD-RTO: 18.5 % (ref 12.4–15.4)
PERFORMED ON: ABNORMAL
PHOSPHORUS: 4.8 MG/DL (ref 2.5–4.9)
PLATELET # BLD: 180 K/UL (ref 135–450)
PMV BLD AUTO: 8.3 FL (ref 5–10.5)
POTASSIUM SERPL-SCNC: 5.2 MMOL/L (ref 3.5–5.1)
RBC # BLD: 2.75 M/UL (ref 4.2–5.9)
SODIUM BLD-SCNC: 134 MMOL/L (ref 136–145)
WBC # BLD: 6.9 K/UL (ref 4–11)

## 2019-12-11 PROCEDURE — 36415 COLL VENOUS BLD VENIPUNCTURE: CPT

## 2019-12-11 PROCEDURE — 83735 ASSAY OF MAGNESIUM: CPT

## 2019-12-11 PROCEDURE — 1200000000 HC SEMI PRIVATE

## 2019-12-11 PROCEDURE — 85025 COMPLETE CBC W/AUTO DIFF WBC: CPT

## 2019-12-11 PROCEDURE — 6360000002 HC RX W HCPCS: Performed by: STUDENT IN AN ORGANIZED HEALTH CARE EDUCATION/TRAINING PROGRAM

## 2019-12-11 PROCEDURE — 90935 HEMODIALYSIS ONE EVALUATION: CPT

## 2019-12-11 PROCEDURE — 2580000003 HC RX 258: Performed by: STUDENT IN AN ORGANIZED HEALTH CARE EDUCATION/TRAINING PROGRAM

## 2019-12-11 PROCEDURE — 80069 RENAL FUNCTION PANEL: CPT

## 2019-12-11 PROCEDURE — 6370000000 HC RX 637 (ALT 250 FOR IP): Performed by: STUDENT IN AN ORGANIZED HEALTH CARE EDUCATION/TRAINING PROGRAM

## 2019-12-11 RX ADMIN — LINEZOLID 600 MG: 600 INJECTION, SOLUTION INTRAVENOUS at 17:31

## 2019-12-11 RX ADMIN — LINEZOLID 600 MG: 600 INJECTION, SOLUTION INTRAVENOUS at 04:07

## 2019-12-11 RX ADMIN — NEPHROCAP 1 MG: 1 CAP ORAL at 14:13

## 2019-12-11 RX ADMIN — HEPARIN SODIUM 5000 UNITS: 5000 INJECTION INTRAVENOUS; SUBCUTANEOUS at 22:04

## 2019-12-11 RX ADMIN — Medication: at 22:04

## 2019-12-11 RX ADMIN — PRAZOSIN HYDROCHLORIDE 2 MG: 1 CAPSULE ORAL at 22:04

## 2019-12-11 RX ADMIN — MIRTAZAPINE 7.5 MG: 15 TABLET, FILM COATED ORAL at 22:04

## 2019-12-11 RX ADMIN — Medication: at 13:45

## 2019-12-11 RX ADMIN — PRAVASTATIN SODIUM 20 MG: 20 TABLET ORAL at 14:13

## 2019-12-11 RX ADMIN — MEROPENEM 500 MG: 500 INJECTION, POWDER, FOR SOLUTION INTRAVENOUS at 16:20

## 2019-12-11 RX ADMIN — HEPARIN SODIUM 5000 UNITS: 5000 INJECTION INTRAVENOUS; SUBCUTANEOUS at 13:53

## 2019-12-11 RX ADMIN — HEPARIN SODIUM 5000 UNITS: 5000 INJECTION INTRAVENOUS; SUBCUTANEOUS at 01:41

## 2019-12-11 RX ADMIN — CINACALCET HYDROCHLORIDE 30 MG: 30 TABLET, FILM COATED ORAL at 14:13

## 2019-12-11 RX ADMIN — CARVEDILOL 25 MG: 25 TABLET, FILM COATED ORAL at 17:30

## 2019-12-11 ASSESSMENT — PAIN SCALES - GENERAL
PAINLEVEL_OUTOF10: 0

## 2019-12-12 VITALS
RESPIRATION RATE: 16 BRPM | TEMPERATURE: 100 F | HEART RATE: 83 BPM | HEIGHT: 77 IN | DIASTOLIC BLOOD PRESSURE: 46 MMHG | WEIGHT: 174.16 LBS | SYSTOLIC BLOOD PRESSURE: 158 MMHG | OXYGEN SATURATION: 96 % | BODY MASS INDEX: 20.56 KG/M2

## 2019-12-12 LAB
ALBUMIN SERPL-MCNC: 2.5 G/DL (ref 3.4–5)
ANION GAP SERPL CALCULATED.3IONS-SCNC: 12 MMOL/L (ref 3–16)
BASOPHILS ABSOLUTE: 0.1 K/UL (ref 0–0.2)
BASOPHILS RELATIVE PERCENT: 0.5 %
BUN BLDV-MCNC: 16 MG/DL (ref 7–20)
CALCIUM SERPL-MCNC: 8.8 MG/DL (ref 8.3–10.6)
CHLORIDE BLD-SCNC: 97 MMOL/L (ref 99–110)
CO2: 23 MMOL/L (ref 21–32)
CREAT SERPL-MCNC: 3.9 MG/DL (ref 0.8–1.3)
EOSINOPHILS ABSOLUTE: 0 K/UL (ref 0–0.6)
EOSINOPHILS RELATIVE PERCENT: 0.4 %
GFR AFRICAN AMERICAN: 18
GFR NON-AFRICAN AMERICAN: 15
GLUCOSE BLD-MCNC: 135 MG/DL (ref 70–99)
GLUCOSE BLD-MCNC: 154 MG/DL (ref 70–99)
GLUCOSE BLD-MCNC: 159 MG/DL (ref 70–99)
GLUCOSE BLD-MCNC: 209 MG/DL (ref 70–99)
HCT VFR BLD CALC: 25.5 % (ref 40.5–52.5)
HEMOGLOBIN: 7.7 G/DL (ref 13.5–17.5)
LYMPHOCYTES ABSOLUTE: 1.3 K/UL (ref 1–5.1)
LYMPHOCYTES RELATIVE PERCENT: 11.3 %
MAGNESIUM: 2 MG/DL (ref 1.8–2.4)
MCH RBC QN AUTO: 26.4 PG (ref 26–34)
MCHC RBC AUTO-ENTMCNC: 30.3 G/DL (ref 31–36)
MCV RBC AUTO: 86.9 FL (ref 80–100)
MONOCYTES ABSOLUTE: 0.9 K/UL (ref 0–1.3)
MONOCYTES RELATIVE PERCENT: 7.5 %
NEUTROPHILS ABSOLUTE: 9.1 K/UL (ref 1.7–7.7)
NEUTROPHILS RELATIVE PERCENT: 80.3 %
PDW BLD-RTO: 19.7 % (ref 12.4–15.4)
PERFORMED ON: ABNORMAL
PHOSPHORUS: 3.8 MG/DL (ref 2.5–4.9)
PLATELET # BLD: 194 K/UL (ref 135–450)
PMV BLD AUTO: 8.3 FL (ref 5–10.5)
POTASSIUM SERPL-SCNC: 4.3 MMOL/L (ref 3.5–5.1)
RBC # BLD: 2.94 M/UL (ref 4.2–5.9)
SODIUM BLD-SCNC: 132 MMOL/L (ref 136–145)
WBC # BLD: 11.3 K/UL (ref 4–11)

## 2019-12-12 PROCEDURE — 97530 THERAPEUTIC ACTIVITIES: CPT

## 2019-12-12 PROCEDURE — 6360000002 HC RX W HCPCS: Performed by: STUDENT IN AN ORGANIZED HEALTH CARE EDUCATION/TRAINING PROGRAM

## 2019-12-12 PROCEDURE — 83735 ASSAY OF MAGNESIUM: CPT

## 2019-12-12 PROCEDURE — 97535 SELF CARE MNGMENT TRAINING: CPT

## 2019-12-12 PROCEDURE — 6370000000 HC RX 637 (ALT 250 FOR IP): Performed by: INTERNAL MEDICINE

## 2019-12-12 PROCEDURE — 80069 RENAL FUNCTION PANEL: CPT

## 2019-12-12 PROCEDURE — 85025 COMPLETE CBC W/AUTO DIFF WBC: CPT

## 2019-12-12 PROCEDURE — 36415 COLL VENOUS BLD VENIPUNCTURE: CPT

## 2019-12-12 PROCEDURE — 2580000003 HC RX 258: Performed by: STUDENT IN AN ORGANIZED HEALTH CARE EDUCATION/TRAINING PROGRAM

## 2019-12-12 PROCEDURE — 97162 PT EVAL MOD COMPLEX 30 MIN: CPT

## 2019-12-12 PROCEDURE — 6370000000 HC RX 637 (ALT 250 FOR IP): Performed by: STUDENT IN AN ORGANIZED HEALTH CARE EDUCATION/TRAINING PROGRAM

## 2019-12-12 PROCEDURE — 97166 OT EVAL MOD COMPLEX 45 MIN: CPT

## 2019-12-12 RX ORDER — LOSARTAN POTASSIUM 25 MG/1
25 TABLET ORAL DAILY
Status: DISCONTINUED | OUTPATIENT
Start: 2019-12-12 | End: 2019-12-12 | Stop reason: HOSPADM

## 2019-12-12 RX ORDER — LOSARTAN POTASSIUM 25 MG/1
25 TABLET ORAL DAILY
Qty: 30 TABLET | Refills: 3 | Status: ON HOLD | OUTPATIENT
Start: 2019-12-13 | End: 2021-10-07 | Stop reason: HOSPADM

## 2019-12-12 RX ADMIN — HEPARIN SODIUM 5000 UNITS: 5000 INJECTION INTRAVENOUS; SUBCUTANEOUS at 05:19

## 2019-12-12 RX ADMIN — CARVEDILOL 25 MG: 25 TABLET, FILM COATED ORAL at 09:36

## 2019-12-12 RX ADMIN — PANTOPRAZOLE SODIUM 40 MG: 40 TABLET, DELAYED RELEASE ORAL at 09:35

## 2019-12-12 RX ADMIN — INSULIN LISPRO 2 UNITS: 100 INJECTION, SOLUTION INTRAVENOUS; SUBCUTANEOUS at 12:29

## 2019-12-12 RX ADMIN — INSULIN LISPRO 1 UNITS: 100 INJECTION, SOLUTION INTRAVENOUS; SUBCUTANEOUS at 09:37

## 2019-12-12 RX ADMIN — LOSARTAN POTASSIUM 25 MG: 25 TABLET ORAL at 09:35

## 2019-12-12 RX ADMIN — Medication 10 ML: at 09:37

## 2019-12-12 RX ADMIN — Medication: at 09:36

## 2019-12-12 RX ADMIN — PRAVASTATIN SODIUM 20 MG: 20 TABLET ORAL at 09:35

## 2019-12-12 RX ADMIN — CARVEDILOL 25 MG: 25 TABLET, FILM COATED ORAL at 17:21

## 2019-12-12 RX ADMIN — NEPHROCAP 1 MG: 1 CAP ORAL at 09:35

## 2019-12-12 RX ADMIN — LINEZOLID 600 MG: 600 INJECTION, SOLUTION INTRAVENOUS at 05:19

## 2019-12-12 RX ADMIN — CINACALCET HYDROCHLORIDE 30 MG: 30 TABLET, FILM COATED ORAL at 09:35

## 2019-12-12 ASSESSMENT — PAIN SCALES - GENERAL: PAINLEVEL_OUTOF10: 0

## 2019-12-17 ENCOUNTER — TELEPHONE (OUTPATIENT)
Dept: SURGERY | Age: 74
End: 2019-12-17

## 2019-12-22 ENCOUNTER — HOSPITAL ENCOUNTER (EMERGENCY)
Age: 74
Discharge: HOME | End: 2019-12-22
Attending: EMERGENCY MEDICINE
Payer: MEDICARE

## 2019-12-22 VITALS
RESPIRATION RATE: 15 BRPM | OXYGEN SATURATION: 99 % | WEIGHT: 186.13 LBS | TEMPERATURE: 98.4 F | HEART RATE: 74 BPM | SYSTOLIC BLOOD PRESSURE: 165 MMHG | DIASTOLIC BLOOD PRESSURE: 73 MMHG | BODY MASS INDEX: 22.07 KG/M2

## 2019-12-22 DIAGNOSIS — Z99.2 ANEMIA DUE TO CHRONIC KIDNEY DISEASE, ON CHRONIC DIALYSIS (HCC): Primary | ICD-10-CM

## 2019-12-22 DIAGNOSIS — D63.1 ANEMIA DUE TO CHRONIC KIDNEY DISEASE, ON CHRONIC DIALYSIS (HCC): Primary | ICD-10-CM

## 2019-12-22 DIAGNOSIS — N18.6 ANEMIA DUE TO CHRONIC KIDNEY DISEASE, ON CHRONIC DIALYSIS (HCC): Primary | ICD-10-CM

## 2019-12-22 LAB
A/G RATIO: 0.7 (ref 1.1–2.2)
ABO/RH: NORMAL
ALBUMIN SERPL-MCNC: 2.8 G/DL (ref 3.4–5)
ALP BLD-CCNC: 160 U/L (ref 40–129)
ALT SERPL-CCNC: 22 U/L (ref 10–40)
ANION GAP SERPL CALCULATED.3IONS-SCNC: 12 MMOL/L (ref 3–16)
ANTIBODY SCREEN: NORMAL
AST SERPL-CCNC: 26 U/L (ref 15–37)
BASOPHILS ABSOLUTE: 0.1 K/UL (ref 0–0.2)
BASOPHILS RELATIVE PERCENT: 1.1 %
BILIRUB SERPL-MCNC: 0.3 MG/DL (ref 0–1)
BLOOD BANK DISPENSE STATUS: NORMAL
BLOOD BANK PRODUCT CODE: NORMAL
BPU ID: NORMAL
BUN BLDV-MCNC: 16 MG/DL (ref 7–20)
CALCIUM SERPL-MCNC: 8.9 MG/DL (ref 8.3–10.6)
CHLORIDE BLD-SCNC: 98 MMOL/L (ref 99–110)
CO2: 25 MMOL/L (ref 21–32)
CREAT SERPL-MCNC: 2.5 MG/DL (ref 0.8–1.3)
DESCRIPTION BLOOD BANK: NORMAL
EOSINOPHILS ABSOLUTE: 0.2 K/UL (ref 0–0.6)
EOSINOPHILS RELATIVE PERCENT: 2.9 %
GFR AFRICAN AMERICAN: 31
GFR NON-AFRICAN AMERICAN: 25
GLOBULIN: 3.8 G/DL
GLUCOSE BLD-MCNC: 147 MG/DL (ref 70–99)
HCT VFR BLD CALC: 21 % (ref 40.5–52.5)
HCT VFR BLD CALC: 23.6 % (ref 40.5–52.5)
HEMOGLOBIN: 6.6 G/DL (ref 13.5–17.5)
HEMOGLOBIN: 7.6 G/DL (ref 13.5–17.5)
LYMPHOCYTES ABSOLUTE: 1.6 K/UL (ref 1–5.1)
LYMPHOCYTES RELATIVE PERCENT: 21 %
MCH RBC QN AUTO: 26.6 PG (ref 26–34)
MCHC RBC AUTO-ENTMCNC: 31.3 G/DL (ref 31–36)
MCV RBC AUTO: 84.9 FL (ref 80–100)
MONOCYTES ABSOLUTE: 0.4 K/UL (ref 0–1.3)
MONOCYTES RELATIVE PERCENT: 5.8 %
NEUTROPHILS ABSOLUTE: 5.4 K/UL (ref 1.7–7.7)
NEUTROPHILS RELATIVE PERCENT: 69.2 %
PDW BLD-RTO: 20.9 % (ref 12.4–15.4)
PLATELET # BLD: 229 K/UL (ref 135–450)
PMV BLD AUTO: 8.8 FL (ref 5–10.5)
POTASSIUM REFLEX MAGNESIUM: 4.2 MMOL/L (ref 3.5–5.1)
RBC # BLD: 2.47 M/UL (ref 4.2–5.9)
SODIUM BLD-SCNC: 135 MMOL/L (ref 136–145)
TOTAL PROTEIN: 6.6 G/DL (ref 6.4–8.2)
WBC # BLD: 7.8 K/UL (ref 4–11)

## 2019-12-22 PROCEDURE — P9016 RBC LEUKOCYTES REDUCED: HCPCS

## 2019-12-22 PROCEDURE — 86900 BLOOD TYPING SEROLOGIC ABO: CPT

## 2019-12-22 PROCEDURE — 85014 HEMATOCRIT: CPT

## 2019-12-22 PROCEDURE — 93005 ELECTROCARDIOGRAM TRACING: CPT | Performed by: PODIATRIST

## 2019-12-22 PROCEDURE — 85018 HEMOGLOBIN: CPT

## 2019-12-22 PROCEDURE — 2580000003 HC RX 258: Performed by: PODIATRIST

## 2019-12-22 PROCEDURE — 36430 TRANSFUSION BLD/BLD COMPNT: CPT

## 2019-12-22 PROCEDURE — 96361 HYDRATE IV INFUSION ADD-ON: CPT

## 2019-12-22 PROCEDURE — 96360 HYDRATION IV INFUSION INIT: CPT

## 2019-12-22 PROCEDURE — 86923 COMPATIBILITY TEST ELECTRIC: CPT

## 2019-12-22 PROCEDURE — 85025 COMPLETE CBC W/AUTO DIFF WBC: CPT

## 2019-12-22 PROCEDURE — 86901 BLOOD TYPING SEROLOGIC RH(D): CPT

## 2019-12-22 PROCEDURE — 86850 RBC ANTIBODY SCREEN: CPT

## 2019-12-22 PROCEDURE — 80053 COMPREHEN METABOLIC PANEL: CPT

## 2019-12-22 PROCEDURE — 36415 COLL VENOUS BLD VENIPUNCTURE: CPT

## 2019-12-22 PROCEDURE — 99285 EMERGENCY DEPT VISIT HI MDM: CPT

## 2019-12-22 RX ORDER — 0.9 % SODIUM CHLORIDE 0.9 %
250 INTRAVENOUS SOLUTION INTRAVENOUS ONCE
Status: COMPLETED | OUTPATIENT
Start: 2019-12-22 | End: 2019-12-22

## 2019-12-22 RX ADMIN — SODIUM CHLORIDE 250 ML: 9 INJECTION, SOLUTION INTRAVENOUS at 18:40

## 2019-12-22 ASSESSMENT — ENCOUNTER SYMPTOMS
SHORTNESS OF BREATH: 0
DIARRHEA: 0
CONSTIPATION: 0
BLOOD IN STOOL: 0
COUGH: 0
ABDOMINAL PAIN: 0
NAUSEA: 0
CHOKING: 0
CHEST TIGHTNESS: 0
BACK PAIN: 0
VOMITING: 0

## 2019-12-23 LAB
EKG ATRIAL RATE: 94 BPM
EKG DIAGNOSIS: NORMAL
EKG P AXIS: 58 DEGREES
EKG P-R INTERVAL: 168 MS
EKG Q-T INTERVAL: 372 MS
EKG QRS DURATION: 98 MS
EKG QTC CALCULATION (BAZETT): 465 MS
EKG R AXIS: 39 DEGREES
EKG T AXIS: 59 DEGREES
EKG VENTRICULAR RATE: 94 BPM

## 2020-01-02 ENCOUNTER — OFFICE VISIT (OUTPATIENT)
Dept: VASCULAR SURGERY | Age: 75
End: 2020-01-02

## 2020-01-02 VITALS
SYSTOLIC BLOOD PRESSURE: 139 MMHG | TEMPERATURE: 97 F | RESPIRATION RATE: 16 BRPM | DIASTOLIC BLOOD PRESSURE: 69 MMHG | HEART RATE: 63 BPM

## 2020-01-02 PROCEDURE — 99024 POSTOP FOLLOW-UP VISIT: CPT | Performed by: SURGERY

## 2020-01-02 NOTE — PROGRESS NOTES
[facility]  Post-Op Visit    Patient: Camilo Aviles  YOB: 1945    HPI: Camilo Aviles is a 75 y.o. male who presents today for post-op visit. Patient here today to have staples removed. Chief Complaint   Patient presents with    Post-Op Check     post op        Plan: No follow-ups on file. Patient doing well overall. Incision is healing well. No drainage noted. Staples removed without difficulty. Ace wrap and coban applied to left limb. Patient will follow up with Sathish.

## 2020-01-15 ENCOUNTER — TELEPHONE (OUTPATIENT)
Dept: SURGERY | Age: 75
End: 2020-01-15

## 2020-01-17 ENCOUNTER — HOSPITAL ENCOUNTER (EMERGENCY)
Age: 75
Discharge: NURSING, REHAB OR LONG-TERM CARE | End: 2020-01-18
Attending: EMERGENCY MEDICINE
Payer: MEDICARE

## 2020-01-17 VITALS
SYSTOLIC BLOOD PRESSURE: 179 MMHG | HEART RATE: 77 BPM | OXYGEN SATURATION: 100 % | BODY MASS INDEX: 21.84 KG/M2 | RESPIRATION RATE: 17 BRPM | DIASTOLIC BLOOD PRESSURE: 82 MMHG | WEIGHT: 185 LBS | TEMPERATURE: 98.7 F | HEIGHT: 77 IN

## 2020-01-17 LAB
ABO/RH: NORMAL
ANION GAP SERPL CALCULATED.3IONS-SCNC: 10 MMOL/L (ref 3–16)
ANTIBODY SCREEN: NORMAL
BASOPHILS ABSOLUTE: 0.1 K/UL (ref 0–0.2)
BASOPHILS RELATIVE PERCENT: 1.3 %
BLOOD BANK DISPENSE STATUS: NORMAL
BLOOD BANK PRODUCT CODE: NORMAL
BPU ID: NORMAL
BUN BLDV-MCNC: 11 MG/DL (ref 7–20)
CALCIUM SERPL-MCNC: 9.3 MG/DL (ref 8.3–10.6)
CHLORIDE BLD-SCNC: 98 MMOL/L (ref 99–110)
CO2: 30 MMOL/L (ref 21–32)
CREAT SERPL-MCNC: 2.3 MG/DL (ref 0.8–1.3)
DESCRIPTION BLOOD BANK: NORMAL
EOSINOPHILS ABSOLUTE: 0.3 K/UL (ref 0–0.6)
EOSINOPHILS RELATIVE PERCENT: 5 %
GFR AFRICAN AMERICAN: 34
GFR NON-AFRICAN AMERICAN: 28
GLUCOSE BLD-MCNC: 106 MG/DL (ref 70–99)
HCT VFR BLD CALC: 21.7 % (ref 40.5–52.5)
HEMOGLOBIN: 7 G/DL (ref 13.5–17.5)
INR BLD: 1.04 (ref 0.86–1.14)
LYMPHOCYTES ABSOLUTE: 1.3 K/UL (ref 1–5.1)
LYMPHOCYTES RELATIVE PERCENT: 25.2 %
MCH RBC QN AUTO: 27.9 PG (ref 26–34)
MCHC RBC AUTO-ENTMCNC: 32.2 G/DL (ref 31–36)
MCV RBC AUTO: 86.6 FL (ref 80–100)
MONOCYTES ABSOLUTE: 0.3 K/UL (ref 0–1.3)
MONOCYTES RELATIVE PERCENT: 6.2 %
NEUTROPHILS ABSOLUTE: 3.3 K/UL (ref 1.7–7.7)
NEUTROPHILS RELATIVE PERCENT: 62.3 %
PDW BLD-RTO: 19.5 % (ref 12.4–15.4)
PLATELET # BLD: 174 K/UL (ref 135–450)
PMV BLD AUTO: 9.5 FL (ref 5–10.5)
POTASSIUM REFLEX MAGNESIUM: 4.1 MMOL/L (ref 3.5–5.1)
PROTHROMBIN TIME: 12.1 SEC (ref 10–13.2)
RBC # BLD: 2.51 M/UL (ref 4.2–5.9)
SODIUM BLD-SCNC: 138 MMOL/L (ref 136–145)
WBC # BLD: 5.2 K/UL (ref 4–11)

## 2020-01-17 PROCEDURE — P9016 RBC LEUKOCYTES REDUCED: HCPCS

## 2020-01-17 PROCEDURE — 36430 TRANSFUSION BLD/BLD COMPNT: CPT

## 2020-01-17 PROCEDURE — 96361 HYDRATE IV INFUSION ADD-ON: CPT

## 2020-01-17 PROCEDURE — 86850 RBC ANTIBODY SCREEN: CPT

## 2020-01-17 PROCEDURE — 86901 BLOOD TYPING SEROLOGIC RH(D): CPT

## 2020-01-17 PROCEDURE — 86923 COMPATIBILITY TEST ELECTRIC: CPT

## 2020-01-17 PROCEDURE — 2580000003 HC RX 258: Performed by: PHYSICIAN ASSISTANT

## 2020-01-17 PROCEDURE — 86900 BLOOD TYPING SEROLOGIC ABO: CPT

## 2020-01-17 PROCEDURE — 85025 COMPLETE CBC W/AUTO DIFF WBC: CPT

## 2020-01-17 PROCEDURE — 99284 EMERGENCY DEPT VISIT MOD MDM: CPT

## 2020-01-17 PROCEDURE — 80048 BASIC METABOLIC PNL TOTAL CA: CPT

## 2020-01-17 PROCEDURE — 85610 PROTHROMBIN TIME: CPT

## 2020-01-17 PROCEDURE — 96360 HYDRATION IV INFUSION INIT: CPT

## 2020-01-17 RX ORDER — 0.9 % SODIUM CHLORIDE 0.9 %
20 INTRAVENOUS SOLUTION INTRAVENOUS ONCE
Status: COMPLETED | OUTPATIENT
Start: 2020-01-17 | End: 2020-01-17

## 2020-01-17 RX ADMIN — SODIUM CHLORIDE 20 ML: 9 INJECTION, SOLUTION INTRAVENOUS at 19:24

## 2020-01-17 ASSESSMENT — ENCOUNTER SYMPTOMS
DIARRHEA: 0
SHORTNESS OF BREATH: 0
BLOOD IN STOOL: 0
VOMITING: 0
CHEST TIGHTNESS: 0
CONSTIPATION: 0

## 2020-01-17 NOTE — ED TRIAGE NOTES
Patient arrives to ER via EMS from dialysis with c/o hemoglobin of 6.1. Patient has no complaints. Currently living at [facility].  Will follow

## 2020-01-17 NOTE — ED PROVIDER NOTES
(Left, 12/9/2019). His family history includes Arthritis in his mother. He reports that he has never smoked. He has never used smokeless tobacco. He reports previous alcohol use. He reports that he does not use drugs. Medications     Previous Medications    ASPIRIN 81 MG TABLET    Take 81 mg by mouth daily     LOSARTAN (COZAAR) 25 MG TABLET    Take 1 tablet by mouth daily    NITROGLYCERIN (NITROSTAT) 0.4 MG SL TABLET    Place 0.4 mg under the tongue every 5 minutes as needed for Chest pain up to max of 3 total doses. If no relief after 1 dose, call 911. Allergies     He has No Known Allergies. Physical Exam     INITIAL VITALS: BP: (!) 155/75, Temp: 97.8 °F (36.6 °C), Pulse: 85, Resp: 20, SpO2: 100 %  Physical Exam  Vitals signs and nursing note reviewed. HENT:      Head: Normocephalic and atraumatic. Eyes:      Extraocular Movements: Extraocular movements intact. Pupils: Pupils are equal, round, and reactive to light. Cardiovascular:      Rate and Rhythm: Normal rate and regular rhythm. Pulses: Normal pulses. Heart sounds: Normal heart sounds. Pulmonary:      Effort: Pulmonary effort is normal.      Breath sounds: Normal breath sounds. Musculoskeletal:      Comments: Palpable thrill on dialysis fistula L forearm   Neurological:      General: No focal deficit present. Mental Status: He is alert.    Psychiatric:         Mood and Affect: Mood normal.         Behavior: Behavior normal.         Diagnostic Results     RADIOLOGY:  No orders to display       LABS:   Results for orders placed or performed during the hospital encounter of 01/17/20   CBC Auto Differential   Result Value Ref Range    WBC 5.2 4.0 - 11.0 K/uL    RBC 2.51 (L) 4.20 - 5.90 M/uL    Hemoglobin 7.0 (L) 13.5 - 17.5 g/dL    Hematocrit 21.7 (L) 40.5 - 52.5 %    MCV 86.6 80.0 - 100.0 fL    MCH 27.9 26.0 - 34.0 pg    MCHC 32.2 31.0 - 36.0 g/dL    RDW 19.5 (H) 12.4 - 15.4 %    Platelets 249 379 - 182 K/uL    MPV 9.5

## 2020-01-18 NOTE — ED NOTES
Patient provided boxed lunch. Updated on ETA for transport back to facility.      Rosaura Apple RN  01/17/20 4537

## 2020-01-31 ENCOUNTER — OFFICE VISIT (OUTPATIENT)
Dept: VASCULAR SURGERY | Age: 75
End: 2020-01-31

## 2020-01-31 VITALS
HEIGHT: 77 IN | BODY MASS INDEX: 21.84 KG/M2 | SYSTOLIC BLOOD PRESSURE: 148 MMHG | WEIGHT: 185 LBS | DIASTOLIC BLOOD PRESSURE: 78 MMHG

## 2020-01-31 PROCEDURE — 99024 POSTOP FOLLOW-UP VISIT: CPT | Performed by: SURGERY

## 2020-01-31 RX ORDER — CARVEDILOL 25 MG/1
25 TABLET ORAL 2 TIMES DAILY WITH MEALS
Status: ON HOLD | COMMUNITY
End: 2022-02-03 | Stop reason: HOSPADM

## 2020-01-31 RX ORDER — RENO CAPS 100; 1.5; 1.7; 20; 10; 1; 150; 5; 6 MG/1; MG/1; MG/1; MG/1; MG/1; MG/1; UG/1; MG/1; UG/1
1 CAPSULE ORAL DAILY
Status: ON HOLD | COMMUNITY
End: 2022-02-03 | Stop reason: HOSPADM

## 2020-01-31 RX ORDER — OXYCODONE HYDROCHLORIDE AND ACETAMINOPHEN 5; 325 MG/1; MG/1
1 TABLET ORAL EVERY 4 HOURS PRN
Status: ON HOLD | COMMUNITY
End: 2020-02-07 | Stop reason: HOSPADM

## 2020-01-31 RX ORDER — PANTOPRAZOLE SODIUM 40 MG/1
40 GRANULE, DELAYED RELEASE ORAL
Status: ON HOLD | COMMUNITY
End: 2022-02-03 | Stop reason: HOSPADM

## 2020-01-31 RX ORDER — MIRTAZAPINE 15 MG/1
15 TABLET, FILM COATED ORAL NIGHTLY
Status: ON HOLD | COMMUNITY
End: 2022-02-03 | Stop reason: HOSPADM

## 2020-01-31 RX ORDER — PRAZOSIN HYDROCHLORIDE 2 MG/1
2 CAPSULE ORAL NIGHTLY
Status: ON HOLD | COMMUNITY
End: 2022-02-03 | Stop reason: HOSPADM

## 2020-01-31 RX ORDER — PRAVASTATIN SODIUM 20 MG
20 TABLET ORAL DAILY
Status: ON HOLD | COMMUNITY
End: 2022-02-03 | Stop reason: HOSPADM

## 2020-01-31 NOTE — PROGRESS NOTES
WOUND VAC APPLICATION  performed by Hernandez Douglas DPM at [address] [place] [address] Right 1/18/2019    RIGHT BELOW THE KNEE AMPUTATION performed by Rafy Braga MD at [address] [place] [address] Left 12/9/2019    LEFT BELOW KNEE AMPUTATION performed by Rafy Braga MD at [address] [place] [address] History:    Social History     Tobacco Use   Smoking Status Never Smoker   Smokeless Tobacco Never Used     Current Medications:  Current Outpatient Medications   Medication Sig Dispense Refill    mirtazapine (REMERON) 15 MG tablet Take 15 mg by mouth nightly      pantoprazole sodium (PROTONIX) 40 MG PACK packet Take 40 mg by mouth every morning (before breakfast)      prazosin (MINIPRESS) 2 MG capsule Take 2 mg by mouth nightly      B Complex-C-Folic Acid (JOSUÉ CAPS) 1 MG CAPS Take 1 mg by mouth daily      pravastatin (PRAVACHOL) 20 MG tablet Take 20 mg by mouth daily      carvedilol (COREG) 25 MG tablet Take 25 mg by mouth 2 times daily (with meals)      insulin lispro (HUMALOG) 100 UNIT/ML injection vial Inject into the skin 3 times daily (before meals)      Petrolatum-Zinc Oxide (PHYTOPLEX Z-GUARD) 57-17 % PSTE Apply topically      oxyCODONE-acetaminophen (PERCOCET) 5-325 MG per tablet Take 1 tablet by mouth every 4 hours as needed for Pain.  losartan (COZAAR) 25 MG tablet Take 1 tablet by mouth daily 30 tablet 3    aspirin 81 MG tablet Take 81 mg by mouth daily       nitroGLYCERIN (NITROSTAT) 0.4 MG SL tablet Place 0.4 mg under the tongue every 5 minutes as needed for Chest pain up to max of 3 total doses. If no relief after 1 dose, call 911. No current facility-administered medications for this visit. Allergies:  Patient has no known allergies. REVIEW OF SYSTEMS:   A 14 point review of systems was completed. Pertinent positives identified in the HPI, all other review of systems negative.       Objective:   PHYSICAL EXAM:        VITALS:  BP (!) 148/78 (Site: Right Upper

## 2020-02-03 ENCOUNTER — PREP FOR PROCEDURE (OUTPATIENT)
Dept: VASCULAR SURGERY | Age: 75
End: 2020-02-03

## 2020-02-05 ENCOUNTER — ANESTHESIA EVENT (OUTPATIENT)
Dept: OPERATING ROOM | Age: 75
DRG: 463 | End: 2020-02-05
Payer: MEDICARE

## 2020-02-05 NOTE — PROGRESS NOTES
Obstructive Sleep Apnea (OANH) Screening     Patient:  Gerard Ambrocio    YOB: 1945      Medical Record #:  [id number]                     Date:  2/5/2020     1. Are you a loud and/or regular snorer? []  Yes       [x] No    2. Have you been observed to gasp or stop breathing during sleep? []  Yes       [x] No    3. Do you feel tired or groggy upon awakening or do you awaken with a headache?           []  Yes       [x] No    4. Are you often tired or fatigued during the wake time hours? []  Yes       [x] No    5. Do you fall asleep sitting, reading, watching TV or driving? []  Yes       [x] No    6. Do you often have problems with memory or concentration? []  Yes       [x] No    **If patient's score is ? 3 they are considered high risk for OANH. Notify the anesthesiologist of the high risk and document in focus note. Note:  If the patient's BMI is more than 35 kg m¯² , has neck circumference > 40 cm, and/or high blood pressure the risk is greater (© American Sleep Apnea Association, 2006).

## 2020-02-06 ENCOUNTER — ANESTHESIA (OUTPATIENT)
Dept: OPERATING ROOM | Age: 75
DRG: 463 | End: 2020-02-06
Payer: MEDICARE

## 2020-02-06 ENCOUNTER — HOSPITAL ENCOUNTER (INPATIENT)
Age: 75
LOS: 1 days | Discharge: NURSING, REHAB OR LONG-TERM CARE | DRG: 463 | End: 2020-02-07
Attending: SURGERY | Admitting: SURGERY
Payer: MEDICARE

## 2020-02-06 VITALS
RESPIRATION RATE: 10 BRPM | TEMPERATURE: 96.8 F | SYSTOLIC BLOOD PRESSURE: 130 MMHG | OXYGEN SATURATION: 100 % | DIASTOLIC BLOOD PRESSURE: 63 MMHG

## 2020-02-06 PROBLEM — T87.50: Status: ACTIVE | Noted: 2020-02-06

## 2020-02-06 PROBLEM — T81.31XS: Status: ACTIVE | Noted: 2020-02-06

## 2020-02-06 LAB
ABO/RH: NORMAL
ANION GAP SERPL CALCULATED.3IONS-SCNC: 13 MMOL/L (ref 3–16)
ANTIBODY SCREEN: NORMAL
BASOPHILS ABSOLUTE: 0 K/UL (ref 0–0.2)
BASOPHILS RELATIVE PERCENT: 0.9 %
BLOOD BANK DISPENSE STATUS: NORMAL
BLOOD BANK PRODUCT CODE: NORMAL
BPU ID: NORMAL
BUN BLDV-MCNC: 29 MG/DL (ref 7–20)
CALCIUM SERPL-MCNC: 8.9 MG/DL (ref 8.3–10.6)
CHLORIDE BLD-SCNC: 99 MMOL/L (ref 99–110)
CO2: 27 MMOL/L (ref 21–32)
CREAT SERPL-MCNC: 4.9 MG/DL (ref 0.8–1.3)
DESCRIPTION BLOOD BANK: NORMAL
EOSINOPHILS ABSOLUTE: 0.3 K/UL (ref 0–0.6)
EOSINOPHILS RELATIVE PERCENT: 5.3 %
GFR AFRICAN AMERICAN: 14
GFR NON-AFRICAN AMERICAN: 12
GLUCOSE BLD-MCNC: 112 MG/DL (ref 70–99)
GLUCOSE BLD-MCNC: 119 MG/DL (ref 70–99)
GLUCOSE BLD-MCNC: 208 MG/DL (ref 70–99)
GLUCOSE BLD-MCNC: 57 MG/DL (ref 70–99)
GLUCOSE BLD-MCNC: 95 MG/DL (ref 70–99)
GLUCOSE BLD-MCNC: 95 MG/DL (ref 70–99)
HCT VFR BLD CALC: 20 % (ref 40.5–52.5)
HCT VFR BLD CALC: 23.8 % (ref 40.5–52.5)
HEMOGLOBIN: 6.7 G/DL (ref 13.5–17.5)
HEMOGLOBIN: 7.9 G/DL (ref 13.5–17.5)
LYMPHOCYTES ABSOLUTE: 1.5 K/UL (ref 1–5.1)
LYMPHOCYTES RELATIVE PERCENT: 28 %
MCH RBC QN AUTO: 29.3 PG (ref 26–34)
MCHC RBC AUTO-ENTMCNC: 33.5 G/DL (ref 31–36)
MCV RBC AUTO: 87.2 FL (ref 80–100)
MONOCYTES ABSOLUTE: 0.5 K/UL (ref 0–1.3)
MONOCYTES RELATIVE PERCENT: 8.7 %
NEUTROPHILS ABSOLUTE: 3.1 K/UL (ref 1.7–7.7)
NEUTROPHILS RELATIVE PERCENT: 57.1 %
PDW BLD-RTO: 16 % (ref 12.4–15.4)
PERFORMED ON: ABNORMAL
PERFORMED ON: NORMAL
PLATELET # BLD: 152 K/UL (ref 135–450)
PMV BLD AUTO: 9.4 FL (ref 5–10.5)
POTASSIUM REFLEX MAGNESIUM: 4.5 MMOL/L (ref 3.5–5.1)
RBC # BLD: 2.3 M/UL (ref 4.2–5.9)
SODIUM BLD-SCNC: 139 MMOL/L (ref 136–145)
SPECIMEN STATUS: NORMAL
WBC # BLD: 5.5 K/UL (ref 4–11)

## 2020-02-06 PROCEDURE — 6370000000 HC RX 637 (ALT 250 FOR IP): Performed by: INTERNAL MEDICINE

## 2020-02-06 PROCEDURE — 2500000003 HC RX 250 WO HCPCS: Performed by: NURSE ANESTHETIST, CERTIFIED REGISTERED

## 2020-02-06 PROCEDURE — 11045 DBRDMT SUBQ TISS EACH ADDL: CPT | Performed by: SURGERY

## 2020-02-06 PROCEDURE — 6360000002 HC RX W HCPCS: Performed by: NURSE ANESTHETIST, CERTIFIED REGISTERED

## 2020-02-06 PROCEDURE — 6360000002 HC RX W HCPCS: Performed by: SURGERY

## 2020-02-06 PROCEDURE — 86900 BLOOD TYPING SEROLOGIC ABO: CPT

## 2020-02-06 PROCEDURE — 6360000002 HC RX W HCPCS: Performed by: ANESTHESIOLOGY

## 2020-02-06 PROCEDURE — 85025 COMPLETE CBC W/AUTO DIFF WBC: CPT

## 2020-02-06 PROCEDURE — 3700000001 HC ADD 15 MINUTES (ANESTHESIA): Performed by: SURGERY

## 2020-02-06 PROCEDURE — 2580000003 HC RX 258: Performed by: NURSE ANESTHETIST, CERTIFIED REGISTERED

## 2020-02-06 PROCEDURE — 2060000000 HC ICU INTERMEDIATE R&B

## 2020-02-06 PROCEDURE — 2580000003 HC RX 258: Performed by: INTERNAL MEDICINE

## 2020-02-06 PROCEDURE — 0JBP0ZZ EXCISION OF LEFT LOWER LEG SUBCUTANEOUS TISSUE AND FASCIA, OPEN APPROACH: ICD-10-PCS | Performed by: SURGERY

## 2020-02-06 PROCEDURE — 80048 BASIC METABOLIC PNL TOTAL CA: CPT

## 2020-02-06 PROCEDURE — 2580000003 HC RX 258: Performed by: ANESTHESIOLOGY

## 2020-02-06 PROCEDURE — 2580000003 HC RX 258: Performed by: SURGERY

## 2020-02-06 PROCEDURE — 86923 COMPATIBILITY TEST ELECTRIC: CPT

## 2020-02-06 PROCEDURE — 86850 RBC ANTIBODY SCREEN: CPT

## 2020-02-06 PROCEDURE — 2709999900 HC NON-CHARGEABLE SUPPLY: Performed by: SURGERY

## 2020-02-06 PROCEDURE — 85014 HEMATOCRIT: CPT

## 2020-02-06 PROCEDURE — 7100000000 HC PACU RECOVERY - FIRST 15 MIN: Performed by: SURGERY

## 2020-02-06 PROCEDURE — 36415 COLL VENOUS BLD VENIPUNCTURE: CPT

## 2020-02-06 PROCEDURE — 11042 DBRDMT SUBQ TIS 1ST 20SQCM/<: CPT | Performed by: SURGERY

## 2020-02-06 PROCEDURE — 3600000007 HC SURGERY HYBRID BASE: Performed by: SURGERY

## 2020-02-06 PROCEDURE — 36430 TRANSFUSION BLD/BLD COMPNT: CPT

## 2020-02-06 PROCEDURE — 3600000017 HC SURGERY HYBRID ADDL 15MIN: Performed by: SURGERY

## 2020-02-06 PROCEDURE — 2580000003 HC RX 258

## 2020-02-06 PROCEDURE — P9016 RBC LEUKOCYTES REDUCED: HCPCS

## 2020-02-06 PROCEDURE — 7100000001 HC PACU RECOVERY - ADDTL 15 MIN: Performed by: SURGERY

## 2020-02-06 PROCEDURE — 86901 BLOOD TYPING SEROLOGIC RH(D): CPT

## 2020-02-06 PROCEDURE — 85018 HEMOGLOBIN: CPT

## 2020-02-06 PROCEDURE — 3700000000 HC ANESTHESIA ATTENDED CARE: Performed by: SURGERY

## 2020-02-06 PROCEDURE — 6360000002 HC RX W HCPCS: Performed by: INTERNAL MEDICINE

## 2020-02-06 RX ORDER — CLONIDINE HYDROCHLORIDE 0.1 MG/1
0.1 TABLET ORAL
Status: DISCONTINUED | OUTPATIENT
Start: 2020-02-06 | End: 2020-02-07 | Stop reason: HOSPADM

## 2020-02-06 RX ORDER — SODIUM CHLORIDE 0.9 % (FLUSH) 0.9 %
10 SYRINGE (ML) INJECTION PRN
Status: DISCONTINUED | OUTPATIENT
Start: 2020-02-06 | End: 2020-02-06 | Stop reason: HOSPADM

## 2020-02-06 RX ORDER — ASPIRIN 81 MG/1
81 TABLET ORAL DAILY
Status: DISCONTINUED | OUTPATIENT
Start: 2020-02-06 | End: 2020-02-07 | Stop reason: HOSPADM

## 2020-02-06 RX ORDER — ONDANSETRON 2 MG/ML
4 INJECTION INTRAMUSCULAR; INTRAVENOUS EVERY 6 HOURS PRN
Status: DISCONTINUED | OUTPATIENT
Start: 2020-02-06 | End: 2020-02-07 | Stop reason: HOSPADM

## 2020-02-06 RX ORDER — 0.9 % SODIUM CHLORIDE 0.9 %
250 INTRAVENOUS SOLUTION INTRAVENOUS ONCE
Status: COMPLETED | OUTPATIENT
Start: 2020-02-06 | End: 2020-02-06

## 2020-02-06 RX ORDER — OXYCODONE HYDROCHLORIDE AND ACETAMINOPHEN 5; 325 MG/1; MG/1
1 TABLET ORAL EVERY 4 HOURS PRN
Status: DISCONTINUED | OUTPATIENT
Start: 2020-02-06 | End: 2020-02-07 | Stop reason: HOSPADM

## 2020-02-06 RX ORDER — MORPHINE SULFATE 2 MG/ML
2 INJECTION, SOLUTION INTRAMUSCULAR; INTRAVENOUS
Status: DISCONTINUED | OUTPATIENT
Start: 2020-02-06 | End: 2020-02-07 | Stop reason: HOSPADM

## 2020-02-06 RX ORDER — LOSARTAN POTASSIUM 25 MG/1
25 TABLET ORAL DAILY
Status: DISCONTINUED | OUTPATIENT
Start: 2020-02-06 | End: 2020-02-07 | Stop reason: HOSPADM

## 2020-02-06 RX ORDER — OXYCODONE HYDROCHLORIDE AND ACETAMINOPHEN 5; 325 MG/1; MG/1
1 TABLET ORAL PRN
Status: DISCONTINUED | OUTPATIENT
Start: 2020-02-06 | End: 2020-02-06 | Stop reason: HOSPADM

## 2020-02-06 RX ORDER — ONDANSETRON 2 MG/ML
4 INJECTION INTRAMUSCULAR; INTRAVENOUS
Status: DISCONTINUED | OUTPATIENT
Start: 2020-02-06 | End: 2020-02-06 | Stop reason: HOSPADM

## 2020-02-06 RX ORDER — SODIUM CHLORIDE 9 MG/ML
INJECTION, SOLUTION INTRAVENOUS
Status: COMPLETED
Start: 2020-02-06 | End: 2020-02-06

## 2020-02-06 RX ORDER — HYDRALAZINE HYDROCHLORIDE 20 MG/ML
5 INJECTION INTRAMUSCULAR; INTRAVENOUS EVERY 10 MIN PRN
Status: DISCONTINUED | OUTPATIENT
Start: 2020-02-06 | End: 2020-02-06 | Stop reason: HOSPADM

## 2020-02-06 RX ORDER — SODIUM CHLORIDE 0.9 % (FLUSH) 0.9 %
10 SYRINGE (ML) INJECTION EVERY 12 HOURS SCHEDULED
Status: DISCONTINUED | OUTPATIENT
Start: 2020-02-06 | End: 2020-02-07 | Stop reason: HOSPADM

## 2020-02-06 RX ORDER — SODIUM CHLORIDE 0.9 % (FLUSH) 0.9 %
10 SYRINGE (ML) INJECTION PRN
Status: DISCONTINUED | OUTPATIENT
Start: 2020-02-06 | End: 2020-02-07 | Stop reason: HOSPADM

## 2020-02-06 RX ORDER — SODIUM CHLORIDE 0.9 % (FLUSH) 0.9 %
10 SYRINGE (ML) INJECTION PRN
Status: DISCONTINUED | OUTPATIENT
Start: 2020-02-06 | End: 2020-02-06 | Stop reason: SDUPTHER

## 2020-02-06 RX ORDER — MORPHINE SULFATE 4 MG/ML
4 INJECTION, SOLUTION INTRAMUSCULAR; INTRAVENOUS
Status: DISCONTINUED | OUTPATIENT
Start: 2020-02-06 | End: 2020-02-07 | Stop reason: HOSPADM

## 2020-02-06 RX ORDER — SODIUM CHLORIDE, SODIUM LACTATE, POTASSIUM CHLORIDE, CALCIUM CHLORIDE 600; 310; 30; 20 MG/100ML; MG/100ML; MG/100ML; MG/100ML
INJECTION, SOLUTION INTRAVENOUS CONTINUOUS PRN
Status: DISCONTINUED | OUTPATIENT
Start: 2020-02-06 | End: 2020-02-06 | Stop reason: SDUPTHER

## 2020-02-06 RX ORDER — CARVEDILOL 6.25 MG/1
25 TABLET ORAL 2 TIMES DAILY WITH MEALS
Status: DISCONTINUED | OUTPATIENT
Start: 2020-02-06 | End: 2020-02-07 | Stop reason: HOSPADM

## 2020-02-06 RX ORDER — CHOLECALCIFEROL (VITAMIN D3) 10 MCG
1 TABLET ORAL DAILY
Status: DISCONTINUED | OUTPATIENT
Start: 2020-02-06 | End: 2020-02-07 | Stop reason: HOSPADM

## 2020-02-06 RX ORDER — FENTANYL CITRATE 50 UG/ML
INJECTION, SOLUTION INTRAMUSCULAR; INTRAVENOUS PRN
Status: DISCONTINUED | OUTPATIENT
Start: 2020-02-06 | End: 2020-02-06 | Stop reason: SDUPTHER

## 2020-02-06 RX ORDER — DEXTROSE MONOHYDRATE 25 G/50ML
12.5 INJECTION, SOLUTION INTRAVENOUS PRN
Status: DISCONTINUED | OUTPATIENT
Start: 2020-02-06 | End: 2020-02-07 | Stop reason: HOSPADM

## 2020-02-06 RX ORDER — PROMETHAZINE HYDROCHLORIDE 25 MG/ML
6.25 INJECTION, SOLUTION INTRAMUSCULAR; INTRAVENOUS
Status: DISCONTINUED | OUTPATIENT
Start: 2020-02-06 | End: 2020-02-06 | Stop reason: HOSPADM

## 2020-02-06 RX ORDER — DEXTROSE MONOHYDRATE 25 G/50ML
25 INJECTION, SOLUTION INTRAVENOUS ONCE
Status: COMPLETED | OUTPATIENT
Start: 2020-02-06 | End: 2020-02-06

## 2020-02-06 RX ORDER — HEPARIN SODIUM 5000 [USP'U]/ML
5000 INJECTION, SOLUTION INTRAVENOUS; SUBCUTANEOUS EVERY 8 HOURS SCHEDULED
Status: DISCONTINUED | OUTPATIENT
Start: 2020-02-06 | End: 2020-02-06 | Stop reason: SDUPTHER

## 2020-02-06 RX ORDER — PRAVASTATIN SODIUM 20 MG
20 TABLET ORAL DAILY
Status: DISCONTINUED | OUTPATIENT
Start: 2020-02-06 | End: 2020-02-07 | Stop reason: HOSPADM

## 2020-02-06 RX ORDER — ONDANSETRON 2 MG/ML
INJECTION INTRAMUSCULAR; INTRAVENOUS PRN
Status: DISCONTINUED | OUTPATIENT
Start: 2020-02-06 | End: 2020-02-06 | Stop reason: SDUPTHER

## 2020-02-06 RX ORDER — DEXTROSE MONOHYDRATE 50 MG/ML
100 INJECTION, SOLUTION INTRAVENOUS PRN
Status: DISCONTINUED | OUTPATIENT
Start: 2020-02-06 | End: 2020-02-07 | Stop reason: HOSPADM

## 2020-02-06 RX ORDER — MORPHINE SULFATE 2 MG/ML
2 INJECTION, SOLUTION INTRAMUSCULAR; INTRAVENOUS EVERY 5 MIN PRN
Status: DISCONTINUED | OUTPATIENT
Start: 2020-02-06 | End: 2020-02-06 | Stop reason: HOSPADM

## 2020-02-06 RX ORDER — SODIUM CHLORIDE 0.9 % (FLUSH) 0.9 %
10 SYRINGE (ML) INJECTION EVERY 12 HOURS SCHEDULED
Status: DISCONTINUED | OUTPATIENT
Start: 2020-02-06 | End: 2020-02-06 | Stop reason: HOSPADM

## 2020-02-06 RX ORDER — DEXTROSE MONOHYDRATE 25 G/50ML
INJECTION, SOLUTION INTRAVENOUS
Status: DISPENSED
Start: 2020-02-06 | End: 2020-02-06

## 2020-02-06 RX ORDER — ACETAMINOPHEN 325 MG/1
650 TABLET ORAL EVERY 4 HOURS PRN
Status: DISCONTINUED | OUTPATIENT
Start: 2020-02-06 | End: 2020-02-07 | Stop reason: HOSPADM

## 2020-02-06 RX ORDER — SODIUM CHLORIDE 0.9 % (FLUSH) 0.9 %
10 SYRINGE (ML) INJECTION EVERY 12 HOURS SCHEDULED
Status: DISCONTINUED | OUTPATIENT
Start: 2020-02-06 | End: 2020-02-06 | Stop reason: SDUPTHER

## 2020-02-06 RX ORDER — HYDROCODONE BITARTRATE AND ACETAMINOPHEN 5; 325 MG/1; MG/1
2 TABLET ORAL EVERY 4 HOURS PRN
Status: DISCONTINUED | OUTPATIENT
Start: 2020-02-06 | End: 2020-02-07 | Stop reason: HOSPADM

## 2020-02-06 RX ORDER — PRAZOSIN HYDROCHLORIDE 1 MG/1
2 CAPSULE ORAL NIGHTLY
Status: DISCONTINUED | OUTPATIENT
Start: 2020-02-06 | End: 2020-02-07 | Stop reason: HOSPADM

## 2020-02-06 RX ORDER — SODIUM CHLORIDE 9 MG/ML
INJECTION, SOLUTION INTRAVENOUS CONTINUOUS
Status: DISCONTINUED | OUTPATIENT
Start: 2020-02-06 | End: 2020-02-07

## 2020-02-06 RX ORDER — MORPHINE SULFATE 2 MG/ML
1 INJECTION, SOLUTION INTRAMUSCULAR; INTRAVENOUS EVERY 5 MIN PRN
Status: DISCONTINUED | OUTPATIENT
Start: 2020-02-06 | End: 2020-02-06 | Stop reason: HOSPADM

## 2020-02-06 RX ORDER — HEPARIN SODIUM 5000 [USP'U]/ML
5000 INJECTION, SOLUTION INTRAVENOUS; SUBCUTANEOUS 2 TIMES DAILY
Status: DISCONTINUED | OUTPATIENT
Start: 2020-02-06 | End: 2020-02-07 | Stop reason: HOSPADM

## 2020-02-06 RX ORDER — PROPOFOL 10 MG/ML
INJECTION, EMULSION INTRAVENOUS PRN
Status: DISCONTINUED | OUTPATIENT
Start: 2020-02-06 | End: 2020-02-06 | Stop reason: SDUPTHER

## 2020-02-06 RX ORDER — NICOTINE POLACRILEX 4 MG
15 LOZENGE BUCCAL PRN
Status: DISCONTINUED | OUTPATIENT
Start: 2020-02-06 | End: 2020-02-07 | Stop reason: HOSPADM

## 2020-02-06 RX ORDER — MIRTAZAPINE 15 MG/1
15 TABLET, FILM COATED ORAL NIGHTLY
Status: DISCONTINUED | OUTPATIENT
Start: 2020-02-06 | End: 2020-02-07 | Stop reason: HOSPADM

## 2020-02-06 RX ORDER — OXYCODONE HYDROCHLORIDE AND ACETAMINOPHEN 5; 325 MG/1; MG/1
2 TABLET ORAL PRN
Status: DISCONTINUED | OUTPATIENT
Start: 2020-02-06 | End: 2020-02-06 | Stop reason: HOSPADM

## 2020-02-06 RX ORDER — LIDOCAINE HYDROCHLORIDE 20 MG/ML
INJECTION, SOLUTION INFILTRATION; PERINEURAL PRN
Status: DISCONTINUED | OUTPATIENT
Start: 2020-02-06 | End: 2020-02-06 | Stop reason: SDUPTHER

## 2020-02-06 RX ORDER — ROCURONIUM BROMIDE 10 MG/ML
INJECTION, SOLUTION INTRAVENOUS PRN
Status: DISCONTINUED | OUTPATIENT
Start: 2020-02-06 | End: 2020-02-06 | Stop reason: SDUPTHER

## 2020-02-06 RX ORDER — MIDAZOLAM HYDROCHLORIDE 1 MG/ML
INJECTION INTRAMUSCULAR; INTRAVENOUS PRN
Status: DISCONTINUED | OUTPATIENT
Start: 2020-02-06 | End: 2020-02-06 | Stop reason: SDUPTHER

## 2020-02-06 RX ORDER — SODIUM CHLORIDE, SODIUM LACTATE, POTASSIUM CHLORIDE, CALCIUM CHLORIDE 600; 310; 30; 20 MG/100ML; MG/100ML; MG/100ML; MG/100ML
INJECTION, SOLUTION INTRAVENOUS CONTINUOUS
Status: DISCONTINUED | OUTPATIENT
Start: 2020-02-06 | End: 2020-02-06

## 2020-02-06 RX ORDER — HYDROCODONE BITARTRATE AND ACETAMINOPHEN 5; 325 MG/1; MG/1
1 TABLET ORAL EVERY 4 HOURS PRN
Status: DISCONTINUED | OUTPATIENT
Start: 2020-02-06 | End: 2020-02-07 | Stop reason: HOSPADM

## 2020-02-06 RX ORDER — LABETALOL HYDROCHLORIDE 5 MG/ML
10 INJECTION, SOLUTION INTRAVENOUS
Status: DISCONTINUED | OUTPATIENT
Start: 2020-02-06 | End: 2020-02-07 | Stop reason: HOSPADM

## 2020-02-06 RX ORDER — PANTOPRAZOLE SODIUM 40 MG/1
40 TABLET, DELAYED RELEASE ORAL
Status: DISCONTINUED | OUTPATIENT
Start: 2020-02-07 | End: 2020-02-07 | Stop reason: HOSPADM

## 2020-02-06 RX ORDER — DIPHENHYDRAMINE HYDROCHLORIDE 50 MG/ML
12.5 INJECTION INTRAMUSCULAR; INTRAVENOUS
Status: DISCONTINUED | OUTPATIENT
Start: 2020-02-06 | End: 2020-02-06 | Stop reason: HOSPADM

## 2020-02-06 RX ORDER — LIDOCAINE HYDROCHLORIDE 10 MG/ML
1 INJECTION, SOLUTION EPIDURAL; INFILTRATION; INTRACAUDAL; PERINEURAL
Status: DISCONTINUED | OUTPATIENT
Start: 2020-02-06 | End: 2020-02-06 | Stop reason: HOSPADM

## 2020-02-06 RX ORDER — LABETALOL HYDROCHLORIDE 5 MG/ML
5 INJECTION, SOLUTION INTRAVENOUS EVERY 10 MIN PRN
Status: DISCONTINUED | OUTPATIENT
Start: 2020-02-06 | End: 2020-02-06 | Stop reason: HOSPADM

## 2020-02-06 RX ADMIN — MIRTAZAPINE 15 MG: 15 TABLET, FILM COATED ORAL at 21:36

## 2020-02-06 RX ADMIN — ASPIRIN 81 MG: 81 TABLET, COATED ORAL at 17:22

## 2020-02-06 RX ADMIN — PRAZOSIN HYDROCHLORIDE 2 MG: 1 CAPSULE ORAL at 21:36

## 2020-02-06 RX ADMIN — DEXTROSE MONOHYDRATE 25 G: 25 INJECTION, SOLUTION INTRAVENOUS at 08:35

## 2020-02-06 RX ADMIN — MIDAZOLAM HYDROCHLORIDE 2 MG: 2 INJECTION, SOLUTION INTRAMUSCULAR; INTRAVENOUS at 10:54

## 2020-02-06 RX ADMIN — HEPARIN SODIUM 5000 UNITS: 5000 INJECTION INTRAVENOUS; SUBCUTANEOUS at 17:22

## 2020-02-06 RX ADMIN — PRAVASTATIN SODIUM 20 MG: 20 TABLET ORAL at 17:21

## 2020-02-06 RX ADMIN — SUGAMMADEX 200 MG: 100 INJECTION, SOLUTION INTRAVENOUS at 11:22

## 2020-02-06 RX ADMIN — ONDANSETRON 4 MG: 2 INJECTION INTRAMUSCULAR; INTRAVENOUS at 10:57

## 2020-02-06 RX ADMIN — FENTANYL CITRATE 100 MCG: 50 INJECTION INTRAMUSCULAR; INTRAVENOUS at 10:57

## 2020-02-06 RX ADMIN — CARVEDILOL 25 MG: 6.25 TABLET, FILM COATED ORAL at 17:21

## 2020-02-06 RX ADMIN — SODIUM CHLORIDE, POTASSIUM CHLORIDE, SODIUM LACTATE AND CALCIUM CHLORIDE: 600; 310; 30; 20 INJECTION, SOLUTION INTRAVENOUS at 10:54

## 2020-02-06 RX ADMIN — CEFAZOLIN 2 G: 1 INJECTION, POWDER, FOR SOLUTION INTRAMUSCULAR; INTRAVENOUS at 10:58

## 2020-02-06 RX ADMIN — PROPOFOL 170 MG: 10 INJECTION, EMULSION INTRAVENOUS at 10:57

## 2020-02-06 RX ADMIN — HYDROMORPHONE HYDROCHLORIDE 0.25 MG: 1 INJECTION, SOLUTION INTRAMUSCULAR; INTRAVENOUS; SUBCUTANEOUS at 12:19

## 2020-02-06 RX ADMIN — ROCURONIUM BROMIDE 50 MG: 10 SOLUTION INTRAVENOUS at 10:57

## 2020-02-06 RX ADMIN — LIDOCAINE HYDROCHLORIDE 20 MG: 20 INJECTION, SOLUTION INFILTRATION; PERINEURAL at 10:57

## 2020-02-06 RX ADMIN — SODIUM CHLORIDE 1000 ML: 9 INJECTION, SOLUTION INTRAVENOUS at 08:35

## 2020-02-06 RX ADMIN — NEPHROCAP 1 MG: 1 CAP ORAL at 17:21

## 2020-02-06 RX ADMIN — HEPARIN SODIUM 5000 UNITS: 5000 INJECTION INTRAVENOUS; SUBCUTANEOUS at 21:36

## 2020-02-06 RX ADMIN — SODIUM CHLORIDE 250 ML: 9 INJECTION, SOLUTION INTRAVENOUS at 17:21

## 2020-02-06 ASSESSMENT — PULMONARY FUNCTION TESTS
PIF_VALUE: 0
PIF_VALUE: 22
PIF_VALUE: 21
PIF_VALUE: 0
PIF_VALUE: 3
PIF_VALUE: 21
PIF_VALUE: 23
PIF_VALUE: 22
PIF_VALUE: 21
PIF_VALUE: 22
PIF_VALUE: 17
PIF_VALUE: 6
PIF_VALUE: 22
PIF_VALUE: 4
PIF_VALUE: 21
PIF_VALUE: 25
PIF_VALUE: 2
PIF_VALUE: 39
PIF_VALUE: 23
PIF_VALUE: 0
PIF_VALUE: 22
PIF_VALUE: 21
PIF_VALUE: 23
PIF_VALUE: 22
PIF_VALUE: 22
PIF_VALUE: 0
PIF_VALUE: 0
PIF_VALUE: 22
PIF_VALUE: 7
PIF_VALUE: 22
PIF_VALUE: 2
PIF_VALUE: 23
PIF_VALUE: 22
PIF_VALUE: 23
PIF_VALUE: 41
PIF_VALUE: 1
PIF_VALUE: 10
PIF_VALUE: 22
PIF_VALUE: 22
PIF_VALUE: 6
PIF_VALUE: 2
PIF_VALUE: 26
PIF_VALUE: 22
PIF_VALUE: 1
PIF_VALUE: 4

## 2020-02-06 ASSESSMENT — PAIN SCALES - GENERAL
PAINLEVEL_OUTOF10: 5
PAINLEVEL_OUTOF10: 0

## 2020-02-06 ASSESSMENT — PAIN - FUNCTIONAL ASSESSMENT: PAIN_FUNCTIONAL_ASSESSMENT: 0-10

## 2020-02-06 NOTE — PROGRESS NOTES
4 Eyes Skin Assessment     The patient is being assess for   Admission    I agree that 2 RN's have performed a thorough Head to Toe Skin Assessment on the patient. ALL assessment sites listed below have been assessed. Areas assessed by both nurses:   [x]   Head, Face, and Ears   [x]   Shoulders, Back, and Chest, Abdomen  [x]   Arms, Elbows, and Hands   [x]   Coccyx, Sacrum, and Ischium  [x]   Legs, Feet, and Heels        Open draining areas on left stump  **SHARE this note so that the co-signing nurse is able to place an eSignature**    Co-signer eSignature: {Esignature:[id number]}    Does the Patient have Skin Breakdown?   Yes LDA WOUND CARE was Initiated documentation include the Krysta-wound, Wound Assessment, Measurements, Dressing Treatment, Drainage, and Color\",          Freddie Prevention initiated:  Yes   Wound Care Orders initiated:  Yes      [address]  nurse consulted for Pressure Injury (Stage 3,4, Unstageable, DTI, NWPT, Complex wounds)and New or Established Ostomies:  No      Primary Nurse eSignature: Electronically signed by Shiv Escoto RN on 2/6/20 at 10:21 AM

## 2020-02-06 NOTE — ANESTHESIA POSTPROCEDURE EVALUATION
Department of Anesthesiology  Postprocedure Note    Patient: Pascual Mccracken  MRN: [id number]  YOB: 1945  Date of evaluation: 2/6/2020  Time:  1:52 PM     Procedure Summary     Date:  02/06/20 Room / Location:  [place] [address] ([facility]) / [facility]    Anesthesia Start:  1057 Anesthesia Stop:      Procedure:  NWYOWGLAXFF AND PLACEMENT OF WOUND VAC LEFT BELOW THE KNEE AMPUTATION SITE (Left Leg Lower) Diagnosis:       Non-healing surgical wound, initial encounter      (NON HEALING SURGICAL WOUND)    Surgeon: Gee Mclaughlin MD Responsible Provider:  Lyssa Sr MD    Anesthesia Type:  general ASA Status:  3          Anesthesia Type: general    Tr Phase I: Tr Score: 8    Tr Phase II:      Last vitals: Reviewed and per EMR flowsheets. Anesthesia Post Evaluation    Patient location during evaluation: PACU  Patient participation: complete - patient participated  Level of consciousness: awake and alert  Airway patency: patent  Nausea & Vomiting: no nausea and no vomiting  Complications: no  Cardiovascular status: blood pressure returned to baseline  Respiratory status: acceptable  Hydration status: euvolemic  Comments: VSS on transfer to phase 2 recovery. No anesthetic complications.

## 2020-02-06 NOTE — PROGRESS NOTES
Patient arrived to PACU. VSS. Report from Michael Covington and [address] [place] [address] CRNA.

## 2020-02-06 NOTE — PROGRESS NOTES
Gave report to [employer]. 4 Eyes Skin Assessment     The patient is being assess for   Post-Op Surgical    I agree that 2 RN's have performed a thorough Head to Toe Skin Assessment on the patient. ALL assessment sites listed below have been assessed. Areas assessed by both nurses:   []   Head, Face, and Ears   [x]   Shoulders, Back, and Chest, Abdomen  [x]   Arms, Elbows, and Hands   [x]   Coccyx, Sacrum, and Ischium  []   Legs, Feet, and Heels        No skin issues. **SHARE this note so that the co-signing nurse is able to place an eSignature**    Co-signer eSignature: Electronically signed by Jesus Yang RN on 2/6/20 at 4:13 PM    Does the Patient have Skin Breakdown?   {Blank single:75004::\"No\",\"Yes LDA WOUND CARE was Initiated documentation include the Krysta-wound, Wound Assessment, Measurements, Dressing Treatment, Drainage, and Color\",\"}          Freddie Prevention initiated:  {YES/NO:19732}   Wound Care Orders initiated:  {YES/NO:19732}      [facility] nurse consulted for Pressure Injury (Stage 3,4, Unstageable, DTI, NWPT, Complex wounds)and New or Established Ostomies:  {YES/NO:19732}      Primary Nurse eSignature: {Esignature:[id number]}

## 2020-02-06 NOTE — H&P
Hospital Medicine History & Physical      PCP: Macario Parada MD    Date of Admission: 2/6/2020    Date of Service: Pt seen/examined on 02/06/20. Epic showed that he was already in \"Inpatient\" status when I was asked to admit him. I went ahead and selected inpatient when I entered his admitting orders. Chief Complaint:  Wound not healing on stump      History Of Present Illness: The patient is a 75 Y M with a h/o HTN, HLD, DM2, ESRD on HD, CAD s/p stenting, PAD s/p BLE amputations and bypass grafting, Afib, and severe chronic anemia. He recently had a L TMA which didn't heal, required a L BKA on 12/9. The stump wound subsequently dehisced and became necrotic and he was referred to Dr. Brown [facility] clinic on 1/31. Arrangements were made for debridement on the OR on 2/6. Now he has had that debridement, surgery was successful, and he was placed on a wound vac. Vascular surgery has requested that hospital medicine admit the patient for postoperative care, blood transfusion, and general management of his many medical issues while case management is organizing his return to [facility] with ongoing HD. The patient currently denies complaints. He is still drowsy after anesthesia. He does open up his eyes and speak with be briefly.         Past Medical History:          Diagnosis Date    Anemia     Atrial fibrillation ([address] 11/4/2013    CAD (coronary artery disease)     Mi, stent    Chronic kidney disease     DVT (deep venous thrombosis) (HCC)     End stage renal disease (HCC)     Gas gangrene ([address]     Hemodialysis patient ([address]     M-W-F    Hx of blood clots     Hyperkalemia     Hyperlipidemia 5/30/2012    Hypertension     Hyperthyroidism     Ischemic heart disease 7/04/8693    Metabolic encephalopathy     MI (myocardial infarction) ([address] 10/2018    Type II or unspecified type diabetes mellitus without mention of complication, not stated as uncontrolled        Past Surgical History: Procedure Laterality Date    CARDIAC SURGERY      cardiac stent    FEMORAL-TIBIAL BYPASS GRAFT Right 1/9/2019    RIGHT FEMORAL POSTERIOR TIBIAL BYPASS performed by Anna Fernández MD at [address] [place] [address] Right 1/4/2019    INCISION AND DRAINAGE, TRANSMETATARSAL AMPUTATION ALL ON RIGHT FOOT performed by Jerrica Alvarado DPM at [address] [place] [address] Right 1/14/2019    REVISION OF TRANSMETATARSAL AMPUTATION RIGHT FOOT performed by Jerrica Alvarado DPM at [address] [place] [address] Left 08/24/2019    TMA    FOOT AMPUTATION Left 8/24/2019    LEFT FOOT TRANSMETATARSAL AMPUTATION performed by Richerd Nyhan, DPM at [address] [place] [address] Left 8/27/2019    REPEAT INCISION AND DRAINAGE, REVISION OF LEFT FOOT TRANSMETATARSAL AMPUTATION performed by Richerd Nyhan, DPM at [address] [place] [address] Left 10/11/2019    LEFT FOOT INCISION AND DRAINAGE WITH REVISION OF TRANSMETARSAL AMPUTATION WITH WOUND VAC APPLICATION  performed by Richerd Nyhan, DPM at [address] [place] [address] Right 1/18/2019    RIGHT BELOW THE KNEE AMPUTATION performed by Anna Fernández MD at [address] [place] [address] Left 12/9/2019    LEFT BELOW KNEE AMPUTATION performed by Anna Fernández MD at [address] [place] [address] [address]       Medications Prior to Admission:      Prior to Admission medications    Medication Sig Start Date End Date Taking?  Authorizing Provider   mirtazapine (REMERON) 15 MG tablet Take 15 mg by mouth nightly   Yes Historical Provider, MD   pantoprazole sodium (PROTONIX) 40 MG PACK packet Take 40 mg by mouth every morning (before breakfast)   Yes Historical Provider, MD   prazosin (MINIPRESS) 2 MG capsule Take 2 mg by mouth nightly   Yes Historical Provider, MD   B Complex-C-Folic Acid (JOSUÉ CAPS) 1 MG CAPS Take 1 mg by mouth daily   Yes Historical Provider, MD   pravastatin (PRAVACHOL) 20 MG tablet Take 20 mg by mouth daily   Yes Historical Provider, MD   carvedilol (COREG) 25 MG tablet Take 25 mg by mouth 2 times daily (with meals)   Yes Historical Provider, MD   insulin lispro (HUMALOG) 100 UNIT/ML injection vial Inject into the skin 3 times daily (before meals) SLIDING SCALE   Yes Historical Provider, MD   Petrolatum-Zinc Oxide (PHYTOPLEX Z-GUARD) 57-17 % PSTE Apply topically   Yes Historical Provider, MD   oxyCODONE-acetaminophen (PERCOCET) 5-325 MG per tablet Take 1 tablet by mouth every 4 hours as needed for Pain. Yes Historical Provider, MD   losartan (COZAAR) 25 MG tablet Take 1 tablet by mouth daily 12/13/19  Yes Candelaria Saba MD   aspirin 81 MG tablet Take 81 mg by mouth daily     Historical Provider, MD   nitroGLYCERIN (NITROSTAT) 0.4 MG SL tablet Place 0.4 mg under the tongue every 5 minutes as needed for Chest pain up to max of 3 total doses. If no relief after 1 dose, call 911. Historical Provider, MD       Allergies:  Patient has no known allergies. Social History:      The patient currently lives at home    TOBACCO:   reports that he has never smoked. He has never used smokeless tobacco.  ETOH:   reports previous alcohol use. E-Cigarettes Vaping or Juuling     Questions Responses    E-Cigarette Use Never User    Start Date     Does device contain nicotine? Quit Date     E-Cigarette Type             Family History:      Reviewed in detail and negative for ESRD. Positive as follows:        Problem Relation Age of Onset    Arthritis Mother        REVIEW OF SYSTEMS:   Pertinent positives as noted in the HPI. All other systems reviewed and negative. PHYSICAL EXAM PERFORMED:    BP (!) 155/67   Pulse 64   Temp 97.5 °F (36.4 °C) (Temporal)   Resp 16   Ht 6' 5\" (1.956 m)   Wt 185 lb (83.9 kg)   SpO2 94%   BMI 21.94 kg/m²     General appearance:  No apparent distress, appears stated age and cooperative. HEENT:  Normal cephalic, atraumatic without obvious deformity. Pupils equal, round, and reactive to light. Extra ocular muscles intact. Conjunctivae/corneas clear.   Neck: Supple, with full range of motion. No jugular venous distention. Trachea midline. Respiratory:  Normal respiratory effort. Clear to auscultation, bilaterally without Rales/Wheezes/Rhonchi. Cardiovascular:  Regular rate and rhythm with normal S1/S2 without murmurs, rubs or gallops. Abdomen: Soft, non-tender, non-distended with normal bowel sounds. Musculoskeletal:  No clubbing, cyanosis or edema bilaterally. BLE amputations, left stump has wound vac in place. Skin: Skin color, texture, turgor normal.  No rashes or lesions. Neurologic:  Neurovascularly intact without any focal sensory/motor deficits. Cranial nerves: II-XII intact, grossly non-focal.  Psychiatric:  Drowsy, limited insight  Capillary Refill: Brisk,< 3 seconds   Peripheral Pulses: +2 palpable, equal bilaterally       Labs:     Recent Labs     02/06/20  0823   WBC 5.5   HGB 6.7*   HCT 20.0*        Recent Labs     02/06/20  0823      K 4.5   CL 99   CO2 27   BUN 29*   CREATININE 4.9*   CALCIUM 8.9     No results for input(s): AST, ALT, BILIDIR, BILITOT, ALKPHOS in the last 72 hours. No results for input(s): INR in the last 72 hours. No results for input(s): Eulene Sarks in the last 72 hours. Urinalysis:      Lab Results   Component Value Date    NITRU NEGATIVE 04/17/2013    RBCUA 3-5 04/17/2013    BLOODU NEGATIVE 04/17/2013    SPECGRAV 1.015 04/17/2013    GLUCOSEU NEGATIVE 04/17/2013       Radiology:     CXR: I have reviewed the CXR with the following interpretation: none  EKG:  I have reviewed the EKG with the following interpretation: none    No orders to display       ASSESSMENT:    Active Hospital Problems    Diagnosis Date Noted    Necrosis of amputation stump ([facility] [T87.50] 02/06/2020    Dehiscence of closure of skin, sequela [T81.31XS] 02/06/2020         PLAN:    The patient is a 75 Y M with a h/o HTN, HLD, DM2, ESRD on HD, CAD s/p stenting, PAD s/p BLE amputations and bypass grafting, Afib, and severe chronic anemia.   He recently had a L TMA

## 2020-02-06 NOTE — ANESTHESIA PRE PROCEDURE
A41.9    Advance care planning Z71.89    Abscess or cellulitis of toe, left L03.032, L02.612    Gangrene of toe of left foot ([facility]) I96    PVD (peripheral vascular disease) ([facility]) I73.9    Type 2 diabetes mellitus, with long-term current use of insulin ([facility]) E11.9, Z79.4    Fever R50.9    Wound, open T14. 8XXA    Pseudomonas infection A49.8    Diabetic foot infection ([address] E11.628, L08.9    Surgical wound dehiscence T81.31XA    Surgical wound, non healing T81.89XA    Osteomyelitis ([address] M86.9       Past Medical History:        Diagnosis Date    Anemia     Atrial fibrillation ([address] 11/4/2013    CAD (coronary artery disease)     Mi, stent    Chronic kidney disease     DVT (deep venous thrombosis) ([facility])     End stage renal disease ([facility])     Gas gangrene ([address]     Hemodialysis patient ([address]     M-W-F    Hx of blood clots     Hyperkalemia     Hyperlipidemia 5/30/2012    Hypertension     Hyperthyroidism     Ischemic heart disease 6/10/7842    Metabolic encephalopathy     MI (myocardial infarction) ([address] 10/2018    Type II or unspecified type diabetes mellitus without mention of complication, not stated as uncontrolled        Past Surgical History:        Procedure Laterality Date    CARDIAC SURGERY      cardiac stent    FEMORAL-TIBIAL BYPASS GRAFT Right 1/9/2019    RIGHT FEMORAL POSTERIOR TIBIAL BYPASS performed by Filippo Harmon MD at [address] [place] [address] Right 1/4/2019    INCISION AND DRAINAGE, TRANSMETATARSAL AMPUTATION ALL ON RIGHT FOOT performed by Adán Silveira DPM at [address] [place] [address] Right 1/14/2019    REVISION OF TRANSMETATARSAL AMPUTATION RIGHT FOOT performed by Adán Silveira DPM at [address] [place] [address] Left 08/24/2019    TMA    FOOT AMPUTATION Left 8/24/2019    LEFT FOOT TRANSMETATARSAL AMPUTATION performed by Timmy Monroy DPM at [address] [place] [address] Left 8/27/2019    REPEAT INCISION AND DRAINAGE, REVISION OF LEFT FOOT TRANSMETATARSAL AMPUTATION

## 2020-02-06 NOTE — H&P
I have reviewed the history and physical (See note dated 1/31/2020) and examined the patient and find no relevant changes. I have reviewed with the patient and/or family the risks, benefits, and alternatives to the procedure.

## 2020-02-06 NOTE — PROGRESS NOTES
Received patient from pacu alert an oriented in no acute distress oriented to room and treatment plan monitor number [id number] verified witht he cmu. Bedalarm on bed and activated.

## 2020-02-06 NOTE — PROGRESS NOTES
[address] [facility] [phone] and left message stating that patient was going to be admitted overnight. Patient provided a phone number for Vandana Bhatia his girlfriend at [phone], but the number was no longer in service. Requested nursing home to contact next of kin ad no number is on file.

## 2020-02-07 VITALS
DIASTOLIC BLOOD PRESSURE: 73 MMHG | SYSTOLIC BLOOD PRESSURE: 170 MMHG | HEIGHT: 77 IN | OXYGEN SATURATION: 99 % | TEMPERATURE: 98.3 F | BODY MASS INDEX: 22.65 KG/M2 | HEART RATE: 63 BPM | WEIGHT: 191.8 LBS | RESPIRATION RATE: 14 BRPM

## 2020-02-07 LAB
ANION GAP SERPL CALCULATED.3IONS-SCNC: 14 MMOL/L (ref 3–16)
BUN BLDV-MCNC: 40 MG/DL (ref 7–20)
CALCIUM SERPL-MCNC: 8.8 MG/DL (ref 8.3–10.6)
CHLORIDE BLD-SCNC: 100 MMOL/L (ref 99–110)
CO2: 24 MMOL/L (ref 21–32)
CREAT SERPL-MCNC: 6.8 MG/DL (ref 0.8–1.3)
GFR AFRICAN AMERICAN: 10
GFR NON-AFRICAN AMERICAN: 8
GLUCOSE BLD-MCNC: 118 MG/DL (ref 70–99)
GLUCOSE BLD-MCNC: 146 MG/DL (ref 70–99)
GLUCOSE BLD-MCNC: 88 MG/DL (ref 70–99)
GLUCOSE BLD-MCNC: 96 MG/DL (ref 70–99)
HCT VFR BLD CALC: 22.8 % (ref 40.5–52.5)
HEMOGLOBIN: 7.7 G/DL (ref 13.5–17.5)
MCH RBC QN AUTO: 29.7 PG (ref 26–34)
MCHC RBC AUTO-ENTMCNC: 33.9 G/DL (ref 31–36)
MCV RBC AUTO: 87.5 FL (ref 80–100)
PDW BLD-RTO: 15 % (ref 12.4–15.4)
PERFORMED ON: ABNORMAL
PERFORMED ON: ABNORMAL
PERFORMED ON: NORMAL
PLATELET # BLD: 150 K/UL (ref 135–450)
PMV BLD AUTO: 9.5 FL (ref 5–10.5)
POTASSIUM REFLEX MAGNESIUM: 5.5 MMOL/L (ref 3.5–5.1)
RBC # BLD: 2.61 M/UL (ref 4.2–5.9)
SODIUM BLD-SCNC: 138 MMOL/L (ref 136–145)
WBC # BLD: 7.1 K/UL (ref 4–11)

## 2020-02-07 PROCEDURE — 85027 COMPLETE CBC AUTOMATED: CPT

## 2020-02-07 PROCEDURE — 80048 BASIC METABOLIC PNL TOTAL CA: CPT

## 2020-02-07 PROCEDURE — 2580000003 HC RX 258: Performed by: ANESTHESIOLOGY

## 2020-02-07 PROCEDURE — 97605 NEG PRS WND THER DME<=50SQCM: CPT

## 2020-02-07 PROCEDURE — 6370000000 HC RX 637 (ALT 250 FOR IP): Performed by: INTERNAL MEDICINE

## 2020-02-07 PROCEDURE — 5A1D70Z PERFORMANCE OF URINARY FILTRATION, INTERMITTENT, LESS THAN 6 HOURS PER DAY: ICD-10-PCS | Performed by: INTERNAL MEDICINE

## 2020-02-07 PROCEDURE — 6360000002 HC RX W HCPCS: Performed by: SURGERY

## 2020-02-07 PROCEDURE — 36415 COLL VENOUS BLD VENIPUNCTURE: CPT

## 2020-02-07 PROCEDURE — 90935 HEMODIALYSIS ONE EVALUATION: CPT

## 2020-02-07 RX ADMIN — PRAVASTATIN SODIUM 20 MG: 20 TABLET ORAL at 09:28

## 2020-02-07 RX ADMIN — CARVEDILOL 25 MG: 6.25 TABLET, FILM COATED ORAL at 09:28

## 2020-02-07 RX ADMIN — HEPARIN SODIUM 5000 UNITS: 5000 INJECTION INTRAVENOUS; SUBCUTANEOUS at 09:28

## 2020-02-07 RX ADMIN — NEPHROCAP 1 MG: 1 CAP ORAL at 09:28

## 2020-02-07 RX ADMIN — PANTOPRAZOLE SODIUM 40 MG: 40 TABLET, DELAYED RELEASE ORAL at 06:28

## 2020-02-07 RX ADMIN — ASPIRIN 81 MG: 81 TABLET, COATED ORAL at 09:28

## 2020-02-07 RX ADMIN — SODIUM CHLORIDE: 9 INJECTION, SOLUTION INTRAVENOUS at 01:01

## 2020-02-07 RX ADMIN — CARVEDILOL 25 MG: 6.25 TABLET, FILM COATED ORAL at 19:19

## 2020-02-07 RX ADMIN — LOSARTAN POTASSIUM 25 MG: 25 TABLET, FILM COATED ORAL at 09:28

## 2020-02-07 ASSESSMENT — PAIN SCALES - GENERAL: PAINLEVEL_OUTOF10: 0

## 2020-02-07 NOTE — FLOWSHEET NOTE
Treatment time: 3 hours  Net UF: 4000 ml    Pre weight: 91.1 kg   Post weight: 87 kg  EDW: 83 kg    Access used: Left arm AVF  Access function: Good with  ml/min    Medications or blood products given: no    Regular outpatient schedule: BENJAMÍN Summary of response to treatment: Pt tolerated well with HD, HD completed in full, Hemostasis less than 10 minutes, thrill and bruit are present. Copy of dialysis treatment record placed in chart, to be scanned into EMR.     02/07/20 1406 02/07/20 1717   Vital Signs   BP (!) 176/89 (!) 166/76   Temp 98.4 °F (36.9 °C) 98 °F (36.7 °C)   Pulse 83 65   Resp 20 16   Weight 200 lb 13.4 oz (91.1 kg) 191 lb 12.8 oz (87 kg)   Weight Method Actual;Bed scale  (1 pillow 1 sheet and 1 pad) Actual;Bed scale   Percent Weight Change -4.41 -4.5   Dry Weight 182 lb 15.7 oz (83 kg)  --    Post-Hemodialysis Assessment   Post-Treatment Procedures  --  Blood returned; Access bleeding time < 10 minutes   Machine Disinfection Process  --  Acid/Vinegar Clean;Heat Disinfect; Exterior Machine Disinfection   Rinseback Volume (ml)  --  300 ml   Total Liters Processed (l/min)  --  75.5 l/min   Dialyzer Clearance  --  Moderately streaked   Duration of Treatment (minutes)  --  180 minutes   Heparin amount administered during treatment (units)  --  0 units   Hemodialysis Intake (ml)  --  300 ml   Hemodialysis Output (ml)  --  4300 ml   NET Removed (ml)  --  4000 ml   Tolerated Treatment  --  Good

## 2020-02-07 NOTE — CONSULTS
[id number] [facility] Wound Ostomy Continence Nurse  Consult Note       NAME:  Gerald Lim  MEDICAL RECORD NUMBER:  [id number]  AGE: 75 y.o. GENDER: male  : 1945  TODAY'S DATE:  2020    Subjective  Does not hurt much   Reason for WOCN Evaluation and Assessment: wound vac changes      Gerald Lim is a 75 y.o. male referred by:   [x] Physician  [] Nursing  [] Other:     Wound Identification:  Wound Type: I & D to left BKA incision with application of  Negative pressure wound therapy (NPWT). Contributing Factors: decreased mobility, obesity and CAD    Wound History: Non healing Left BK stump. TO areas of skin edge necrosis and dehiscence. To OR on 20 for Debridement of skin and subcutaneous tissues of the left below-knee amputation stump and placement of a negative pressure wound dressing by Dr Pradip Valverde. Total excised tissue measured 40 cm2.   Current Wound Care Treatment:  NPWT    Patient Goal of Care:  [x] Wound Healing  [] Odor Control  [] Palliative Care  [] Pain Control   [] Other:         PAST MEDICAL HISTORY        Diagnosis Date    Anemia     Atrial fibrillation ([address] 2013    CAD (coronary artery disease)     Mi, stent    Chronic kidney disease     [facility]) ([facility])     End stage renal disease ([address]     Gas gangrene ([address]     Hemodialysis patient ([facility]     M-W-F    Hx of blood clots     Hyperkalemia     Hyperlipidemia 2012    Hypertension     Hyperthyroidism     Ischemic heart disease 485    Metabolic encephalopathy     MI (myocardial infarction) ([address] 10/2018    Type II or unspecified type diabetes mellitus without mention of complication, not stated as uncontrolled        PAST SURGICAL HISTORY    Past Surgical History:   Procedure Laterality Date    CARDIAC SURGERY      cardiac stent    FEMORAL-TIBIAL BYPASS GRAFT Right 2019    RIGHT FEMORAL POSTERIOR TIBIAL BYPASS performed by Ajith Larios MD at [facility] Right 2019 Complex-C-Folic Acid (JOSUÉ CAPS) 1 MG CAPS Take 1 mg by mouth daily      pravastatin (PRAVACHOL) 20 MG tablet Take 20 mg by mouth daily      carvedilol (COREG) 25 MG tablet Take 25 mg by mouth 2 times daily (with meals)      insulin lispro (HUMALOG) 100 UNIT/ML injection vial Inject into the skin 3 times daily (before meals) SLIDING SCALE      Petrolatum-Zinc Oxide (PHYTOPLEX Z-GUARD) 57-17 % PSTE Apply topically      losartan (COZAAR) 25 MG tablet Take 1 tablet by mouth daily 30 tablet 3    aspirin 81 MG tablet Take 81 mg by mouth daily       nitroGLYCERIN (NITROSTAT) 0.4 MG SL tablet Place 0.4 mg under the tongue every 5 minutes as needed for Chest pain up to max of 3 total doses. If no relief after 1 dose, call 911. Objective  Lying in bed. Left BK stump elevated on pillow. BP (!) 192/81   Pulse 66   Temp 98.1 °F (36.7 °C) (Oral)   Resp 17   Ht 6' 5\" (1.956 m)   Wt 210 lb 1.6 oz (95.3 kg)   SpO2 99%   BMI 24.91 kg/m²     LABS:  WBC:    Lab Results   Component Value Date    WBC 7.1 02/07/2020     H/H:    Lab Results   Component Value Date    HGB 7.7 02/07/2020    HCT 22.8 02/07/2020     PTT:    Lab Results   Component Value Date    APTT 28.2 01/09/2019   [APTT}  PT/INR:    Lab Results   Component Value Date    PROTIME 12.1 01/17/2020    INR 1.04 01/17/2020     HgBA1c:    Lab Results   Component Value Date    LABA1C 6.3 08/21/2019       Assessment  Wound bed red with granulation tissue. Slightly bleeding at 900pm edge.    Freddie Risk Score: Freddie Scale Score: 14    Patient Active Problem List   Diagnosis    HTN (hypertension)    Diabetes mellitus [facility]    Anemia    Diabetic retinopathy ([facility]    Cataracts, bilateral    Mixed hyperlipidemia    Atrial fibrillation ([facility]    [facility]    Ischemic heart disease    Acute anterolateral wall MI ([facility]    Acute ST elevation myocardial infarction (STEMI) involving left anterior descending (LAD) coronary artery (HCC)    CKD (chronic kidney disease) stage 4, GFR 15-29 ml/min ([facility])    DM (diabetes mellitus) type II uncontrolled with renal manifestation    Fungal infection of foot    Hemodialysis patient ([address]    Malignant essential hypertension    Right second toe ulcer, with necrosis of bone ([facility])    Hyperkalemia    CAD (coronary artery disease) s/p stent to LAD and RCA 10/2018    Acute metabolic encephalopathy    Pain in right foot    Somnolence    Encounter for palliative care    Advance directive discussed with patient    Gangrene of right foot ([address]    Gas gangrene ([address]    Sepsis due to pneumonia ([address]    Advance care planning    Abscess or cellulitis of toe, left    Gangrene of toe of left foot ([address]    PVD (peripheral vascular disease) ([address]    Type 2 diabetes mellitus, with long-term current use of insulin ([address]    Fever    Wound, open    Pseudomonas infection    Diabetic foot infection ([address]    Surgical wound dehiscence    Surgical wound, non healing    Osteomyelitis ([address]    Necrosis of amputation stump ([address]    Dehiscence of closure of skin, sequela       Measurements:  Negative Pressure Wound Therapy Leg Left; Lower (Active)   $ Standard NPWT <=50 sq cm PER TX $ Yes 2/7/2020 11:24 AM   Wound Type Surgical 2/7/2020 11:24 AM   Unit Type [facility] 2/7/2020 11:24 AM   Dressing Type Black foam 2/7/2020 11:24 AM   Number of pieces used 1 2/7/2020 11:24 AM   Cycle Continuous 2/7/2020 11:24 AM   Target Pressure (mmHg) 125 2/7/2020 11:24 AM   Intensity 1 2/7/2020 11:24 AM   Canister changed? No 2/7/2020 11:24 AM   Dressing Status Changed 2/7/2020 11:24 AM   Dressing Changed Changed/New 2/7/2020 11:24 AM   Drainage Amount Moderate 2/7/2020 11:24 AM   Drainage Description Sanguinous 2/7/2020 11:24 AM   Dressing Change Due 02/10/20 2/7/2020 11:24 AM   Wound Assessment Red;Granulation tissue 2/7/2020 11:24 AM   Krysta-wound Assessment Clean;Dry; Intact 2/7/2020 11:24 AM   Shape oval 2/7/2020 11:24 AM   Odor None 2/7/2020 11:24 AM   Number of days: 1     Incision 12/09/19 Tibial Left;Proximal;Other (Comment) (Active)   Number of days: 60       Incision 02/06/20 Pretibial Left (Active)   Wound Image   2/7/2020 11:24 AM   Wound Assessment Bleeding;Red 2/7/2020 11:24 AM   Krysta-wound Assessment Clean;Dry; Intact 2/7/2020 11:24 AM   Wound Length (cm) 3 cm 2/7/2020 11:24 AM   Wound Width (cm) 15 cm 2/7/2020 11:24 AM   Wound Depth (cm) 1 cm 2/7/2020 11:24 AM   Wound Volume (cm^3) 45 cm^3 2/7/2020 11:24 AM   Closure None 2/7/2020 11:24 AM   Drainage Amount Moderate 2/7/2020 11:24 AM   Drainage Description Sanguinous 2/7/2020 11:24 AM   Odor None 2/7/2020 11:24 AM   Dressing/Treatment Barrier film;Vacuum dressing 2/7/2020 11:24 AM   Dressing Changed Changed/New 2/7/2020 11:24 AM   Dressing Status Changed 2/7/2020 11:24 AM   Dressing Change Due 02/10/20 2/7/2020 11:24 AM   Number of days: 1     Left bka open incision line          Response to treatment:  Well tolerated by patient. Pain Assessment:  Severity:  0 / 10  Quality of pain: N/A  Wound Pain Timing/Severity: none  Premedicated: No    Plan   Plan of Care: Incision 02/06/20 Pretibial Left-Dressing/Treatment: Barrier film, Vacuum dressing    Current dressing removed. Large clot in the old dressing. Wound bed cleansed with normal saline. Krysta wound prepped with hydrocolloid and VAC drape dressings. 1 black sponge place over open wound. Covered with VAC drape. Connected to 125 mmHg low continuous [facility] [employer] VAC suction. Plan to change 3 times a week. Call for problems with seal.  Wound care to follow.     Specialty Bed Required : Yes   [] Low Air Loss   [x] Pressure Redistribution isoflex matress in place  [] Fluid Immersion  [] Bariatric  [] Total Pressure Relief  [] Other:     Current Diet: DIET CARB CONTROL; Carb Control: 4 carb choices (60 gms)/meal  Dietician consult:  Yes    Discharge Plan:  Placement for patient upon discharge: intermediate care facility return to [facility]    Patient appropriate for Outpatient [address]: Yes follow up with Dr Maggie Stevenson    Referrals:  []  / discharge planner - following, patient going back to SNF today after dialysis. [] [address] [facility]  [] Supplies  [] Other    Patient/Caregiver Teaching: Instructed on NPWT treatment.   Level of patient/caregiver understanding able to:   [x] Indicates understanding       [x] Needs reinforcement  [] Unsuccessful      [] Verbal Understanding  [] Demonstrated understanding       [] No evidence of learning  [] Refused teaching         [] N/A       Electronically signed by Jake Fontenot, RN, MSN, Rocio Arriola on 2/7/2020 at 11:27 AM

## 2020-02-07 NOTE — CONSULTS
detail. We are consulted for ESRD and related issues. Per note there is no appearance of infection. Interval History:      On normal saline  Blood pressure high    ROS:     Seen with- no family  Unable to obtain ROS due to does not talk much ,says fine        PMH/PSH/SH/Family History:     Past Medical History:   Diagnosis Date    Anemia     Atrial fibrillation ([facility] 11/4/2013    CAD (coronary artery disease)     Mi, stent    Chronic kidney disease     [facility] thrombosis) [facility]     End stage renal disease (HCC)     [facility] ([facility]     Hemodialysis patient ([facility]     M-W-F    Hx of blood clots     Hyperkalemia     Hyperlipidemia 5/30/2012    Hypertension     Hyperthyroidism     Ischemic heart disease 4/71/0375    Metabolic encephalopathy     MI (myocardial infarction) ([facility] 10/2018    Type II or unspecified type diabetes mellitus without mention of complication, not stated as uncontrolled        Past Surgical History:   Procedure Laterality Date    CARDIAC SURGERY      cardiac stent    FEMORAL-TIBIAL BYPASS GRAFT Right 1/9/2019    RIGHT FEMORAL POSTERIOR TIBIAL BYPASS performed by Mary Sosa MD at [address] [place] [address] Right 1/4/2019    INCISION AND DRAINAGE, TRANSMETATARSAL AMPUTATION ALL ON RIGHT FOOT performed by Ed Dumont DPM at [address] [place] [address] Right 1/14/2019    REVISION OF TRANSMETATARSAL AMPUTATION RIGHT FOOT performed by Ed Dumont DPM at [address] [place] [address] Left 08/24/2019    TMA    FOOT AMPUTATION Left 8/24/2019    LEFT FOOT TRANSMETATARSAL AMPUTATION performed by Simin Jackson DPM at [address] [place] [address] Left 8/27/2019    REPEAT INCISION AND DRAINAGE, REVISION OF LEFT FOOT TRANSMETATARSAL AMPUTATION performed by Simin Jackson DPM at [address] [place] [address] Left 10/11/2019    LEFT FOOT INCISION AND DRAINAGE WITH REVISION OF TRANSMETARSAL AMPUTATION WITH WOUND VAC APPLICATION  performed by Simin Jackson DPM at [address] [facility] [address]  LEG AMPUTATION BELOW KNEE Right 1/18/2019    RIGHT BELOW THE KNEE AMPUTATION performed by Carlita Hart MD at [address] Left 12/9/2019    LEFT BELOW KNEE AMPUTATION performed by Carlita Hart MD at [address]        reports that he has never smoked. He has never used smokeless tobacco. He reports previous alcohol use. He reports that he does not use drugs. family history includes Arthritis in his mother.          Medication:     Current Facility-Administered Medications: 0.9 % sodium chloride infusion, , Intravenous, Continuous  sodium chloride flush 0.9 % injection 10 mL, 10 mL, Intravenous, 2 times per day  sodium chloride flush 0.9 % injection 10 mL, 10 mL, Intravenous, PRN  acetaminophen (TYLENOL) tablet 650 mg, 650 mg, Oral, Q4H PRN  HYDROcodone-acetaminophen (NORCO) 5-325 MG per tablet 1 tablet, 1 tablet, Oral, Q4H PRN **OR** HYDROcodone-acetaminophen (NORCO) 5-325 MG per tablet 2 tablet, 2 tablet, Oral, Q4H PRN  morphine (PF) injection 2 mg, 2 mg, Intravenous, Q2H PRN **OR** morphine (PF) injection 4 mg, 4 mg, Intravenous, Q2H PRN  ondansetron (ZOFRAN) injection 4 mg, 4 mg, Intravenous, Q6H PRN  cloNIDine (CATAPRES) tablet 0.1 mg, 0.1 mg, Oral, Q2H PRN  labetalol (NORMODYNE;TRANDATE) injection 10 mg, 10 mg, Intravenous, Q2H PRN  heparin (porcine) injection 5,000 Units, 5,000 Units, Subcutaneous, BID  aspirin EC tablet 81 mg, 81 mg, Oral, Daily  b complex-C-folic acid (NEPHROCAPS) capsule 1 mg, 1 mg, Oral, Daily  carvedilol (COREG) tablet 25 mg, 25 mg, Oral, BID WC  losartan (COZAAR) tablet 25 mg, 25 mg, Oral, Daily  mirtazapine (REMERON) tablet 15 mg, 15 mg, Oral, Nightly  oxyCODONE-acetaminophen (PERCOCET) 5-325 MG per tablet 1 tablet, 1 tablet, Oral, Q4H PRN  pantoprazole (PROTONIX) tablet 40 mg, 40 mg, Oral, QAM AC  pravastatin (PRAVACHOL) tablet 20 mg, 20 mg, Oral, Daily  prazosin (MINIPRESS) capsule 2 mg, 2 mg, Oral, Nightly  magnesium hydroxide (MILK OF MAGNESIA) 400

## 2020-02-07 NOTE — DISCHARGE INSTR - COC
Continuity of Care Form    Patient Name: Gerald Findlast   :  1945  MRN:  [id number]    Admit date:  2020  Discharge date:  20      Code Status Order: Full Code   Advance Directives:   [facility] Documentation     Date/Time Healthcare Directive Type of Healthcare Directive Copy in [facility] [address] Agent's Name Healthcare Agent's Phone Number    [phone]  No, patient does not have an advance directive for healthcare treatment -- -- -- -- --          Admitting Physician:   eGneva Can MD  PCP: Mary Grace White MD    Discharging Nurse: [facility] Unit/Room#: 1302/1718-24  Discharging Unit Phone Number: ***    Emergency Contact:   Extended Emergency Contact Information  Primary Emergency Contact: [address] [place] [address] Phone: [phone]  Relation: Child  Secondary Emergency Contact: Jinny Schwarz   [address] Phone: [phone]  Relation: Domestic Partner    Past Surgical History:  Past Surgical History:   Procedure Laterality Date    CARDIAC SURGERY      cardiac stent    FEMORAL-TIBIAL BYPASS GRAFT Right 2019    RIGHT FEMORAL POSTERIOR TIBIAL BYPASS performed by Ajith Larios MD at [address] [place] [address] Right 2019    INCISION AND DRAINAGE, TRANSMETATARSAL AMPUTATION ALL ON RIGHT FOOT performed by Vaibhav Saeed DPM at [address] [place] [address] Right 2019    REVISION OF TRANSMETATARSAL AMPUTATION RIGHT FOOT performed by Vaibhav Saeed DPM at [address] [place] [address] Left 2019    TMA    FOOT AMPUTATION Left 2019    LEFT FOOT TRANSMETATARSAL AMPUTATION performed by Vinnie Kc DPM at [address] [place] [address] Left 2019    REPEAT INCISION AND DRAINAGE, REVISION OF LEFT FOOT TRANSMETATARSAL AMPUTATION performed by Vinnie Kc DPM at [address] [place] [address] Left 10/11/2019    LEFT FOOT INCISION AND DRAINAGE WITH REVISION OF [place] AMPUTATION WITH WOUND VAC APPLICATION  performed by Johanny Wylie DPM at [address] Right 1/18/2019    RIGHT BELOW THE KNEE AMPUTATION performed by Edwin Garcia MD at [address] Left 12/9/2019    LEFT BELOW KNEE AMPUTATION performed by Edwin Garcia MD at [address]       Immunization History:   Immunization History   Administered Date(s) Administered    Influenza Vaccine, unspecified formulation 10/05/2016    Influenza, High Dose (Fluzone 65 yrs and older) 10/05/2011, 10/07/2014, 11/17/2015, 10/15/2017, 10/09/2018    Pneumococcal Conjugate 13-valent ([id number]) 11/15/2016    Pneumococcal Polysaccharide ([id number]) 10/07/2014    Tdap (Boostrix, Adacel) 12/04/2018       Active Problems:  Patient Active Problem List   Diagnosis Code    HTN (hypertension) I10    Diabetes mellitus ([facility] E11.9    Anemia D64.9    Diabetic retinopathy ([facility] E11.319    Cataracts, bilateral H26.9    Mixed hyperlipidemia E78.2    Atrial fibrillation (HCC) I48.91    ESRD on dialysis ([facility] N18.6, Z99.2    Ischemic heart disease I25.9    Acute anterolateral wall MI ([facility]) I21.09    Acute ST elevation myocardial infarction (STEMI) involving left anterior descending (LAD) coronary artery ([facility]) I21.02    CKD (chronic kidney disease) stage 4, GFR 15-29 ml/min ([facility]) N18.4    DM (diabetes mellitus) type II uncontrolled with renal manifestation E11.29, E11.65    Fungal infection of foot B35.3    Hemodialysis patient ([facility] Z99.2    Malignant essential hypertension I10    Right second toe ulcer, with necrosis of bone ([facility]) L97.514    Hyperkalemia E87.5    CAD (coronary artery disease) s/p stent to LAD and RCA 10/2018 I25.10    Acute metabolic encephalopathy D05.66    Pain in right foot M79.671    Somnolence R40.0    Encounter for palliative care Z51.5    Advance directive discussed with patient Z71.89    Gangrene of right foot ([facility] I96    Gas gangrene ([facility] A48.0    Sepsis due to pneumonia ([facility] J18.9, A41.9    Advance care planning Z71.89    Abscess or cellulitis of toe, left L03.032, L02.612    Gangrene of toe of left foot ([facility]) I96    PVD (peripheral vascular disease) ([facility]) I73.9    Type 2 diabetes mellitus, with long-term current use of insulin ([facility]) E11.9, Z79.4    Fever R50.9    Wound, open T14. 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 10/12/19 10/11/19 Surgical Culture        MRSA 08/21/19 08/23/19 08/24/19 Body Fluid Culture              Nurse Assessment:  Last Vital Signs: BP (!) 142/74   Pulse 78   Temp 97.6 °F (36.4 °C) (Oral)   Resp 16   Ht 6' 5\" (1.956 m)   Wt 210 lb 1.6 oz (95.3 kg)   SpO2 96%   BMI 24.91 kg/m²     Last documented pain score (0-10 scale): Pain Level: 0  Last Weight:   Wt Readings from Last 1 Encounters:   02/07/20 210 lb 1.6 oz (95.3 kg)     Mental Status:  oriented and alert    IV Access:  - Dialysis Catheter  - site  L arm fistula, insertion date: ***    Nursing Mobility/ADLs:  Walking   Dependent  Transfer  Dependent  Bathing  Dependent  Dressing  Dependent  Toileting  Dependent  Feeding  Assisted  Med Admin  Assisted  Med Delivery   whole    Wound Care Documentation and Therapy:    Measurements:  Negative Pressure Wound Therapy Leg Left; Lower (Active)   $ Standard NPWT <=50 sq cm PER TX $ Yes 2/7/2020 11:24 AM   Wound Type Surgical 2/7/2020 11:24 AM   Unit Type [facility] 2/7/2020 11:24 AM   Dressing Type Black foam 2/7/2020 11:24 AM   Number of pieces used 1 2/7/2020 11:24 AM   Cycle Continuous 2/7/2020 11:24 AM   Target Pressure (mmHg) 125 2/7/2020 11:24 AM   Intensity 1 2/7/2020 11:24 AM   Canister changed? No 2/7/2020 11:24 AM   Dressing Status Changed 2/7/2020 11:24 AM   Dressing Changed Changed/New 2/7/2020 11:24 AM   Drainage Amount Moderate 2/7/2020 11:24 AM   Drainage Description Sanguinous 2/7/2020 11:24 AM   Dressing Change Due 02/10/20 2/7/2020 11:24 AM   Wound Assessment Red;Granulation tissue 2/7/2020 11:24 AM   Krysta-wound Assessment Clean;Dry; Intact 2/7/2020 11:24 AM   Shape oval 2/7/2020 11:24 AM   Odor None 2/7/2020 11:24 AM   Number of days: 1     Incision 12/09/19 Tibial Left;Proximal;Other (Comment) (Active)   Number of days: 60       Incision 02/06/20 Pretibial Left (Active)   Wound Image   2/7/2020 11:24 AM   Wound Assessment Bleeding;Red 2/7/2020 11:24 AM   Krysta-wound Assessment Clean;Dry; Intact 2/7/2020 11:24 AM   Wound Length (cm) 3 cm 2/7/2020 11:24 AM   Wound Width (cm) 15 cm 2/7/2020 11:24 AM   Wound Depth (cm) 1 cm 2/7/2020 11:24 AM   Wound Volume (cm^3) 45 cm^3 2/7/2020 11:24 AM   Closure None 2/7/2020 11:24 AM   Drainage Amount Moderate 2/7/2020 11:24 AM   Drainage Description Sanguinous 2/7/2020 11:24 AM   Odor None 2/7/2020 11:24 AM   Dressing/Treatment Barrier film;Vacuum dressing 2/7/2020 11:24 AM   Dressing Changed Changed/New 2/7/2020 11:24 AM   Dressing Status Changed 2/7/2020 11:24 AM   Dressing Change Due 02/10/20 2/7/2020 11:24 AM   Number of days: 1     Left bka open incision line       Elimination:  Continence:   · Bowel: Yes  · Bladder: Yes  Urinary Catheter: None   Colostomy/Ileostomy/Ileal Conduit: No       Date of Last BM:     Intake/Output Summary (Last 24 hours) at 2/7/2020 0729  Last data filed at 2/7/2020 0430  Gross per 24 hour   Intake 1371.67 ml   Output 0 ml   Net 1371.67 ml     I/O last 3 completed shifts: In: 1371.7 [P.O.:360; I.V.:750; Blood:261.7]  Out: 0     Safety Concerns:      At Risk for

## 2020-02-07 NOTE — DISCHARGE SUMMARY
Hospital Medicine Discharge Summary    Patient ID: Elle Grant      Patient's PCP: Estephania Beltran MD    Admit Date: 2/6/2020     Discharge Date:   02/07/20     Admitting Physician: Corinne Dalton, MD     Discharge Physician: Ebenezer Kate MD     Discharge Diagnoses: Active Hospital Problems    Diagnosis    Necrosis of amputation stump ([facility] [T87.50]    Dehiscence of closure of skin, sequela [T81.31XS]       The patient was seen and examined on day of discharge and this discharge summary is in conjunction with any daily progress note from day of discharge. Hospital Course: The patient is a 75 Y M with a h/o HTN, HLD, DM2, ESRD on HD, CAD s/p stenting, PAD s/p BLE amputations and bypass grafting, Afib, and severe chronic anemia. He recently had a L TMA which didn't heal, required a L BKA on 12/9. The stump wound subsequently dehisced and became necrotic and he was referred to Dr. Vicky Zelaya clinic on 1/31. Arrangements were made for debridement on the OR on 2/6. Now he has had that debridement, surgery was successful, and he was placed on a wound vac. Vascular surgery has requested that hospital medicine admit the patient for postoperative care, blood transfusion, and general management of his many medical issues while case management is organizing his return to [facility] with ongoing HD.        Dehiscence with necrotic stump wound  - debrided in OR on 2/6. Wound vac placed, will change dressing MWF per vascular surgery. There did not appear to be any infection. Continue to follow with vascular surgery as outpatient.      Acute on chronic normocytic anemia  - recent workup has suggested anemia of chronic disease. Required 1u pRBCs on 2/6. Hb responded well, monitor intermittently as outpatient.   No signs of ongoing blood loss.     DM2  - SSI while here     ESRD on HD  - nephrology consulted     CAD  - aspirin, statin, carvedilol     HTN  - prazosin, carvedilol, losartan     I see that he has a listed h/o Afib and DVT. He is not on anticoagulation. Details unknown. He does seem higher risk for bleeding given his chronically low Hb. Defer to outpatient management. EKGs seem to be all sinus per our records. Physical Exam Performed:     BP (!) 142/74   Pulse 78   Temp 97.6 °F (36.4 °C) (Oral)   Resp 16   Ht 6' 5\" (1.956 m)   Wt 210 lb 1.6 oz (95.3 kg)   SpO2 96%   BMI 24.91 kg/m²       General appearance:  No apparent distress, appears stated age and cooperative. HEENT:  Normal cephalic, atraumatic without obvious deformity. Pupils equal, round, and reactive to light. Extra ocular muscles intact. Conjunctivae/corneas clear. Neck: Supple, with full range of motion. No jugular venous distention. Trachea midline. Respiratory:  Normal respiratory effort. Clear to auscultation, bilaterally without Rales/Wheezes/Rhonchi. Cardiovascular:  Regular rate and rhythm with normal S1/S2 without murmurs, rubs or gallops. Abdomen: Soft, non-tender, non-distended with normal bowel sounds. Musculoskeletal:  No clubbing, cyanosis or edema bilaterally. BLE amputations, left stump has wound vac in place. Skin: Skin color, texture, turgor normal.  No rashes or lesions. Neurologic:  Neurovascularly intact without any focal sensory/motor deficits. Cranial nerves: II-XII intact, grossly non-focal.  Psychiatric:  alert, partially oriented, limited insight  Capillary Refill: Brisk,< 3 seconds   Peripheral Pulses: +2 palpable, equal bilaterally       Labs:  For convenience and continuity at follow-up the following most recent labs are provided:      CBC:    Lab Results   Component Value Date    WBC 7.1 02/07/2020    HGB 7.7 02/07/2020    HCT 22.8 02/07/2020     02/07/2020       Renal:    Lab Results   Component Value Date     02/07/2020    K 5.5 02/07/2020     02/07/2020    CO2 24 02/07/2020    BUN 40 02/07/2020    CREATININE 6.8 02/07/2020    CALCIUM 8.8 02/07/2020    PHOS 3.8 12/12/2019         Significant Diagnostic Studies    Radiology:   No orders to display          Consults:     IP CONSULT TO NEPHROLOGY    Disposition:  SNF     Condition at Discharge: Stable    Discharge Instructions/Follow-up:  Follow up with PCP within 1-2 weeks. Follow up with vascular surgery per their instructions. Code Status:  Full Code     Activity: activity as tolerated    Diet: renal diet      Discharge Medications:     Current Discharge Medication List           Details   mirtazapine (REMERON) 15 MG tablet Take 15 mg by mouth nightly      pantoprazole sodium (PROTONIX) 40 MG PACK packet Take 40 mg by mouth every morning (before breakfast)      prazosin (MINIPRESS) 2 MG capsule Take 2 mg by mouth nightly      B Complex-C-Folic Acid (JOSUÉ CAPS) 1 MG CAPS Take 1 mg by mouth daily      pravastatin (PRAVACHOL) 20 MG tablet Take 20 mg by mouth daily      carvedilol (COREG) 25 MG tablet Take 25 mg by mouth 2 times daily (with meals)      insulin lispro (HUMALOG) 100 UNIT/ML injection vial Inject into the skin 3 times daily (before meals) SLIDING SCALE      Petrolatum-Zinc Oxide (PHYTOPLEX Z-GUARD) 57-17 % PSTE Apply topically      losartan (COZAAR) 25 MG tablet Take 1 tablet by mouth daily  Qty: 30 tablet, Refills: 3      aspirin 81 MG tablet Take 81 mg by mouth daily       nitroGLYCERIN (NITROSTAT) 0.4 MG SL tablet Place 0.4 mg under the tongue every 5 minutes as needed for Chest pain up to max of 3 total doses. If no relief after 1 dose, call 911. Time Spent on discharge is more than 30 minutes in the examination, evaluation, counseling and review of medications and discharge plan. Signed:    Paz Muñoz MD   2/7/2020      Thank you Dale Cuba MD for the opportunity to be involved in this patient's care. If you have any questions or concerns please feel free to contact me at [phone].

## 2020-02-07 NOTE — CARE COORDINATION
CASE MANAGEMENT INITIAL ASSESSMENT      Reviewed chart and met with patient today, re: potential discharge needs   Explained Case Management role/services. Family present: none  Primary contact information: Leonid Mccarty [phone]    Admit date/status: 2/6/20 Inpatient   Diagnosis: non-healing surgical wound   Is this a Readmission?:  (If yes, why did you come back to the hospital? E.g. Alarming symptoms, medication issues, PCP recs)    Insurance: [facility] Medicare complete  Precert required for SNF - N  (due to patient long term at [facility])       3 night stay required -  N    Living arrangements, Adls, care needs, prior to admission: patient has been a long term resident at [facility], normally lives alone in an apartment     Transportation: [facility] transport to and from dialysis     [employer] at home: through facility     Services in the home and/or outpatient, prior to admission: through facility     Dialysis Facility (if applicable)   · Name: [facility] in [place]   · Address:  · Dialysis Schedule: M W F  · Phone:  · Fax:    PT/OT recs: none     Hospital Exemption Notification (HEN): not needed to return to [facility]     Barriers to discharge: none     Plan/comments: Patient normally lives alone in an apartment but has been at [facility] long term since last September. His plan is to discharge back at discharge. CASE MANAGEMENT DISCHARGE SUMMARY      Discharge to: [facility] completed: [address] Exemption Notification (HENS) completed: n/a     Transportation: ambulance    Medical Transport explained to pt/family. Pt/family voice no agency preference.     Agency used: [address] [place] [address],[address] up time: 8pm due to still needs to get dialysis prior to discharging    Ambulance form completed: Yes    Notified: patient aware of discharge plan    Facility/Agency: [facility]/AVS faxed to [phone]   RN: Magaly Jimenez aware of discharge plan    Phone number for report to facility: Susan Russ with [facility] aware of discharge plan and transport time. Note: Discharging nurse to complete NORA, reconcile AVS, and place final copy with patient's discharge packet. RN to ensure that written prescriptions for  Level II medications are sent with patient to the facility as per protocol.

## 2020-02-17 ENCOUNTER — TELEPHONE (OUTPATIENT)
Dept: PHARMACY | Facility: CLINIC | Age: 75
End: 2020-02-17

## 2020-06-16 ENCOUNTER — HOSPITAL ENCOUNTER (INPATIENT)
Age: 75
LOS: 2 days | Discharge: HOME | DRG: 682 | End: 2020-06-18
Attending: INTERNAL MEDICINE | Admitting: INTERNAL MEDICINE
Payer: COMMERCIAL

## 2020-06-16 LAB
BASOPHILS ABSOLUTE: 0.1 K/UL (ref 0–0.2)
BASOPHILS RELATIVE PERCENT: 1 %
EOSINOPHILS ABSOLUTE: 0.6 K/UL (ref 0–0.6)
EOSINOPHILS RELATIVE PERCENT: 8 %
GLUCOSE BLD-MCNC: 97 MG/DL (ref 70–99)
HCT VFR BLD CALC: 26 % (ref 40.5–52.5)
HEMOGLOBIN: 8.6 G/DL (ref 13.5–17.5)
INR BLD: 1.05 (ref 0.86–1.14)
LYMPHOCYTES ABSOLUTE: 2.1 K/UL (ref 1–5.1)
LYMPHOCYTES RELATIVE PERCENT: 30.9 %
MCH RBC QN AUTO: 30.1 PG (ref 26–34)
MCHC RBC AUTO-ENTMCNC: 33 G/DL (ref 31–36)
MCV RBC AUTO: 91.1 FL (ref 80–100)
MONOCYTES ABSOLUTE: 0.4 K/UL (ref 0–1.3)
MONOCYTES RELATIVE PERCENT: 6.5 %
NEUTROPHILS ABSOLUTE: 3.7 K/UL (ref 1.7–7.7)
NEUTROPHILS RELATIVE PERCENT: 53.6 %
PDW BLD-RTO: 13.9 % (ref 12.4–15.4)
PERFORMED ON: NORMAL
PLATELET # BLD: 176 K/UL (ref 135–450)
PMV BLD AUTO: 8.8 FL (ref 5–10.5)
PROTHROMBIN TIME: 12.2 SEC (ref 10–13.2)
RBC # BLD: 2.85 M/UL (ref 4.2–5.9)
WBC # BLD: 6.9 K/UL (ref 4–11)

## 2020-06-16 PROCEDURE — G0378 HOSPITAL OBSERVATION PER HR: HCPCS

## 2020-06-16 PROCEDURE — U0003 INFECTIOUS AGENT DETECTION BY NUCLEIC ACID (DNA OR RNA); SEVERE ACUTE RESPIRATORY SYNDROME CORONAVIRUS 2 (SARS-COV-2) (CORONAVIRUS DISEASE [COVID-19]), AMPLIFIED PROBE TECHNIQUE, MAKING USE OF HIGH THROUGHPUT TECHNOLOGIES AS DESCRIBED BY CMS-2020-01-R: HCPCS

## 2020-06-16 PROCEDURE — 2580000003 HC RX 258: Performed by: INTERNAL MEDICINE

## 2020-06-16 PROCEDURE — 80053 COMPREHEN METABOLIC PANEL: CPT

## 2020-06-16 PROCEDURE — 6370000000 HC RX 637 (ALT 250 FOR IP): Performed by: STUDENT IN AN ORGANIZED HEALTH CARE EDUCATION/TRAINING PROGRAM

## 2020-06-16 PROCEDURE — 85025 COMPLETE CBC W/AUTO DIFF WBC: CPT

## 2020-06-16 PROCEDURE — 85610 PROTHROMBIN TIME: CPT

## 2020-06-16 RX ORDER — SEVELAMER HYDROCHLORIDE 800 MG/1
1600 TABLET, FILM COATED ORAL
Status: ON HOLD | COMMUNITY
End: 2021-12-03 | Stop reason: HOSPADM

## 2020-06-16 RX ORDER — PRAZOSIN HYDROCHLORIDE 1 MG/1
2 CAPSULE ORAL NIGHTLY
Status: DISCONTINUED | OUTPATIENT
Start: 2020-06-16 | End: 2020-06-18 | Stop reason: HOSPADM

## 2020-06-16 RX ORDER — ACETAMINOPHEN 325 MG/1
650 TABLET ORAL EVERY 6 HOURS PRN
Status: DISCONTINUED | OUTPATIENT
Start: 2020-06-16 | End: 2020-06-18 | Stop reason: HOSPADM

## 2020-06-16 RX ORDER — INSULIN LISPRO 100 [IU]/ML
0-6 INJECTION, SOLUTION INTRAVENOUS; SUBCUTANEOUS
Status: DISCONTINUED | OUTPATIENT
Start: 2020-06-17 | End: 2020-06-18 | Stop reason: HOSPADM

## 2020-06-16 RX ORDER — INSULIN LISPRO 100 [IU]/ML
0-3 INJECTION, SOLUTION INTRAVENOUS; SUBCUTANEOUS NIGHTLY
Status: DISCONTINUED | OUTPATIENT
Start: 2020-06-16 | End: 2020-06-16

## 2020-06-16 RX ORDER — PROMETHAZINE HYDROCHLORIDE 25 MG/1
12.5 TABLET ORAL EVERY 6 HOURS PRN
Status: DISCONTINUED | OUTPATIENT
Start: 2020-06-16 | End: 2020-06-18 | Stop reason: HOSPADM

## 2020-06-16 RX ORDER — SODIUM CHLORIDE 0.9 % (FLUSH) 0.9 %
10 SYRINGE (ML) INJECTION EVERY 12 HOURS SCHEDULED
Status: DISCONTINUED | OUTPATIENT
Start: 2020-06-16 | End: 2020-06-16 | Stop reason: SDUPTHER

## 2020-06-16 RX ORDER — FOLIC ACID 1 MG/1
1 TABLET ORAL DAILY
Status: ON HOLD | COMMUNITY
End: 2022-02-03 | Stop reason: HOSPADM

## 2020-06-16 RX ORDER — LANOLIN ALCOHOL/MO/W.PET/CERES
1000 CREAM (GRAM) TOPICAL DAILY
Status: ON HOLD | COMMUNITY
End: 2022-02-03 | Stop reason: HOSPADM

## 2020-06-16 RX ORDER — DEXTROSE MONOHYDRATE 25 G/50ML
12.5 INJECTION, SOLUTION INTRAVENOUS PRN
Status: DISCONTINUED | OUTPATIENT
Start: 2020-06-16 | End: 2020-06-18 | Stop reason: HOSPADM

## 2020-06-16 RX ORDER — DEXTROSE MONOHYDRATE 50 MG/ML
100 INJECTION, SOLUTION INTRAVENOUS PRN
Status: DISCONTINUED | OUTPATIENT
Start: 2020-06-16 | End: 2020-06-18 | Stop reason: HOSPADM

## 2020-06-16 RX ORDER — INSULIN LISPRO 100 [IU]/ML
0-3 INJECTION, SOLUTION INTRAVENOUS; SUBCUTANEOUS NIGHTLY
Status: DISCONTINUED | OUTPATIENT
Start: 2020-06-16 | End: 2020-06-18 | Stop reason: HOSPADM

## 2020-06-16 RX ORDER — NICOTINE POLACRILEX 4 MG
15 LOZENGE BUCCAL PRN
Status: DISCONTINUED | OUTPATIENT
Start: 2020-06-16 | End: 2020-06-18 | Stop reason: HOSPADM

## 2020-06-16 RX ORDER — FERROUS SULFATE 325(65) MG
325 TABLET ORAL
Status: ON HOLD | COMMUNITY
End: 2022-02-03 | Stop reason: HOSPADM

## 2020-06-16 RX ORDER — PANTOPRAZOLE SODIUM 40 MG/1
40 TABLET, DELAYED RELEASE ORAL
Status: DISCONTINUED | OUTPATIENT
Start: 2020-06-17 | End: 2020-06-18 | Stop reason: HOSPADM

## 2020-06-16 RX ORDER — INSULIN LISPRO 100 [IU]/ML
0-6 INJECTION, SOLUTION INTRAVENOUS; SUBCUTANEOUS EVERY 6 HOURS
Status: DISCONTINUED | OUTPATIENT
Start: 2020-06-16 | End: 2020-06-16

## 2020-06-16 RX ORDER — ACETAMINOPHEN 650 MG/1
650 SUPPOSITORY RECTAL EVERY 6 HOURS PRN
Status: DISCONTINUED | OUTPATIENT
Start: 2020-06-16 | End: 2020-06-18 | Stop reason: HOSPADM

## 2020-06-16 RX ORDER — MIRTAZAPINE 15 MG/1
15 TABLET, FILM COATED ORAL NIGHTLY
Status: DISCONTINUED | OUTPATIENT
Start: 2020-06-16 | End: 2020-06-18 | Stop reason: HOSPADM

## 2020-06-16 RX ORDER — SODIUM CHLORIDE 0.9 % (FLUSH) 0.9 %
10 SYRINGE (ML) INJECTION PRN
Status: DISCONTINUED | OUTPATIENT
Start: 2020-06-16 | End: 2020-06-18 | Stop reason: HOSPADM

## 2020-06-16 RX ORDER — SERTRALINE HYDROCHLORIDE 25 MG/1
25 TABLET, FILM COATED ORAL DAILY
Status: ON HOLD | COMMUNITY
End: 2021-10-07

## 2020-06-16 RX ORDER — INSULIN LISPRO 100 [IU]/ML
0-6 INJECTION, SOLUTION INTRAVENOUS; SUBCUTANEOUS
Status: DISCONTINUED | OUTPATIENT
Start: 2020-06-17 | End: 2020-06-16

## 2020-06-16 RX ORDER — CHOLECALCIFEROL (VITAMIN D3) 10 MCG
1 TABLET ORAL DAILY
Status: DISCONTINUED | OUTPATIENT
Start: 2020-06-16 | End: 2020-06-18 | Stop reason: HOSPADM

## 2020-06-16 RX ORDER — SODIUM CHLORIDE 0.9 % (FLUSH) 0.9 %
10 SYRINGE (ML) INJECTION 2 TIMES DAILY
Status: DISCONTINUED | OUTPATIENT
Start: 2020-06-16 | End: 2020-06-18 | Stop reason: HOSPADM

## 2020-06-16 RX ORDER — ONDANSETRON 2 MG/ML
4 INJECTION INTRAMUSCULAR; INTRAVENOUS EVERY 6 HOURS PRN
Status: DISCONTINUED | OUTPATIENT
Start: 2020-06-16 | End: 2020-06-18 | Stop reason: HOSPADM

## 2020-06-16 RX ORDER — SODIUM CHLORIDE 0.9 % (FLUSH) 0.9 %
10 SYRINGE (ML) INJECTION PRN
Status: DISCONTINUED | OUTPATIENT
Start: 2020-06-16 | End: 2020-06-16 | Stop reason: SDUPTHER

## 2020-06-16 RX ORDER — PRAVASTATIN SODIUM 20 MG
20 TABLET ORAL DAILY
Status: DISCONTINUED | OUTPATIENT
Start: 2020-06-17 | End: 2020-06-18 | Stop reason: HOSPADM

## 2020-06-16 RX ORDER — POLYETHYLENE GLYCOL 3350 17 G/17G
17 POWDER, FOR SOLUTION ORAL DAILY PRN
Status: DISCONTINUED | OUTPATIENT
Start: 2020-06-16 | End: 2020-06-18 | Stop reason: HOSPADM

## 2020-06-16 RX ADMIN — PRAZOSIN HYDROCHLORIDE 2 MG: 1 CAPSULE ORAL at 22:53

## 2020-06-16 RX ADMIN — Medication 10 ML: at 20:03

## 2020-06-16 RX ADMIN — MIRTAZAPINE 15 MG: 15 TABLET, FILM COATED ORAL at 22:53

## 2020-06-16 ASSESSMENT — ENCOUNTER SYMPTOMS
ROS SKIN COMMENTS: BILATERAL BKA
NAUSEA: 0
COLOR CHANGE: 0
DIARRHEA: 0
RHINORRHEA: 0
CONSTIPATION: 0
PHOTOPHOBIA: 0
SHORTNESS OF BREATH: 0
SINUS PRESSURE: 0
CHEST TIGHTNESS: 0
COUGH: 0
ABDOMINAL PAIN: 0
SINUS PAIN: 0
VOMITING: 1

## 2020-06-16 ASSESSMENT — PAIN SCALES - GENERAL
PAINLEVEL_OUTOF10: 0

## 2020-06-17 PROBLEM — D64.9 ACUTE ON CHRONIC ANEMIA: Status: ACTIVE | Noted: 2020-06-17

## 2020-06-17 LAB
A/G RATIO: 0.8 (ref 1.1–2.2)
ABO/RH: NORMAL
ALBUMIN SERPL-MCNC: 3.2 G/DL (ref 3.4–5)
ALBUMIN SERPL-MCNC: 3.2 G/DL (ref 3.4–5)
ALBUMIN SERPL-MCNC: 3.4 G/DL (ref 3.4–5)
ALP BLD-CCNC: 132 U/L (ref 40–129)
ALT SERPL-CCNC: 19 U/L (ref 10–40)
ANION GAP SERPL CALCULATED.3IONS-SCNC: 12 MMOL/L (ref 3–16)
ANION GAP SERPL CALCULATED.3IONS-SCNC: 13 MMOL/L (ref 3–16)
ANION GAP SERPL CALCULATED.3IONS-SCNC: 13 MMOL/L (ref 3–16)
ANTIBODY SCREEN: NORMAL
AST SERPL-CCNC: 22 U/L (ref 15–37)
BASOPHILS ABSOLUTE: 0.1 K/UL (ref 0–0.2)
BASOPHILS RELATIVE PERCENT: 1 %
BILIRUB SERPL-MCNC: <0.2 MG/DL (ref 0–1)
BUN BLDV-MCNC: 36 MG/DL (ref 7–20)
BUN BLDV-MCNC: 39 MG/DL (ref 7–20)
BUN BLDV-MCNC: 49 MG/DL (ref 7–20)
CALCIUM SERPL-MCNC: 9.2 MG/DL (ref 8.3–10.6)
CALCIUM SERPL-MCNC: 9.7 MG/DL (ref 8.3–10.6)
CALCIUM SERPL-MCNC: 9.9 MG/DL (ref 8.3–10.6)
CHLORIDE BLD-SCNC: 99 MMOL/L (ref 99–110)
CO2: 24 MMOL/L (ref 21–32)
CO2: 26 MMOL/L (ref 21–32)
CO2: 27 MMOL/L (ref 21–32)
CREAT SERPL-MCNC: 7.2 MG/DL (ref 0.8–1.3)
CREAT SERPL-MCNC: 7.8 MG/DL (ref 0.8–1.3)
CREAT SERPL-MCNC: 9 MG/DL (ref 0.8–1.3)
EOSINOPHILS ABSOLUTE: 0.4 K/UL (ref 0–0.6)
EOSINOPHILS RELATIVE PERCENT: 7.3 %
FERRITIN: 2091 NG/ML (ref 30–400)
FERRITIN: 2157 NG/ML (ref 30–400)
GFR AFRICAN AMERICAN: 7
GFR AFRICAN AMERICAN: 8
GFR AFRICAN AMERICAN: 9
GFR NON-AFRICAN AMERICAN: 6
GFR NON-AFRICAN AMERICAN: 7
GFR NON-AFRICAN AMERICAN: 7
GLOBULIN: 4.4 G/DL
GLUCOSE BLD-MCNC: 111 MG/DL (ref 70–99)
GLUCOSE BLD-MCNC: 85 MG/DL (ref 70–99)
GLUCOSE BLD-MCNC: 90 MG/DL (ref 70–99)
GLUCOSE BLD-MCNC: 91 MG/DL (ref 70–99)
GLUCOSE BLD-MCNC: 91 MG/DL (ref 70–99)
HCT VFR BLD CALC: 24.2 % (ref 40.5–52.5)
HEMOGLOBIN: 8 G/DL (ref 13.5–17.5)
IRON SATURATION: 48 % (ref 20–50)
IRON: 75 UG/DL (ref 59–158)
LYMPHOCYTES ABSOLUTE: 1.7 K/UL (ref 1–5.1)
LYMPHOCYTES RELATIVE PERCENT: 27.7 %
MAGNESIUM: 2.3 MG/DL (ref 1.8–2.4)
MCH RBC QN AUTO: 30 PG (ref 26–34)
MCHC RBC AUTO-ENTMCNC: 32.9 G/DL (ref 31–36)
MCV RBC AUTO: 91 FL (ref 80–100)
MONOCYTES ABSOLUTE: 0.4 K/UL (ref 0–1.3)
MONOCYTES RELATIVE PERCENT: 6.9 %
NEUTROPHILS ABSOLUTE: 3.5 K/UL (ref 1.7–7.7)
NEUTROPHILS RELATIVE PERCENT: 57.1 %
PARATHYROID HORMONE INTACT: 616.6 PG/ML (ref 14–72)
PDW BLD-RTO: 14.1 % (ref 12.4–15.4)
PERFORMED ON: ABNORMAL
PERFORMED ON: NORMAL
PHOSPHORUS: 4.6 MG/DL (ref 2.5–4.9)
PHOSPHORUS: 4.9 MG/DL (ref 2.5–4.9)
PLATELET # BLD: 171 K/UL (ref 135–450)
PMV BLD AUTO: 8.8 FL (ref 5–10.5)
POTASSIUM SERPL-SCNC: 5.2 MMOL/L (ref 3.5–5.1)
POTASSIUM SERPL-SCNC: 5.3 MMOL/L (ref 3.5–5.1)
POTASSIUM SERPL-SCNC: 6 MMOL/L (ref 3.5–5.1)
RBC # BLD: 2.66 M/UL (ref 4.2–5.9)
REPORT: NORMAL
SARS-COV-2: NOT DETECTED
SODIUM BLD-SCNC: 136 MMOL/L (ref 136–145)
SODIUM BLD-SCNC: 138 MMOL/L (ref 136–145)
SODIUM BLD-SCNC: 138 MMOL/L (ref 136–145)
THIS TEST SENT TO: NORMAL
TOTAL IRON BINDING CAPACITY: 155 UG/DL (ref 260–445)
TOTAL PROTEIN: 7.8 G/DL (ref 6.4–8.2)
WBC # BLD: 6.2 K/UL (ref 4–11)

## 2020-06-17 PROCEDURE — 2580000003 HC RX 258: Performed by: STUDENT IN AN ORGANIZED HEALTH CARE EDUCATION/TRAINING PROGRAM

## 2020-06-17 PROCEDURE — 82607 VITAMIN B-12: CPT

## 2020-06-17 PROCEDURE — 83970 ASSAY OF PARATHORMONE: CPT

## 2020-06-17 PROCEDURE — 83550 IRON BINDING TEST: CPT

## 2020-06-17 PROCEDURE — 83735 ASSAY OF MAGNESIUM: CPT

## 2020-06-17 PROCEDURE — 86901 BLOOD TYPING SEROLOGIC RH(D): CPT

## 2020-06-17 PROCEDURE — 86850 RBC ANTIBODY SCREEN: CPT

## 2020-06-17 PROCEDURE — 36415 COLL VENOUS BLD VENIPUNCTURE: CPT

## 2020-06-17 PROCEDURE — 83540 ASSAY OF IRON: CPT

## 2020-06-17 PROCEDURE — 1200000000 HC SEMI PRIVATE

## 2020-06-17 PROCEDURE — 80069 RENAL FUNCTION PANEL: CPT

## 2020-06-17 PROCEDURE — 82746 ASSAY OF FOLIC ACID SERUM: CPT

## 2020-06-17 PROCEDURE — 85025 COMPLETE CBC W/AUTO DIFF WBC: CPT

## 2020-06-17 PROCEDURE — 5A1D70Z PERFORMANCE OF URINARY FILTRATION, INTERMITTENT, LESS THAN 6 HOURS PER DAY: ICD-10-PCS | Performed by: INTERNAL MEDICINE

## 2020-06-17 PROCEDURE — 93005 ELECTROCARDIOGRAM TRACING: CPT | Performed by: STUDENT IN AN ORGANIZED HEALTH CARE EDUCATION/TRAINING PROGRAM

## 2020-06-17 PROCEDURE — 86900 BLOOD TYPING SEROLOGIC ABO: CPT

## 2020-06-17 PROCEDURE — 6370000000 HC RX 637 (ALT 250 FOR IP): Performed by: INTERNAL MEDICINE

## 2020-06-17 PROCEDURE — 82728 ASSAY OF FERRITIN: CPT

## 2020-06-17 PROCEDURE — 6370000000 HC RX 637 (ALT 250 FOR IP): Performed by: STUDENT IN AN ORGANIZED HEALTH CARE EDUCATION/TRAINING PROGRAM

## 2020-06-17 RX ORDER — CARVEDILOL 6.25 MG/1
6.25 TABLET ORAL 2 TIMES DAILY WITH MEALS
Status: DISCONTINUED | OUTPATIENT
Start: 2020-06-17 | End: 2020-06-18 | Stop reason: HOSPADM

## 2020-06-17 RX ORDER — LOSARTAN POTASSIUM 25 MG/1
25 TABLET ORAL DAILY
Status: DISCONTINUED | OUTPATIENT
Start: 2020-06-17 | End: 2020-06-18 | Stop reason: HOSPADM

## 2020-06-17 RX ADMIN — PANTOPRAZOLE SODIUM 40 MG: 40 TABLET, DELAYED RELEASE ORAL at 09:13

## 2020-06-17 RX ADMIN — CARVEDILOL 6.25 MG: 6.25 TABLET, FILM COATED ORAL at 12:25

## 2020-06-17 RX ADMIN — PRAVASTATIN SODIUM 20 MG: 20 TABLET ORAL at 09:13

## 2020-06-17 RX ADMIN — Medication 10 ML: at 09:13

## 2020-06-17 RX ADMIN — NEPHROCAP 1 MG: 1 CAP ORAL at 09:13

## 2020-06-17 ASSESSMENT — PAIN SCALES - GENERAL
PAINLEVEL_OUTOF10: 0
PAINLEVEL_OUTOF10: 0

## 2020-06-17 ASSESSMENT — VISUAL ACUITY: OU: 1

## 2020-06-17 NOTE — DISCHARGE INSTR - COC
Continuity of Care Form    Patient Name: Joaquin Pastrana   :  1945  MRN:  [id number]    Admit date:  2020  Discharge date:  ***    Code Status Order: Full Code   Advance Directives:   Advance Care Flowsheet Documentation     Date/Time Healthcare Directive Type of Healthcare Directive Copy in [address] Agent's Name Healthcare Agent's Phone Number    20  No, patient does not have an advance directive for healthcare treatment -- -- -- -- --          Admitting Physician:  Chandler Cooks, MD  PCP: Torey Langford MD    Discharging Nurse: [facility] Unit/Room#: 7436/6896-79  Discharging Unit Phone Number: ***    Emergency Contact:   Extended Emergency Contact Information  Primary Emergency Contact: [address] [place] [address] Phone: [phone]  Relation: Child  Secondary Emergency Contact: Jinny Schwarz   [address] Phone: [phone]  Relation: Domestic Partner    Past Surgical History:  Past Surgical History:   Procedure Laterality Date    CARDIAC SURGERY      cardiac stent    FEMORAL-TIBIAL BYPASS GRAFT Right 2019    RIGHT FEMORAL POSTERIOR TIBIAL BYPASS performed by Candance Scale, MD at [address] [place] [address] Right 2019    INCISION AND DRAINAGE, TRANSMETATARSAL AMPUTATION ALL ON RIGHT FOOT performed by Yusef Finney DPM at [address] [place] [address] Right 2019    REVISION OF TRANSMETATARSAL AMPUTATION RIGHT FOOT performed by Yusef Finney DPM at [address] [place] [address] Left 2019    TMA    FOOT AMPUTATION Left 2019    LEFT FOOT TRANSMETATARSAL AMPUTATION performed by Roly Roy DPM at [address] [place] [address] Left 2019    REPEAT INCISION AND DRAINAGE, REVISION OF LEFT FOOT TRANSMETATARSAL AMPUTATION performed by Roly Roy DPM at [address] [place] [address] Left 10/11/2019    LEFT FOOT INCISION AND DRAINAGE WITH REVISION OF TRANSMETARSAL AMPUTATION WITH Discharging to Facility/ [address]: [facility]        [place] [address]       Phone: [phone]       Fax: [phone]        Dialysis Facility (if applicable) NA  · Name:  · Address:  · Dialysis Schedule:  · Phone:  · Fax:    / signature: Electronically signed by Deni Barrios RN on 6/18/20 at 10:13 AM EDT    PHYSICIAN SECTION    Prognosis: Good    Condition at Discharge: Stable    Rehab Potential (if transferring to Rehab): Good    Recommended Labs or Other Treatments After Discharge:   - H/H during dialysis for 2 occurences to ensure Hgb stays stable  - F/u with Urology outpt for known renal mass  - Calcitriol 0.25mg on dialysis days  - Aranesp q. weekly with dialysis    Physician Certification: I certify the above information and transfer of Agata Stahl  is necessary for the continuing treatment of the diagnosis listed and that he requires [place] for greater 30 days.      Update Admission H&P: No change in H&P    PHYSICIAN SIGNATURE:  Electronically signed by Monty Campos MD and Naomy Nice MD on 6/18/20 at 3:33 PM EDT

## 2020-06-17 NOTE — CONSULTS
[facility]    [facility]              [phone]                       Plan :       Dialysis arranged for today. Rest as below     Assessment :           1. ESRD:  Will plan HD Monday, Wednesday and Friday at [facility]. Access: AV fistula  Daily weights  Fluid restriction: 1 L  Nephrocap 1 tab PO daily    2. Anemia:  Erythropoetin dose: We will start Aranesp q. weekly with dialysis. Goal hemoglobin between 10-12. Transfuse if hemoglobin is less than 7. He is on iron supplements. Other work-up including GI bleed is in progress by admitting team.    3.  Osteodystrophy:  Phosphate Binder:  He is not on binders. I will check phosphorus, PTH.    4.  Hypertension: Continue Minipress and losartan. Restart low-dose carvedilol. 5. Renal Imaging: CT abdomen 1/19-4.2 cm left kidney lesion possibly hemorrhagic cyst,  Repeat CT scan on 10/19-confirmed hemorrhagic cyst, but has 3.4 x 3.3 cm lesion arising from right kidney demonstrating mild enhancement favored to reflect renal cell carcinoma     Seen by Dr Iliana Underwood, urology, followed by sonogram, and plan for follow up visits every 6 months. Urology consulted again inpatient. [facility] would like to thank Kumar Pace MD   for opportunity to serve this patient      Please call with questions at-   24 Hrs Answering service [phone] or  7 am- 5 pm via Perfect serve or cell phone  Dana Lopez          CC/reason for consult :     ESRD on hemodialysis     HPI :     Sandeep Solorio is a 75 y.o. male presented to   the hospital on 6/16/2020 with anemia. He has past medical history of ESRD on hemodialysis Monday, Wednesday and Friday at [facility], diabetes, hypertension, PVD status post BKA and is resident of nursing home. His last dialysis was on Monday via AV fistula. Patient was sent to [facility] for hemoglobin of 7.3.   He had an episode of vomiting but denies any melena, BUN 36* 39*   CREATININE 7.2* 7.8*   GLUCOSE 91 85   MG  --  2.30   PHOS  --  4.6      Nephrology  Blayne Moreau [address] [facility], [address] [place] [address]  Office: [phone]  Cell: [phone]  Fax: [phone]

## 2020-06-17 NOTE — PROGRESS NOTES
Progress Note    Admit Date: 6/16/2020  Day: 2  Diet: DIET RENAL; Carb Control: 3 carb choices (45 gms)/meal    CC: Low hemoglobin    Interval history: Pt is AAOx3 lying in bed on his side. She states he is more comfortable that way. He had no acute events overnight and states he feels at his baseline and asking when if he can leave today. He does not appear to be oriented to situation because when told about the kidney mass w/u, he states he does not know that he has a kidney mass, though overnight mentioned it to him. At that time also, he mentioned he did not know about the mass. He says he rarely makes urine but when he does, he does not know the color because he's in a diaper. He also reports not knowing the color of him BMs but denies F/C HA, dizziness, CP/SOB/cough, palpitations, abd pain N/V, bowel or urinary symptoms. Medications:     Scheduled Meds:   sodium chloride flush  10 mL Intravenous BID    b complex-C-folic acid  1 mg Oral Daily    mirtazapine  15 mg Oral Nightly    pantoprazole  40 mg Oral QAM AC    pravastatin  20 mg Oral Daily    prazosin  2 mg Oral Nightly    insulin lispro  0-6 Units Subcutaneous TID WC    insulin lispro  0-3 Units Subcutaneous Nightly     Continuous Infusions:   dextrose       PRN Meds:sodium chloride flush, glucose, dextrose, glucagon (rDNA), dextrose, acetaminophen **OR** acetaminophen, polyethylene glycol, promethazine **OR** ondansetron    Objective:   Vitals:   T-max:  Patient Vitals for the past 8 hrs:   BP Temp Temp src Pulse Resp SpO2 Weight   06/17/20 0512 (!) 165/65 99.4 °F (37.4 °C) Oral 69 18 97 % 205 lb 11 oz (93.3 kg)       Intake/Output Summary (Last 24 hours) at 6/17/2020 0755  Last data filed at 6/17/2020 0513  Gross per 24 hour   Intake 300 ml   Output --   Net 300 ml       Review of Systems  Pertinent positives and negatives in interval history above    Physical Exam  Constitutional:       General: He is not in acute distress.      Appearance:

## 2020-06-17 NOTE — H&P
kidney disease     [facility]) [facility]     [facility]     Gas gangrene ([facility]     Hemodialysis patient ([facility]     M-W-F    Hx of blood clots     Hyperkalemia     Hyperlipidemia 5/30/2012    Hypertension     Hyperthyroidism     Ischemic heart disease 4/49/4752    Metabolic encephalopathy     MI (myocardial infarction) ([facility] 10/2018    Type II or unspecified type diabetes mellitus without mention of complication, not stated as uncontrolled      Past Surgical History:   Procedure Laterality Date    CARDIAC SURGERY      cardiac stent    FEMORAL-TIBIAL BYPASS GRAFT Right 1/9/2019    RIGHT FEMORAL POSTERIOR TIBIAL BYPASS performed by Maye Avila MD at [address] [place] [address] Right 1/4/2019    INCISION AND DRAINAGE, TRANSMETATARSAL AMPUTATION ALL ON RIGHT FOOT [facility] at [address] [place] [address] Right 1/14/2019    REVISION OF TRANSMETATARSAL AMPUTATION RIGHT FOOT performed by Aurea Tracy DPM at [address] [place] [address] Left 08/24/2019    TMA    FOOT AMPUTATION Left 8/24/2019    LEFT FOOT TRANSMETATARSAL AMPUTATION performed by Sharona Garcia DPM at [address] [place] [address] Left 8/27/2019    REPEAT INCISION AND DRAINAGE, REVISION OF LEFT FOOT TRANSMETATARSAL AMPUTATION performed by Sharona Garcia DPM at [address] [place] [address] Left 10/11/2019    LEFT FOOT INCISION AND DRAINAGE WITH REVISION OF TRANSMETARSAL AMPUTATION WITH WOUND VAC APPLICATION  performed by Sharona Garcia DPM at [address] [place] [address] Right 1/18/2019    RIGHT BELOW THE KNEE AMPUTATION performed by Maye Avila MD at [address] [place] [address] Left 12/9/2019    LEFT BELOW KNEE AMPUTATION performed by Maye [facility]ila MD at [facility] 2/6/2020    DEBRIDEMENT AND PLACEMENT OF WOUND VAC LEFT BELOW THE KNEE AMPUTATION SITE performed by Marino Ashby MD at [place] [address]       Medications Prior to Admission:    Prior to Admission medications    Medication Sig Start Date End Date Taking? Authorizing Provider   sevelamer hcl (RENAGEL) 800 MG tablet Take 1,600 mg by mouth 3 times daily (with meals)    Historical Provider, MD   sertraline (ZOLOFT) 25 MG tablet Take 25 mg by mouth daily    Historical Provider, MD   folic acid (FOLVITE) 1 MG tablet Take 1 mg by mouth daily    Historical Provider, MD   ferrous sulfate (IRON 325) 325 (65 Fe) MG tablet Take 325 mg by mouth daily (with breakfast)    Historical Provider, MD   vitamin B-12 (CYANOCOBALAMIN) 1000 MCG tablet Take 1,000 mcg by mouth daily    Historical Provider, MD   mirtazapine (REMERON) 15 MG tablet Take 15 mg by mouth nightly    Historical Provider, MD   pantoprazole sodium (PROTONIX) 40 MG PACK packet Take 40 mg by mouth every morning (before breakfast)    Historical Provider, MD   prazosin (MINIPRESS) 2 MG capsule Take 2 mg by mouth nightly    Historical Provider, MD   B Complex-C-Folic Acid (JOSUÉ CAPS) 1 MG CAPS Take 1 mg by mouth daily    Historical Provider, MD   pravastatin (PRAVACHOL) 20 MG tablet Take 20 mg by mouth daily    Historical Provider, MD   carvedilol (COREG) 25 MG tablet Take 25 mg by mouth 2 times daily (with meals)    Historical Provider, MD   insulin lispro (HUMALOG) 100 UNIT/ML injection vial Inject into the skin 3 times daily (before meals) SLIDING SCALE    Historical Provider, MD   Petrolatum-Zinc Oxide (PHYTOPLEX Z-GUARD) 57-17 % PSTE Apply topically    Historical Provider, MD   losartan (COZAAR) 25 MG tablet Take 1 tablet by mouth daily 12/13/19   Altamese Shallow, MD   aspirin 81 MG tablet Take 81 mg by mouth daily     Historical Provider, MD   nitroGLYCERIN (NITROSTAT) 0.4 MG SL tablet Place 0.4 mg under the tongue every 5 minutes as needed for Chest pain up to max of 3 total doses. If no relief after 1 dose, call 911. Historical Provider, MD       Allergies:  Patient has no known allergies. Social History:   TOBACCO:   reports that he has never smoked.  He Breath sounds: Normal breath sounds. No wheezing, rhonchi or rales. Abdominal:      General: Abdomen is flat. Bowel sounds are normal. There is no distension. Palpations: Abdomen is soft. Tenderness: There is no abdominal tenderness. There is no guarding or rebound. Musculoskeletal:      Comments: Bilateral BKA   Skin:     General: Skin is warm and dry. Coloration: Skin is not jaundiced. Neurological:      General: No focal deficit present. Mental Status: He is alert. Mental status is at baseline. LABS:    Lab Results   Component Value Date    PHART 7.453 08/22/2019    EZO9FTW 35.7 08/22/2019    PO2ART 88.4 08/22/2019       Urinalysis:  Lab Results   Component Value Date    NITRU NEGATIVE 04/17/2013    RBCUA 3-5 04/17/2013    BLOODU NEGATIVE 04/17/2013    SPECGRAV 1.015 04/17/2013    GLUCOSEU NEGATIVE 04/17/2013       Radiology:     No orders to display         Assessment & Plan:      Anemia - does not appear to be far from patient's baseline (approx 7-8). Patient is asymptomatic. No obvious source of bleeding. Previous iron studies consistent with anemia of chronic disease.   - check CBC, PT/INR  - FOBT  - type and screen, may require transfusion  - continue home protonix (hx gastritis)  - hold ASA   - patient occasionally makes urine, will check UA    Renal Mass - 3.5 cm mass upper pole right kidney, concerning for RCC. Per chart, patient was planning to have outpatient workup, but on discussion with patient he did not seem aware of this.    - urology consulted     T2DM  - LDSSI  - accuchecks AC/HS  - hypoglycemia protocol    ESRD on HD  - nephrology consulted    HTN  - continue home prizess  - hold losartan, coreg    CAD  - holding coreg & ASA  - continue statin    Code Status:  Full Code  F/E/N:  DIET RENAL; Carb Control: 3 carb choices (45 gms)/meal  DVT Prophylaxis:  Holding DVT ppx given anemia, unable to place SCDs due to bilateral BKA  Disposition:  GMF  PT/OT Eval Status: Consulted     I will discuss the patient with the MD Daphne Pereira MD  Internal Medicine Resident, PGY-2      ------------------------------------------------------------------------------------    Attending [phone] Statement  I have seen, examined and evaluated the patient as did the resident physician on 06/16/2020. We have discussed the plan of care and decisions made during that discussion were incorporated into this note. I have reviewed the resident physician's note and agree with the assessment and plan of care as documented. Pt supposedly sent over from nursing facility for low Hb. Pt denies any symptoms at this point. Recheck Hb. Transfuse if less then 7. Consulted nephrology as pt is ESRD on dialysis. Urology consult for renal mass. PT/OT consult.       Rick Flood MD

## 2020-06-17 NOTE — PROGRESS NOTES
Patient in bed resting comfortably. Respirations steady and unlabored. No signs of respiratory or cardiac distress. No complaints of pain at this time. No needs at this time. Call light in reach and bed alarm in place. Will continue to monitor.   Electronically signed by Amanda Zhang RN on 6/17/2020 at 2:16 AM

## 2020-06-17 NOTE — CARE COORDINATION
Case Management Assessment           Initial Evaluation                Date / Time of Evaluation: 6/17/2020 12:16 PM                 Assessment Completed by: Bebo Holland    Patient Name: Julienne Jara     YOB: 1945  Diagnosis: Anemia [D64.9]  Anemia [D64.9]     Date / Time: 6/16/2020  7:38 PM    Patient Admission Status: Observation    If patient is discharged prior to next notation, then this note serves as note for discharge by case management.      Current PCP: Regan Gordillo MD  Clinic Patient: No    Chart Reviewed: Yes  Patient/ Family Interviewed: Yes    Initial assessment completed at bedside with: daughters Lopez Marquez and Sparkle Lal over the phone    Hospitalization in the last 30 days: No    Emergency Contacts:  Extended Emergency Contact Information  Primary Emergency Contact: [address] [place] [address] Phone: [phone]  Relation: Child  Secondary Emergency Contact: Jinny Schwarz   [address] Phone: [phone]  Relation: Domestic Partner    Advance Directives:   Code Status: Full Code        Financial  Payor: King Aviles / Plan: [place] / Product Type: *No Product type* /     Pre-cert required for SNF: Yes    Pharmacy    [facility] [address] [address] [address] [facility]  [address] [address]  Phone: [phone] Fax: [phone]    [facility] [address] [place] [facility], [address]) [phone] [facility] [phone]  [address] [place] [address]  Phone: [phone] Fax: [phone]    [address]  [address]  Phone: [phone] Fax: [phone]      Potential assistance Purchasing Medications: Potential Assistance Purchasing Medications: No  Does Patient want to participate in local refill/ meds to beds program?: No    Meds To Beds

## 2020-06-17 NOTE — PROGRESS NOTES
4 Eyes Admission Assessment     I agree as the admission nurse that 2 RN's have performed a thorough Head to Toe Skin Assessment on the patient. ALL assessment sites listed below have been assessed on admission. Areas assessed by both nurses:   []   Head, Face, and Ears   []   Shoulders, Back, and Chest  []   Arms, Elbows, and Hands   []   Coccyx, Sacrum, and Ischum  []   Legs, Feet, and Heels        Does the Patient have Skin Breakdown?   Yes a wound was noted on the Admission Assessment and an LDA was Initiated documentation include the Krysta-wound, Wound Assessment, Measurements, Dressing Treatment, Drainage, and Color\",         Freddie Prevention initiated:  Yes   Wound Care Orders initiated:  No      WOC nurse consulted for Pressure Injury (Stage 3,4, Unstageable, DTI, NWPT, and Complex wounds):  No      Nurse 1 eSignature: Electronically signed by Josie Bravo RN on 6/16/20 at 9:03 PM EDT    **SHARE this note so that the co-signing nurse is able to place an eSignature**    Nurse 2 eSignature: Electronically signed by Mai Pop RN on 6/16/20 at 8:22 PM EDT

## 2020-06-18 ENCOUNTER — APPOINTMENT (OUTPATIENT)
Dept: ULTRASOUND IMAGING | Age: 75
DRG: 682 | End: 2020-06-18
Attending: INTERNAL MEDICINE
Payer: COMMERCIAL

## 2020-06-18 VITALS
RESPIRATION RATE: 16 BRPM | TEMPERATURE: 99.2 F | OXYGEN SATURATION: 98 % | BODY MASS INDEX: 25.86 KG/M2 | DIASTOLIC BLOOD PRESSURE: 71 MMHG | HEIGHT: 73 IN | WEIGHT: 195.11 LBS | SYSTOLIC BLOOD PRESSURE: 137 MMHG | HEART RATE: 79 BPM

## 2020-06-18 LAB
ALBUMIN SERPL-MCNC: 3.3 G/DL (ref 3.4–5)
ANION GAP SERPL CALCULATED.3IONS-SCNC: 11 MMOL/L (ref 3–16)
BASOPHILS ABSOLUTE: 0.1 K/UL (ref 0–0.2)
BASOPHILS RELATIVE PERCENT: 1.1 %
BUN BLDV-MCNC: 16 MG/DL (ref 7–20)
CALCIUM SERPL-MCNC: 9.4 MG/DL (ref 8.3–10.6)
CHLORIDE BLD-SCNC: 101 MMOL/L (ref 99–110)
CO2: 24 MMOL/L (ref 21–32)
CREAT SERPL-MCNC: 4.4 MG/DL (ref 0.8–1.3)
EOSINOPHILS ABSOLUTE: 0.5 K/UL (ref 0–0.6)
EOSINOPHILS RELATIVE PERCENT: 7 %
FOLATE: >20 NG/ML (ref 4.78–24.2)
GFR AFRICAN AMERICAN: 16
GFR NON-AFRICAN AMERICAN: 13
GLUCOSE BLD-MCNC: 85 MG/DL (ref 70–99)
GLUCOSE BLD-MCNC: 86 MG/DL (ref 70–99)
GLUCOSE BLD-MCNC: 89 MG/DL (ref 70–99)
GLUCOSE BLD-MCNC: 99 MG/DL (ref 70–99)
HCT VFR BLD CALC: 24 % (ref 40.5–52.5)
HEMOGLOBIN: 7.9 G/DL (ref 13.5–17.5)
IRON SATURATION: 49 % (ref 20–50)
IRON: 74 UG/DL (ref 59–158)
LYMPHOCYTES ABSOLUTE: 1.3 K/UL (ref 1–5.1)
LYMPHOCYTES RELATIVE PERCENT: 20.1 %
MCH RBC QN AUTO: 30.3 PG (ref 26–34)
MCHC RBC AUTO-ENTMCNC: 33.1 G/DL (ref 31–36)
MCV RBC AUTO: 91.4 FL (ref 80–100)
MONOCYTES ABSOLUTE: 0.4 K/UL (ref 0–1.3)
MONOCYTES RELATIVE PERCENT: 6.4 %
NEUTROPHILS ABSOLUTE: 4.3 K/UL (ref 1.7–7.7)
NEUTROPHILS RELATIVE PERCENT: 65.4 %
OCCULT BLOOD DIAGNOSTIC: NORMAL
PDW BLD-RTO: 13.6 % (ref 12.4–15.4)
PERFORMED ON: NORMAL
PHOSPHORUS: 2.9 MG/DL (ref 2.5–4.9)
PLATELET # BLD: 160 K/UL (ref 135–450)
PMV BLD AUTO: 8.7 FL (ref 5–10.5)
POTASSIUM SERPL-SCNC: 4.2 MMOL/L (ref 3.5–5.1)
RBC # BLD: 2.62 M/UL (ref 4.2–5.9)
SODIUM BLD-SCNC: 136 MMOL/L (ref 136–145)
TOTAL IRON BINDING CAPACITY: 150 UG/DL (ref 260–445)
VITAMIN B-12: 1280 PG/ML (ref 211–911)
WBC # BLD: 6.6 K/UL (ref 4–11)

## 2020-06-18 PROCEDURE — 6370000000 HC RX 637 (ALT 250 FOR IP): Performed by: STUDENT IN AN ORGANIZED HEALTH CARE EDUCATION/TRAINING PROGRAM

## 2020-06-18 PROCEDURE — 85025 COMPLETE CBC W/AUTO DIFF WBC: CPT

## 2020-06-18 PROCEDURE — G0328 FECAL BLOOD SCRN IMMUNOASSAY: HCPCS

## 2020-06-18 PROCEDURE — 6370000000 HC RX 637 (ALT 250 FOR IP): Performed by: INTERNAL MEDICINE

## 2020-06-18 PROCEDURE — 2580000003 HC RX 258: Performed by: STUDENT IN AN ORGANIZED HEALTH CARE EDUCATION/TRAINING PROGRAM

## 2020-06-18 PROCEDURE — 80069 RENAL FUNCTION PANEL: CPT

## 2020-06-18 RX ORDER — CALCITRIOL 0.25 UG/1
0.25 CAPSULE, LIQUID FILLED ORAL
Status: DISCONTINUED | OUTPATIENT
Start: 2020-06-19 | End: 2020-06-18 | Stop reason: HOSPADM

## 2020-06-18 RX ORDER — CALCITRIOL 0.25 UG/1
0.25 CAPSULE, LIQUID FILLED ORAL EVERY OTHER DAY
Qty: 30 CAPSULE | Refills: 3 | Status: ON HOLD | OUTPATIENT
Start: 2020-06-18 | End: 2021-10-07

## 2020-06-18 RX ADMIN — PRAZOSIN HYDROCHLORIDE 2 MG: 1 CAPSULE ORAL at 00:01

## 2020-06-18 RX ADMIN — CARVEDILOL 6.25 MG: 6.25 TABLET, FILM COATED ORAL at 08:54

## 2020-06-18 RX ADMIN — LOSARTAN POTASSIUM 25 MG: 25 TABLET, FILM COATED ORAL at 00:08

## 2020-06-18 RX ADMIN — MIRTAZAPINE 15 MG: 15 TABLET, FILM COATED ORAL at 00:01

## 2020-06-18 RX ADMIN — PRAVASTATIN SODIUM 20 MG: 20 TABLET ORAL at 08:54

## 2020-06-18 RX ADMIN — PANTOPRAZOLE SODIUM 40 MG: 40 TABLET, DELAYED RELEASE ORAL at 06:16

## 2020-06-18 RX ADMIN — Medication 10 ML: at 00:08

## 2020-06-18 RX ADMIN — CARVEDILOL 6.25 MG: 6.25 TABLET, FILM COATED ORAL at 00:01

## 2020-06-18 RX ADMIN — Medication 10 ML: at 08:55

## 2020-06-18 RX ADMIN — NEPHROCAP 1 MG: 1 CAP ORAL at 08:54

## 2020-06-18 NOTE — PLAN OF CARE
Problem: Falls - Risk of:  Goal: Will remain free from falls  Description: Will remain free from falls  Outcome: Met This Shift  Goal: Absence of physical injury  Description: Absence of physical injury  Outcome: Met This Shift     Problem: SAFETY  Goal: Free from accidental physical injury  Outcome: Met This Shift     Problem: DISCHARGE BARRIERS  Goal: Patient's continuum of care needs are met  Outcome: Met This Shift     Problem: Cardiac:  Goal: Ability to maintain an adequate cardiac output will improve  Description: Ability to maintain an adequate cardiac output will improve  Outcome: Met This Shift  Goal: Ability to maintain adequate ventilation will improve  Description: Ability to maintain adequate ventilation will improve  Outcome: Met This Shift  Goal: Ability to achieve and maintain adequate cardiopulmonary perfusion will improve  Description: Ability to achieve and maintain adequate cardiopulmonary perfusion will improve  Outcome: Met This Shift     Problem: Tissue Perfusion:  Goal: Ability to maintain adequate tissue perfusion will improve  Description: Ability to maintain adequate tissue perfusion will improve  Outcome: Met This Shift  Goal: Ability to maintain a stable neurologic state will improve  Description: Ability to maintain a stable neurologic state will improve  Outcome: Met This Shift

## 2020-06-18 NOTE — PROGRESS NOTES
Report called to [facility] spoke with Caleb, all questions answered. Notified of discharge time of 12pm today. Patient aware as well.

## 2020-06-18 NOTE — PROGRESS NOTES
was sent to [facility] ER for hemoglobin of 7.3. He had an episode of vomiting but denies any melena, hematochezia, hemoptysis. He had similar admission last month and was transfused 1 unit of packed RBC. EGD showed antral erosions. He also had capsule endoscopy last year but results are not available. He is now admitted for anemia work-up. Aspirin is on hold. He is on Protonix. We are called for management of ESRD. Interval History:     Urology is consulted. ROS:     Seen with-nurse and resident. positives in bold   Constitutional:  fever, chills, weakness, weight change, fatigue  Skin:  rash, pruritus, hair loss, bruising, dry skin, petechiae  Head, Face, Neck   headaches, swelling,  cervical adenopathy  Respiratory: shortness of breath, cough, or wheezing  Cardiovascular: chest pain, palpitations, dizzy, edema  Gastrointestinal: nausea, vomiting, diarrhea, constipation,belly pain    Yellow skin, blood in stool  Musculoskeletal:  back pain, muscle weakness, gait problems,       joint pain or swelling. Genitourinary:  dysuria, poor urine flow, flank pain, blood in urine  Neurologic:  vertigo, TIA'S, syncope, seizures, focal weakness  Psychosocial:  insomnia, anxiety, or depression.                         All other remaining systems are negative or unable to obtain        PMH/PSH/SH/Family History:     Past Medical History:   Diagnosis Date    Anemia     Atrial fibrillation ([facility] 11/4/2013    CAD (coronary artery disease)     Mi, stent    Chronic kidney disease     DVT (deep venous thrombosis) (HCC)     End stage renal disease ([facility])     Gas gangrene ([facility]     Hemodialysis patient ([facility]     M-W-F    Hx of blood clots     Hyperkalemia     Hyperlipidemia 5/30/2012    Hypertension     Hyperthyroidism     Ischemic heart disease 6/29/8674    Metabolic encephalopathy     MI (myocardial infarction) ([address] 10/2018    Type II or unspecified type diabetes mellitus without mention of Intravenous, PRN  b complex-C-folic acid (NEPHROCAPS) capsule 1 mg, 1 mg, Oral, Daily  glucose (GLUTOSE) 40 % oral gel 15 g, 15 g, Oral, PRN  dextrose 50 % IV solution, 12.5 g, Intravenous, PRN  glucagon (rDNA) injection 1 mg, 1 mg, Intramuscular, PRN  dextrose 5 % solution, 100 mL/hr, Intravenous, PRN  mirtazapine (REMERON) tablet 15 mg, 15 mg, Oral, Nightly  pantoprazole (PROTONIX) tablet 40 mg, 40 mg, Oral, QAM AC  pravastatin (PRAVACHOL) tablet 20 mg, 20 mg, Oral, Daily  prazosin (MINIPRESS) capsule 2 mg, 2 mg, Oral, Nightly  acetaminophen (TYLENOL) tablet 650 mg, 650 mg, Oral, Q6H PRN **OR** acetaminophen (TYLENOL) suppository 650 mg, 650 mg, Rectal, Q6H PRN  polyethylene glycol (GLYCOLAX) packet 17 g, 17 g, Oral, Daily PRN  promethazine (PHENERGAN) tablet 12.5 mg, 12.5 mg, Oral, Q6H PRN **OR** ondansetron (ZOFRAN) injection 4 mg, 4 mg, Intravenous, Q6H PRN  insulin lispro (1 Unit Dial) 0-6 Units, 0-6 Units, Subcutaneous, TID WC  insulin lispro (1 Unit Dial) 0-3 Units, 0-3 Units, Subcutaneous, Nightly       Vitals :     Vitals:    06/18/20 0456   BP: (!) 144/66   Pulse: 68   Resp: 16   Temp: 99.3 °F (37.4 °C)   SpO2: 98%       I & O :       Intake/Output Summary (Last 24 hours) at 6/18/2020 0657  Last data filed at 6/18/2020 0009  Gross per 24 hour   Intake 970 ml   Output 2500 ml   Net -1530 ml        Physical Examination :     General appearance: Anxious- no, distressed- no, in good spirits- yes    HEENT: Lips- normal, teeth- ok , oral mucosa- moist  Neck : Mass- no, appears symmetrical, JVD- not visible  Respiratory: Respiratory effort-okay, wheeze- no, crackles - none  Cardiovascular:  Ausculation- No M/R/G,  Abdomen: visible mass- no, distention- no, scar- no, tenderness- no                            hepatosplenomegaly-  no  Musculoskeletal: Status post bilateral BKA  Skin: rashes- no , ulcers- no, induration- no, tightening - no  Psychiatric: Not evaluated     LABS:     Recent Labs     06/16/20  0223 06/17/20  0522 06/18/20  0548   WBC 6.9 6.2 6.6   HGB 8.6* 8.0* 7.9*   HCT 26.0* 24.2* 24.0*    171 160     Recent Labs     06/16/20  2320 06/17/20  0522 06/17/20  1844    138 136   K 5.3* 5.2* 6.0*   CL 99 99 99   CO2 27 26 24   BUN 36* 39* 49*   CREATININE 7.2* 7.8* 9.0*   GLUCOSE 91 85 91   MG  --  2.30  --    PHOS  --  4.6 4.9      Nephrology  [facility] [address], [address]  Office: [phone]  Cell: [phone]  Fax: [phone]

## 2020-06-18 NOTE — CARE COORDINATION
Case Management Assessment            Discharge Note                    Date / Time of Note: 6/18/2020 10:18 AM                  Discharge Note Completed by: Feliciano Wolff    Patient Name: Sandeep Solorio   YOB: 1945  Diagnosis: Anemia [D64.9]  Anemia [D64.9]  Acute on chronic anemia [D64.9]   Date / Time: 6/16/2020  7:38 PM    Current PCP: Gladys Chang MD  Clinic patient: No    Hospitalization in the last 30 days: No    Advance Directives:  Code Status: Full Code  [place] DNR form completed and on chart: No    Financial:  Payor: Leonardo Gautamn / Plan: [place] / Product Type: *No Product type* /      Pharmacy:    [facility] [address] [facility], [facility]. [address] [address]  Phone: [phone] Fax: [phone]    [facility] [address] KELSEY BARLOW [address]) [phone] [facility] [phone]  [address] [place] [address]  Phone: [phone] Fax: [phone]    [address], V Yasemin [address] [place] [address]  Phone: [phone] Fax: [phone]      Assistance purchasing medications?: Potential Assistance Purchasing Medications: No  Assistance provided by Case Management: None at this time    Does patient want to participate in local refill/ meds to beds program?: No    Meds To Beds General Rules:  1. Can ONLY be done Monday- Friday between 8:30am-5pm  2. Prescription(s) must be in pharmacy by 3pm to be filled same day  3. Copy of patient's insurance/ prescription drug card and patient face sheet must be sent along with the prescription(s)  4. Cost of Rx cannot be added to hospital bill. If financial assistance is needed, please contact unit  or ;  or  CANNOT provide pharmacy voucher for patients co-pays  5.  Patients can then  the prescription on their way out of the hospital at discharge, or pharmacy can deliver to the bedside if staff is available. (payment due at time of pick-up or delivery - cash, check, or card accepted)     Able to afford home medications/ co-pay costs: Yes    ADLS:  Current PT AM-PAC Score:   /24  Current OT AM-PAC Score:   /24      DISCHARGE Disposition:    - [facility]: Skilled Nursing        [facility], [address] [facility] [place] [address]      REPORT Phone: [phone]      FAX Fax: [phone]            LOC at discharge: [address] [place] Completed: Yes    Notification completed in HENS/PAS?:  Not Applicable    IMM Completed:   Not Indicated    Transportation:  Transportation PLAN for discharge: EMS transportation   Mode of Transport: Ambulance stretcher - BLS  Reason for medical transport: Bed confined: Meets the following criteria 1) unable to get out of bed without assistance or ambulate, 2) unable to safely sit up in a wheelchair, 3) unable to maintain erect seating position in a chair for time needed for transport  Name of Transport Company: [employer]  Phone: [phone]  Time of Transport: 1200    Transport form completed: Yes    Home Care:  [address] ordered at discharge: No  [address]: Not Applicable  Orders faxed: No    Durable Medical Equipment:  DME Provider: defer to facility  Equipment obtained during hospitalization: defer    Home Oxygen and Respiratory Equipment:  Oxygen needed at discharge?: No  [address]: Not Applicable  Portable tank available for discharge?: No    Dialysis:  Dialysis patient: Yes    Dialysis Center:  [facility]  Address: [address]   Phone: [phone]    Hospice Services:  Location: Not Applicable  Agency: Not Applicable    Consents signed: Not Indicated    Referrals made at [facility] for outpatient continued care:  Not Applicable    Additional CM Notes: CM confirmed d/c back to SNF  ;  Cm spoke with Alf Barnes in admissions , and aware of return today : orders

## 2020-06-19 LAB
EKG ATRIAL RATE: 76 BPM
EKG DIAGNOSIS: NORMAL
EKG P AXIS: 59 DEGREES
EKG P-R INTERVAL: 206 MS
EKG Q-T INTERVAL: 394 MS
EKG QRS DURATION: 96 MS
EKG QTC CALCULATION (BAZETT): 443 MS
EKG R AXIS: 41 DEGREES
EKG T AXIS: 64 DEGREES
EKG VENTRICULAR RATE: 76 BPM

## 2020-06-19 PROCEDURE — 93010 ELECTROCARDIOGRAM REPORT: CPT | Performed by: INTERNAL MEDICINE

## 2020-07-20 ENCOUNTER — HOSPITAL ENCOUNTER (EMERGENCY)
Age: 75
Discharge: NURSING, REHAB OR LONG-TERM CARE | End: 2020-07-20
Attending: EMERGENCY MEDICINE
Payer: COMMERCIAL

## 2020-07-20 VITALS
OXYGEN SATURATION: 100 % | HEART RATE: 66 BPM | DIASTOLIC BLOOD PRESSURE: 68 MMHG | RESPIRATION RATE: 18 BRPM | WEIGHT: 211.1 LBS | HEIGHT: 73 IN | BODY MASS INDEX: 27.98 KG/M2 | SYSTOLIC BLOOD PRESSURE: 163 MMHG | TEMPERATURE: 98 F

## 2020-07-20 LAB
ABO/RH: NORMAL
ANTIBODY SCREEN: NORMAL
BASOPHILS ABSOLUTE: 0.1 K/UL (ref 0–0.2)
BASOPHILS RELATIVE PERCENT: 1.7 %
EOSINOPHILS ABSOLUTE: 0.6 K/UL (ref 0–0.6)
EOSINOPHILS RELATIVE PERCENT: 13.6 %
HCT VFR BLD CALC: 21.9 % (ref 40.5–52.5)
HEMOGLOBIN: 7.2 G/DL (ref 13.5–17.5)
LYMPHOCYTES ABSOLUTE: 1.3 K/UL (ref 1–5.1)
LYMPHOCYTES RELATIVE PERCENT: 28.8 %
MCH RBC QN AUTO: 30.2 PG (ref 26–34)
MCHC RBC AUTO-ENTMCNC: 33.1 G/DL (ref 31–36)
MCV RBC AUTO: 91.3 FL (ref 80–100)
MONOCYTES ABSOLUTE: 0.3 K/UL (ref 0–1.3)
MONOCYTES RELATIVE PERCENT: 7.3 %
NEUTROPHILS ABSOLUTE: 2.2 K/UL (ref 1.7–7.7)
NEUTROPHILS RELATIVE PERCENT: 48.6 %
PDW BLD-RTO: 13.6 % (ref 12.4–15.4)
PLATELET # BLD: 159 K/UL (ref 135–450)
PMV BLD AUTO: 9.1 FL (ref 5–10.5)
RBC # BLD: 2.4 M/UL (ref 4.2–5.9)
WBC # BLD: 4.4 K/UL (ref 4–11)

## 2020-07-20 PROCEDURE — 86901 BLOOD TYPING SEROLOGIC RH(D): CPT

## 2020-07-20 PROCEDURE — 86850 RBC ANTIBODY SCREEN: CPT

## 2020-07-20 PROCEDURE — 99282 EMERGENCY DEPT VISIT SF MDM: CPT

## 2020-07-20 PROCEDURE — 86900 BLOOD TYPING SEROLOGIC ABO: CPT

## 2020-07-20 PROCEDURE — 85025 COMPLETE CBC W/AUTO DIFF WBC: CPT

## 2020-07-20 NOTE — ED PROVIDER NOTES
[address] ENCOUNTER          ATTENDING PHYSICIAN NOTE       Date of evaluation: 7/20/2020    Chief Complaint     Abnormal Lab      History of Present Illness     Bandar Collins is a 67 y.o. male with history of diabetes, hypertension, CAD, ESRD on hemodialysis, multiple other comorbidities presenting for possible low hemoglobin. Patient states he was sent here from dialysis today for a blood transfusion. Does not know what his numbers were at dialysis. Completed his full run without complication. Denies any symptoms currently including specifically chest pain, shortness of breath, fatigue, lightheadedness, loss of consciousness, abdominal pain, nausea/vomiting. Review of Systems     Pertinent positive and negative findings as documented in the HPI. Otherwise all other systems were reviewed and were negative. Physical Exam     INITIAL VITALS: BP: (!) 143/70, Temp: 98 °F (36.7 °C), Pulse: 66, Resp: 18, SpO2: 99 %     Nursing note and vitals reviewed. General:  Adult male, alert and appropriately interactive. In no distress. HENT: Normocephalic and atraumatic. External ears normal. Nose appears normal externally. Eyes: Conjunctivae normal. No scleral icterus. Neck: Neck supple. No tracheal deviation present. CV: Normal rate. Regular rhythm. Chest: Effort normal on room air  Abdominal: Soft. No distension. No tenderness. No rebound or guarding. No masses. No peritoneal signs. Neurological: Alert and appropriately interactive. Face symmetric, speech without dysarthria or obvious aphasia. Moving all extremities spontaneously. Skin: Warm, dry. No rash. No diaphoresis or erythema. Psychiatric: Calm and cooperative with appropriate mood and affect.      Procedures   Procedures    MEDICAL DECISION MAKING     MDM: Bandar Collins is a 67 y.o. male with history of ESRD on hemodialysis and multiple other comorbidities presenting today for possible anemia requiring transfusion. On arrival, patient is in no distress and vital signs are reassuring. He denies any associated symptoms and was referred here solely for possible transfusion. CBC demonstrates a hemoglobin of 7.2 today. Given that he is asymptomatic do not feel that transfusion is appropriate at this time. Discussed with his nephrology team, Dr. Mushtaq Mcadams, who agreed and had no other concerns related to him from the dialysis team today that would suggest need for further work-up or transfusion at this time. He will continue to be monitored closely at dialysis and return if he develops any symptoms. Discharged in stable condition. Clinical Impression     1. ESRD on hemodialysis ([address]    2. Chronic anemia        Disposition     DISPOSITION Decision To Discharge 07/20/2020 07:01:42 PM        Byron Vaughn MD  7:22 PM                     Past Medical, Surgical, Family, and Social History     He has a past medical history of Anemia, Atrial fibrillation ([address] CAD (coronary artery disease), Chronic kidney disease, DVT (deep venous thrombosis) ([address] End stage renal disease ([address] Gas gangrene ([address] Hemodialysis patient ([address] Hx of blood clots, Hyperkalemia, Hyperlipidemia, Hypertension, Hyperthyroidism, Ischemic heart disease, Metabolic encephalopathy, MI (myocardial infarction) ([address] and Type II or unspecified type diabetes mellitus without mention of complication, not stated as uncontrolled. He has a past surgical history that includes Cardiac surgery; Foot Amputation (Right, 1/4/2019); Femoral-tibial Bypass Graft (Right, 1/9/2019); Foot Amputation (Right, 1/14/2019); Leg amputation below knee (Right, 1/18/2019); Foot Amputation (Left, 08/24/2019); Foot Amputation (Left, 8/24/2019); Foot Amputation (Left, 8/27/2019); Foot Amputation (Left, 10/11/2019); Leg amputation below knee (Left, 12/9/2019); and Leg Surgery (Left, 2/6/2020). His family history includes Arthritis in his mother.   He reports that he has never smoked. He has never used smokeless tobacco. He reports previous alcohol use. He reports that he does not use drugs. Medications     Previous Medications    ASPIRIN 81 MG TABLET    Take 81 mg by mouth daily     B COMPLEX-C-FOLIC ACID (JOSUÉ CAPS) 1 MG CAPS    Take 1 mg by mouth daily    CALCITRIOL (ROCALTROL) 0.25 MCG CAPSULE    Take 1 capsule by mouth every other day MWF on dialysis days. CARVEDILOL (COREG) 25 MG TABLET    Take 25 mg by mouth 2 times daily (with meals)    FERROUS SULFATE (IRON 325) 325 (65 FE) MG TABLET    Take 325 mg by mouth daily (with breakfast)    FOLIC ACID (FOLVITE) 1 MG TABLET    Take 1 mg by mouth daily    INSULIN LISPRO (HUMALOG) 100 UNIT/ML INJECTION VIAL    Inject into the skin 3 times daily (before meals) SLIDING SCALE    LOSARTAN (COZAAR) 25 MG TABLET    Take 1 tablet by mouth daily    MIRTAZAPINE (REMERON) 15 MG TABLET    Take 15 mg by mouth nightly    NITROGLYCERIN (NITROSTAT) 0.4 MG SL TABLET    Place 0.4 mg under the tongue every 5 minutes as needed for Chest pain up to max of 3 total doses. If no relief after 1 dose, call 911. PANTOPRAZOLE SODIUM (PROTONIX) 40 MG PACK PACKET    Take 40 mg by mouth every morning (before breakfast)    PETROLATUM-ZINC OXIDE (PHYTOPLEX Z-GUARD) 57-17 % PSTE    Apply topically    PRAVASTATIN (PRAVACHOL) 20 MG TABLET    Take 20 mg by mouth daily    PRAZOSIN (MINIPRESS) 2 MG CAPSULE    Take 2 mg by mouth nightly    SERTRALINE (ZOLOFT) 25 MG TABLET    Take 25 mg by mouth daily    SEVELAMER HCL (RENAGEL) 800 MG TABLET    Take 1,600 mg by mouth 3 times daily (with meals)    VITAMIN B-12 (CYANOCOBALAMIN) 1000 MCG TABLET    Take 1,000 mcg by mouth daily       Allergies     He has No Known Allergies. ED Course     Nursing Notes, Past Medical Hx, Past Surgical Hx, Social Hx,Allergies, and Family Hx were reviewed. Patient was given the following medications:  No orders of the defined types were placed in this encounter.       Diagnostic Results RECENT VITALS:  BP: (!) 148/63,Temp: 98 °F (36.7 °C), Pulse: 66, Resp: 18, SpO2: 98 %     RADIOLOGY:  No orders to display       LABS:   Results for orders placed or performed during the hospital encounter of 07/20/20   CBC Auto Differential   Result Value Ref Range    WBC 4.4 4.0 - 11.0 K/uL    RBC 2.40 (L) 4.20 - 5.90 M/uL    Hemoglobin 7.2 (L) 13.5 - 17.5 g/dL    Hematocrit 21.9 (L) 40.5 - 52.5 %    MCV 91.3 80.0 - 100.0 fL    MCH 30.2 26.0 - 34.0 pg    MCHC 33.1 31.0 - 36.0 g/dL    RDW 13.6 12.4 - 15.4 %    Platelets 585 699 - 553 K/uL    MPV 9.1 5.0 - 10.5 fL    Neutrophils % 48.6 %    Lymphocytes % 28.8 %    Monocytes % 7.3 %    Eosinophils % 13.6 %    Basophils % 1.7 %    Neutrophils Absolute 2.2 1.7 - 7.7 K/uL    Lymphocytes Absolute 1.3 1.0 - 5.1 K/uL    Monocytes Absolute 0.3 0.0 - 1.3 K/uL    Eosinophils Absolute 0.6 0.0 - 0.6 K/uL    Basophils Absolute 0.1 0.0 - 0.2 K/uL   TYPE AND SCREEN   Result Value Ref Range    ABO/Rh B POS     Antibody Screen NEG        CONSULTS:  IP CONSULT TO NEPHROLOGY    PATIENT REFERRED TO:  [facility]  [address] [place] [address]  [phone]    If symptoms worsen      DISCHARGE MEDICATIONS:  New Prescriptions    No medications on file        Kaykay Hughes MD  07/20/20 1521

## 2020-08-03 ENCOUNTER — HOSPITAL ENCOUNTER (EMERGENCY)
Age: 75
Discharge: HOME | End: 2020-08-03
Attending: EMERGENCY MEDICINE
Payer: COMMERCIAL

## 2020-08-03 VITALS
TEMPERATURE: 98.9 F | SYSTOLIC BLOOD PRESSURE: 182 MMHG | HEIGHT: 60 IN | OXYGEN SATURATION: 94 % | RESPIRATION RATE: 20 BRPM | HEART RATE: 73 BPM | BODY MASS INDEX: 64.42 KG/M2 | DIASTOLIC BLOOD PRESSURE: 83 MMHG

## 2020-08-03 LAB
A/G RATIO: 0.8 (ref 1.1–2.2)
ABO/RH: NORMAL
ALBUMIN SERPL-MCNC: 3.7 G/DL (ref 3.4–5)
ALP BLD-CCNC: 136 U/L (ref 40–129)
ALT SERPL-CCNC: 15 U/L (ref 10–40)
ANION GAP SERPL CALCULATED.3IONS-SCNC: 11 MMOL/L (ref 3–16)
ANTIBODY SCREEN: NORMAL
AST SERPL-CCNC: 15 U/L (ref 15–37)
BASOPHILS ABSOLUTE: 0.1 K/UL (ref 0–0.2)
BASOPHILS RELATIVE PERCENT: 1.2 %
BILIRUB SERPL-MCNC: 0.3 MG/DL (ref 0–1)
BUN BLDV-MCNC: 25 MG/DL (ref 7–20)
CALCIUM SERPL-MCNC: 9 MG/DL (ref 8.3–10.6)
CHLORIDE BLD-SCNC: 100 MMOL/L (ref 99–110)
CO2: 27 MMOL/L (ref 21–32)
CREAT SERPL-MCNC: 5.1 MG/DL (ref 0.8–1.3)
EOSINOPHILS ABSOLUTE: 0.6 K/UL (ref 0–0.6)
EOSINOPHILS RELATIVE PERCENT: 11.4 %
GFR AFRICAN AMERICAN: 13
GFR NON-AFRICAN AMERICAN: 11
GLOBULIN: 4.5 G/DL
GLUCOSE BLD-MCNC: 147 MG/DL (ref 70–99)
HCT VFR BLD CALC: 22.4 % (ref 40.5–52.5)
HEMOGLOBIN: 7.5 G/DL (ref 13.5–17.5)
LYMPHOCYTES ABSOLUTE: 1.1 K/UL (ref 1–5.1)
LYMPHOCYTES RELATIVE PERCENT: 20.5 %
MCH RBC QN AUTO: 30.4 PG (ref 26–34)
MCHC RBC AUTO-ENTMCNC: 33.4 G/DL (ref 31–36)
MCV RBC AUTO: 90.9 FL (ref 80–100)
MONOCYTES ABSOLUTE: 0.4 K/UL (ref 0–1.3)
MONOCYTES RELATIVE PERCENT: 7.7 %
NEUTROPHILS ABSOLUTE: 3.2 K/UL (ref 1.7–7.7)
NEUTROPHILS RELATIVE PERCENT: 59.2 %
PDW BLD-RTO: 13.1 % (ref 12.4–15.4)
PLATELET # BLD: 172 K/UL (ref 135–450)
PMV BLD AUTO: 8.9 FL (ref 5–10.5)
POC OCCULT BLOOD STOOL: NEGATIVE
POTASSIUM REFLEX MAGNESIUM: 4.3 MMOL/L (ref 3.5–5.1)
RBC # BLD: 2.46 M/UL (ref 4.2–5.9)
SODIUM BLD-SCNC: 138 MMOL/L (ref 136–145)
TOTAL PROTEIN: 8.2 G/DL (ref 6.4–8.2)
WBC # BLD: 5.5 K/UL (ref 4–11)

## 2020-08-03 PROCEDURE — 93005 ELECTROCARDIOGRAM TRACING: CPT | Performed by: STUDENT IN AN ORGANIZED HEALTH CARE EDUCATION/TRAINING PROGRAM

## 2020-08-03 PROCEDURE — 80053 COMPREHEN METABOLIC PANEL: CPT

## 2020-08-03 PROCEDURE — 86850 RBC ANTIBODY SCREEN: CPT

## 2020-08-03 PROCEDURE — 82272 OCCULT BLD FECES 1-3 TESTS: CPT

## 2020-08-03 PROCEDURE — 85025 COMPLETE CBC W/AUTO DIFF WBC: CPT

## 2020-08-03 PROCEDURE — 86900 BLOOD TYPING SEROLOGIC ABO: CPT

## 2020-08-03 PROCEDURE — 99284 EMERGENCY DEPT VISIT MOD MDM: CPT

## 2020-08-03 PROCEDURE — 86901 BLOOD TYPING SEROLOGIC RH(D): CPT

## 2020-08-03 NOTE — ED PROVIDER NOTES
[address] [facility] RESIDENT NOTE       Date of evaluation: 8/3/2020    Chief Complaint     Other (hemaglobin <7 hx of anemia, no rectal bleeding or hema. denies sob or cp)      History of Present Illness     Makayla Mondragon is a 67 y.o. male who presents from SNF for potential blood transfusion. Patient had routine labs drawn today, was told Hgb was low and told to go to ED for transfusion. Patient denies any CP, SOB, abd pain, fatigue, dizziness, syncope, vomiting, diarrhea, constipation. Denies hematemesis/hemoptysis/melena/BRBPR. Had full dialysis session today, has not missed dialysis in last few weeks. States he has needed to be transfused in the past.    Review of Systems   As per HPI, otherwise all other systems reviewed and negative. Past Medical, Surgical, Family, and Social History     He has a past medical history of Anemia, Atrial fibrillation ([address] CAD (coronary artery disease), Chronic kidney disease, DVT (deep venous thrombosis) ([address] End stage renal disease ([address] Gas gangrene ([address] Hemodialysis patient ([address] Hx of blood clots, Hyperkalemia, Hyperlipidemia, Hypertension, Hyperthyroidism, Ischemic heart disease, Metabolic encephalopathy, MI (myocardial infarction) ([address] and Type II or unspecified type diabetes mellitus without mention of complication, not stated as uncontrolled. He has a past surgical history that includes Cardiac surgery; Foot Amputation (Right, 1/4/2019); Femoral-tibial Bypass Graft (Right, 1/9/2019); Foot Amputation (Right, 1/14/2019); Leg amputation below knee (Right, 1/18/2019); Foot Amputation (Left, 08/24/2019); Foot Amputation (Left, 8/24/2019); Foot Amputation (Left, 8/27/2019); Foot Amputation (Left, 10/11/2019); Leg amputation below knee (Left, 12/9/2019); and Leg Surgery (Left, 2/6/2020). His family history includes Arthritis in his mother. He reports that he has never smoked.  He has never used smokeless tobacco. He reports previous alcohol use. He reports that he does not use drugs. Medications     Previous Medications    ASPIRIN 81 MG TABLET    Take 81 mg by mouth daily     B COMPLEX-C-FOLIC ACID (JOSUÉ CAPS) 1 MG CAPS    Take 1 mg by mouth daily    CALCITRIOL (ROCALTROL) 0.25 MCG CAPSULE    Take 1 capsule by mouth every other day MWF on dialysis days. CARVEDILOL (COREG) 25 MG TABLET    Take 25 mg by mouth 2 times daily (with meals)    FERROUS SULFATE (IRON 325) 325 (65 FE) MG TABLET    Take 325 mg by mouth daily (with breakfast)    FOLIC ACID (FOLVITE) 1 MG TABLET    Take 1 mg by mouth daily    INSULIN LISPRO (HUMALOG) 100 UNIT/ML INJECTION VIAL    Inject into the skin 3 times daily (before meals) SLIDING SCALE    LOSARTAN (COZAAR) 25 MG TABLET    Take 1 tablet by mouth daily    MIRTAZAPINE (REMERON) 15 MG TABLET    Take 15 mg by mouth nightly    NITROGLYCERIN (NITROSTAT) 0.4 MG SL TABLET    Place 0.4 mg under the tongue every 5 minutes as needed for Chest pain up to max of 3 total doses. If no relief after 1 dose, call 911. PANTOPRAZOLE SODIUM (PROTONIX) 40 MG PACK PACKET    Take 40 mg by mouth every morning (before breakfast)    PETROLATUM-ZINC OXIDE (PHYTOPLEX Z-GUARD) 57-17 % PSTE    Apply topically    PRAVASTATIN (PRAVACHOL) 20 MG TABLET    Take 20 mg by mouth daily    PRAZOSIN (MINIPRESS) 2 MG CAPSULE    Take 2 mg by mouth nightly    SERTRALINE (ZOLOFT) 25 MG TABLET    Take 25 mg by mouth daily    SEVELAMER HCL (RENAGEL) 800 MG TABLET    Take 1,600 mg by mouth 3 times daily (with meals)    VITAMIN B-12 (CYANOCOBALAMIN) 1000 MCG TABLET    Take 1,000 mcg by mouth daily       Allergies     He has No Known Allergies.     Physical Exam     INITIAL VITALS: BP: (!) 158/69, Temp: 98.9 °F (37.2 °C), Pulse: 75, Resp: 20, SpO2: 94 %     General:  Well-developed, NAD  HEENT:  Normocephalic, atraumatic, PERRL, EOMI, oropharynx clear  Neck:  Supple, trachea midline  Pulmonary:   CTAB, good air movement  Cardiac:  RRR, no M/R/G  Abdomen:  Soft, non-tender, non-distended, no rebounding or guarding.  CONSTANTINO: no external hemorrhoids, no melena or BRBPR  Musculoskeletal:  B/l BKA's  Skin:  No rashes or lesions  Neuro: aox4, able to move all four extremities, sensory exam grossly intact  Psych:  Appropriate mood and affect    DiagnosticResults     EKG   Interpreted in conjunction with emergency department physician Dr. Stephanie Najera  Rhythm: normal sinus   Rate: normal  Axis: normal  Ectopy: none  Conduction: prolonged QTc  ST Segments: no acute change  T Waves: no acute change  Q Waves: none  Clinical Impression: no acute changes  Comparison:  6/17/20, unchanged    RADIOLOGY:  No orders to display       LABS:   Results for orders placed or performed during the hospital encounter of 08/03/20   CBC Auto Differential   Result Value Ref Range    WBC 5.5 4.0 - 11.0 K/uL    RBC 2.46 (L) 4.20 - 5.90 M/uL    Hemoglobin 7.5 (L) 13.5 - 17.5 g/dL    Hematocrit 22.4 (L) 40.5 - 52.5 %    MCV 90.9 80.0 - 100.0 fL    MCH 30.4 26.0 - 34.0 pg    MCHC 33.4 31.0 - 36.0 g/dL    RDW 13.1 12.4 - 15.4 %    Platelets 719 379 - 507 K/uL    MPV 8.9 5.0 - 10.5 fL    Neutrophils % 59.2 %    Lymphocytes % 20.5 %    Monocytes % 7.7 %    Eosinophils % 11.4 %    Basophils % 1.2 %    Neutrophils Absolute 3.2 1.7 - 7.7 K/uL    Lymphocytes Absolute 1.1 1.0 - 5.1 K/uL    Monocytes Absolute 0.4 0.0 - 1.3 K/uL    Eosinophils Absolute 0.6 0.0 - 0.6 K/uL    Basophils Absolute 0.1 0.0 - 0.2 K/uL   Comprehensive Metabolic Panel w/ Reflex to MG   Result Value Ref Range    Sodium 138 136 - 145 mmol/L    Potassium reflex Magnesium 4.3 3.5 - 5.1 mmol/L    Chloride 100 99 - 110 mmol/L    CO2 27 21 - 32 mmol/L    Anion Gap 11 3 - 16    Glucose 147 (H) 70 - 99 mg/dL    BUN 25 (H) 7 - 20 mg/dL    CREATININE 5.1 (HH) 0.8 - 1.3 mg/dL    GFR Non- 11 (A) >60    GFR  13 (A) >60    Calcium 9.0 8.3 - 10.6 mg/dL    Total Protein 8.2 6.4 - 8.2 g/dL    Alb 3.7 3.4 - 5.0 g/dL    Albumin/Globulin Ratio 0.8 (L) 1.1 - 2.2    Total Bilirubin 0.3 0.0 - 1.0 mg/dL    Alkaline Phosphatase 136 (H) 40 - 129 U/L    ALT 15 10 - 40 U/L    AST 15 15 - 37 U/L    Globulin 4.5 g/dL   POCT Blood Occult   Result Value Ref Range    POC Occult Blood Stool Negative Negative       ED BEDSIDE ULTRASOUND:  N/A    RECENT VITALS:  BP: (!) 158/69, Temp: 98.9 °F (37.2 °C), Pulse: 75,Resp: 20, SpO2: 94 %     Procedures   Procedures    ED Course     Nursing Notes, Past Medical Hx, Past Surgical Hx, Social Hx, Allergies, and Family Hx were reviewed. The patient was given the followingmedications:  No orders of the defined types were placed in this encounter. CONSULTS:  None    MEDICAL DECISION MAKING / ASSESSMENT / [place] is a 67 y.o. male w/ h/o CKD, CAD, DM, HTN who p/w potential need for blood transfusion after told hgb was 6.3 on routine blood work this AM.  No h/o bleeding, no CP, no SOB. POC hemoccult test negative. Hgb 7.5 in ED. Potassium reassuring. On chart review, patient's Hgb appears to be near baseline level, no need for transfusion today. HDS in ED. Will discharge w/ PCP f/u. Told to return to ED for bleeding, CP, SOB, fever, abd pain, or any other new sxs/concerns. This patient was also evaluated by the attending physician. All care plans were discussed and agreed upon. At this time the patient has been deemed safe for discharge. Verbal discharge instructions including strict return precautions for worsening or new symptoms have been communicated. The patient was advised to follow up with PCP. Clinical Impression     1.  Anemia due to chronic kidney disease, unspecified CKD stage        Disposition     PATIENT REFERRED TO:  Valentino Robledo MD  [address] 97 11    Call in 1 day      [facility] [employer] Emergency Department  [address]  [facility]  Go to   As needed, If symptoms worsen      DISCHARGE MEDICATIONS:  New Prescriptions    No medications on file       Andre Cat MD  Resident  08/03/20 2299

## 2020-08-04 LAB
EKG ATRIAL RATE: 77 BPM
EKG DIAGNOSIS: NORMAL
EKG P AXIS: 59 DEGREES
EKG P-R INTERVAL: 186 MS
EKG Q-T INTERVAL: 442 MS
EKG QRS DURATION: 98 MS
EKG QTC CALCULATION (BAZETT): 500 MS
EKG R AXIS: 36 DEGREES
EKG T AXIS: 36 DEGREES
EKG VENTRICULAR RATE: 77 BPM

## 2020-08-04 NOTE — ED PROVIDER NOTES
ED Attending Attestation Note     Date of evaluation: 8/3/2020    This patient was seen by the resident. I have seen and examined the patient, agree with the workup, evaluation, management and diagnosis. The care plan has been discussed. I have reviewed the ECG and concur with the resident's interpretation. My assessment reveals adult male, asymptomatic, with reported low hemoglobin drawn on labs today. He has no bleeding diathesis, is not hemodynamically stable, and is asymptomatic. CBC here shows a stable hemoglobin for the patient, no indication for transfusion right now.        Gunnar Olivas MD  08/03/20 8496

## 2020-08-04 NOTE — ED NOTES
Patient report given to private EMS transport team. Patient stable at time of transfer.      Casa Aragon RN  08/03/20 5391

## 2020-08-13 ENCOUNTER — HOSPITAL ENCOUNTER (OUTPATIENT)
Age: 75
Setting detail: OBSERVATION
LOS: 1 days | Discharge: NURSING, REHAB OR LONG-TERM CARE | End: 2020-08-14
Attending: INTERNAL MEDICINE | Admitting: INTERNAL MEDICINE
Payer: COMMERCIAL

## 2020-08-13 LAB
GLUCOSE BLD-MCNC: 107 MG/DL (ref 70–99)
PERFORMED ON: ABNORMAL

## 2020-08-13 PROCEDURE — 86923 COMPATIBILITY TEST ELECTRIC: CPT

## 2020-08-13 PROCEDURE — 36415 COLL VENOUS BLD VENIPUNCTURE: CPT

## 2020-08-13 PROCEDURE — U0003 INFECTIOUS AGENT DETECTION BY NUCLEIC ACID (DNA OR RNA); SEVERE ACUTE RESPIRATORY SYNDROME CORONAVIRUS 2 (SARS-COV-2) (CORONAVIRUS DISEASE [COVID-19]), AMPLIFIED PROBE TECHNIQUE, MAKING USE OF HIGH THROUGHPUT TECHNOLOGIES AS DESCRIBED BY CMS-2020-01-R: HCPCS

## 2020-08-13 PROCEDURE — 86900 BLOOD TYPING SEROLOGIC ABO: CPT

## 2020-08-13 PROCEDURE — 86850 RBC ANTIBODY SCREEN: CPT

## 2020-08-13 PROCEDURE — 86901 BLOOD TYPING SEROLOGIC RH(D): CPT

## 2020-08-13 PROCEDURE — 6370000000 HC RX 637 (ALT 250 FOR IP): Performed by: INTERNAL MEDICINE

## 2020-08-13 PROCEDURE — 82728 ASSAY OF FERRITIN: CPT

## 2020-08-13 PROCEDURE — G0378 HOSPITAL OBSERVATION PER HR: HCPCS

## 2020-08-13 PROCEDURE — 2580000003 HC RX 258: Performed by: INTERNAL MEDICINE

## 2020-08-13 PROCEDURE — P9016 RBC LEUKOCYTES REDUCED: HCPCS

## 2020-08-13 RX ORDER — 0.9 % SODIUM CHLORIDE 0.9 %
20 INTRAVENOUS SOLUTION INTRAVENOUS ONCE
Status: COMPLETED | OUTPATIENT
Start: 2020-08-13 | End: 2020-08-14

## 2020-08-13 RX ORDER — CHOLECALCIFEROL (VITAMIN D3) 10 MCG
1 TABLET ORAL DAILY
Status: DISCONTINUED | OUTPATIENT
Start: 2020-08-14 | End: 2020-08-14 | Stop reason: HOSPADM

## 2020-08-13 RX ORDER — ACETAMINOPHEN 650 MG/1
650 SUPPOSITORY RECTAL EVERY 6 HOURS PRN
Status: DISCONTINUED | OUTPATIENT
Start: 2020-08-13 | End: 2020-08-14 | Stop reason: HOSPADM

## 2020-08-13 RX ORDER — ACETAMINOPHEN 160 MG
2000 TABLET,DISINTEGRATING ORAL DAILY
Status: ON HOLD | COMMUNITY
End: 2022-02-03 | Stop reason: HOSPADM

## 2020-08-13 RX ORDER — SODIUM CHLORIDE 0.9 % (FLUSH) 0.9 %
10 SYRINGE (ML) INJECTION PRN
Status: DISCONTINUED | OUTPATIENT
Start: 2020-08-13 | End: 2020-08-14 | Stop reason: HOSPADM

## 2020-08-13 RX ORDER — FOLIC ACID 1 MG/1
1 TABLET ORAL DAILY
Status: DISCONTINUED | OUTPATIENT
Start: 2020-08-14 | End: 2020-08-14 | Stop reason: HOSPADM

## 2020-08-13 RX ORDER — PRAZOSIN HYDROCHLORIDE 1 MG/1
2 CAPSULE ORAL NIGHTLY
Status: DISCONTINUED | OUTPATIENT
Start: 2020-08-13 | End: 2020-08-14 | Stop reason: HOSPADM

## 2020-08-13 RX ORDER — ACETAMINOPHEN 325 MG/1
650 TABLET ORAL EVERY 6 HOURS PRN
Status: DISCONTINUED | OUTPATIENT
Start: 2020-08-13 | End: 2020-08-14 | Stop reason: HOSPADM

## 2020-08-13 RX ORDER — INSULIN LISPRO 100 [IU]/ML
0-6 INJECTION, SOLUTION INTRAVENOUS; SUBCUTANEOUS
Status: DISCONTINUED | OUTPATIENT
Start: 2020-08-14 | End: 2020-08-14 | Stop reason: HOSPADM

## 2020-08-13 RX ORDER — CARVEDILOL 25 MG/1
25 TABLET ORAL 2 TIMES DAILY WITH MEALS
Status: DISCONTINUED | OUTPATIENT
Start: 2020-08-14 | End: 2020-08-13

## 2020-08-13 RX ORDER — ONDANSETRON 2 MG/ML
4 INJECTION INTRAMUSCULAR; INTRAVENOUS EVERY 6 HOURS PRN
Status: DISCONTINUED | OUTPATIENT
Start: 2020-08-13 | End: 2020-08-14 | Stop reason: HOSPADM

## 2020-08-13 RX ORDER — FERROUS SULFATE 325(65) MG
325 TABLET ORAL
Status: DISCONTINUED | OUTPATIENT
Start: 2020-08-14 | End: 2020-08-14 | Stop reason: HOSPADM

## 2020-08-13 RX ORDER — SEVELAMER CARBONATE 800 MG/1
1600 TABLET, FILM COATED ORAL
Status: DISCONTINUED | OUTPATIENT
Start: 2020-08-14 | End: 2020-08-14 | Stop reason: HOSPADM

## 2020-08-13 RX ORDER — NICOTINE POLACRILEX 4 MG
15 LOZENGE BUCCAL PRN
Status: DISCONTINUED | OUTPATIENT
Start: 2020-08-13 | End: 2020-08-14 | Stop reason: HOSPADM

## 2020-08-13 RX ORDER — SERTRALINE HYDROCHLORIDE 25 MG/1
25 TABLET, FILM COATED ORAL DAILY
Status: DISCONTINUED | OUTPATIENT
Start: 2020-08-14 | End: 2020-08-14 | Stop reason: HOSPADM

## 2020-08-13 RX ORDER — PRAVASTATIN SODIUM 20 MG
20 TABLET ORAL DAILY
Status: DISCONTINUED | OUTPATIENT
Start: 2020-08-14 | End: 2020-08-14 | Stop reason: HOSPADM

## 2020-08-13 RX ORDER — MIRTAZAPINE 15 MG/1
15 TABLET, FILM COATED ORAL NIGHTLY
Status: DISCONTINUED | OUTPATIENT
Start: 2020-08-13 | End: 2020-08-14 | Stop reason: HOSPADM

## 2020-08-13 RX ORDER — DEXTROSE MONOHYDRATE 25 G/50ML
12.5 INJECTION, SOLUTION INTRAVENOUS PRN
Status: DISCONTINUED | OUTPATIENT
Start: 2020-08-13 | End: 2020-08-14 | Stop reason: HOSPADM

## 2020-08-13 RX ORDER — NITROGLYCERIN 0.4 MG/1
0.4 TABLET SUBLINGUAL EVERY 5 MIN PRN
Status: DISCONTINUED | OUTPATIENT
Start: 2020-08-13 | End: 2020-08-14 | Stop reason: HOSPADM

## 2020-08-13 RX ORDER — INSULIN LISPRO 100 [IU]/ML
0-3 INJECTION, SOLUTION INTRAVENOUS; SUBCUTANEOUS NIGHTLY
Status: DISCONTINUED | OUTPATIENT
Start: 2020-08-13 | End: 2020-08-14 | Stop reason: HOSPADM

## 2020-08-13 RX ORDER — LOSARTAN POTASSIUM 25 MG/1
25 TABLET ORAL DAILY
Status: DISCONTINUED | OUTPATIENT
Start: 2020-08-14 | End: 2020-08-14 | Stop reason: HOSPADM

## 2020-08-13 RX ORDER — SODIUM CHLORIDE 0.9 % (FLUSH) 0.9 %
10 SYRINGE (ML) INJECTION EVERY 12 HOURS SCHEDULED
Status: DISCONTINUED | OUTPATIENT
Start: 2020-08-13 | End: 2020-08-14 | Stop reason: HOSPADM

## 2020-08-13 RX ORDER — PANTOPRAZOLE SODIUM 40 MG/1
40 TABLET, DELAYED RELEASE ORAL
Status: DISCONTINUED | OUTPATIENT
Start: 2020-08-14 | End: 2020-08-14 | Stop reason: HOSPADM

## 2020-08-13 RX ORDER — CALCITRIOL 0.25 UG/1
0.25 CAPSULE, LIQUID FILLED ORAL EVERY OTHER DAY
Status: DISCONTINUED | OUTPATIENT
Start: 2020-08-15 | End: 2020-08-14 | Stop reason: HOSPADM

## 2020-08-13 RX ORDER — DEXTROSE MONOHYDRATE 50 MG/ML
100 INJECTION, SOLUTION INTRAVENOUS PRN
Status: DISCONTINUED | OUTPATIENT
Start: 2020-08-13 | End: 2020-08-14 | Stop reason: HOSPADM

## 2020-08-13 RX ORDER — CARVEDILOL 25 MG/1
25 TABLET ORAL 2 TIMES DAILY WITH MEALS
Status: DISCONTINUED | OUTPATIENT
Start: 2020-08-13 | End: 2020-08-14 | Stop reason: HOSPADM

## 2020-08-13 RX ORDER — PROMETHAZINE HYDROCHLORIDE 25 MG/1
12.5 TABLET ORAL EVERY 6 HOURS PRN
Status: DISCONTINUED | OUTPATIENT
Start: 2020-08-13 | End: 2020-08-14 | Stop reason: HOSPADM

## 2020-08-13 RX ORDER — LANOLIN ALCOHOL/MO/W.PET/CERES
1000 CREAM (GRAM) TOPICAL DAILY
Status: DISCONTINUED | OUTPATIENT
Start: 2020-08-14 | End: 2020-08-14 | Stop reason: HOSPADM

## 2020-08-13 RX ADMIN — Medication 10 ML: at 22:32

## 2020-08-13 RX ADMIN — PRAZOSIN HYDROCHLORIDE 2 MG: 1 CAPSULE ORAL at 22:32

## 2020-08-13 RX ADMIN — CARVEDILOL 25 MG: 25 TABLET, FILM COATED ORAL at 22:32

## 2020-08-13 RX ADMIN — MIRTAZAPINE 15 MG: 15 TABLET, FILM COATED ORAL at 22:32

## 2020-08-13 ASSESSMENT — PAIN SCALES - GENERAL
PAINLEVEL_OUTOF10: 0
PAINLEVEL_OUTOF10: 0

## 2020-08-14 VITALS
BODY MASS INDEX: 40.3 KG/M2 | OXYGEN SATURATION: 96 % | HEIGHT: 60 IN | HEART RATE: 70 BPM | WEIGHT: 205.25 LBS | DIASTOLIC BLOOD PRESSURE: 80 MMHG | RESPIRATION RATE: 18 BRPM | SYSTOLIC BLOOD PRESSURE: 184 MMHG | TEMPERATURE: 98.5 F

## 2020-08-14 LAB
ABO/RH: NORMAL
ALBUMIN SERPL-MCNC: 3.3 G/DL (ref 3.4–5)
ANION GAP SERPL CALCULATED.3IONS-SCNC: 12 MMOL/L (ref 3–16)
ANTIBODY SCREEN: NORMAL
BASOPHILS ABSOLUTE: 0.1 K/UL (ref 0–0.2)
BASOPHILS ABSOLUTE: 0.1 K/UL (ref 0–0.2)
BASOPHILS RELATIVE PERCENT: 1.1 %
BASOPHILS RELATIVE PERCENT: 1.1 %
BLOOD BANK DISPENSE STATUS: NORMAL
BLOOD BANK DISPENSE STATUS: NORMAL
BLOOD BANK PRODUCT CODE: NORMAL
BLOOD BANK PRODUCT CODE: NORMAL
BPU ID: NORMAL
BPU ID: NORMAL
BUN BLDV-MCNC: 31 MG/DL (ref 7–20)
CALCIUM SERPL-MCNC: 9 MG/DL (ref 8.3–10.6)
CHLORIDE BLD-SCNC: 99 MMOL/L (ref 99–110)
CO2: 27 MMOL/L (ref 21–32)
CREAT SERPL-MCNC: 7.2 MG/DL (ref 0.8–1.3)
DESCRIPTION BLOOD BANK: NORMAL
DESCRIPTION BLOOD BANK: NORMAL
EOSINOPHILS ABSOLUTE: 0.5 K/UL (ref 0–0.6)
EOSINOPHILS ABSOLUTE: 0.6 K/UL (ref 0–0.6)
EOSINOPHILS RELATIVE PERCENT: 8.4 %
EOSINOPHILS RELATIVE PERCENT: 9.1 %
FERRITIN: 1665 NG/ML (ref 30–400)
GFR AFRICAN AMERICAN: 9
GFR NON-AFRICAN AMERICAN: 7
GLUCOSE BLD-MCNC: 101 MG/DL (ref 70–99)
GLUCOSE BLD-MCNC: 104 MG/DL (ref 70–99)
GLUCOSE BLD-MCNC: 109 MG/DL (ref 70–99)
GLUCOSE BLD-MCNC: 89 MG/DL (ref 70–99)
HCT VFR BLD CALC: 20.1 % (ref 40.5–52.5)
HCT VFR BLD CALC: 20.5 % (ref 40.5–52.5)
HCT VFR BLD CALC: 24.9 % (ref 40.5–52.5)
HEMOGLOBIN: 7 G/DL (ref 13.5–17.5)
HEMOGLOBIN: 8.4 G/DL (ref 13.5–17.5)
IMMATURE RETIC FRACT: 0.42 (ref 0.21–0.37)
IRON SATURATION: 49 % (ref 20–50)
IRON: 90 UG/DL (ref 59–158)
LYMPHOCYTES ABSOLUTE: 1.3 K/UL (ref 1–5.1)
LYMPHOCYTES ABSOLUTE: 1.7 K/UL (ref 1–5.1)
LYMPHOCYTES RELATIVE PERCENT: 21.1 %
LYMPHOCYTES RELATIVE PERCENT: 28 %
MAGNESIUM: 2.2 MG/DL (ref 1.8–2.4)
MCH RBC QN AUTO: 30.4 PG (ref 26–34)
MCH RBC QN AUTO: 30.6 PG (ref 26–34)
MCHC RBC AUTO-ENTMCNC: 33.8 G/DL (ref 31–36)
MCHC RBC AUTO-ENTMCNC: 34 G/DL (ref 31–36)
MCV RBC AUTO: 89.3 FL (ref 80–100)
MCV RBC AUTO: 90.5 FL (ref 80–100)
MONOCYTES ABSOLUTE: 0.4 K/UL (ref 0–1.3)
MONOCYTES ABSOLUTE: 0.4 K/UL (ref 0–1.3)
MONOCYTES RELATIVE PERCENT: 5.8 %
MONOCYTES RELATIVE PERCENT: 6.4 %
NEUTROPHILS ABSOLUTE: 3.4 K/UL (ref 1.7–7.7)
NEUTROPHILS ABSOLUTE: 3.9 K/UL (ref 1.7–7.7)
NEUTROPHILS RELATIVE PERCENT: 56.1 %
NEUTROPHILS RELATIVE PERCENT: 62.9 %
PDW BLD-RTO: 13.6 % (ref 12.4–15.4)
PDW BLD-RTO: 13.6 % (ref 12.4–15.4)
PERFORMED ON: ABNORMAL
PHOSPHORUS: 4.7 MG/DL (ref 2.5–4.9)
PLATELET # BLD: 141 K/UL (ref 135–450)
PLATELET # BLD: 149 K/UL (ref 135–450)
PMV BLD AUTO: 8.3 FL (ref 5–10.5)
PMV BLD AUTO: 8.8 FL (ref 5–10.5)
POTASSIUM SERPL-SCNC: 4.7 MMOL/L (ref 3.5–5.1)
RBC # BLD: 2.29 M/UL (ref 4.2–5.9)
RBC # BLD: 2.75 M/UL (ref 4.2–5.9)
RETICULOCYTE ABSOLUTE COUNT: 0.03 M/UL
RETICULOCYTE COUNT PCT: 1.43 % (ref 0.5–2.18)
SODIUM BLD-SCNC: 138 MMOL/L (ref 136–145)
TOTAL IRON BINDING CAPACITY: 184 UG/DL (ref 260–445)
WBC # BLD: 6 K/UL (ref 4–11)
WBC # BLD: 6.1 K/UL (ref 4–11)

## 2020-08-14 PROCEDURE — 36415 COLL VENOUS BLD VENIPUNCTURE: CPT

## 2020-08-14 PROCEDURE — 90935 HEMODIALYSIS ONE EVALUATION: CPT

## 2020-08-14 PROCEDURE — 85025 COMPLETE CBC W/AUTO DIFF WBC: CPT

## 2020-08-14 PROCEDURE — 85045 AUTOMATED RETICULOCYTE COUNT: CPT

## 2020-08-14 PROCEDURE — 83540 ASSAY OF IRON: CPT

## 2020-08-14 PROCEDURE — 6370000000 HC RX 637 (ALT 250 FOR IP): Performed by: INTERNAL MEDICINE

## 2020-08-14 PROCEDURE — 36430 TRANSFUSION BLD/BLD COMPNT: CPT

## 2020-08-14 PROCEDURE — 83550 IRON BINDING TEST: CPT

## 2020-08-14 PROCEDURE — 80069 RENAL FUNCTION PANEL: CPT

## 2020-08-14 PROCEDURE — 2580000003 HC RX 258: Performed by: INTERNAL MEDICINE

## 2020-08-14 PROCEDURE — G0378 HOSPITAL OBSERVATION PER HR: HCPCS

## 2020-08-14 PROCEDURE — 83735 ASSAY OF MAGNESIUM: CPT

## 2020-08-14 PROCEDURE — 6370000000 HC RX 637 (ALT 250 FOR IP): Performed by: STUDENT IN AN ORGANIZED HEALTH CARE EDUCATION/TRAINING PROGRAM

## 2020-08-14 RX ORDER — SENNA PLUS 8.6 MG/1
1 TABLET ORAL NIGHTLY PRN
Status: DISCONTINUED | OUTPATIENT
Start: 2020-08-14 | End: 2020-08-14 | Stop reason: HOSPADM

## 2020-08-14 RX ORDER — 0.9 % SODIUM CHLORIDE 0.9 %
20 INTRAVENOUS SOLUTION INTRAVENOUS ONCE
Status: DISCONTINUED | OUTPATIENT
Start: 2020-08-14 | End: 2020-08-14 | Stop reason: HOSPADM

## 2020-08-14 RX ADMIN — MIRTAZAPINE 15 MG: 15 TABLET, FILM COATED ORAL at 20:41

## 2020-08-14 RX ADMIN — PRAVASTATIN SODIUM 20 MG: 20 TABLET ORAL at 08:34

## 2020-08-14 RX ADMIN — SEVELAMER CARBONATE 1600 MG: 800 TABLET, FILM COATED ORAL at 12:21

## 2020-08-14 RX ADMIN — FERROUS SULFATE TAB 325 MG (65 MG ELEMENTAL FE) 325 MG: 325 (65 FE) TAB at 08:34

## 2020-08-14 RX ADMIN — SENNOSIDES 8.6 MG: 8.6 TABLET, FILM COATED ORAL at 11:20

## 2020-08-14 RX ADMIN — SEVELAMER CARBONATE 1600 MG: 800 TABLET, FILM COATED ORAL at 18:49

## 2020-08-14 RX ADMIN — SEVELAMER CARBONATE 1600 MG: 800 TABLET, FILM COATED ORAL at 08:33

## 2020-08-14 RX ADMIN — SERTRALINE HYDROCHLORIDE 25 MG: 25 TABLET ORAL at 08:34

## 2020-08-14 RX ADMIN — LOSARTAN POTASSIUM 25 MG: 25 TABLET, FILM COATED ORAL at 08:34

## 2020-08-14 RX ADMIN — PANTOPRAZOLE SODIUM 40 MG: 40 TABLET, DELAYED RELEASE ORAL at 06:44

## 2020-08-14 RX ADMIN — CARVEDILOL 25 MG: 25 TABLET, FILM COATED ORAL at 18:45

## 2020-08-14 RX ADMIN — CARVEDILOL 25 MG: 25 TABLET, FILM COATED ORAL at 08:34

## 2020-08-14 RX ADMIN — NEPHROCAP 1 MG: 1 CAP ORAL at 08:34

## 2020-08-14 RX ADMIN — Medication 10 ML: at 08:34

## 2020-08-14 RX ADMIN — Medication 10 ML: at 20:42

## 2020-08-14 RX ADMIN — PRAZOSIN HYDROCHLORIDE 2 MG: 1 CAPSULE ORAL at 20:41

## 2020-08-14 RX ADMIN — CYANOCOBALAMIN TAB 1000 MCG 1000 MCG: 1000 TAB at 08:33

## 2020-08-14 RX ADMIN — FOLIC ACID 1 MG: 1 TABLET ORAL at 08:34

## 2020-08-14 RX ADMIN — SODIUM CHLORIDE 20 ML: 9 INJECTION, SOLUTION INTRAVENOUS at 04:18

## 2020-08-14 ASSESSMENT — PAIN SCALES - GENERAL
PAINLEVEL_OUTOF10: 0

## 2020-08-14 NOTE — CARE COORDINATION
CM spoke with Agata Go at [facility] who states pt is LTC.   CM advised that pt is in observation and would like discharge back today or tomorrow at the latest.      Darcie Soriano RN, BSN,   4th Floor Progressive Care Unit  [phone]

## 2020-08-14 NOTE — CARE COORDINATION
Case Management Assessment            Discharge Note                    Date / Time of Note: 8/14/2020 2:27 PM                  Discharge Note Completed by: Eloise Sykes    Patient Name: Tulio Castro   YOB: 1945  Diagnosis: Anemia [D64.9]  Anemia [D64.9]   Date / Time: 8/13/2020  8:38 PM    Current PCP: Torrey Templeton MD  Clinic patient: No    Hospitalization in the last 30 days: No    Advance Directives:  Code Status: Full Code  [place] DNR form completed and on chart: Not Indicated    Financial:  Payor: Dev Bundy / Plan: Katina Zavala / Product Type: *No Product type* /      Pharmacy:    [facility] [address]. [address] [address]  Phone: [phone] Fax: [phone] [address] [facility] [phone] Akhilmaykel Venkat [phone]  [address] [place] [address]  Phone: [phone] Fax: [phone]    [address], [address]  [address]  Phone: [phone] Fax: [phone]      Assistance purchasing medications?: Potential Assistance Purchasing Medications: Yes  Assistance provided by Case Management: None at this time    Does patient want to participate in local refill/ meds to beds program?: Yes    Meds To Beds General Rules:  1. Can ONLY be done Monday- Friday between 8:30am-5pm  2. Prescription(s) must be in pharmacy by 3pm to be filled same day  3. Copy of patient's insurance/ prescription drug card and patient face sheet must be sent along with the prescription(s)  4. Cost of Rx cannot be added to hospital bill. If financial assistance is needed, please contact unit  or ;  or  CANNOT provide pharmacy voucher for patients co-pays  5.  Patients can then  the prescription on their

## 2020-08-14 NOTE — PROGRESS NOTES
Pt's daughter Mady London updated on pt's current status and plan of care. All questions answered.  Electronically signed by Nikkie Caldera RN on 8/14/2020 at 8:44 AM

## 2020-08-14 NOTE — DISCHARGE INSTR - COC
Continuity of Care Form    Patient Name: Eddie Garcia   :  1945  MRN:  [id number]    Admit date:  2020  Discharge date: 2020  Code Status Order: Full Code   Advance Directives:   Advance Care Flowsheet Documentation     Date/Time Healthcare Directive Type of Healthcare Directive Copy in [address] Agent's Name Healthcare Agent's Phone Number    [phone]  No, patient does not have an advance directive for healthcare treatment -- -- -- -- --          Admitting Physician:  Chester Serna MD  PCP: Joenathan Goltz, MD    Discharging Nurse: [facility] Unit/Room#: 9128/8918-29  Discharging Unit Phone Number:[phone]    Emergency Contact:   Extended Emergency Contact Information  Primary Emergency Contact: Cely Thorntoner of [address]  Phone: [phone]  Relation: Child  Secondary Emergency Contact: Jinny Schwarz   [facility] of [address] Phone: [phone]  Relation: Domestic Partner    Past Surgical History:  Past Surgical History:   Procedure Laterality Date    CARDIAC SURGERY      cardiac stent    FEMORAL-TIBIAL BYPASS GRAFT Right 2019    RIGHT FEMORAL POSTERIOR TIBIAL BYPASS performed by Bailey Downing MD at [address] [place] [address] Right 2019    INCISION AND DRAINAGE, TRANSMETATARSAL AMPUTATION ALL ON RIGHT FOOT performed by Hossein Villasenor DPM at [address] [place] [address] Right 2019    REVISION OF TRANSMETATARSAL AMPUTATION RIGHT FOOT performed by Hossein Villasenor DPM at [address] [place] [address] Left 2019    TMA    FOOT AMPUTATION Left 2019    LEFT FOOT TRANSMETATARSAL AMPUTATION performed by Chet Trent DPM at [address] [place] [address] Left 2019    REPEAT INCISION AND DRAINAGE, REVISION OF LEFT FOOT TRANSMETATARSAL AMPUTATION performed by Chet Trent DPM at [address] [place] [address] Left 10/11/2019    LEFT FOOT INCISION AND DRAINAGE WITH REVISION OF TRANSMETARSAL AMPUTATION WITH WOUND VAC APPLICATION  performed by Rocael Saenz DPM at [address] Right 1/18/2019    RIGHT BELOW THE KNEE AMPUTATION performed by Bolivar Davidson MD at [address] Left 12/9/2019    LEFT BELOW KNEE AMPUTATION performed by Bolivar Davidson MD at [facility] Left 2/6/2020    DEBRIDEMENT AND PLACEMENT OF WOUND VAC LEFT BELOW THE KNEE AMPUTATION SITE performed by Kentrell Kelly MD at [facility] 1       Immunization History:   Immunization History   Administered Date(s) Administered    Influenza Vaccine, unspecified formulation 10/05/2016    Influenza, High Dose (Fluzone 65 yrs and older) 10/05/2011, 10/07/2014, 11/17/2015, 10/15/2017, 10/09/2018    Pneumococcal Conjugate 13-valent ([id number]) 11/15/2016    Pneumococcal Polysaccharide ([id number]) 10/07/2014    Tdap (Boostrix, Adacel) 12/04/2018       Active Problems:  Patient Active Problem List   Diagnosis Code    HTN (hypertension) I10    Diabetes mellitus ([facility] E11.9    Anemia D64.9    Diabetic retinopathy ([facility] E11.319    Cataracts, bilateral H26.9    Mixed hyperlipidemia E78.2    Atrial fibrillation ([address] I48.91    ESRD on dialysis ([facility] N18.6, Z99.2    Ischemic heart disease I25.9    Acute anterolateral wall MI ([address] I21.09    Acute ST elevation myocardial infarction (STEMI) involving left anterior descending (LAD) coronary artery ([facility]) I21.02    CKD (chronic kidney disease) stage 4, GFR 15-29 ml/min ([facility]) N18.4    DM (diabetes mellitus) type II uncontrolled with renal manifestation E11.29, E11.65    Fungal infection of foot B35.3    Hemodialysis patient ([facility] Z99.2    Malignant essential hypertension I10    Right second toe ulcer, with necrosis of bone ([facility]) L97.514    Hyperkalemia E87.5    CAD (coronary artery disease) s/p stent to LAD and RCA 10/2018 I25.10    Acute metabolic encephalopathy M95.19    Pain in right foot M79.671    Somnolence R40.0  Encounter for palliative care Z51.5    Advance directive discussed with patient Z71.89    Gangrene of right foot ([facility] I96    Gas gangrene ([address] A48.0    Sepsis due to pneumonia ([address] J18.9, A41.9    Advance care planning Z71.89    Abscess or cellulitis of toe, left L03.032, L02.612    Gangrene of toe of left foot ([address] I96    PVD (peripheral vascular disease) ([facility]) I73.9    Type 2 diabetes mellitus, with long-term current use of insulin ([facility]) E11.9, Z79.4    Fever R50.9    Wound, open T14. 8XXA    Pseudomonas infection A49.8    Diabetic foot infection ([facility] E11.628, L08.9    Surgical wound dehiscence T81.31XA    Surgical wound, non healing T81.89XA    Osteomyelitis ([address] M86.9    Necrosis of amputation stump ([facility] T87.50    Dehiscence of closure of skin, sequela T81.31XS    Acute on chronic anemia D64.9       Isolation/Infection:   Isolation          Droplet Plus        Patient Infection Status     Infection Onset Added Last Indicated Last Indicated By Review Planned Expiration Resolved Resolved By    COVID-19 Rule Out 08/13/20 08/13/20 08/13/20 COVID-19 (Ordered) 08/20/20 08/27/20      Resolved    COVID-19 Rule Out 06/16/20 06/16/20 06/16/20 COVID-19 (Ordered)   06/17/20 Rule-Out Test Resulted    VRE 10/08/19 10/12/19 10/11/19 Surgical Culture   06/18/20 Eliana Schmidt RN    MRSA 08/21/19 08/23/19 08/24/19 Body Fluid Culture   06/18/20 Eliana Schmidt RN          Nurse Assessment:  Last Vital Signs: BP (!) 180/78   Pulse 60   Temp 99.1 °F (37.3 °C)   Resp 18   Ht 4' (1.219 m)   Wt 205 lb 4 oz (93.1 kg)   SpO2 97%   BMI 62.63 kg/m²     Last documented pain score (0-10 scale): Pain Level: 0  Last Weight:   Wt Readings from Last 1 Encounters:   08/14/20 205 lb 4 oz (93.1 kg)     Mental Status:  oriented    IV Access:  - None    Nursing Mobility/ADLs:  Walking   Dependent  Transfer  Dependent  Bathing  Dependent  Dressing  Dependent  Toileting  Dependent  Feeding  Independent  Med Admin  Assisted  Med Delivery   whole    Wound Care Documentation and Therapy:  Wound 20 Buttocks Left;Right skin flaking off, moisture related (Active)   Number of days: 58        Elimination:  Continence:   · Bowel: No  · Bladder: No  Urinary Catheter: None   Colostomy/Ileostomy/Ileal Conduit: No       Date of Last BM: 2020    Intake/Output Summary (Last 24 hours) at 2020 1451  Last data filed at 2020 1350  Gross per 24 hour   Intake 520 ml   Output --   Net 520 ml     I/O last 3 completed shifts: In: 300 [I.V.:300]  Out: -     Safety Concerns:     None    Impairments/Disabilities:      None    Nutrition Therapy:  Current Nutrition Therapy:   - Oral Diet:  Clear Liquid    Routes of Feeding: Oral  Liquids: Thin Liquids  Daily Fluid Restriction: no  Last Modified Barium Swallow with Video (Video Swallowing Test): not done    Treatments at the Time of Hospital Discharge:   Respiratory Treatments: ***  Oxygen Therapy:  is not on home oxygen therapy.   Ventilator:    - No ventilator support    Rehab Therapies: {THERAPEUTIC INTERVENTION:[id number]}  Weight Bearing Status/Restrictions: {[facility] Weight Bearin} bilat BKA  Other Medical Equipment (for information only, NOT a DME order):  wheelchair  Other Treatments: ***    Patient's personal belongings (please select all that are sent with patient):  {[facility] DME Belongings:[id number]}    RN SIGNATURE:  Electronically signed by Scott Larios RN on 20 at 4:39 PM EDT    CASE MANAGEMENT/SOCIAL WORK SECTION    Inpatient Status Date: ***    Readmission Risk Assessment Score:  Readmission Risk              Risk of Unplanned Readmission:        32           Discharging to Facility/ Agency   Name:   Jerrica Ranks: [facility], [address] [place] [address]       Phone: [phone]       Fax: [phone]          Dialysis Facility (if applicable)   · Name:  [facility]    / signature: Electronically signed by Kane Valladares RN on 8/14/20 at 2:54 PM EDT    PHYSICIAN SECTION    Prognosis: Fair    Condition at Discharge: Stable    Rehab Potential (if transferring to Rehab): Fair    Recommended Labs or Other Treatments After Discharge: pt/ot    Physician Certification: I certify the above information and transfer of Estrada Shannon  is necessary for the continuing treatment of the diagnosis listed and that he requires [place] for less 30 days.      Update Admission H&P: No change in H&P    PHYSICIAN SIGNATURE:  Electronically signed by Jocelynn West MD on 8/14/20 at 2:52 PM EDT

## 2020-08-14 NOTE — CONSULTS
EGD showing antral erosions. He also had capsule endoscopy last year but results are not available.     We are called for management of ESRD. Interval History:   - Hgb today 7.0. 1u PRBC ordered. patient seen and examined at bedside. He has no complains. He denies lightheadedness, dizziness. He reports that nurse at [facility] told him he had black stool and was concerned for GI bleed but he never seen. He makes little of bit urine but couldn't tell color as he is on diaper and urine outout normally comes at night. ROS:     Seen with-     positives in bold   Constitutional:  fever, chills, weakness, weight change, fatigue  Skin:  rash, pruritus, hair loss, bruising, dry skin, petechiae  Head, Face, Neck   headaches, swelling,  cervical adenopathy  Respiratory: shortness of breath, cough, or wheezing  Cardiovascular: chest pain, palpitations, dizzy, edema  Gastrointestinal: nausea, vomiting, diarrhea, constipation,belly pain    Yellow skin, blood in stool  Musculoskeletal:  back pain, muscle weakness, gait problems,       joint pain or swelling. Genitourinary:  dysuria, poor urine flow, flank pain, blood in urine  Neurologic:  vertigo, TIA'S, syncope, seizures, focal weakness  Psychosocial:  insomnia, anxiety, or depression.                        All other remaining systems are negative or unable to obtain        PMH/PSH/SH/Family History:     Past Medical History:   Diagnosis Date    Anemia     Atrial fibrillation ([facility] 11/4/2013    CAD (coronary artery disease)     Mi, stent    Chronic kidney disease     DVT (deep venous thrombosis) (HCC)     End stage renal disease (HCC)     Gas gangrene ([facility]     Hemodialysis patient ([facility]     M-W-F    Hx of blood clots     Hyperkalemia     Hyperlipidemia 5/30/2012    Hypertension     Hyperthyroidism     Ischemic heart disease 6/07/2611    Metabolic encephalopathy     MI (myocardial infarction) ([facility] 10/2018    Type II or unspecified type diabetes mellitus without mention of complication, not stated as uncontrolled        Past Surgical History:   Procedure Laterality Date    CARDIAC SURGERY      cardiac stent    FEMORAL-TIBIAL BYPASS GRAFT Right 1/9/2019    RIGHT FEMORAL POSTERIOR TIBIAL BYPASS performed by Sudha Roy MD at [address] [place] [address] Right 1/4/2019    INCISION AND DRAINAGE, TRANSMETATARSAL AMPUTATION ALL ON RIGHT FOOT performed by Chio Thacker DPM at [address] [place] [address] Right 1/14/2019    REVISION OF TRANSMETATARSAL AMPUTATION RIGHT FOOT performed by Chio Thacker DPM at [address] [place] [address] Left 08/24/2019    TMA    FOOT AMPUTATION Left 8/24/2019    LEFT FOOT TRANSMETATARSAL AMPUTATION performed by Tu Irvin DPM at [address] [place] [address] Left 8/27/2019    REPEAT INCISION AND DRAINAGE, REVISION OF LEFT FOOT TRANSMETATARSAL AMPUTATION performed by Tu Irvin DPM at [address] [place] [address] Left 10/11/2019    LEFT FOOT INCISION AND DRAINAGE WITH REVISION OF TRANSMETARSAL AMPUTATION WITH WOUND VAC APPLICATION  performed by Tu Irvin DPM at [address] Right 1/18/2019    RIGHT BELOW THE KNEE AMPUTATION performed by Sudha Roy MD at [address] Left 12/9/2019    LEFT BELOW KNEE AMPUTATION performed by Sudha Roy MD at [facility] Left 2/6/2020    DEBRIDEMENT AND PLACEMENT OF WOUND VAC LEFT BELOW THE KNEE AMPUTATION SITE performed by Chapito Sotelo MD at [facility] 1        reports that he has never smoked. He has never used smokeless tobacco. He reports previous alcohol use. He reports that he does not use drugs. family history includes Arthritis in his mother.          Medication:     Current Facility-Administered Medications: 0.9 % sodium chloride bolus, 20 mL, Intravenous, Once  senna (SENOKOT) tablet 8.6 mg, 1 tablet, Oral, Nightly PRN  sodium chloride flush 0.9 % injection 10 mL, 10 mL, Intravenous, 2 times per day  sodium no, distressed- no, in good spirits- yes      HEENT: Lips- normal, teeth- ok , oral mucosa- moist  Neck : Mass- no, appears symmetrical, JVD- not visible  Respiratory: Respiratory effort-  wnl, wheeze- no, crackles - no  Cardiovascular: Ausculation- No M/R/G, Edema no  Abdomen: visible mass- no, distention- no, scar- no, tenderness- no                            hepatosplenomegaly-  no  Musculoskeletal:  clubbing no,cyanosis- no , digital ischemia- no                           muscle strength- grossly normal , tone - grossly normal  Skin: rashes- no , ulcers- no, induration- no, tightening - no  Psychiatric:  Judgement and insight- normal           AAO X 3       LABS:     Recent Labs     08/14/20  0650   WBC 6.0   HGB 7.0*   HCT 20.1*  20.5*        Recent Labs     08/14/20  0650      K 4.7   CL 99   CO2 27   BUN 31*   CREATININE 7.2*   GLUCOSE 89   MG 2.20   PHOS 4.7        Patient was seen and examined and the case was discussed with the resident. He acted as my scribe. I agree with the assessment and plan.     Thanks  Nephrology  Blayne Moreau [address]  Office: [phone]  Cell: [phone]  Fax: [phone]

## 2020-08-14 NOTE — H&P
Hospital Medicine History & Physical      PCP: Radha Jerry MD    Date of Admission: 8/13/2020    Date of Service: Pt seen/examined on 08/13/20 and Admitted to Observation    Chief Complaint: Low blood counts hemoglobin of 6.2 found on labs at nursing facility      History Of Present Illness:   69-year-old male who is a resident of [address] [place], with PMHx of ESRD on HD (MWF), T2DM, HTN, PVD s/p bilateral BKA  - sent to the ED at [facility] for Hgb 6.1, Hct 18.4 drawn on 08/10  -In the ED his blood pressure 156/59, pulse 62, respiratory rate 18/min, 100% on room air.  -Labs showed hemoglobin of 6.7, WBC of 6.2, otherwise renal profile was unremarkable  He denies chest pain shortness of breath, denies any blood in stool or black stool. He says his blood counts come down intermittently. He says he feels fine. Review of records  06/2020 - admitted for anemia, received 1 unit pRBC, he was discharged back to skilled nursing facility to start anaresp every week with dialysis. He was also going to follow up with Dr. Sophie Fuchs from urology for Right kidney mass    07/20 [facility] ED - he was sent to ED from HD for a blood transfusion, he was found to be in no acute distress, vitals are reassuring, hemoglobin was found to be 7.2, given he was asymptomatic he was not given transfusion. Iron studies were repeated and appear consistent w/ anemia of chronic disease likely 2/2 to ESRD. She is stable to be discharge back to SNF to start on Aranesp q. weekly with dialysis and Calcitriol 0.25mg on dialysis days    8/3 [facility] ED --he had a hemoglobin of 6.3 at the nursing facility and he was sent to the ED for further evaluation, in the ED hemoglobin was 7.5, fecal occult was negative, he was discharged home without blood transfusion to follow-up with primary care physician.     Past Medical History:          Diagnosis Date    Anemia     Atrial fibrillation ([address] 11/4/2013    CAD (coronary artery disease)     Mi, stent  Chronic kidney disease     [facility]     End stage renal disease (HCC)     [facility] gangrene [facility]     Hemodialysis patient ([facility]     M-W-F    Hx of blood clots     Hyperkalemia     Hyperlipidemia 5/30/2012    Hypertension     Hyperthyroidism     Ischemic heart disease 6/39/9972    Metabolic encephalopathy     MI (myocardial infarction) ([facility] 10/2018    Type II or unspecified type diabetes mellitus without mention of complication, not stated as uncontrolled        Past Surgical History:          Procedure Laterality Date    CARDIAC SURGERY      cardiac stent    FEMORAL-TIBIAL BYPASS GRAFT Right 1/9/2019    RIGHT FEMORAL POSTERIOR TIBIAL BYPASS performed by Sharla De La Torre MD at [address] [place] [address] Right 1/4/2019    INCISION AND DRAINAGE, TRANSMETATARSAL AMPUTATION ALL ON RIGHT FOOT performed by Fausto Morin DPM at [address] [place] [address] Right 1/14/2019    REVISION OF TRANSMETATARSAL AMPUTATION RIGHT FOOT performed by Fausto Morin DPM at [address] [place] [address] Left 08/24/2019    TMA    FOOT AMPUTATION Left 8/24/2019    LEFT FOOT TRANSMETATARSAL AMPUTATION performed by Saroj Yuan DPM at [address] [place] [address] Left 8/27/2019    REPEAT INCISION AND DRAINAGE, REVISION OF LEFT FOOT TRANSMETATARSAL AMPUTATION performed by Saroj Yuan DPM at [address] [place] [address] Left 10/11/2019    LEFT FOOT INCISION AND DRAINAGE WITH REVISION OF TRANSMETARSAL AMPUTATION WITH WOUND VAC APPLICATION  performed by Saroj Yuan DPM at [address] Right 1/18/2019    RIGHT BELOW THE KNEE AMPUTATION performed by Sharla De La Torre MD at [address] Left 12/9/2019    LEFT BELOW KNEE AMPUTATION performed by Sharla De La Torre MD at [facility] 2/6/2020    DEBRIDEMENT AND PLACEMENT OF WOUND VAC LEFT BELOW THE KNEE AMPUTATION SITE performed by Darlin Butterfield MD at [place] [address]       Medications Prior to Admission: Prior to Admission medications    Medication Sig Start Date End Date Taking? Authorizing Provider   calcitRIOL (ROCALTROL) 0.25 MCG capsule Take 1 capsule by mouth every other day MWF on dialysis days. 6/18/20   Maico Aj MD   sevelamer hcl (RENAGEL) 800 MG tablet Take 1,600 mg by mouth 3 times daily (with meals)    Historical Provider, MD   sertraline (ZOLOFT) 25 MG tablet Take 25 mg by mouth daily    Historical Provider, MD   folic acid (FOLVITE) 1 MG tablet Take 1 mg by mouth daily    Historical Provider, MD   ferrous sulfate (IRON 325) 325 (65 Fe) MG tablet Take 325 mg by mouth daily (with breakfast)    Historical Provider, MD   vitamin B-12 (CYANOCOBALAMIN) 1000 MCG tablet Take 1,000 mcg by mouth daily    Historical Provider, MD   mirtazapine (REMERON) 15 MG tablet Take 15 mg by mouth nightly    Historical Provider, MD   pantoprazole sodium (PROTONIX) 40 MG PACK packet Take 40 mg by mouth every morning (before breakfast)    Historical Provider, MD   prazosin (MINIPRESS) 2 MG capsule Take 2 mg by mouth nightly    Historical Provider, MD TOLBERT Complex-C-Folic Acid (JOSUÉ CAPS) 1 MG CAPS Take 1 mg by mouth daily    Historical Provider, MD   pravastatin (PRAVACHOL) 20 MG tablet Take 20 mg by mouth daily    Historical Provider, MD   carvedilol (COREG) 25 MG tablet Take 25 mg by mouth 2 times daily (with meals)    Historical Provider, MD   insulin lispro (HUMALOG) 100 UNIT/ML injection vial Inject into the skin 3 times daily (before meals) SLIDING SCALE    Historical Provider, MD   Petrolatum-Zinc Oxide (PHYTOPLEX Z-GUARD) 57-17 % PSTE Apply topically    Historical Provider, MD   losartan (COZAAR) 25 MG tablet Take 1 tablet by mouth daily 12/13/19   Malika Peña MD   aspirin 81 MG tablet Take 81 mg by mouth daily     Historical Provider, MD   nitroGLYCERIN (NITROSTAT) 0.4 MG SL tablet Place 0.4 mg under the tongue every 5 minutes as needed for Chest pain up to max of 3 total doses.  If no relief after 1 dose, call 911. Historical Provider, MD       Allergies:  Patient has no known allergies. Social History:      The patient currently lives at [address]    TOBACCO:   reports that he has never smoked. He has never used smokeless tobacco.  ETOH:   reports previous alcohol use. Family History:      Reviewed        Problem Relation Age of Onset    Arthritis Mother        REVIEW OF SYSTEMS:   Pertinent positives as noted in the HPI. All other systems reviewed and negative. PHYSICAL EXAM PERFORMED:    BP (!) 183/64   Pulse 63   Temp 98.2 °F (36.8 °C) (Oral)   Resp 20   SpO2 100%     General appearance:  No apparent distress, appears stated age and cooperative. HEENT:  Normal cephalic, atraumatic without obvious deformity. Pupils equal, round, and reactive to light. Extra ocular muscles intact. Conjunctivae/corneas clear. Neck: Supple, with full range of motion. No jugular venous distention. Trachea midline. Respiratory:  Normal respiratory effort. Clear to auscultation, bilaterally without Rales/Wheezes/Rhonchi. Cardiovascular:  Regular rate and rhythm with normal S1/S2 without murmurs, rubs or gallops. Abdomen: Soft, non-tender, non-distended with normal bowel sounds. Musculoskeletal:  No clubbing, cyanosis or edema bilaterally. Full range of motion without deformity. Skin: Skin color, texture, turgor normal.  No rashes or lesions. Neurologic:  Neurovascularly intact without any focal sensory/motor deficits. Cranial nerves: II-XII intact, grossly non-focal.  Psychiatric:  Alert and oriented, thought content appropriate, normal insight  Capillary Refill: Brisk,< 3 seconds   Peripheral Pulses: +2 palpable, equal bilaterally       Labs:     No results for input(s): WBC, HGB, HCT, PLT in the last 72 hours. No results for input(s): NA, K, CL, CO2, BUN, CREATININE, CALCIUM, PHOS in the last 72 hours.     Invalid input(s): MAGNES  No results for input(s): AST, ALT, BILIDIR, BILITOT, ALKPHOS in the last 72 hours. No results for input(s): INR in the last 72 hours. No results for input(s): Marly Sox in the last 72 hours.     Urinalysis:      Lab Results   Component Value Date    NITRU NEGATIVE 04/17/2013    RBCUA 3-5 04/17/2013    BLOODU NEGATIVE 04/17/2013    SPECGRAV 1.015 04/17/2013    GLUCOSEU NEGATIVE 04/17/2013       Radiology:     CXR: I have reviewed the CXR with the following interpretation: N/A  EKG:  I have reviewed the EKG with the following interpretation: N/A    No orders to display       ASSESSMENT:    Active Hospital Problems    Diagnosis Date Noted    Anemia [D64.9] 09/07/2011     Anemia I suspect this is likely due to underlying ESRD  Hold aspirin, SCDs for DVT prophylaxis  Continue Protonix which he is at home  Fecal occult was negative, will hold off GI consult  Previous iron studies consistent with anemia of chronic disease, will recheck iron profile  Continue B12/folate supplements    ESRD hemodialysis  Consulted Dr. Salomon Seo for hemodialysis management while inpatient    Type 2 diabetes  He is insulin-dependent  We will start him on low-dose Lantus and insulin  Monitor blood glucose levels, will make changes accordingly  Hypoglycemia protocol    Peripheral vascular disease status post bilateral BKA  Holding aspirin until blood counts greater than 7.0    Hypertension  Continue home blood pressure medication    CAD; continue statin, home medications, holding aspirin    Right kidney mass - last imaging in 05/2020, he was supposed to followup with urology, will need records from urology group(Dr. Jimmy Cabello), he couldn't tell me if he is following her or not    DVT Prophylaxis: SCDs  Diet: DIET CLEAR LIQUID;  Code Status: Full Code    PT/OT Eval Status: N/A    Dispo -admit to observation, he is being transferred from [facility] and initially an inpatient order was placed, after reviewing the patient case and evaluating the patient I feel he needs monitoring for less than 48 hours  And he may be discharged tomorrow if remains stable. Waqar Dooley MD   Hospitalist    Thank you Citlali Singh MD for the opportunity to be involved in this patient's care. If you have any questions or concerns please feel free to contact me at [phone].

## 2020-08-14 NOTE — PROGRESS NOTES
Pt's daughter Bernetta Boxer updated on pt's current status and plan of care. All questions answered.  Electronically signed by Louie Bangura RN on 8/13/2020 at 10:07 PM

## 2020-08-14 NOTE — PROGRESS NOTES
[facility] Wound Ostomy Continence Nurse  Follow-up Progress Note       NAME:  Bandar Collins  MEDICAL RECORD NUMBER:  [id number]  AGE:  67 y.o. GENDER:  male  :  1945  TODAY'S DATE:  2020    Subjective:   Wound Identification: L stump  Wound Type:surgical  Contributing Factors:DM, BKA, PVD , Xerosis     Patient Goal of Care:  [x] Wound Healing  [] Odor Control  [] Palliative Care  [] Pain Control   [] Other:     Objective:    BP (!) 182/72   Pulse 64   Temp 98.4 °F (36.9 °C) (Oral)   Resp 18   Ht 4' (1.219 m)   Wt 215 lb 6.2 oz (97.7 kg)   SpO2 97%   BMI 65.73 kg/m²   Freddie Risk Score: Freddie Scale Score: 16  Assessment:   Measurements:  Wound 20 Buttocks Left;Right skin flaking off, moisture related (Active)   Number of days: 58     Silver nitrated hypergranular bleb, silver alginate and foam over. Response to treatment:  insensate  Plan:   Plan of Care:  Incision 20 Pretibial Left-Dressing/Treatment: Alginate with Ag, Foam    Specialty Bed Required :n/a  [] Low Air Loss   [] Pressure Redistribution  [] Fluid Immersion  [] Bariatric  [] Total Pressure Relief  [] Other:     Current Diet: DIET CLEAR LIQUID; Renal    Patient/Caregiver Teaching: etiology, treatment  Level of patient/caregiver understanding able to:   [x] Indicates understanding       [] Needs reinforcement  [] Unsuccessful      [x] Verbal Understanding  [] Demonstrated understanding       [] No evidence of learning  [] Refused teaching         [] N/A       Electronically signed by Dionte Dennis RN, CWOCN on 2020 at 1:07 PM

## 2020-08-14 NOTE — PLAN OF CARE
Problem: Falls - Risk of:  Goal: Will remain free from falls  Description: Will remain free from falls  Outcome: Met This Shift  Note: Side rails up x 2 call light in reach bed in low position bed/chair alarm on when in use     Problem: Falls - Risk of:  Goal: Absence of physical injury  Description: Absence of physical injury  Outcome: Met This Shift     Problem: Skin Integrity:  Goal: Will show no infection signs and symptoms  Description: Will show no infection signs and symptoms  Outcome: Met This Shift     Problem: Skin Integrity:  Goal: Absence of new skin breakdown  Description: Absence of new skin breakdown  Outcome: Met This Shift     Problem: Daily Care:  Goal: Daily care needs are met  Description: Daily care needs are met  Outcome: Met This Shift     Problem: Pain:  Goal: Patient's pain/discomfort is manageable  Description: Patient's pain/discomfort is manageable  Outcome: Met This Shift  Note: denies     Problem: Discharge Planning:  Goal: Patients continuum of care needs are met  Description: Patients continuum of care needs are met  Outcome: Met This Shift  Note: Eturn to SNF when medically stable     Problem: Gas Exchange - Impaired  Goal: Absence of hypoxia  8/14/2020 1030 by Jessie Mckeon RN  Outcome: Met This Shift  Note: Room air ats WNL's     Problem: Gas Exchange - Impaired  Goal: Promote optimal lung function  Outcome: Met This Shift     Problem: Cardiac:  Goal: Ability to maintain an adequate cardiac output will improve  Description: Ability to maintain an adequate cardiac output will improve  8/14/2020 1030 by Jessie Mckeon RN  Outcome: Ongoing  Note: Low H/H after PRBC's will get 2nd unit in dialysis

## 2020-08-14 NOTE — PLAN OF CARE
Problem: Cardiac:  Goal: Ability to maintain an adequate cardiac output will improve  Description: Ability to maintain an adequate cardiac output will improve  Outcome: Ongoing  Note: Pt has elevated blood pressure overnight (see flowsheets); vitals have otherwise remained stable. Pt has had no complaints of chest pain, shortness of breath/dyspnea, or ligthheadedness/dizziness. Will continue to monitor. Problem: Gas Exchange - Impaired  Goal: Absence of hypoxia  Outcome: Ongoing  Note: Pt has maintained SpO2 > 92% on room air with respiratory rate 18-20 breaths/min and no complaints of shortness of breath/dyspnea. Will continue to monitor.

## 2020-08-14 NOTE — PROGRESS NOTES
4 Eyes Admission Assessment     I agree as the admission nurse that 2 RN's have performed a thorough Head to Toe Skin Assessment on the patient. ALL assessment sites listed below have been assessed on admission. Areas assessed by both nurses:   [x]   Head, Face, and Ears   [x]   Shoulders, Back, and Chest  [x]   Arms, Elbows, and Hands   [x]   Coccyx, Sacrum, and Ischum  [x]   Legs, Feet, and Heels        Does the Patient have Skin Breakdown?   No         Freddie Prevention initiated:  Yes   Wound Care Orders initiated:  No      [facility] nurse consulted for Pressure Injury (Stage 3,4, Unstageable, DTI, NWPT, and Complex wounds) or Freddie score 18 or lower:  No      Nurse 1 eSignature: Electronically signed by Ramón Bedolla RN on 8/14/20 at 2:02 AM EDT    **SHARE this note so that the co-signing nurse is able to place an eSignature**    Nurse 2 eSignature: Electronically signed by Osmin Meyer RN on 8/14/20 at 3:14 AM EDT

## 2020-08-14 NOTE — PROGRESS NOTES
NEPHROLOGIST DIALYSIS NOTE:    Seen during dialysis  Vitals and labs as given below. Please see today's consult note for remaining details    Vitals:    08/14/20 1615   BP: (!) 169/70   Pulse: 58   Resp: 18   Temp: 99.2 °F (37.3 °C)   SpO2:      Lab Results   Component Value Date     08/14/2020    K 4.7 08/14/2020    K 4.3 08/03/2020    CL 99 08/14/2020    CO2 27 08/14/2020    BUN 31 08/14/2020    CREATININE 7.2 08/14/2020    GLUCOSE 89 08/14/2020    GLUCOSE 216 05/23/2012    CALCIUM 9.0 08/14/2020           Please call with questions at-  24 Hrs Answering service [phone]  Perfect serve, or cell phone 7 am - [facility]  [email or web address]

## 2020-08-15 NOTE — DISCHARGE SUMMARY
Hospitalist Discharge Summary    Patient ID:  Cem García  [id number]  55 y.o.  1945    Admit date: 8/13/2020    Discharge date: 8/14/2020    Disposition: SNF    Admission Diagnoses:   Patient Active Problem List   Diagnosis    HTN (hypertension)    [facility]    Anemia    Diabetic retinopathy [facility]    Cataracts, bilateral    Mixed hyperlipidemia    Atrial fibrillation ([facility]    ESRD on dialysis ([facility]    Ischemic heart disease    Acute anterolateral wall MI ([facility]    Acute ST elevation myocardial infarction (STEMI) involving left anterior descending (LAD) coronary artery ([facility])    CKD (chronic kidney disease) stage 4, GFR 15-29 ml/min ([facility])    DM (diabetes mellitus) type II uncontrolled with renal manifestation    Fungal infection of foot    Hemodialysis patient [facility]    Malignant essential hypertension    Right second toe ulcer, with necrosis of bone ([facility])    Hyperkalemia    CAD (coronary artery disease) s/p stent to LAD and RCA 10/2018    Acute metabolic encephalopathy    Pain in right foot    Somnolence    Encounter for palliative care    Advance directive discussed with patient    Gangrene of right foot ([facility]    Gas gangrene ([facility]    Sepsis due to pneumonia ([facility]    Advance care planning    Abscess or cellulitis of toe, left    Gangrene of toe of left foot ([address]    PVD (peripheral vascular disease) ([facility]    Type 2 diabetes mellitus, with long-term current use of insulin ([address]    Fever    Wound, open    Pseudomonas infection    Diabetic foot infection ([address]    Surgical wound dehiscence    Surgical wound, non healing    Osteomyelitis ([address]    Necrosis of amputation stump ([facility]    Dehiscence of closure of skin, sequela    Acute on chronic anemia       Discharge Diagnoses: Active Problems:    Anemia  Resolved Problems:    * No resolved hospital problems.  *      Code Status:  Prior    Condition:  Stable    Discharge Diet: Diet:  No diet orders on file    PCP to do list: Follow-up for improvement of symptoms    Hospital Course: 48-year-old male with past medical history of end-stage renal disease on hemodialysis, type 2 diabetes mellitus, peripheral vascular disease status post bilateral BKA presented to hospital as outpatient lab work showed hemoglobin of 6.1. Arrival to the hospital patient did not complain of any significant symptoms and the only reason he was here was because of the low hemoglobin. While in the hospital patient was transfused and hemodialysis. Repeat blood work showed hemoglobin of 8.4. Following this patient was discharged back to his facility in stable condition. Discharge Medications:   Discharge Medication List as of 8/14/2020  4:39 PM        Discharge Medication List as of 8/14/2020  4:39 PM        Discharge Medication List as of 8/14/2020  4:39 PM      CONTINUE these medications which have NOT CHANGED    Details   Cholecalciferol (VITAMIN D3) 50 MCG (2000 UT) CAPS Take 2,000 Units by mouth dailyHistorical Med      calcitRIOL (ROCALTROL) 0.25 MCG capsule Take 1 capsule by mouth every other day MWF on dialysis days. , Disp-30 capsule, R-3Print      sevelamer hcl (RENAGEL) 800 MG tablet Take 1,600 mg by mouth 3 times daily (with meals)Historical Med      sertraline (ZOLOFT) 25 MG tablet Take 25 mg by mouth dailyHistorical Med      folic acid (FOLVITE) 1 MG tablet Take 1 mg by mouth dailyHistorical Med      ferrous sulfate (IRON 325) 325 (65 Fe) MG tablet Take 325 mg by mouth daily (with breakfast)Historical Med      vitamin B-12 (CYANOCOBALAMIN) 1000 MCG tablet Take 1,000 mcg by mouth dailyHistorical Med      mirtazapine (REMERON) 15 MG tablet Take 15 mg by mouth nightlyHistorical Med      pantoprazole sodium (PROTONIX) 40 MG PACK packet Take 40 mg by mouth every morning (before breakfast)Historical Med      prazosin (MINIPRESS) 2 MG capsule Take 2 mg by mouth nightlyHistorical Med      B Complex-C-Folic Acid (JOSUÉ CAPS) 1 MG CAPS Take 1 mg by mouth dailyHistorical Med      pravastatin (PRAVACHOL) 20 MG tablet Take 20 mg by mouth dailyHistorical Med      carvedilol (COREG) 25 MG tablet Take 25 mg by mouth 2 times daily (with meals)Historical Med      insulin lispro (HUMALOG) 100 UNIT/ML injection vial Inject into the skin 3 times daily (before meals) SLIDING SCALEHistorical Med      Petrolatum-Zinc Oxide (PHYTOPLEX Z-GUARD) 57-17 % PSTE Apply topically Apply to scrotum every day and nightHistorical Med      losartan (COZAAR) 25 MG tablet Take 1 tablet by mouth daily, Disp-30 tablet, R-3Normal      aspirin 81 MG tablet Take 81 mg by mouth daily Historical Med      nitroGLYCERIN (NITROSTAT) 0.4 MG SL tablet Place 0.4 mg under the tongue every 5 minutes as needed for Chest pain up to max of 3 total doses. If no relief after 1 dose, call 911. Historical Med           Discharge Medication List as of 8/14/2020  4:39 PM              Procedures: none    Assessment on Discharge: Stable, improved     Discharge ROS:  A complete review of systems was asked and negative except for none    Discharge Exam:  BP (!) 184/80   Pulse 70   Temp 98.5 °F (36.9 °C) (Oral)   Resp 18   Ht 4' (1.219 m)   Wt 205 lb 4 oz (93.1 kg)   SpO2 96%   BMI 62.63 kg/m²     General appearance:  No apparent distress, appears stated age and cooperative. HEENT:  Normal cephalic, atraumatic without obvious deformity. Pupils equal, round, and reactive to light. Extra ocular muscles intact. Conjunctivae/corneas clear. Neck: Supple, with full range of motion. No jugular venous distention. Trachea midline. Respiratory:  Normal respiratory effort. Clear to auscultation, bilaterally without Rales/Wheezes/Rhonchi. Cardiovascular:  Regular rate and rhythm with normal S1/S2 without murmurs, rubs or gallops. Abdomen: Soft, non-tender, non-distended with normal bowel sounds.   Musculoskeletal:   Bilateral BKA  Skin: Skin color, texture, turgor normal.  No rashes or lesions. Neurologic:   No focal motor deficits  Psychiatric:  Alert and oriented, thought content appropriate, normal insight    Pertinent Studies During Hospital Stay:  Radiology:  No results found. Last Labs on Discharge:     Recent Results (from the past 24 hour(s))   CBC auto differential    Collection Time: 08/14/20  8:05 PM   Result Value Ref Range    WBC 6.1 4.0 - 11.0 K/uL    RBC 2.75 (L) 4.20 - 5.90 M/uL    Hemoglobin 8.4 (L) 13.5 - 17.5 g/dL    Hematocrit 24.9 (L) 40.5 - 52.5 %    MCV 90.5 80.0 - 100.0 fL    MCH 30.6 26.0 - 34.0 pg    MCHC 33.8 31.0 - 36.0 g/dL    RDW 13.6 12.4 - 15.4 %    Platelets 162 574 - 138 K/uL    MPV 8.3 5.0 - 10.5 fL    Neutrophils % 62.9 %    Lymphocytes % 21.1 %    Monocytes % 5.8 %    Eosinophils % 9.1 %    Basophils % 1.1 %    Neutrophils Absolute 3.9 1.7 - 7.7 K/uL    Lymphocytes Absolute 1.3 1.0 - 5.1 K/uL    Monocytes Absolute 0.4 0.0 - 1.3 K/uL    Eosinophils Absolute 0.6 0.0 - 0.6 K/uL    Basophils Absolute 0.1 0.0 - 0.2 K/uL         Follow up: with Jerel Kitchen MD    Note that over 30 minutes was spent in preparing discharge papers, discussing discharge with patient, medication review, etc.    Thank you Jerel Kitchen MD for the opportunity to be involved in this patient's care. If you have any questions or concerns please feel free to contact me at [phone].     Electronically signed by Codey Rick MD on 8/15/2020 at 3:27 PM

## 2020-08-15 NOTE — PROGRESS NOTES
Patient Discharged to [place] via transport. V.S.S. at time of transfer. I.V. and telemetry monitor discontinued. Patient was assisted with dressing.  Patient belongings packed and given to transport team.

## 2020-08-17 LAB
GLUCOSE BLD-MCNC: 88 MG/DL (ref 70–99)
PERFORMED ON: NORMAL

## 2020-08-20 LAB — SARS-COV-2, NAA: NOT DETECTED

## 2020-12-14 ENCOUNTER — HOSPITAL ENCOUNTER (EMERGENCY)
Age: 75
Discharge: HOME | End: 2020-12-15
Attending: EMERGENCY MEDICINE
Payer: COMMERCIAL

## 2020-12-14 VITALS
OXYGEN SATURATION: 99 % | SYSTOLIC BLOOD PRESSURE: 162 MMHG | HEART RATE: 68 BPM | RESPIRATION RATE: 18 BRPM | DIASTOLIC BLOOD PRESSURE: 65 MMHG | TEMPERATURE: 98.3 F

## 2020-12-14 LAB
ABO/RH: NORMAL
ANTIBODY SCREEN: NORMAL
BASOPHILS ABSOLUTE: 0 K/UL (ref 0–0.2)
BASOPHILS RELATIVE PERCENT: 0.8 %
EOSINOPHILS ABSOLUTE: 0.3 K/UL (ref 0–0.6)
EOSINOPHILS RELATIVE PERCENT: 5.9 %
HCT VFR BLD CALC: 22.5 % (ref 40.5–52.5)
HEMOGLOBIN: 7.4 G/DL (ref 13.5–17.5)
LYMPHOCYTES ABSOLUTE: 1.4 K/UL (ref 1–5.1)
LYMPHOCYTES RELATIVE PERCENT: 29.2 %
MCH RBC QN AUTO: 29.7 PG (ref 26–34)
MCHC RBC AUTO-ENTMCNC: 32.9 G/DL (ref 31–36)
MCV RBC AUTO: 90.4 FL (ref 80–100)
MONOCYTES ABSOLUTE: 0.3 K/UL (ref 0–1.3)
MONOCYTES RELATIVE PERCENT: 6.3 %
NEUTROPHILS ABSOLUTE: 2.7 K/UL (ref 1.7–7.7)
NEUTROPHILS RELATIVE PERCENT: 57.8 %
PDW BLD-RTO: 12.8 % (ref 12.4–15.4)
PLATELET # BLD: 160 K/UL (ref 135–450)
PMV BLD AUTO: 8.7 FL (ref 5–10.5)
RBC # BLD: 2.49 M/UL (ref 4.2–5.9)
WBC # BLD: 4.6 K/UL (ref 4–11)

## 2020-12-14 PROCEDURE — 85025 COMPLETE CBC W/AUTO DIFF WBC: CPT

## 2020-12-14 PROCEDURE — 86900 BLOOD TYPING SEROLOGIC ABO: CPT

## 2020-12-14 PROCEDURE — 86901 BLOOD TYPING SEROLOGIC RH(D): CPT

## 2020-12-14 PROCEDURE — 99283 EMERGENCY DEPT VISIT LOW MDM: CPT

## 2020-12-14 PROCEDURE — 86850 RBC ANTIBODY SCREEN: CPT

## 2020-12-14 PROCEDURE — 36415 COLL VENOUS BLD VENIPUNCTURE: CPT

## 2020-12-14 NOTE — ED PROVIDER NOTES
[id number] [facility] RESIDENT NOTE       Date of evaluation: 12/14/2020    Chief Complaint     Abnormal Lab (hgb 6.7)      History of Present Illness     Angelika Webster is a 67 y.o. male with a hx of ESRD, CAD (on ASA) and multiple other medical comorbidities who presents with anemia. Patient is resident in a nursing facility. He went to dialysis today per his normal schedule. He was noted to have anemia with a hemoglobin of 6.7. Aside from the above, the patient is without complaints. He denies nausea, vomiting, chest pain, abdominal pain, shortness of breath, lightheadedness, bloody bowel movements, melenic stools. Review of Systems     Review of Systems    A full review of systems was obtained and is otherwise negative except as noted above in the history of present illness. Past Medical, Surgical, Family, and Social History     He has a past medical history of Anemia, Atrial fibrillation ([address] CAD (coronary artery disease), Chronic kidney disease, DVT (deep venous thrombosis) ([address] End stage renal disease ([address] Gas gangrene ([address] Hemodialysis patient ([address] Hx of blood clots, Hyperkalemia, Hyperlipidemia, Hypertension, Hyperthyroidism, Ischemic heart disease, Metabolic encephalopathy, MI (myocardial infarction) ([address] and Type II or unspecified type diabetes mellitus without mention of complication, not stated as uncontrolled. He has a past surgical history that includes Cardiac surgery; Foot Amputation (Right, 1/4/2019); Femoral-tibial Bypass Graft (Right, 1/9/2019); Foot Amputation (Right, 1/14/2019); Leg amputation below knee (Right, 1/18/2019); Foot Amputation (Left, 08/24/2019); Foot Amputation (Left, 8/24/2019); Foot Amputation (Left, 8/27/2019); Foot Amputation (Left, 10/11/2019); Leg amputation below knee (Left, 12/9/2019); and Leg Surgery (Left, 2/6/2020). His family history includes Arthritis in his mother.   He reports that he has never smoked. He has never used smokeless tobacco. He reports previous alcohol use. He reports that he does not use drugs. Medications     Previous Medications    ASPIRIN 81 MG TABLET    Take 81 mg by mouth daily     B COMPLEX-C-FOLIC ACID (JOSUÉ CAPS) 1 MG CAPS    Take 1 mg by mouth daily    CALCITRIOL (ROCALTROL) 0.25 MCG CAPSULE    Take 1 capsule by mouth every other day MWF on dialysis days. CARVEDILOL (COREG) 25 MG TABLET    Take 25 mg by mouth 2 times daily (with meals)    CHOLECALCIFEROL (VITAMIN D3) 50 MCG (2000 UT) CAPS    Take 2,000 Units by mouth daily    FERROUS SULFATE (IRON 325) 325 (65 FE) MG TABLET    Take 325 mg by mouth daily (with breakfast)    FOLIC ACID (FOLVITE) 1 MG TABLET    Take 1 mg by mouth daily    INSULIN LISPRO (HUMALOG) 100 UNIT/ML INJECTION VIAL    Inject into the skin 3 times daily (before meals) SLIDING SCALE    LOSARTAN (COZAAR) 25 MG TABLET    Take 1 tablet by mouth daily    MIRTAZAPINE (REMERON) 15 MG TABLET    Take 15 mg by mouth nightly    NITROGLYCERIN (NITROSTAT) 0.4 MG SL TABLET    Place 0.4 mg under the tongue every 5 minutes as needed for Chest pain up to max of 3 total doses. If no relief after 1 dose, call 911. PANTOPRAZOLE SODIUM (PROTONIX) 40 MG PACK PACKET    Take 40 mg by mouth every morning (before breakfast)    PETROLATUM-ZINC OXIDE (PHYTOPLEX Z-GUARD) 57-17 % PSTE    Apply topically Apply to scrotum every day and night    PRAVASTATIN (PRAVACHOL) 20 MG TABLET    Take 20 mg by mouth daily    PRAZOSIN (MINIPRESS) 2 MG CAPSULE    Take 2 mg by mouth nightly    SERTRALINE (ZOLOFT) 25 MG TABLET    Take 25 mg by mouth daily    SEVELAMER HCL (RENAGEL) 800 MG TABLET    Take 1,600 mg by mouth 3 times daily (with meals)    VITAMIN B-12 (CYANOCOBALAMIN) 1000 MCG TABLET    Take 1,000 mcg by mouth daily       Allergies     He has No Known Allergies.     Physical Exam     INITIAL VITALS: BP: (!) 162/65, Temp: 98.3 °F (36.8 °C), Pulse: 68, Resp: 18, SpO2: 99 %     General: Patient is a 67 y.o. male who is seated in NAD. HEENT: Head atraumatic. Pupils equal, round, and reactive to light. Sclera clear. Mucous membranes moist. Oropharynx clear. Neck:  Supple. No lymphadenopathy. Pulmonary:   Normal respiratory effort. Lungs clear to auscultation bilaterally, with no wheezes, rales or rhonchi. Cardiac:  Regular rate and rhythm. Normal S1, S2. No murmurs, rubs or gallops. Abdomen:  Soft. Non-tender. Non-distended. No rebound tenderness or guarding. Musculoskeletal:  No tenderness to palpation. Vascular:  Radial pulses 2+ bilaterally. Skin:  Warm, dry, well-perfused. No rash. Neuro: AAOx4. CN II-XII grossly intact. Sensation grossly intact. Strength grossly equal and symmetric. Extremities:  No peripheral edema.     Rectal: No external lesions or hemorrhoids, good tone, light colored stool with no bright red blood or melanotic stool noted in the rectal vault    Diagnostic Results     EKG   None    RADIOLOGY:  No orders to display       LABS:   Results for orders placed or performed during the hospital encounter of 12/14/20   CBC Auto Differential   Result Value Ref Range    WBC 4.6 4.0 - 11.0 K/uL    RBC 2.49 (L) 4.20 - 5.90 M/uL    Hemoglobin 7.4 (L) 13.5 - 17.5 g/dL    Hematocrit 22.5 (L) 40.5 - 52.5 %    MCV 90.4 80.0 - 100.0 fL    MCH 29.7 26.0 - 34.0 pg    MCHC 32.9 31.0 - 36.0 g/dL    RDW 12.8 12.4 - 15.4 %    Platelets 049 701 - 364 K/uL    MPV 8.7 5.0 - 10.5 fL    Neutrophils % 57.8 %    Lymphocytes % 29.2 %    Monocytes % 6.3 %    Eosinophils % 5.9 %    Basophils % 0.8 %    Neutrophils Absolute 2.7 1.7 - 7.7 K/uL    Lymphocytes Absolute 1.4 1.0 - 5.1 K/uL    Monocytes Absolute 0.3 0.0 - 1.3 K/uL    Eosinophils Absolute 0.3 0.0 - 0.6 K/uL    Basophils Absolute 0.0 0.0 - 0.2 K/uL       ED BEDSIDE ULTRASOUND:  None    RECENT VITALS:  BP: (!) 162/65, Temp: 98.3 °F (36.8 °C), Pulse: 68, Resp: 18, SpO2: 99 %     Procedures     None    ED Course     Nursing Notes, Past Medical Hx, Past Surgical Hx, Social Hx, Allergies, and Family Hx were reviewed. The patient was given the following medications:  No orders of the defined types were placed in this encounter. CONSULTS:  None    MEDICAL DECISION MAKING / ASSESSMENT / PLAN     Felipe Shaikh is a 67 y.o. male with a history of multiple medical comorbidities including end-stage renal disease, coronary artery disease on aspirin who presents with anemia. He was reported to have an outpatient hemoglobin of 6.7. We checked here and it was 7.4, without any intervention. He has no signs or symptoms of symptomatic anemia, and hemoglobin of 7.4 is not greatly changed from prior values that are documented in the chart. He has no signs or symptoms of active bleeding either by history or on examination, including a normal rectal examination. He was discharged in stable condition. This patient was also evaluated by the attending physician. All care plans were discussed and agreed upon. Clinical Impression     1.  Anemia, unspecified type        Disposition     PATIENT REFERRED TO:  Chris Redman MD  [id number] [id number] [phone]    In 1 week        DISCHARGE MEDICATIONS:  New Prescriptions    No medications on file       DISPOSITION  Discharge        Yudith Cordon MD  Resident  12/14/20 [id number]

## 2020-12-14 NOTE — ED PROVIDER NOTES
ED Attending Attestation Note     Date of evaluation: 12/14/2020    This patient was seen by the resident. I have seen and examined the patient, agree with the workup, evaluation, management and diagnosis. The care plan has been discussed. My assessment reveals patient sent from dialysis where routine blood work was drawn and he was found to be anemic. Rectal exam negative and hemoglobin actually here is stable at 7.4 which appears to be around his baseline. Patient will be discharged.      [place]ia Escobar MD  12/14/20 [id number]

## 2020-12-15 NOTE — ED NOTES
PIV placed using aseptic technique per hospital policy. Blood collected and sent to lab.      J Carlos Quinonez RN  12/14/20 1927

## 2020-12-15 NOTE — ED NOTES
Patient discharged back to [facility]. Copy of AVS and belongings sent with patient. [facility] provided by [facility].   PIV to be removed when transportation arrives     Marcos, [facility]  12/14/20 1930

## 2020-12-28 ENCOUNTER — HOSPITAL ENCOUNTER (EMERGENCY)
Age: 75
Discharge: HOME | End: 2020-12-28
Attending: EMERGENCY MEDICINE
Payer: COMMERCIAL

## 2020-12-28 VITALS
DIASTOLIC BLOOD PRESSURE: 21 MMHG | OXYGEN SATURATION: 100 % | SYSTOLIC BLOOD PRESSURE: 91 MMHG | TEMPERATURE: 98.6 F | RESPIRATION RATE: 21 BRPM | HEART RATE: 64 BPM | BODY MASS INDEX: 70.19 KG/M2 | WEIGHT: 230 LBS

## 2020-12-28 LAB
ABO/RH: NORMAL
ANION GAP SERPL CALCULATED.3IONS-SCNC: 9 MMOL/L (ref 3–16)
ANTIBODY SCREEN: NORMAL
BASOPHILS ABSOLUTE: 0.1 K/UL (ref 0–0.2)
BASOPHILS RELATIVE PERCENT: 1.3 %
BUN BLDV-MCNC: 15 MG/DL (ref 7–20)
CALCIUM SERPL-MCNC: 9.8 MG/DL (ref 8.3–10.6)
CHLORIDE BLD-SCNC: 99 MMOL/L (ref 99–110)
CO2: 31 MMOL/L (ref 21–32)
CREAT SERPL-MCNC: 3.3 MG/DL (ref 0.8–1.3)
EOSINOPHILS ABSOLUTE: 0.3 K/UL (ref 0–0.6)
EOSINOPHILS RELATIVE PERCENT: 6.6 %
GFR AFRICAN AMERICAN: 22
GFR NON-AFRICAN AMERICAN: 18
GLUCOSE BLD-MCNC: 134 MG/DL (ref 70–99)
HCT VFR BLD CALC: 22.9 % (ref 40.5–52.5)
HEMOGLOBIN: 7.7 G/DL (ref 13.5–17.5)
LYMPHOCYTES ABSOLUTE: 1.6 K/UL (ref 1–5.1)
LYMPHOCYTES RELATIVE PERCENT: 33.6 %
MCH RBC QN AUTO: 30.1 PG (ref 26–34)
MCHC RBC AUTO-ENTMCNC: 33.5 G/DL (ref 31–36)
MCV RBC AUTO: 89.8 FL (ref 80–100)
MONOCYTES ABSOLUTE: 0.3 K/UL (ref 0–1.3)
MONOCYTES RELATIVE PERCENT: 5.8 %
NEUTROPHILS ABSOLUTE: 2.5 K/UL (ref 1.7–7.7)
NEUTROPHILS RELATIVE PERCENT: 52.7 %
PDW BLD-RTO: 13.3 % (ref 12.4–15.4)
PLATELET # BLD: 178 K/UL (ref 135–450)
PMV BLD AUTO: 8.6 FL (ref 5–10.5)
POTASSIUM REFLEX MAGNESIUM: 4.1 MMOL/L (ref 3.5–5.1)
RBC # BLD: 2.55 M/UL (ref 4.2–5.9)
SODIUM BLD-SCNC: 139 MMOL/L (ref 136–145)
WBC # BLD: 4.7 K/UL (ref 4–11)

## 2020-12-28 PROCEDURE — 86850 RBC ANTIBODY SCREEN: CPT

## 2020-12-28 PROCEDURE — 85025 COMPLETE CBC W/AUTO DIFF WBC: CPT

## 2020-12-28 PROCEDURE — 86901 BLOOD TYPING SEROLOGIC RH(D): CPT

## 2020-12-28 PROCEDURE — 86900 BLOOD TYPING SEROLOGIC ABO: CPT

## 2020-12-28 PROCEDURE — 80048 BASIC METABOLIC PNL TOTAL CA: CPT

## 2020-12-28 PROCEDURE — 99283 EMERGENCY DEPT VISIT LOW MDM: CPT

## 2020-12-28 NOTE — ED NOTES
Sent from dialysis for Hgb 6.6. Full course of dialysis completed today. Alert, oriented x4. Denies SOB, fatigue or weakness.       Margo Davis RN  12/28/20 [id number]

## 2020-12-28 NOTE — ED PROVIDER NOTES
[facility]  EMERGENCY DEPARTMENT ENCOUNTER          ATTENDING PHYSICIAN NOTE       Date of evaluation: 12/28/2020    Chief Complaint     Anemia      History of Present Illness     Eugene King is a 67 y.o. male who presents to the emergency department with reports of a low hemoglobin. The patient has a history of end-stage renal disease on hemodialysis Monday, Wednesday, and Friday, with associated anemia of chronic kidney disease, which has required transfusion a couple of times over the last 6 months. By report from EMS, the patient was transported from his dialysis center to the emergency department for low hemoglobin. The patient did complete his dialysis session today. EMS states that they were told that his hemoglobin was \"six-point something. \"  The patient himself has no complaints. He denies any chest pain or shortness of breath, dizziness or lightheadedness, numbness or weakness, increased fatigue or other symptoms. He denies any abdominal pain, nausea or vomiting, changes in bowel habits, including dark or bloody stools. He has been evaluated on prior visits for anemia for the possibility of GI bleed, and Hemoccults have been negative. His recurrent anemia is felt to be strictly due to anemia of chronic kidney disease. The patient was most recently seen in this emergency department about 2 weeks ago with concern for anemia, although on CBC here his hemoglobin was 7.4, and transfusion was not indicated. His most recent transfusion was in August 2020, at which time he was transferred from [facility] emergency department, received a unit of packed red blood cells, and was discharged the following day. Review of Systems     Review of Systems   All other systems reviewed and are negative.       Past Medical, Surgical, Family, and Social History     He has a past medical history of Anemia, Atrial fibrillation ([address] CAD (coronary artery disease), Chronic kidney disease, DVT (deep venous thrombosis) ([facility] End stage renal disease ([facility] Gas gangrene ([facility] Hemodialysis patient ([facility] Hx of blood clots, Hyperkalemia, Hyperlipidemia, Hypertension, Hyperthyroidism, Ischemic heart disease, Metabolic encephalopathy, MI (myocardial infarction) ([facility] and Type II or unspecified type diabetes mellitus without mention of complication, not stated as uncontrolled. He has a past surgical history that includes Cardiac surgery; Foot Amputation (Right, 1/4/2019); Femoral-tibial Bypass Graft (Right, 1/9/2019); Foot Amputation (Right, 1/14/2019); Leg amputation below knee (Right, 1/18/2019); Foot Amputation (Left, 08/24/2019); Foot Amputation (Left, 8/24/2019); Foot Amputation (Left, 8/27/2019); Foot Amputation (Left, 10/11/2019); Leg amputation below knee (Left, 12/9/2019); and Leg Surgery (Left, 2/6/2020). His family history includes Arthritis in his mother. He reports that he has never smoked. He has never used smokeless tobacco. He reports previous alcohol use. He reports that he does not use drugs. Medications     Previous Medications    ASPIRIN 81 MG TABLET    Take 81 mg by mouth daily     B COMPLEX-C-FOLIC ACID (JOSUÉ CAPS) 1 MG CAPS    Take 1 mg by mouth daily    CALCITRIOL (ROCALTROL) 0.25 MCG CAPSULE    Take 1 capsule by mouth every other day MWF on dialysis days.     CARVEDILOL (COREG) 25 MG TABLET    Take 25 mg by mouth 2 times daily (with meals)    CHOLECALCIFEROL (VITAMIN D3) 50 MCG (2000 UT) CAPS    Take 2,000 Units by mouth daily    FERROUS SULFATE (IRON 325) 325 (65 FE) MG TABLET    Take 325 mg by mouth daily (with breakfast)    FOLIC ACID (FOLVITE) 1 MG TABLET    Take 1 mg by mouth daily    INSULIN LISPRO (HUMALOG) 100 UNIT/ML INJECTION VIAL    Inject into the skin 3 times daily (before meals) SLIDING SCALE    LOSARTAN (COZAAR) 25 MG TABLET    Take 1 tablet by mouth daily    MIRTAZAPINE (REMERON) 15 MG TABLET    Take 15 mg by mouth nightly    NITROGLYCERIN (NITROSTAT) 0.4 MG SL TABLET Place 0.4 mg under the tongue every 5 minutes as needed for Chest pain up to max of 3 total doses. If no relief after 1 dose, call 911. PANTOPRAZOLE SODIUM (PROTONIX) 40 MG PACK PACKET    Take 40 mg by mouth every morning (before breakfast)    PETROLATUM-ZINC OXIDE (PHYTOPLEX Z-GUARD) 57-17 % PSTE    Apply topically Apply to scrotum every day and night    PRAVASTATIN (PRAVACHOL) 20 MG TABLET    Take 20 mg by mouth daily    PRAZOSIN (MINIPRESS) 2 MG CAPSULE    Take 2 mg by mouth nightly    SERTRALINE (ZOLOFT) 25 MG TABLET    Take 25 mg by mouth daily    SEVELAMER HCL (RENAGEL) 800 MG TABLET    Take 1,600 mg by mouth 3 times daily (with meals)    VITAMIN B-12 (CYANOCOBALAMIN) 1000 MCG TABLET    Take 1,000 mcg by mouth daily       Allergies     He has No Known Allergies. Physical Exam     INITIAL VITALS: BP: (!) 156/66, Temp: 98.6 °F (37 °C), Pulse: 69, Resp: 19, SpO2: 100 %     General: Chronically ill-appearing older gentleman. He is awake, calmly conversational, and in NAD. HEENT: Pupils are equal, round, and reactive to light. Extraocular muscles are intact. Conjunctivae are clear and moist. No redness or drainage from the eyes. No drainage from the nose. The oropharynx appeared to be normal.    Neck: Supple, with full range of motion. Cardiovascular: Normal S1-S2. 2+ radial pulses bilaterally. Respiratory: Unlabored breathing with equal chest rise and fall. Lungs are clear to auscultation bilaterally. No adventitious lung sounds heard. Abdomen: Soft and nontender, without guarding or rebound tenderness. No masses or hepatosplenomegaly. Skin: Warm and dry, without rashes or ecchymoses, lacerations or abrasions. Neuro: Alert and oriented x3. No focal neurologic deficits are noted. Extremities: Warm and well-perfused. The patient has bilateral BKAs. The patient moves all extremities equally. Psych:  The patient's mood and affect are generally within normal limits for their presentation. Diagnostic Results       RADIOLOGY:  No orders to display       LABS:   Results for orders placed or performed during the hospital encounter of 12/28/20   CBC auto differential   Result Value Ref Range    WBC 4.7 4.0 - 11.0 K/uL    RBC 2.55 (L) 4.20 - 5.90 M/uL    Hemoglobin 7.7 (L) 13.5 - 17.5 g/dL    Hematocrit 22.9 (L) 40.5 - 52.5 %    MCV 89.8 80.0 - 100.0 fL    MCH 30.1 26.0 - 34.0 pg    MCHC 33.5 31.0 - 36.0 g/dL    RDW 13.3 12.4 - 15.4 %    Platelets 712 060 - 632 K/uL    MPV 8.6 5.0 - 10.5 fL    Neutrophils % 52.7 %    Lymphocytes % 33.6 %    Monocytes % 5.8 %    Eosinophils % 6.6 %    Basophils % 1.3 %    Neutrophils Absolute 2.5 1.7 - 7.7 K/uL    Lymphocytes Absolute 1.6 1.0 - 5.1 K/uL    Monocytes Absolute 0.3 0.0 - 1.3 K/uL    Eosinophils Absolute 0.3 0.0 - 0.6 K/uL    Basophils Absolute 0.1 0.0 - 0.2 K/uL   Basic Metabolic Panel w/ Reflex to MG   Result Value Ref Range    Sodium 139 136 - 145 mmol/L    Potassium reflex Magnesium 4.1 3.5 - 5.1 mmol/L    Chloride 99 99 - 110 mmol/L    CO2 31 21 - 32 mmol/L    Anion Gap 9 3 - 16    Glucose 134 (H) 70 - 99 mg/dL    BUN 15 7 - 20 mg/dL    CREATININE 3.3 (H) 0.8 - 1.3 mg/dL    GFR Non-African American 18 (A) >60    GFR  22 (A) >60    Calcium 9.8 8.3 - 10.6 mg/dL         RECENT VITALS:  BP: (!) 155/66, Temp: 98.6 °F (37 °C), Pulse: 67, Resp: 19, SpO2: 100 %     Procedures       ED Course     Nursing Notes, Past Medical Hx, Past Surgical Hx, Social Hx, Allergies, and Family Hx were reviewed. The patient was given the following medications:  No orders of the defined types were placed in this encounter. CONSULTS:  None    MEDICAL DECISION MAKING / ASSESSMENT / PLAN     Primus Alt is a 67 y.o. male with a known history of anemia of chronic kidney disease, who presents from dialysis due to a reported low hemoglobin on labs. He is asymptomatic. His most recent need for transmission was in August 2020.   On our

## 2021-03-09 ENCOUNTER — HOSPITAL ENCOUNTER (EMERGENCY)
Age: 76
Discharge: HOME | End: 2021-03-09
Attending: EMERGENCY MEDICINE
Payer: COMMERCIAL

## 2021-03-09 VITALS
DIASTOLIC BLOOD PRESSURE: 55 MMHG | TEMPERATURE: 99.4 F | WEIGHT: 233.2 LBS | SYSTOLIC BLOOD PRESSURE: 137 MMHG | RESPIRATION RATE: 17 BRPM | HEART RATE: 62 BPM | HEIGHT: 60 IN | BODY MASS INDEX: 45.78 KG/M2 | OXYGEN SATURATION: 98 %

## 2021-03-09 DIAGNOSIS — Z99.2 ANEMIA DUE TO CHRONIC KIDNEY DISEASE, ON CHRONIC DIALYSIS (HCC): Primary | ICD-10-CM

## 2021-03-09 DIAGNOSIS — N18.6 ANEMIA DUE TO CHRONIC KIDNEY DISEASE, ON CHRONIC DIALYSIS (HCC): Primary | ICD-10-CM

## 2021-03-09 DIAGNOSIS — D63.1 ANEMIA DUE TO CHRONIC KIDNEY DISEASE, ON CHRONIC DIALYSIS (HCC): Primary | ICD-10-CM

## 2021-03-09 LAB
ABO/RH: NORMAL
ANION GAP SERPL CALCULATED.3IONS-SCNC: 10 MMOL/L (ref 3–16)
ANTIBODY SCREEN: NORMAL
BASOPHILS ABSOLUTE: 0 K/UL (ref 0–0.2)
BASOPHILS RELATIVE PERCENT: 1 %
BUN BLDV-MCNC: 22 MG/DL (ref 7–20)
CALCIUM SERPL-MCNC: 9.3 MG/DL (ref 8.3–10.6)
CHLORIDE BLD-SCNC: 99 MMOL/L (ref 99–110)
CO2: 28 MMOL/L (ref 21–32)
CREAT SERPL-MCNC: 6 MG/DL (ref 0.8–1.3)
EOSINOPHILS ABSOLUTE: 0.2 K/UL (ref 0–0.6)
EOSINOPHILS RELATIVE PERCENT: 4.5 %
GFR AFRICAN AMERICAN: 11
GFR NON-AFRICAN AMERICAN: 9
GLUCOSE BLD-MCNC: 99 MG/DL (ref 70–99)
HCT VFR BLD CALC: 21.5 % (ref 40.5–52.5)
HEMOGLOBIN: 7.2 G/DL (ref 13.5–17.5)
LYMPHOCYTES ABSOLUTE: 1.6 K/UL (ref 1–5.1)
LYMPHOCYTES RELATIVE PERCENT: 35.6 %
MCH RBC QN AUTO: 29.8 PG (ref 26–34)
MCHC RBC AUTO-ENTMCNC: 33.4 G/DL (ref 31–36)
MCV RBC AUTO: 89.3 FL (ref 80–100)
MONOCYTES ABSOLUTE: 0.3 K/UL (ref 0–1.3)
MONOCYTES RELATIVE PERCENT: 5.7 %
NEUTROPHILS ABSOLUTE: 2.4 K/UL (ref 1.7–7.7)
NEUTROPHILS RELATIVE PERCENT: 53.2 %
PDW BLD-RTO: 13.6 % (ref 12.4–15.4)
PLATELET # BLD: 147 K/UL (ref 135–450)
PMV BLD AUTO: 9.1 FL (ref 5–10.5)
POTASSIUM REFLEX MAGNESIUM: 4.8 MMOL/L (ref 3.5–5.1)
RBC # BLD: 2.41 M/UL (ref 4.2–5.9)
SODIUM BLD-SCNC: 137 MMOL/L (ref 136–145)
WBC # BLD: 4.6 K/UL (ref 4–11)

## 2021-03-09 PROCEDURE — 80048 BASIC METABOLIC PNL TOTAL CA: CPT

## 2021-03-09 PROCEDURE — 86901 BLOOD TYPING SEROLOGIC RH(D): CPT

## 2021-03-09 PROCEDURE — 86900 BLOOD TYPING SEROLOGIC ABO: CPT

## 2021-03-09 PROCEDURE — 86850 RBC ANTIBODY SCREEN: CPT

## 2021-03-09 PROCEDURE — 85025 COMPLETE CBC W/AUTO DIFF WBC: CPT

## 2021-03-09 PROCEDURE — 99283 EMERGENCY DEPT VISIT LOW MDM: CPT

## 2021-03-09 ASSESSMENT — ENCOUNTER SYMPTOMS
SHORTNESS OF BREATH: 0
BLOOD IN STOOL: 0

## 2021-03-09 NOTE — ED NOTES
Pt discharged from ED in stable condition. [facility] at bedside to transport pt back to [address] [place] [address].       Ronel Araiza RN  03/09/21 [id number]

## 2021-03-09 NOTE — ED PROVIDER NOTES
[address] [facility] ENCOUNTER          ATTENDING PHYSICIAN NOTE       Date of evaluation: 3/9/2021    Chief Complaint     Abnormal Lab (low Hgb 7.6)      History of Present Illness     Tam De La Torre is a 67 y.o. male who presents to the emergency department for evaluation of hemoglobin of 6.4 drawn as an outpatient roughly 24 hours ago. He has no complaints, including weakness and dizziness upon standing, palpitations, shortness of breath, or fatigue. No fevers or chills. He has Monday Wednesday Friday dialysis, and had no issues with his dialysis yesterday. No nausea vomiting or diarrhea. No abnormal bowel movements. He has not noticed blood per rectum. His only complaint is that he would like a morning cup of coffee. Review of Systems     Review of Systems   Constitutional: Negative for chills and fever. Respiratory: Negative for shortness of breath. Cardiovascular: Negative for chest pain. Gastrointestinal: Negative for blood in stool. Neurological: Negative for dizziness. Hematological: Does not bruise/bleed easily. All other systems reviewed and are negative. Past Medical, Surgical, Family, and Social History     He has a past medical history of Anemia, Atrial fibrillation ([address] CAD (coronary artery disease), Chronic kidney disease, DVT (deep venous thrombosis) ([address] End stage renal disease ([address] Gas gangrene ([address] Hemodialysis patient ([address] Hx of blood clots, Hyperkalemia, Hyperlipidemia, Hypertension, Hyperthyroidism, Ischemic heart disease, Metabolic encephalopathy, MI (myocardial infarction) ([address] and Type II or unspecified type diabetes mellitus without mention of complication, not stated as uncontrolled. He has a past surgical history that includes Cardiac surgery; Foot Amputation (Right, 1/4/2019); Femoral-tibial Bypass Graft (Right, 1/9/2019); Foot Amputation (Right, 1/14/2019); Leg amputation below knee (Right, 1/18/2019);  Foot Amputation (Left, 08/24/2019); Foot Amputation (Left, 8/24/2019); Foot Amputation (Left, 8/27/2019); Foot Amputation (Left, 10/11/2019); Leg amputation below knee (Left, 12/9/2019); and Leg Surgery (Left, 2/6/2020). His family history includes Arthritis in his mother. He reports that he has never smoked. He has never used smokeless tobacco. He reports previous alcohol use. He reports that he does not use drugs. Medications     Previous Medications    ASPIRIN 81 MG TABLET    Take 81 mg by mouth daily     B COMPLEX-C-FOLIC ACID (JOSUÉ CAPS) 1 MG CAPS    Take 1 mg by mouth daily    CALCITRIOL (ROCALTROL) 0.25 MCG CAPSULE    Take 1 capsule by mouth every other day MWF on dialysis days. CARVEDILOL (COREG) 25 MG TABLET    Take 25 mg by mouth 2 times daily (with meals)    CHOLECALCIFEROL (VITAMIN D3) 50 MCG (2000 UT) CAPS    Take 2,000 Units by mouth daily    FERROUS SULFATE (IRON 325) 325 (65 FE) MG TABLET    Take 325 mg by mouth daily (with breakfast)    FOLIC ACID (FOLVITE) 1 MG TABLET    Take 1 mg by mouth daily    INSULIN LISPRO (HUMALOG) 100 UNIT/ML INJECTION VIAL    Inject into the skin 3 times daily (before meals) SLIDING SCALE    LOSARTAN (COZAAR) 25 MG TABLET    Take 1 tablet by mouth daily    MIRTAZAPINE (REMERON) 15 MG TABLET    Take 15 mg by mouth nightly    NITROGLYCERIN (NITROSTAT) 0.4 MG SL TABLET    Place 0.4 mg under the tongue every 5 minutes as needed for Chest pain up to max of 3 total doses. If no relief after 1 dose, call 911.     PANTOPRAZOLE SODIUM (PROTONIX) 40 MG PACK PACKET    Take 40 mg by mouth every morning (before breakfast)    PETROLATUM-ZINC OXIDE (PHYTOPLEX Z-GUARD) 57-17 % PSTE    Apply topically Apply to scrotum every day and night    PRAVASTATIN (PRAVACHOL) 20 MG TABLET    Take 20 mg by mouth daily    PRAZOSIN (MINIPRESS) 2 MG CAPSULE    Take 2 mg by mouth nightly    SERTRALINE (ZOLOFT) 25 MG TABLET    Take 25 mg by mouth daily    SEVELAMER HCL (RENAGEL) 800 MG TABLET    Take 1,600 mg by mouth 3 times daily (with meals)    VITAMIN B-12 (CYANOCOBALAMIN) 1000 MCG TABLET    Take 1,000 mcg by mouth daily       Allergies     He has No Known Allergies. Physical Exam     INITIAL VITALS: BP (!) 156/66   Pulse 62   Temp 99.4 °F (37.4 °C) (Oral)   Resp 17   Ht 4' (1.219 m)   Wt 233 lb 3.2 oz (105.8 kg)   SpO2 96%   BMI 71.16 kg/m²    General: Well developed, well nourished male in no acute distress. HEENT: Sclera white, conjunctiva pink, mucous membranes moist and oropharynx clear  Neck: Supple, no meningismus or JVD  Respirations: Unlabored with symmetric chest rise and fall  CV: Regular rate and rhythm with strong distal pulses  Musculoskeletal: Moves all extremities with no signs of orthopedic trauma or edema. Bilateral below the knee amputations. Skin: Warm and dry with no rashes or lesions. Neuro: Awake and alert with no focal neurologic deficits. Normal speech and facial symmetry.   Psych: Mood and affect are normal.    Diagnostic Results       LABS:   Results for orders placed or performed during the hospital encounter of 03/09/21   CBC Auto Differential   Result Value Ref Range    WBC 4.6 4.0 - 11.0 K/uL    RBC 2.41 (L) 4.20 - 5.90 M/uL    Hemoglobin 7.2 (L) 13.5 - 17.5 g/dL    Hematocrit 21.5 (L) 40.5 - 52.5 %    MCV 89.3 80.0 - 100.0 fL    MCH 29.8 26.0 - 34.0 pg    MCHC 33.4 31.0 - 36.0 g/dL    RDW 13.6 12.4 - 15.4 %    Platelets 004 663 - 659 K/uL    MPV 9.1 5.0 - 10.5 fL    Neutrophils % 53.2 %    Lymphocytes % 35.6 %    Monocytes % 5.7 %    Eosinophils % 4.5 %    Basophils % 1.0 %    Neutrophils Absolute 2.4 1.7 - 7.7 K/uL    Lymphocytes Absolute 1.6 1.0 - 5.1 K/uL    Monocytes Absolute 0.3 0.0 - 1.3 K/uL    Eosinophils Absolute 0.2 0.0 - 0.6 K/uL    Basophils Absolute 0.0 0.0 - 0.2 K/uL   Basic Metabolic Panel w/ Reflex to MG   Result Value Ref Range    Sodium 137 136 - 145 mmol/L    Potassium reflex Magnesium 4.8 3.5 - 5.1 mmol/L    Chloride 99 99 - 110 mmol/L    CO2 28 21 - 32 mmol/L    Anion Gap 10 3 - 16    Glucose 99 70 - 99 mg/dL    BUN 22 (H) 7 - 20 mg/dL    CREATININE 6.0 (HH) 0.8 - 1.3 mg/dL    GFR Non-African American 9 (A) >60    GFR  11 (A) >60    Calcium 9.3 8.3 - 10.6 mg/dL         RECENT VITALS:  BP: (!) 156/66, Temp: 99.4 °F (37.4 °C), Pulse: 62, Resp: 17, SpO2: 96 %       ED Course     Nursing Notes, Past Medical Hx, Past Surgical Hx, Social Hx, Allergies, and Family Hx were reviewed. He was seen and examined on arrival to the treatment area. He was stable throughout his course emergency department observation. I was able to speak with his nephrologist to clarify treatment goals. Transfusion was not required today. CONSULTS:  IP CONSULT TO PRIMARY CARE PROVIDER  IP CONSULT TO [address] [place] [address] / ASSESSMENT / [place] is a 67 y.o. male presenting to the emergency department for evaluation of asymptomatic anemia without an obvious source. Record review shows previous ED visits with similar circumstances, where upon recheck, hemoglobin has not been with any transfusion range. He has longstanding renal disease, and suffers from anemia of chronic disease. It is possible that anemia observed yesterday could be dilutional in nature, as it was taken first thing in the morning. I was able to speak with Dr. Kun Zelaya to clarify hemoglobin goals and transfusion threshold. While goal hemoglobin should be close to 12, transfusion is not indicated for hemoglobin less than 7. He is receiving medications to boost red blood cell production during dialysis. No transfusion is needed today. He is safe to continue outpatient care. Clinical Impression     1.  Anemia due to chronic kidney disease, on chronic dialysis [facility])        Disposition     PATIENT REFERRED TO:  [facility] Emergency Department  [address]  [address] [place] [address]  [phone]    As needed, If symptoms worsen      DISCHARGE MEDICATIONS:  New Prescriptions    No medications on file       DISPOSITION    Discharge home good condition        Rocky Dhillon MD  03/09/21 1439 Carola Ford MD  03/09/21 [id number]

## 2021-03-09 NOTE — ED NOTES
Attempted to call report to [facility] 2nd time, still no answer     Dafne Green, RN  03/09/21 [id number]

## 2021-03-22 NOTE — PROGRESS NOTES
ACS DAILY PROGRESS NOTE:       SUBJECTIVE/ROS: Patient on trach collar- seen and examined on rounds    24 HOUR EVENTS:  - Received 1 unit PRBCs  - Discontinued caspofungin  - Trach collar overnight       MEDICATIONS  (STANDING):  albuterol/ipratropium for Nebulization 3 milliLiter(s) Nebulizer every 6 hours  chlorhexidine 0.12% Liquid 15 milliLiter(s) Oral Mucosa two times a day  chlorhexidine 2% Cloths 1 Application(s) Topical <User Schedule>  diltiazem    Tablet 30 milliGRAM(s) Oral every 6 hours  diltiazem Infusion 5 mG/Hr (5 mL/Hr) IV Continuous <Continuous>  enoxaparin Injectable 160 milliGRAM(s) SubCutaneous every 12 hours  epoetin sushila-epbx (RETACRIT) Injectable 36741 Unit(s) SubCutaneous every 7 days  influenza   Vaccine 0.5 milliLiter(s) IntraMuscular once  meropenem  IVPB 1000 milliGRAM(s) IV Intermittent every 12 hours  midodrine 5 milliGRAM(s) Oral every 8 hours  norepinephrine Infusion 0.05 MICROgram(s)/kG/Min (14.7 mL/Hr) IV Continuous <Continuous>  nystatin Powder 1 Application(s) Topical <User Schedule>  QUEtiapine 175 milliGRAM(s) Oral <User Schedule>  QUEtiapine 25 milliGRAM(s) Oral <User Schedule>    MEDICATIONS  (PRN):  ALPRAZolam 0.25 milliGRAM(s) Oral two times a day PRN anxiety  HYDROmorphone  Injectable 0.5 milliGRAM(s) IV Push every 3 hours PRN Severe Breakthrough Pain  oxyCODONE    Solution 5 milliGRAM(s) Oral every 4 hours PRN Moderate Pain (4 - 6)  oxyCODONE    Solution 10 milliGRAM(s) Oral every 6 hours PRN Severe Pain (7 - 10)      OBJECTIVE:    Vital Signs Last 24 Hrs  T(C): 37.7 (22 Mar 2021 03:00), Max: 38 (21 Mar 2021 13:00)  T(F): 99.9 (22 Mar 2021 03:00), Max: 100.4 (21 Mar 2021 13:00)  HR: 104 (22 Mar 2021 09:32) (87 - 108)  BP: 141/70 (22 Mar 2021 07:00) (79/34 - 203/94)  BP(mean): 94 (22 Mar 2021 07:00) (49 - 124)  RR: 22 (22 Mar 2021 09:32) (12 - 41)  SpO2: 91% (22 Mar 2021 09:32) (79% - 100%)        I&O's Detail    21 Mar 2021 07:01  -  22 Mar 2021 07:00  --------------------------------------------------------  IN:    Diltiazem: 75 mL    IV PiggyBack: 200 mL    IV PiggyBack: 550 mL    Jevity: 1080 mL    Norepinephrine: 549.8 mL    PRBCs (Packed Red Blood Cells): 300 mL  Total IN: 2754.8 mL    OUT:    Indwelling Catheter - Urethral (mL): 625 mL    VAC (Vacuum Assisted Closure) System (mL): 650 mL  Total OUT: 1275 mL    Total NET: 1479.8 mL          Daily     Daily Weight in k.7 (22 Mar 2021 00:30)    LABS:                        7.5    11.60 )-----------( 210      ( 22 Mar 2021 04:50 )             26.1     03-22    142  |  111<H>  |  54<H>  ----------------------------<  139<H>  4.5   |  17<L>  |  1.75<H>    Ca    6.2<LL>      22 Mar 2021 04:50  Phos  5.7     -22  Mg     2.2     -22    TPro  5.2<L>  /  Alb  1.1<L>  /  TBili  0.3  /  DBili  0.1  /  AST  14  /  ALT  10  /  AlkPhos  187<H>  03-22    PT/INR - ( 22 Mar 2021 04:50 )   PT: 14.0 sec;   INR: 1.18 ratio         PTT - ( 22 Mar 2021 04:50 )  PTT:48.1 sec            Physical Exam:  Gen: obese, critically ill  HEENT: tracheostomy in place  Chest: breast wound dressings intact  Abd/pelvis: wound VACs and dressings intact  Back: Sacrum s/p debridement with bleeding, viable looking tissue, deep wound to subcutaneous tissue         Physical Therapy/Occupational Therapy    Orders received and chart reviewed. Attempted to see pt for PT/OT evals. Pt currently off floor. Will f/u later this date as schedule allows.     Kaleigh Ledezma, PT [id number] OTR/L #[id number]

## 2021-04-01 ENCOUNTER — HOSPITAL ENCOUNTER (OUTPATIENT)
Dept: CT IMAGING | Age: 76
Discharge: HOME | End: 2021-04-01
Payer: COMMERCIAL

## 2021-04-01 DIAGNOSIS — N28.1 CYST OF KIDNEY, ACQUIRED: ICD-10-CM

## 2021-04-01 DIAGNOSIS — N13.39 OTHER HYDRONEPHROSIS: ICD-10-CM

## 2021-04-01 PROCEDURE — 74176 CT ABD & PELVIS W/O CONTRAST: CPT

## 2021-05-28 ENCOUNTER — TELEPHONE (OUTPATIENT)
Dept: CT IMAGING | Age: 76
End: 2021-05-28

## 2021-05-28 NOTE — TELEPHONE ENCOUNTER
Incidental pulmonary nodule noted on CT Abdomen/Pelvis 04/01/2021. Confirmed with Dr. Pablo Tucker' office that she is managing follow-up for this finding.

## 2021-10-04 ENCOUNTER — APPOINTMENT (OUTPATIENT)
Dept: CT IMAGING | Age: 76
DRG: 177 | End: 2021-10-04
Payer: MEDICARE

## 2021-10-04 ENCOUNTER — APPOINTMENT (OUTPATIENT)
Dept: GENERAL RADIOLOGY | Age: 76
DRG: 177 | End: 2021-10-04
Payer: MEDICARE

## 2021-10-04 ENCOUNTER — HOSPITAL ENCOUNTER (INPATIENT)
Age: 76
LOS: 2 days | Discharge: NURSING, REHAB OR LONG-TERM CARE | DRG: 177 | End: 2021-10-07
Attending: EMERGENCY MEDICINE | Admitting: INTERNAL MEDICINE
Payer: MEDICARE

## 2021-10-04 DIAGNOSIS — R41.82 ALTERED MENTAL STATUS, UNSPECIFIED ALTERED MENTAL STATUS TYPE: Primary | ICD-10-CM

## 2021-10-04 DIAGNOSIS — J18.9 PNEUMONIA OF BOTH LOWER LOBES DUE TO INFECTIOUS ORGANISM: ICD-10-CM

## 2021-10-04 LAB
A/G RATIO: 1 (ref 1.1–2.2)
ALBUMIN SERPL-MCNC: 4 G/DL (ref 3.4–5)
ALP BLD-CCNC: 89 U/L (ref 40–129)
ALT SERPL-CCNC: 12 U/L (ref 10–40)
ANION GAP SERPL CALCULATED.3IONS-SCNC: 11 MMOL/L (ref 3–16)
AST SERPL-CCNC: 12 U/L (ref 15–37)
BASE EXCESS VENOUS: 8.3 MMOL/L (ref -2–3)
BASOPHILS ABSOLUTE: 0 K/UL (ref 0–0.2)
BASOPHILS RELATIVE PERCENT: 0.7 %
BILIRUB SERPL-MCNC: 0.3 MG/DL (ref 0–1)
BUN BLDV-MCNC: 20 MG/DL (ref 7–20)
CALCIUM SERPL-MCNC: 9.2 MG/DL (ref 8.3–10.6)
CARBOXYHEMOGLOBIN: 1.5 % (ref 0–1.5)
CHLORIDE BLD-SCNC: 96 MMOL/L (ref 99–110)
CO2: 29 MMOL/L (ref 21–32)
CREAT SERPL-MCNC: 5.4 MG/DL (ref 0.8–1.3)
EOSINOPHILS ABSOLUTE: 0.2 K/UL (ref 0–0.6)
EOSINOPHILS RELATIVE PERCENT: 2.9 %
GFR AFRICAN AMERICAN: 13
GFR NON-AFRICAN AMERICAN: 10
GLOBULIN: 3.9 G/DL
GLUCOSE BLD-MCNC: 117 MG/DL (ref 70–99)
HCO3 VENOUS: 33.3 MMOL/L (ref 24–28)
HCT VFR BLD CALC: 20.5 % (ref 40.5–52.5)
HEMOGLOBIN, VEN, REDUCED: 69.1 %
HEMOGLOBIN: 6.8 G/DL (ref 13.5–17.5)
LACTIC ACID, SEPSIS: 1.2 MMOL/L (ref 0.4–1.9)
LYMPHOCYTES ABSOLUTE: 1.1 K/UL (ref 1–5.1)
LYMPHOCYTES RELATIVE PERCENT: 16.6 %
MCH RBC QN AUTO: 29.3 PG (ref 26–34)
MCHC RBC AUTO-ENTMCNC: 33.4 G/DL (ref 31–36)
MCV RBC AUTO: 87.8 FL (ref 80–100)
METHEMOGLOBIN VENOUS: 0.9 % (ref 0–1.5)
MONOCYTES ABSOLUTE: 0.5 K/UL (ref 0–1.3)
MONOCYTES RELATIVE PERCENT: 7.2 %
NEUTROPHILS ABSOLUTE: 4.7 K/UL (ref 1.7–7.7)
NEUTROPHILS RELATIVE PERCENT: 72.6 %
O2 SAT, VEN: 29 %
PCO2, VEN: 49.3 MMHG (ref 41–51)
PDW BLD-RTO: 13.4 % (ref 12.4–15.4)
PH VENOUS: 7.44 (ref 7.35–7.45)
PLATELET # BLD: 169 K/UL (ref 135–450)
PMV BLD AUTO: 9.2 FL (ref 5–10.5)
PO2, VEN: <30 MMHG (ref 25–40)
POTASSIUM REFLEX MAGNESIUM: 4.4 MMOL/L (ref 3.5–5.1)
PRO-BNP: ABNORMAL PG/ML (ref 0–449)
RBC # BLD: 2.33 M/UL (ref 4.2–5.9)
SODIUM BLD-SCNC: 136 MMOL/L (ref 136–145)
TCO2 CALC VENOUS: 35 MMOL/L
TOTAL PROTEIN: 7.9 G/DL (ref 6.4–8.2)
TROPONIN: 0.2 NG/ML
WBC # BLD: 6.4 K/UL (ref 4–11)

## 2021-10-04 PROCEDURE — 83880 ASSAY OF NATRIURETIC PEPTIDE: CPT

## 2021-10-04 PROCEDURE — 83605 ASSAY OF LACTIC ACID: CPT

## 2021-10-04 PROCEDURE — 70450 CT HEAD/BRAIN W/O DYE: CPT

## 2021-10-04 PROCEDURE — 82803 BLOOD GASES ANY COMBINATION: CPT

## 2021-10-04 PROCEDURE — 84484 ASSAY OF TROPONIN QUANT: CPT

## 2021-10-04 PROCEDURE — 71045 X-RAY EXAM CHEST 1 VIEW: CPT

## 2021-10-04 PROCEDURE — 80053 COMPREHEN METABOLIC PANEL: CPT

## 2021-10-04 PROCEDURE — 99284 EMERGENCY DEPT VISIT MOD MDM: CPT

## 2021-10-04 PROCEDURE — 85025 COMPLETE CBC W/AUTO DIFF WBC: CPT

## 2021-10-04 PROCEDURE — 93005 ELECTROCARDIOGRAM TRACING: CPT | Performed by: PHYSICIAN ASSISTANT

## 2021-10-04 PROCEDURE — 36415 COLL VENOUS BLD VENIPUNCTURE: CPT

## 2021-10-05 PROBLEM — R41.82 AMS (ALTERED MENTAL STATUS): Status: ACTIVE | Noted: 2021-10-05

## 2021-10-05 LAB
ABO/RH: NORMAL
AMMONIA: 26 UMOL/L (ref 16–60)
ANTIBODY SCREEN: NORMAL
BLOOD BANK DISPENSE STATUS: NORMAL
BLOOD BANK PRODUCT CODE: NORMAL
BPU ID: NORMAL
C-REACTIVE PROTEIN: 84.7 MG/L (ref 0–5.1)
DESCRIPTION BLOOD BANK: NORMAL
EKG ATRIAL RATE: 85 BPM
EKG DIAGNOSIS: NORMAL
EKG P AXIS: 98 DEGREES
EKG P-R INTERVAL: 240 MS
EKG Q-T INTERVAL: 372 MS
EKG QRS DURATION: 102 MS
EKG QTC CALCULATION (BAZETT): 442 MS
EKG R AXIS: 9 DEGREES
EKG T AXIS: 58 DEGREES
EKG VENTRICULAR RATE: 85 BPM
ETHANOL: NORMAL MG/DL (ref 0–0.08)
GLUCOSE BLD-MCNC: 106 MG/DL (ref 70–99)
GLUCOSE BLD-MCNC: 106 MG/DL (ref 70–99)
GLUCOSE BLD-MCNC: 125 MG/DL (ref 70–99)
HAPTOGLOBIN: 225 MG/DL (ref 30–200)
HCT VFR BLD CALC: 20.6 % (ref 40.5–52.5)
HCT VFR BLD CALC: 21.1 % (ref 40.5–52.5)
HEMOGLOBIN: 7 G/DL (ref 13.5–17.5)
IMMATURE RETIC FRACT: 0.55 (ref 0.21–0.37)
IRON SATURATION: 21 % (ref 20–50)
IRON: 39 UG/DL (ref 59–158)
LACTATE DEHYDROGENASE: 144 U/L (ref 100–190)
LACTIC ACID, SEPSIS: 1.3 MMOL/L (ref 0.4–1.9)
LACTIC ACID: 0.7 MMOL/L (ref 0.4–2)
LACTIC ACID: 0.8 MMOL/L (ref 0.4–2)
MAGNESIUM: 2.1 MG/DL (ref 1.8–2.4)
PERFORMED ON: ABNORMAL
PHOSPHORUS: 2.9 MG/DL (ref 2.5–4.9)
PROCALCITONIN: 0.92 NG/ML (ref 0–0.15)
RETICULOCYTE ABSOLUTE COUNT: 0.04 M/UL
RETICULOCYTE COUNT PCT: 1.85 % (ref 0.5–2.18)
SEDIMENTATION RATE, ERYTHROCYTE: 115 MM/HR (ref 0–20)
TOTAL IRON BINDING CAPACITY: 189 UG/DL (ref 260–445)
TROPONIN: 0.19 NG/ML
TROPONIN: 0.19 NG/ML
TROPONIN: 0.2 NG/ML

## 2021-10-05 PROCEDURE — 84100 ASSAY OF PHOSPHORUS: CPT

## 2021-10-05 PROCEDURE — 86850 RBC ANTIBODY SCREEN: CPT

## 2021-10-05 PROCEDURE — 83615 LACTATE (LD) (LDH) ENZYME: CPT

## 2021-10-05 PROCEDURE — 85045 AUTOMATED RETICULOCYTE COUNT: CPT

## 2021-10-05 PROCEDURE — U0003 INFECTIOUS AGENT DETECTION BY NUCLEIC ACID (DNA OR RNA); SEVERE ACUTE RESPIRATORY SYNDROME CORONAVIRUS 2 (SARS-COV-2) (CORONAVIRUS DISEASE [COVID-19]), AMPLIFIED PROBE TECHNIQUE, MAKING USE OF HIGH THROUGHPUT TECHNOLOGIES AS DESCRIBED BY CMS-2020-01-R: HCPCS

## 2021-10-05 PROCEDURE — 2580000003 HC RX 258: Performed by: PHYSICIAN ASSISTANT

## 2021-10-05 PROCEDURE — 82668 ASSAY OF ERYTHROPOIETIN: CPT

## 2021-10-05 PROCEDURE — 6360000002 HC RX W HCPCS

## 2021-10-05 PROCEDURE — 83010 ASSAY OF HAPTOGLOBIN QUANT: CPT

## 2021-10-05 PROCEDURE — 6370000000 HC RX 637 (ALT 250 FOR IP)

## 2021-10-05 PROCEDURE — 85652 RBC SED RATE AUTOMATED: CPT

## 2021-10-05 PROCEDURE — 82140 ASSAY OF AMMONIA: CPT

## 2021-10-05 PROCEDURE — 2580000003 HC RX 258

## 2021-10-05 PROCEDURE — 84145 PROCALCITONIN (PCT): CPT

## 2021-10-05 PROCEDURE — 84238 ASSAY NONENDOCRINE RECEPTOR: CPT

## 2021-10-05 PROCEDURE — 86901 BLOOD TYPING SEROLOGIC RH(D): CPT

## 2021-10-05 PROCEDURE — 82077 ASSAY SPEC XCP UR&BREATH IA: CPT

## 2021-10-05 PROCEDURE — 85014 HEMATOCRIT: CPT

## 2021-10-05 PROCEDURE — 87040 BLOOD CULTURE FOR BACTERIA: CPT

## 2021-10-05 PROCEDURE — 36430 TRANSFUSION BLD/BLD COMPNT: CPT

## 2021-10-05 PROCEDURE — 83550 IRON BINDING TEST: CPT

## 2021-10-05 PROCEDURE — 84484 ASSAY OF TROPONIN QUANT: CPT

## 2021-10-05 PROCEDURE — 83735 ASSAY OF MAGNESIUM: CPT

## 2021-10-05 PROCEDURE — P9016 RBC LEUKOCYTES REDUCED: HCPCS

## 2021-10-05 PROCEDURE — 36415 COLL VENOUS BLD VENIPUNCTURE: CPT

## 2021-10-05 PROCEDURE — 1200000000 HC SEMI PRIVATE

## 2021-10-05 PROCEDURE — U0005 INFEC AGEN DETEC AMPLI PROBE: HCPCS

## 2021-10-05 PROCEDURE — 6360000002 HC RX W HCPCS: Performed by: PHYSICIAN ASSISTANT

## 2021-10-05 PROCEDURE — 85018 HEMOGLOBIN: CPT

## 2021-10-05 PROCEDURE — 83540 ASSAY OF IRON: CPT

## 2021-10-05 PROCEDURE — 6370000000 HC RX 637 (ALT 250 FOR IP): Performed by: STUDENT IN AN ORGANIZED HEALTH CARE EDUCATION/TRAINING PROGRAM

## 2021-10-05 PROCEDURE — 86900 BLOOD TYPING SEROLOGIC ABO: CPT

## 2021-10-05 PROCEDURE — 86923 COMPATIBILITY TEST ELECTRIC: CPT

## 2021-10-05 PROCEDURE — 83605 ASSAY OF LACTIC ACID: CPT

## 2021-10-05 PROCEDURE — 86140 C-REACTIVE PROTEIN: CPT

## 2021-10-05 RX ORDER — INSULIN LISPRO 100 [IU]/ML
0-6 INJECTION, SOLUTION INTRAVENOUS; SUBCUTANEOUS
Status: DISCONTINUED | OUTPATIENT
Start: 2021-10-05 | End: 2021-10-07 | Stop reason: HOSPADM

## 2021-10-05 RX ORDER — INSULIN LISPRO 100 [IU]/ML
0-3 INJECTION, SOLUTION INTRAVENOUS; SUBCUTANEOUS NIGHTLY
Status: DISCONTINUED | OUTPATIENT
Start: 2021-10-05 | End: 2021-10-07 | Stop reason: HOSPADM

## 2021-10-05 RX ORDER — SEVELAMER CARBONATE 800 MG/1
800 TABLET, FILM COATED ORAL 2 TIMES DAILY WITH MEALS
Status: DISCONTINUED | OUTPATIENT
Start: 2021-10-05 | End: 2021-10-07 | Stop reason: HOSPADM

## 2021-10-05 RX ORDER — ONDANSETRON 4 MG/1
4 TABLET, ORALLY DISINTEGRATING ORAL EVERY 8 HOURS PRN
Status: DISCONTINUED | OUTPATIENT
Start: 2021-10-05 | End: 2021-10-07 | Stop reason: HOSPADM

## 2021-10-05 RX ORDER — SODIUM CHLORIDE 9 MG/ML
INJECTION, SOLUTION INTRAVENOUS PRN
Status: DISCONTINUED | OUTPATIENT
Start: 2021-10-05 | End: 2021-10-07 | Stop reason: HOSPADM

## 2021-10-05 RX ORDER — CHOLECALCIFEROL (VITAMIN D3) 10 MCG
1 TABLET ORAL DAILY
Status: DISCONTINUED | OUTPATIENT
Start: 2021-10-05 | End: 2021-10-07 | Stop reason: HOSPADM

## 2021-10-05 RX ORDER — SODIUM CHLORIDE 9 MG/ML
25 INJECTION, SOLUTION INTRAVENOUS PRN
Status: DISCONTINUED | OUTPATIENT
Start: 2021-10-05 | End: 2021-10-07 | Stop reason: HOSPADM

## 2021-10-05 RX ORDER — ACETAMINOPHEN 325 MG/1
650 TABLET ORAL EVERY 6 HOURS PRN
Status: DISCONTINUED | OUTPATIENT
Start: 2021-10-05 | End: 2021-10-07 | Stop reason: HOSPADM

## 2021-10-05 RX ORDER — SERTRALINE HYDROCHLORIDE 25 MG/1
25 TABLET, FILM COATED ORAL DAILY
Status: DISCONTINUED | OUTPATIENT
Start: 2021-10-05 | End: 2021-10-07 | Stop reason: HOSPADM

## 2021-10-05 RX ORDER — CARVEDILOL 25 MG/1
25 TABLET ORAL 2 TIMES DAILY WITH MEALS
Status: DISCONTINUED | OUTPATIENT
Start: 2021-10-05 | End: 2021-10-07 | Stop reason: HOSPADM

## 2021-10-05 RX ORDER — LOSARTAN POTASSIUM 25 MG/1
25 TABLET ORAL DAILY
Status: DISCONTINUED | OUTPATIENT
Start: 2021-10-05 | End: 2021-10-07 | Stop reason: HOSPADM

## 2021-10-05 RX ORDER — LABETALOL HYDROCHLORIDE 5 MG/ML
10 INJECTION, SOLUTION INTRAVENOUS EVERY 4 HOURS PRN
Status: DISCONTINUED | OUTPATIENT
Start: 2021-10-05 | End: 2021-10-05

## 2021-10-05 RX ORDER — NICOTINE POLACRILEX 4 MG
15 LOZENGE BUCCAL PRN
Status: DISCONTINUED | OUTPATIENT
Start: 2021-10-05 | End: 2021-10-07 | Stop reason: HOSPADM

## 2021-10-05 RX ORDER — PANTOPRAZOLE SODIUM 40 MG/1
40 TABLET, DELAYED RELEASE ORAL DAILY
Status: DISCONTINUED | OUTPATIENT
Start: 2021-10-05 | End: 2021-10-07 | Stop reason: HOSPADM

## 2021-10-05 RX ORDER — ONDANSETRON 2 MG/ML
4 INJECTION INTRAMUSCULAR; INTRAVENOUS EVERY 6 HOURS PRN
Status: DISCONTINUED | OUTPATIENT
Start: 2021-10-05 | End: 2021-10-07 | Stop reason: HOSPADM

## 2021-10-05 RX ORDER — SODIUM CHLORIDE 0.9 % (FLUSH) 0.9 %
5-40 SYRINGE (ML) INJECTION EVERY 12 HOURS SCHEDULED
Status: DISCONTINUED | OUTPATIENT
Start: 2021-10-05 | End: 2021-10-07 | Stop reason: HOSPADM

## 2021-10-05 RX ORDER — DEXTROSE MONOHYDRATE 50 MG/ML
100 INJECTION, SOLUTION INTRAVENOUS PRN
Status: DISCONTINUED | OUTPATIENT
Start: 2021-10-05 | End: 2021-10-07 | Stop reason: HOSPADM

## 2021-10-05 RX ORDER — FERROUS SULFATE 325(65) MG
325 TABLET ORAL
Status: DISCONTINUED | OUTPATIENT
Start: 2021-10-06 | End: 2021-10-07 | Stop reason: HOSPADM

## 2021-10-05 RX ORDER — LABETALOL HYDROCHLORIDE 5 MG/ML
10 INJECTION, SOLUTION INTRAVENOUS EVERY 6 HOURS PRN
Status: DISCONTINUED | OUTPATIENT
Start: 2021-10-05 | End: 2021-10-07

## 2021-10-05 RX ORDER — HEPARIN SODIUM 5000 [USP'U]/ML
5000 INJECTION, SOLUTION INTRAVENOUS; SUBCUTANEOUS EVERY 8 HOURS SCHEDULED
Status: DISCONTINUED | OUTPATIENT
Start: 2021-10-05 | End: 2021-10-07 | Stop reason: HOSPADM

## 2021-10-05 RX ORDER — VITAMIN B COMPLEX
2000 TABLET ORAL DAILY
Status: DISCONTINUED | OUTPATIENT
Start: 2021-10-05 | End: 2021-10-07 | Stop reason: HOSPADM

## 2021-10-05 RX ORDER — MIRTAZAPINE 15 MG/1
15 TABLET, FILM COATED ORAL NIGHTLY
Status: DISCONTINUED | OUTPATIENT
Start: 2021-10-05 | End: 2021-10-07 | Stop reason: HOSPADM

## 2021-10-05 RX ORDER — SODIUM CHLORIDE 0.9 % (FLUSH) 0.9 %
5-40 SYRINGE (ML) INJECTION PRN
Status: DISCONTINUED | OUTPATIENT
Start: 2021-10-05 | End: 2021-10-07 | Stop reason: HOSPADM

## 2021-10-05 RX ORDER — DEXTROSE MONOHYDRATE 25 G/50ML
12.5 INJECTION, SOLUTION INTRAVENOUS PRN
Status: DISCONTINUED | OUTPATIENT
Start: 2021-10-05 | End: 2021-10-07 | Stop reason: HOSPADM

## 2021-10-05 RX ORDER — LANOLIN ALCOHOL/MO/W.PET/CERES
1000 CREAM (GRAM) TOPICAL DAILY
Status: DISCONTINUED | OUTPATIENT
Start: 2021-10-05 | End: 2021-10-07 | Stop reason: HOSPADM

## 2021-10-05 RX ORDER — FOLIC ACID 1 MG/1
1 TABLET ORAL DAILY
Status: DISCONTINUED | OUTPATIENT
Start: 2021-10-05 | End: 2021-10-07 | Stop reason: HOSPADM

## 2021-10-05 RX ORDER — PRAZOSIN HYDROCHLORIDE 2 MG/1
2 CAPSULE ORAL NIGHTLY
Status: DISCONTINUED | OUTPATIENT
Start: 2021-10-05 | End: 2021-10-07 | Stop reason: HOSPADM

## 2021-10-05 RX ORDER — ASPIRIN 81 MG/1
81 TABLET, CHEWABLE ORAL DAILY
Status: DISCONTINUED | OUTPATIENT
Start: 2021-10-05 | End: 2021-10-07 | Stop reason: HOSPADM

## 2021-10-05 RX ORDER — POLYETHYLENE GLYCOL 3350 17 G/17G
17 POWDER, FOR SOLUTION ORAL DAILY PRN
Status: DISCONTINUED | OUTPATIENT
Start: 2021-10-05 | End: 2021-10-07 | Stop reason: HOSPADM

## 2021-10-05 RX ORDER — PRAVASTATIN SODIUM 20 MG
20 TABLET ORAL DAILY
Status: DISCONTINUED | OUTPATIENT
Start: 2021-10-05 | End: 2021-10-07 | Stop reason: HOSPADM

## 2021-10-05 RX ORDER — CALCITRIOL 0.25 UG/1
0.25 CAPSULE, LIQUID FILLED ORAL EVERY OTHER DAY
Status: DISCONTINUED | OUTPATIENT
Start: 2021-10-06 | End: 2021-10-07 | Stop reason: HOSPADM

## 2021-10-05 RX ORDER — NITROGLYCERIN 0.4 MG/1
0.4 TABLET SUBLINGUAL EVERY 5 MIN PRN
Status: DISCONTINUED | OUTPATIENT
Start: 2021-10-05 | End: 2021-10-07 | Stop reason: HOSPADM

## 2021-10-05 RX ORDER — POLYMYXIN B SULFATE AND TRIMETHOPRIM 1; 10000 MG/ML; [USP'U]/ML
1 SOLUTION OPHTHALMIC
Status: DISCONTINUED | OUTPATIENT
Start: 2021-10-05 | End: 2021-10-07 | Stop reason: HOSPADM

## 2021-10-05 RX ORDER — ACETAMINOPHEN 650 MG/1
650 SUPPOSITORY RECTAL EVERY 6 HOURS PRN
Status: DISCONTINUED | OUTPATIENT
Start: 2021-10-05 | End: 2021-10-07 | Stop reason: HOSPADM

## 2021-10-05 RX ADMIN — MIRTAZAPINE 15 MG: 15 TABLET, FILM COATED ORAL at 21:41

## 2021-10-05 RX ADMIN — ASPIRIN 81 MG: 81 TABLET, CHEWABLE ORAL at 17:35

## 2021-10-05 RX ADMIN — LOSARTAN POTASSIUM 25 MG: 25 TABLET, FILM COATED ORAL at 17:35

## 2021-10-05 RX ADMIN — CYANOCOBALAMIN TAB 1000 MCG 1000 MCG: 1000 TAB at 17:35

## 2021-10-05 RX ADMIN — FOLIC ACID 1 MG: 1 TABLET ORAL at 17:35

## 2021-10-05 RX ADMIN — CARVEDILOL 25 MG: 25 TABLET, FILM COATED ORAL at 17:35

## 2021-10-05 RX ADMIN — SERTRALINE HYDROCHLORIDE 25 MG: 25 TABLET ORAL at 21:39

## 2021-10-05 RX ADMIN — Medication 2000 UNITS: at 17:35

## 2021-10-05 RX ADMIN — VANCOMYCIN HYDROCHLORIDE 500 MG: 10 INJECTION, POWDER, LYOPHILIZED, FOR SOLUTION INTRAVENOUS at 13:30

## 2021-10-05 RX ADMIN — ACETAMINOPHEN 650 MG: 325 TABLET ORAL at 17:35

## 2021-10-05 RX ADMIN — HEPARIN SODIUM 5000 UNITS: 5000 INJECTION INTRAVENOUS; SUBCUTANEOUS at 12:21

## 2021-10-05 RX ADMIN — PRAZOSIN HYDROCHLORIDE 2 MG: 2 CAPSULE ORAL at 21:39

## 2021-10-05 RX ADMIN — PRAVASTATIN SODIUM 20 MG: 20 TABLET ORAL at 17:35

## 2021-10-05 RX ADMIN — Medication 10 ML: at 21:41

## 2021-10-05 RX ADMIN — VANCOMYCIN HYDROCHLORIDE 1500 MG: 10 INJECTION, POWDER, LYOPHILIZED, FOR SOLUTION INTRAVENOUS at 03:26

## 2021-10-05 RX ADMIN — ACETAMINOPHEN 650 MG: 325 TABLET ORAL at 12:22

## 2021-10-05 RX ADMIN — POLYMYXIN B SULFATE, TRIMETHOPRIM SULFATE 1 DROP: 10000; 1 SOLUTION/ DROPS OPHTHALMIC at 21:39

## 2021-10-05 RX ADMIN — CEFEPIME 2000 MG: 2 INJECTION, POWDER, FOR SOLUTION INTRAMUSCULAR; INTRAVENOUS at 05:51

## 2021-10-05 RX ADMIN — HEPARIN SODIUM 5000 UNITS: 5000 INJECTION INTRAVENOUS; SUBCUTANEOUS at 21:40

## 2021-10-05 RX ADMIN — NEPHROCAP 1 MG: 1 CAP ORAL at 17:35

## 2021-10-05 RX ADMIN — SEVELAMER CARBONATE 800 MG: 800 TABLET, FILM COATED ORAL at 17:35

## 2021-10-05 ASSESSMENT — PAIN SCALES - GENERAL
PAINLEVEL_OUTOF10: 0
PAINLEVEL_OUTOF10: 2
PAINLEVEL_OUTOF10: 0

## 2021-10-05 ASSESSMENT — ENCOUNTER SYMPTOMS
WHEEZING: 0
COUGH: 0
ABDOMINAL DISTENTION: 0
ABDOMINAL PAIN: 0
SHORTNESS OF BREATH: 0

## 2021-10-05 NOTE — H&P
Internal Medicine  PGY-1  History & Physical      CC: AMS. History Obtained From: Patient, chart. HISTORY OF PRESENT ILLNESS:    68 yo M PMHx CKD stage IV on HD MWF (does not make urine), anemia of chronic disease, CAD, PVD, T2DM, HTN, HLD, GERD, MDD presented to [facility] ED from [address] for AMS. Per nursing facility patient was lethargic, nurse says that usually patient talks to her but when she went in he didn't and was slower than he typically is hence she sent him to the ED. Phoned patient's daughter who states that she saw him this past Sunday, they had lunch together and watched football. She states that he appeared at his baseline at the time though a bit more sleepy than usual, which she thought maybe was related to him just having had a big lunch. She denied that he expressed to her any acute symptoms including fever, chills, n/v, abdominal, chest pain/chest pressure. Of note he is fully vaccinated for Covid. Attempted to obtain history from patient, he repeats \"some lonely ass people brought me here\". He is resisting physical exam. Of note patient has a history of multiple ED admissions and hospitalizations for \"low hemoglobin\" and received transfusions. In ED, VS significant for T 101.8 and 'E systolic. Other VSS. Chemistry in line with ESRD, BUN 20, Cr 5.4. CBC with Hb 6.8/Hct 20.5. CXR with evidence of bibasilar infiltrates. Patient was admitted to the floor for further management.     Past Medical History:          Diagnosis  Date      Anemia        Atrial fibrillation ([address]  11/4/2013      CAD (coronary artery disease)        Mi, stent      Chronic kidney disease        [facility] thrombosis) [facility])        End stage renal disease ([address]        Gas gangrene ([address]        Hemodialysis [facility]        M-W-F      Hx of blood clots        Hyperkalemia        Hyperlipidemia  5/30/2012      Hypertension     [place]  Hyperthyroidism        Ischemic heart disease  3/05/7719      Metabolic encephalopathy        MI [facility]myocardial infarction) [facility]  10/2018      Type II or unspecified type diabetes mellitus without mention of complication, not stated as uncontrolled Kiya Kaur     Past Surgical History:          Procedure  Laterality  Date   Barrie Diallo [facility] CARDIAC SURGERY          cardiac stent      FEMORAL-TIBIAL BYPASS GRAFT  Right  1/9/2019      RIGHT FEMORAL POSTERIOR TIBIAL BYPASS performed by Gerardo Mcclelland MD at [facility] Right  1/4/2019      INCISION AND DRAINAGE, TRANSMETATARSAL AMPUTATION ALL ON RIGHT FOOT performed by Irene Dos Santos DPM at [facility] Right  1/14/2019      REVISION OF TRANSMETATARSAL AMPUTATION RIGHT FOOT performed by Irene Dos Santos DPM at [address] [place] [address] Left  08/24/2019      TMA      FOOT AMPUTATION  Left  8/24/2019      LEFT FOOT TRANSMETATARSAL AMPUTATION performed by Lashonda Field DPM at [address] [place] [address] Left  8/27/2019      REPEAT INCISION AND DRAINAGE, REVISION OF LEFT FOOT TRANSMETATARSAL AMPUTATION performed by Lashonda Field DPM at [address] [place] [address] Left  10/11/2019      LEFT FOOT INCISION AND DRAINAGE WITH REVISION OF TRANSMETARSAL AMPUTATION WITH WOUND VAC APPLICATION  performed by Lashonda Field DPM at [facility] Right  1/18/2019      RIGHT BELOW THE KNEE AMPUTATION performed by Gerardo Mcclelland MD at [facility] Left  12/9/2019      LEFT BELOW KNEE AMPUTATION performed by Gerardo Mcclelland MD at [address] [address]  Left  2/6/2020      DEBRIDEMENT AND PLACEMENT OF WOUND VAC LEFT BELOW THE KNEE AMPUTATION SITE performed by Kamran Arreaga MD at [address] [place] [address]     Medications Priorto Admission:     Not in a hospital admission. Allergies:  Patient has no known allergies.     Social History:   · TOBACCO:   reports that he has never smoked. He has never used smokeless tobacco.  · ETOH:   reports previous alcohol use. · DRUGS: Unknown. · Patient currently lives at [facility]. ·   Family History:         Problem  Relation  Age of Onset      Arthritis  Mother          Review of Systems    ROS: A 10 point review of systems was conducted, significant findings as noted in HPI. Physical Exam  Constitutional:       Comments: Oriented x2. Cardiovascular:      Rate and Rhythm: Normal rate and regular rhythm. Pulses: Normal pulses. Heart sounds: Normal heart sounds. Pulmonary:      Effort: Pulmonary effort is normal.      Comments: Diminished breath sounds throughout. Abdominal:      General: Bowel sounds are normal.      Palpations: Abdomen is soft. Comments: Obese abdomen. Genitourinary:     Comments: Deferred. Musculoskeletal:      Comments: Patient with b/l below knee amputation. Skin:     General: Skin is warm and dry. Capillary Refill: Capillary refill takes less than 2 seconds. Comments: AV fistula noted on the left upper arm. No erythema, warmth, tenderness around site. Palpable thrill noted. Neurological:      Mental Status: He is alert. Comments: Alert, oriented x2 to person and place but not situation. Unable to assess neurological exam as patient does not follow commands. Psychiatric:      Comments: Unable to assess.             Vitals:    10/05/21 0500   BP: (!) 188/85   Pulse: 78   Resp: 24   Temp:    SpO2: 96%       DATA:    Labs:  CBC:   Recent Labs     10/04/21  2302   WBC 6.4   HGB 6.8*   HCT 20.5*          BMP:   Recent Labs     10/04/21  2302      K 4.4   CL 96*   CO2 29   BUN 20   CREATININE 5.4*   GLUCOSE 117*     LFT's:   Recent Labs     10/04/21  2302   AST 12*   ALT 12   BILITOT 0.3   ALKPHOS 89     Troponin:   Recent Labs     10/04/21  2302   TROPONINI 0.20*     BNP:No results for input(s): BNP in the last 72 hours.  ABGs: No results for input(s): PHART, ACW8VBP, PO2ART in the last 72 hours. INR: No results for input(s): INR in the last 72 hours. U/A:No results for input(s): NITRITE, COLORU, PHUR, LABCAST, WBCUA, RBCUA, MUCUS, TRICHOMONAS, YEAST, BACTERIA, CLARITYU, SPECGRAV, LEUKOCYTESUR, UROBILINOGEN, BILIRUBINUR, BLOODU, GLUCOSEU, AMORPHOUS in the last 72 hours. Invalid input(s): KETONESU    XR CHEST PORTABLE    (Results Pending)   CT Head WO Contrast    (Results Pending)         ASSESSMENT AND PLAN:  66 yo M PMHx CKD stage IV on HD MWF (does not make urine), anemia of chronic disease, CAD, PVD, T2DM, HTN, HLD, GERD, MDD presented to [facility] ED from [address] for AMS. Hb low to 6.8. Febrile with CXR with evidence of bibasilar infiltrates. He currently has the following issues:    Acute Encephalopathy 2/2 Likely Community-Acquired Pneumonia  Patient reportedly altered from mental status baseline.   - f/u Ammonia  - treat underlying infection as below   - keep K>4, Mg>2     Sepsis 2/2 Likely Community-Acquired Pneumonia  Patient presented febrile to 101.8. WBC, Lactate wnl. CXR with evidence of bibasilar infiltrates with concern for pneumonia. S/p Vanc in ED 10/5. Coverage adjusted to likely exposure to healthcare facilities - nursing home, frequent hospital admissions. - f/u blood cx, Covid swab, resp viral panel, Inf A/B, MRSA  - f/u procal, ESR, CRP, lactate    - continue Vanc, Cefepime      CKD Stage 4  On HD MWF. Cr 5.4.   - Nephrology c/s for HD   - continue Sevelamer bid with meals   - continue Calcitriol 0.25 mcg once daily on dialysis days only    Anemia of Chronic Disease  On admission Hb 6.8/20.5. Unsure of patient's Hb goal, per nursing home it is <6 but this is probably a threshold they set themselves to avoid frequent admissions to the hospital for transfusions.    - transfuse 1U pRBC's  - f/u Iron, TIBC, Ferritin, LDH, Haptoglobin, Soluble Transferrin Receptor     NSTEMI, Type II   Trop elevated to 0.20. Likely 2/2 CKD state and poor clearance. - trend Trop q6h for 2 occurrences   - continue to monitor     CAD  Hx of LAD STEMI (2013), LAD/RCA STEMI (10/2018) s/p stents. - continue ASA 81 mg daily   - Nitro PRN      T2DM   on admission. Takes 100U Humalog injection at home (not re-ordered). - LDSSI, hypoglycemia protocol  - continue to monitor sugars     HTN   's in ED.  - continue home meds - Coreg 25 bid, Cozaar 25 daily  - continue Prazosin 2 mg nightly      HLD  - continue Pravastatin 20 mg daily     MDD  - continue Mirtazapine 15 mg nightly       Will discuss with attending physician Dr. Shanell Apple. Code Status: Full Code  FEN: Adult Regular Diet   PPX: Protonix   DISPO: IP    Jose F Salter MD  10/5/2021,  6:10 AM    I saw the patient independently from the resident . I discussed the care with the resident. I personally reviewed the HPI, PH, FH, SH, ROS and medications. I repeated pertinent portions of the examination and reviewed the relevant imaging and laboratory data. I agree with the findings, assessment and plan as documented. addition to: 26-year-old male admitted with acute encephalopathy in the setting of community-acquired pneumonia. Patient will be treated broadly due to being in nursing home and frequent hospitalizations.   Plan as above  1

## 2021-10-05 NOTE — ED PROVIDER NOTES
ED Attending Attestation Note     Date of evaluation: 10/4/2021    This patient was seen by the advance practice provider. I have seen and examined the patient, agree with the workup, evaluation, management and diagnosis. The care plan has been discussed. My assessment reveals patient sent in from SNF with AMS and fever. Patient undergoing lab evaluation to work up possible sepsis and care will be turned over to oncoming provider.      Renae Main MD  10/04/21 [id number]

## 2021-10-05 NOTE — PROGRESS NOTES
Cefepime 1000 mg q12hrs  ordered for patient. This medication is renally eliminated. per renal dose adjustment policy, will change to cefepime 2000 mg to be administered after hemodialysis on dialysis days   CrCl cannot be calculated (Unknown ideal weight.). Pharmacy will continue to monitor renal function and adjust dose as necessary. Please call with any questions. Thanks!   Petty Young Rph

## 2021-10-05 NOTE — CONSULTS
[facility]  Palliative Medicine Consultation Note      Date Of Admission:10/4/2021  Date of consult: 10/05/21  Seen by [facility] in the past:  Yes    Recommendations:        Met with pt in room. He was AO to self only, fully AO at baseline per review of chart. States things were going well at facility and only came in because another Dr made him. Called POA Author Ramana who states patient was doing well with dialysis and was getting more lethargic since other sister visited on Saturday. Discussed code status and patient to be a FULL CODE. Family would like to continue with aggressive management. Confirmed Author Boles (primary) and [facility] as HCPOA as shown in our documentation. Will continue to provide support as needed. 1. Goals of Care/Advanced Care planning/Code status: FULL CODE  2. Pain: denied  3. SOB: denied  4. Disposition: inpatient     Reason for Consult:         [x]  Goals of Care  [x]  Code Status Discussion/Advanced Care Planning   [x]  Psychosocial/Family Support  []  Symptom Management  []  Other (Specify)    Requesting Physician: Dr. Deon Boles:  AMS    History Obtained From:  electronic medical record    History of Present Illness:         Dave Wright is a 67 y.o. male with PMH of CKD stage IV on HD MWF (does not make urine), anemia of chronic disease, CAD, PVD, T2DM, HTN, HLD, GERD, MDD who presented with AMS from [place]. Patient was more lethargic than at baseline but with no reported n/v abdominal pain, fever, cp. Fully vaccinated for COVID. Patient not cooperative with exam on admission, but febrile with bibasilar infiltrates. Admitted for PNA management.     Subjective:         Past Medical History:        Diagnosis Date    Anemia     Atrial [facility] ([facility] 11/4/2013    CAD (coronary artery disease)     Mi, stent    Chronic kidney disease     DVT (deep venous thrombosis) ([facility])     End stage renal disease (H[facility])     Gas gangrene ([address]     Hemodialysis patient ([facility]     M-W-F    Hx of blood clots     Hyperkalemia     Hyperlipidemia 5/30/2012    Hypertension     Hyperthyroidism     Ischemic heart disease 5/70/0828    Metabolic encephalopathy     MI (myocardial infarction) ([facility] 10/2018    Type II or unspecified type diabetes mellitus without mention of complication, not stated as uncontrolled        Past Surgical History:        Procedure Laterality Date    CARDIAC SURGERY      cardiac stent    FEMORAL-TIBIAL BYPASS GRAFT Right 1/9/2019    RIGHT FEMORAL POSTERIOR TIBIAL BYPASS performed by Ashley Ford MD at [address] [place] [address] Right 1/4/2019    INCISION AND DRAINAGE, TRANSMETATARSAL AMPUTATION ALL ON RIGHT FOOT performed by Belem Allison DPM at [address] [place] [address] Right 1/14/2019    REVISION OF TRANSMETATARSAL AMPUTATION RIGHT FOOT performed by Belem Allison DPM at [address] [place] [address] Left 08/24/2019    TMA    FOOT AMPUTATION Left 8/24/2019    LEFT FOOT TRANSMETATARSAL AMPUTATION performed by Laura Conner DPM at [address] [place] [address] Left 8/27/2019    REPEAT INCISION AND DRAINAGE, REVISION OF LEFT FOOT TRANSMETATARSAL AMPUTATION performed by Laura Conner DPM at [address] [place] [address] Left 10/11/2019    LEFT FOOT INCISION AND DRAINAGE WITH REVISION OF TRANSMETARSAL AMPUTATION WITH WOUND VAC APPLICATION  performed by Laura Conner DPM at [address] Right 1/18/2019    RIGHT BELOW THE KNEE AMPUTATION performed by Ashley Ford MD at [address] Left 12/9/2019    LEFT BELOW KNEE AMPUTATION performed by Ashley Ford MD at [facility] Left 2/6/2020    DEBRIDEMENT AND PLACEMENT OF WOUND VAC LEFT BELOW THE KNEE AMPUTATION SITE performed by Sandoval Womack MD at [facility] 1       Current Medications:    Not in a hospital admission. Allergies:  Patient has no known allergies. Social History:    · TOBACCO: reports that he has never smoked.  He has never used smokeless tobacco.  · ETOH:   reports previous alcohol use. · Patient currently lives at facility    Review of Systems -   Review of Systems: A 10 point review of systems was conducted, significant findings as notedin HPI. Objective:        Physical Exam  Constitutional:       Appearance: He is obese. HENT:      Head: Normocephalic. Mouth/Throat:      Mouth: Mucous membranes are moist.   Eyes:      Extraocular Movements: Extraocular movements intact. Pupils: Pupils are equal, round, and reactive to light. Cardiovascular:      Rate and Rhythm: Normal rate and regular rhythm. Pulmonary:      Effort: Pulmonary effort is normal.      Breath sounds: Rales: bilateral.      Comments: Decreased BS  Abdominal:      Comments: obese   Musculoskeletal:      Comments: BL below knee amputations   Neurological:      Mental Status: He is alert. Palliative Performance Scale:  [] 60% Ambulation reduced; Significant disease; Can't do hobbies/housework; intake normal or reduced; occasional assist; LOC full/confusion  [x] 50% Mainly sit/lie; Extensive disease; Can't do any work; Considerable assist; intake normal  Or reduced; LOC full/confusion  [] 40% Mainly in bed; Extensive disease; Mainly assist; intake normal or reduced; occasional assist; LOC full/confusion  [] 30% Bed Bound; Extensive disease; Total care; intake reduced; LOC full/confusion  [] 20% Bed Bound; Extensive disease; Total care; intake minimal; Drowsy/coma  [] 10% Bed Bound; Extensive disease;  Total care; Mouth care only; Drowsy/coma  [] 0% Death    PPS:     Vitals:    BP (!) 171/87   Pulse 82   Temp 100.1 °F (37.8 °C) (Axillary)   Resp 23   Ht 4' (1.219 m)   Wt 233 lb (105.7 kg)   SpO2 92%   BMI 71.10 kg/m²     Labs:    BMP:   Recent Labs     10/04/21  2302      K 4.4   CL 96*   CO2 29   BUN 20   CREATININE 5.4*   GLUCOSE 117*     CBC:   Recent Labs     10/04/21  2302 10/05/21  0648   WBC 6.4  --    HGB 6.8*  -- HCT 20.5* 21.1*     --        LFT's:   Recent Labs     10/04/21  2302   AST 12*   ALT 12   BILITOT 0.3   ALKPHOS 89     Troponin:   Recent Labs     10/04/21  2302 10/05/21  0648   TROPONINI 0.20* 0.20*     BNP: No results for input(s): BNP in the last 72 hours. ABGs: No results for input(s): PHART, VNG7ALQ, PO2ART in the last 72 hours. INR: No results for input(s): INR in the last 72 hours. U/A:No results for input(s): NITRITE, COLORU, PHUR, LABCAST, WBCUA, RBCUA, MUCUS, TRICHOMONAS, YEAST, BACTERIA, CLARITYU, SPECGRAV, LEUKOCYTESUR, UROBILINOGEN, BILIRUBINUR, BLOODU, GLUCOSEU, AMORPHOUS in the last 72 hours. Invalid input(s): KETONESU    XR CHEST PORTABLE   Final Result      Mild cardiomegaly. Mild bibasilar airspace disease, atelectasis or pneumonia. Preliminary report provided by [facility] at [facility] , without discrepancy. A         CT Head WO Contrast   Final Result      No acute intracranial hemorrhage or mass effect. Mild atrophy and chronic small vessel ischemic change with remote left occipital lobe infarct. Paranasal sinus inflammatory disease. Preliminary report provided by [facility] at [address] [place] [address] , without discrepancy. A               Conclusion/Time spent:         Recommendations see above    9:40-9:50 pt 9:55-10:05 with [employer] [facility] on phone  Time spent with patient and/or family: 20  Time reviewing records: 10 min   Time communicating with staff: 5 min     A total of 35 minutes spent with the patient and family on unit greater than 50% in counseling regarding palliative care and in goals of care for the patient. Thank you to Dr. Silvestre Veliz for this consultation. We will continue to follow Mr. Dinesh vizcaino as needed.     Sunshine Holman MD  PGY1  11:11 AM  10/05/21

## 2021-10-05 NOTE — CONSULTS
Clinical Pharmacy Progress Note    Vancomycin - Management by Pharmacy    Consult Date(s): 10/1  Consulting Provider(s): Shawn    Assessment / Plan    HAP - Vancomycin   Concurrent Antimicrobials: Cefepime   Day of Vanc Therapy: 1   Current Dosing Method: Intermittent   Therapeutic Goal: Trough    Plan / Rationale: Patient is a MWF HD patient, he received a 1,500mg (~14mg/kg dose) this AM @0326. Patient is not due for HD until tomorrow (no orders currently). I am afraid that he did not receive an adequate load this AM. I will re-dose with 500mg now to get a total load of 2000mg (~19mg/kg). And check a level prior to HD tomorrow.  Will continue to monitor clinical condition and make adjustments to regimen as appropriate. Thank you for consulting Pharmacy! Pa Bronson, PharmD  PGY-1 Pharmacy Resident  [id number]  10/5/2021 10:46 AM        Interval update: MRSA nares sent. O2 saturating well on room air. Subjective/Objective: Mr. Jemma Anderson is a 67 y.o. male with a PMHx significant for CKD on HD (MWF), T2DM, PVD, CAD. HTN, HDL, GERD and MDD. He has bilateral LE amputations and admitted for AMS and HAP. Pharmacy has been consulted to dose vancomycin. Height:   Ht Readings from Last 1 Encounters:   10/05/21 4' (1.219 m)     Weight:   Wt Readings from Last 1 Encounters:   10/05/21 233 lb (105.7 kg)     Level(s) / Doses:    Date Time Dose Level / Type of Level Interpretation                 Note: Serum levels collected for AUC-based dosing may be high if collected in close proximity to the dose administered. This is not necessarily an indicator of toxicity. Cultures & Sensitivities:    Date Site Micro Susceptibility / Result   10/5 Blood     10/5 Nares       Labs / Ancillary Data:    CrCl cannot be calculated (Unknown ideal weight. ).     Recent Labs     10/04/21  2302   CREATININE 5.4*   BUN 20   WBC 6.4       Additional Lab Values / Findings of Note: N/A

## 2021-10-05 NOTE — ED PROVIDER NOTES
1 [facility]  EMERGENCY DEPARTMENT ENCOUNTER          PHYSICIAN ASSISTANT NOTE       Date of evaluation: 10/4/2021    Chief Complaint     Altered Mental Status      History of Present Illness     Doyle Gross is a 67 y.o. male who presents for altered mental status. Patient arrives via EMS from nursing home. Per EMS, the patient had dialysis today as normal.  EMS reports when the evening shift arrived they reported the patient seemed altered and called EMS. Per report, patient is normally alert and conversant. On arrival patient is unable to contribute to history. Review of Systems     Review of Systems   Unable to perform ROS: Mental status change       Past Medical, Surgical, Family, and Social History     He has a past medical history of Anemia, Atrial fibrillation ([address] CAD (coronary artery disease), Chronic kidney disease, DVT (deep venous thrombosis) ([address] End stage renal disease ([address] Gas gangrene ([address] Hemodialysis patient ([address] Hx of blood clots, Hyperkalemia, Hyperlipidemia, Hypertension, Hyperthyroidism, Ischemic heart disease, Metabolic encephalopathy, MI (myocardial infarction) ([address] and Type II or unspecified type diabetes mellitus without mention of complication, not stated as uncontrolled. He has a past surgical history that includes Cardiac surgery; Foot Amputation (Right, 1/4/2019); Femoral-tibial Bypass Graft (Right, 1/9/2019); Foot Amputation (Right, 1/14/2019); Leg amputation below knee (Right, 1/18/2019); Foot Amputation (Left, 08/24/2019); Foot Amputation (Left, 8/24/2019); Foot Amputation (Left, 8/27/2019); Foot Amputation (Left, 10/11/2019); Leg amputation below knee (Left, 12/9/2019); and Leg Surgery (Left, 2/6/2020). His family history includes Arthritis in his mother. He reports that he has never smoked. He has never used smokeless tobacco. He reports previous alcohol use. He reports that he does not use drugs.     Medications     Previous Medications    ASPIRIN 81 MG TABLET    Take 81 mg by mouth daily     B COMPLEX-C-FOLIC ACID (JOSUÉ CAPS) 1 MG CAPS    Take 1 mg by mouth daily    CALCITRIOL (ROCALTROL) 0.25 MCG CAPSULE    Take 1 capsule by mouth every other day MWF on dialysis days. CARVEDILOL (COREG) 25 MG TABLET    Take 25 mg by mouth 2 times daily (with meals)    CHOLECALCIFEROL (VITAMIN D3) 50 MCG (2000 UT) CAPS    Take 2,000 Units by mouth daily    FERROUS SULFATE (IRON 325) 325 (65 FE) MG TABLET    Take 325 mg by mouth daily (with breakfast)    FOLIC ACID (FOLVITE) 1 MG TABLET    Take 1 mg by mouth daily    INSULIN LISPRO (HUMALOG) 100 UNIT/ML INJECTION VIAL    Inject into the skin 3 times daily (before meals) SLIDING SCALE    LOSARTAN (COZAAR) 25 MG TABLET    Take 1 tablet by mouth daily    MIRTAZAPINE (REMERON) 15 MG TABLET    Take 15 mg by mouth nightly    NITROGLYCERIN (NITROSTAT) 0.4 MG SL TABLET    Place 0.4 mg under the tongue every 5 minutes as needed for Chest pain up to max of 3 total doses. If no relief after 1 dose, call 911. PANTOPRAZOLE SODIUM (PROTONIX) 40 MG PACK PACKET    Take 40 mg by mouth every morning (before breakfast)    PETROLATUM-ZINC OXIDE (PHYTOPLEX Z-GUARD) 57-17 % PSTE    Apply topically Apply to scrotum every day and night    PRAVASTATIN (PRAVACHOL) 20 MG TABLET    Take 20 mg by mouth daily    PRAZOSIN (MINIPRESS) 2 MG CAPSULE    Take 2 mg by mouth nightly    SERTRALINE (ZOLOFT) 25 MG TABLET    Take 25 mg by mouth daily    SEVELAMER HCL (RENAGEL) 800 MG TABLET    Take 1,600 mg by mouth 3 times daily (with meals)    VITAMIN B-12 (CYANOCOBALAMIN) 1000 MCG TABLET    Take 1,000 mcg by mouth daily       Allergies     He has No Known Allergies. Physical Exam     INITIAL VITALS: BP: (!) 165/79, Temp: 101.8 °F (38.8 °C), Pulse: 68, Resp: 19, SpO2: 96 %  Physical Exam  Vitals and nursing note reviewed. Constitutional:       Comments: Resting with eyes closed on stretcher, awakens to verbal stimuli.     HENT:      Head: Normocephalic and atraumatic. Eyes:      Extraocular Movements: Extraocular movements intact. Pupils: Pupils are equal, round, and reactive to light. Cardiovascular:      Rate and Rhythm: Normal rate and regular rhythm. Pulmonary:      Effort: Pulmonary effort is normal.      Breath sounds: Normal breath sounds. Abdominal:      General: There is no distension. Palpations: Abdomen is soft. Tenderness: There is no abdominal tenderness. Musculoskeletal:      Cervical back: Neck supple. Comments: Bilateral BKA, no signs of infection. Skin:     General: Skin is warm and dry. Comments: Fistula left upper arm, palpable thrill, no erythema/warmth   Neurological:      Mental Status: He is alert. Comments: States first name, will not answer any other questions. Able to squeeze hands bilaterally on command.          Diagnostic Results     EKG   Interpreted in conjunction with emergency department physician No att. providers found  Rhythm: 1 degree AV block  Rate: normal  Axis: normal  Ectopy: none  Conduction: 1st degree AV block  ST Segments: no acute change  T Waves: no acute change  Q Waves:nonspecific  Clinical Impression: no acute changes      RADIOLOGY:  XR CHEST PORTABLE    (Results Pending)   CT Head WO Contrast    (Results Pending)       LABS:   Results for orders placed or performed during the hospital encounter of 10/04/21   CBC Auto Differential   Result Value Ref Range    WBC 6.4 4.0 - 11.0 K/uL    RBC 2.33 (L) 4.20 - 5.90 M/uL    Hemoglobin 6.8 (LL) 13.5 - 17.5 g/dL    Hematocrit 20.5 (LL) 40.5 - 52.5 %    MCV 87.8 80.0 - 100.0 fL    MCH 29.3 26.0 - 34.0 pg    MCHC 33.4 31.0 - 36.0 g/dL    RDW 13.4 12.4 - 15.4 %    Platelets 708 344 - 825 K/uL    MPV 9.2 5.0 - 10.5 fL    Neutrophils % 72.6 %    Lymphocytes % 16.6 %    Monocytes % 7.2 %    Eosinophils % 2.9 %    Basophils % 0.7 %    Neutrophils Absolute 4.7 1.7 - 7.7 K/uL    Lymphocytes Absolute 1.1 1.0 - 5.1 K/uL    Monocytes Absolute 0.5 0.0 - 1.3 K/uL    Eosinophils Absolute 0.2 0.0 - 0.6 K/uL    Basophils Absolute 0.0 0.0 - 0.2 K/uL   Comprehensive Metabolic Panel w/ Reflex to MG   Result Value Ref Range    Sodium 136 136 - 145 mmol/L    Potassium reflex Magnesium 4.4 3.5 - 5.1 mmol/L    Chloride 96 (L) 99 - 110 mmol/L    CO2 29 21 - 32 mmol/L    Anion Gap 11 3 - 16    Glucose 117 (H) 70 - 99 mg/dL    BUN 20 7 - 20 mg/dL    CREATININE 5.4 (HH) 0.8 - 1.3 mg/dL    GFR Non-African American 10 (A) >60    GFR  13 (A) >60    Calcium 9.2 8.3 - 10.6 mg/dL    Total Protein 7.9 6.4 - 8.2 g/dL    Albumin 4.0 3.4 - 5.0 g/dL    Albumin/Globulin Ratio 1.0 (L) 1.1 - 2.2    Total Bilirubin 0.3 0.0 - 1.0 mg/dL    Alkaline Phosphatase 89 40 - 129 U/L    ALT 12 10 - 40 U/L    AST 12 (L) 15 - 37 U/L    Globulin 3.9 g/dL   Troponin   Result Value Ref Range    Troponin 0.20 (H) <0.01 ng/mL   Brain Natriuretic Peptide   Result Value Ref Range    Pro-BNP 18,891 (H) 0 - 449 pg/mL   Lactate, Sepsis   Result Value Ref Range    Lactic Acid, Sepsis 1.2 0.4 - 1.9 mmol/L   Blood gas, venous (Lab)   Result Value Ref Range    pH, Kishore 7.438 7.350 - 7.450    pCO2, Kishore 49.3 41.0 - 51.0 mmHg    pO2, Kishore <30.0 25 - 40 mmHg    HCO3, Venous 33.3 (H) 24.0 - 28.0 mmol/L    Base Excess, Kishore 8.3 (H) -2.0 - 3.0 mmol/L    O2 Sat, Kishore 29 Not established %    Carboxyhemoglobin 1.5 0.0 - 1.5 %    MetHgb, Kishore 0.9 0.0 - 1.5 %    TC02 (Calc), Kishore 35 mmol/L    Hemoglobin, Kishore, Reduced 69.10 %       ED BEDSIDE ULTRASOUND:      RECENT VITALS:  BP: (!) 165/79, Temp: 101.8 °F (38.8 °C), Pulse: 70, Resp: 16, SpO2: 96 %     Procedures         ED Course     Nursing Notes, Past Medical Hx,Past Surgical Hx, Social Hx, Allergies, and Family Hx were reviewed.     The patient was given the following medications:  Orders Placed This Encounter   Medications    cefepime (MAXIPIME) 2000 mg IVPB minibag     Order Specific Question:   Antimicrobial Indications     Answer:   Pneumonia (HAP) CONSULTS:  PHARMACY TO DOSE VANCOMYCIN  IP CONSULT TO HOSPITALIST    MEDICAL DECISION MAKING / ASSESSMENT / Hoang Anderson is a 67 y.o. male with altered mental status. Patient is able to state his name and follow commands. Patient is minimally conversant. No focal deficits on exam.  EKG is normal sinus rhythm without signs of ischemia. White blood cell count is normal.  Patient is anemic to 6.8. Patient has known chronic anemia and is not transfuse unless he is under 6. Normal potassium, BUN is 20 with creatinine of 5.4 consistent with end-stage renal disease. Troponin is 0.2 with a proBNP of almost 19,000. Troponin will be trended. CT of the head shows no acute process. Chest x-ray shows bilateral basilar infiltrates. Covid swab sent. Blood cultures obtained x2. Vancomycin and cefepime initiated for healthcare associated pneumonia. Patient was not given 30 cc/kg of fluid due to end-stage renal disease/dialysis status. Patient will be admitted for further management. This patient was also evaluated by the attending physician. All care plans were discussed and agreed upon. Clinical Impression     1. Altered mental status, unspecified altered mental status type    2. Pneumonia of both lower lobes due to infectious organism        Disposition     PATIENT REFERRED TO:  No follow-up provider specified.     DISCHARGE MEDICATIONS:  New Prescriptions    No medications on file       DISPOSITION  admit        Ksenia Bradshaw PA-C  10/05/21 0030

## 2021-10-05 NOTE — ED NOTES
Bed: A09-09  Expected date:   Expected time:   Means of arrival:   Comments:  EMS     Brandt Albarran RN  10/04/21 2026

## 2021-10-05 NOTE — CONSULTS
Clinical Pharmacy Progress Note    Admit date: 10/4/2021     Subjective/Objective:  Pt presented to ED with AMS, found to have PNA (HAP). Pharmacy is consulted to dose Vancomycin x1 dose in ED per SAL Larson. Ht = 48 in  Wt = 105.7 kg    Assessment/Plan:  · Will order Vancomycin 1500 mg IV x1 for administration in ED per ER pharmacy consult. · If Vancomycin is to continue on admission and pharmacy is to manage dosing, please re-consult with admission orders.     Thanks--  [employer], [facility]

## 2021-10-05 NOTE — PROGRESS NOTES
Pt A/O x 3. VSS. Receiving 1 Unit PRBCs. Bilat BKAs. Fall precautions. Placed on tele [id number].    4 Eyes Admission Assessment     I agree as the admission nurse that 2 RN's have performed a thorough Head to Toe Skin Assessment on the patient. ALL assessment sites listed below have been assessed on admission. Areas assessed by both nurses: dry flakey skin, bilateral BKAs, scattered abrasions, no open wounds noted  [x]   Head, Face, and Ears   [x]   Shoulders, Back, and Chest  [x]   Arms, Elbows, and Hands   [x]   Coccyx, Sacrum, and Ischium  [x]   Legs, Feet, and Heels        Does the Patient have Skin Breakdown?   No         Freddie Prevention initiated:  No   Wound Care Orders initiated:  No      WOC nurse consulted for Pressure Injury (Stage 3,4, Unstageable, DTI, NWPT, and Complex wounds) or Freddie score 18 or lower:  No      Nurse 1 eSignature: Electronically signed by Thony Salgado RN on 10/5/21 at 6:00 PM EDT    **SHARE this note so that the co-signing nurse is able to place an eSignature**    Nurse 2 eSignature: Electronically signed by Angelita Roque on 10/5/21 at 7:52 PM EDT

## 2021-10-05 NOTE — ED TRIAGE NOTES
Patient sent from [facility], hx of renal failure, on dialysis, states received his full rotation today. FSBS WNL, last seen jacquelin; \"a few hours ago\". Patient with weak but bilateral strength in hands, states he knows where he is, but will not disclose information.  Patient baseline A&O

## 2021-10-05 NOTE — PROGRESS NOTES
Progress Note  PGY-1    Admit Date: 10/4/2021  Day: 2  Diet: ADULT DIET; Regular    CC: AMS    Interval history:     Pt seen at bedside. Alert and oriented to self. Daughter reports via phone that he is AOx4 at baseline. Last known well was Sunday. Nursing home personnel reports he received dialysis yesterday and was noted to be lethargic in the night. He is a poor historian and not very conversational. He did obey some commands initially. BP elevated. Tmax:101.8 - febrile  Spo2: 92-98% on room air. Medications:     Scheduled Meds:   sodium chloride flush  5-40 mL IntraVENous 2 times per day    heparin (porcine)  5,000 Units SubCUTAneous 3 times per day    pravastatin  20 mg Oral Daily    losartan  25 mg Oral Daily    calcitRIOL  0.25 mcg Oral Every Other Day    aspirin  81 mg Oral Daily    Bunker Caps  1 mg Oral Daily    sevelamer  800 mg Oral BID WC    carvedilol  25 mg Oral BID WC    mirtazapine  15 mg Oral Nightly    [START ON 10/6/2021] ferrous sulfate  325 mg Oral Daily with breakfast    Vitamin D3  2,000 Units Oral Daily    folic acid  1 mg Oral Daily    pantoprazole  40 mg Oral Daily    prazosin  2 mg Oral Nightly    vitamin B-12  1,000 mcg Oral Daily    cefepime  2,000 mg IntraVENous Q MWF    insulin lispro  0-6 Units SubCUTAneous TID WC    insulin lispro  0-3 Units SubCUTAneous Nightly    vancomycin (VANCOCIN) intermittent dosing (placeholder)   Other See Admin Instructions    vancomycin  500 mg IntraVENous Once     Continuous Infusions:   sodium chloride      sodium chloride       PRN Meds:    Objective:   Vitals:   T-max:  Patient Vitals for the past 8 hrs:   BP Temp Pulse Resp SpO2   10/05/21 1100 (!) 144/57 -- 87 19 93 %   10/05/21 0900 (!) 154/74 100.9 °F (38.3 °C) 85 19 93 %     No intake or output data in the 24 hours ending 10/05/21 1406    Review of Systems   Constitutional: Positive for chills, fatigue and fever.    Respiratory: Negative for cough, shortness of breath and wheezing. Cardiovascular: Negative for chest pain, palpitations and leg swelling. Gastrointestinal: Negative for abdominal distention and abdominal pain. Neurological: Negative for speech difficulty, weakness and numbness. Physical Exam  Constitutional:       Appearance: He is ill-appearing. HENT:      Mouth/Throat:      Mouth: Mucous membranes are dry. Pharynx: Oropharynx is clear. Eyes:      General:         Right eye: Discharge (+ conjunctival injection) present. Cardiovascular:      Rate and Rhythm: Normal rate and regular rhythm. Pulses: Normal pulses. Heart sounds: No murmur heard. No gallop. Pulmonary:      Breath sounds: No wheezing, rhonchi or rales. Comments: Diminished breath sounds b/l   Abdominal:      General: Bowel sounds are normal. There is no distension. Palpations: Abdomen is soft. Tenderness: There is no abdominal tenderness. Musculoskeletal:      Comments: Bilateral BKA   Skin:     General: Skin is warm and dry. Capillary Refill: Capillary refill takes 2 to 3 seconds. Neurological:      Comments: Pt only oriented to name and month. Stopped answering questions during interview. Was able to move all limbs on command and good Upper extremity power bilaterally. Unable to assess co-ordination - pt did not obey command.           LABS:    CBC:   Recent Labs     10/04/21  2302 10/05/21  0648   WBC 6.4  --    HGB 6.8*  --    HCT 20.5* 21.1*     --    MCV 87.8  --      Renal:    Recent Labs     10/04/21  2302 10/05/21  0648     --    K 4.4  --    CL 96*  --    CO2 29  --    BUN 20  --    CREATININE 5.4*  --    GLUCOSE 117*  --    CALCIUM 9.2  --    MG  --  2.10   PHOS  --  2.9   ANIONGAP 11  --      Hepatic:   Recent Labs     10/04/21  2302   AST 12*   ALT 12   BILITOT 0.3   PROT 7.9   LABALBU 4.0   ALKPHOS 89     Troponin:   Recent Labs     10/04/21  2302 10/05/21  0648 10/05/21  1320   TROPONINI 0.20* 0.20* 0.19* BNP: No results for input(s): BNP in the last 72 hours. Lipids: No results for input(s): CHOL, HDL in the last 72 hours. Invalid input(s): LDLCALCU, TRIGLYCERIDE  ABGs:  No results for input(s): PHART, EHI1CTH, PO2ART, WEA5SCA, BEART, THGBART, E5GNXSDA, SSX3FLT in the last 72 hours. INR: No results for input(s): INR in the last 72 hours. Lactate: No results for input(s): LACTATE in the last 72 hours. Cultures:  -----------------------------------------------------------------  RAD:   XR CHEST PORTABLE   Final Result      Mild cardiomegaly. Mild bibasilar airspace disease, atelectasis or pneumonia. Preliminary report provided by [facility] at [id number] , without discrepancy. A         CT Head WO Contrast   Final Result      No acute intracranial hemorrhage or mass effect. Mild atrophy and chronic small vessel ischemic change with remote left occipital lobe infarct. Paranasal sinus inflammatory disease. Preliminary report provided by [facility] at [id number] , without discrepancy. A             Assessment/Plan:   Shalini Kumari is a 67 y.o. male with PMHx  ESRD HD MWF (does not make urine), anemia of chronic disease, CAD, PVD, T2DM, HTN, HLD, GERD, MDD  Who presented to [facility] ED from Nursing home c/o AMS. Acute encephalopathy likely 2/2 PNA  CT head with no acute intracranial abnormalities. Pt is AOx2. He does appear selective with speaking but he was noted to be lethargic. Ammonia: 26  Ethanol level: Not detected  Plan to treat Pneumonia and assess mental status    Pneumonia  Rule out COVID-19  Pt a resident in [facility]. Appeared lethargic. Is febrile with CXR Infiltrates.    Fully vaccinated w/ Pfizer 2/21, procal: 0.92  - ABx: Vancomycin, Cefepime  - Blood Cultures x 2 pending   - Respiratory Culture, Respiratory Viral Panel,  MRSA probe pending  - COVID PCR pending  - procalcitonin, lactic acid, CRP, ESR pending    ESRD on HD  HD: MWF  Cr:5.4   Pt does not

## 2021-10-06 LAB
ANION GAP SERPL CALCULATED.3IONS-SCNC: 14 MMOL/L (ref 3–16)
BASOPHILS ABSOLUTE: 0.1 K/UL (ref 0–0.2)
BASOPHILS RELATIVE PERCENT: 0.8 %
BUN BLDV-MCNC: 38 MG/DL (ref 7–20)
CALCIUM SERPL-MCNC: 9.3 MG/DL (ref 8.3–10.6)
CHLORIDE BLD-SCNC: 94 MMOL/L (ref 99–110)
CO2: 23 MMOL/L (ref 21–32)
CREAT SERPL-MCNC: 8.2 MG/DL (ref 0.8–1.3)
EOSINOPHILS ABSOLUTE: 0.3 K/UL (ref 0–0.6)
EOSINOPHILS RELATIVE PERCENT: 3.4 %
GFR AFRICAN AMERICAN: 8
GFR NON-AFRICAN AMERICAN: 6
GLUCOSE BLD-MCNC: 106 MG/DL (ref 70–99)
GLUCOSE BLD-MCNC: 113 MG/DL (ref 70–99)
GLUCOSE BLD-MCNC: 114 MG/DL (ref 70–99)
GLUCOSE BLD-MCNC: 118 MG/DL (ref 70–99)
HCT VFR BLD CALC: 21.6 % (ref 40.5–52.5)
HEMOGLOBIN: 7.2 G/DL (ref 13.5–17.5)
HEPATITIS B SURFACE ANTIGEN INTERPRETATION: NORMAL
LYMPHOCYTES ABSOLUTE: 1.6 K/UL (ref 1–5.1)
LYMPHOCYTES RELATIVE PERCENT: 20 %
MAGNESIUM: 2.1 MG/DL (ref 1.8–2.4)
MCH RBC QN AUTO: 29.7 PG (ref 26–34)
MCHC RBC AUTO-ENTMCNC: 33.6 G/DL (ref 31–36)
MCV RBC AUTO: 88.4 FL (ref 80–100)
MONOCYTES ABSOLUTE: 0.5 K/UL (ref 0–1.3)
MONOCYTES RELATIVE PERCENT: 6.9 %
NEUTROPHILS ABSOLUTE: 5.3 K/UL (ref 1.7–7.7)
NEUTROPHILS RELATIVE PERCENT: 68.9 %
PDW BLD-RTO: 13.4 % (ref 12.4–15.4)
PERFORMED ON: ABNORMAL
PLATELET # BLD: 144 K/UL (ref 135–450)
PMV BLD AUTO: 9.3 FL (ref 5–10.5)
POTASSIUM SERPL-SCNC: 4.7 MMOL/L (ref 3.5–5.1)
RBC # BLD: 2.44 M/UL (ref 4.2–5.9)
SARS-COV-2: NOT DETECTED
SODIUM BLD-SCNC: 131 MMOL/L (ref 136–145)
TROPONIN: 0.18 NG/ML
TROPONIN: 0.19 NG/ML
VANCOMYCIN RANDOM: 23.4 UG/ML
WBC # BLD: 7.8 K/UL (ref 4–11)

## 2021-10-06 PROCEDURE — 6360000002 HC RX W HCPCS: Performed by: INTERNAL MEDICINE

## 2021-10-06 PROCEDURE — 84484 ASSAY OF TROPONIN QUANT: CPT

## 2021-10-06 PROCEDURE — 2580000003 HC RX 258

## 2021-10-06 PROCEDURE — 36415 COLL VENOUS BLD VENIPUNCTURE: CPT

## 2021-10-06 PROCEDURE — 6360000002 HC RX W HCPCS

## 2021-10-06 PROCEDURE — 93005 ELECTROCARDIOGRAM TRACING: CPT | Performed by: STUDENT IN AN ORGANIZED HEALTH CARE EDUCATION/TRAINING PROGRAM

## 2021-10-06 PROCEDURE — 80202 ASSAY OF VANCOMYCIN: CPT

## 2021-10-06 PROCEDURE — 80048 BASIC METABOLIC PNL TOTAL CA: CPT

## 2021-10-06 PROCEDURE — 5A1D70Z PERFORMANCE OF URINARY FILTRATION, INTERMITTENT, LESS THAN 6 HOURS PER DAY: ICD-10-PCS | Performed by: INTERNAL MEDICINE

## 2021-10-06 PROCEDURE — 6370000000 HC RX 637 (ALT 250 FOR IP)

## 2021-10-06 PROCEDURE — 85025 COMPLETE CBC W/AUTO DIFF WBC: CPT

## 2021-10-06 PROCEDURE — 83735 ASSAY OF MAGNESIUM: CPT

## 2021-10-06 PROCEDURE — 87340 HEPATITIS B SURFACE AG IA: CPT

## 2021-10-06 PROCEDURE — 87633 RESP VIRUS 12-25 TARGETS: CPT

## 2021-10-06 PROCEDURE — 90935 HEMODIALYSIS ONE EVALUATION: CPT

## 2021-10-06 PROCEDURE — 2500000003 HC RX 250 WO HCPCS: Performed by: STUDENT IN AN ORGANIZED HEALTH CARE EDUCATION/TRAINING PROGRAM

## 2021-10-06 PROCEDURE — 1200000000 HC SEMI PRIVATE

## 2021-10-06 PROCEDURE — 6360000002 HC RX W HCPCS: Performed by: STUDENT IN AN ORGANIZED HEALTH CARE EDUCATION/TRAINING PROGRAM

## 2021-10-06 PROCEDURE — 6370000000 HC RX 637 (ALT 250 FOR IP): Performed by: STUDENT IN AN ORGANIZED HEALTH CARE EDUCATION/TRAINING PROGRAM

## 2021-10-06 RX ORDER — HYDRALAZINE HYDROCHLORIDE 20 MG/ML
10 INJECTION INTRAMUSCULAR; INTRAVENOUS ONCE
Status: COMPLETED | OUTPATIENT
Start: 2021-10-06 | End: 2021-10-06

## 2021-10-06 RX ADMIN — POLYMYXIN B SULFATE, TRIMETHOPRIM SULFATE 1 DROP: 10000; 1 SOLUTION/ DROPS OPHTHALMIC at 16:49

## 2021-10-06 RX ADMIN — EPOETIN ALFA-EPBX 15000 UNITS: 10000 INJECTION, SOLUTION INTRAVENOUS; SUBCUTANEOUS at 14:41

## 2021-10-06 RX ADMIN — LABETALOL HYDROCHLORIDE 10 MG: 5 INJECTION INTRAVENOUS at 06:36

## 2021-10-06 RX ADMIN — HEPARIN SODIUM 5000 UNITS: 5000 INJECTION INTRAVENOUS; SUBCUTANEOUS at 06:28

## 2021-10-06 RX ADMIN — HEPARIN SODIUM 5000 UNITS: 5000 INJECTION INTRAVENOUS; SUBCUTANEOUS at 16:48

## 2021-10-06 RX ADMIN — FERROUS SULFATE TAB 325 MG (65 MG ELEMENTAL FE) 325 MG: 325 (65 FE) TAB at 10:46

## 2021-10-06 RX ADMIN — PRAZOSIN HYDROCHLORIDE 2 MG: 2 CAPSULE ORAL at 21:40

## 2021-10-06 RX ADMIN — SODIUM CHLORIDE 25 ML: 9 INJECTION, SOLUTION INTRAVENOUS at 04:02

## 2021-10-06 RX ADMIN — CEFEPIME 2000 MG: 2 INJECTION, POWDER, FOR SOLUTION INTRAMUSCULAR; INTRAVENOUS at 04:02

## 2021-10-06 RX ADMIN — Medication 10 ML: at 10:46

## 2021-10-06 RX ADMIN — PANTOPRAZOLE SODIUM 40 MG: 40 TABLET, DELAYED RELEASE ORAL at 10:47

## 2021-10-06 RX ADMIN — POLYMYXIN B SULFATE, TRIMETHOPRIM SULFATE 1 DROP: 10000; 1 SOLUTION/ DROPS OPHTHALMIC at 21:40

## 2021-10-06 RX ADMIN — HEPARIN SODIUM 5000 UNITS: 5000 INJECTION INTRAVENOUS; SUBCUTANEOUS at 21:41

## 2021-10-06 RX ADMIN — Medication 2000 UNITS: at 10:46

## 2021-10-06 RX ADMIN — ASPIRIN 81 MG: 81 TABLET, CHEWABLE ORAL at 10:46

## 2021-10-06 RX ADMIN — PRAVASTATIN SODIUM 20 MG: 20 TABLET ORAL at 10:46

## 2021-10-06 RX ADMIN — LABETALOL HYDROCHLORIDE 10 MG: 5 INJECTION INTRAVENOUS at 18:00

## 2021-10-06 RX ADMIN — POLYMYXIN B SULFATE, TRIMETHOPRIM SULFATE 1 DROP: 10000; 1 SOLUTION/ DROPS OPHTHALMIC at 10:47

## 2021-10-06 RX ADMIN — SERTRALINE HYDROCHLORIDE 25 MG: 25 TABLET ORAL at 10:46

## 2021-10-06 RX ADMIN — MIRTAZAPINE 15 MG: 15 TABLET, FILM COATED ORAL at 21:39

## 2021-10-06 RX ADMIN — NEPHROCAP 1 MG: 1 CAP ORAL at 10:46

## 2021-10-06 RX ADMIN — FOLIC ACID 1 MG: 1 TABLET ORAL at 10:46

## 2021-10-06 RX ADMIN — LOSARTAN POTASSIUM 25 MG: 25 TABLET, FILM COATED ORAL at 10:47

## 2021-10-06 RX ADMIN — POLYMYXIN B SULFATE, TRIMETHOPRIM SULFATE 1 DROP: 10000; 1 SOLUTION/ DROPS OPHTHALMIC at 00:37

## 2021-10-06 RX ADMIN — CALCITRIOL CAPSULES 0.25 MCG 0.25 MCG: 0.25 CAPSULE ORAL at 10:46

## 2021-10-06 RX ADMIN — Medication 10 ML: at 21:40

## 2021-10-06 RX ADMIN — SEVELAMER CARBONATE 800 MG: 800 TABLET, FILM COATED ORAL at 16:49

## 2021-10-06 RX ADMIN — CARVEDILOL 25 MG: 25 TABLET, FILM COATED ORAL at 16:49

## 2021-10-06 RX ADMIN — HYDRALAZINE HYDROCHLORIDE 10 MG: 20 INJECTION INTRAMUSCULAR; INTRAVENOUS at 04:32

## 2021-10-06 RX ADMIN — CYANOCOBALAMIN TAB 1000 MCG 1000 MCG: 1000 TAB at 10:46

## 2021-10-06 RX ADMIN — LABETALOL HYDROCHLORIDE 10 MG: 5 INJECTION INTRAVENOUS at 00:37

## 2021-10-06 ASSESSMENT — PAIN SCALES - GENERAL
PAINLEVEL_OUTOF10: 0

## 2021-10-06 ASSESSMENT — ENCOUNTER SYMPTOMS: GASTROINTESTINAL NEGATIVE: 1

## 2021-10-06 NOTE — PROGRESS NOTES
Pt's BP remains elevated at 202/71, pt was given labetalol at 0037. Labetalol is q6h PRN, MD notified. MD ordered one time dose of IV hydralazine.

## 2021-10-06 NOTE — PROGRESS NOTES
Clinical Pharmacy Progress Note    Vancomycin - Management by Pharmacy    Consult Date(s): 10/1  Consulting Provider(s): Shawn    Assessment / Plan    1) HAP - Vancomycin   Concurrent Antimicrobials:   o Cefepime - Day #2   Day of Vanc Therapy: 2   Current Dosing Method: Intermittent dosing given ESRD on HD   Current Dose / Plan / Rationale:   o Given ESRD on HD, will dose vancomycin based on intermittent levels with HD. Current HD schedule is MWF.  o Received total of vancomycin 2000 mg IV as loading dose yesterday. o Random level this AM = 23.4 mcg/mL. o Will not give vancomycin dose today as level > 20 mcg/mL. Anticipate post-HD level to remain > 18 mcg/mL. o Plan to check level on Fri, 10/8 prior to next HD session.  Will continue to monitor clinical condition and make adjustments to regimen as appropriate. Please call with questions--  Ruddy Jimenes, PharmD, BCPS  Wireless: [phone]   10/6/2021 11:25 AM        Interval update: Hgb up to 7.2 today after transfusion yesterday. Scheduled for HD today. COVID-19 test pending. Subjective/Objective: Mr. Pattie Wolf is a 67 y.o. male with a PMHx significant for CKD on HD (MWF), T2DM, PVD, CAD. HTN, HDL, GERD and MDD. He has bilateral LE amputations and admitted for AMS and HAP. Pharmacy has been consulted to dose vancomycin.     Pertinent Antimicrobials:  Cefepime 2000 mg IV 3x weekly (MW) (10/5-current)  Vancomycin - pharmacy to dose (10/5-current)   Intermittent dosing (10/5-current)  Date Dose Vanc Level   10/5 2000 mg IV total    10/6 -- 23.4 mcg/mL          Height:   Ht Readings from Last 1 Encounters:   10/05/21 4' (1.219 m)     Weight:   Wt Readings from Last 1 Encounters:   10/05/21 235 lb 10.8 oz (106.9 kg)     Cultures & Sensitivities:    Date Site Micro Susceptibility / Result   10/5 Blood x 2 NGTD      MRSA nasal PCR Ordered     COVID-19      [facility], viral panel       Labs / Ancillary Data:    Recent Labs     10/04/21  6350

## 2021-10-06 NOTE — CARE COORDINATION
Case Management Assessment           Initial Evaluation                Date / Time of Evaluation: 10/6/2021 9:44 AM                 Assessment Completed by: Jose Adam RN    Patient Name: Suzanna Mercer     YOB: 1945  Diagnosis: Altered mental status, unspecified altered mental status type [R41.82]  Pneumonia of both lower lobes due to infectious organism [J18.9]  AMS (altered mental status) [R41.82]     Date / Time: 10/4/2021  9:35 PM    Patient Admission Status: Inpatient    If patient is discharged prior to next notation, then this note serves as note for discharge by case management.      Current PCP: Jimena Ortiz MD  Clinic Patient: No    Chart Reviewed: Yes  Patient/ Family Interviewed: Yes    Initial assessment completed at bedside with: over the phone with daughterAlejandra    Hospitalization in the last 30 days: No    Emergency Contacts:  Extended Emergency Contact Information  Primary Emergency Contact: [address] [place] [address] Phone: [phone]  Relation: Child  Secondary Emergency Contact: Jinny Schwarz   [address] Phone: [phone]  Relation: Domestic Partner    Advance Directives:   Code Status: [address]: Yes  Agent: Rolene Lipoma   Contact Number: [phone]    Copy present: Yes     In paper Chart: No    Scanned into EMR No    Financial  Payor: MEDICARE / Plan: MEDICARE PART A AND B / Product Type: *No Product type* /     Pre-cert required for SNF: No    Pharmacy    [facility] [address] [place] [address] Alexandriahoracio [address]o [address] [address]  Phone: [phone] Fax: [phone]    [facility] [phone] Santiago Wolf [phone]  [address] [place] [address]  Phone: [phone] Fax: [phone] [facility], [facility] 219 [phone] Benito Mckeon [phone]  [address] [place] [address]  Phone: [phone] Fax: [phone]      Potential assistance Purchasing Medications: Potential Assistance Purchasing Medications: No  Does Patient want to participate in local refill/ meds to beds program?: No    Meds To Beds General Rules:  1. Can ONLY be done Monday- Friday between 8:30am-5pm  2. Prescription(s) must be in pharmacy by 3pm to be filled same day  3. Copy of patient's insurance/ prescription drug card and patient face sheet must be sent along with the prescription(s)  4. Cost of Rx cannot be added to hospital bill. If financial assistance is needed, please contact unit  or ;  or  CANNOT provide pharmacy voucher for patients co-pays  5. Patients can then  the prescription on their way out of the hospital at discharge, or pharmacy can deliver to the bedside if staff is available. (payment due at time of pick-up or delivery - cash, check, or card accepted)     Able to afford home medications/ co-pay costs: Yes    ADLS  Support Systems: Children    PT AM-PAC:   /24  OT AM-PAC:   /24    [place]: from [place]   Steps: 0    Plans to RETURN to current housing: Yes  Barriers to RETURNING to current housing: Bladimir Powers  Currently ACTIVE with [address]: No  Home Care Agency: Not Applicable          Durable Medical Equipment  DME Provider: n/a  Equipment: defer    Home Oxygen and [address] [place] prior to admission: Yes  [address]: Not Applicable      Dialysis  Active with HD/PD prior to admission: Yes  Nephrologist:     HD Center:  Lea Comer  Address: [address]   Phone: [phone]    DISCHARGE PLAN:  Disposition: [place] ([facility]):  Gabriela Santos  Phone: [phone]  Fax: [phone]    Transportation PLAN for discharge: EMS transportation     Factors facilitating achievement of predicted outcomes: Family support    Barriers to discharge: Medical complications    Additional Case Management Notes: Patient is from [address] [place] [address] at [address]. Patient is bilateral BKA, will need transport at discharge. CM spoke with patient's daughter Dayana Mya over the phone. The plan will be for him to return to LTC at discharge. The Plan for Transition of Care is related to the following treatment goals of Altered mental status, unspecified altered mental status type [R41.82]  Pneumonia of both lower lobes due to infectious organism [J18.9]  AMS (altered mental status) [R41.82]    The Patient and/or patient representative Román Augustin and his family were provided with a choice of provider and agrees with the discharge plan Yes    Freedom of choice list was provided with basic dialogue that supports the patient's individualized plan of care/goals and shares the quality data associated with the providers.  Yes    Care Transition patient: No    Jerri Panda RN  [facility]  Case Management Department  Ph: [phone]   Fax: [phone]

## 2021-10-06 NOTE — CONSULTS
Patient Name: Irene Durham                                                    Primary Physician: Yudith Evans DO  Admitting Dx: Altered mental status, unspecified altered mental status type [R41.82]  Pneumonia of both lower lobes due to infectious organism [J18.9]  AMS (altered mental status) [R41.82]    Nephrology [facility] Nephrology  [facility]                                          Assessment / Plan:     ESRD  · Normally runs MWF at [facility]  · Volume:  EDW of 104.5 kg and 1.7 above removing with HD. · Anemia: Hgb 7.2 and ordered Retacrit 15,000 and check iron studies. · Access: LA Fistula with no issues. · SHPT:  Renagel with HD and Rocaltrol 0.25 mcg with D3 2000 units daily  HTN  · Coreg, Cozaar  ID  · Started on Vanc and Cefepime to be dosed with dialysis. · Cultures sent and NGTD. Please call our office at [phone] or [facility] with any questions or contact me directly. Subjective:     CC / Reason for Consult:  ESRD    HPI / PMHx:  This is a consult for Ierne Durham  requested by Yudith Evans DO for the reason of  ESRD. Irene Durham is a 67 y.o. male admitted for Altered mental status, PNA w/ PMHx of ESRD, Anemia in CKD, SHPT, HTN, DVT, Gangrene, Hyperkalemia, HLD, CAD, A-fib / MI, DM Type 2, B/L BKA. Patient normally dialyzes MWF at [facility]. Missed treatment today. He presented to [place] [address] ED from [address] home for AMS. Per nursing facility patient was lethargic with altered mental status. Patient's daughter  states that she saw him this past Sunday, they had lunch together and watched football. He is fully vaccinated for Covid. Attempted to obtain history from patient but can't say why he is here other then he was javon here. History of multiple ED admissions and hospitalizations for \"low hemoglobin\" and received transfusions. I thank Dr. Yudith Evans DO for consulting [facility] [phone] [facility] Nephrology in the care of nausea, vomiting, diarrhea, constipation and abdominal pain     :  dysuria, nocturia, urinary incontinence, hesitancy and hematuria     Derm:   rash, skin lesion(s), pruritus and dryness     Neuro:   headaches, dizziness, seizures, gait problems, tremor and weakness     MS:  myalgias, arthralgias, neck pain and back pain     Endo:    nephropathy and cardiovascular disease    PE:      Gen: ill-appearing and lethargic answering some questions     HEENT:atraumatic      Neuro: awake and responsive but confused     Neck:  supple, no significant adenopathy     Cardio: normal rate, regular rhythm, normal S1, S2, no murmurs, rubs, clicks or gallops      Resp: clear to auscultation, no wheezes, rales or rhonchi, symmetric air entry. GI:  soft, nontender, nondistended, no masses or organomegaly. Ext:  B/l BKA with no edema      MS: no joint tenderness, deformity or swelling      DERM: normal coloration and turgor, no rashesor bruising.        Vitals: Patient Vitals for the past 8 hrs:   BP Temp Temp src Pulse Resp SpO2 Weight   10/06/21 1230 (!) 181/78 98.5 °F (36.9 °C) -- 67 18 -- 234 lb 2.1 oz (106.2 kg)   10/06/21 1133 (!) 165/67 98.5 °F (36.9 °C) Oral 60 16 96 % --   10/06/21 0720 (!) 169/71 97.1 °F (36.2 °C) Oral 67 16 99 % --   10/06/21 0631 (!) 203/76 -- -- 64 -- -- --          I/Os:     Intake/Output Summary (Last 24 hours) at 10/6/2021 1426  Last data filed at 10/6/2021 1055  Gross per 24 hour   Intake 925.51 ml   Output 0 ml   Net 925.51 ml       LABS:    Lab Results   Component Value Date    CREATININE 8.2 10/06/2021    BUN 38 10/06/2021     10/06/2021    K 4.7 10/06/2021    K 4.4 10/04/2021    CL 94 10/06/2021    CO2 23 10/06/2021     Lab Results   Component Value Date    HTSXIKG63LH 1.984 05/14/2013    LYGHLDN28RW 2.065 05/05/2011      Lab Results   Component Value Date    WBC 7.8 10/06/2021    HGB 7.2 10/06/2021    HCT 21.6 10/06/2021    MCV 88.4 10/06/2021     10/06/2021      Lab Results   Component Value Date    IRON 39 10/05/2021    TIBC 189 10/05/2021    FERRITIN 1,665.0 08/13/2020      No results found for: Jet Chang  Lab Results   Component Value Date    .6 06/17/2020    CALCIUM 9.3 10/06/2021    PHOS 2.9 10/05/2021

## 2021-10-06 NOTE — DISCHARGE INSTR - COC
SURGERY Left 2/6/2020    DEBRIDEMENT AND PLACEMENT OF WOUND VAC LEFT BELOW THE KNEE AMPUTATION SITE performed by Eloise Anderson MD at [facility]       Immunization History:   Immunization History   Administered Date(s) Administered    COVID-19, Pfizer, PF, 30mcg/0.3mL 12/30/2020, 02/10/2021, 09/16/2021    Influenza Vaccine, unspecified formulation 10/05/2016    Influenza, High Dose (Fluzone 65 yrs and older) 10/05/2011, 10/07/2014, 11/17/2015, 10/15/2017, 10/09/2018    Pneumococcal Conjugate 13-valent ([id number]) 11/15/2016    Pneumococcal Polysaccharide (Dchhzldeq73) 10/07/2014    Tdap (Boostrix, Adacel) 12/04/2018       Active Problems:  Patient Active Problem List   Diagnosis Code    HTN (hypertension) I10    Diabetes mellitus ([facility] E11.9    Anemia D64.9    Diabetic retinopathy ([facility] E11.319    Cataracts, bilateral H26.9    Mixed hyperlipidemia E78.2    Atrial fibrillation (HCC) I48.91    ESRD on dialysis ([address] N18.6, Z99.2    Ischemic heart disease I25.9    Acute anterolateral wall MI ([address] I21.09    Acute ST elevation myocardial infarction (STEMI) involving left anterior descending (LAD) coronary artery ([facility]) I21.02    CKD (chronic kidney disease) stage 4, GFR 15-29 ml/min ([facility]) N18.4    DM (diabetes mellitus) type II uncontrolled with renal manifestation E11.29, E11.65    Fungal infection of foot B35.3    Hemodialysis patient ([address] Z99.2    Malignant essential hypertension I10    Right second toe ulcer, with necrosis of bone ([address] L97.514    Hyperkalemia E87.5    CAD (coronary artery disease) s/p stent to LAD and RCA 10/2018 P28.16    Acute metabolic encephalopathy G40.15    Pain in right foot M79.671    Somnolence R40.0    Encounter for palliative care Z51.5    Advance directive discussed with patient Z71.89    Gangrene of right foot ([address] I96    Gas gangrene ([address] A48.0    Sepsis due to pneumonia ([facility] J18.9, A41.9    Advance care planning Z71.89    Abscess or cellulitis of toe, left L03.032, L02.612    Gangrene of toe of left foot ([facility] I96    PVD (peripheral vascular disease) ([facility]) I73.9    Type 2 diabetes mellitus, with long-term current use of insulin ([facility]) E11.9, Z79.4    Fever R50.9    Wound, open T14. 8XXA    Pseudomonas infection A49.8    Diabetic foot infection ([facility] E11.628, L08.9    Surgical wound dehiscence T81.31XA    Surgical wound, non healing T81.89XA    Osteomyelitis ([facility] M86.9    Necrosis of amputation stump ([facility] T87.50    Dehiscence of closure of skin, sequela T81.31XS    Acute on chronic anemia D64.9    AMS (altered mental status) R41.82       Isolation/Infection:   Isolation            Droplet Plus          Patient Infection Status       Infection Onset Added Last Indicated Last Indicated By Review Planned Expiration Resolved Resolved By    COVID-19 Rule Out 10/05/21 10/05/21 10/05/21 COVID-19 (Ordered) 10/13/21 10/19/21      Resolved    COVID-19 Rule Out 08/13/20 08/13/20 08/13/20 COVID-19 (Ordered)   08/15/20 Rule-Out Test Resulted    COVID-19 Rule Out 06/16/20 06/16/20 06/16/20 COVID-19 (Ordered)   06/17/20 Rule-Out Test Resulted    VRE 10/08/19 10/12/19 10/11/19 Surgical Culture   06/18/20 Sean Pride RN    MRSA 08/21/19 08/23/19 08/24/19 Body Fluid Culture   06/18/20 Sean Pride RN            Nurse Assessment:  Last Vital Signs: BP (!) 169/71   Pulse 67   Temp 97.1 °F (36.2 °C) (Oral)   Resp 16   Ht 4' (1.219 m)   Wt 235 lb 10.8 oz (106.9 kg)   SpO2 99%   BMI 71.92 kg/m²     Last documented pain score (0-10 scale): Pain Level: 0  Last Weight:   Wt Readings from Last 1 Encounters:   10/05/21 235 lb 10.8 oz (106.9 kg)     Mental Status:  {IP PT MENTAL STATUS:20030}    IV Access:  {MH NORA IV ACCESS:732527466}    Nursing Mobility/ADLs:  Walking   {CHP DME UYVR:374456154}  Transfer  {CHP DME WRTT:735943596}  Bathing  {CHP DME HKZL:142796731}  Dressing  {CHP DME YEEL:976351556}  Toileting  {CHP DME SFFH:297753400}  Feeding  {CHP DME DSIB:630431217}  Med Admin  {CHP DME PUID:291054874}  Med Delivery   [address] [employer] MED Delivery:525476320}    Wound Care Documentation and Therapy:  Wound 20 Buttocks Left;Right skin flaking off, moisture related (Active)   Number of days: 476        Elimination:  Continence: Bowel: {YES / HR:05031}  Bladder: {YES / SO:80084}  Urinary Catheter: {Urinary Catheter:422494516}   Colostomy/Ileostomy/Ileal Conduit: {YES / IZ:91437}       Date of Last BM: ***    Intake/Output Summary (Last 24 hours) at 10/6/2021 0958  Last data filed at 10/6/2021 0600  Gross per 24 hour   Intake 685.51 ml   Output 0 ml   Net 685.51 ml     I/O last 3 completed shifts: In: 685.5 [P.O.:300;  I.V.:35.5; IV Piggyback:350]  Out: 0     Safety Concerns:     [address] [employer] Safety Concerns:547028347}    Impairments/Disabilities:      [address] [employer] Impairments/Disabilities:521030957}    Nutrition Therapy:  Current Nutrition Therapy:   [address] [employer] Diet List:676363992}    Routes of Feeding: {P DME Other Feedings:614312019}  Liquids: {Slp liquid thickness:60486}  Daily Fluid Restriction: {CHP DME Yes amt example:544844022}  Last Modified Barium Swallow with Video (Video Swallowing Test): {Done Not Done JVU}    Treatments at the Time of Hospital Discharge:   Respiratory Treatments: ***  Oxygen Therapy:  {Therapy; copd oxygen:22087}  Ventilator:    {[facility] Vent KBZK:916343332}    Rehab Therapies: {THERAPEUTIC INTERVENTION:6719749696}  Weight Bearing Status/Restrictions: [facility] Weight Bearin}  Other Medical Equipment (for information only, NOT a DME order):  {EQUIPMENT:549959863}  Other Treatments: ***    Patient's personal belongings (please select all that are sent with patient):  {P DME Belongings:966178396}    RN SIGNATURE:  {Esignature:295819652}    CASE MANAGEMENT/SOCIAL WORK SECTION    Inpatient Status Date: ***    Readmission Risk Assessment Score:  Readmission Risk              Risk of Unplanned Readmission:  25           Discharging to Facility/ Agency   Name: [facility] Details  Erick Chávez [phone] [facility], [facility] [address]       Phone: [phone]       Fax: [phone]            Dialysis Facility (if applicable)   Name: Lacey Boggs   Address: [address]  Dialysis Schedule: [facility]  Phone: [phone]      / signature: Electronically signed by Teodora Sal RN on 10/7/21 at 10:24 AM EDT    PHYSICIAN SECTION    Prognosis: Good    Condition at Discharge: Stable    Rehab Potential (if transferring to Rehab): Good    Recommended Labs or Other Treatments After Discharge: Renal function panel    Physician Certification: I certify the above information and transfer of Dave Wright  is necessary for the continuing treatment of the diagnosis listed and that he requires [facility] for greater 30 days.      Update Admission H&P: No change in H&P    PHYSICIAN SIGNATURE:  Electronically signed by MD Anita Courtney on 10/7/21 at 9:14 AM EDT

## 2021-10-06 NOTE — PROGRESS NOTES
Pt's post transfusion H/H was hemoglobin 7.0 hematocrit 20.6. MD notified, stated to hold off on another transfusion for now. Will continue to monitor pt.

## 2021-10-06 NOTE — CONSULTS
Consult received. Labs and notes were reviewed. Case was discussed with the staff. Full note to follow.     Thanks  Nephrology  [address] [facility], [address] [place] [address]  Office: [phone]  Cell: [phone]  Fax: [phone]

## 2021-10-06 NOTE — PLAN OF CARE
Problem: Falls - Risk of:  Goal: Will remain free from falls  Description: Will remain free from falls  Outcome: Ongoing  Goal: Absence of physical injury  Description: Absence of physical injury  Outcome: Ongoing     Problem: Skin Integrity:  Goal: Will show no infection signs and symptoms  Description: Will show no infection signs and symptoms  Outcome: Ongoing  Goal: Absence of new skin breakdown  Description: Absence of new skin breakdown  Outcome: Ongoing  Goal: Status of oral mucous membranes will improve  Description: Status of oral mucous membranes will improve  Outcome: Ongoing  Goal: Skin integrity will be maintained  Description: Skin integrity will be maintained  Outcome: Ongoing  Goal: Skin integrity will improve  Description: Skin integrity will improve  Outcome: Ongoing  Goal: Signs of wound healing will improve  Description: Signs of wound healing will improve  Outcome: Ongoing     Problem: Airway Clearance - Ineffective  Goal: Achieve or maintain patent airway  Outcome: Ongoing     Problem: Gas Exchange - Impaired  Goal: Absence of hypoxia  Outcome: Ongoing  Goal: Promote optimal lung function  Outcome: Ongoing     Problem: Breathing Pattern - Ineffective  Goal: Ability to achieve and maintain a regular respiratory rate  Outcome: Ongoing     Problem:  Body Temperature -  Risk of, Imbalanced  Goal: Ability to maintain a body temperature within defined limits  Outcome: Ongoing  Goal: Will regain or maintain usual level of consciousness  Outcome: Ongoing  Goal: Complications related to the disease process, condition or treatment will be avoided or minimized  Outcome: Ongoing     Problem: Isolation Precautions - Risk of Spread of Infection  Goal: Prevent transmission of infection  Outcome: Ongoing     Problem: Nutrition Deficits  Goal: Optimize nutritional status  Outcome: Ongoing     Problem: Risk for Fluid Volume Deficit  Goal: Maintain normal heart rhythm  Outcome: Ongoing  Goal: Maintain absence of improve  Outcome: Ongoing     Problem: Nutritional:  Goal: Ability to identify appropriate dietary choices will improve  Description: Ability to identify appropriate dietary choices will improve  Outcome: Ongoing  Goal: Maintenance of adequate nutrition will improve  Description: Maintenance of adequate nutrition will improve  Outcome: Ongoing     Problem: Physical Regulation:  Goal: Ability to maintain clinical measurements within normal limits will improve  Description: Ability to maintain clinical measurements within normal limits will improve  Outcome: Ongoing  Goal: Complications related to the disease process, condition or treatment will be avoided or minimized  Description: Complications related to the disease process, condition or treatment will be avoided or minimized  Outcome: Ongoing  Goal: Signs and symptoms of infection will decrease  Description: Signs and symptoms of infection will decrease  Outcome: Ongoing  Goal: Will show no signs and symptoms of excessive bleeding  Description: Will show no signs and symptoms of excessive bleeding  Outcome: Ongoing     Problem: Sensory:  Goal: General experience of comfort will improve  Description: General experience of comfort will improve  Outcome: Ongoing  Goal: Pain level will decrease  Description: Pain level will decrease  Outcome: Ongoing     Problem: Serum Glucose Level - Abnormal:  Goal: Ability to maintain appropriate glucose levels will improve  Description: Ability to maintain appropriate glucose levels will improve  Outcome: Ongoing     Problem:  Bowel/Gastric:  Goal: Ability to achieve a regular elimination pattern will improve  Description: Ability to achieve a regular elimination pattern will improve  Outcome: Ongoing     Problem: Cardiac:  Goal: Ability to maintain an adequate cardiac output will improve  Description: Ability to maintain an adequate cardiac output will improve  Outcome: Ongoing  Goal: Ability to maintain adequate ventilation will improve  Description: Ability to maintain adequate ventilation will improve  Outcome: Ongoing  Goal: Ability to achieve and maintain adequate cardiopulmonary perfusion will improve  Description: Ability to achieve and maintain adequate cardiopulmonary perfusion will improve  Outcome: Ongoing     Problem: Safety:  Goal: Ability to remain free from injury will improve  Description: Ability to remain free from injury will improve  Outcome: Ongoing     Problem: Tissue Perfusion:  Goal: Ability to maintain adequate tissue perfusion will improve  Description: Ability to maintain adequate tissue perfusion will improve  Outcome: Ongoing  Goal: Ability to maintain a stable neurologic state will improve  Description: Ability to maintain a stable neurologic state will improve  Outcome: Ongoing     A/O x 4. Lethargic at times. Hypertensive. PRN Labetalol. HD today. Assist w/ turns. Fall precautions.

## 2021-10-06 NOTE — PROGRESS NOTES
Physical Therapy/Occupational Therapy    Discharge note    Referral received, chart reviewed. Per chart/CM, pt is from LTC and will return to LTC. Will defer any PT and OT needs back to LTC staff.      Jacki Hickey, PT  Agnieszka Yee, LESTER, OTR/L

## 2021-10-06 NOTE — PROGRESS NOTES
Progress Note  PGY-1    Admit Date: 10/4/2021  Day: 2  Diet: ADULT DIET; Regular; 4 carb choices (60 gm/meal)    CC: AMS    Interval history:  Pt seen at bedside. Completed blood transfusion overnight. Post Transfusion H/H: 7.2   He is Alert and oriented x3 today. Denies any chest pain, abdominal pain, SOB, palpitations, N/V. Pending dialysis today.    BP elevated this am. Pending anti HTN meds    Medications:     Scheduled Meds:   sodium chloride flush  5-40 mL IntraVENous 2 times per day    heparin (porcine)  5,000 Units SubCUTAneous 3 times per day    pravastatin  20 mg Oral Daily    losartan  25 mg Oral Daily    calcitRIOL  0.25 mcg Oral Every Other Day    aspirin  81 mg Oral Daily    b complex-C-folic acid  1 mg Oral Daily    sevelamer  800 mg Oral BID WC    carvedilol  25 mg Oral BID WC    mirtazapine  15 mg Oral Nightly    ferrous sulfate  325 mg Oral Daily with breakfast    Vitamin D  2,000 Units Oral Daily    folic acid  1 mg Oral Daily    pantoprazole  40 mg Oral Daily    prazosin  2 mg Oral Nightly    vitamin B-12  1,000 mcg Oral Daily    cefepime  2,000 mg IntraVENous Q MWF    insulin lispro  0-6 Units SubCUTAneous TID WC    insulin lispro  0-3 Units SubCUTAneous Nightly    vancomycin (VANCOCIN) intermittent dosing (placeholder)   Other See Admin Instructions    trimethoprim-polymyxin b  1 drop Right Eye 6 times per day    sertraline  25 mg Oral Daily     Continuous Infusions:   sodium chloride Stopped (10/06/21 0452)    sodium chloride      dextrose       PRN Meds:sodium chloride flush, sodium chloride, ondansetron **OR** ondansetron, polyethylene glycol, acetaminophen **OR** acetaminophen, nitroGLYCERIN, sodium chloride, labetalol, glucose, dextrose, glucagon (rDNA), dextrose    Objective:   Vitals:   T-max:  Patient Vitals for the past 8 hrs:   BP Temp Temp src Pulse Resp SpO2   10/06/21 0720 (!) 169/71 97.1 °F (36.2 °C) Oral 67 16 99 %   10/06/21 0631 (!) 203/76 -- -- 64 -- --   10/06/21 0455 (!) 189/67 -- -- 69 -- --   10/06/21 0347 (!) 202/71 99.2 °F (37.3 °C) Axillary 60 20 99 %       Intake/Output Summary (Last 24 hours) at 10/6/2021 0917  Last data filed at 10/6/2021 0600  Gross per 24 hour   Intake 685.51 ml   Output 0 ml   Net 685.51 ml       Review of Systems   Constitutional: Positive for fatigue. Negative for chills and fever. Cardiovascular: Negative for chest pain, palpitations and leg swelling. Gastrointestinal: Negative. Neurological: Negative for dizziness, speech difficulty, weakness, light-headedness and numbness. Physical Exam  Constitutional:       Appearance: He is not ill-appearing. HENT:      Head: Normocephalic and atraumatic. Eyes:      General:         Right eye: Discharge present. Pupils: Pupils are equal, round, and reactive to light. Cardiovascular:      Rate and Rhythm: Normal rate and regular rhythm. Pulses: Normal pulses. Heart sounds: Normal heart sounds. No murmur heard. No gallop. Pulmonary:      Effort: Pulmonary effort is normal.      Breath sounds: No wheezing, rhonchi or rales. Comments: Slightly diminished breath sounds at lung bases  Abdominal:      General: Bowel sounds are normal.      Palpations: Abdomen is soft. Tenderness: There is no abdominal tenderness. There is no guarding. Musculoskeletal:      Comments: Bilateral BKAs   Skin:     General: Skin is warm. Capillary Refill: Capillary refill takes less than 2 seconds. Neurological:      Mental Status: He is alert and oriented to person, place, and time.       Coordination: Coordination normal.      Comments: Able to move upper extremities with no issues  Lower extremities power 3/5 b/l          LABS:    CBC:   Recent Labs     10/04/21  2302 10/04/21  2302 10/05/21  0648 10/05/21  2114 10/06/21  0429   WBC 6.4  --   --   --  7.8   HGB 6.8*  --   --  7.0* 7.2*   HCT 20.5*   < > 21.1* 20.6* 21.6*     --   --   --  144   MCV 87. 8  --   --   --  88.4    < > = values in this interval not displayed. Renal:    Recent Labs     10/04/21  2302 10/05/21  0648 10/06/21  0429     --  131*   K 4.4  --  4.7   CL 96*  --  94*   CO2 29  --  23   BUN 20  --  38*   CREATININE 5.4*  --  8.2*   GLUCOSE 117*  --  106*   CALCIUM 9.2  --  9.3   MG  --  2.10 2.10   PHOS  --  2.9  --    ANIONGAP 11  --  14     Hepatic:   Recent Labs     10/04/21  2302   AST 12*   ALT 12   BILITOT 0.3   PROT 7.9   LABALBU 4.0   ALKPHOS 89     Troponin:   Recent Labs     10/05/21  2114 10/06/21  0103 10/06/21  0429   TROPONINI 0.19* 0.19* 0.18*     BNP: No results for input(s): BNP in the last 72 hours. Lipids: No results for input(s): CHOL, HDL in the last 72 hours. Invalid input(s): LDLCALCU, TRIGLYCERIDE  ABGs:  No results for input(s): PHART, NIK1WOP, PO2ART, SQL5ARB, BEART, THGBART, I7JUAETY, KJT3VSP in the last 72 hours. INR: No results for input(s): INR in the last 72 hours. Lactate: No results for input(s): LACTATE in the last 72 hours. Cultures:  -----------------------------------------------------------------  RAD:   XR CHEST PORTABLE   Final Result      Mild cardiomegaly. Mild bibasilar airspace disease, atelectasis or pneumonia. Preliminary report provided by [facility] at [phone] , without discrepancy. A         CT Head WO Contrast   Final Result      No acute intracranial hemorrhage or mass effect. Mild atrophy and chronic small vessel ischemic change with remote left occipital lobe infarct. Paranasal sinus inflammatory disease. Preliminary report provided by [facility] at [id number] , without discrepancy. A             Assessment/Plan:   Aurelio Palm is a 67 y.o. male with PMHx  ESRD HD MWF (does not make urine), anemia of chronic disease, CAD, PVD, T2DM, HTN, HLD, GERD, MDD  Who presented to [facility] ED from Nursing home c/o AMS.    Acute encephalopathy likely 2/2 PNA  CT head with no acute intracranial abnormalities.  Pt is AOx2. He does appear selective with speaking but he was noted to be lethargic. Ammonia: 26  Ethanol level: Not detected  Plan to treat Pneumonia and assess mental status  Pt More alert and oriented today (x3). Able to hold a conversation.      Pneumonia  Rule out [phone]  Pt a [facility] in [facility]. Appeared lethargic. Is febrile with CXR Infiltrates. Fully vaccinated w/ Pfizer , procal: 0.92  Blood Cx x2: with NGTD  - ABx: Vancomycin, Cefepime  - Respiratory Culture, Respiratory Viral Panel,  MRSA probe pending  - COVID PCR pending  - procalcitonin, lactic acid, CRP, ESR pending     ESRD on HD  HD: MWF  Cr:5.4   Pt does not make urine  - Continue home Sevelamer and Calcitriol  - Nephrology consulted to manage dialysis  - Pt pending Dialysis today     Right Conjunctivitis  Conjunctival erythema with purulent discharge to right eye.   - TMP- PolymixinB ophthalmic drops to right eye every 4 hrs   Slight improvement noted today with right eye     Anemia of Chronic Disease  Hb: 6.8 -> 7.0 (Post Transfusion) -> 7.2   Pt has had frequent transfusions in the past. There seems to be a lower threshold to tranfuse per nursing home who mention that pt is usually transfused <6 hb.   Transfused 1 unit PRBC overnight. Iron: 39. Iron sat: 21, TIBC: 189     Type 2 DM  B  - LDSSI  - Hypoglycemia protocol  - POCT     NSTEMI II   Trop 0.20 -> 0.19 -> 0.18  Pt is ESRD and does not make urine. Elevated Trop likely due to this reason.      HTN  BP: 171/87  - Continue home losartan  ECHO in 2019 EF 65-70%     Chronic Issues  - Major depressive Disorder: Continue Home Mirtazipine  - p-AF:  - HLD: Continue Home Pravastatin    Code Status: Full Code   FEN: ADULT DIET;  Regular; 4 carb choices (60 gm/meal)  PPX: Heparin  DISPO: TITO Emerson MD, PGY-1  Internal Medicine Resident  Contact via [facility]  10/06/21  9:17 AM    This patient has been staffed and discussed with Leeann Leventhal, MD. Patient seen and examined, labs and imaging studies reviewed, agree with assessment and plan as outlined above. Continue with current care and plan. Discussed case with patients nurse, discussed case with care team, discussed plan.       MD Zari Gutierres

## 2021-10-06 NOTE — FLOWSHEET NOTE
10/06/21 1230 10/06/21 1545   Treatment   Time On 1234  --    Time Off  --  1540   Treatment Goal 1700  --    Vital Signs   BP (!) 181/78 (!) 162/67   Temp 98.5 °F (36.9 °C) 98.4 °F (36.9 °C)   Pulse 67 69   Resp 18 18   Weight 234 lb 2.1 oz (106.2 kg) 227 lb 15.3 oz (103.4 kg)   Weight Method Bed scale Bed scale   Percent Weight Change -0.65 -2.64   Dry Weight 230 lb 6.1 oz (104.5 kg)  --    Access   Needle Size Used 15  --    Treatment Initiation   Dialyze Hours 3  --    Post-Hemodialysis Assessment   Rinseback Volume (ml)  --  400 ml   Total Liters Processed (l/min)  --  75.7 l/min   Dialyzer Clearance  --  Lightly streaked   Duration of Treatment (minutes)  --  180 minutes   Heparin amount administered during treatment (units)  --  0 units   Hemodialysis Intake (ml)  --  0 ml   Hemodialysis Output (ml)  --  2100 ml   NET Removed (ml)  --  1700 ml   Tolerated Treatment  --  Good     Treatment time: 3 Hours  Net UF: 1700 ML    Pre weight: 106.2 Kg  Post weight: 103.4 kg  EDW: 104.5 kg    Access used: LLA AVF   Access function: Good with  ml/min    Medications or blood products given: Epo 27272 units    Regular outpatient schedule: KITA Sow    Summary of response to treatment: Tolerated tx well, BP remained elevated t/o tx, ending /67/    Copy of dialysis treatment record placed in chart, to be scanned into EMR. Report given to General Eye RN.

## 2021-10-06 NOTE — PLAN OF CARE
Problem: Falls - Risk of:  Goal: Will remain free from falls  Description: Will remain free from falls  Outcome: Ongoing   Pt's bed is in lowest position, brake and bed exit alarm are on, call light and bedside table are within reach. Problem: Skin Integrity:  Goal: Absence of new skin breakdown  Description: Absence of new skin breakdown  Outcome: Ongoing   Pt is being turned every two hours, pt shows no new skin breakdown. Problem: Gas Exchange - Impaired  Goal: Absence of hypoxia  Outcome: Ongoing   Pt remains on RA and SpO2 has been WNL. Problem: Body Temperature -  Risk of, Imbalanced  Goal: Ability to maintain a body temperature within defined limits  Outcome: Ongoing  Pt was febrile at beginning of shift, temperature has started to come down with last temp of 99.5. Goal: Will regain or maintain usual level of consciousness  Outcome: Ongoing  Pt is alert and oriented to self and place, disoriented to situation and time. Goal: Complications related to the disease process, condition or treatment will be avoided or minimized  Outcome: Ongoing   Pt is on IV abx. Problem: Isolation Precautions - Risk of Spread of Infection  Goal: Prevent transmission of infection  Outcome: Ongoing   Pt is in droplet plus precautions r/t pending COVID test.    Problem: Risk for Fluid Volume Deficit  Goal: Maintain normal heart rhythm  Outcome: Ongoing   Pt is on continuous telemetry and heart rhythm has NSR. Problem: Fluid Volume:  Goal: Will show no signs or symptoms of fluid imbalance  Description: Will show no signs or symptoms of fluid imbalance  Outcome: Ongoing   Pt is a dialysis pt and receives dialysis MWF. Problem: Physical Regulation:  Goal: Ability to maintain clinical measurements within normal limits will improve  Description: Ability to maintain clinical measurements within normal limits will improve  Outcome: Ongoing   Pt's BP was elevated at midnight, pt was given labetalol.     Problem: Serum Glucose Level - Abnormal:  Goal: Ability to maintain appropriate glucose levels will improve  Description: Ability to maintain appropriate glucose levels will improve  Outcome: Ongoing   Pt's blood sugar is being checked ACHS and did not need insulin coverage during this shift.

## 2021-10-06 NOTE — PROGRESS NOTES
Physician Progress Note      PATIENT:               Lindley Boxer  CSN #:                  [id number]  :                       1945  ADMIT DATE:       10/4/2021 9:35 PM  DISCH DATE:  RESPONDING  PROVIDER #:        BERNIE PEREZ          QUERY TEXT:    Patient admitted with Pneumonia and has ESRD. Noted documentation of NSTEMI   II per resident H&P and elevated troponin likely 2/2 ESRD and not making urine   by resident H&P as well. Per ED: EKG does not show signs of ischemia. If possible, please document in progress notes and discharge summary if NSTEMI   is ruled in or out: The medical record reflects the following:  Risk Factors: 67 y.o. male with pneumonia, ESRD, HTN, CAD, Cardiorenal  Clinical Indicators: elevated troponin not documented as related to demand   ischema but only related to ESRD. Treatment: Monitor  Options provided:  -- NSTEMI type 2 ruled out  -- NSTEMI type 2 confirmed, Please document supporting clinical indicators. -- Other - I will add my own diagnosis  -- Disagree - Not applicable / Not valid  -- Disagree - Clinically unable to determine / Unknown  -- Refer to Clinical Documentation Reviewer    PROVIDER RESPONSE TEXT:    After study NSTEMI type 2 ruled out. Query created by: Maricn Wyatt on 10/6/2021 3:57 PM      QUERY TEXT:    Pt admitted with pneumonia. Pt noted to have likely community acquired   pneumonia. If possible, please document in the progress notes and discharge   summary if you are evaluating and/or treating any of the following:    Note: CAP and HCAP indicate where the pneumonia was acquired, not a specific   type.     The medical record reflects the following:  Risk Factors: 67 y.o. male admitted with pneumonia  Clinical Indicators: Cough and chest pain (Per nephrology)  Treatment: Cefipime, Vanc  Options provided:  -- Gram negative pneumonia  -- After study, unable to rule out gram negative pneumonia  -- Other - I will add my own diagnosis  -- Disagree - Not applicable / Not valid  -- Disagree - Clinically unable to determine / Unknown  -- Refer to Clinical Documentation Reviewer    PROVIDER RESPONSE TEXT:    After study, unable to rule out gram negative pneumonia.     Query created by: Gabriella Goodell on 10/6/2021 4:20 PM      Electronically signed by:  Sidney Clay 10/6/2021 6:47 PM

## 2021-10-07 VITALS
SYSTOLIC BLOOD PRESSURE: 170 MMHG | RESPIRATION RATE: 20 BRPM | WEIGHT: 227.96 LBS | HEIGHT: 60 IN | HEART RATE: 70 BPM | BODY MASS INDEX: 44.75 KG/M2 | DIASTOLIC BLOOD PRESSURE: 76 MMHG | TEMPERATURE: 100 F | OXYGEN SATURATION: 98 %

## 2021-10-07 LAB
ANION GAP SERPL CALCULATED.3IONS-SCNC: 14 MMOL/L (ref 3–16)
BASOPHILS ABSOLUTE: 0 K/UL (ref 0–0.2)
BASOPHILS RELATIVE PERCENT: 0.8 %
BUN BLDV-MCNC: 26 MG/DL (ref 7–20)
CALCIUM SERPL-MCNC: 9.4 MG/DL (ref 8.3–10.6)
CHLORIDE BLD-SCNC: 100 MMOL/L (ref 99–110)
CO2: 23 MMOL/L (ref 21–32)
CREAT SERPL-MCNC: 5.8 MG/DL (ref 0.8–1.3)
EKG ATRIAL RATE: 65 BPM
EKG ATRIAL RATE: 75 BPM
EKG DIAGNOSIS: NORMAL
EKG DIAGNOSIS: NORMAL
EKG P AXIS: 102 DEGREES
EKG P AXIS: 32 DEGREES
EKG P-R INTERVAL: 198 MS
EKG P-R INTERVAL: 208 MS
EKG Q-T INTERVAL: 430 MS
EKG Q-T INTERVAL: 442 MS
EKG QRS DURATION: 104 MS
EKG QRS DURATION: 104 MS
EKG QTC CALCULATION (BAZETT): 459 MS
EKG QTC CALCULATION (BAZETT): 480 MS
EKG R AXIS: 20 DEGREES
EKG R AXIS: 37 DEGREES
EKG T AXIS: 52 DEGREES
EKG T AXIS: 57 DEGREES
EKG VENTRICULAR RATE: 65 BPM
EKG VENTRICULAR RATE: 75 BPM
EOSINOPHILS ABSOLUTE: 0.2 K/UL (ref 0–0.6)
EOSINOPHILS RELATIVE PERCENT: 3.1 %
ERYTHROPOIETIN: 20 MU/ML (ref 4–27)
GFR AFRICAN AMERICAN: 12
GFR NON-AFRICAN AMERICAN: 10
GLUCOSE BLD-MCNC: 104 MG/DL (ref 70–99)
GLUCOSE BLD-MCNC: 107 MG/DL (ref 70–99)
GLUCOSE BLD-MCNC: 127 MG/DL (ref 70–99)
HCT VFR BLD CALC: 24.3 % (ref 40.5–52.5)
HEMOGLOBIN: 8.2 G/DL (ref 13.5–17.5)
LYMPHOCYTES ABSOLUTE: 1 K/UL (ref 1–5.1)
LYMPHOCYTES RELATIVE PERCENT: 16.8 %
MAGNESIUM: 2.1 MG/DL (ref 1.8–2.4)
MCH RBC QN AUTO: 29.4 PG (ref 26–34)
MCHC RBC AUTO-ENTMCNC: 33.5 G/DL (ref 31–36)
MCV RBC AUTO: 87.7 FL (ref 80–100)
MONOCYTES ABSOLUTE: 0.5 K/UL (ref 0–1.3)
MONOCYTES RELATIVE PERCENT: 8.8 %
NEUTROPHILS ABSOLUTE: 4.1 K/UL (ref 1.7–7.7)
NEUTROPHILS RELATIVE PERCENT: 70.5 %
PDW BLD-RTO: 13.3 % (ref 12.4–15.4)
PERFORMED ON: ABNORMAL
PERFORMED ON: ABNORMAL
PLATELET # BLD: 153 K/UL (ref 135–450)
PMV BLD AUTO: 9.1 FL (ref 5–10.5)
POTASSIUM SERPL-SCNC: 4.3 MMOL/L (ref 3.5–5.1)
RBC # BLD: 2.78 M/UL (ref 4.2–5.9)
SODIUM BLD-SCNC: 137 MMOL/L (ref 136–145)
SOLUBLE TRANSFERRIN RECEPT: 2.2 MG/L (ref 2.2–5)
WBC # BLD: 5.9 K/UL (ref 4–11)

## 2021-10-07 PROCEDURE — 6360000002 HC RX W HCPCS: Performed by: STUDENT IN AN ORGANIZED HEALTH CARE EDUCATION/TRAINING PROGRAM

## 2021-10-07 PROCEDURE — 93005 ELECTROCARDIOGRAM TRACING: CPT | Performed by: STUDENT IN AN ORGANIZED HEALTH CARE EDUCATION/TRAINING PROGRAM

## 2021-10-07 PROCEDURE — 93010 ELECTROCARDIOGRAM REPORT: CPT | Performed by: INTERNAL MEDICINE

## 2021-10-07 PROCEDURE — 36415 COLL VENOUS BLD VENIPUNCTURE: CPT

## 2021-10-07 PROCEDURE — 83735 ASSAY OF MAGNESIUM: CPT

## 2021-10-07 PROCEDURE — 6370000000 HC RX 637 (ALT 250 FOR IP)

## 2021-10-07 PROCEDURE — 6360000002 HC RX W HCPCS

## 2021-10-07 PROCEDURE — 2580000003 HC RX 258

## 2021-10-07 PROCEDURE — 85025 COMPLETE CBC W/AUTO DIFF WBC: CPT

## 2021-10-07 PROCEDURE — 80048 BASIC METABOLIC PNL TOTAL CA: CPT

## 2021-10-07 PROCEDURE — 2500000003 HC RX 250 WO HCPCS: Performed by: STUDENT IN AN ORGANIZED HEALTH CARE EDUCATION/TRAINING PROGRAM

## 2021-10-07 PROCEDURE — 6370000000 HC RX 637 (ALT 250 FOR IP): Performed by: STUDENT IN AN ORGANIZED HEALTH CARE EDUCATION/TRAINING PROGRAM

## 2021-10-07 RX ORDER — CALCITRIOL 0.25 UG/1
0.25 CAPSULE, LIQUID FILLED ORAL EVERY OTHER DAY
Qty: 30 CAPSULE | Refills: 3 | Status: CANCELLED | OUTPATIENT
Start: 2021-10-07

## 2021-10-07 RX ORDER — POLYMYXIN B SULFATE AND TRIMETHOPRIM 1; 10000 MG/ML; [USP'U]/ML
1 SOLUTION OPHTHALMIC EVERY 4 HOURS
Qty: 1 EACH | Refills: 0 | Status: SHIPPED | OUTPATIENT
Start: 2021-10-07 | End: 2021-10-15

## 2021-10-07 RX ORDER — LABETALOL HYDROCHLORIDE 5 MG/ML
10 INJECTION, SOLUTION INTRAVENOUS
Status: DISCONTINUED | OUTPATIENT
Start: 2021-10-07 | End: 2021-10-07 | Stop reason: HOSPADM

## 2021-10-07 RX ORDER — LABETALOL HYDROCHLORIDE 5 MG/ML
10 INJECTION, SOLUTION INTRAVENOUS
Status: DISCONTINUED | OUTPATIENT
Start: 2021-10-07 | End: 2021-10-07

## 2021-10-07 RX ORDER — LOPERAMIDE HYDROCHLORIDE 2 MG/1
2 CAPSULE ORAL 2 TIMES DAILY PRN
Status: ON HOLD | COMMUNITY
End: 2021-10-07 | Stop reason: HOSPADM

## 2021-10-07 RX ORDER — DOXYCYCLINE HYCLATE 100 MG
100 TABLET ORAL 2 TIMES DAILY
Qty: 10 TABLET | Refills: 0 | Status: SHIPPED | OUTPATIENT
Start: 2021-10-07 | End: 2021-10-12

## 2021-10-07 RX ORDER — LOSARTAN POTASSIUM 50 MG/1
50 TABLET ORAL DAILY
Qty: 30 TABLET | Refills: 0 | Status: ON HOLD | OUTPATIENT
Start: 2021-10-07 | End: 2021-12-03 | Stop reason: HOSPADM

## 2021-10-07 RX ORDER — METRONIDAZOLE 500 MG/1
500 TABLET ORAL 3 TIMES DAILY
Qty: 21 TABLET | Refills: 0 | Status: SHIPPED | OUTPATIENT
Start: 2021-10-07 | End: 2021-10-14

## 2021-10-07 RX ORDER — HYDRALAZINE HYDROCHLORIDE 20 MG/ML
10 INJECTION INTRAMUSCULAR; INTRAVENOUS
Status: DISCONTINUED | OUTPATIENT
Start: 2021-10-07 | End: 2021-10-07 | Stop reason: HOSPADM

## 2021-10-07 RX ORDER — HYDRALAZINE HYDROCHLORIDE 20 MG/ML
10 INJECTION INTRAMUSCULAR; INTRAVENOUS ONCE
Status: COMPLETED | OUTPATIENT
Start: 2021-10-07 | End: 2021-10-07

## 2021-10-07 RX ADMIN — POLYMYXIN B SULFATE, TRIMETHOPRIM SULFATE 1 DROP: 10000; 1 SOLUTION/ DROPS OPHTHALMIC at 08:07

## 2021-10-07 RX ADMIN — ASPIRIN 81 MG: 81 TABLET, CHEWABLE ORAL at 10:15

## 2021-10-07 RX ADMIN — FOLIC ACID 1 MG: 1 TABLET ORAL at 10:05

## 2021-10-07 RX ADMIN — PRAVASTATIN SODIUM 20 MG: 20 TABLET ORAL at 10:05

## 2021-10-07 RX ADMIN — FERROUS SULFATE TAB 325 MG (65 MG ELEMENTAL FE) 325 MG: 325 (65 FE) TAB at 08:08

## 2021-10-07 RX ADMIN — CARVEDILOL 25 MG: 25 TABLET, FILM COATED ORAL at 08:08

## 2021-10-07 RX ADMIN — POLYMYXIN B SULFATE, TRIMETHOPRIM SULFATE 1 DROP: 10000; 1 SOLUTION/ DROPS OPHTHALMIC at 11:54

## 2021-10-07 RX ADMIN — Medication 2000 UNITS: at 10:15

## 2021-10-07 RX ADMIN — NEPHROCAP 1 MG: 1 CAP ORAL at 10:14

## 2021-10-07 RX ADMIN — LOSARTAN POTASSIUM 25 MG: 25 TABLET, FILM COATED ORAL at 10:06

## 2021-10-07 RX ADMIN — HEPARIN SODIUM 5000 UNITS: 5000 INJECTION INTRAVENOUS; SUBCUTANEOUS at 06:14

## 2021-10-07 RX ADMIN — LABETALOL HYDROCHLORIDE 10 MG: 5 INJECTION INTRAVENOUS at 03:58

## 2021-10-07 RX ADMIN — Medication 10 ML: at 10:16

## 2021-10-07 RX ADMIN — SERTRALINE HYDROCHLORIDE 25 MG: 25 TABLET ORAL at 10:15

## 2021-10-07 RX ADMIN — PANTOPRAZOLE SODIUM 40 MG: 40 TABLET, DELAYED RELEASE ORAL at 10:15

## 2021-10-07 RX ADMIN — POLYMYXIN B SULFATE, TRIMETHOPRIM SULFATE 1 DROP: 10000; 1 SOLUTION/ DROPS OPHTHALMIC at 04:02

## 2021-10-07 RX ADMIN — HYDRALAZINE HYDROCHLORIDE 10 MG: 20 INJECTION INTRAMUSCULAR; INTRAVENOUS at 06:14

## 2021-10-07 RX ADMIN — HYDRALAZINE HYDROCHLORIDE 10 MG: 20 INJECTION INTRAMUSCULAR; INTRAVENOUS at 08:08

## 2021-10-07 RX ADMIN — ACETAMINOPHEN 650 MG: 325 TABLET ORAL at 11:54

## 2021-10-07 RX ADMIN — HEPARIN SODIUM 5000 UNITS: 5000 INJECTION INTRAVENOUS; SUBCUTANEOUS at 14:13

## 2021-10-07 RX ADMIN — SEVELAMER CARBONATE 800 MG: 800 TABLET, FILM COATED ORAL at 08:08

## 2021-10-07 RX ADMIN — CYANOCOBALAMIN TAB 1000 MCG 1000 MCG: 1000 TAB at 10:05

## 2021-10-07 ASSESSMENT — PAIN SCALES - GENERAL
PAINLEVEL_OUTOF10: 0

## 2021-10-07 NOTE — PROGRESS NOTES
Clinical Pharmacy Progress Note    Vancomycin - Management by Pharmacy    Consult Date(s): 10/1  Consulting Provider(s): Shawn    Assessment / Plan    1) HAP - Vancomycin   Concurrent Antimicrobials:   o Cefepime - Day #3   Day of Vanc Therapy: #3   Current Dosing Method: Intermittent dosing given ESRD on HD   Current Dose / Plan / Rationale:   o Given ESRD on HD, will dose vancomycin based on intermittent levels with HD. Current HD schedule is MWF.  o No dose scheduled for today as pt is not scheduled for HD.  o Vancomycin level ordered for Fri 10/8 prior to next HD session. Will re-dose as appropriate.  Will continue to monitor clinical condition and make adjustments to regimen as appropriate. Please call with questions--  Sadi Maurer PharmD, BCPS  Wireless: [phone]   10/7/2021 9:37 AM        Interval update: Hypertensive to 222/83 overnight, treated with hydralazine and labetalol. Subjective/Objective: Mr. Ella Rome is a 67 y.o. male with a PMHx significant for CKD on HD (MWF), T2DM, PVD, CAD. HTN, HDL, GERD and MDD. He has bilateral LE amputations and admitted for AMS and HAP. Pharmacy has been consulted to dose vancomycin.     Pertinent Antimicrobials:  Cefepime 2000 mg IV 3x weekly (MWF) (10/5-current)  Vancomycin - pharmacy to dose (10/5-current)   Intermittent dosing (10/5-current)  Date Dose Vanc Level   10/5 2000 mg IV total    10/6 -- 23.4 mcg/mL   10/7 --      Height:   Ht Readings from Last 1 Encounters:   10/05/21 4' (1.219 m)     Weight:   Wt Readings from Last 1 Encounters:   10/06/21 227 lb 15.3 oz (103.4 kg)     Cultures & Sensitivities:    Date Site Micro Susceptibility / Result   10/5 Blood x 2 NGTD      MRSA nasal PCR Ordered     COVID-19      [facility], viral panel       Labs / Ancillary Data:    Recent Labs     10/04/21  2302 10/06/21  0429 10/07/21  0417   CREATININE 5.4* 8.2* 5.8*   BUN 20 38* 26*   WBC 6.4 7.8 5.9

## 2021-10-07 NOTE — PROGRESS NOTES
RN spoke with daughters Reg Gutierrez and Oley Bosworth concerning patients discharge and POC to return to [place].

## 2021-10-07 NOTE — PROGRESS NOTES
Patient Name: Mena Cuevas                                                    Primary Physician: Se Mcclain DO  Admitting Dx: Altered mental status, unspecified altered mental status type [R41.82]  Pneumonia of both lower lobes due to infectious organism [J18.9]  AMS (altered mental status) [R41.82]    Nephrology [facility] Nephrology  [email or web address] [place] Self Representation Document Preparation                                          Interval Plan:     · BP remains high on Home meds Coreg 25 mg and Cozaar 25 mg. Neither are removed with PD and would increase Cozaar to 50 mg for now. ·  Labs reviewed and lytes are stable. No need for HD today. · Hgb 8.2 and adjust Mircera as outpatient. · Blood cultures show no growth. Low grade temps noted. Assessment / Plan:     ESRD  · Normally runs MWF at [facility]  · Volume:  EDW of 104.5 kg and 1.7 above removing with HD. · Anemia: Hgb 7.2 and ordered Retacrit 15,000 and check iron studies. · Access: LA Fistula with no issues. · SHPT:  Renagel with HD and Rocaltrol 0.25 mcg with D3 2000 units daily  HTN  · Coreg, Cozaar  ID  · Started on Vanc and Cefepime to be dosed with dialysis. · Cultures sent and NGTD. Please call our office at [phone] or [employer] with any questions or contact me directly. Subjective:     CC / Reason for Consult:  ESRD    HPI / PMHx:  This is a consult for Mena Cuevas  requested by Se Mcclain DO for the reason of  ESRD. Mena Cuevas is a 67 y.o. male admitted for Altered mental status, PNA w/ PMHx of ESRD, Anemia in CKD, SHPT, HTN, DVT, Gangrene, Hyperkalemia, HLD, CAD, A-fib / MI, DM Type 2, B/L BKA. Patient normally dialyzes MWF at [facility]. Missed treatment today. He presented to [facility] ED from [address] for AMS. Per nursing facility patient was lethargic with altered mental status.  Patient's daughter  states that she saw him this past Sunday, they had lunch together and watched football. He is fully vaccinated for Covid. Attempted to obtain history from patient but can't say why he is here other then he was javon here. History of multiple ED admissions and hospitalizations for \"low hemoglobin\" and received transfusions. I thank Dr. Gladys Brooke DO [facility] [address] [facility] Nephrology in the care of your patient. Please call with any questions or concerns. [phone]. Danya Lim    Objective:     SocHx: No hx of smoking and does not drink curently. FamHx: Mother is alive with arthritis and father is .     Current Medications:  Scheduled Medications:  epoetin paulina-epbx, 15,000 Units, Once per day on   sodium chloride flush, 5-40 mL, 2 times per day  heparin (porcine), 5,000 Units, 3 times per day  pravastatin, 20 mg, Daily  losartan, 25 mg, Daily  calcitRIOL, 0.25 mcg, Every Other Day  aspirin, 81 mg, Daily  b complex-C-folic acid, 1 mg, Daily  sevelamer, 800 mg, BID WC  carvedilol, 25 mg, BID WC  mirtazapine, 15 mg, Nightly  ferrous sulfate, 325 mg, Daily with breakfast  Vitamin D, 2,000 Units, Daily  folic acid, 1 mg, Daily  pantoprazole, 40 mg, Daily  prazosin, 2 mg, Nightly  vitamin B-12, 1,000 mcg, Daily  cefepime, 2,000 mg, Q MWF  insulin lispro, 0-6 Units, TID WC  insulin lispro, 0-3 Units, Nightly  vancomycin (VANCOCIN) intermittent dosing (placeholder), , See Admin Instructions  trimethoprim-polymyxin b, 1 drop, 6 times per day  sertraline, 25 mg, Daily       IV Meds:  sodium chloride, Last Rate: Stopped (10/06/21 0452)  sodium chloride  dextrose       PRN Medications:  hydrALAZINE, 10 mg, Q2H PRN  labetalol, 10 mg, Q2H PRN  sodium chloride flush, 5-40 mL, PRN  sodium chloride, 25 mL, PRN  ondansetron, 4 mg, Q8H PRN   Or  ondansetron, 4 mg, Q6H PRN  polyethylene glycol, 17 g, Daily PRN  acetaminophen, 650 mg, Q6H PRN   Or  acetaminophen, 650 mg, Q6H PRN  nitroGLYCERIN, 0.4 mg, Q5 Min PRN  sodium chloride, , PRN  glucose, 15 g, PRN  dextrose, 12.5 g, PRN  glucagon (rDNA), 1 mg, PRN  dextrose, 100 mL/hr, PRN        Allergies: Patient has no known allergies. ROS: Positives in BOLD     GEN:   fevers, chills, sweats, fatigue and weight loss     HEENT:    nasal congestion, sore mouth and sore throat     Resp:    cough, hemoptysis, pneumonia or dyspnea on exertion     Card:  chest pain, chest pressure/discomfort, dyspnea, palpitations,  lower extremity edema     GI:   nausea, vomiting, diarrhea, constipation and abdominal pain     :  dysuria, nocturia, urinary incontinence, hesitancy and hematuria     Derm:   rash, skin lesion(s), pruritus and dryness     Neuro:   headaches, dizziness, seizures, gait problems, tremor and weakness     MS:  myalgias, arthralgias, neck pain and back pain     Endo:    nephropathy and cardiovascular disease    PE:      Gen: ill-appearing and lethargic answering some questions     HEENT:atraumatic      Neuro: awake and responsive but confused     Neck:  supple, no significant adenopathy     Cardio: normal rate, regular rhythm, normal S1, S2, no murmurs, rubs, clicks or gallops      Resp: clear to auscultation, no wheezes, rales or rhonchi, symmetric air entry. GI:  soft, nontender, nondistended, no masses or organomegaly. Ext:  B/l BKA with no edema      MS: no joint tenderness, deformity or swelling      DERM: normal coloration and turgor, no rashesor bruising.        Vitals:   Patient Vitals for the past 8 hrs:   BP Temp Temp src Pulse Resp SpO2   10/07/21 1144 (!) 170/76 100 °F (37.8 °C) Oral 70 20 98 %   10/07/21 0752 (!) 186/72 98.8 °F (37.1 °C) Oral 79 20 100 %   10/07/21 0651 (!) 192/75 -- -- -- -- --   10/07/21 0600 (!) 182/70 -- -- 62 -- --   10/07/21 0523 (!) 210/73 -- -- -- -- --   10/07/21 0442 (!) 203/83 -- -- -- -- --          I/Os:     Intake/Output Summary (Last 24 hours) at 10/7/2021 1157  Last data filed at 10/7/2021 1144  Gross per 24 hour   Intake 300 ml   Output 2100 ml   Net -1800 ml       LABS:    Lab

## 2021-10-07 NOTE — CONSULTS
Clinical Pharmacy Progress Note  Medication History     Admit Date: 10/4/2021    Pharmacy consulted to verify home medications per Dr. Howard Husbands. Spoke with RN at [facility]. List of of current medications patient is taking is complete. Home Medication list in EPIC updated to reflect changes noted below. Source of information: RN at [employer] ([phone])     Changes made to medication list:   Medications removed: (include reason, ex: therapy completed, inactive medication)   Calcitriol - RN states pt no longer takes   Humalog - RN states pt is no longer on SS insulin    Sertraline - RN states this was replaced with mirtazapine  Medications added:    Loperamide PRN      Please call with any questions.   Amaury Cox PharmD, BCPS  Wireless: [phone]   10/7/2021 9:43 AM

## 2021-10-07 NOTE — CARE COORDINATION
Case Management Assessment            Discharge Note                    Date / Time of Note: 10/7/2021 10:05 AM                  Discharge Note Completed by: Scotty Calvillo RN    Patient Name: Rodolfo García   YOB: 1945  Diagnosis: Altered mental status, unspecified altered mental status type [R41.82]  Pneumonia of both lower lobes due to infectious organism [J18.9]  AMS (altered mental status) [R41.82]   Date / Time: 10/4/2021  9:35 PM    Current PCP: Yvonne Hood MD  Clinic patient: No    Hospitalization in the last 30 days: No    Advance Directives:  Code Status: Full Code  [place] DNR form completed and on chart: Not Indicated    Financial:  Payor: MEDICARE / Plan: MEDICARE PART A AND B / Product Type: *No Product type* /      Pharmacy:    [address] [address]  Phone: [phone] Fax: [phone] [address] [facility] [phone] [facility] [phone]  [address] [address]  Phone: [phone] Fax: [phone]    [address]  [address]  Phone: [phone] Fax: [phone]      Assistance purchasing medications?: Potential Assistance Purchasing Medications: No  Assistance provided by Case Management: None at this time    Does patient want to participate in local refill/ meds to beds program?: No    Meds To Beds General Rules:  1. Can ONLY be done Monday- Friday between 8:30am-5pm  2. Prescription(s) must be in pharmacy by 3pm to be filled same day  3. Copy of patient's insurance/ prescription drug card and patient face sheet must be sent along with the prescription(s)  4. Cost of Rx cannot be added to hospital bill.  If financial assistance is needed, please contact unit  or ;  or  CANNOT provide pharmacy voucher for patients co-pays  5. Patients can then  the prescription on their way out of the hospital at discharge, or pharmacy can deliver to the bedside if staff is available. (payment due at time of pick-up or delivery - cash, check, or card accepted)     Able to afford home medications/ co-pay costs: Yes    ADLS:  Current PT AM-PAC Score:   /24  Current OT AM-PAC Score:   /24      DISCHARGE Disposition: [facility] (LTC): [facility]  Phone: [phone]  Fax: [phone]    LOC at discharge: [address] Completed: Yes    Notification completed in HENS/PAS?:  Not Applicable    IMM Completed:   Not Indicated    Transportation:  Transportation PLAN for discharge: EMS transportation   Mode of Transport: Ambulance stretcher - BLS  Reason for medical transport: Other: Bilateral [address] [facility]  Name of Transport Company: [employer]  Phone: [phone]  Time of Transport: 1500    Transport form completed: Yes    Home Care:  Home Care ordered at discharge: Not [address]: Not Applicable      Durable Medical Equipment:  DME Provider: n/a  Equipment obtained during hospitalization: n/a    Home Oxygen and Respiratory Equipment:  Oxygen needed at discharge?: Not [address]: Not Applicable  Portable tank available for discharge?: Not Indicated    Dialysis:  Dialysis patient: Yes    Dialysis Center:  [facility]  Address: [address]   Phone: [phone]        Referrals made at [facility] for outpatient continued care:  Not Applicable    Additional CM Notes:   Patient will return to [address] [place] [address]. Orders faxed to [phone], nurse to call report to [phone]. Patient is scheduled for transport via [employer] at 1500. Patient's family aware and agreeable to plan.     The Plan for Transition of Care is related to the following treatment goals of Altered mental status, unspecified altered mental status type [R41.82]  Pneumonia of both lower lobes due to infectious organism [J18.9]  AMS (altered mental status) [R41.82]    The Patient and/or patient representative Nalini Alva and his family were provided with a choice of provider and agrees with the discharge plan Yes    Freedom of choice list was provided with basic dialogue that supports the patient's individualized plan of care/goals and shares the quality data associated with the providers.  Yes    Care Transitions patient: No    Rm Artis RN  [facility] [employer]  Case Management Department  Ph: [phone]  Fax: [phone]

## 2021-10-07 NOTE — PROGRESS NOTES
Pt's BP at 0351 was 222/83. RN gave pt PRN labetalol. Pt's last BP was 210/73 at 0523. MD notified. MD ordered hydralazine PRN q2h and modified PRN labetalol to q2h PRN.

## 2021-10-07 NOTE — DISCHARGE SUMMARY
INTERNAL MEDICINE DEPARTMENT  DISCHARGE SUMMARY    Patient ID: Jhoana Villegas                                             Discharge Date: 10/7/2021   Patient's PCP: Adán Patterson MD                                          Discharge Physician: Etta Koroma MD   Admit Date: 10/4/2021   Admitting Physician: Polo Richardson DO    PROBLEMS DURING HOSPITALIZATION:  Present on Admission:   AMS (altered mental status)  - Community acquired Pneumonia  - Right conjunctivitis  - ESRD      DISCHARGE DIAGNOSES:  1- Community acquired pneumonia  2. COVID -19 ruled out   3. ESRD  4. Right Conjunctivitis  5. Anemia of Chronic Disease  6. Type 2 DM  7. HTN  8. MDD  9. HLD      Hospital Course:      68 yo M PMHx CKD stage IV on HD MWF (does not make urine), anemia of chronic disease, CAD, PVD, T2DM, HTN, HLD, GERD, MDD presented to [place] [address] ED from [address] home for AMS. Per nursing facility patient was lethargic and  nurse appeared less conversational and slower than he typically is hence she sent him to the ED. She denied that he expressed to her any acute symptoms including fever, chills, n/v, abdominal, chest pain/chest pressure. Of note he is fully vaccinated for Covid.      In ED, VS significant for T 101.8 and 'N systolic. Chemistry in line with ESRD, BUN 20, Cr 5.4. CBC with Hb 6.8/Hct 20.5. CXR with evidence of bibasilar infiltrates. Patient was admitted to the general medical floor for treatment of pneumonia and anemia. He received a blood transfusion. Hb 6.8 on admission to 8.2 today. Had dialysis with output of 2.1L. Pt remained afebrile throughout admission. Blood cultures were all negative. He received Vancomycin and Cefepime for his pneumonia. On the basis of discharge, patient reported feeling stable, He was Alert and oriented x 4. The patient was found to not be in any acute distress and no acute abnormalities on physical examination.   Further, the patient expressed appropriate understanding of, and agreement with, the discharge recommendations, medications and plan. Discussed all recommendations with POA. Physical Exam:  Vitals: BP (!) 170/76   Pulse 70   Temp 100 °F (37.8 °C) (Oral)   Resp 20   Ht 4' (1.219 m)   Wt 227 lb 15.3 oz (103.4 kg)   SpO2 98%   BMI 69.56 kg/m²   I/O :     Intake/Output Summary (Last 24 hours) at 10/7/2021 1303  Last data filed at 10/7/2021 1144  Gross per 24 hour   Intake 300 ml   Output 2100 ml   Net -1800 ml         General appearance: alert, appears stated age and cooperative  HEENT: Head: Normocephalic, no lesions, without obvious abnormality. Lungs: clear to auscultation bilaterally and diminished breath sounds bibasilar  Heart: regular rate and rhythm, S1, S2 normal, no murmur, click, rub or gallop  Abdomen: soft, non-tender; bowel sounds normal; no masses,  no organomegaly  Extremities: extremities normal, atraumatic, no cyanosis or edema and no ulcers, gangrene or trophic changes BKA bilaterally  Neurologic: Mental status: Alert, oriented, thought content appropriate    Labs:  For convenience and continuity at follow-up the following most recent labs are provided:      CBC:    Lab Results   Component Value Date    WBC 5.9 10/07/2021    HGB 8.2 10/07/2021    HCT 24.3 10/07/2021     10/07/2021       Renal:    Lab Results   Component Value Date     10/07/2021    K 4.3 10/07/2021    K 4.4 10/04/2021     10/07/2021    CO2 23 10/07/2021    BUN 26 10/07/2021    CREATININE 5.8 10/07/2021    CALCIUM 9.4 10/07/2021    PHOS 2.9 10/05/2021       Consults: nephrology  Significant Diagnostic Studies:    CXR    Treatments:  Hemodialysis    Disposition: SNF  Discharged Condition: Stable  Follow Up: Primary Care Physician in one week    DISCHARGE MEDICATION:     Medication List      START taking these medications    doxycycline hyclate 100 MG tablet  Commonly known as: VIBRA-TABS  Take 1 tablet by mouth 2 times daily for 5 days     epoetin paulina-epbx [id number] Instructions:  1. Please follow up with your Primary Care physician in 1 week  2. Please follow up with you Nephrologist Dr. Sathish French in 1 week  3. Please keep scheduled dialysis appointments  4. To do repeated CBC and present with follow up. 5. Please apply eye ointment trimethoprim/polymixinB to right eye every 4 hrs to complete 10 days. 6. To obtain EPO injections three times a week. 7. Please start taking antibiotics: Doxycycline 100mg twice a day for 5 days and Metronidazole 500mg three times a day for 7 days. 8. Start taking Losartan 50mg by mouth daily    Time Spent on discharge is more than 45 minutes    Signed:  Rafiq Queen MD,  PGY-1  10/7/2021    Patient seen and examined, labs and imaging reviewed, agree with assessment and plan as outlined above. patients condition continues to improve doing well, asked patient to monitor side effects of medications which i've discussed at length, recurrence of symptoms or new symptoms including but not limited to chest pain, shortness of breath, nausea, vomiting, fevers or chills and seek immediate medical attention or call 911. Greater than 30 minutes spent on case on day of discharge.      Marilyn Arevalo MD FACP

## 2021-10-07 NOTE — PLAN OF CARE
Problem: Falls - Risk of:  Goal: Will remain free from falls  Description: Will remain free from falls  10/7/2021 1459 by Maddy Kumar  Outcome: Completed  10/7/2021 0228 by Tara Ellis  Outcome: Ongoing  Goal: Absence of physical injury  Description: Absence of physical injury  Outcome: Completed     Problem: Skin Integrity:  Goal: Will show no infection signs and symptoms  Description: Will show no infection signs and symptoms  Outcome: Completed  Goal: Absence of new skin breakdown  Description: Absence of new skin breakdown  10/7/2021 1459 by Maddy Kumar  Outcome: Completed  10/7/2021 0228 by Tara Ellis  Outcome: Ongoing  Goal: Status of oral mucous membranes will improve  Description: Status of oral mucous membranes will improve  Outcome: Completed  Goal: Skin integrity will be maintained  Description: Skin integrity will be maintained  Outcome: Completed  Goal: Skin integrity will improve  Description: Skin integrity will improve  Outcome: Completed  Goal: Signs of wound healing will improve  Description: Signs of wound healing will improve  Outcome: Completed     Problem: Airway Clearance - Ineffective  Goal: Achieve or maintain patent airway  Outcome: Completed     Problem: Gas Exchange - Impaired  Goal: Absence of hypoxia  Outcome: Completed  Goal: Promote optimal lung function  Outcome: Completed     Problem: Breathing Pattern - Ineffective  Goal: Ability to achieve and maintain a regular respiratory rate  Outcome: Completed     Problem:  Body Temperature -  Risk of, Imbalanced  Goal: Ability to maintain a body temperature within defined limits  Outcome: Completed  Goal: Will regain or maintain usual level of consciousness  Outcome: Completed  Goal: Complications related to the disease process, condition or treatment will be avoided or minimized  Outcome: Completed     Problem: Isolation Precautions - Risk of Spread of Infection  Goal: Prevent transmission of infection  Outcome: Completed Ability to maintain appropriate glucose levels will improve  10/7/2021 1459 by ALEXA GU  Outcome: Completed  10/7/2021 0228 by Major Lars  Outcome: Ongoing     Problem:  Bowel/Gastric:  Goal: Ability to achieve a regular elimination pattern will improve  Description: Ability to achieve a regular elimination pattern will improve  Outcome: Completed     Problem: Cardiac:  Goal: Ability to maintain an adequate cardiac output will improve  Description: Ability to maintain an adequate cardiac output will improve  10/7/2021 1459 by Elsa Esposito  Outcome: Completed  10/7/2021 0228 by Major Lars  Outcome: Ongoing  Goal: Ability to maintain adequate ventilation will improve  Description: Ability to maintain adequate ventilation will improve  Outcome: Completed  Goal: Ability to achieve and maintain adequate cardiopulmonary perfusion will improve  Description: Ability to achieve and maintain adequate cardiopulmonary perfusion will improve  Outcome: Completed     Problem: Safety:  Goal: Ability to remain free from injury will improve  Description: Ability to remain free from injury will improve  Outcome: Completed     Problem: Tissue Perfusion:  Goal: Ability to maintain adequate tissue perfusion will improve  Description: Ability to maintain adequate tissue perfusion will improve  Outcome: Completed  Goal: Ability to maintain a stable neurologic state will improve  Description: Ability to maintain a stable neurologic state will improve  Outcome: Completed

## 2021-10-07 NOTE — PLAN OF CARE
Problem: Falls - Risk of:  Goal: Will remain free from falls  Description: Will remain free from falls  10/7/2021 0228 by Dennis Parr  Outcome: Ongoing   Pt's bed is in lowest position, brake and bed exit alarm are on, call light and bedside table are within reach. Problem: Skin Integrity:  Goal: Absence of new skin breakdown  Description: Absence of new skin breakdown  10/7/2021 0228 by Dennis Parr  Outcome: Ongoing   Pt is being turned every two hours to decrease risk of skin breakdown. Problem: Activity:  Goal: Fatigue will decrease  Description: Fatigue will decrease  10/7/2021 0228 by Dennis Parr  Outcome: Ongoing   Pt has been lethargic during shift. Pt did eat a couple bites of applesauce this shift. Problem: Fluid Volume:  Goal: Will show no signs or symptoms of fluid imbalance  Description: Will show no signs or symptoms of fluid imbalance  10/7/2021 0228 by Dennis Parr  Outcome: Ongoing   Pt receives dialysis MWF. Per pt, he is oliguric. Problem: Physical Regulation:  Goal: Complications related to the disease process, condition or treatment will be avoided or minimized  Description: Complications related to the disease process, condition or treatment will be avoided or minimized  10/7/2021 0228 by Dennis Parr  Outcome: Ongoing   Pt has a L arm fistula. Pt is on IV abx r/t pneumonia. Problem: Serum Glucose Level - Abnormal:  Goal: Ability to maintain appropriate glucose levels will improve  Description: Ability to maintain appropriate glucose levels will improve  10/7/2021 0228 by Dennis Parr  Outcome: Ongoing   Pt's blood sugar is being checked ACHS and is being covered with insulin lispro. Pt didn't need insulin coverage this shift.

## 2021-10-07 NOTE — DISCHARGE SUMMARY
RN explained discharge instructions to RN at facility, [facility], and also to daughters due to patients mental status. AVS sent to residents home with paramedics. IV removed and patient cleaned.

## 2021-10-09 LAB
BLOOD CULTURE, ROUTINE: NORMAL
CULTURE, BLOOD 2: NORMAL

## 2021-10-11 NOTE — PROGRESS NOTES
Physician Progress Note      PATIENT:               Irina Arteaga  CSN #:                  [id number]  :                       1945  ADMIT DATE:       10/4/2021 9:35 PM  [facility] DATE:        10/7/2021 3:22 PM  RESPONDING  PROVIDER #:        Jay Liu MD          QUERY TEXT:    Patient admitted 10/4 and discharged 10/7 with pneumonia. Per answer to   earlier query: cannot rule out gram negative pneumonia. Per 10/4 Attending   H&P Documentation reflects Sepsis 2/2 Likely Community-Acquired Pneumonia. Sepsis not documented following H&P. If possible, please document in the   progress notes and discharge summary if Sepsis was: The medical record reflects the following:  Risk Factors: 67 y.o. male with AMS, CKD4, Anemia, Possible gram negative   pneumonia  Clinical Indicators: Patient presented febrile to 101.8, Resp: 24, Procal:   0.94, Lactic: 1.2-1.3  Treatment: Vancomycin, Cefepime  Options provided:  -- Sepsis confirmed at time of order to admit after study  -- Sepsis ruled out after study  -- Other - I will add my own diagnosis  -- Disagree - Not applicable / Not valid  -- Disagree - Clinically unable to determine / Unknown  -- Refer to Clinical Documentation Reviewer    PROVIDER RESPONSE TEXT:    Sepsis ruled out after study.     Query created by: Shaista Robertson on 10/11/2021 8:26 AM      Electronically signed by:  Jay Liu MD 10/11/2021 4:08 PM

## 2021-12-01 ENCOUNTER — APPOINTMENT (OUTPATIENT)
Dept: GENERAL RADIOLOGY | Age: 76
DRG: 640 | End: 2021-12-01
Payer: MEDICARE

## 2021-12-01 ENCOUNTER — HOSPITAL ENCOUNTER (INPATIENT)
Age: 76
LOS: 2 days | Discharge: NURSING, REHAB OR LONG-TERM CARE | DRG: 640 | End: 2021-12-03
Attending: STUDENT IN AN ORGANIZED HEALTH CARE EDUCATION/TRAINING PROGRAM | Admitting: INTERNAL MEDICINE
Payer: MEDICARE

## 2021-12-01 DIAGNOSIS — Z99.2 ESRD NEEDING DIALYSIS (HCC): Primary | ICD-10-CM

## 2021-12-01 DIAGNOSIS — E87.5 HYPERKALEMIA: ICD-10-CM

## 2021-12-01 DIAGNOSIS — N18.6 ESRD NEEDING DIALYSIS (HCC): Primary | ICD-10-CM

## 2021-12-01 LAB
A/G RATIO: 1.1 (ref 1.1–2.2)
ALBUMIN SERPL-MCNC: 3.7 G/DL (ref 3.4–5)
ALP BLD-CCNC: 89 U/L (ref 40–129)
ALT SERPL-CCNC: 13 U/L (ref 10–40)
ANION GAP SERPL CALCULATED.3IONS-SCNC: 16 MMOL/L (ref 3–16)
AST SERPL-CCNC: 12 U/L (ref 15–37)
BASE EXCESS VENOUS: 1 MMOL/L (ref -2–3)
BASOPHILS ABSOLUTE: 0 K/UL (ref 0–0.2)
BASOPHILS RELATIVE PERCENT: 1 %
BILIRUB SERPL-MCNC: <0.2 MG/DL (ref 0–1)
BUN BLDV-MCNC: 71 MG/DL (ref 7–20)
CALCIUM SERPL-MCNC: 8.6 MG/DL (ref 8.3–10.6)
CARBOXYHEMOGLOBIN: 1 % (ref 0–1.5)
CHLORIDE BLD-SCNC: 96 MMOL/L (ref 99–110)
CO2: 23 MMOL/L (ref 21–32)
CREAT SERPL-MCNC: 14.1 MG/DL (ref 0.8–1.3)
EKG ATRIAL RATE: 77 BPM
EKG DIAGNOSIS: NORMAL
EKG P AXIS: 93 DEGREES
EKG P-R INTERVAL: 268 MS
EKG Q-T INTERVAL: 398 MS
EKG QRS DURATION: 102 MS
EKG QTC CALCULATION (BAZETT): 450 MS
EKG R AXIS: 18 DEGREES
EKG T AXIS: 56 DEGREES
EKG VENTRICULAR RATE: 77 BPM
EOSINOPHILS ABSOLUTE: 0.2 K/UL (ref 0–0.6)
EOSINOPHILS RELATIVE PERCENT: 4.5 %
GFR AFRICAN AMERICAN: 4
GFR NON-AFRICAN AMERICAN: 3
GLUCOSE BLD-MCNC: 107 MG/DL (ref 70–99)
GLUCOSE BLD-MCNC: 95 MG/DL (ref 70–99)
HCO3 VENOUS: 26.4 MMOL/L (ref 24–28)
HCT VFR BLD CALC: 21.2 % (ref 40.5–52.5)
HEMOGLOBIN, VEN, REDUCED: 16.5 %
HEMOGLOBIN: 7 G/DL (ref 13.5–17.5)
LACTIC ACID: 0.8 MMOL/L (ref 0.4–2)
LYMPHOCYTES ABSOLUTE: 1.4 K/UL (ref 1–5.1)
LYMPHOCYTES RELATIVE PERCENT: 28.9 %
MAGNESIUM: 2.5 MG/DL (ref 1.8–2.4)
MCH RBC QN AUTO: 29.3 PG (ref 26–34)
MCHC RBC AUTO-ENTMCNC: 32.8 G/DL (ref 31–36)
MCV RBC AUTO: 89.1 FL (ref 80–100)
METHEMOGLOBIN VENOUS: 0.7 % (ref 0–1.5)
MONOCYTES ABSOLUTE: 0.3 K/UL (ref 0–1.3)
MONOCYTES RELATIVE PERCENT: 5.5 %
NEUTROPHILS ABSOLUTE: 3 K/UL (ref 1.7–7.7)
NEUTROPHILS RELATIVE PERCENT: 60.1 %
O2 SAT, VEN: 83 %
PCO2, VEN: 45.3 MMHG (ref 41–51)
PDW BLD-RTO: 14.4 % (ref 12.4–15.4)
PERFORMED ON: ABNORMAL
PH VENOUS: 7.37 (ref 7.35–7.45)
PHOSPHORUS: 6 MG/DL (ref 2.5–4.9)
PLATELET # BLD: 156 K/UL (ref 135–450)
PMV BLD AUTO: 9.1 FL (ref 5–10.5)
PO2, VEN: 50.7 MMHG (ref 25–40)
POTASSIUM REFLEX MAGNESIUM: 6.3 MMOL/L (ref 3.5–5.1)
PRO-BNP: ABNORMAL PG/ML (ref 0–449)
RBC # BLD: 2.38 M/UL (ref 4.2–5.9)
SODIUM BLD-SCNC: 135 MMOL/L (ref 136–145)
TCO2 CALC VENOUS: 28 MMOL/L
TOTAL PROTEIN: 7.1 G/DL (ref 6.4–8.2)
TROPONIN: 0.17 NG/ML
TROPONIN: 0.19 NG/ML
WBC # BLD: 5 K/UL (ref 4–11)

## 2021-12-01 PROCEDURE — 1200000000 HC SEMI PRIVATE

## 2021-12-01 PROCEDURE — 80053 COMPREHEN METABOLIC PANEL: CPT

## 2021-12-01 PROCEDURE — 93005 ELECTROCARDIOGRAM TRACING: CPT | Performed by: PHYSICIAN ASSISTANT

## 2021-12-01 PROCEDURE — 2500000003 HC RX 250 WO HCPCS: Performed by: PHYSICIAN ASSISTANT

## 2021-12-01 PROCEDURE — 90935 HEMODIALYSIS ONE EVALUATION: CPT

## 2021-12-01 PROCEDURE — 83036 HEMOGLOBIN GLYCOSYLATED A1C: CPT

## 2021-12-01 PROCEDURE — 6370000000 HC RX 637 (ALT 250 FOR IP): Performed by: INTERNAL MEDICINE

## 2021-12-01 PROCEDURE — 6360000002 HC RX W HCPCS: Performed by: INTERNAL MEDICINE

## 2021-12-01 PROCEDURE — 84100 ASSAY OF PHOSPHORUS: CPT

## 2021-12-01 PROCEDURE — 84484 ASSAY OF TROPONIN QUANT: CPT

## 2021-12-01 PROCEDURE — 85025 COMPLETE CBC W/AUTO DIFF WBC: CPT

## 2021-12-01 PROCEDURE — 87340 HEPATITIS B SURFACE AG IA: CPT

## 2021-12-01 PROCEDURE — 83735 ASSAY OF MAGNESIUM: CPT

## 2021-12-01 PROCEDURE — 83880 ASSAY OF NATRIURETIC PEPTIDE: CPT

## 2021-12-01 PROCEDURE — 86704 HEP B CORE ANTIBODY TOTAL: CPT

## 2021-12-01 PROCEDURE — 2580000003 HC RX 258: Performed by: INTERNAL MEDICINE

## 2021-12-01 PROCEDURE — 36415 COLL VENOUS BLD VENIPUNCTURE: CPT

## 2021-12-01 PROCEDURE — 86706 HEP B SURFACE ANTIBODY: CPT

## 2021-12-01 PROCEDURE — 82803 BLOOD GASES ANY COMBINATION: CPT

## 2021-12-01 PROCEDURE — 6360000002 HC RX W HCPCS: Performed by: STUDENT IN AN ORGANIZED HEALTH CARE EDUCATION/TRAINING PROGRAM

## 2021-12-01 PROCEDURE — 71045 X-RAY EXAM CHEST 1 VIEW: CPT

## 2021-12-01 PROCEDURE — 5A1D70Z PERFORMANCE OF URINARY FILTRATION, INTERMITTENT, LESS THAN 6 HOURS PER DAY: ICD-10-PCS | Performed by: STUDENT IN AN ORGANIZED HEALTH CARE EDUCATION/TRAINING PROGRAM

## 2021-12-01 PROCEDURE — 83605 ASSAY OF LACTIC ACID: CPT

## 2021-12-01 PROCEDURE — 99284 EMERGENCY DEPT VISIT MOD MDM: CPT

## 2021-12-01 RX ORDER — DEXTROSE MONOHYDRATE 50 MG/ML
100 INJECTION, SOLUTION INTRAVENOUS PRN
Status: DISCONTINUED | OUTPATIENT
Start: 2021-12-01 | End: 2021-12-03 | Stop reason: HOSPADM

## 2021-12-01 RX ORDER — PANTOPRAZOLE SODIUM 40 MG/1
40 TABLET, DELAYED RELEASE ORAL
Status: DISCONTINUED | OUTPATIENT
Start: 2021-12-02 | End: 2021-12-03 | Stop reason: HOSPADM

## 2021-12-01 RX ORDER — DEXTROSE MONOHYDRATE 25 G/50ML
25 INJECTION, SOLUTION INTRAVENOUS ONCE
Status: DISCONTINUED | OUTPATIENT
Start: 2021-12-01 | End: 2021-12-03 | Stop reason: HOSPADM

## 2021-12-01 RX ORDER — SENNA AND DOCUSATE SODIUM 50; 8.6 MG/1; MG/1
1 TABLET, FILM COATED ORAL 2 TIMES DAILY
Status: DISCONTINUED | OUTPATIENT
Start: 2021-12-01 | End: 2021-12-03 | Stop reason: HOSPADM

## 2021-12-01 RX ORDER — NICOTINE POLACRILEX 4 MG
15 LOZENGE BUCCAL PRN
Status: DISCONTINUED | OUTPATIENT
Start: 2021-12-01 | End: 2021-12-01 | Stop reason: SDUPTHER

## 2021-12-01 RX ORDER — PRAVASTATIN SODIUM 20 MG
20 TABLET ORAL DAILY
Status: DISCONTINUED | OUTPATIENT
Start: 2021-12-02 | End: 2021-12-03 | Stop reason: HOSPADM

## 2021-12-01 RX ORDER — PRAZOSIN HYDROCHLORIDE 2 MG/1
2 CAPSULE ORAL NIGHTLY
Status: DISCONTINUED | OUTPATIENT
Start: 2021-12-01 | End: 2021-12-03 | Stop reason: HOSPADM

## 2021-12-01 RX ORDER — FOLIC ACID 1 MG/1
1 TABLET ORAL DAILY
Status: DISCONTINUED | OUTPATIENT
Start: 2021-12-02 | End: 2021-12-03 | Stop reason: HOSPADM

## 2021-12-01 RX ORDER — SODIUM CHLORIDE 9 MG/ML
25 INJECTION, SOLUTION INTRAVENOUS PRN
Status: DISCONTINUED | OUTPATIENT
Start: 2021-12-01 | End: 2021-12-03 | Stop reason: HOSPADM

## 2021-12-01 RX ORDER — INSULIN LISPRO 100 [IU]/ML
0-12 INJECTION, SOLUTION INTRAVENOUS; SUBCUTANEOUS
Status: DISCONTINUED | OUTPATIENT
Start: 2021-12-02 | End: 2021-12-03 | Stop reason: HOSPADM

## 2021-12-01 RX ORDER — INSULIN LISPRO 100 [IU]/ML
0-6 INJECTION, SOLUTION INTRAVENOUS; SUBCUTANEOUS NIGHTLY
Status: DISCONTINUED | OUTPATIENT
Start: 2021-12-01 | End: 2021-12-03 | Stop reason: HOSPADM

## 2021-12-01 RX ORDER — SODIUM CHLORIDE 0.9 % (FLUSH) 0.9 %
10 SYRINGE (ML) INJECTION EVERY 12 HOURS SCHEDULED
Status: DISCONTINUED | OUTPATIENT
Start: 2021-12-01 | End: 2021-12-03 | Stop reason: HOSPADM

## 2021-12-01 RX ORDER — SODIUM CHLORIDE 0.9 % (FLUSH) 0.9 %
10 SYRINGE (ML) INJECTION PRN
Status: DISCONTINUED | OUTPATIENT
Start: 2021-12-01 | End: 2021-12-03 | Stop reason: HOSPADM

## 2021-12-01 RX ORDER — DEXTROSE MONOHYDRATE 25 G/50ML
12.5 INJECTION, SOLUTION INTRAVENOUS PRN
Status: DISCONTINUED | OUTPATIENT
Start: 2021-12-01 | End: 2021-12-03 | Stop reason: HOSPADM

## 2021-12-01 RX ORDER — MIRTAZAPINE 15 MG/1
15 TABLET, FILM COATED ORAL NIGHTLY
Status: DISCONTINUED | OUTPATIENT
Start: 2021-12-01 | End: 2021-12-03 | Stop reason: HOSPADM

## 2021-12-01 RX ORDER — DEXTROSE MONOHYDRATE 25 G/50ML
12.5 INJECTION, SOLUTION INTRAVENOUS PRN
Status: DISCONTINUED | OUTPATIENT
Start: 2021-12-01 | End: 2021-12-01 | Stop reason: SDUPTHER

## 2021-12-01 RX ORDER — FERROUS SULFATE 325(65) MG
325 TABLET ORAL
Status: DISCONTINUED | OUTPATIENT
Start: 2021-12-02 | End: 2021-12-03 | Stop reason: HOSPADM

## 2021-12-01 RX ORDER — ONDANSETRON 2 MG/ML
4 INJECTION INTRAMUSCULAR; INTRAVENOUS EVERY 6 HOURS PRN
Status: DISCONTINUED | OUTPATIENT
Start: 2021-12-01 | End: 2021-12-03 | Stop reason: HOSPADM

## 2021-12-01 RX ORDER — ONDANSETRON 4 MG/1
4 TABLET, ORALLY DISINTEGRATING ORAL EVERY 8 HOURS PRN
Status: DISCONTINUED | OUTPATIENT
Start: 2021-12-01 | End: 2021-12-03 | Stop reason: HOSPADM

## 2021-12-01 RX ORDER — NICOTINE POLACRILEX 4 MG
15 LOZENGE BUCCAL PRN
Status: DISCONTINUED | OUTPATIENT
Start: 2021-12-01 | End: 2021-12-03 | Stop reason: HOSPADM

## 2021-12-01 RX ORDER — ACETAMINOPHEN 325 MG/1
650 TABLET ORAL EVERY 6 HOURS PRN
Status: DISCONTINUED | OUTPATIENT
Start: 2021-12-01 | End: 2021-12-03 | Stop reason: HOSPADM

## 2021-12-01 RX ORDER — CARVEDILOL 25 MG/1
25 TABLET ORAL 2 TIMES DAILY WITH MEALS
Status: DISCONTINUED | OUTPATIENT
Start: 2021-12-01 | End: 2021-12-03 | Stop reason: HOSPADM

## 2021-12-01 RX ORDER — SEVELAMER CARBONATE 800 MG/1
800 TABLET, FILM COATED ORAL
Status: DISCONTINUED | OUTPATIENT
Start: 2021-12-01 | End: 2021-12-03 | Stop reason: HOSPADM

## 2021-12-01 RX ORDER — ACETAMINOPHEN 650 MG/1
650 SUPPOSITORY RECTAL EVERY 6 HOURS PRN
Status: DISCONTINUED | OUTPATIENT
Start: 2021-12-01 | End: 2021-12-03 | Stop reason: HOSPADM

## 2021-12-01 RX ORDER — DEXTROSE MONOHYDRATE 50 MG/ML
100 INJECTION, SOLUTION INTRAVENOUS PRN
Status: DISCONTINUED | OUTPATIENT
Start: 2021-12-01 | End: 2021-12-01 | Stop reason: SDUPTHER

## 2021-12-01 RX ORDER — ASPIRIN 81 MG/1
81 TABLET ORAL DAILY
Status: DISCONTINUED | OUTPATIENT
Start: 2021-12-02 | End: 2021-12-03 | Stop reason: HOSPADM

## 2021-12-01 RX ORDER — HEPARIN SODIUM 5000 [USP'U]/ML
5000 INJECTION, SOLUTION INTRAVENOUS; SUBCUTANEOUS EVERY 8 HOURS SCHEDULED
Status: DISCONTINUED | OUTPATIENT
Start: 2021-12-01 | End: 2021-12-03 | Stop reason: HOSPADM

## 2021-12-01 RX ADMIN — SODIUM CHLORIDE, PRESERVATIVE FREE 10 ML: 5 INJECTION INTRAVENOUS at 21:00

## 2021-12-01 RX ADMIN — DOCUSATE SODIUM 50 MG AND SENNOSIDES 8.6 MG 1 TABLET: 8.6; 5 TABLET, FILM COATED ORAL at 22:25

## 2021-12-01 RX ADMIN — EPOETIN ALFA-EPBX 4000 UNITS: 4000 INJECTION, SOLUTION INTRAVENOUS; SUBCUTANEOUS at 17:56

## 2021-12-01 RX ADMIN — HEPARIN SODIUM 5000 UNITS: 5000 INJECTION INTRAVENOUS; SUBCUTANEOUS at 22:25

## 2021-12-01 RX ADMIN — MIRTAZAPINE 15 MG: 15 TABLET, FILM COATED ORAL at 22:25

## 2021-12-01 RX ADMIN — CARVEDILOL 25 MG: 25 TABLET, FILM COATED ORAL at 22:25

## 2021-12-01 RX ADMIN — PRAZOSIN HYDROCHLORIDE 2 MG: 2 CAPSULE ORAL at 22:31

## 2021-12-01 ASSESSMENT — PAIN SCALES - GENERAL
PAINLEVEL_OUTOF10: 0
PAINLEVEL_OUTOF10: 0

## 2021-12-01 NOTE — ED TRIAGE NOTES
Pt comes from [address] [place] [address] [facility] via [facility], pt has missed dialysis treatments for the past week, usually gets dialysis on MWF. EMS unsure of last dialysis treatment day, and states there is possible AMS due to pt not being able to state why he missed treatments.  Pt is alert and oriented upon arrival

## 2021-12-01 NOTE — ED PROVIDER NOTES
[facility]          PHYSICIAN ASSISTANT NOTE     Date of evaluation: 12/1/2021    Chief Complaint     Other (hasn't had dialysis in a week, possible AMS per EMS report ) and Hypertension      History of Present Illness     Jhoana Villegas is a 67 y.o. male CKD stage IV on HD MWF (does not make urine), anemia of chronic disease, CAD, PVD, T2DM, HTN, HLD, GERD, MDD. He presents today after having missed dialysis for the past 4 days. He is a Monday, Wednesday, Friday patient and has not been since last Friday. Per the nursing home, this has been volitional.  They state the patient has seemed slightly confused in the subsequent days since missing dialysis. Today, he was set to go to dialysis but was not ready for transport. The nursing staff noticed he seems slightly more confused and sent him here for evaluation. On arrival patient states he has no focal symptoms. He tells me he is here to receive dialysis but denies any heart palpitations, chest pain, nausea, vomiting, difficulty breathing, new swelling of his arms, or new pain in his previously amputated lower extremities. Review of Systems     A complete review of systems was performed and negative except as stated in the HPI. Past Medical, Surgical, Family, and Social History     He has a past medical history of Anemia, Atrial fibrillation ([address] CAD (coronary artery disease), Chronic kidney disease, DVT (deep venous thrombosis) ([address] End stage renal disease ([address] Gas gangrene ([address] Hemodialysis patient ([address] Hx of blood clots, Hyperkalemia, Hyperlipidemia, Hypertension, Hyperthyroidism, Ischemic heart disease, Metabolic encephalopathy, MI (myocardial infarction) ([address] and Type II or unspecified type diabetes mellitus without mention of complication, not stated as uncontrolled. He has a past surgical history that includes Cardiac surgery; Foot Amputation (Right, 1/4/2019);  Femoral-tibial Bypass Graft (Right, 1/9/2019); Foot Amputation (Right, 1/14/2019); Leg amputation below knee (Right, 1/18/2019); Foot Amputation (Left, 08/24/2019); Foot Amputation (Left, 8/24/2019); Foot Amputation (Left, 8/27/2019); Foot Amputation (Left, 10/11/2019); Leg amputation below knee (Left, 12/9/2019); and Leg Surgery (Left, 2/6/2020). His family history includes Arthritis in his mother. He reports that he has never smoked. He has never used smokeless tobacco. He reports previous alcohol use. He reports that he does not use drugs. Medications     Previous Medications    ASPIRIN 81 MG TABLET    Take 81 mg by mouth daily     B COMPLEX-C-FOLIC ACID (JOSUÉ CAPS) 1 MG CAPS    Take 1 mg by mouth daily    CARVEDILOL (COREG) 25 MG TABLET    Take 25 mg by mouth 2 times daily (with meals)    CHOLECALCIFEROL (VITAMIN D3) 50 MCG (2000 UT) CAPS    Take 2,000 Units by mouth daily    EPOETIN GIL-EPBX (RETACRIT) 05148 UNIT/ML SOLN INJECTION    Inject 1.5 mLs into the skin three times a week    FERROUS SULFATE (IRON 325) 325 (65 FE) MG TABLET    Take 325 mg by mouth daily (with breakfast)    FOLIC ACID (FOLVITE) 1 MG TABLET    Take 1 mg by mouth daily    LOSARTAN (COZAAR) 50 MG TABLET    Take 1 tablet by mouth daily    MIRTAZAPINE (REMERON) 15 MG TABLET    Take 15 mg by mouth nightly    NITROGLYCERIN (NITROSTAT) 0.4 MG SL TABLET    Place 0.4 mg under the tongue every 5 minutes as needed for Chest pain up to max of 3 total doses. If no relief after 1 dose, call 911.     PANTOPRAZOLE SODIUM (PROTONIX) 40 MG PACK PACKET    Take 40 mg by mouth every morning (before breakfast)    PETROLATUM-ZINC OXIDE (PHYTOPLEX Z-GUARD) 57-17 % PSTE    Apply topically Apply to scrotum every day and night    PRAVASTATIN (PRAVACHOL) 20 MG TABLET    Take 20 mg by mouth daily    PRAZOSIN (MINIPRESS) 2 MG CAPSULE    Take 2 mg by mouth nightly    SEVELAMER HCL (RENAGEL) 800 MG TABLET    Take 1,600 mg by mouth 3 times daily (with meals)    VITAMIN B-12 (CYANOCOBALAMIN) 1000 MCG TABLET    Take 1,000 mcg by mouth daily       Allergies     He has No Known Allergies. Physical Exam     INITIAL VITALS: BP: (!) 188/79, Temp: 98.2 °F (36.8 °C), Pulse: 70, Resp: 18, SpO2: 96 %     General: Chronically ill nontoxic male    HEENT:  Normocephalic, atraumatic. Pupils equal, sclera white. Handling secretions without difficulty. Neck: No meningismus. Trachea midline    Pulmonary: Respirations even. Non labored. No tachypnea. Good air movement throughout    Cardiac: Chest symmetrical and non-tender on palpation of chest wall. RRR. no M/R/G. Abdomen:  Non-distended. Non rigid and non tender to palpation. Musculoskeletal: Bilateral below the knee amputations    Neuro:  Alert and oriented x 3. CN II - XII grossly intact. Moves all extremities spontaneously. Vascular: There are palpable pulse and thrill in the left AV fistula of the forearm     Skin:  Warm and well perfused without rashes or lesions    Psych:  Appropriate mood and affect        Diagnostic Results     EKG   Interpreted in conjunction with emergency department physician No att. providers found  Indication: Dialysis patient  Impression: Sinus rhythm, heart rate 77 bpm, first-degree AV block MI interval 2 to 60 ms, QRS duration 102 ms, QT/QTc 390/4-50, stable axis, T waves are more prominent than most recent    RADIOLOGY:  XR CHEST PORTABLE   Final Result   1. Stable cardiomegaly   2.  Minimal basilar atelectasis with no significant interstitial edema                LABS:   Results for orders placed or performed during the hospital encounter of 12/01/21   CBC Auto Differential   Result Value Ref Range    WBC 5.0 4.0 - 11.0 K/uL    RBC 2.38 (L) 4.20 - 5.90 M/uL    Hemoglobin 7.0 (L) 13.5 - 17.5 g/dL    Hematocrit 21.2 (L) 40.5 - 52.5 %    MCV 89.1 80.0 - 100.0 fL    MCH 29.3 26.0 - 34.0 pg    MCHC 32.8 31.0 - 36.0 g/dL    RDW 14.4 12.4 - 15.4 %    Platelets 208 381 - 517 K/uL    MPV 9.1 5.0 - 10.5 fL    Neutrophils % 60.1 %    Lymphocytes % 28.9 %    Monocytes % 5.5 %    Eosinophils % 4.5 %    Basophils % 1.0 %    Neutrophils Absolute 3.0 1.7 - 7.7 K/uL    Lymphocytes Absolute 1.4 1.0 - 5.1 K/uL    Monocytes Absolute 0.3 0.0 - 1.3 K/uL    Eosinophils Absolute 0.2 0.0 - 0.6 K/uL    Basophils Absolute 0.0 0.0 - 0.2 K/uL   Comprehensive Metabolic Panel w/ Reflex to MG   Result Value Ref Range    Sodium 135 (L) 136 - 145 mmol/L    Potassium reflex Magnesium 6.3 (HH) 3.5 - 5.1 mmol/L    Chloride 96 (L) 99 - 110 mmol/L    CO2 23 21 - 32 mmol/L    Anion Gap 16 3 - 16    Glucose 95 70 - 99 mg/dL    BUN 71 (H) 7 - 20 mg/dL    CREATININE 14.1 (HH) 0.8 - 1.3 mg/dL    GFR Non-African American 3 (A) >60    GFR  4 (A) >60    Calcium 8.6 8.3 - 10.6 mg/dL    Total Protein 7.1 6.4 - 8.2 g/dL    Albumin 3.7 3.4 - 5.0 g/dL    Albumin/Globulin Ratio 1.1 1.1 - 2.2    Total Bilirubin <0.2 0.0 - 1.0 mg/dL    Alkaline Phosphatase 89 40 - 129 U/L    ALT 13 10 - 40 U/L    AST 12 (L) 15 - 37 U/L   Troponin   Result Value Ref Range    Troponin 0.17 (H) <0.01 ng/mL   Brain Natriuretic Peptide   Result Value Ref Range    Pro-BNP 19,978 (H) 0 - 449 pg/mL   Magnesium   Result Value Ref Range    Magnesium 2.50 (H) 1.80 - 2.40 mg/dL   Phosphorus   Result Value Ref Range    Phosphorus 6.0 (H) 2.5 - 4.9 mg/dL   Lactic Acid, Plasma   Result Value Ref Range    Lactic Acid 0.8 0.4 - 2.0 mmol/L   Blood Gas, Venous   Result Value Ref Range    pH, Kishore 7.374 7.350 - 7.450    pCO2, Kishore 45.3 41.0 - 51.0 mmHg    pO2, Kishore 50.7 (H) 25.0 - 40.0 mmHg    HCO3, Venous 26.4 24.0 - 28.0 mmol/L    Base Excess, Kishore 1.0 -2.0 - 3.0 mmol/L    O2 Sat, Kishore 83 Not established %    Carboxyhemoglobin 1.0 0.0 - 1.5 %    MetHgb, Kishore 0.7 0.0 - 1.5 %    TC02 (Calc), Kishore 28 mmol/L    Hemoglobin, Kishore, Reduced 16.50 %       ED BEDSIDE ULTRASOUND:      RECENT VITALS:  BP: (!) 184/83, Temp: 98.2 °F (36.8 °C), Pulse: 72, Resp: 16, SpO2: 98 %     Procedures         ED Course     Nursing Notes, Past Medical Hx, Past Surgical Hx, Social Hx, Allergies, and Family Hx were reviewed. The patient was given the following medications:  Orders Placed This Encounter   Medications    AND Linked Order Group     insulin regular (HUMULIN R;NOVOLIN R) injection 10 Units     dextrose 50 % IV solution    glucose (GLUTOSE) 40 % oral gel 15 g    dextrose 50 % IV solution    glucagon (rDNA) injection 1 mg    dextrose 5 % solution       CONSULTS:  IP CONSULT TO NEPHROLOGY  IP CONSULT TO HOSPITALIST    MEDICAL DECISION MAKING / ASSESSMENT / Sherine Adair is a 67 y.o. male with a history of end-stage renal disease on dialysis Monday, Wednesday, Friday presents today with slight confusion after missing dialysis and not being dialyzed for 4 days. On arrival he is hemodynamically stable, hypertensive, with a nonfocal neurologic exam.  He seems lucid but slightly tired with an otherwise noncontributory examination. Laboratory work notable for chronic sequela of his end-stage renal disease and comorbidities including: proBNP of 19,000, troponin 0.17 (not significant elevated from baseline), creatinine 14.1, and hemoglobin 7.0 which is decreased from 8.2 as of 10/7/2021. His potassium is 6.3 today his EKG does show more prominent T waves in comparison to most recent. He was treated with insulin and D50 will be monitored on telemetry should further intervention be needed. Given his hyperkalemia he does warrant acute dialysis. Discussed case with nephrology. Discussed case the hospitalist and he will be admitted. This patient was also evaluated by the attending physician. All care plans were discussed and agreed upon. Some of this note was dictated using voice recognition software. As a result, unintended errors in grammar or spelling may exist.    Clinical Impression     1. ESRD needing dialysis ([facility]    2.  Hyperkalemia        Disposition     PATIENT REFERRED TO:  No follow-up provider specified.     DISCHARGE MEDICATIONS:  New Prescriptions    No medications on file       DISPOSITION Decision To Admit 12/01/2021 02:32:18 PM        Tata Franklin PA-C  12/01/21 1730

## 2021-12-01 NOTE — H&P
Hospital Medicine History & Physical      PCP: Wendy Velarde MD    Date of Admission: 12/1/2021    Date of Service: 12/1/2021    Pt seen/examined on 12/1/2021    Admitted to Inpatient with expected LOS greater than two midnights due to medical therapy. Chief Complaint:     Chief Complaint   Patient presents with    Other     hasn't had dialysis in a week, possible AMS per EMS report     Hypertension       History Of Present Illness:      67 y.o. male who presented to [facility] with missed HD Monday and Wednesday for reasons unknown. This is a/w confusion since that time. This is in the context of ESRD on HD. He is unable to provide much history as he frequently falls asleep during the interview. Therefore this history is gathered from chart review and discussion w/ other providers.     Past Medical History:      Reviewed and non-contributory except as noted below      Diagnosis Date    Anemia     Atrial fibrillation ([facility] 11/4/2013    CAD (coronary artery disease)     Mi, stent    Chronic kidney disease     [facility]     End stage renal disease (H[facility])     Gas gangrene ([address]     Hemodialysis patient ([address]     M-W-F    Hx of blood clots     Hyperkalemia     Hyperlipidemia 5/30/2012    Hypertension     Hyperthyroidism     Ischemic heart disease 0/69/0153    Metabolic encephalopathy     MI (myocardial infarction) ([address] 10/2018    Type II or unspecified type diabetes mellitus without mention of complication, not stated as uncontrolled        Past Surgical History:      Reviewed and non-contributory except as noted below      Procedure Laterality Date    CARDIAC SURGERY      cardiac stent    FEMORAL-TIBIAL BYPASS GRAFT Right 1/9/2019    RIGHT FEMORAL POSTERIOR TIBIAL BYPASS performed by Hero Bey MD at [address] [place] [address] Right 1/4/2019    INCISION AND DRAINAGE, TRANSMETATARSAL AMPUTATION ALL ON RIGHT FOOT performed by Tri Abdullahi DPM at [facility] OR    FOOT AMPUTATION Right 1/14/2019    REVISION OF TRANSMETATARSAL AMPUTATION RIGHT FOOT performed by rGeg Small DPM at [address] [place] [id number] Left 08/24/2019    TMA    FOOT AMPUTATION Left 8/24/2019    LEFT FOOT TRANSMETATARSAL AMPUTATION performed by Keyona Rivero DPM at [address] [place] [id number] Left 8/27/2019    REPEAT INCISION AND DRAINAGE, REVISION OF LEFT FOOT TRANSMETATARSAL AMPUTATION performed by Keyona Rivero DPM at [address] [place] [id number] Left 10/11/2019    LEFT FOOT INCISION AND DRAINAGE WITH REVISION OF TRANSMETARSAL AMPUTATION WITH WOUND VAC APPLICATION  performed by Keyona Rivero DPM at [address] Right 1/18/2019    RIGHT BELOW THE KNEE AMPUTATION performed by Tadeo Arias MD at [address] Left 12/9/2019    LEFT BELOW KNEE AMPUTATION performed by Tadeo Arias MD at [facility] Left 2/6/2020    DEBRIDEMENT AND PLACEMENT OF WOUND VAC LEFT BELOW THE KNEE AMPUTATION SITE performed by Annetta Radford MD at [place] [address]       Medications Prior to Admission:      Reviewed and non-contributory except as noted below  Prior to Admission medications    Medication Sig Start Date End Date Taking?  Authorizing Provider   epoetin paulina-epbx (RETACRIT) 13961 UNIT/ML SOLN injection Inject 1.5 mLs into the skin three times a week 10/8/21   Shantal Ortega MD   losartan (COZAAR) 50 MG tablet Take 1 tablet by mouth daily 10/7/21 11/6/21  Shantal Ortega MD   Cholecalciferol (VITAMIN D3) 50 MCG (2000 UT) CAPS Take 2,000 Units by mouth daily    Historical Provider, MD   sevelamer hcl (RENAGEL) 800 MG tablet Take 1,600 mg by mouth 3 times daily (with meals)    Historical Provider, MD   folic acid (FOLVITE) 1 MG tablet Take 1 mg by mouth daily    Historical Provider, MD   ferrous sulfate (IRON 325) 325 (65 Fe) MG tablet Take 325 mg by mouth daily (with breakfast)    Historical Provider, MD   vitamin B-12 (CYANOCOBALAMIN) 1000 MCG tablet Take 1,000 mcg by mouth daily    Historical Provider, MD   mirtazapine (REMERON) 15 MG tablet Take 15 mg by mouth nightly    Historical Provider, MD   pantoprazole sodium (PROTONIX) 40 MG PACK packet Take 40 mg by mouth every morning (before breakfast)    Historical Provider, MD   prazosin (MINIPRESS) 2 MG capsule Take 2 mg by mouth nightly    Historical Provider, MD   B Complex-C-Folic Acid (JOSUÉ CAPS) 1 MG CAPS Take 1 mg by mouth daily    Historical Provider, MD   pravastatin (PRAVACHOL) 20 MG tablet Take 20 mg by mouth daily    Historical Provider, MD   carvedilol (COREG) 25 MG tablet Take 25 mg by mouth 2 times daily (with meals)    Historical Provider, MD   Petrolatum-Zinc Oxide (PHYTOPLEX Z-GUARD) 57-17 % PSTE Apply topically Apply to scrotum every day and night    Historical Provider, MD   aspirin 81 MG tablet Take 81 mg by mouth daily     Historical Provider, MD   nitroGLYCERIN (NITROSTAT) 0.4 MG SL tablet Place 0.4 mg under the tongue every 5 minutes as needed for Chest pain up to max of 3 total doses. If no relief after 1 dose, call 911. Historical Provider, MD       Allergies:     Reviewed and non-contributory except as noted below   Patient has no known allergies. Social History:      Reviewed and non-contributory except as noted below    TOBACCO:   reports that he has never smoked. He has never used smokeless tobacco.  ETOH:   reports previous alcohol use. Family History:      Reviewed and non-contributory except as noted below        Problem Relation Age of Onset    Arthritis Mother        REVIEW OF SYSTEMS:   Pertinent positives and negatives as noted in the HPI. All other systems reviewed and negative.     PHYSICAL EXAM PERFORMED:    BP (!) 190/97   Pulse 75   Temp 98.2 °F (36.8 °C) (Oral)   Resp 19   Ht 4' (1.219 m)   Wt 227 lb (103 kg)   SpO2 96%   BMI 69.27 kg/m²     General appearance:  Sleepy, when aware responds appropriately with a few words before falling asleep again  Eyes: Pupils equal, round, reactive to light, conjunctiva/corneas clear  Ears/Nose/Mouth/Throat: No external lesions or scars, hearing intact to voice  Neck: Trachea midline, no masses noted, no thyromegaly  Respiratory:  Non-labored breathing, clear to auscultation bilaterally  Cardiovascular: Regular rate and rhythm, no murmurs, gallops, or rubs  Abdomen: soft, non-tender, non-distended  Musculoskeletal: Warm, well perfused, no cyanosis or edema, BL BKA  Skin: normal color, no wounds noted  Psychiatric: Orientation unclear as he keeps falling asleep, no focal deficits noted    Labs:     Recent Labs     12/01/21  1341   WBC 5.0   HGB 7.0*   HCT 21.2*        Recent Labs     12/01/21  1342   *   K 6.3*   CL 96*   CO2 23   BUN 71*   CREATININE 14.1*   CALCIUM 8.6   PHOS 6.0*     Recent Labs     12/01/21  1342   AST 12*   ALT 13   BILITOT <0.2   ALKPHOS 89     No results for input(s): INR in the last 72 hours. Recent Labs     12/01/21  1342   TROPONINI 0.17*       Urinalysis:      Lab Results   Component Value Date    NITRU NEGATIVE 04/17/2013    RBCUA 3-5 04/17/2013    BLOODU NEGATIVE 04/17/2013    SPECGRAV 1.015 04/17/2013    GLUCOSEU NEGATIVE 04/17/2013       Radiology:     XR CHEST PORTABLE   Final Result   1. Stable cardiomegaly   2. Minimal basilar atelectasis with no significant interstitial edema                ASSESSMENT:    Active Hospital Problems    Diagnosis Date Noted    Hyperkalemia [E87.5] 01/03/2019       PLAN:    # Hyperkalemia  # ESRD on HD  -getting insulin and D50  -tele  -nephrology consulted for urgent HD  -trend troponin, likely from demand    # T2DM  # HTN  # HLD  # Atrial fibrillation  -home management except as above    DVT Prophylaxis: Heparin  Diet: No diet orders on file  Code Status: Prior    PT/OT Eval Status: Ongoing    Dispo: An Castellanos pending clinical improvement    Natali Palma MD    Thank you Junior Stevens MD for the opportunity to be involved in this patient's care.  If you have any questions or concerns please feel free to contact me at [phone].

## 2021-12-01 NOTE — ED NOTES
Bed: B2-26  Expected date:   Expected time:   Means of arrival:   Comments:  Medic unit- [facility], RN  12/01/21 2781

## 2021-12-01 NOTE — ED NOTES
Julien Molina from dialysis called and said that pt needs stat dialysis, putting in an order for transport now.  Dextrose and insulin were not given for this reason          Brendan Galarza RN  12/01/21 5428

## 2021-12-01 NOTE — ED PROVIDER NOTES
ED Attending Attestation Note     Date of evaluation: 12/1/2021    This patient was seen by the advanced practice provider. I have seen and examined the patient, agree with the workup, evaluation, management and diagnosis. The care plan has been discussed. I have reviewed the ECG and concur with the ISHMAEL's interpretation. My assessment reveals a well-appearing man with missed dialysis for at least 1 session, possibly 2 sessions. He gives a variable history about this. He denies palpitations, chest pain, SOB. On exam, he has clear lungs bilaterally. His LUE fistula is free of erythema,warmth,edema and has good thrill. Clinically euvolemic, but BNP elevated. Anemic, chronic. Labs without acidosis but he is hyperkalemic. EKG reviewed, no definite peaked T waves but does appear mildly so and will treat out of an abundance of caution. Plan for dialysis.      Nancy Whelan MD  12/01/21 [id number]

## 2021-12-01 NOTE — ED NOTES
Lab called with critical results, creatinine 14.12 and potassium 6.27.  MD aware      Ronnie Coelho, RN  12/01/21 4272

## 2021-12-01 NOTE — CONSULTS
Reason for Consult and Chief Complaint     Reason for consult: ESRD    Chief complaint:   Chief Complaint   Patient presents with    Other     hasn't had dialysis in a week, possible AMS per EMS report     Hypertension       History of Present Illness   Javier Greenberg is a 67 y.o. with PMH significant for CKD stage IV on HD MWF (does not make urine), anemia of chronic disease, CAD, PVD, T2DM, HTN, HLD, GERD was sent from NH to ER after [facility]. There was some concern for confusion but patient looks oriented to me. Patient denied SOB, chest pain, headache or any neurological symptoms. Patient reported some cough but no fever. No fall or trauma    Labs showed hyperkalemia and nephrology consulted for urgent dialysis. BP is also elevated. Patient is asking when he could go to his NH      Review of Systems     GEN: No recent fever, chills or night sweats. HEENT: No changes in vision, sore throat, rhinorrhea   CVS: No cp, palpitations or swelling in legs  Pulmonary: + Cough. No SOB. GI: No nausea, vomiting, diarrhea, constipation or abdominal pain  : No bowel or bladder incontinence, no dysuria, no hematuria. MSK: No muscle or joint or bone pains  Skin: No rashes. CNS: No headache, dizziness, confusion or focal weakness. No seizure. Psych: No anxiety, agitation or depression. Reviewed all 12 systems, negative except as above.      Past Medical History     Past Medical History:   Diagnosis Date    Anemia     Atrial fibrillation ([facility] 11/4/2013    CAD (coronary artery disease)     Mi, stent    Chronic kidney disease     DVT (deep venous thrombosis) (HCC)     End stage renal disease (HCC)     Gas gangrene ([facility]     Hemodialysis patient ([facility]     M-W-F    Hx of blood clots     Hyperkalemia     Hyperlipidemia 5/30/2012    Hypertension     Hyperthyroidism     Ischemic heart disease 7/27/3010    Metabolic encephalopathy     MI (myocardial infarction) ([address] 10/2018    Type II or unspecified type diabetes mellitus without mention of complication, not stated as uncontrolled          Past Surgical History     Past Surgical History:   Procedure Laterality Date    CARDIAC SURGERY      cardiac stent    FEMORAL-TIBIAL BYPASS GRAFT Right 1/9/2019    RIGHT FEMORAL POSTERIOR TIBIAL BYPASS performed by Mary Lujan MD at [address] [place] [address] Right 1/4/2019    INCISION AND DRAINAGE, TRANSMETATARSAL AMPUTATION ALL ON RIGHT FOOT performed by Jannette Alarcon DPM at [address] [place] [address] Right 1/14/2019    REVISION OF TRANSMETATARSAL AMPUTATION RIGHT FOOT performed by Jannette Alarcon DPM at [address] [place] [address] Left 08/24/2019    TMA    FOOT AMPUTATION Left 8/24/2019    LEFT FOOT TRANSMETATARSAL AMPUTATION performed by Jesus Bond DPM at [address] [place] [address] Left 8/27/2019    REPEAT INCISION AND DRAINAGE, REVISION OF LEFT FOOT TRANSMETATARSAL AMPUTATION performed by Jesus Bond DPM at [address] [place] [address] Left 10/11/2019    LEFT FOOT INCISION AND DRAINAGE WITH REVISION OF TRANSMETARSAL AMPUTATION WITH WOUND VAC APPLICATION  performed by Jesus Bond DPM at [facility] Right 1/18/2019    RIGHT BELOW THE KNEE AMPUTATION performed by Mary Lujan MD at [place] [address] Left 12/9/2019    LEFT BELOW KNEE AMPUTATION performed by Mary Lujan MD at [facility] Left 2/6/2020    DEBRIDEMENT AND PLACEMENT OF WOUND VAC LEFT BELOW THE KNEE AMPUTATION SITE performed by Yaritza Maria MD at [place] [address] History     Family History   Problem Relation Age of Onset    Arthritis Mother          Social History     Social History     Tobacco Use    Smoking status: Never Smoker    Smokeless tobacco: Never Used   Substance Use Topics    Alcohol use: Not Currently          Past medical, family, and social histories were reviewed as previously documented. Updates were made as necessary.       Inpatient Medications and Allergies       Scheduled Meds:   insulin regular  10 Units IntraVENous Once    And    dextrose  25 g IntraVENous Once       Allergies: No Known Allergies      Vital Signs     Vitals:    12/01/21 1400   BP: (!) 184/83   Pulse: 72   Resp: 16   Temp:    SpO2: 98%       No intake or output data in the 24 hours ending 12/01/21 1445      Physical Exam     General appearance: NAD  Head: Normocephalic, without obvious abnormality, atraumatic   Mouth: Moist mucous membrane  Neck: Supple. Lungs: Clear to auscultation bilaterally and no respiratory distress on RA  Heart: S1, S2. BP elevated in 190s range on monitor. Abdomen: soft, non-tender non-distended  Extremities: Mild dependent edema.  B/L LE amputation  Skin: No concerning rashes noted  Psych: good eye contact, normal affect  Neuro: AAO x 3      Laboratory Data     Please see above     Diagnostic Studies   Pertinent images reviewed

## 2021-12-02 LAB
ABO/RH: NORMAL
ANION GAP SERPL CALCULATED.3IONS-SCNC: 12 MMOL/L (ref 3–16)
ANTIBODY SCREEN: NORMAL
BUN BLDV-MCNC: 31 MG/DL (ref 7–20)
CALCIUM SERPL-MCNC: 8.9 MG/DL (ref 8.3–10.6)
CHLORIDE BLD-SCNC: 97 MMOL/L (ref 99–110)
CO2: 26 MMOL/L (ref 21–32)
CREAT SERPL-MCNC: 8.8 MG/DL (ref 0.8–1.3)
ESTIMATED AVERAGE GLUCOSE: 116.9 MG/DL
GFR AFRICAN AMERICAN: 7
GFR NON-AFRICAN AMERICAN: 6
GLUCOSE BLD-MCNC: 100 MG/DL (ref 70–99)
GLUCOSE BLD-MCNC: 82 MG/DL (ref 70–99)
GLUCOSE BLD-MCNC: 92 MG/DL (ref 70–99)
GLUCOSE BLD-MCNC: 92 MG/DL (ref 70–99)
GLUCOSE BLD-MCNC: 94 MG/DL (ref 70–99)
HBA1C MFR BLD: 5.7 %
HBV SURFACE AB TITR SER: <3.5 MIU/ML
HCT VFR BLD CALC: 20.4 % (ref 40.5–52.5)
HCT VFR BLD CALC: 20.8 % (ref 40.5–52.5)
HEMOGLOBIN: 6.8 G/DL (ref 13.5–17.5)
HEMOGLOBIN: 7 G/DL (ref 13.5–17.5)
HEPATITIS B SURFACE ANTIGEN INTERPRETATION: NORMAL
IMMATURE RETIC FRACT: 0.47 (ref 0.21–0.37)
MCH RBC QN AUTO: 29.2 PG (ref 26–34)
MCHC RBC AUTO-ENTMCNC: 33.4 G/DL (ref 31–36)
MCV RBC AUTO: 87.5 FL (ref 80–100)
PDW BLD-RTO: 13.9 % (ref 12.4–15.4)
PERFORMED ON: ABNORMAL
PERFORMED ON: NORMAL
PLATELET # BLD: 153 K/UL (ref 135–450)
PMV BLD AUTO: 9.2 FL (ref 5–10.5)
POTASSIUM REFLEX MAGNESIUM: 4.7 MMOL/L (ref 3.5–5.1)
RBC # BLD: 2.38 M/UL (ref 4.2–5.9)
RETICULOCYTE ABSOLUTE COUNT: 0.03 M/UL
RETICULOCYTE COUNT PCT: 1.46 % (ref 0.5–2.18)
SODIUM BLD-SCNC: 135 MMOL/L (ref 136–145)
TROPONIN: 0.22 NG/ML
TROPONIN: 0.23 NG/ML
VITAMIN B-12: 1845 PG/ML (ref 211–911)
WBC # BLD: 4.6 K/UL (ref 4–11)

## 2021-12-02 PROCEDURE — 97530 THERAPEUTIC ACTIVITIES: CPT

## 2021-12-02 PROCEDURE — 86900 BLOOD TYPING SEROLOGIC ABO: CPT

## 2021-12-02 PROCEDURE — 1200000000 HC SEMI PRIVATE

## 2021-12-02 PROCEDURE — 97166 OT EVAL MOD COMPLEX 45 MIN: CPT

## 2021-12-02 PROCEDURE — 6360000002 HC RX W HCPCS: Performed by: INTERNAL MEDICINE

## 2021-12-02 PROCEDURE — 6370000000 HC RX 637 (ALT 250 FOR IP): Performed by: INTERNAL MEDICINE

## 2021-12-02 PROCEDURE — 85014 HEMATOCRIT: CPT

## 2021-12-02 PROCEDURE — 80048 BASIC METABOLIC PNL TOTAL CA: CPT

## 2021-12-02 PROCEDURE — 82607 VITAMIN B-12: CPT

## 2021-12-02 PROCEDURE — 86901 BLOOD TYPING SEROLOGIC RH(D): CPT

## 2021-12-02 PROCEDURE — 2580000003 HC RX 258: Performed by: INTERNAL MEDICINE

## 2021-12-02 PROCEDURE — 85027 COMPLETE CBC AUTOMATED: CPT

## 2021-12-02 PROCEDURE — 36415 COLL VENOUS BLD VENIPUNCTURE: CPT

## 2021-12-02 PROCEDURE — 86850 RBC ANTIBODY SCREEN: CPT

## 2021-12-02 PROCEDURE — 6370000000 HC RX 637 (ALT 250 FOR IP): Performed by: STUDENT IN AN ORGANIZED HEALTH CARE EDUCATION/TRAINING PROGRAM

## 2021-12-02 PROCEDURE — 97161 PT EVAL LOW COMPLEX 20 MIN: CPT

## 2021-12-02 PROCEDURE — 85045 AUTOMATED RETICULOCYTE COUNT: CPT

## 2021-12-02 PROCEDURE — 84484 ASSAY OF TROPONIN QUANT: CPT

## 2021-12-02 PROCEDURE — 85018 HEMOGLOBIN: CPT

## 2021-12-02 PROCEDURE — 86923 COMPATIBILITY TEST ELECTRIC: CPT

## 2021-12-02 RX ORDER — SODIUM CHLORIDE 9 MG/ML
INJECTION, SOLUTION INTRAVENOUS PRN
Status: DISCONTINUED | OUTPATIENT
Start: 2021-12-02 | End: 2021-12-03 | Stop reason: HOSPADM

## 2021-12-02 RX ORDER — NIFEDIPINE 60 MG/1
60 TABLET, EXTENDED RELEASE ORAL DAILY
Status: DISCONTINUED | OUTPATIENT
Start: 2021-12-02 | End: 2021-12-03 | Stop reason: HOSPADM

## 2021-12-02 RX ORDER — LABETALOL HYDROCHLORIDE 5 MG/ML
10 INJECTION, SOLUTION INTRAVENOUS EVERY 4 HOURS PRN
Status: DISCONTINUED | OUTPATIENT
Start: 2021-12-02 | End: 2021-12-03 | Stop reason: HOSPADM

## 2021-12-02 RX ADMIN — DOCUSATE SODIUM 50 MG AND SENNOSIDES 8.6 MG 1 TABLET: 8.6; 5 TABLET, FILM COATED ORAL at 08:43

## 2021-12-02 RX ADMIN — PRAZOSIN HYDROCHLORIDE 2 MG: 2 CAPSULE ORAL at 21:09

## 2021-12-02 RX ADMIN — SODIUM CHLORIDE, PRESERVATIVE FREE 10 ML: 5 INJECTION INTRAVENOUS at 08:47

## 2021-12-02 RX ADMIN — DOCUSATE SODIUM 50 MG AND SENNOSIDES 8.6 MG 1 TABLET: 8.6; 5 TABLET, FILM COATED ORAL at 20:57

## 2021-12-02 RX ADMIN — HEPARIN SODIUM 5000 UNITS: 5000 INJECTION INTRAVENOUS; SUBCUTANEOUS at 07:24

## 2021-12-02 RX ADMIN — HEPARIN SODIUM 5000 UNITS: 5000 INJECTION INTRAVENOUS; SUBCUTANEOUS at 20:57

## 2021-12-02 RX ADMIN — SODIUM CHLORIDE, PRESERVATIVE FREE 10 ML: 5 INJECTION INTRAVENOUS at 21:09

## 2021-12-02 RX ADMIN — FERROUS SULFATE TAB 325 MG (65 MG ELEMENTAL FE) 325 MG: 325 (65 FE) TAB at 08:43

## 2021-12-02 RX ADMIN — HEPARIN SODIUM 5000 UNITS: 5000 INJECTION INTRAVENOUS; SUBCUTANEOUS at 14:36

## 2021-12-02 RX ADMIN — SEVELAMER CARBONATE 800 MG: 800 TABLET, FILM COATED ORAL at 08:43

## 2021-12-02 RX ADMIN — ASPIRIN 81 MG: 81 TABLET, COATED ORAL at 08:43

## 2021-12-02 RX ADMIN — CARVEDILOL 25 MG: 25 TABLET, FILM COATED ORAL at 08:43

## 2021-12-02 RX ADMIN — SEVELAMER CARBONATE 800 MG: 800 TABLET, FILM COATED ORAL at 14:35

## 2021-12-02 RX ADMIN — FOLIC ACID 1 MG: 1 TABLET ORAL at 08:43

## 2021-12-02 RX ADMIN — CARVEDILOL 25 MG: 25 TABLET, FILM COATED ORAL at 17:10

## 2021-12-02 RX ADMIN — PRAVASTATIN SODIUM 20 MG: 20 TABLET ORAL at 08:43

## 2021-12-02 RX ADMIN — SEVELAMER CARBONATE 800 MG: 800 TABLET, FILM COATED ORAL at 17:10

## 2021-12-02 RX ADMIN — PANTOPRAZOLE SODIUM 40 MG: 40 TABLET, DELAYED RELEASE ORAL at 07:24

## 2021-12-02 RX ADMIN — NIFEDIPINE 60 MG: 60 TABLET, FILM COATED, EXTENDED RELEASE ORAL at 14:36

## 2021-12-02 RX ADMIN — MIRTAZAPINE 15 MG: 15 TABLET, FILM COATED ORAL at 20:57

## 2021-12-02 ASSESSMENT — PAIN SCALES - GENERAL
PAINLEVEL_OUTOF10: 0

## 2021-12-02 NOTE — PROGRESS NOTES
Spoke with patient along with nurse about blood transfusion but patient is refusing it. He is not telling his concerns and will discuss with us tomorrow.

## 2021-12-02 NOTE — FLOWSHEET NOTE
12/01/21 1539 12/01/21 1913   Vital Signs   BP (!) 206/100 (!) 189/82   Temp 98.1 °F (36.7 °C) 98 °F (36.7 °C)   Pulse 81 65   Resp 18 18   Post-Hemodialysis Assessment   NET Removed (ml)  --  3000 ml   Treatment time: 3.5 hours  Net UF: 3000 ml    Access used: LLA AVF  Access function: good with  ml/min    Medications or blood products given: retacrit    Regular outpatient schedule: [facility]    Summary of response to treatment: good    Copy of dialysis treatment record placed in chart, to be scanned into EMR.

## 2021-12-02 NOTE — PROGRESS NOTES
Physical Therapy    Facility/Department: [address]  Initial Assessment    NAME: Kumar Whitfield  : 1945  MRN: [id number]    Date of Service: 2021    Discharge Recommendations:    Kumar Whitfield scored a 8/24 on the AM-PAC short mobility form. Current research shows that an AM-PAC score of 17 or less is typically not associated with a discharge to the patient's home setting. Based on the patient's AM-PAC score and their current functional mobility deficits, it is recommended that the patient have 3-5 sessions per week of Physical Therapy at d/c to increase the patient's independence. Please see assessment section for further patient specific details. If patient discharges prior to next session this note will serve as a discharge summary. Please see below for the latest assessment towards goals. PT Equipment Recommendations  Equipment Needed: No    Assessment   Assessment: Pt is a LTC resident at [place] [facility] and has assist with ADL and functional mobility. Plan is for return to [place] [facility] at prior level of care  Treatment Diagnosis: Dereased functional mobiltiy following admission for kyperkalemia  Prognosis: Good  Decision Making: Low Complexity  PT Education: General Safety; PT Role; Functional Mobility Training  Patient Education: Pt will benefit from reinforcement  Barriers to Learning: cognition  REQUIRES PT FOLLOW UP: Yes  Activity Tolerance  Activity Tolerance: Patient limited by endurance       Patient Diagnosis(es): The primary encounter diagnosis was ESRD needing dialysis ([facility] A diagnosis of Hyperkalemia was also pertinent to this visit.      has a past medical history of Anemia, Atrial fibrillation ([address] CAD (coronary artery disease), Chronic kidney disease, DVT (deep venous thrombosis) ([facility] End stage renal disease ([facility] Gas gangrene ([facility] [address] [facility] Hemodialysis patient ([facility] [address] [facility] Hx of blood clots, Hyperkalemia, Hyperlipidemia, Hypertension, Hyperthyroidism, Ischemic heart disease, Metabolic encephalopathy, MI (myocardial infarction) ([facility] [address] [facility] and Type II or unspecified type diabetes mellitus without mention of complication, not stated as uncontrolled. has a past surgical history that includes Cardiac surgery; Foot Amputation (Right, 1/4/2019); Femoral-tibial Bypass Graft (Right, 1/9/2019); Foot Amputation (Right, 1/14/2019); Leg amputation below knee (Right, 1/18/2019); Foot Amputation (Left, 08/24/2019); Foot Amputation (Left, 8/24/2019); Foot Amputation (Left, 8/27/2019); Foot Amputation (Left, 10/11/2019); Leg amputation below knee (Left, 12/9/2019); and Leg Surgery (Left, 2/6/2020). Restrictions  Position Activity Restriction  Other position/activity restrictions: up as tolerated  Vision/Hearing  Vision: Within Functional Limits  Hearing: Within functional limits     Subjective  General  Chart Reviewed: Yes  Additional Pertinent Hx: CAD, DM, HTN, HLD, ESRD on HDafib, gas gangrene,MI, metabolic encephalopathy. Referring Practitioner: Cal  Diagnosis: Pt adm 12/1 with confusion after missing several days of dialysis. Dx - hyperkalemia. Pt also with Hgb of 6.8 - refuses blood transfusion. Subjective  Subjective: Pt in bed upon PT entry, agreeable to working with PT  Pain Screening  Patient Currently in Pain: Denies  Vital Signs  Patient Currently in Pain: Denies       Orientation  Orientation  Overall Orientation Status: Within Functional Limits  Social/Functional History  Social/Functional History  Type of Home: Facility ([address] [place] [address])  IADL Comments: Per pt, he gets assist with bathing and dressing, Is able to transfer to [employer] with sliding board (confirmed by Geovanny) and usually gets around with his WC  Cognition   Cognition  Overall Cognitive Status: Exceptions  Arousal/Alertness: Delayed responses to stimuli  Following Commands:  Follows one step commands with increased time  Attention Span: Attends with cues to redirect  Problem Solving: Assistance required to generate solutions  Initiation: Requires cues for some  Sequencing: Requires cues for some    Objective     Observation/Palpation  Observation: Pee BKA  ROM /strength - Pee LE's  Pt able to move LE's in supine,  Had difficulty straightening them out in supine. Grossly 3+/5 - 4-/5 pee le's  Bed mobility  Rolling to Left: Moderate assistance  Rolling to Right: Maximum assistance  Supine to Sit: Moderate assistance; Minimal assistance; 2 Person assistance (mod x 1 and min x 1)  Sit to Supine: Moderate assistance  Scooting: Maximal assistance (with bed in Trendelenberg)   Attempted to scoot up in bed in seated position - pt unable to do so. Transfers  Sit to Stand: Unable to assess     Balance  Comments: Pt sat at EOB with good stability and ate some dessert.   Initially needed min assist for stability, [facility] [id number] [facility]  Times per week: 2-5  Times per day: Daily  Current Treatment Recommendations: Strengthening, Safety Education & Training, Functional Mobility Training, Balance Training  Safety Devices  Type of devices: Call light within reach, Bed alarm in place, Nurse notified, Left in bed    AM-PAC Score  AM-PAC Inpatient Mobility Raw Score : 8 (12/02/21 1447)  AM-PAC Inpatient T-Scale Score : 28.52 (12/02/21 1447)  Mobility Inpatient CMS 0-100% Score: 86.62 (12/02/21 1447)  Mobility Inpatient CMS G-Code Modifier : CM (12/02/21 1447)          Goals  Short term goals  Time Frame for Short term goals: Discharge  Short term goal 1: Roll to right and left with min assist  Short term goal 2: Supine to sit with Min x 1  Short term goal 3: Sit on EOB x 10 min to perform self care  Patient Goals   Patient goals : Not specifically stated       Therapy Time   Individual Concurrent Group Co-treatment   Time In 1330         Time Out 1410         Minutes 40              Timed Code Treatment Minutes:   25    Total Treatment Minutes:  [address] [facility], [id number]

## 2021-12-02 NOTE — PROGRESS NOTES
Component Value Date    LACTATE 1.64 10/08/2019         Volume status/BP:  BP elevated. Hematology:   CKD related anemia:  Goal Hgb 10-11.5 g/dl. Lab Results   Component Value Date    IRON 39 (L) 10/05/2021    TIBC 189 (L) 10/05/2021    FERRITIN 1,665.0 (H) 08/13/2020     Lab Results   Component Value Date    WBC 4.6 12/02/2021    HGB 6.8 (LL) 12/02/2021    HCT 20.4 (LL) 12/02/2021    MCV 87.5 12/02/2021     12/02/2021           Reason for Consult and Chief Complaint     Reason for consult: ESRD    Chief complaint:   Chief Complaint   Patient presents with    Other     hasn't had dialysis in a week, possible AMS per EMS report     Hypertension       History of Present Illness   Rodolfo García is a 67 y.o. with PMH significant for CKD stage IV on HD MWF (does not make urine), anemia of chronic disease, CAD, PVD, T2DM, HTN, HLD, GERD was [facility]. There was some concern for confusion but patient looks oriented to me. Patient denied SOB, chest pain, headache or any neurological symptoms. Patient reported some cough but no fever. No fall or trauma    Labs showed hyperkalemia and nephrology consulted for urgent dialysis. BP is also elevated. Patient is asking when he could go to his NH      Review of Systems     GEN: No recent fever, chills or night sweats. HEENT: No changes in vision, sore throat, rhinorrhea   CVS: No cp, palpitations or swelling in legs  Pulmonary: + Cough. No SOB. GI: No nausea, vomiting, diarrhea, constipation or abdominal pain  : No bowel or bladder incontinence, no dysuria, no hematuria. MSK: No muscle or joint or bone pains  Skin: No rashes. CNS: No headache, dizziness, confusion or focal weakness. No seizure. Psych: No anxiety, agitation or depression. Reviewed all 12 systems, negative except as above.      Past Medical History     Past Medical History:   Diagnosis Date    Anemia     Atrial fibrillation ([facility] 11/4/2013    CAD (coronary artery disease)     Mi, stent    Chronic kidney disease     [facility]     [facility] stage [facility]     Gas gangrene ([facility]     Hemodialysis patient ([facility] [address]     M-W-F    Hx of blood clots     Hyperkalemia     Hyperlipidemia 5/30/2012    Hypertension     Hyperthyroidism     Ischemic heart disease 3/99/8549    Metabolic encephalopathy     MI (myocardial infarction) ([facility] [facility] 10/2018    Type II or unspecified type diabetes mellitus without mention of complication, not stated as uncontrolled          Past Surgical History     Past Surgical History:   Procedure Laterality Date    CARDIAC SURGERY      cardiac stent    FEMORAL-TIBIAL BYPASS GRAFT Right 1/9/2019    RIGHT FEMORAL POSTERIOR TIBIAL BYPASS performed by David Pritchard MD at [address] [place] [address] Right 1/4/2019    INCISION AND DRAINAGE, TRANSMETATARSAL AMPUTATION ALL ON RIGHT FOOT performed by Marcin Stokes DPM at [address] [place] [address] Right 1/14/2019    REVISION OF TRANSMETATARSAL AMPUTATION RIGHT FOOT performed by Marcin Stokes DPM at [address] [place] [address] Left 08/24/2019    TMA    FOOT AMPUTATION Left 8/24/2019    LEFT FOOT TRANSMETATARSAL AMPUTATION performed by Naresh Agee DPM at [address] [place] [address] Left 8/27/2019    REPEAT INCISION AND DRAINAGE, REVISION OF LEFT FOOT TRANSMETATARSAL AMPUTATION performed by Naresh Agee DPM at [address] [place] [address] Left 10/11/2019    LEFT FOOT INCISION AND DRAINAGE WITH REVISION OF TRANSMETARSAL AMPUTATION WITH WOUND VAC APPLICATION  performed by Naresh Agee DPM at [address] Right 1/18/2019    RIGHT BELOW THE KNEE AMPUTATION performed by David Pritchard MD at [address] Left 12/9/2019    LEFT BELOW KNEE AMPUTATION performed by David [facility]chard MD at [facility] Left 2/6/2020    DEBRIDEMENT AND PLACEMENT OF WOUND VAC LEFT BELOW THE KNEE AMPUTATION SITE performed by Fritz Mccallum Alondra Arenas MD at [facility] History     Family History   Problem Relation Age of Onset    Arthritis Mother          Social History     Social History     Tobacco Use    Smoking status: Never Smoker    Smokeless tobacco: Never Used   Substance Use Topics    Alcohol use: Not Currently          Past medical, family, and social histories were reviewed as previously documented. Updates were made as necessary. Inpatient Medications and Allergies       Scheduled Meds:   NIFEdipine  60 mg Oral Daily    insulin regular  10 Units IntraVENous Once    And    dextrose  25 g IntraVENous Once    aspirin  81 mg Oral Daily    carvedilol  25 mg Oral BID WC    ferrous sulfate  325 mg Oral Daily with breakfast    folic acid  1 mg Oral Daily    mirtazapine  15 mg Oral Nightly    pantoprazole  40 mg Oral QAM AC    pravastatin  20 mg Oral Daily    prazosin  2 mg Oral Nightly    sevelamer  800 mg Oral TID WC    sodium chloride flush  10 mL IntraVENous 2 times per day    sennosides-docusate sodium  1 tablet Oral BID    heparin (porcine)  5,000 Units SubCUTAneous 3 times per day    insulin lispro  0-12 Units SubCUTAneous TID WC    insulin lispro  0-6 Units SubCUTAneous Nightly       Allergies: No Known Allergies      Vital Signs     Vitals:    12/02/21 0803   BP:    Pulse: 76   Resp:    Temp:    SpO2:          Intake/Output Summary (Last 24 hours) at 12/2/2021 1138  Last data filed at 12/2/2021 1028  Gross per 24 hour   Intake 500 ml   Output 3500 ml   Net -3000 ml         Physical Exam     General appearance: NAD  Head: Normocephalic, without obvious abnormality, atraumatic   Mouth: Moist mucous membrane  Neck: Supple. Lungs: Clear to auscultation bilaterally and no respiratory distress on RA  Heart: S1, S2. BP elevated in 190s range on monitor. Abdomen: soft, non-tender non-distended  Extremities: Mild dependent edema.  B/L LE amputation  Skin: No concerning rashes noted  Psych: good eye contact, normal affect  Neuro: AAO x 3      Laboratory Data     Please see above     Diagnostic Studies   Pertinent images reviewed

## 2021-12-02 NOTE — PLAN OF CARE
Problem: Falls - Risk of:  Goal: Will remain free from falls  Description: Will remain free from falls  Outcome: Ongoing  Note: Patient remains free from falls this shift. Bed in low position. Alarm in place. Patient able to make needs known. Will continue to monitor. Problem: Falls - Risk of:  Goal: Absence of physical injury  Description: Absence of physical injury  Outcome: Ongoing  Note: Patient remains free of physical injury this shift. Bed in low position. Patient able to make needs known. Bed alarm in place. Call light in reach.

## 2021-12-02 NOTE — PROGRESS NOTES
Patient states his final decision on whether or not he wants a blood transfusion is that he does not want it.

## 2021-12-02 NOTE — CARE COORDINATION
CM attempted to meet with pt to complete initial assessment. Pt sleeping, unable to be awakened. CM will visit again. MALA spoke with April at [facility]. She confirmed that pt is LTC there, and can return at [facility]. [facility] for [facility] MWF.          Electronically signed by ADELINA Leblanc, ANAIS on 12/2/2021 at 12:42 PM

## 2021-12-02 NOTE — PLAN OF CARE
Problem: Falls - Risk of:  Goal: Will remain free from falls  Description: Will remain free from falls  12/2/2021 1013 by Joelle Delgado RN  Outcome: Ongoing     Problem: Falls - Risk of:  Goal: Absence of physical injury  Description: Absence of physical injury  12/2/2021 1013 by Joelle Delgado RN  Outcome: Ongoing   Bed alarm on, call light and table within reach, needs addressed.

## 2021-12-02 NOTE — PROGRESS NOTES
Hospitalist Progress Note      PCP: Andreea Strong MD    Date of Admission: 12/1/2021    Chief Complaint: Missed dialysis    Hospital Course: 76-year-old man admitted to the hospital would volume overload/hyperkalemia due to missed dialysis for about a week    Subjective: Patient underwent dialysis last night. This morning he feels okay. Does not want to communicate much      Medications:  Reviewed    Infusion Medications    sodium chloride      dextrose      sodium chloride       Scheduled Medications    NIFEdipine  60 mg Oral Daily    insulin regular  10 Units IntraVENous Once    And    dextrose  25 g IntraVENous Once    aspirin  81 mg Oral Daily    carvedilol  25 mg Oral BID WC    ferrous sulfate  325 mg Oral Daily with breakfast    folic acid  1 mg Oral Daily    mirtazapine  15 mg Oral Nightly    pantoprazole  40 mg Oral QAM AC    pravastatin  20 mg Oral Daily    prazosin  2 mg Oral Nightly    sevelamer  800 mg Oral TID WC    sodium chloride flush  10 mL IntraVENous 2 times per day    sennosides-docusate sodium  1 tablet Oral BID    heparin (porcine)  5,000 Units SubCUTAneous 3 times per day    insulin lispro  0-12 Units SubCUTAneous TID WC    insulin lispro  0-6 Units SubCUTAneous Nightly     PRN Meds: labetalol, sodium chloride, dextrose, glucose, dextrose, glucagon (rDNA), sodium chloride flush, sodium chloride, ondansetron **OR** ondansetron, magnesium hydroxide, acetaminophen **OR** acetaminophen      Intake/Output Summary (Last 24 hours) at 12/2/2021 1152  Last data filed at 12/2/2021 1028  Gross per 24 hour   Intake 500 ml   Output 3500 ml   Net -3000 ml       Physical Exam Performed:    BP (!) 168/67 Comment: primary nurse notified  Pulse 62   Temp 98.5 °F (36.9 °C) (Oral)   Resp 18   Ht 4' (1.219 m)   Wt 224 lb 9.6 oz (101.9 kg)   SpO2 97%   BMI 68.54 kg/m²     General appearance: No apparent distress, appears stated age and cooperative.   HEENT: Pupils equal, round, refusing transfusion. He is aware of risks and benefits  -Continue ferrous sulfate    Type 2 diabetes mellitus  -Insulin sliding scale    Hypertension-uncontrolled  -Continue Coreg, prazosin. Added Procardia    Metabolic bone disease of CKD  -Continue Renvela    DVT Prophylaxis: Subcu heparin  Diet: ADULT DIET; Regular; 4 carb choices (60 gm/meal);  Low Potassium (Less than 3000 mg/day); 1500 ml  Code Status: Full Code    PT/OT Eval Status: Consulted    Dispo -likely discharge tomorrow as per nephrology    Dixie Jensen MD

## 2021-12-02 NOTE — PROGRESS NOTES
I notified the patient that the provider ordered a blood transfusion, he initially stated that he does not want the blood. After explaining why he needed the blood, he said \"let me think about it some more\". Dr. Army Salinas notified.

## 2021-12-02 NOTE — PROGRESS NOTES
Occupational Therapy   Occupational Therapy Initial Assessment and Tx  Date: 2021   Patient Name: Kumar Whitfield  MRN: [id number]     : 1945    Date of Service: 2021    Discharge Recommendations: Kumar Whitfield scored a 14/24 on the AM-PAC ADL Inpatient form. Current research shows that an AM-PAC score of 17 or less is typically not associated with a discharge to the patient's home setting. Based on the patient's AM-PAC score and their current ADL deficits, it is recommended that the patient have 3-5 sessions per week of Occupational Therapy at d/c to increase the patient's independence. Please see assessment section for further patient specific details. If patient discharges prior to next session this note will serve as a discharge summary. Please see below for the latest assessment towards goals. OT Equipment Recommendations  Equipment Needed: No    Assessment   Performance deficits / Impairments: Decreased functional mobility ; Decreased ADL status; Decreased strength; Decreased cognition; Decreased endurance  Assessment: From [address], [facility] reporting pt typically uses slide board for transfers with assist. Assist with ADLs at baseline. Pt requiring 2 person assist with bed mobility this date, assist sit balance EOB for bathing, dressing, and feeding. Would benefit from cont OT while in acute care. Reporting receiving therapy at facility. Treatment Diagnosis: impaired mobility, transfers, ADL  Decision Making: Medium Complexity  OT Education: OT Role; Plan of Care; ADL Adaptive Strategies  Patient Education: verb understanding  REQUIRES OT FOLLOW UP: Yes  Activity Tolerance  Activity Tolerance: Patient Tolerated treatment well; Patient limited by fatigue  Safety Devices  Safety Devices in place: Yes  Type of devices: All fall risk precautions in place;  Left in bed; Bed alarm in place; Call light within reach; Gait belt; Nurse notified           Patient Diagnosis(es): The primary encounter diagnosis was ESRD needing dialysis ([facility] A diagnosis of Hyperkalemia was also pertinent to this visit. has a past medical history of Anemia, Atrial fibrillation ([facility] [facility] CAD (coronary artery disease), Chronic kidney disease, DVT (deep venous thrombosis) ([facility] [address] [facility] End stage renal disease ([facility] [facility] Gas gangrene ([facility] [facility] Hemodialysis patient ([facility] [address] [facility] Hx of blood clots, Hyperkalemia, Hyperlipidemia, Hypertension, Hyperthyroidism, Ischemic heart disease, Metabolic encephalopathy, MI (myocardial infarction) ([facility] [facility] and Type II or unspecified type diabetes mellitus without mention of complication, not stated as uncontrolled. has a past surgical history that includes Cardiac surgery; Foot Amputation (Right, 1/4/2019); Femoral-tibial Bypass Graft (Right, 1/9/2019); Foot Amputation (Right, 1/14/2019); Leg amputation below knee (Right, 1/18/2019); Foot Amputation (Left, 08/24/2019); Foot Amputation (Left, 8/24/2019); Foot Amputation (Left, 8/27/2019); Foot Amputation (Left, 10/11/2019); Leg amputation below knee (Left, 12/9/2019); and Leg Surgery (Left, 2/6/2020). Treatment Diagnosis: impaired mobility, transfers, ADL      Restrictions  Position Activity Restriction  Other position/activity restrictions: up as tolerated    Subjective   General  Chart Reviewed: Yes  Additional Pertinent Hx: 67 y.o. with PMH significant for CKD stage IV on HD MWF (does not make urine), anemia of chronic disease, CAD, PVD, T2DM, HTN, HLD, GERD was sent from NH to ER after [facility]  Referring Practitioner: Manfred John MD  Diagnosis: hyperkalemia  Subjective  Subjective:  In bed upon arrival, agreeable with encouragement  Patient Currently in Pain: Denies  Vital Signs  Patient Currently in Pain: Denies  Social/Functional History  Social/Functional History  Type of Home: Facility ([address] [place] [address])  IADL Comments: Per pt, he gets assist with bathing and dressing, Is able to transfer to [facility] with sliding board (confirmed by Geovanny) and usually gets around with his WC       Objective           Observation/Palpation  Observation: Pee SOUZA  Balance  Sitting Balance:  (Mod initially, progressing to SPVN sitting EOB ~15min)  Standing Balance: Unable to assess(comment) (chandan SOUZA)  Standing Balance  Comment: completing sitting EOB for feeding, gown change, UB bathing wipes  ADL  Feeding: Setup; Supervision (seated EOB feeding with RUE)  Grooming: Setup; Stand by assistance (wipe face seated EOB)  UE Bathing: Moderate assistance  UE Dressing: Minimal assistance        Bed mobility  Rolling to Left: Moderate assistance  Rolling to Right: Maximum assistance  Supine to Sit: Moderate assistance; Minimal assistance; 2 Person assistance  Sit to Supine: Moderate assistance  Scooting: Maximal assistance  Transfers  Sit to stand: Unable to assess  Stand to sit: Unable to assess     Cognition  Overall Cognitive Status: Exceptions  Arousal/Alertness: Delayed responses to stimuli  Following Commands:  Follows one step commands with increased time  Attention Span: Attends with cues to redirect  Problem Solving: Assistance required to generate solutions  Initiation: Requires cues for some  Sequencing: Requires cues for some                 LUE AROM (degrees)  LUE AROM : WFL  Left Hand AROM (degrees)  Left Hand AROM: WFL  RUE AROM (degrees)  RUE AROM : WFL  Right Hand AROM (degrees)  Right Hand AROM: WFL  LUE Strength  Gross LUE Strength: WFL  RUE Strength  Gross RUE Strength: WFL                   Plan   Plan  Times per week: 2-5  Times per day: Daily      AM-PAC Score        AM-[facility] Inpatient Daily Activity Raw Score: 14 (12/02/21 1510)  AM-PAC Inpatient ADL T-Scale Score : 33.39 (12/02/21 1510)  ADL Inpatient CMS 0-100% Score: 59.67 (12/02/21 1510)  ADL Inpatient CMS G-Code Modifier : CK (12/02/21 1510)    Goals  Short term goals  Time Frame for Short term goals: dc  Short term goal 1: supine to sit Chantal x1  Short term

## 2021-12-02 NOTE — PROGRESS NOTES
4 Eyes Admission Assessment     I agree as the admission nurse that 2 RN's have performed a thorough Head to Toe Skin Assessment on the patient. ALL assessment sites listed below have been assessed on admission. ADMISSION   Areas assessed by both nurses:   [x]   Head, Face, and Ears   [x]   Shoulders, Back, and Chest  [x]   Arms, Elbows, and Hands   [x]   Coccyx, Sacrum, and Ischium  [x]   Legs, Feet, and Heels        Does the Patient have Skin Breakdown?   Freddie Prevention initiated:  YES  Wound Care Orders initiated:  YES  [facility] nurse consulted for Pressure Injury (Stage 3,4, Unstageable, DTI, NWPT, and Complex wounds) or Freddie score 18 or lower:  Stage 2 on bottom       Nurse 1 eSignature: Electronically signed by Cristian Mendosa RN on 12/2/21 at 5:37 AM EST    **SHARE this note so that the co-signing nurse is able to place an eSignature**    Nurse 2 eSignature: Electronically signed by Medina Lewis RN on 12/2/21 at 5:38 AM EST

## 2021-12-03 VITALS
SYSTOLIC BLOOD PRESSURE: 177 MMHG | TEMPERATURE: 98.4 F | OXYGEN SATURATION: 98 % | HEART RATE: 68 BPM | WEIGHT: 223.99 LBS | RESPIRATION RATE: 14 BRPM | DIASTOLIC BLOOD PRESSURE: 73 MMHG | HEIGHT: 60 IN | BODY MASS INDEX: 43.98 KG/M2

## 2021-12-03 LAB
ANION GAP SERPL CALCULATED.3IONS-SCNC: 14 MMOL/L (ref 3–16)
BUN BLDV-MCNC: 39 MG/DL (ref 7–20)
CALCIUM SERPL-MCNC: 8.9 MG/DL (ref 8.3–10.6)
CHLORIDE BLD-SCNC: 94 MMOL/L (ref 99–110)
CO2: 27 MMOL/L (ref 21–32)
CREAT SERPL-MCNC: 9.3 MG/DL (ref 0.8–1.3)
GFR AFRICAN AMERICAN: 7
GFR NON-AFRICAN AMERICAN: 6
GLUCOSE BLD-MCNC: 82 MG/DL (ref 70–99)
GLUCOSE BLD-MCNC: 88 MG/DL (ref 70–99)
GLUCOSE BLD-MCNC: 91 MG/DL (ref 70–99)
HCT VFR BLD CALC: 21.2 % (ref 40.5–52.5)
HEMOGLOBIN: 7 G/DL (ref 13.5–17.5)
MCH RBC QN AUTO: 29.3 PG (ref 26–34)
MCHC RBC AUTO-ENTMCNC: 32.8 G/DL (ref 31–36)
MCV RBC AUTO: 89.2 FL (ref 80–100)
PDW BLD-RTO: 13.6 % (ref 12.4–15.4)
PERFORMED ON: NORMAL
PERFORMED ON: NORMAL
PLATELET # BLD: 152 K/UL (ref 135–450)
PMV BLD AUTO: 9.1 FL (ref 5–10.5)
POTASSIUM REFLEX MAGNESIUM: 5.1 MMOL/L (ref 3.5–5.1)
RBC # BLD: 2.38 M/UL (ref 4.2–5.9)
SODIUM BLD-SCNC: 135 MMOL/L (ref 136–145)
TROPONIN: 0.24 NG/ML
WBC # BLD: 5.6 K/UL (ref 4–11)

## 2021-12-03 PROCEDURE — 80048 BASIC METABOLIC PNL TOTAL CA: CPT

## 2021-12-03 PROCEDURE — 36415 COLL VENOUS BLD VENIPUNCTURE: CPT

## 2021-12-03 PROCEDURE — 6360000002 HC RX W HCPCS: Performed by: INTERNAL MEDICINE

## 2021-12-03 PROCEDURE — 84484 ASSAY OF TROPONIN QUANT: CPT

## 2021-12-03 PROCEDURE — 85027 COMPLETE CBC AUTOMATED: CPT

## 2021-12-03 PROCEDURE — 90935 HEMODIALYSIS ONE EVALUATION: CPT

## 2021-12-03 PROCEDURE — 2580000003 HC RX 258: Performed by: INTERNAL MEDICINE

## 2021-12-03 PROCEDURE — 6370000000 HC RX 637 (ALT 250 FOR IP): Performed by: INTERNAL MEDICINE

## 2021-12-03 PROCEDURE — 6370000000 HC RX 637 (ALT 250 FOR IP): Performed by: STUDENT IN AN ORGANIZED HEALTH CARE EDUCATION/TRAINING PROGRAM

## 2021-12-03 PROCEDURE — 2500000003 HC RX 250 WO HCPCS: Performed by: INTERNAL MEDICINE

## 2021-12-03 RX ORDER — HYDRALAZINE HYDROCHLORIDE 50 MG/1
50 TABLET, FILM COATED ORAL EVERY 8 HOURS SCHEDULED
Status: DISCONTINUED | OUTPATIENT
Start: 2021-12-03 | End: 2021-12-03 | Stop reason: HOSPADM

## 2021-12-03 RX ORDER — NIFEDIPINE 90 MG/1
90 TABLET, EXTENDED RELEASE ORAL DAILY
Qty: 90 TABLET | Refills: 1
Start: 2021-12-03 | End: 2022-01-27 | Stop reason: ALTCHOICE

## 2021-12-03 RX ORDER — HYDRALAZINE HYDROCHLORIDE 20 MG/ML
10 INJECTION INTRAMUSCULAR; INTRAVENOUS EVERY 4 HOURS PRN
Status: DISCONTINUED | OUTPATIENT
Start: 2021-12-03 | End: 2021-12-03 | Stop reason: HOSPADM

## 2021-12-03 RX ORDER — NIFEDIPINE 60 MG/1
60 TABLET, EXTENDED RELEASE ORAL DAILY
Qty: 30 TABLET | Refills: 3
Start: 2021-12-03 | End: 2021-12-03 | Stop reason: HOSPADM

## 2021-12-03 RX ORDER — SEVELAMER CARBONATE 800 MG/1
800 TABLET, FILM COATED ORAL
Qty: 90 TABLET | Refills: 3 | Status: ON HOLD
Start: 2021-12-03 | End: 2022-02-03 | Stop reason: HOSPADM

## 2021-12-03 RX ORDER — HYDRALAZINE HYDROCHLORIDE 50 MG/1
50 TABLET, FILM COATED ORAL EVERY 8 HOURS SCHEDULED
Qty: 90 TABLET | Refills: 3 | Status: ON HOLD
Start: 2021-12-03 | End: 2022-02-03 | Stop reason: HOSPADM

## 2021-12-03 RX ADMIN — NIFEDIPINE 60 MG: 60 TABLET, FILM COATED, EXTENDED RELEASE ORAL at 12:13

## 2021-12-03 RX ADMIN — FOLIC ACID 1 MG: 1 TABLET ORAL at 12:12

## 2021-12-03 RX ADMIN — PRAVASTATIN SODIUM 20 MG: 20 TABLET ORAL at 12:12

## 2021-12-03 RX ADMIN — PANTOPRAZOLE SODIUM 40 MG: 40 TABLET, DELAYED RELEASE ORAL at 06:11

## 2021-12-03 RX ADMIN — HYDRALAZINE HYDROCHLORIDE 10 MG: 20 INJECTION INTRAMUSCULAR; INTRAVENOUS at 14:37

## 2021-12-03 RX ADMIN — SEVELAMER CARBONATE 800 MG: 800 TABLET, FILM COATED ORAL at 12:12

## 2021-12-03 RX ADMIN — FERROUS SULFATE TAB 325 MG (65 MG ELEMENTAL FE) 325 MG: 325 (65 FE) TAB at 12:12

## 2021-12-03 RX ADMIN — ASPIRIN 81 MG: 81 TABLET, COATED ORAL at 12:12

## 2021-12-03 RX ADMIN — CARVEDILOL 25 MG: 25 TABLET, FILM COATED ORAL at 12:13

## 2021-12-03 RX ADMIN — LABETALOL HYDROCHLORIDE 10 MG: 5 INJECTION, SOLUTION INTRAVENOUS at 06:03

## 2021-12-03 RX ADMIN — DOCUSATE SODIUM 50 MG AND SENNOSIDES 8.6 MG 1 TABLET: 8.6; 5 TABLET, FILM COATED ORAL at 12:13

## 2021-12-03 RX ADMIN — HEPARIN SODIUM 5000 UNITS: 5000 INJECTION INTRAVENOUS; SUBCUTANEOUS at 06:11

## 2021-12-03 RX ADMIN — HYDRALAZINE HYDROCHLORIDE 50 MG: 50 TABLET, FILM COATED ORAL at 16:05

## 2021-12-03 RX ADMIN — SODIUM CHLORIDE, PRESERVATIVE FREE 10 ML: 5 INJECTION INTRAVENOUS at 12:16

## 2021-12-03 RX ADMIN — HYDRALAZINE HYDROCHLORIDE 10 MG: 20 INJECTION INTRAMUSCULAR; INTRAVENOUS at 04:30

## 2021-12-03 RX ADMIN — HEPARIN SODIUM 5000 UNITS: 5000 INJECTION INTRAVENOUS; SUBCUTANEOUS at 14:03

## 2021-12-03 ASSESSMENT — PAIN SCALES - GENERAL: PAINLEVEL_OUTOF10: 0

## 2021-12-03 NOTE — DISCHARGE SUMMARY
Hospitalist Discharge Summary    Patient ID:  Ozzy Huerta  [id number]  17 y.o.  1945    Admit date: 12/1/2021    Discharge date: 12/3/2021    Disposition: SNF    Admission Diagnoses:   Patient Active Problem List   Diagnosis    HTN (hypertension)    Diabetes mellitus ([address]    Anemia    Diabetic retinopathy ([address]    Cataracts, bilateral    Mixed hyperlipidemia    Atrial fibrillation ([address]    ESRD on dialysis ([address]    Ischemic heart disease    Acute anterolateral wall MI ([address]    Acute ST elevation myocardial infarction (STEMI) involving left anterior descending (LAD) coronary artery ([facility])    CKD (chronic kidney disease) stage 4, GFR 15-29 ml/min ([facility])    DM (diabetes mellitus) type II uncontrolled with renal manifestation    Fungal infection of foot    Hemodialysis patient ([address]    Malignant essential hypertension    Right second toe ulcer, with necrosis of bone ([facility])    Hyperkalemia    CAD (coronary artery disease) s/p stent to LAD and RCA 10/2018    Acute metabolic encephalopathy    Pain in right foot    Somnolence    Encounter for palliative care    Advance directive discussed with patient    Gangrene of right foot ([address]    Gas gangrene ([address]    Sepsis due to pneumonia ([address]    Advance care planning    Abscess or cellulitis of toe, left    Gangrene of toe of left foot ([address]    PVD (peripheral vascular disease) ([address]    Type 2 diabetes mellitus, with long-term current use of insulin ([address]    Fever    Wound, open    Pseudomonas infection    Diabetic foot infection ([address]    Surgical wound dehiscence    Surgical wound, non healing    Osteomyelitis ([address]    Necrosis of amputation stump ([address]    Dehiscence of closure of skin, sequela    Acute on chronic anemia    AMS (altered mental status)       Discharge Diagnoses: Active Problems:    Hyperkalemia  Resolved Problems:    * No resolved hospital problems.  *      Code Status:  Full Code    Condition:  Stable    Discharge Diet: Diet:  ADULT DIET; Regular; 4 carb choices (60 gm/meal); Low Potassium (Less than 3000 mg/day); 1500 ml    PCP to do list: Follow for improvement of symptoms    Hospital Course: As per HPI:76 y.o. male who presented to [facility]. with missed HD Monday and Wednesday for reasons unknown. This is a/w confusion since that time. This is in the context of ESRD on HD.     He is unable to provide much history as he frequently falls asleep during the interview. Therefore this history is gathered from chart review and discussion w/ other providers. Patient was admitted to the hospital following problems were addressed    Missed dialysis  Fluid overload  ESRD on hemodialysis  -Nephrology following  -Patient underwent 2 rounds of dialysis as inpatient and once he was stable he was discharged back to his facility     Hyperkalemia  -Resolved     Anemia of chronic kidney disease  -Hemoglobin stable around 7  -Patient refused blood transfusion     Type 2 diabetes mellitus  -Insulin sliding scale     Hypertension-uncontrolled  -Continue Coreg, prazosin.   Added Procardia     Metabolic bone disease of CKD  -Continue Renvela    Discharge Medications:   Current Discharge Medication List      START taking these medications    Details   NIFEdipine (PROCARDIA XL) 60 MG extended release tablet Take 1 tablet by mouth daily  Qty: 30 tablet, Refills: 3      sevelamer (RENVELA) 800 MG tablet Take 1 tablet by mouth 3 times daily (with meals)  Qty: 90 tablet, Refills: 3           Current Discharge Medication List        Current Discharge Medication List      CONTINUE these medications which have NOT CHANGED    Details   Cholecalciferol (VITAMIN D3) 50 MCG (2000 UT) CAPS Take 2,000 Units by mouth daily      mirtazapine (REMERON) 15 MG tablet Take 15 mg by mouth nightly      pantoprazole sodium (PROTONIX) 40 MG PACK packet Take 40 mg by mouth every morning (before breakfast)      prazosin (MINIPRESS) 2 MG capsule Take 2 mg by mouth nightly      B Complex-C-Folic Acid (JOSUÉ CAPS) 1 MG CAPS Take 1 mg by mouth daily      pravastatin (PRAVACHOL) 20 MG tablet Take 20 mg by mouth daily      carvedilol (COREG) 25 MG tablet Take 25 mg by mouth 2 times daily (with meals)      aspirin 81 MG tablet Take 81 mg by mouth daily       epoetin paulina-epbx (RETACRIT) 35968 UNIT/ML SOLN injection Inject 1.5 mLs into the skin three times a week  Qty: 6.6 mL, Refills: 1      folic acid (FOLVITE) 1 MG tablet Take 1 mg by mouth daily      ferrous sulfate (IRON 325) 325 (65 Fe) MG tablet Take 325 mg by mouth daily (with breakfast)      vitamin B-12 (CYANOCOBALAMIN) 1000 MCG tablet Take 1,000 mcg by mouth daily      Petrolatum-Zinc Oxide (PHYTOPLEX Z-GUARD) 57-17 % PSTE Apply topically Apply to scrotum every day and night      nitroGLYCERIN (NITROSTAT) 0.4 MG SL tablet Place 0.4 mg under the tongue every 5 minutes as needed for Chest pain up to max of 3 total doses. If no relief after 1 dose, call 911. Current Discharge Medication List      STOP taking these medications       losartan (COZAAR) 50 MG tablet Comments:   Reason for Stopping:         sevelamer hcl (RENAGEL) 800 MG tablet Comments:   Reason for Stopping:                   Procedures: none    Assessment on Discharge: Stable, improved     Discharge ROS:  A complete review of systems was asked and negative except for none    Discharge Exam:  BP (!) 192/76   Pulse 76   Temp 98.6 °F (37 °C) (Oral)   Resp 15   Ht 4' (1.219 m)   Wt 223 lb 15.8 oz (101.6 kg)   SpO2 98%   BMI 68.35 kg/m²     Gen: NAD  HEENT: NC/AT, moist mucous membranes, no oropharyngeal erythema or exudate  Neck: supple, trachea midline, no anterior cervical or SC LAD  Heart:  Normal s1/s2, RRR, no murmurs, gallops, or rubs.  no leg edema  Lungs:  CTA bilaterally, no wheeze,no rales or rhonchi, no use of accessory muscles  Abd: bowel sounds present, soft, nontender, nondistended, no masses  Extrem: Bilateral BKA  Skin: no lesion or masses  Psych:  A & O x3  Neuro: grossly intact, moves all four extremities    Pertinent Studies During Hospital Stay:  Radiology:  XR CHEST PORTABLE    Result Date: 12/1/2021  EXAM: PORTABLE AP CHEST X-RAY, 1340 INDICATION: AMS, missed dialysis, COMPARISON: 10/4/2021 Trachea is midline. Cardiomegaly Aortic tortuosity. Lungs are clear without active pulmonary opacities or effusion. Minimal basilar subsegmental atelectasis No evidence of interstitial edema     1. Stable cardiomegaly 2.  Minimal basilar atelectasis with no significant interstitial edema           Last Labs on Discharge:     Recent Results (from the past 24 hour(s))   POCT Glucose    Collection Time: 12/02/21  4:55 PM   Result Value Ref Range    POC Glucose 100 (H) 70 - 99 mg/dl    Performed on ACCU-CHEK    Troponin    Collection Time: 12/02/21  5:06 PM   Result Value Ref Range    Troponin 0.22 (H) <0.01 ng/mL   POCT Glucose    Collection Time: 12/02/21  7:38 PM   Result Value Ref Range    POC Glucose 94 70 - 99 mg/dl    Performed on ACCU-CHEK    Troponin    Collection Time: 12/02/21 10:00 PM   Result Value Ref Range    Troponin 0.23 (H) <0.01 ng/mL   Basic Metabolic Panel w/ Reflex to MG    Collection Time: 12/03/21  4:35 AM   Result Value Ref Range    Sodium 135 (L) 136 - 145 mmol/L    Potassium reflex Magnesium 5.1 3.5 - 5.1 mmol/L    Chloride 94 (L) 99 - 110 mmol/L    CO2 27 21 - 32 mmol/L    Anion Gap 14 3 - 16    Glucose 82 70 - 99 mg/dL    BUN 39 (H) 7 - 20 mg/dL    CREATININE 9.3 (HH) 0.8 - 1.3 mg/dL    GFR Non- 6 (A) >60    GFR  7 (A) >60    Calcium 8.9 8.3 - 10.6 mg/dL   CBC    Collection Time: 12/03/21  4:35 AM   Result Value Ref Range    WBC 5.6 4.0 - 11.0 K/uL    RBC 2.38 (L) 4.20 - 5.90 M/uL    Hemoglobin 7.0 (L) 13.5 - 17.5 g/dL    Hematocrit 21.2 (L) 40.5 - 52.5 %    MCV 89.2 80.0 - 100.0 fL    MCH 29.3 26.0 - 34.0 pg    MCHC 32.8 31.0 - 36.0 g/dL    RDW 13.6 12.4 - 15.4 %    Platelets 766 230 - 401 K/uL    MPV 9.1 5.0 - 10.5 fL   POCT Glucose    Collection Time: 12/03/21  7:29 AM   Result Value Ref Range    POC Glucose 91 70 - 99 mg/dl    Performed on ACCU-CHEK    Troponin    Collection Time: 12/03/21 11:55 AM   Result Value Ref Range    Troponin 0.24 (H) <0.01 ng/mL   POCT Glucose    Collection Time: 12/03/21 11:58 AM   Result Value Ref Range    POC Glucose 88 70 - 99 mg/dl    Performed on ACCU-CHEK          Follow up: with Sincere Rodríguze MD    Note that over 30 minutes was spent in preparing discharge papers, discussing discharge with patient, medication review, etc.    Thank you Sincere Rodríguez MD for the opportunity to be involved in this patient's care. If you have any questions or concerns please feel free to contact me at [phone].     Electronically signed by Kushal Good MD on 12/3/2021 at 12:45 PM

## 2021-12-03 NOTE — PROGRESS NOTES
IV removed, telemetry removed, report given to [facility] ambulance transport. New AVS printed and given to [facility] ambulance team to show updated med list. Patient stable.

## 2021-12-03 NOTE — PLAN OF CARE
Problem: Falls - Risk of:  Goal: Will remain free from falls  Description: Will remain free from falls  Outcome: Ongoing  Note: Patient remains free from falls this shift. Bed in low position. Alarm in place. Problem: Falls - Risk of:  Goal: Absence of physical injury  Description: Absence of physical injury  Outcome: Ongoing  Note: Patient remains free from physical injury this shift. Bed in low position. Patient able to make needs known. Alarm on bed. Call light in reach.  Will continue to monitor

## 2021-12-03 NOTE — PROGRESS NOTES
Treatment time: 3.5 Hrs    Net UF: 3L    Pre weight: 101.6 kg  Post weight: 98 kg  EDW: TBD    Access used: L AVF  Access function: Good @  ml/min    Medications or blood products given: N/A    Regular outpatient schedule: [facility]    Summary of response to treatment: Patient tolerated tx well. Access ran well at ordered BFR. Report called into primary RN. Patient returned to room. VSS     Copy of dialysis treatment record placed in chart, to be scanned into EMR.

## 2021-12-03 NOTE — PLAN OF CARE
Problem: Skin Integrity:  Goal: Will show no infection signs and symptoms  Description: Will show no infection signs and symptoms  Outcome: Ongoing   Patient's vitals are stable, laboratory results not indicative of infection, patient not in pain.

## 2021-12-03 NOTE — CARE COORDINATION
Case Management Assessment            Discharge Note                    Date / Time of Note: 12/3/2021 11:00 AM                  Discharge Note Completed by: ADELINA Leblanc, LSW    Patient Name: Leon Varela   YOB: 1945  Diagnosis: Hyperkalemia [E87.5]  ESRD needing dialysis [facility]) [N18.6, Z99.2]   Date / Time: 12/1/2021  1:14 PM    Current PCP: Fabi Saldaña MD  Clinic patient: No    Hospitalization in the last 30 days: No    Advance Directives:  Code Status: Full Code  [place] DNR form completed and on chart: Yes    Financial:  Payor: Danielle Chavez / Plan: Elvia Allison / Product Type: *No Product type* /      Pharmacy:    [address] [place] [address], [facility]. [address] [id number]  Phone: [phone] Fax: [phone] [address] [facility], [address] [place] [address] [phone] [facility] [phone]  [address] [place] [address]  Phone: [phone] Fax: [phone]    [address], [address]  [address]  Phone: [phone] Fax: [phone]      Assistance purchasing medications?: Potential Assistance Purchasing Medications: No  Assistance provided by Case Management: None at this time    Does patient want to participate in local refill/ meds to beds program?: No    Meds To Beds General Rules:  1. Can ONLY be done Monday- Friday between 8:30am-5pm  2. Prescription(s) must be in pharmacy by 3pm to be filled same day  3. Copy of patient's insurance/ prescription drug card and patient face sheet must be sent along with the prescription(s)  4. Cost of Rx cannot be added to hospital bill. If financial assistance is needed, please contact unit  or ;  or  CANNOT provide pharmacy voucher for patients co-pays  5.  Patients can then  the prescription on their way out of the hospital at discharge, or pharmacy can deliver to the bedside if staff is available. (payment due at time of pick-up or delivery - cash, check, or card accepted)     Able to afford home medications/ co-pay costs: Yes    ADLS:  Current PT AM-PAC Score: 8 /24  Current OT AM-PAC Score: 14 /24      DISCHARGE Disposition: [facility] ([facility]): [facility]  Phone: [phone]  Fax: [phone]    LOC at discharge: [address] Completed: Yes    Notification completed in HENS/PAS?:  Not Applicable    IMM Completed:   Not Indicated    Transportation:  Transportation PLAN for discharge: EMS transportation   Mode of Transport: Ambulance stretcher - BLS  Reason for medical transport: Other: fall risk, both feet amputated, 2x assist  Name of [address] [place] [address],[facility]: [facility]  Phone: [phone]  Time of Transport: 4:00PM    Transport form completed: Yes    Dialysis:  Dialysis patient: Yes    Dialysis Center:  Erica Parra  Address: [address] [facility]   Phone: [phone], [facility]      Referrals made at [facility] for outpatient continued care:  Not Applicable    Additional CM Notes:  Pt to DC today, returning to [facility] at [address] [place] [address]. MALA spoke with April [phone] to confirm return and provide transport time. Clementina confirmed being able to access DC paperwork in [employer]. MALA notified pt's RN of transport time. MALA also notified Erica Parra and faxed [phone]. No other needs at this time. The Plan for Transition of Care is related to the following treatment goals of Hyperkalemia [E87.5]  ESRD needing dialysis ([facility] [address] [N18.6, Z99.2]    The Patient and/or patient representative Delmar Gallardo and his family were provided with a choice of provider and agrees with the discharge plan Yes    Freedom of choice list was provided with basic dialogue that supports the patient's individualized plan of care/goals and shares the quality data associated with the providers. Yes    Care Transitions patient: No    ADELINA Lance, [facility]  Case Management Department  Ph: [phone]

## 2021-12-03 NOTE — PROGRESS NOTES
Discharge order received, paperwork printed and put in transport packet for facility. Report called to [place], I spoke to the [facility]. I answered all questions and left a call back number. Transport is scheduled for 1600, I will remove his telemetry and IV when they arrive.

## 2021-12-03 NOTE — PROGRESS NOTES
Nephrology  Note        [facility]    [email or web address]       Phone: [phone]                                                  12/3/2021 2:57 PM     Patient: Susie Giles [id number]  [id number]  Date of Admit: 12/1/2021 LOS: 2 days    Interval History and Plan:  Patient tolerated HD well with 3 liter of UF. BP still elevated in 180s range. Hb ~ 7. Patient is asymptomatic. HD done today. Increase Nifedipine dose to 90 mg daily and also add Hydralazine 50 mg TID. Continue Carvedilol 25 mg BID. Holding Losartan for now and can resume it once K is better controlled. Advised patient to be complaint with dialysis. Need to continue challenge EDW and adjust medications to improve BP as outpatient  Anemia. Hb below 7. Patient refusing blood transfusion. Did not give ALLA due to high BP today. Avoid nephrotoxins  Monitor input, output and daily weight  Renally dose all medications  D/C plan noted. Discussed with patient and Dr Fan Casper    Patient is very difficult to talk with. Thank you for allowing us to participate in this patient's care    In case of any question please call us at our 24 hour answering service [phone] or from 7 AM to 5 PM via [employer] or cell number    Aroldo Hedrick MD  [facility] Nephrology  [address], [address] [place] [address] Av  Fax: [phone]  Office: [phone]       Assessment & Plan     Renal function:  ESRD  On iHD 3 MWF  Tiffanie Helena        Electrolytes:  Lab Results   Component Value Date    CREATININE 9.3 ([place]) 12/03/2021    BUN 39 (H) 12/03/2021     (L) 12/03/2021    K 5.1 12/03/2021    CL 94 (L) 12/03/2021    CO2 27 12/03/2021            # Renal Osteodystrophy:   Secondary hyperparathyroidism due to renal failure  Monitor phos     Lab Results   Component Value Date    .6 (H) 06/17/2020    CALCIUM 8.9 12/03/2021    PHOS 6.0 (H) 12/01/2021         Acid/Base status:  Stable.      Lab Results   Component Value Date    LACTATE 1.64 10/08/2019         Volume status/BP:  BP elevated. Hematology:   CKD related anemia:  Goal Hgb 10-11.5 g/dl. Lab Results   Component Value Date    IRON 39 (L) 10/05/2021    TIBC 189 (L) 10/05/2021    FERRITIN 1,665.0 (H) 08/13/2020     Lab Results   Component Value Date    WBC 5.6 12/03/2021    HGB 7.0 (L) 12/03/2021    HCT 21.2 (L) 12/03/2021    MCV 89.2 12/03/2021     12/03/2021           Reason for Consult and Chief Complaint     Reason for consult: ESRD    Chief complaint:   Chief Complaint   Patient presents with    Other     hasn't had dialysis in a week, possible AMS per EMS report     Hypertension       History of Present Illness   Alin Shelby is a 67 y.o. with PMH significant for CKD stage IV on HD MWF (does not make urine), anemia of chronic disease, CAD, PVD, T2DM, HTN, HLD, GERD was sent from NH to ER after [facility]. There was some concern for confusion but patient looks oriented to me. Patient denied SOB, chest pain, headache or any neurological symptoms. Patient reported some cough but no fever. No fall or trauma    Labs showed hyperkalemia and nephrology consulted for urgent dialysis. BP is also elevated. Patient is asking when he could go to his NH      Review of Systems     GEN: No recent fever, chills or night sweats. HEENT: No changes in vision, sore throat, rhinorrhea   CVS: No cp, palpitations or swelling in legs  Pulmonary: + Cough. No SOB. GI: No nausea, vomiting, diarrhea, constipation or abdominal pain  : No bowel or bladder incontinence, no dysuria, no hematuria. MSK: No muscle or joint or bone pains  Skin: No rashes. CNS: No headache, dizziness, confusion or focal weakness. No seizure. Psych: No anxiety, agitation or depression. Reviewed all 12 systems, negative except as above.      Past Medical History     Past Medical History:   Diagnosis Date    Anemia     Atrial fibrillation ([facility] 11/4/2013    CAD (coronary artery disease)     Mi, stent    Chronic kidney disease     D[facility]deep venous thrombosis) ([facility])     [facility] stage renal disease [facility]     Gas gangrene ([facility]     Hemodialysis patient ([facility]     M-W-F    Hx of blood clots     Hyperkalemia     Hyperlipidemia 5/30/2012    Hypertension     Hyperthyroidism     Ischemic heart disease 9/29/5142    Metabolic encephalopathy     MI (myocardial infarction) ([facility] 10/2018    Type II or unspecified type diabetes mellitus without mention of complication, not stated as uncontrolled          Past Surgical History     Past Surgical History:   Procedure Laterality Date    CARDIAC SURGERY      cardiac stent    FEMORAL-TIBIAL BYPASS GRAFT Right 1/9/2019    RIGHT FEMORAL POSTERIOR TIBIAL BYPASS performed by Ilan Silvestre MD at [address] [place] [address] Right 1/4/2019    INCISION AND DRAINAGE, TRANSMETATARSAL AMPUTATION ALL ON RIGHT FOOT performed by Deni Maloney DPM at [address] [place] [address] Right 1/14/2019    REVISION OF TRANSMETATARSAL AMPUTATION RIGHT FOOT performed by Deni Maloney DPM at [address] [place] [address] Left 08/24/2019    TMA    FOOT AMPUTATION Left 8/24/2019    LEFT FOOT TRANSMETATARSAL AMPUTATION performed by Donal Silverman DPM at [address] [place] [address] Left 8/27/2019    REPEAT INCISION AND DRAINAGE, REVISION OF LEFT FOOT TRANSMETATARSAL AMPUTATION performed by Donal Silverman DPM at [address] [place] [address] Left 10/11/2019    LEFT FOOT INCISION AND DRAINAGE WITH REVISION OF TRANSMETARSAL AMPUTATION WITH WOUND VAC APPLICATION  performed by Donal Silverman DPM at [address] Right 1/18/2019    RIGHT BELOW THE KNEE AMPUTATION performed by Ilan Silvestre MD at [address] Left 12/9/2019    LEFT BELOW KNEE AMPUTATION performed by Ilan Silvestre MD at [facility] 2/6/2020    DEBRIDEMENT AND PLACEMENT OF WOUND VAC LEFT BELOW THE KNEE AMPUTATION SITE performed by Fritz Martines MD at [place] [address] History     Family History   Problem Relation Age of Onset    Arthritis Mother          Social History     Social History     Tobacco Use    Smoking status: Never Smoker    Smokeless tobacco: Never Used   Substance Use Topics    Alcohol use: Not Currently          Past medical, family, and social histories were reviewed as previously documented. Updates were made as necessary. Inpatient Medications and Allergies       Scheduled Meds:   NIFEdipine  60 mg Oral Daily    insulin regular  10 Units IntraVENous Once    And    dextrose  25 g IntraVENous Once    aspirin  81 mg Oral Daily    carvedilol  25 mg Oral BID WC    ferrous sulfate  325 mg Oral Daily with breakfast    folic acid  1 mg Oral Daily    mirtazapine  15 mg Oral Nightly    pantoprazole  40 mg Oral QAM AC    pravastatin  20 mg Oral Daily    prazosin  2 mg Oral Nightly    sevelamer  800 mg Oral TID WC    sodium chloride flush  10 mL IntraVENous 2 times per day    sennosides-docusate sodium  1 tablet Oral BID    heparin (porcine)  5,000 Units SubCUTAneous 3 times per day    insulin lispro  0-12 Units SubCUTAneous TID WC    insulin lispro  0-6 Units SubCUTAneous Nightly       Allergies: No Known Allergies      Vital Signs     Vitals:    12/03/21 1431   BP: (!) 182/75   Pulse:    Resp:    Temp:    SpO2:          Intake/Output Summary (Last 24 hours) at 12/3/2021 1457  Last data filed at 12/3/2021 0732  Gross per 24 hour   Intake 50 ml   Output --   Net 50 ml         Physical Exam     General appearance: NAD  Head: Normocephalic, without obvious abnormality, atraumatic   Mouth: Moist mucous membrane  Neck: Supple. Lungs: Clear to auscultation bilaterally and no respiratory distress on RA  Heart: S1, S2.   Abdomen: soft, non-tender non-distended  Extremities: + dependent edema.  B/L LE amputation  Skin: No concerning rashes noted  Psych: good eye contact, normal affect  Neuro: AAO x 3      Laboratory Data     Please see above Diagnostic Studies   Pertinent images reviewed

## 2021-12-03 NOTE — PROGRESS NOTES
Blood pressures have been consistently high, 192/76 after dialysis at 1206 at which I gave all morning medicines including blood pressure medicines. At 1431 his pressure was 182/75 for which I gave PRN IV hydralazine. At the recheck at 1503 his pressure was 175/73. Dr. César Ford with nephrology and Dr. Eugenia Ormond notified. New order for hydralazine 50mg PO TID, first dose now.

## 2021-12-03 NOTE — DISCHARGE INSTR - DIET
Good nutrition is important when healing from an illness, injury, or surgery. Follow any nutrition recommendations given to you during your hospital stay. If you were given an oral nutrition supplement while in the hospital, continue to take this supplement at home. You can take it with meals, in-between meals, and/or before bedtime. These supplements can be purchased at most local grocery stores, pharmacies, and chain [employer]-stores. If you have any questions about your diet or nutrition, call the hospital and ask for the dietitian. 4 carb choices (60gm/meal), low potassium (less than 3,000 mg/day), 1500 ml fluid restriction.

## 2021-12-03 NOTE — DISCHARGE INSTR - COC
Continuity of Care Form    Patient Name: Suzanna Mercer   :  1945  MRN:  [id number]    Admit date:  2021  Discharge date:  ***    Code Status Order: Full Code   Advance Directives:      Admitting Physician:  Bety Wasserman MD  PCP: Jimena Ortiz MD    Discharging Nurse: [facility] Unit/Room#: 9591/7503-74  Discharging Unit Phone Number: ***    Emergency Contact:   Extended Emergency Contact Information  Primary Emergency Contact: [address] [place] [address] of [facility] Phone: [phone]  Relation: Child  Secondary Emergency Contact: Jinny Schwarz   [facility] Phone: [phone]  Relation: Domestic Partner    Past Surgical History:  Past Surgical History:   Procedure Laterality Date    CARDIAC SURGERY      cardiac stent    FEMORAL-TIBIAL BYPASS GRAFT Right 2019    RIGHT FEMORAL POSTERIOR TIBIAL BYPASS performed by Magdaleno Phelan MD at [address] [place] [address] Right 2019    INCISION AND DRAINAGE, TRANSMETATARSAL AMPUTATION ALL ON RIGHT FOOT performed by Edwige Jay DPM at [address] [place] [address] Right 2019    REVISION OF TRANSMETATARSAL AMPUTATION RIGHT FOOT performed by Edwige Jay DPM at [address] [place] [address] Left 2019    TMA    FOOT AMPUTATION Left 2019    LEFT FOOT TRANSMETATARSAL AMPUTATION performed by Consuelo Berry DPM at [address] [place] [address] Left 2019    REPEAT INCISION AND DRAINAGE, REVISION OF LEFT FOOT TRANSMETATARSAL AMPUTATION performed by Consuelo Berry DPM at [address] [place] [address] Left 10/11/2019    LEFT FOOT INCISION AND DRAINAGE WITH REVISION OF [place]  performed by Consuelo Berry DPM at [facility] Right 2019    RIGHT BELOW THE KNEE AMPUTATION performed by Magdaleno Phelan MD at [facility] Left 2019    LEFT BELOW KNEE AMPUTATION performed by Magdaleno Phelan MD at [address] [place] [address] Left 2/6/2020    DEBRIDEMENT AND PLACEMENT OF WOUND VAC LEFT BELOW THE KNEE AMPUTATION SITE performed by Henry Anton MD at [facility]       Immunization History:   Immunization History   Administered Date(s) Administered    COVID-19, Pfizer, PF, 30mcg/0.3mL 12/30/2020, 02/10/2021, 09/16/2021    Influenza Vaccine, unspecified formulation 10/05/2016    Influenza, High Dose (Fluzone 65 yrs and older) 10/05/2011, 10/07/2014, 11/17/2015, 10/15/2017, 10/09/2018    Pneumococcal Conjugate 13-valent ([id number]) 11/15/2016    Pneumococcal Polysaccharide ([id number]) 10/07/2014    Tdap (Boostrix, Adacel) 12/04/2018       Active Problems:  Patient Active Problem List   Diagnosis Code    HTN (hypertension) I10    Diabetes mellitus ([facility] E11.9    Anemia D64.9    Diabetic retinopathy ([facility] E11.319    Cataracts, bilateral H26.9    Mixed hyperlipidemia E78.2    Atrial fibrillation ([facility]) I48.91    ESRD on dialysis ([facility] N18.6, Z99.2    Ischemic heart disease I25.9    Acute anterolateral wall MI ([facility] I21.09    Acute ST elevation myocardial infarction (STEMI) involving left anterior descending (LAD) coronary artery ([facility]) I21.02    CKD (chronic kidney disease) stage 4, GFR 15-29 ml/min ([facility]) N18.4    DM (diabetes mellitus) type II uncontrolled with renal manifestation E11.29, E11.65    Fungal infection of foot B35.3    Hemodialysis patient ([facility] Z99.2    Malignant essential hypertension I10    Right second toe ulcer, with necrosis of bone ([facility]) L97.514    Hyperkalemia E87.5    CAD (coronary artery disease) s/p stent to LAD and RCA 10/2018 U57.68    Acute metabolic encephalopathy J73.32    Pain in right foot M79.671    Somnolence R40.0    Encounter for palliative care Z51.5    Advance directive discussed with patient Z71.89    Gangrene of right foot ([facility] I96    Gas gangrene ([facility] A48.0    Sepsis due to pneumonia ([facility] J18.9, A41.9    Advance care planning Z71.89    Abscess or cellulitis of toe, left L03.032, L02.612    Gangrene of toe of left foot ([facility] I96    PVD (peripheral vascular disease) ([facility]) I73.9    Type 2 diabetes mellitus, with long-term current use of insulin ([facility]) E11.9, Z79.4    Fever R50.9    Wound, open T14. 8XXA    Pseudomonas infection A49.8    Diabetic foot infection ([facility] E11.628, L08.9    Surgical wound dehiscence T81.31XA    Surgical wound, non healing T81.89XA    Osteomyelitis ([facility] M86.9    Necrosis of amputation stump ([facility] T87.50    Dehiscence of closure of skin, sequela T81.31XS    Acute on chronic anemia D64.9    AMS (altered mental status) R41.82       Isolation/Infection:   Isolation            No Isolation          Patient Infection Status       Infection Onset Added Last Indicated Last Indicated By Review Planned Expiration Resolved Resolved By    None active    Resolved    COVID-19 (Rule Out) 10/05/21 10/05/21 10/05/21 COVID-19 (Ordered)   10/06/21 Rule-Out Test Resulted    COVID-19 (Rule Out) 08/13/20 08/13/20 08/13/20 COVID-19 (Ordered)   08/15/20 Rule-Out Test Resulted    COVID-19 (Rule Out) 06/16/20 06/16/20 06/16/20 COVID-19 (Ordered)   06/17/20 Rule-Out Test Resulted    VRE 10/08/19 10/12/19 10/11/19 Surgical Culture   06/18/20 Mine Ko RN    MRSA 08/21/19 08/23/19 08/24/19 Body Fluid Culture   06/18/20 Mine Ko RN            Nurse Assessment:  Last Vital Signs: BP (!) 172/79   Pulse 75   Temp 98.6 °F (37 °C)   Resp 18   Ht 4' (1.219 m)   Wt 223 lb 15.8 oz (101.6 kg)   SpO2 99%   BMI 68.35 kg/m²     Last documented pain score (0-10 scale): Pain Level: 0  Last Weight:   Wt Readings from Last 1 Encounters:   12/03/21 223 lb 15.8 oz (101.6 kg)     Mental Status:  oriented    IV Access:  - None    Nursing Mobility/ADLs:  Walking   Dependent  Transfer  Dependent  Bathing  Dependent  Dressing  Dependent  Toileting  Dependent  Feeding  Dependent  Med Admin  Dependent  Med Delivery   whole    Wound Care Documentation and Therapy:  Wound 06/16/20 Buttocks Left;Right skin flaking off, moisture related (Active)   Number of days: 534        Elimination:  Continence: Bowel: No  Bladder: No  Urinary Catheter: None   Colostomy/Ileostomy/Ileal Conduit: No       Date of Last BM: 12/01/2021    Intake/Output Summary (Last 24 hours) at 12/3/2021 1019  Last data filed at 12/3/2021 0732  Gross per 24 hour   Intake 125 ml   Output --   Net 125 ml     I/O last 3 completed shifts: In: 125 [P.O.:125]  Out: -     Safety Concerns: At Risk for Falls    Impairments/Disabilities:      None    Nutrition Therapy:  Current Nutrition Therapy:   - Oral Diet:  Carb Control 3 carbs/meal (1500kcals/day)    Routes of Feeding: Oral  Liquids: Thin Liquids  Daily Fluid Restriction: yes - amount 1500 ml  Last Modified Barium Swallow with Video (Video Swallowing Test): not done    Treatments at the Time of Hospital Discharge:   Respiratory Treatments:   Oxygen Therapy:  is not on home oxygen therapy.   Ventilator:    - No ventilator support    Rehab Therapies: Physical Therapy and Occupational Therapy  Weight Bearing Status/Restrictions: No weight bearing restirctions  Other Medical Equipment (for information only, NOT a DME order):  wheelchair and hospital bed  Other Treatments:     Patient's personal belongings (please select all that are sent with patient):  None    RN SIGNATURE:  Electronically signed by Gautam Cao RN on 12/3/21 at 10:41 AM EST    CASE MANAGEMENT/SOCIAL WORK SECTION    Inpatient Status Date: 12/1/21    Readmission Risk Assessment Score:  Readmission Risk              Risk of Unplanned Readmission:  26           Discharging to Facility/ Agency   Name: [place]  Address: [address]  [address]  Phone: [phone]  Fax: [phone]    Dialysis Facility (if applicable)   Name: [place] [facility]  [facility] [place] [address]  Dialysis Schedule: [facility]  Phone: [phone]  Fax: [phone]    / signature: Electronically signed by Stephania Villegas

## 2021-12-06 LAB
BLOOD BANK DISPENSE STATUS: NORMAL
BLOOD BANK PRODUCT CODE: NORMAL
BPU ID: NORMAL
DESCRIPTION BLOOD BANK: NORMAL

## 2021-12-16 LAB — HEPATITIS B CORE TOTAL ANTIBODY: NEGATIVE

## 2022-01-19 ENCOUNTER — HOSPITAL ENCOUNTER (EMERGENCY)
Age: 77
Discharge: NURSING, REHAB OR LONG-TERM CARE | End: 2022-01-19
Attending: EMERGENCY MEDICINE
Payer: MEDICARE

## 2022-01-19 VITALS
RESPIRATION RATE: 20 BRPM | SYSTOLIC BLOOD PRESSURE: 167 MMHG | BODY MASS INDEX: 43.19 KG/M2 | HEIGHT: 60 IN | TEMPERATURE: 98.3 F | OXYGEN SATURATION: 95 % | WEIGHT: 220 LBS | DIASTOLIC BLOOD PRESSURE: 75 MMHG | HEART RATE: 75 BPM

## 2022-01-19 DIAGNOSIS — D63.1 ANEMIA DUE TO CHRONIC KIDNEY DISEASE, ON CHRONIC DIALYSIS (HCC): Primary | ICD-10-CM

## 2022-01-19 DIAGNOSIS — Z99.2 ANEMIA DUE TO CHRONIC KIDNEY DISEASE, ON CHRONIC DIALYSIS (HCC): Primary | ICD-10-CM

## 2022-01-19 DIAGNOSIS — N18.6 ANEMIA DUE TO CHRONIC KIDNEY DISEASE, ON CHRONIC DIALYSIS (HCC): Primary | ICD-10-CM

## 2022-01-19 LAB
ABO/RH: NORMAL
ANION GAP SERPL CALCULATED.3IONS-SCNC: 11 MMOL/L (ref 3–16)
ANTIBODY SCREEN: NORMAL
BASOPHILS ABSOLUTE: 0 K/UL (ref 0–0.2)
BASOPHILS RELATIVE PERCENT: 0.9 %
BLOOD BANK DISPENSE STATUS: NORMAL
BLOOD BANK PRODUCT CODE: NORMAL
BPU ID: NORMAL
BUN BLDV-MCNC: 35 MG/DL (ref 7–20)
CALCIUM SERPL-MCNC: 9.3 MG/DL (ref 8.3–10.6)
CHLORIDE BLD-SCNC: 101 MMOL/L (ref 99–110)
CO2: 28 MMOL/L (ref 21–32)
CREAT SERPL-MCNC: 7.2 MG/DL (ref 0.8–1.3)
DESCRIPTION BLOOD BANK: NORMAL
EOSINOPHILS ABSOLUTE: 0.2 K/UL (ref 0–0.6)
EOSINOPHILS RELATIVE PERCENT: 3.4 %
GFR AFRICAN AMERICAN: 9
GFR NON-AFRICAN AMERICAN: 7
GLUCOSE BLD-MCNC: 96 MG/DL (ref 70–99)
HCT VFR BLD CALC: 20 % (ref 40.5–52.5)
HCT VFR BLD CALC: 21.6 % (ref 40.5–52.5)
HEMOGLOBIN: 6.8 G/DL (ref 13.5–17.5)
HEMOGLOBIN: 7.2 G/DL (ref 13.5–17.5)
LYMPHOCYTES ABSOLUTE: 1.4 K/UL (ref 1–5.1)
LYMPHOCYTES RELATIVE PERCENT: 28.2 %
MAGNESIUM: 2.2 MG/DL (ref 1.8–2.4)
MCH RBC QN AUTO: 29.5 PG (ref 26–34)
MCHC RBC AUTO-ENTMCNC: 33.8 G/DL (ref 31–36)
MCV RBC AUTO: 87.2 FL (ref 80–100)
MONOCYTES ABSOLUTE: 0.3 K/UL (ref 0–1.3)
MONOCYTES RELATIVE PERCENT: 5.7 %
NEUTROPHILS ABSOLUTE: 3.1 K/UL (ref 1.7–7.7)
NEUTROPHILS RELATIVE PERCENT: 61.8 %
PDW BLD-RTO: 13.5 % (ref 12.4–15.4)
PHOSPHORUS: 3.7 MG/DL (ref 2.5–4.9)
PLATELET # BLD: 177 K/UL (ref 135–450)
PMV BLD AUTO: 8.4 FL (ref 5–10.5)
POTASSIUM SERPL-SCNC: 4.3 MMOL/L (ref 3.5–5.1)
RBC # BLD: 2.29 M/UL (ref 4.2–5.9)
SODIUM BLD-SCNC: 140 MMOL/L (ref 136–145)
WBC # BLD: 5 K/UL (ref 4–11)

## 2022-01-19 PROCEDURE — 85025 COMPLETE CBC W/AUTO DIFF WBC: CPT

## 2022-01-19 PROCEDURE — 80048 BASIC METABOLIC PNL TOTAL CA: CPT

## 2022-01-19 PROCEDURE — 85014 HEMATOCRIT: CPT

## 2022-01-19 PROCEDURE — 99284 EMERGENCY DEPT VISIT MOD MDM: CPT

## 2022-01-19 PROCEDURE — 84100 ASSAY OF PHOSPHORUS: CPT

## 2022-01-19 PROCEDURE — 86900 BLOOD TYPING SEROLOGIC ABO: CPT

## 2022-01-19 PROCEDURE — 83735 ASSAY OF MAGNESIUM: CPT

## 2022-01-19 PROCEDURE — 86923 COMPATIBILITY TEST ELECTRIC: CPT

## 2022-01-19 PROCEDURE — 86850 RBC ANTIBODY SCREEN: CPT

## 2022-01-19 PROCEDURE — P9016 RBC LEUKOCYTES REDUCED: HCPCS

## 2022-01-19 PROCEDURE — 36430 TRANSFUSION BLD/BLD COMPNT: CPT

## 2022-01-19 PROCEDURE — 2580000003 HC RX 258: Performed by: PHYSICIAN ASSISTANT

## 2022-01-19 PROCEDURE — 85018 HEMOGLOBIN: CPT

## 2022-01-19 PROCEDURE — 86901 BLOOD TYPING SEROLOGIC RH(D): CPT

## 2022-01-19 RX ORDER — SODIUM CHLORIDE 9 MG/ML
INJECTION, SOLUTION INTRAVENOUS PRN
Status: COMPLETED | OUTPATIENT
Start: 2022-01-19 | End: 2022-01-19

## 2022-01-19 RX ADMIN — SODIUM CHLORIDE: 9 INJECTION, SOLUTION INTRAVENOUS at 05:35

## 2022-01-19 NOTE — ED NOTES
Attempted multiple times to contact nursing facility, [facility], from which the pt is a resident, to give notification of pt return however no answer from nursing staff for any attempt.       Renetta Mullins RN  01/19/22 9858

## 2022-01-19 NOTE — ED NOTES
Attempted to call Tennie Persons to obtain consent for blood transfusion, however unable to answer - voicemail left. Called daughter, Joaquín Call who was able to give consent for blood transfusion today. Consent verified by charge nurse who also spoke with Joaquín Call.       Joie Harrison RN  01/19/22 4775

## 2022-01-19 NOTE — ED PROVIDER NOTES
ED Attending Attestation Note     Date of evaluation: 1/19/2022    This patient was seen by the advance practice provider. I have seen and examined the patient, agree with the workup, evaluation, management and diagnosis. The care plan has been discussed. My assessment reveals with multiple chronic medical problems including end-stage renal disease and chronic anemia presents for low blood counts. Patient has a hemoglobin of 6.8 which is approximately at his baseline. Patient is agreeable to transfusion and will be given 1 unit of packed red blood cells and discharged back to his care facility. [place] Franco MD  01/19/22 1492

## 2022-01-27 ENCOUNTER — APPOINTMENT (OUTPATIENT)
Dept: CT IMAGING | Age: 77
DRG: 870 | End: 2022-01-27
Payer: MEDICARE

## 2022-01-27 ENCOUNTER — APPOINTMENT (OUTPATIENT)
Dept: GENERAL RADIOLOGY | Age: 77
DRG: 870 | End: 2022-01-27
Payer: MEDICARE

## 2022-01-27 ENCOUNTER — HOSPITAL ENCOUNTER (INPATIENT)
Age: 77
LOS: 7 days | Discharge: HOSPICE | DRG: 870 | End: 2022-02-03
Attending: EMERGENCY MEDICINE | Admitting: INTERNAL MEDICINE
Payer: MEDICARE

## 2022-01-27 DIAGNOSIS — I46.9 CARDIAC ARREST (HCC): Primary | ICD-10-CM

## 2022-01-27 LAB
ABO/RH: NORMAL
ALBUMIN SERPL-MCNC: 3.6 G/DL (ref 3.4–5)
ALP BLD-CCNC: 112 U/L (ref 40–129)
ALT SERPL-CCNC: 731 U/L (ref 10–40)
ANION GAP SERPL CALCULATED.3IONS-SCNC: 15 MMOL/L (ref 3–16)
ANION GAP SERPL CALCULATED.3IONS-SCNC: 21 MMOL/L (ref 3–16)
ANTIBODY SCREEN: NORMAL
APTT: 26.4 SEC (ref 26.2–38.6)
AST SERPL-CCNC: 831 U/L (ref 15–37)
BASE EXCESS VENOUS: -4.3 MMOL/L (ref -2–3)
BASE EXCESS VENOUS: 2 (ref -3–3)
BASOPHILS ABSOLUTE: 0 K/UL (ref 0–0.2)
BASOPHILS RELATIVE PERCENT: 0.4 %
BILIRUB SERPL-MCNC: 0.3 MG/DL (ref 0–1)
BILIRUBIN DIRECT: <0.2 MG/DL (ref 0–0.3)
BILIRUBIN, INDIRECT: ABNORMAL MG/DL (ref 0–1)
BUN BLDV-MCNC: 23 MG/DL (ref 7–20)
BUN BLDV-MCNC: 33 MG/DL (ref 7–20)
CALCIUM IONIZED: 1.22 MMOL/L (ref 1.12–1.32)
CALCIUM SERPL-MCNC: 10.9 MG/DL (ref 8.3–10.6)
CALCIUM SERPL-MCNC: 9.8 MG/DL (ref 8.3–10.6)
CARBOXYHEMOGLOBIN: 1 % (ref 0–1.5)
CHLORIDE BLD-SCNC: 100 MMOL/L (ref 99–110)
CHLORIDE BLD-SCNC: 101 MMOL/L (ref 99–110)
CO2: 20 MMOL/L (ref 21–32)
CO2: 24 MMOL/L (ref 21–32)
CREAT SERPL-MCNC: 5.8 MG/DL (ref 0.8–1.3)
CREAT SERPL-MCNC: 6 MG/DL (ref 0.8–1.3)
EKG ATRIAL RATE: 76 BPM
EKG DIAGNOSIS: NORMAL
EKG P AXIS: 106 DEGREES
EKG P-R INTERVAL: 238 MS
EKG Q-T INTERVAL: 438 MS
EKG QRS DURATION: 100 MS
EKG QTC CALCULATION (BAZETT): 492 MS
EKG R AXIS: 36 DEGREES
EKG T AXIS: 81 DEGREES
EKG VENTRICULAR RATE: 76 BPM
EOSINOPHILS ABSOLUTE: 0.1 K/UL (ref 0–0.6)
EOSINOPHILS RELATIVE PERCENT: 0.9 %
GFR AFRICAN AMERICAN: 11
GFR AFRICAN AMERICAN: 12
GFR NON-AFRICAN AMERICAN: 10
GFR NON-AFRICAN AMERICAN: 9
GLUCOSE BLD-MCNC: 157 MG/DL (ref 70–99)
GLUCOSE BLD-MCNC: 190 MG/DL (ref 70–99)
GLUCOSE BLD-MCNC: 199 MG/DL (ref 70–99)
GLUCOSE BLD-MCNC: 94 MG/DL (ref 70–99)
HCO3 VENOUS: 22.8 MMOL/L (ref 24–28)
HCO3 VENOUS: 26.6 MMOL/L (ref 23–29)
HCT VFR BLD CALC: 22.5 % (ref 40.5–52.5)
HEMOGLOBIN, VEN, REDUCED: 20.8 %
HEMOGLOBIN: 7.4 G/DL (ref 13.5–17.5)
INR BLD: 1.2 (ref 0.88–1.12)
LACTATE: 0.85 MMOL/L (ref 0.4–2)
LACTIC ACID: 1 MMOL/L (ref 0.4–2)
LACTIC ACID: 8.9 MMOL/L (ref 0.4–2)
LYMPHOCYTES ABSOLUTE: 2.9 K/UL (ref 1–5.1)
LYMPHOCYTES RELATIVE PERCENT: 30.2 %
MAGNESIUM: 2.1 MG/DL (ref 1.8–2.4)
MCH RBC QN AUTO: 29.9 PG (ref 26–34)
MCHC RBC AUTO-ENTMCNC: 32.7 G/DL (ref 31–36)
MCV RBC AUTO: 91.2 FL (ref 80–100)
METHEMOGLOBIN VENOUS: 0.7 % (ref 0–1.5)
MONOCYTES ABSOLUTE: 0.3 K/UL (ref 0–1.3)
MONOCYTES RELATIVE PERCENT: 3.2 %
NEUTROPHILS ABSOLUTE: 6.2 K/UL (ref 1.7–7.7)
NEUTROPHILS RELATIVE PERCENT: 65.3 %
O2 SAT, VEN: 79 %
O2 SAT, VEN: 99 %
PCO2, VEN: 41.6 MM HG (ref 40–50)
PCO2, VEN: 52.1 MMHG (ref 41–51)
PDW BLD-RTO: 14 % (ref 12.4–15.4)
PERFORMED ON: ABNORMAL
PERFORMED ON: ABNORMAL
PH VENOUS: 7.25 (ref 7.35–7.45)
PH VENOUS: 7.41 (ref 7.35–7.45)
PHOSPHORUS: 3.7 MG/DL (ref 2.5–4.9)
PLATELET # BLD: 195 K/UL (ref 135–450)
PMV BLD AUTO: 9.1 FL (ref 5–10.5)
PO2, VEN: 127 MM HG
PO2, VEN: 52 MMHG (ref 25–40)
POC POTASSIUM: 3.5 MMOL/L (ref 3.5–5.1)
POC SAMPLE TYPE: ABNORMAL
POC SODIUM: 142 MMOL/L (ref 136–145)
POTASSIUM REFLEX MAGNESIUM: 4.5 MMOL/L (ref 3.5–5.1)
POTASSIUM SERPL-SCNC: 3.9 MMOL/L (ref 3.5–5.1)
PRO-BNP: 7274 PG/ML (ref 0–449)
PROTHROMBIN TIME: 13.7 SEC (ref 9.9–12.7)
RBC # BLD: 2.47 M/UL (ref 4.2–5.9)
SODIUM BLD-SCNC: 140 MMOL/L (ref 136–145)
SODIUM BLD-SCNC: 141 MMOL/L (ref 136–145)
TCO2 CALC VENOUS: 24 MMOL/L
TCO2 CALC VENOUS: 28 MMOL/L
TOTAL PROTEIN: 6.5 G/DL (ref 6.4–8.2)
TROPONIN: 0.19 NG/ML
WBC # BLD: 9.5 K/UL (ref 4–11)

## 2022-01-27 PROCEDURE — 36415 COLL VENOUS BLD VENIPUNCTURE: CPT

## 2022-01-27 PROCEDURE — 71275 CT ANGIOGRAPHY CHEST: CPT

## 2022-01-27 PROCEDURE — 84484 ASSAY OF TROPONIN QUANT: CPT

## 2022-01-27 PROCEDURE — 80076 HEPATIC FUNCTION PANEL: CPT

## 2022-01-27 PROCEDURE — 2580000003 HC RX 258

## 2022-01-27 PROCEDURE — 82330 ASSAY OF CALCIUM: CPT

## 2022-01-27 PROCEDURE — 82803 BLOOD GASES ANY COMBINATION: CPT

## 2022-01-27 PROCEDURE — 85730 THROMBOPLASTIN TIME PARTIAL: CPT

## 2022-01-27 PROCEDURE — 6360000002 HC RX W HCPCS: Performed by: EMERGENCY MEDICINE

## 2022-01-27 PROCEDURE — 6360000002 HC RX W HCPCS

## 2022-01-27 PROCEDURE — 82947 ASSAY GLUCOSE BLOOD QUANT: CPT

## 2022-01-27 PROCEDURE — 96375 TX/PRO/DX INJ NEW DRUG ADDON: CPT

## 2022-01-27 PROCEDURE — 83735 ASSAY OF MAGNESIUM: CPT

## 2022-01-27 PROCEDURE — 94002 VENT MGMT INPAT INIT DAY: CPT

## 2022-01-27 PROCEDURE — 99283 EMERGENCY DEPT VISIT LOW MDM: CPT

## 2022-01-27 PROCEDURE — 2580000003 HC RX 258: Performed by: INTERNAL MEDICINE

## 2022-01-27 PROCEDURE — P9016 RBC LEUKOCYTES REDUCED: HCPCS

## 2022-01-27 PROCEDURE — 5A1955Z RESPIRATORY VENTILATION, GREATER THAN 96 CONSECUTIVE HOURS: ICD-10-PCS | Performed by: INTERNAL MEDICINE

## 2022-01-27 PROCEDURE — 84132 ASSAY OF SERUM POTASSIUM: CPT

## 2022-01-27 PROCEDURE — 84100 ASSAY OF PHOSPHORUS: CPT

## 2022-01-27 PROCEDURE — 70450 CT HEAD/BRAIN W/O DYE: CPT

## 2022-01-27 PROCEDURE — 85025 COMPLETE CBC W/AUTO DIFF WBC: CPT

## 2022-01-27 PROCEDURE — 86900 BLOOD TYPING SEROLOGIC ABO: CPT

## 2022-01-27 PROCEDURE — 0BH17EZ INSERTION OF ENDOTRACHEAL AIRWAY INTO TRACHEA, VIA NATURAL OR ARTIFICIAL OPENING: ICD-10-PCS | Performed by: EMERGENCY MEDICINE

## 2022-01-27 PROCEDURE — 2580000003 HC RX 258: Performed by: STUDENT IN AN ORGANIZED HEALTH CARE EDUCATION/TRAINING PROGRAM

## 2022-01-27 PROCEDURE — 80048 BASIC METABOLIC PNL TOTAL CA: CPT

## 2022-01-27 PROCEDURE — 6360000002 HC RX W HCPCS: Performed by: STUDENT IN AN ORGANIZED HEALTH CARE EDUCATION/TRAINING PROGRAM

## 2022-01-27 PROCEDURE — 6370000000 HC RX 637 (ALT 250 FOR IP): Performed by: STUDENT IN AN ORGANIZED HEALTH CARE EDUCATION/TRAINING PROGRAM

## 2022-01-27 PROCEDURE — 86923 COMPATIBILITY TEST ELECTRIC: CPT

## 2022-01-27 PROCEDURE — 85610 PROTHROMBIN TIME: CPT

## 2022-01-27 PROCEDURE — 93005 ELECTROCARDIOGRAM TRACING: CPT | Performed by: EMERGENCY MEDICINE

## 2022-01-27 PROCEDURE — 2580000003 HC RX 258: Performed by: EMERGENCY MEDICINE

## 2022-01-27 PROCEDURE — 92950 HEART/LUNG RESUSCITATION CPR: CPT

## 2022-01-27 PROCEDURE — 71045 X-RAY EXAM CHEST 1 VIEW: CPT

## 2022-01-27 PROCEDURE — 2700000000 HC OXYGEN THERAPY PER DAY

## 2022-01-27 PROCEDURE — 86901 BLOOD TYPING SEROLOGIC RH(D): CPT

## 2022-01-27 PROCEDURE — 6360000002 HC RX W HCPCS: Performed by: INTERNAL MEDICINE

## 2022-01-27 PROCEDURE — 2500000003 HC RX 250 WO HCPCS: Performed by: EMERGENCY MEDICINE

## 2022-01-27 PROCEDURE — 99291 CRITICAL CARE FIRST HOUR: CPT | Performed by: INTERNAL MEDICINE

## 2022-01-27 PROCEDURE — 94761 N-INVAS EAR/PLS OXIMETRY MLT: CPT

## 2022-01-27 PROCEDURE — 31500 INSERT EMERGENCY AIRWAY: CPT

## 2022-01-27 PROCEDURE — 83605 ASSAY OF LACTIC ACID: CPT

## 2022-01-27 PROCEDURE — 2000000000 HC ICU R&B

## 2022-01-27 PROCEDURE — 83880 ASSAY OF NATRIURETIC PEPTIDE: CPT

## 2022-01-27 PROCEDURE — 2500000003 HC RX 250 WO HCPCS: Performed by: STUDENT IN AN ORGANIZED HEALTH CARE EDUCATION/TRAINING PROGRAM

## 2022-01-27 PROCEDURE — 84295 ASSAY OF SERUM SODIUM: CPT

## 2022-01-27 PROCEDURE — 96365 THER/PROPH/DIAG IV INF INIT: CPT

## 2022-01-27 PROCEDURE — 86850 RBC ANTIBODY SCREEN: CPT

## 2022-01-27 PROCEDURE — 6360000004 HC RX CONTRAST MEDICATION: Performed by: INTERNAL MEDICINE

## 2022-01-27 RX ORDER — PROPOFOL 10 MG/ML
INJECTION, EMULSION INTRAVENOUS
Status: COMPLETED
Start: 2022-01-27 | End: 2022-01-27

## 2022-01-27 RX ORDER — ROCURONIUM BROMIDE 10 MG/ML
100 INJECTION, SOLUTION INTRAVENOUS ONCE
Status: COMPLETED | OUTPATIENT
Start: 2022-01-27 | End: 2022-01-27

## 2022-01-27 RX ORDER — POLYETHYLENE GLYCOL 3350 17 G/17G
17 POWDER, FOR SOLUTION ORAL DAILY PRN
Status: DISCONTINUED | OUTPATIENT
Start: 2022-01-27 | End: 2022-02-03 | Stop reason: HOSPADM

## 2022-01-27 RX ORDER — HEPARIN SODIUM 1000 [USP'U]/ML
40 INJECTION, SOLUTION INTRAVENOUS; SUBCUTANEOUS PRN
Status: DISCONTINUED | OUTPATIENT
Start: 2022-01-27 | End: 2022-02-02

## 2022-01-27 RX ORDER — PROPOFOL 10 MG/ML
5-50 INJECTION, EMULSION INTRAVENOUS
Status: DISCONTINUED | OUTPATIENT
Start: 2022-01-27 | End: 2022-01-27

## 2022-01-27 RX ORDER — HEPARIN SODIUM 10000 [USP'U]/100ML
5-30 INJECTION, SOLUTION INTRAVENOUS CONTINUOUS
Status: DISCONTINUED | OUTPATIENT
Start: 2022-01-27 | End: 2022-02-02

## 2022-01-27 RX ORDER — CASTOR OIL AND BALSAM, PERU 788; 87 MG/G; MG/G
OINTMENT TOPICAL 2 TIMES DAILY
Status: DISCONTINUED | OUTPATIENT
Start: 2022-01-27 | End: 2022-02-02

## 2022-01-27 RX ORDER — FENTANYL CITRATE 50 UG/ML
INJECTION, SOLUTION INTRAMUSCULAR; INTRAVENOUS
Status: DISPENSED
Start: 2022-01-27 | End: 2022-01-27

## 2022-01-27 RX ORDER — HEPARIN SODIUM 1000 [USP'U]/ML
80 INJECTION, SOLUTION INTRAVENOUS; SUBCUTANEOUS ONCE
Status: COMPLETED | OUTPATIENT
Start: 2022-01-27 | End: 2022-01-27

## 2022-01-27 RX ORDER — ONDANSETRON 2 MG/ML
4 INJECTION INTRAMUSCULAR; INTRAVENOUS EVERY 6 HOURS PRN
Status: DISCONTINUED | OUTPATIENT
Start: 2022-01-27 | End: 2022-02-03 | Stop reason: HOSPADM

## 2022-01-27 RX ORDER — CALCIUM CHLORIDE 100 MG/ML
1000 INJECTION INTRAVENOUS; INTRAVENTRICULAR ONCE
Status: COMPLETED | OUTPATIENT
Start: 2022-01-27 | End: 2022-01-27

## 2022-01-27 RX ORDER — DEXTROSE MONOHYDRATE 50 MG/ML
100 INJECTION, SOLUTION INTRAVENOUS PRN
Status: DISCONTINUED | OUTPATIENT
Start: 2022-01-27 | End: 2022-02-02

## 2022-01-27 RX ORDER — SODIUM CHLORIDE 9 MG/ML
INJECTION, SOLUTION INTRAVENOUS
Status: COMPLETED
Start: 2022-01-27 | End: 2022-01-27

## 2022-01-27 RX ORDER — SODIUM CHLORIDE 0.9 % (FLUSH) 0.9 %
5-40 SYRINGE (ML) INJECTION PRN
Status: DISCONTINUED | OUTPATIENT
Start: 2022-01-27 | End: 2022-02-02

## 2022-01-27 RX ORDER — KETAMINE HYDROCHLORIDE 50 MG/ML
100 INJECTION, SOLUTION, CONCENTRATE INTRAMUSCULAR; INTRAVENOUS ONCE
Status: COMPLETED | OUTPATIENT
Start: 2022-01-27 | End: 2022-01-27

## 2022-01-27 RX ORDER — HEPARIN SODIUM 1000 [USP'U]/ML
80 INJECTION, SOLUTION INTRAVENOUS; SUBCUTANEOUS PRN
Status: DISCONTINUED | OUTPATIENT
Start: 2022-01-27 | End: 2022-02-02

## 2022-01-27 RX ORDER — DEXTROSE MONOHYDRATE 25 G/50ML
12.5 INJECTION, SOLUTION INTRAVENOUS PRN
Status: DISCONTINUED | OUTPATIENT
Start: 2022-01-27 | End: 2022-02-02

## 2022-01-27 RX ORDER — AMLODIPINE BESYLATE 10 MG/1
10 TABLET ORAL NIGHTLY
Status: ON HOLD | COMMUNITY
End: 2022-02-03 | Stop reason: HOSPADM

## 2022-01-27 RX ORDER — SODIUM CHLORIDE 0.9 % (FLUSH) 0.9 %
5-40 SYRINGE (ML) INJECTION EVERY 12 HOURS SCHEDULED
Status: DISCONTINUED | OUTPATIENT
Start: 2022-01-27 | End: 2022-02-02

## 2022-01-27 RX ORDER — NICOTINE POLACRILEX 4 MG
15 LOZENGE BUCCAL PRN
Status: DISCONTINUED | OUTPATIENT
Start: 2022-01-27 | End: 2022-02-03 | Stop reason: HOSPADM

## 2022-01-27 RX ORDER — ACETAMINOPHEN 650 MG/1
650 SUPPOSITORY RECTAL EVERY 6 HOURS PRN
Status: DISCONTINUED | OUTPATIENT
Start: 2022-01-27 | End: 2022-02-02

## 2022-01-27 RX ORDER — 0.9 % SODIUM CHLORIDE 0.9 %
250 INTRAVENOUS SOLUTION INTRAVENOUS ONCE
Status: COMPLETED | OUTPATIENT
Start: 2022-01-27 | End: 2022-01-27

## 2022-01-27 RX ORDER — HEPARIN SODIUM 5000 [USP'U]/ML
5000 INJECTION, SOLUTION INTRAVENOUS; SUBCUTANEOUS EVERY 8 HOURS SCHEDULED
Status: DISCONTINUED | OUTPATIENT
Start: 2022-01-27 | End: 2022-01-27

## 2022-01-27 RX ORDER — FERROUS SULFATE 325(65) MG
325 TABLET ORAL
Status: DISCONTINUED | OUTPATIENT
Start: 2022-01-28 | End: 2022-02-02

## 2022-01-27 RX ORDER — PRAVASTATIN SODIUM 10 MG
20 TABLET ORAL DAILY
Status: DISCONTINUED | OUTPATIENT
Start: 2022-01-27 | End: 2022-02-02

## 2022-01-27 RX ORDER — POTASSIUM CHLORIDE 7.45 MG/ML
10 INJECTION INTRAVENOUS
Status: DISCONTINUED | OUTPATIENT
Start: 2022-01-27 | End: 2022-01-27

## 2022-01-27 RX ORDER — FENTANYL CITRATE-0.9 % NACL/PF 10 MCG/ML
12.5-2 PLASTIC BAG, INJECTION (ML) INTRAVENOUS CONTINUOUS
Status: DISCONTINUED | OUTPATIENT
Start: 2022-01-27 | End: 2022-01-28

## 2022-01-27 RX ORDER — ONDANSETRON 4 MG/1
4 TABLET, ORALLY DISINTEGRATING ORAL EVERY 8 HOURS PRN
Status: DISCONTINUED | OUTPATIENT
Start: 2022-01-27 | End: 2022-02-03 | Stop reason: HOSPADM

## 2022-01-27 RX ORDER — SODIUM CHLORIDE 9 MG/ML
25 INJECTION, SOLUTION INTRAVENOUS PRN
Status: DISCONTINUED | OUTPATIENT
Start: 2022-01-27 | End: 2022-02-02

## 2022-01-27 RX ORDER — ASPIRIN 81 MG/1
81 TABLET ORAL DAILY
Status: DISCONTINUED | OUTPATIENT
Start: 2022-01-27 | End: 2022-01-31

## 2022-01-27 RX ORDER — ACETAMINOPHEN 325 MG/1
650 TABLET ORAL EVERY 6 HOURS PRN
Status: DISCONTINUED | OUTPATIENT
Start: 2022-01-27 | End: 2022-02-03 | Stop reason: HOSPADM

## 2022-01-27 RX ADMIN — FENTANYL CITRATE 15 MCG/HR: 50 INJECTION, SOLUTION INTRAMUSCULAR; INTRAVENOUS at 19:49

## 2022-01-27 RX ADMIN — PROPOFOL 10 MCG/KG/MIN: 10 INJECTION, EMULSION INTRAVENOUS at 08:46

## 2022-01-27 RX ADMIN — SODIUM BICARBONATE 50 MEQ: 84 INJECTION, SOLUTION INTRAVENOUS at 08:31

## 2022-01-27 RX ADMIN — CALCIUM CHLORIDE 1000 MG: 100 INJECTION, SOLUTION INTRAVENOUS at 08:29

## 2022-01-27 RX ADMIN — HEPARIN SODIUM 18 UNITS/KG/HR: 5000 INJECTION INTRAVENOUS; SUBCUTANEOUS at 19:57

## 2022-01-27 RX ADMIN — SODIUM CHLORIDE 250 ML: 9 INJECTION, SOLUTION INTRAVENOUS at 09:20

## 2022-01-27 RX ADMIN — FENTANYL CITRATE 50 MCG/HR: 50 INJECTION, SOLUTION INTRAMUSCULAR; INTRAVENOUS at 08:50

## 2022-01-27 RX ADMIN — Medication 250 ML: at 09:20

## 2022-01-27 RX ADMIN — SODIUM CHLORIDE 20 MCG/MIN: 900 INJECTION, SOLUTION INTRAVENOUS at 09:20

## 2022-01-27 RX ADMIN — FAMOTIDINE 20 MG: 10 INJECTION, SOLUTION INTRAVENOUS at 17:09

## 2022-01-27 RX ADMIN — HEPARIN SODIUM 7980 UNITS: 1000 INJECTION INTRAVENOUS; SUBCUTANEOUS at 19:50

## 2022-01-27 RX ADMIN — KETAMINE HYDROCHLORIDE 100 MG: 50 INJECTION INTRAMUSCULAR; INTRAVENOUS at 08:35

## 2022-01-27 RX ADMIN — CASTOR OIL AND BALSAM, PERU: 788; 87 OINTMENT TOPICAL at 20:20

## 2022-01-27 RX ADMIN — ROCURONIUM BROMIDE 100 MG: 10 INJECTION, SOLUTION INTRAVENOUS at 08:36

## 2022-01-27 RX ADMIN — SODIUM CHLORIDE, PRESERVATIVE FREE 10 ML: 5 INJECTION INTRAVENOUS at 20:21

## 2022-01-27 RX ADMIN — HEPARIN SODIUM 5000 UNITS: 5000 INJECTION, SOLUTION INTRAVENOUS; SUBCUTANEOUS at 15:24

## 2022-01-27 RX ADMIN — IOPAMIDOL 80 ML: 755 INJECTION, SOLUTION INTRAVENOUS at 18:50

## 2022-01-27 ASSESSMENT — PULMONARY FUNCTION TESTS
PIF_VALUE: 18
PIF_VALUE: 13
PIF_VALUE: 20

## 2022-01-27 NOTE — CONSULTS
10/05/2021    TIBC 189 (L) 10/05/2021    FERRITIN 1,665.0 (H) 08/13/2020     Lab Results   Component Value Date    WBC 9.5 01/27/2022    HGB 7.4 (L) 01/27/2022    HCT 22.5 (L) 01/27/2022    MCV 91.2 01/27/2022     01/27/2022             Reason for Consult and Chief Complaint     Reason for consult: ESRD    Chief complaint:   Chief Complaint   Patient presents with    Cardiac Arrest       History of Present Illness   Mike Webster is a 67 y.o. with PMH significant for ESRD on HD M/W/F, DM2, bl BKA, pAF, CAD (s/p stent in LAD and RCA 2018), HTN, HLD admitted after cardiac arrest and now patient is intubated. Patient was on pressors but now weaned off. Labs stable. Review of Systems   Positive in bold or unable to assess     GEN: Fever, chills, night sweats. HEENT: Changes in vision, sore throat, rhinorrhea   CVS: Chest pain, palpitations, swelling or edema in legs  Pulmonary: Cough, hemoptysis, SOB  GI: Nausea, vomiting, diarrhea, constipation, abdominal pain  : Bladder incontinence, dysuria,hematuria. MSK: Muscle or joint or bone pains  Skin: Rashes, ulcers, skin thickness  CNS: Headache, dizziness, confusion, focal weakness, seizure. Psych: Anxiety, agitation, depression. Reviewed all 12 systems, negative except as above.      Past Medical History     Past Medical History:   Diagnosis Date    Anemia     Atrial fibrillation ([facility] 11/4/2013    CAD (coronary artery disease)     Mi, stent    Chronic kidney disease     DVT (deep venous thrombosis) (HCC)     End stage renal disease (HCC)     Gas gangrene ([facility]     Hemodialysis patient ([facility]     M-W-F    Hx of blood clots     Hyperkalemia     Hyperlipidemia 5/30/2012    Hypertension     Hyperthyroidism     Ischemic heart disease 6/52/6615    Metabolic encephalopathy     MI (myocardial infarction) ([facility] 10/2018    Type II or unspecified type diabetes mellitus without mention of complication, not stated as uncontrolled          Past Surgical History     Past Surgical History:   Procedure Laterality Date    CARDIAC SURGERY      cardiac stent    FEMORAL-TIBIAL BYPASS GRAFT Right 1/9/2019    RIGHT FEMORAL POSTERIOR TIBIAL BYPASS performed by Ryan Bourne MD at [address] [place] [address] Right 1/4/2019    INCISION AND DRAINAGE, TRANSMETATARSAL AMPUTATION ALL ON RIGHT FOOT performed by Alexandro Lujan DPM at [address] [place] [address] Right 1/14/2019    REVISION OF TRANSMETATARSAL AMPUTATION RIGHT FOOT performed by Alexandro Lujan DPM at [address] [place] [address] Left 08/24/2019    TMA    FOOT AMPUTATION Left 8/24/2019    LEFT FOOT TRANSMETATARSAL AMPUTATION performed by Mavis Escobedo DPM at [address] [place] [address] Left 8/27/2019    REPEAT INCISION AND DRAINAGE, REVISION OF LEFT FOOT TRANSMETATARSAL AMPUTATION performed by Mavis Escobedo DPM at [address] [place] [address] Left 10/11/2019    LEFT FOOT INCISION AND DRAINAGE WITH REVISION OF TRANSMETARSAL AMPUTATION WITH WOUND VAC APPLICATION  performed by Mavis Escobedo DPM at [address] Right 1/18/2019    RIGHT BELOW THE KNEE AMPUTATION performed by Ryan Bourne MD at [address] Left 12/9/2019    LEFT BELOW KNEE AMPUTATION performed by Ryan Bourne MD at [facility] Left 2/6/2020    DEBRIDEMENT AND PLACEMENT OF WOUND VAC LEFT BELOW THE KNEE AMPUTATION SITE performed by Nori Rey MD at [place] [address] History     Family History   Problem Relation Age of Onset    Arthritis Mother          Social History     Social History     Tobacco Use    Smoking status: Never Smoker    Smokeless tobacco: Never Used   Substance Use Topics    Alcohol use: Not Currently          Past medical, family, and social histories were reviewed as previously documented. Updates were made as necessary.       Inpatient Medications and Allergies       Scheduled Meds:   fentaNYL        aspirin  81 mg Oral Daily    [START ON 1/28/2022] ferrous sulfate  325 mg Oral Daily with breakfast    pravastatin  20 mg Oral Daily    sodium chloride flush  5-40 mL IntraVENous 2 times per day    heparin (porcine)  5,000 Units SubCUTAneous 3 times per day    potassium chloride  10 mEq IntraVENous Q1H       Allergies: No Known Allergies      Vital Signs     Vitals:    01/27/22 1405   BP:    Pulse:    Resp: 14   Temp:    SpO2: 100%         Intake/Output Summary (Last 24 hours) at 1/27/2022 1527  Last data filed at 1/27/2022 1115  Gross per 24 hour   Intake 268.93 ml   Output --   Net 268.93 ml         Physical Exam     General appearance: NAD  Head: Normocephalic, without obvious abnormality, atraumatic   Mouth: ETT in mouth  Neck: Supple. Lungs: Intubated. No respiratory distress  Heart: S1, S2.  Abdomen: soft,  non-distended  Extremities: No leg edema, warm to touch   Skin: No concerning rashes noted  Neuro: Sedated.        Laboratory Data     Please see above     Diagnostic Studies   Pertinent images reviewed

## 2022-01-27 NOTE — ED NOTES
Clinical Pharmacy Progress Note  Medication History     Admit Date: 1/27/2022    List of of current medications patient is taking is complete. Home Medication list in EPIC updated to reflect changes noted below. Source of information: faxed list from [address] [place] [address] Pkwy made to medication list:   Medications removed:   Nifedipine ER 90mg tablet daily   Retacrit 08602 units/1mL SQ three times weekly   Phytoplex paste BID to scrotum  Medications added:    Amlodipine      Please call with any questions.   Benjie Rahman PharmD., BCPS   1/27/2022 11:37 AM  Wireless: [phone]

## 2022-01-27 NOTE — ED NOTES
Pt was incontinent of large pasty dark stool. Krysta care done. Linens changed. Clean gown applied.      Lily Tanner, RN  01/27/22 7093

## 2022-01-27 NOTE — ED NOTES
Clinical Pharmacy Progress Note  Medication History     Admit Date: 1/27/2022    List of of current medications patient is taking is in progress.  Patient from [address] [place] [address]. No paperwork sent with patient this AM.    Called at 09:00, 09:30, and again at 10:10. Unable to reach RN, and unit clerk unable to assist further.  Will continue to attempt contacting to obtain medication list.      Addendum @ 10:50-  · Reached RN Martha Keating; she will fax over a list.    · Waiting on list.     Please call with any questions.   Joshua ChauD., [facility]   1/27/2022 10:13 AM  Wireless: [phone]

## 2022-01-27 NOTE — ED NOTES
Bed: 1TR-01  Expected date:   Expected time:   Means of arrival:   Comments:  [facility],[address], RN  01/27/22 7868

## 2022-01-27 NOTE — H&P
ICU HISTORY AND PHYSICAL       Hospital Day: 1  ICU Day:  1                                                        Code:Prior  Admit Date: 1/27/2022  PCP: Mirela Childers MD                                  CC: cardiac arrest    HISTORY OF PRESENT ILLNESS:   Amy Leo is a 68 yo M PMH ESRD on HD M/W/F, DM2, bl BKA, pAF, CAD (s/p stent in LAD and RCA 2018), HTN, HLD who presented from [facility] after cardiac arrest. Last known well was 7:15 AM, pt was found down around 20 minutes later. EMS arrived and reported initial rhyhm PEA, pt underwent 5 rounds of CPR with epi and achieved ROSC. Total resuscitation time estimated 40 minutes. Pt was intubated in ED. On labs, BUN, Cr, and troponin lower than baseline. Lactic acid 8.9. CXR and CT head negative for acute abnormality. Pt received 1 amp sodium bicarb, 1g calcium chloride, and 250 mL NS. Pt was initially given levo, ketamine, and propofol but became hypotensive and is currently on levo and fentanyl.      PAST HISTORY:     Past Medical History:   Diagnosis Date    Anemia     Atrial fibrillation ([facility] 11/4/2013    CAD (coronary artery disease)     Mi, stent    Chronic kidney disease     DVT (deep venous thrombosis) (HCC)     End stage renal disease (HCC)     Gas gangrene ([facility]     Hemodialysis patient ([facility]     M-W-F    Hx of blood clots     Hyperkalemia     Hyperlipidemia 5/30/2012    Hypertension     Hyperthyroidism     Ischemic heart disease 9/73/4633    Metabolic encephalopathy     MI (myocardial infarction) ([facility] 10/2018    Type II or unspecified type diabetes mellitus without mention of complication, not stated as uncontrolled        Past Surgical History:   Procedure Laterality Date    CARDIAC SURGERY      cardiac stent    FEMORAL-TIBIAL BYPASS GRAFT Right 1/9/2019    RIGHT FEMORAL POSTERIOR TIBIAL BYPASS performed by Omega Rubio MD at [address] [place] [address] Right 1/4/2019    INCISION AND DRAINAGE, TRANSMETATARSAL AMPUTATION ALL ON RIGHT FOOT performed by Jesus Aguilar DPM at [address] [place] [address] Right 1/14/2019    REVISION OF TRANSMETATARSAL AMPUTATION RIGHT FOOT performed by Jesus Aguilar DPM at [address] [place] [address] Left 08/24/2019    TMA    FOOT AMPUTATION Left 8/24/2019    LEFT FOOT TRANSMETATARSAL AMPUTATION performed by Annie Toro DPM at [address] [place] [address] Left 8/27/2019    REPEAT INCISION AND DRAINAGE, REVISION OF LEFT FOOT TRANSMETATARSAL AMPUTATION performed by Annie Toro DPM at [address] [place] [address] Left 10/11/2019    LEFT FOOT INCISION AND DRAINAGE WITH REVISION OF TRANSMETARSAL AMPUTATION WITH WOUND VAC APPLICATION  performed by Annie Toro DPM at [address] Right 1/18/2019    RIGHT BELOW THE KNEE AMPUTATION performed by Srinivasa Le MD at [address] Left 12/9/2019    LEFT BELOW KNEE AMPUTATION performed by Srinivasa Le MD at [facility] Left 2/6/2020    DEBRIDEMENT AND PLACEMENT OF WOUND VAC LEFT BELOW THE KNEE AMPUTATION SITE performed by Ria Cooper MD at [facility]:   The patient lives at [facility]    Alcohol: none  Illicit drugs: no use  Tobacco:  none    Family History:  Family History   Problem Relation Age of Onset    Arthritis Mother        MEDICATIONS:     No current facility-administered medications on file prior to encounter.      Current Outpatient Medications on File Prior to Encounter   Medication Sig Dispense Refill    sevelamer (RENVELA) 800 MG tablet Take 1 tablet by mouth 3 times daily (with meals) 90 tablet 3    NIFEdipine (PROCARDIA XL) 90 MG extended release tablet Take 1 tablet by mouth daily 90 tablet 1    hydrALAZINE (APRESOLINE) 50 MG tablet Take 1 tablet by mouth every 8 hours 90 tablet 3    epoetin paulina-epbx (RETACRIT) 99041 UNIT/ML SOLN injection Inject 1.5 mLs into the skin three times a week 6.6 mL 1    Cholecalciferol (VITAMIN D3) 50 MCG (2000 UT) CAPS Take 2,000 Units by mouth daily  folic acid (FOLVITE) 1 MG tablet Take 1 mg by mouth daily      ferrous sulfate (IRON 325) 325 (65 Fe) MG tablet Take 325 mg by mouth daily (with breakfast)      vitamin B-12 (CYANOCOBALAMIN) 1000 MCG tablet Take 1,000 mcg by mouth daily      mirtazapine (REMERON) 15 MG tablet Take 15 mg by mouth nightly      pantoprazole sodium (PROTONIX) 40 MG PACK packet Take 40 mg by mouth every morning (before breakfast)      prazosin (MINIPRESS) 2 MG capsule Take 2 mg by mouth nightly      B Complex-C-Folic Acid (JOSUÉ CAPS) 1 MG CAPS Take 1 mg by mouth daily      pravastatin (PRAVACHOL) 20 MG tablet Take 20 mg by mouth daily      carvedilol (COREG) 25 MG tablet Take 25 mg by mouth 2 times daily (with meals)      Petrolatum-Zinc Oxide (PHYTOPLEX Z-GUARD) 57-17 % PSTE Apply topically Apply to scrotum every day and night      aspirin 81 MG tablet Take 81 mg by mouth daily       nitroGLYCERIN (NITROSTAT) 0.4 MG SL tablet Place 0.4 mg under the tongue every 5 minutes as needed for Chest pain up to max of 3 total doses. If no relief after 1 dose, call 911. Scheduled Meds:   fentaNYL        sodium chloride  250 mL IntraVENous Once      Continuous Infusions:   fentaNYL 50 mcg/hr (01/27/22 0850)    propofol Stopped (01/27/22 0856)    norepinephrine 20 mcg/min (01/27/22 0920)     PRN Meds:    Allergies: No Known Allergies    REVIEW OF SYSTEMS:         Review of Systems   Unable to perform ROS: Intubated       PHYSICAL EXAM:   Vitals: /62   Pulse 74   Resp 18   Wt 220 lb (99.8 kg)   SpO2 100%   BMI 42.97 kg/m²     I/O:  No intake or output data in the 24 hours ending 01/27/22 0937  No intake/output data recorded. No intake/output data recorded. Physical Examination:     Physical Exam  Constitutional:       Appearance: He is ill-appearing. Comments: intubated   HENT:      Head: Normocephalic and atraumatic. Cardiovascular:      Rate and Rhythm: Normal rate and regular rhythm. Pulmonary:      Effort: Pulmonary effort is normal.      Comments: Mechanical breath sounds  Abdominal:      Palpations: Abdomen is soft. Tenderness: There is no abdominal tenderness. There is no guarding or rebound. Musculoskeletal:         General: No swelling. Cervical back: Neck supple. Comments: Bilateral BKA   Skin:     General: Skin is warm and dry. Vent Settings: Vent Mode: AC/VC Rate Set: 16 bmp/Vt Ordered: 550 mL/ /FiO2 : 100 %      DATA:   Labs:  CBC:   Recent Labs     01/27/22  0849   WBC 9.5   HGB 7.4*   HCT 22.5*          BMP:   Recent Labs     01/27/22 0849      K 4.5      CO2 20*   BUN 23*   CREATININE 5.8*   GLUCOSE 94     LFT's: No results for input(s): AST, ALT, ALB, BILITOT, ALKPHOS in the last 72 hours. Troponin:   Recent Labs     01/27/22 0849   TROPONINI 0.19*     BNP:No results for input(s): BNP in the last 72 hours. ABGs: No results for input(s): PHART, DPP0DMW, PO2ART in the last 72 hours. INR:   Recent Labs     01/27/22 0849   INR 1.20*       U/A:No results for input(s): NITRITE, COLORU, PHUR, LABCAST, WBCUA, RBCUA, MUCUS, TRICHOMONAS, YEAST, BACTERIA, CLARITYU, SPECGRAV, LEUKOCYTESUR, UROBILINOGEN, BILIRUBINUR, BLOODU, GLUCOSEU, AMORPHOUS in the last 72 hours. Invalid input(s): KETONESU    XR CHEST PORTABLE   Final Result   1. Nasogastric tube coiled in the distal esophagus with gastric air distention. 2. Endotracheal tube in satisfactory position. Critical results reported to Physician: Isamar Corrales   at 1/27/20229:25 AM on 1/27/2022 by telephone. CT HEAD WO CONTRAST    (Results Pending)       EKG: NSR  Micro: BC 10/5 pending    ASSESSMENT AND PLAN:   Myrtle Mitchell is a 67 y.o. male PMH ESRD on HD, DM2, bl BKA, pAF, CAD (s/p stent in LAD and RCA 2018), HTN, HLD who presented from [facility] after cardiac arrest.     S/p Cardiac Arrest  - suspected down for 20 minutes. Initial rhythm PEA.  Received total of 40 minutes of resuscitation with 5 rounds of CPR and epi  - intubated in ED  - CXR and CT head negative  - afebrile, no leukocytosis  - s/p 250 mL fluid and weaning down on Levophed  - on fentanyl    Acute respiratory failure 2/2 cardiac arrest  - s/p intubation in ED  - ABG    Lactic acidosis   - 2/2 hypoperfusion from cardiac arrest  - trend q4h    ESRD on HD  - gets HD M/W/F and does not make urine  - nephro consulted  - strict I/O, daily weights    pAF/HTN- holding home nifedipine, hydralazine, prazosin, and carvedilol  CAD s/p LAD and RCA stent in 2018, continue home ASA and statin  HLD- home statin  Anemia of Chronic Disease- epo three times per week  Depression- holding home mirtazapine  DM2- hypoglycemia, POCT    Code Status:Prior  FEN: NPO  PPX:  subq heparin  DISPO: ICU    This patient has been staffed and discussed with Dr. Sinai Cortez  -----------------------------  Hiwot Lawler DO, PGY-1  1/27/2022  9:37 AM

## 2022-01-27 NOTE — ED NOTES
Chest x ray shows NG curled in esophagus. Removed NG and attempted reinsertion x 3 but unable to insert fully into stomach. Removed NG.      Alba Ford RN  01/27/22 4447

## 2022-01-27 NOTE — ED NOTES
Cardiac arrest at [facility]. Bystander CPR. EMS reports PEA on their arrival. CPR, Epi x 5, Glucose 119, 1 amp BiCarb. ROSC achieved. IGel and ambu in use on arrival. Afib on monitor.  Pt having ineffective spontaneous respirations on arrival so ambu continues     [facility]  01/27/22 9563

## 2022-01-27 NOTE — PROGRESS NOTES
4 Eyes Skin Assessment     NAME:  Crescencio Palma  YOB: 1945  MEDICAL RECORD NUMBER:  [id number]    The patient is being assess for  Admission    I agree that 2 RN's have performed a thorough Head to Toe Skin Assessment on the patient. ALL assessment sites listed below have been assessed. Areas assessed by both nurses: Bonnie JOHNSTON/Clementina PATEL Head, Face, Ears, Shoulders, Back, Chest, Arms, Elbows, Hands, Sacrum. Buttock, Coccyx, Ischium and Legs. Feet and Heels        Does the Patient have a Wound? Yes wound(s) were present on assessment.  LDA wound assessment was Initiated and completed        Freddie Prevention initiated:  Yes   Wound Care Orders initiated:  Yes    Pressure Injury (Stage 3,4, Unstageable, DTI, NWPT, and Complex wounds) if present place consult order under [de-identified] No    New and Established Ostomies if present place consult order under : No      Nurse 1 eSignature: Electronically signed by Aurea Palacios RN on 1/27/22 at 5:28 PM EST    **SHARE this note so that the co-signing nurse is able to place an eSignature**    Nurse 2 eSignature: Electronically signed by Moe Roger RN on 1/27/22 at 5:31 PM EST

## 2022-01-27 NOTE — ED PROVIDER NOTES
[address] [facility] ENCOUNTER          ATTENDING PHYSICIAN NOTE       Date of evaluation: 1/27/2022    Chief Complaint     Cardiac Arrest      History of Present Illness     Ella Rush is a 67 y.o. male with a PMH as described below who presents to the ED today with a chief complaint of: Cardiac arrest.  Patient presents emergency department after he was found out at his nursing facility. Patient was last seen at approximately 715 and then found sometime around 730 unresponsive. On EMS arrival he was getting chest compressions but was PEA. He received 5 rounds of ACLS with return of spontaneous circulation. On arrival patient did have spontaneous respirations but had minimal movement to painful or verbal stimulation. Unable to perform additional history given his critical state. Review of Systems     Unable to perform as the patient cannot verbalize  Past Medical, Surgical, Family, and Social History     He has a past medical history of Anemia, Atrial fibrillation ([address] CAD (coronary artery disease), Chronic kidney disease, DVT (deep venous thrombosis) ([address] End stage renal disease ([address] Gas gangrene ([address] Hemodialysis patient ([address] Hx of blood clots, Hyperkalemia, Hyperlipidemia, Hypertension, Hyperthyroidism, Ischemic heart disease, Metabolic encephalopathy, MI (myocardial infarction) ([address] and Type II or unspecified type diabetes mellitus without mention of complication, not stated as uncontrolled. He has a past surgical history that includes Cardiac surgery; Foot Amputation (Right, 1/4/2019); Femoral-tibial Bypass Graft (Right, 1/9/2019); Foot Amputation (Right, 1/14/2019); Leg amputation below knee (Right, 1/18/2019); Foot Amputation (Left, 08/24/2019); Foot Amputation (Left, 8/24/2019); Foot Amputation (Left, 8/27/2019); Foot Amputation (Left, 10/11/2019); Leg amputation below knee (Left, 12/9/2019); and Leg Surgery (Left, 2/6/2020).   His family history includes Arthritis in his mother. He reports that he has never smoked. He has never used smokeless tobacco. He reports previous alcohol use. He reports that he does not use drugs. Medications     Previous Medications    ASPIRIN 81 MG TABLET    Take 81 mg by mouth daily     B COMPLEX-C-FOLIC ACID (JOSUÉ CAPS) 1 MG CAPS    Take 1 mg by mouth daily    CARVEDILOL (COREG) 25 MG TABLET    Take 25 mg by mouth 2 times daily (with meals)    CHOLECALCIFEROL (VITAMIN D3) 50 MCG (2000 UT) CAPS    Take 2,000 Units by mouth daily    EPOETIN GIL-EPBX (RETACRIT) 40767 UNIT/ML SOLN INJECTION    Inject 1.5 mLs into the skin three times a week    FERROUS SULFATE (IRON 325) 325 (65 FE) MG TABLET    Take 325 mg by mouth daily (with breakfast)    FOLIC ACID (FOLVITE) 1 MG TABLET    Take 1 mg by mouth daily    HYDRALAZINE (APRESOLINE) 50 MG TABLET    Take 1 tablet by mouth every 8 hours    MIRTAZAPINE (REMERON) 15 MG TABLET    Take 15 mg by mouth nightly    NIFEDIPINE (PROCARDIA XL) 90 MG EXTENDED RELEASE TABLET    Take 1 tablet by mouth daily    NITROGLYCERIN (NITROSTAT) 0.4 MG SL TABLET    Place 0.4 mg under the tongue every 5 minutes as needed for Chest pain up to max of 3 total doses. If no relief after 1 dose, call 911. PANTOPRAZOLE SODIUM (PROTONIX) 40 MG PACK PACKET    Take 40 mg by mouth every morning (before breakfast)    PETROLATUM-ZINC OXIDE (PHYTOPLEX Z-GUARD) 57-17 % PSTE    Apply topically Apply to scrotum every day and night    PRAVASTATIN (PRAVACHOL) 20 MG TABLET    Take 20 mg by mouth daily    PRAZOSIN (MINIPRESS) 2 MG CAPSULE    Take 2 mg by mouth nightly    SEVELAMER (RENVELA) 800 MG TABLET    Take 1 tablet by mouth 3 times daily (with meals)    VITAMIN B-12 (CYANOCOBALAMIN) 1000 MCG TABLET    Take 1,000 mcg by mouth daily       Allergies     He has No Known Allergies. Physical Exam     INITIAL VITALS: BP: (!) 81/52,  , Pulse: 73, Resp: 16, SpO2: 100 %   Physical Exam  Vitals and nursing note reviewed. Constitutional:       General: He is in acute distress. Appearance: He is ill-appearing and toxic-appearing. HENT:      Head: Normocephalic and atraumatic. Mouth/Throat:      Mouth: Mucous membranes are dry. Eyes:      Comments: Pupils 2 mm nonreactive. Cardiovascular:      Rate and Rhythm: Normal rate. Rhythm irregular. Pulses: Normal pulses. Heart sounds: Normal heart sounds. Pulmonary:      Comments: Extra glottic airway in place. Breathing spontaneously though bag assisted  Abdominal:      General: There is no distension. Tenderness: There is no abdominal tenderness. Musculoskeletal:      Cervical back: Normal range of motion and neck supple. Comments: Fistula in the left forearm. Palpable thrill. Bilateral BKA lower extremity amputation. Skin:     General: Skin is warm. Capillary Refill: Capillary refill takes less than 2 seconds. Neurological:      Comments: Breathing spontaneously. No movement to painful stimulation. No clonus. Unable to perform Babinski given his BKA status         DiagnosticResults     EKG   Indication: Post cardiac arrest: Ventricular rate 76, KY interval 238, , QTc 492, axis 36 degrees. Sinus rhythm first-degree AV block. No acute ST-T wave elevations or depressions concerning for acute ischemia infarct. No significant change compared to prior KG performed in December 2021. RADIOLOGY:  CT HEAD WO CONTRAST   Final Result   1. Atrophy and superimposed small vessel ischemic disease with remote infarct in the lateral left occipital region. No recent infarct, hemorrhage or mass detected. XR CHEST PORTABLE   Final Result   1. Nasogastric tube coiled in the distal esophagus with gastric air distention. 2. Endotracheal tube in satisfactory position. Critical results reported to Physician: Casie Pritchard   at 1/27/20229:25 AM on 1/27/2022 by telephone.           LABS:   Results for orders placed or performed during the hospital encounter of 01/27/22   CBC auto differential   Result Value Ref Range    WBC 9.5 4.0 - 11.0 K/uL    RBC 2.47 (L) 4.20 - 5.90 M/uL    Hemoglobin 7.4 (L) 13.5 - 17.5 g/dL    Hematocrit 22.5 (L) 40.5 - 52.5 %    MCV 91.2 80.0 - 100.0 fL    MCH 29.9 26.0 - 34.0 pg    MCHC 32.7 31.0 - 36.0 g/dL    RDW 14.0 12.4 - 15.4 %    Platelets 049 052 - 184 K/uL    MPV 9.1 5.0 - 10.5 fL    Neutrophils % 65.3 %    Lymphocytes % 30.2 %    Monocytes % 3.2 %    Eosinophils % 0.9 %    Basophils % 0.4 %    Neutrophils Absolute 6.2 1.7 - 7.7 K/uL    Lymphocytes Absolute 2.9 1.0 - 5.1 K/uL    Monocytes Absolute 0.3 0.0 - 1.3 K/uL    Eosinophils Absolute 0.1 0.0 - 0.6 K/uL    Basophils Absolute 0.0 0.0 - 0.2 K/uL   Basic Metabolic Panel w/ Reflex to MG   Result Value Ref Range    Sodium 141 136 - 145 mmol/L    Potassium reflex Magnesium 4.5 3.5 - 5.1 mmol/L    Chloride 100 99 - 110 mmol/L    CO2 20 (L) 21 - 32 mmol/L    Anion Gap 21 (H) 3 - 16    Glucose 94 70 - 99 mg/dL    BUN 23 (H) 7 - 20 mg/dL    CREATININE 5.8 (HH) 0.8 - 1.3 mg/dL    GFR Non-African American 10 (A) >60    GFR  12 (A) >60    Calcium 10.9 (H) 8.3 - 10.6 mg/dL   Blood gas, venous (Lab)   Result Value Ref Range    pH, Kishore 7.250 (L) 7.350 - 7.450    pCO2, Kishore 52.1 (H) 41.0 - 51.0 mmHg    pO2, Kishore 52.0 (H) 25.0 - 40.0 mmHg    HCO3, Venous 22.8 (L) 24.0 - 28.0 mmol/L    Base Excess, Kishore -4.3 (L) -2.0 - 3.0 mmol/L    O2 Sat, Kishore 79 Not established %    Carboxyhemoglobin 1.0 0.0 - 1.5 %    MetHgb, Kishore 0.7 0.0 - 1.5 %    TC02 (Calc), Kishore 24 mmol/L    Hemoglobin, Kishore, Reduced 20.80 %   Lactic acid, plasma   Result Value Ref Range    Lactic Acid 8.9 (HH) 0.4 - 2.0 mmol/L   Troponin   Result Value Ref Range    Troponin 0.19 (H) <0.01 ng/mL   Brain Natriuretic Peptide   Result Value Ref Range    Pro-BNP 7,274 (H) 0 - 449 pg/mL   PT - INR   Result Value Ref Range    Protime 13.7 (H) 9.9 - 12.7 sec    INR 1.20 (H) 0.88 - 1.12   EKG 12 Lead   Result Value Ref Range    Ventricular Rate 76 BPM    Atrial Rate 76 BPM    P-R Interval 238 ms    QRS Duration 100 ms    Q-T Interval 438 ms    QTc Calculation (Bazett) 492 ms    P Axis 106 degrees    R Axis 36 degrees    T Axis 81 degrees    Diagnosis       EKG performed in ER and to be interpreted by ER physician. Confirmed by MD, ER ([id number]),  Farhat Brown ([id number]) on 1/27/2022 9:43:07 AM       ED BEDSIDE ULTRASOUND:      RECENT VITALS:  BP: 130/64,  ,Pulse: 68, Resp: 17, SpO2: 100 %     Procedures     Intubation    Date/Time: 1/27/2022 11:37 AM  Performed by: Murali Sargent MD  Authorized by: Murali Sargent MD     Consent:     Consent obtained:  Emergent situation  Pre-procedure details:     Patient status:  Unresponsive    Mallampati score:  III    Pretreatment medications:  None    Paralytics:  Rocuronium  Procedure details:     Preoxygenation:  MABEL/LMA    CPR in progress: no      Intubation method:  Oral    Oral intubation technique:  Video-assisted    Laryngoscope blade: Mac 4    Tube size (mm):  7.5    Tube type:  Cuffed    Number of attempts:  1    Cricoid pressure: no      Tube visualized through cords: yes    Placement assessment:     ETT to lip:  24    ETT to teeth:  23    Tube secured with:  ETT mixon    Breath sounds:  Equal    Placement verification: chest rise, CXR verification, equal breath sounds and ETCO2 detector      CXR findings:  ETT in proper place  Post-procedure details:     Patient tolerance of procedure: Tolerated well, no immediate complications          ED Course     Nursing Notes, Past Medical Hx, Past Surgical Hx, Social Hx, Allergies, and Family Hx werereviewed.     The patient was given thefollowing medications:  Orders Placed This Encounter   Medications    fentaNYL (SUBLIMAZE) 1,000 mcg in sodium chloride 0.9% 100 mL infusion    fentaNYL (SUBLIMAZE) 100 MCG/2ML injection     Eble, Keshia: cabinet override    ketamine (KETALAR) injection 100 mg    rocuronium (ZEMURON) injection 100 mg    calcium chloride 10 % injection 1,000 mg    sodium bicarbonate 8.4 % injection 50 mEq    propofol injection    propofol 1000 MG/100ML injection     Ni Mclaughlin: cabinet override    sodium chloride 0.9 % infusion     Ni Mclaughlin: cabinet override    norepinephrine (LEVOPHED) 16 mg in sodium chloride 0.9 % 250 mL infusion    0.9 % sodium chloride bolus       CONSULTS:  IP CONSULT TO CRITICAL CARE  IP CONSULT TO HOSPITALIST  IP CONSULT TO [address] [facility] / [facility] / Jamarcus Tomasz is a 67 y.o. male who presents after cardiac arrest.  On arrival he is breathing spontaneously with good central pulses. He was placed on monitors, given bicarb and calcium given his dialysis state. He was intubated with a 7 5 ET tube. Intubation was performed by myself. After intubation, he was started on fentanyl and propofol. His blood pressure was somewhat low and propofol was stopped and he was started on Levophed. Laboratory studies did not reveal a acute cause of his arrest.  Chest x-ray shows reasonable to positioning. CT scan of his head does not reveal any bleed. He will be admitted to the ICU for further post cardiac arrest management. No indication for cardiac Cath Lab given it was a PEA arrest..    Critical Care:  Due to the immediate potential for life-threatening deterioration due to cardiac arrest, I spent 45 minutes providing critical care. This time excludes timespent performing procedures but includes time spent on direct patient care, history retrieval, review of the chart, and discussions with patient, family, and consultant(s). Clinical Impression     1. Cardiac arrest [facility])        Disposition     PATIENT REFERRED TO:  No follow-up provider specified.     DISCHARGE MEDICATIONS:  New Prescriptions    No medications on file       DISPOSITION Admitted 01/27/2022 09:38:28 AM         Meliza Nunez MD  01/27/22 1136       Meliza Nunez MD  01/27/22 [address] [place] [address]

## 2022-01-27 NOTE — CONSULTS
ICU Consult Note      Reason for Consult: Cardiac arrest  Requesting Physician: Felicitas Nesbitt    Subjective:        [facility] / HPI:                Rashad Whelan is a 68 yo M PMH ESRD on HD M/W/F, DM2, bl BKA, pAF, CAD (s/p stent in LAD and RCA 2018), HTN, HLD who presented from [facility] after cardiac arrest. Last known well was 7:15 AM, pt was found down around 20 minutes later. EMS arrived and reported initial rhyhm PEA, pt underwent 5 rounds of CPR with epi and achieved ROSC. Total resuscitation time estimated 40 minutes. Pt was intubated in ED. On labs, BUN, Cr, and troponin lower than baseline. Lactic acid 8.9. CXR and CT head negative for acute abnormality. Pt received 1 amp sodium bicarb, 1g calcium chloride, and 250 mL NS. Pt was initially given levo, ketamine, and propofol but became hypotensive and is currently on levo and fentanyl.      Past Medical History:      Diagnosis Date    Anemia     Atrial fibrillation ([facility] 11/4/2013    CAD (coronary artery disease)     Mi, stent    Chronic kidney disease     [facility]     End stage renal disease ([facility])     Gas gangrene ([address]     Hemodialysis patient ([facility]     M-W-F    Hx of blood clots     Hyperkalemia     Hyperlipidemia 5/30/2012    Hypertension     Hyperthyroidism     Ischemic heart disease 7/14/6089    Metabolic encephalopathy     MI (myocardial infarction) ([facility] 10/2018    Type II or unspecified type diabetes mellitus without mention of complication, not stated as uncontrolled       Past Surgical History:        Procedure Laterality Date    CARDIAC SURGERY      cardiac stent    FEMORAL-TIBIAL BYPASS GRAFT Right 1/9/2019    RIGHT FEMORAL POSTERIOR TIBIAL BYPASS performed by Lex Ardon MD at [address] [place] [address] Right 1/4/2019    INCISION AND DRAINAGE, TRANSMETATARSAL AMPUTATION ALL ON RIGHT FOOT performed by Roxi Crowder DPM at [address] [place] [address] Right 1/14/2019    REVISION OF TRANSMETATARSAL AMPUTATION RIGHT FOOT performed by Simi Hudson DPM at [address] [place] [address] Left 2019    TMA    FOOT AMPUTATION Left 2019    LEFT FOOT TRANSMETATARSAL AMPUTATION performed by Veronica Bullard DPM at [address] [place] [address] Left 2019    REPEAT INCISION AND DRAINAGE, REVISION OF LEFT FOOT TRANSMETATARSAL AMPUTATION performed by Veronica Bullard DPM at [address] [place] [address] 10/11/2019    LEFT FOOT INCISION AND DRAINAGE WITH REVISION OF TRANSMETARSAL AMPUTATION WITH WOUND VAC APPLICATION  performed by Veronica Bullard DPM at [address] [place] [address] Right 2019    RIGHT BELOW THE KNEE AMPUTATION performed by Karan Dominguez MD at [address] [place] [address] 2019    LEFT BELOW KNEE AMPUTATION performed by Karan Dominguez MD at [facility] 2020    DEBRIDEMENT AND PLACEMENT OF WOUND VAC LEFT BELOW THE KNEE AMPUTATION SITE performed by Jordan Parrish MD at [place] [address]     Current Medications:     fentaNYL         Allergies:  No Known Allergies  Social History:    TOBACCO:   reports that he has never smoked. He has never used smokeless tobacco.  ETOH:   reports previous alcohol use. Family History:       Problem Relation Age of Onset    Arthritis Mother        REVIEW OF SYSTEMS:  Unable to obtain due to intubated/sedated    Objective:   PHYSICAL EXAM:      VITALS:  /64   Pulse 68   Resp 17   Wt 220 lb (99.8 kg)   SpO2 100%   BMI 42.97 kg/m²   24HR INTAKE/OUTPUT:      Intake/Output Summary (Last 24 hours) at 2022 1134  Last data filed at 2022 1115  Gross per 24 hour   Intake 268.93 ml   Output --   Net 268.93 ml     CURRENT PULSE OXIMETRY:  SpO2: 100 %  24HR PULSE OXIMETRY RANGE:  SpO2  Av %  Min: 100 %  Max: 100 % on Mechanical ventilation    Physical Exam  Constitutional:       Appearance: He is ill-appearing. Comments: intubated   HENT:      Head: Normocephalic and atraumatic.    Cardiovascular:      Rate and Rhythm: Normal rate and regular rhythm. Pulmonary:      Effort: Pulmonary effort is normal.      Comments: Mechanical breath sounds  Abdominal:      Palpations: Abdomen is soft. Tenderness: There is no abdominal tenderness. There is no guarding or rebound. Musculoskeletal:         General: No swelling. Cervical back: Neck supple. Comments: Bilateral BKA   Skin:     General: Skin is warm and dry.      DATA:    Old records have been reviewed  CBC with Differential:    Lab Results   Component Value Date    WBC 9.5 01/27/2022    RBC 2.47 01/27/2022    HGB 7.4 01/27/2022    HCT 22.5 01/27/2022     01/27/2022    MCV 91.2 01/27/2022    MCH 29.9 01/27/2022    MCHC 32.7 01/27/2022    RDW 14.0 01/27/2022    SEGSPCT 56.8 05/14/2013    METASPCT 1 01/13/2019    LYMPHOPCT 30.2 01/27/2022    MONOPCT 3.2 01/27/2022    MYELOPCT 1 01/06/2019    EOSPCT 3.1 05/05/2011    BASOPCT 0.4 01/27/2022    MONOSABS 0.3 01/27/2022    LYMPHSABS 2.9 01/27/2022    EOSABS 0.1 01/27/2022    BASOSABS 0.0 01/27/2022    DIFFTYPE Auto-K 05/14/2013     BMP:    Lab Results   Component Value Date     01/27/2022    K 4.5 01/27/2022     01/27/2022    CO2 20 01/27/2022    BUN 23 01/27/2022    CREATININE 5.8 01/27/2022    CALCIUM 10.9 01/27/2022    GFRAA 12 01/27/2022    GFRAA 19 05/14/2013    LABGLOM 10 01/27/2022    LABGLOM 24 05/23/2012    GLUCOSE 94 01/27/2022    GLUCOSE 216 05/23/2012     Hepatic Function Panel:    Lab Results   Component Value Date    ALKPHOS 89 12/01/2021    ALT 13 12/01/2021    AST 12 12/01/2021    PROT 7.1 12/01/2021    PROT 6.8 05/23/2012    BILITOT <0.2 12/01/2021    BILIDIR <0.2 08/22/2019    IBILI see below 08/22/2019     ABG:    Lab Results   Component Value Date    UUP6JOK 25 08/22/2019    BEART 1.2 08/22/2019    T7FAYUKC 98 08/22/2019    PHART 7.453 08/22/2019    PYC9BIQ 35.7 08/22/2019    PO2ART 88.4 08/22/2019    YWZ6WHP 26 08/22/2019       Radiology Review:  All pertinent images / reports were reviewed as a part of this visit. Imaging reveals the following:  CT HEAD WO CONTRAST   Final Result   1. Atrophy and superimposed small vessel ischemic disease with remote infarct in the lateral left occipital region. No recent infarct, hemorrhage or mass detected. XR CHEST PORTABLE   Final Result   1. Nasogastric tube coiled in the distal esophagus with gastric air distention. 2. Endotracheal tube in satisfactory position. Critical results reported to Physician: Kimmy Wilkins   at 1/27/20229:25 AM on 1/27/2022 by telephone. Assessment/Plan   Poonam Singh is a 67 y.o. male PMH ESRD on HD, DM2, bl BKA, pAF, CAD (s/p stent in LAD and RCA 2018), HTN, HLD who presented from [facility] after cardiac arrest.      PEA Cardiac Arrest - cause unclear but no readily identifiable reason so far  - Estimated time down ~20 minutes  - Initial rhythm PEA with total of 5 rounds epinephrine   - Intubated with propofol, rocuronium and ketamine  - Currently off sedation and is on respond     Acute respiratory failure 2/2 cardiac arrest  - Mechanical ventilation: AC/VC 16  550  5  100%  - Maintain SpO2 goal >94%; wean as tolerated  - Repeat ABG     Lactic acidosis   - Trend lactic acid Q4H   - 8.9 >     ESRD  - HD scheduled: M/W/F  - Nephrology consulted    Chronic Problems:    Hypertension - holding home Rx: nifedipine, hydralazine, prazosin, carvedilol  CAD - s/p LAD & RCA stent (2018), continue home ASA  Hyperlipidemia - continue home statin        Plan and assessment discussed with attending, Lorretta Began, MD Gerhardt Donning, MD, PGY-2    Pulm/CC    Patient seen and examined. I agree with Dr. Roby Dalal history, physical, lab findings, assessment and plan. Patient suffered PEA arrest at his nursing home. He is chronically ill and has ESRD on HD, PVD, CAD and hypertension. Initial review of chest x-ray, EKG, and labs does not show an obvious etiology.   Will obtain CTPA to rule out PE although he

## 2022-01-28 ENCOUNTER — APPOINTMENT (OUTPATIENT)
Dept: GENERAL RADIOLOGY | Age: 77
DRG: 870 | End: 2022-01-28
Payer: MEDICARE

## 2022-01-28 PROBLEM — G25.3 MYOCLONUS: Status: ACTIVE | Noted: 2022-01-28

## 2022-01-28 PROBLEM — G93.1 ANOXIC BRAIN INJURY (HCC): Status: ACTIVE | Noted: 2022-01-28

## 2022-01-28 PROBLEM — J96.90 RESPIRATORY FAILURE (HCC): Status: ACTIVE | Noted: 2022-01-28

## 2022-01-28 LAB
ALBUMIN SERPL-MCNC: 2.9 G/DL (ref 3.4–5)
ALBUMIN SERPL-MCNC: 3 G/DL (ref 3.4–5)
ALP BLD-CCNC: 82 U/L (ref 40–129)
ALP BLD-CCNC: 85 U/L (ref 40–129)
ALT SERPL-CCNC: 438 U/L (ref 10–40)
ALT SERPL-CCNC: 440 U/L (ref 10–40)
ANION GAP SERPL CALCULATED.3IONS-SCNC: 11 MMOL/L (ref 3–16)
ANION GAP SERPL CALCULATED.3IONS-SCNC: 13 MMOL/L (ref 3–16)
ANTI-XA UNFRAC HEPARIN: 0.74 IU/ML (ref 0.3–0.7)
ANTI-XA UNFRAC HEPARIN: 0.75 IU/ML (ref 0.3–0.7)
ANTI-XA UNFRAC HEPARIN: 1.34 IU/ML (ref 0.3–0.7)
APTT: 138.5 SEC (ref 26.2–38.6)
AST SERPL-CCNC: 266 U/L (ref 15–37)
AST SERPL-CCNC: 276 U/L (ref 15–37)
BANDED NEUTROPHILS RELATIVE PERCENT: 6 % (ref 0–7)
BASOPHILS ABSOLUTE: 0 K/UL (ref 0–0.2)
BASOPHILS RELATIVE PERCENT: 0 %
BILIRUB SERPL-MCNC: 0.3 MG/DL (ref 0–1)
BILIRUB SERPL-MCNC: <0.2 MG/DL (ref 0–1)
BILIRUBIN DIRECT: <0.2 MG/DL (ref 0–0.3)
BILIRUBIN DIRECT: <0.2 MG/DL (ref 0–0.3)
BILIRUBIN, INDIRECT: ABNORMAL MG/DL (ref 0–1)
BILIRUBIN, INDIRECT: ABNORMAL MG/DL (ref 0–1)
BLOOD BANK DISPENSE STATUS: NORMAL
BLOOD BANK PRODUCT CODE: NORMAL
BPU ID: NORMAL
BUN BLDV-MCNC: 40 MG/DL (ref 7–20)
BUN BLDV-MCNC: 40 MG/DL (ref 7–20)
BURR CELLS: ABNORMAL
CALCIUM SERPL-MCNC: 9.4 MG/DL (ref 8.3–10.6)
CALCIUM SERPL-MCNC: 9.4 MG/DL (ref 8.3–10.6)
CHLORIDE BLD-SCNC: 102 MMOL/L (ref 99–110)
CHLORIDE BLD-SCNC: 103 MMOL/L (ref 99–110)
CO2: 27 MMOL/L (ref 21–32)
CO2: 28 MMOL/L (ref 21–32)
CREAT SERPL-MCNC: 6.6 MG/DL (ref 0.8–1.3)
CREAT SERPL-MCNC: 6.8 MG/DL (ref 0.8–1.3)
DESCRIPTION BLOOD BANK: NORMAL
EOSINOPHILS ABSOLUTE: 0 K/UL (ref 0–0.6)
EOSINOPHILS RELATIVE PERCENT: 0 %
GFR AFRICAN AMERICAN: 10
GFR AFRICAN AMERICAN: 10
GFR NON-AFRICAN AMERICAN: 8
GFR NON-AFRICAN AMERICAN: 8
GLUCOSE BLD-MCNC: 108 MG/DL (ref 70–99)
GLUCOSE BLD-MCNC: 144 MG/DL (ref 70–99)
GLUCOSE BLD-MCNC: 85 MG/DL (ref 70–99)
GLUCOSE BLD-MCNC: 90 MG/DL (ref 70–99)
GLUCOSE BLD-MCNC: 91 MG/DL (ref 70–99)
GLUCOSE BLD-MCNC: 97 MG/DL (ref 70–99)
GLUCOSE BLD-MCNC: 98 MG/DL (ref 70–99)
HCT VFR BLD CALC: 18.6 % (ref 40.5–52.5)
HCT VFR BLD CALC: 23.7 % (ref 40.5–52.5)
HEMOGLOBIN: 6.2 G/DL (ref 13.5–17.5)
HEMOGLOBIN: 7.8 G/DL (ref 13.5–17.5)
HYPOCHROMIA: ABNORMAL
INR BLD: 1.32 (ref 0.88–1.12)
LYMPHOCYTES ABSOLUTE: 1 K/UL (ref 1–5.1)
LYMPHOCYTES RELATIVE PERCENT: 13 %
MCH RBC QN AUTO: 29.3 PG (ref 26–34)
MCHC RBC AUTO-ENTMCNC: 33 G/DL (ref 31–36)
MCV RBC AUTO: 88.7 FL (ref 80–100)
MONOCYTES ABSOLUTE: 0.1 K/UL (ref 0–1.3)
MONOCYTES RELATIVE PERCENT: 1 %
NEUTROPHILS ABSOLUTE: 6.6 K/UL (ref 1.7–7.7)
NEUTROPHILS RELATIVE PERCENT: 80 %
PDW BLD-RTO: 13.6 % (ref 12.4–15.4)
PERFORMED ON: ABNORMAL
PERFORMED ON: ABNORMAL
PERFORMED ON: NORMAL
PLATELET # BLD: 149 K/UL (ref 135–450)
PMV BLD AUTO: 9.3 FL (ref 5–10.5)
POTASSIUM REFLEX MAGNESIUM: 3.7 MMOL/L (ref 3.5–5.1)
POTASSIUM REFLEX MAGNESIUM: 3.8 MMOL/L (ref 3.5–5.1)
PROTHROMBIN TIME: 15.1 SEC (ref 9.9–12.7)
RBC # BLD: 2.1 M/UL (ref 4.2–5.9)
SODIUM BLD-SCNC: 141 MMOL/L (ref 136–145)
SODIUM BLD-SCNC: 143 MMOL/L (ref 136–145)
TOTAL PROTEIN: 5.9 G/DL (ref 6.4–8.2)
TOTAL PROTEIN: 5.9 G/DL (ref 6.4–8.2)
WBC # BLD: 7.7 K/UL (ref 4–11)

## 2022-01-28 PROCEDURE — 85025 COMPLETE CBC W/AUTO DIFF WBC: CPT

## 2022-01-28 PROCEDURE — 80076 HEPATIC FUNCTION PANEL: CPT

## 2022-01-28 PROCEDURE — 99223 1ST HOSP IP/OBS HIGH 75: CPT | Performed by: PSYCHIATRY & NEUROLOGY

## 2022-01-28 PROCEDURE — 85610 PROTHROMBIN TIME: CPT

## 2022-01-28 PROCEDURE — 2000000000 HC ICU R&B

## 2022-01-28 PROCEDURE — 87077 CULTURE AEROBIC IDENTIFY: CPT

## 2022-01-28 PROCEDURE — 36556 INSERT NON-TUNNEL CV CATH: CPT

## 2022-01-28 PROCEDURE — 2500000003 HC RX 250 WO HCPCS: Performed by: STUDENT IN AN ORGANIZED HEALTH CARE EDUCATION/TRAINING PROGRAM

## 2022-01-28 PROCEDURE — 85018 HEMOGLOBIN: CPT

## 2022-01-28 PROCEDURE — 85014 HEMATOCRIT: CPT

## 2022-01-28 PROCEDURE — 85730 THROMBOPLASTIN TIME PARTIAL: CPT

## 2022-01-28 PROCEDURE — 87040 BLOOD CULTURE FOR BACTERIA: CPT

## 2022-01-28 PROCEDURE — 71045 X-RAY EXAM CHEST 1 VIEW: CPT

## 2022-01-28 PROCEDURE — 2700000000 HC OXYGEN THERAPY PER DAY

## 2022-01-28 PROCEDURE — APPNB30 APP NON BILLABLE TIME 0-30 MINS: Performed by: NURSE PRACTITIONER

## 2022-01-28 PROCEDURE — 36415 COLL VENOUS BLD VENIPUNCTURE: CPT

## 2022-01-28 PROCEDURE — 94761 N-INVAS EAR/PLS OXIMETRY MLT: CPT

## 2022-01-28 PROCEDURE — 6360000002 HC RX W HCPCS: Performed by: INTERNAL MEDICINE

## 2022-01-28 PROCEDURE — 2580000003 HC RX 258: Performed by: STUDENT IN AN ORGANIZED HEALTH CARE EDUCATION/TRAINING PROGRAM

## 2022-01-28 PROCEDURE — 87150 DNA/RNA AMPLIFIED PROBE: CPT

## 2022-01-28 PROCEDURE — 02HV33Z INSERTION OF INFUSION DEVICE INTO SUPERIOR VENA CAVA, PERCUTANEOUS APPROACH: ICD-10-PCS | Performed by: INTERNAL MEDICINE

## 2022-01-28 PROCEDURE — 76937 US GUIDE VASCULAR ACCESS: CPT | Performed by: INTERNAL MEDICINE

## 2022-01-28 PROCEDURE — 36556 INSERT NON-TUNNEL CV CATH: CPT | Performed by: INTERNAL MEDICINE

## 2022-01-28 PROCEDURE — 2580000003 HC RX 258: Performed by: INTERNAL MEDICINE

## 2022-01-28 PROCEDURE — 94003 VENT MGMT INPAT SUBQ DAY: CPT

## 2022-01-28 PROCEDURE — 80048 BASIC METABOLIC PNL TOTAL CA: CPT

## 2022-01-28 PROCEDURE — 90935 HEMODIALYSIS ONE EVALUATION: CPT

## 2022-01-28 PROCEDURE — 87186 SC STD MICRODIL/AGAR DIL: CPT

## 2022-01-28 PROCEDURE — 5A1D70Z PERFORMANCE OF URINARY FILTRATION, INTERMITTENT, LESS THAN 6 HOURS PER DAY: ICD-10-PCS | Performed by: STUDENT IN AN ORGANIZED HEALTH CARE EDUCATION/TRAINING PROGRAM

## 2022-01-28 PROCEDURE — 6360000002 HC RX W HCPCS: Performed by: STUDENT IN AN ORGANIZED HEALTH CARE EDUCATION/TRAINING PROGRAM

## 2022-01-28 PROCEDURE — 99291 CRITICAL CARE FIRST HOUR: CPT | Performed by: INTERNAL MEDICINE

## 2022-01-28 PROCEDURE — 85520 HEPARIN ASSAY: CPT

## 2022-01-28 RX ORDER — SODIUM CHLORIDE 9 MG/ML
INJECTION, SOLUTION INTRAVENOUS PRN
Status: DISCONTINUED | OUTPATIENT
Start: 2022-01-28 | End: 2022-01-31

## 2022-01-28 RX ORDER — FENTANYL CITRATE-0.9 % NACL/PF 10 MCG/ML
12.5-2 PLASTIC BAG, INJECTION (ML) INTRAVENOUS CONTINUOUS
Status: DISCONTINUED | OUTPATIENT
Start: 2022-01-28 | End: 2022-02-02

## 2022-01-28 RX ADMIN — CASTOR OIL AND BALSAM, PERU: 788; 87 OINTMENT TOPICAL at 08:56

## 2022-01-28 RX ADMIN — FENTANYL CITRATE 50 MCG/HR: 50 INJECTION, SOLUTION INTRAMUSCULAR; INTRAVENOUS at 22:24

## 2022-01-28 RX ADMIN — SODIUM CHLORIDE, PRESERVATIVE FREE 10 ML: 5 INJECTION INTRAVENOUS at 19:29

## 2022-01-28 RX ADMIN — HEPARIN SODIUM 14 UNITS/KG/HR: 5000 INJECTION INTRAVENOUS; SUBCUTANEOUS at 14:25

## 2022-01-28 RX ADMIN — FAMOTIDINE 20 MG: 10 INJECTION, SOLUTION INTRAVENOUS at 18:26

## 2022-01-28 RX ADMIN — CASTOR OIL AND BALSAM, PERU: 788; 87 OINTMENT TOPICAL at 19:30

## 2022-01-28 RX ADMIN — HEPARIN SODIUM 15 UNITS/KG/HR: 5000 INJECTION INTRAVENOUS; SUBCUTANEOUS at 02:33

## 2022-01-28 ASSESSMENT — PULMONARY FUNCTION TESTS
PIF_VALUE: 22
PIF_VALUE: 18
PIF_VALUE: 13
PIF_VALUE: 17
PIF_VALUE: 22
PIF_VALUE: 17
PIF_VALUE: 20
PIF_VALUE: 17
PIF_VALUE: 29
PIF_VALUE: 17
PIF_VALUE: 18
PIF_VALUE: 17
PIF_VALUE: 16
PIF_VALUE: 17
PIF_VALUE: 17
PIF_VALUE: 14
PIF_VALUE: 14
PIF_VALUE: 17
PIF_VALUE: 12
PIF_VALUE: 14
PIF_VALUE: 15
PIF_VALUE: 17
PIF_VALUE: 18
PIF_VALUE: 20
PIF_VALUE: 17
PIF_VALUE: 17
PIF_VALUE: 15
PIF_VALUE: 16
PIF_VALUE: 22
PIF_VALUE: 17
PIF_VALUE: 17
PIF_VALUE: 16
PIF_VALUE: 16
PIF_VALUE: 12
PIF_VALUE: 20
PIF_VALUE: 16
PIF_VALUE: 15
PIF_VALUE: 17
PIF_VALUE: 16
PIF_VALUE: 11
PIF_VALUE: 15
PIF_VALUE: 15
PIF_VALUE: 12
PIF_VALUE: 12
PIF_VALUE: 17
PIF_VALUE: 16
PIF_VALUE: 17
PIF_VALUE: 18
PIF_VALUE: 16
PIF_VALUE: 15
PIF_VALUE: 22
PIF_VALUE: 17
PIF_VALUE: 13
PIF_VALUE: 17
PIF_VALUE: 16
PIF_VALUE: 16
PIF_VALUE: 17
PIF_VALUE: 17
PIF_VALUE: 14
PIF_VALUE: 17
PIF_VALUE: 17
PIF_VALUE: 12
PIF_VALUE: 17
PIF_VALUE: 15
PIF_VALUE: 17
PIF_VALUE: 16
PIF_VALUE: 17
PIF_VALUE: 16
PIF_VALUE: 14
PIF_VALUE: 11
PIF_VALUE: 14
PIF_VALUE: 24
PIF_VALUE: 15
PIF_VALUE: 18
PIF_VALUE: 17

## 2022-01-28 NOTE — PLAN OF CARE
Problem: Non-Violent Restraints  Goal: Removal from restraints as soon as assessed to be safe  Outcome: Ongoing  Goal: No harm/injury to patient while restraints in use  Outcome: Ongoing  Goal: Patient's dignity will be maintained  Outcome: Ongoing     Problem: Pain:  Goal: Pain level will decrease  Description: Pain level will decrease  Outcome: Ongoing  Goal: Control of acute pain  Description: Control of acute pain  Outcome: Ongoing  Goal: Control of chronic pain  Description: Control of chronic pain  Outcome: Ongoing     Problem: Skin Integrity:  Goal: Will show no infection signs and symptoms  Description: Will show no infection signs and symptoms  Outcome: Ongoing  Goal: Absence of new skin breakdown  Description: Absence of new skin breakdown  Outcome: Ongoing     Problem: Falls - Risk of:  Goal: Will remain free from falls  Description: Will remain free from falls  Outcome: Ongoing  Goal: Absence of physical injury  Description: Absence of physical injury  Outcome: Ongoing

## 2022-01-28 NOTE — PROGRESS NOTES
Patient started to cough and setting off vent. Fentanyl started at  15 mcg/hr to ensure vent compliance.

## 2022-01-28 NOTE — PROGRESS NOTES
Nephrology Note        [facility]    [email or web address]       Phone: [phone]                                                  1/28/2022 8:11 AM     Patient: Tamara Jay [id number]  [id number]  Date of Admit: 1/27/2022 LOS: 1 days      Interval History and Plan:  Patient remain intubated. FiO2 40 %. Patient is diagnosed with PE and he is on Heparin infusion. BP stable in 140s on monitor without pressors. AM labs pending. HD today per his MWF schedule with UF as tolerated. Follow pending labs. Monitor Hb and transfuse as needed  Cardiac workup. Avoid nephrotoxins  Monitor input, output  Monitor labs and vitals closely  Renally dose all medications      Thank you for allowing us to participate in this patient's care    In case of any question please call us at our 24 hour answering service [phone] or from 7 AM to 5 PM via [employer] or cell number    Ascencion Morgan MD  [facility]  [facility]  [place], [address]  Fax: [phone]  Office: [phone]       Assessment & Plan     Renal function:  ESRD  I confirmed from [address] and last HD was on wednesday and per report         Electrolytes:   Lab Results   Component Value Date    CREATININE 6.0 ([place]) 01/27/2022    BUN 33 (H) 01/27/2022     01/27/2022    K 3.9 01/27/2022     01/27/2022    CO2 24 01/27/2022            # CKD- MBD:   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 Lab Results   Component Value Date    .6 (H) 06/17/2020    CALCIUM 9.8 01/27/2022    CAION 1.22 01/27/2022    PHOS 3.7 01/27/2022         Acid/Base status:  No acidosis on last labs. PH 7.4 on VBG  Last lactate was normal, 0.85      Volume status/BP:  Now off pressors and BP is stable  No acute volume overload concerns.        Intake/Output Summary (Last 24 hours) at 1/28/2022 0811  Last data filed at 1/28/2022 0600  Gross per 24 hour   Intake 438.74 ml   Output 0 ml   Net 438.74 ml         Hematology:   CKD related anemia  Goal Hgb 10-11  Monitor and transfuse as needed. Lab Results   Component Value Date    IRON 39 (L) 10/05/2021    TIBC 189 (L) 10/05/2021    FERRITIN 1,665.0 (H) 08/13/2020     Lab Results   Component Value Date    WBC 9.5 01/27/2022    HGB 7.4 (L) 01/27/2022    HCT 22.5 (L) 01/27/2022    MCV 91.2 01/27/2022     01/27/2022             Reason for Consult and Chief Complaint     Reason for consult: ESRD    Chief complaint:   Chief Complaint   Patient presents with    Cardiac Arrest       History of Present Illness   Jamar Back is a 67 y.o. with PMH significant for ESRD on HD M/W/F, DM2, bl BKA, pAF, CAD (s/p stent in LAD and RCA 2018), HTN, HLD admitted after cardiac arrest and now patient is intubated. Patient was on pressors but now weaned off. Labs stable. Review of Systems   Positive in bold or unable to assess     GEN: Fever, chills, night sweats. HEENT: Changes in vision, sore throat, rhinorrhea   CVS: Chest pain, palpitations, swelling or edema in legs  Pulmonary: Cough, hemoptysis, SOB  GI: Nausea, vomiting, diarrhea, constipation, abdominal pain  : Bladder incontinence, dysuria,hematuria. MSK: Muscle or joint or bone pains  Skin: Rashes, ulcers, skin thickness  CNS: Headache, dizziness, confusion, focal weakness, seizure. Psych: Anxiety, agitation, depression. Reviewed all 12 systems, negative except as above.      Past Medical History     Past Medical History:   Diagnosis Date    Anemia     Atrial fibrillation ([facility] 11/4/2013    CAD (coronary artery disease)     Mi, stent    Chronic kidney disease     DVT (deep venous thrombosis) (HCC)     End stage renal disease (HCC)     Gas gangrene ([facility]     Hemodialysis patient ([facility]     M-W-F    Hx of blood clots     Hyperkalemia     Hyperlipidemia 5/30/2012    Hypertension     Hyperthyroidism     Ischemic heart disease 1/34/8472    Metabolic encephalopathy     MI (myocardial infarction) ([facility] 10/2018    Type II or unspecified type diabetes mellitus without mention of complication, not stated as uncontrolled          Past Surgical History     Past Surgical History:   Procedure Laterality Date    CARDIAC SURGERY      cardiac stent    FEMORAL-TIBIAL BYPASS GRAFT Right 1/9/2019    RIGHT FEMORAL POSTERIOR TIBIAL BYPASS performed by Michael Spain MD at [address] [place] [address] Right 1/4/2019    INCISION AND DRAINAGE, TRANSMETATARSAL AMPUTATION ALL ON RIGHT FOOT performed by Christa Garcia DPM at [address] [place] [address] Right 1/14/2019    REVISION OF TRANSMETATARSAL AMPUTATION RIGHT FOOT performed by Christa Garcia DPM at [address] [place] [address] Left 08/24/2019    TMA    FOOT AMPUTATION Left 8/24/2019    LEFT FOOT TRANSMETATARSAL AMPUTATION performed by Juvenal Felix DPM at [address] [place] [address] Left 8/27/2019    REPEAT INCISION AND DRAINAGE, REVISION OF LEFT FOOT TRANSMETATARSAL AMPUTATION performed by Juvenal Felix DPM at [address] [place] [address] Left 10/11/2019    LEFT FOOT INCISION AND DRAINAGE WITH REVISION OF TRANSMETARSAL AMPUTATION WITH WOUND VAC APPLICATION  performed by Juvenal Felix DPM at [address] Right 1/18/2019    RIGHT BELOW THE KNEE AMPUTATION performed by Michael Spain MD at [address] Left 12/9/2019    LEFT BELOW KNEE AMPUTATION performed by Michael Spain MD at [facility] 2/6/2020    DEBRIDEMENT AND PLACEMENT OF WOUND VAC LEFT BELOW THE KNEE AMPUTATION SITE performed by Nawaf Martinez MD at [place] [address] History     Family History   Problem Relation Age of Onset    Arthritis Mother          Social History     Social History     Tobacco Use    Smoking status: Never Smoker    Smokeless tobacco: Never Used   Substance Use Topics    Alcohol use: Not Currently          Past medical, family, and social histories were reviewed as previously documented. Updates were made as necessary. Inpatient Medications and Allergies       Scheduled Meds:   aspirin  81 mg Oral Daily    ferrous sulfate  325 mg Oral Daily with breakfast    pravastatin  20 mg Oral Daily    sodium chloride flush  5-40 mL IntraVENous 2 times per day    famotidine (PEPCID) injection  20 mg IntraVENous Daily    Venelex   Topical BID       Allergies: No Known Allergies      Vital Signs     Vitals:    01/28/22 0742   BP:    Pulse:    Resp: 16   Temp:    SpO2: 100%         Intake/Output Summary (Last 24 hours) at 1/28/2022 8454  Last data filed at 1/28/2022 0600  Gross per 24 hour   Intake 438.74 ml   Output 0 ml   Net 438.74 ml         Physical Exam     General appearance: NAD  Head: Normocephalic, without obvious abnormality, atraumatic   Mouth: ETT in mouth  Neck: Supple. Lungs: Intubated. No respiratory distress  Heart: S1, S2.  Abdomen: soft,  non-distended  Extremities: No leg edema, warm to touch   Skin: No concerning rashes noted  Neuro: Sedated.        Laboratory Data     Please see above     Diagnostic Studies   Pertinent images reviewed

## 2022-01-28 NOTE — PROCEDURES
Terrilyn Peabody is a 67 y.o. male patient. 1. Cardiac arrest [facility])      Past Medical History:   Diagnosis Date    Anemia     Atrial fibrillation ([facility] 11/4/2013    CAD (coronary artery disease)     Mi, stent    Chronic kidney disease     DVT (deep venous thrombosis) (HCC)     End stage renal disease (HCC)     Gas gangrene ([facility]     Hemodialysis patient ([facility]     M-W-F    Hx of blood clots     Hyperkalemia     Hyperlipidemia 5/30/2012    Hypertension     Hyperthyroidism     Ischemic heart disease 5/53/7045    Metabolic encephalopathy     MI (myocardial infarction) ([facility] 10/2018    Type II or unspecified type diabetes mellitus without mention of complication, not stated as uncontrolled      Blood pressure (!) 100/50, pulse 62, temperature 98.4 °F (36.9 °C), resp. rate 14, height 5' (1.524 m), weight 197 lb 12 oz (89.7 kg), SpO2 99 %. Central Line    Date/Time: 1/28/2022 3:07 PM  Performed by: Jamie Ceron DO  Authorized by: Jamie Ceron DO   Consent: Verbal consent obtained.   Risks and benefits: risks, benefits and alternatives were discussed  Consent given by: patient and power of   Patient understanding: patient states understanding of the procedure being performed  Patient consent: the patient's understanding of the procedure matches consent given  Procedure consent: procedure consent matches procedure scheduled  Relevant documents: relevant documents present and verified  Site marked: the operative site was marked  Imaging studies: imaging studies available  Required items: required blood products, implants, devices, and special equipment available  Patient identity confirmed: verbally with patient and hospital-assigned identification number  Indications: vascular access  Anesthesia: see MAR for details    Sedation:  Patient sedated: yes  Sedatives: fentanyl    Preparation: skin prepped with 2% chlorhexidine  Skin prep agent dried: skin prep agent completely dried prior to procedure  Sterile barriers: all five maximum sterile barriers used - cap, mask, sterile gown, sterile gloves, and large sterile sheet  Hand hygiene: hand hygiene performed prior to central venous catheter insertion  Location details: left internal jugular  Patient position: Trendelenburg  Catheter size: 3 Fr  Ultrasound guidance: yes  Number of attempts: 2  Successful placement: yes  Post-procedure: line sutured and dressing applied  Assessment: placement verified by x-ray and blood return through all ports  Patient tolerance: patient tolerated the procedure well with no immediate complications  Comments: EBL<5ml        Jordyn Cortez DO  1/28/2022    Pulmonary & Critical Care    I was present and supervised procedure    Dexter Bliss MD

## 2022-01-28 NOTE — CONSULTS
[facility]  Palliative Medicine Consultation Note      Date Of Admission:1/27/2022  Date of consult: 01/28/22  Seen by [facility] in the past:  Yes    Recommendations:        Reviewed HCPOA on chart which names daughter Kenji Cole as his agent. Deepthibrian Jamir, introduced palliative care and spoke with her briefly, but she was on her way out of work (she is a [address] at [employer]) and asked if she can call me back when she gets home from work. Will wait for her to call back. 1. Goals of Care/Advanced Care planning/Code status: Full Code, mentioned briefly to Kenji Cole. She said the family wants him to be full code, but she will call me back when she gets home from work to discuss further. 2. Pain: no signs of pain on current sedation. 3. SOB: intubated, s/p cardiac arrest.  4. Cardiac arrest: pt arrested at his nursing facility, likely secondary to bilateral PEs, now on heparin drip. Total down time was 40 mins. Neurology is following, find him to be opening his eyes to noxious stimuli but also some myoclonic activity in his neck. 5. Disposition: TBD    Reason for Consult:         [x]  Goals of Care  [x]  Code Status Discussion/Advanced Care Planning   []  Psychosocial/Family Support  []  Symptom Management  []  Other (Specify)    Requesting Physician: Dr. Beulah Glover:  Cardiac arrest    History Obtained From:  electronic medical record    History of Present Illness:         Tamara Jay is a 67 y.o. male with PMH of ESRD on HD MWF, DMII, BL BKA, pAF, CAD s/p stent in LAD and RCA 2018, HTN, HLD who presented with cardiac arrest from his nursing facility. His last known normal was 7:15am on the morning of presentation, staff checked on him 20 minutes later and he was down. EMS arrived and reported initial rhyhm PEA, pt underwent 5 rounds of CPR with epi and achieved ROSC. Total resuscitation time estimated 40 minutes.     Subjective:         Past Medical History:        Diagnosis Date    Anemia  Atrial fibrillation ([facility] 11/4/2013    CAD (coronary artery disease)     Mi, stent    Chronic kidney disease     [facility]     End stage renal disease (HCC)     [facility]     Hemodialysis patient ([facility]     M-W-F    Hx of blood clots     Hyperkalemia     Hyperlipidemia 5/30/2012    Hypertension     Hyperthyroidism     Ischemic heart disease 7/51/3532    Metabolic encephalopathy     MI (myocardial infarction) ([facility] 10/2018    Type II or unspecified type diabetes mellitus without mention of complication, not stated as uncontrolled        Past Surgical History:        Procedure Laterality Date    CARDIAC SURGERY      cardiac stent    FEMORAL-TIBIAL BYPASS GRAFT Right 1/9/2019    RIGHT FEMORAL POSTERIOR TIBIAL BYPASS performed by Angelia Ceballos MD at [address] [place] [address] Right 1/4/2019    INCISION AND DRAINAGE, TRANSMETATARSAL AMPUTATION ALL ON RIGHT FOOT performed by John Alexander DPM at [address] [place] [address] Right 1/14/2019    REVISION OF TRANSMETATARSAL AMPUTATION RIGHT FOOT performed by John Alexander DPM at [address] [place] [address] Left 08/24/2019    TMA    FOOT AMPUTATION Left 8/24/2019    LEFT FOOT TRANSMETATARSAL AMPUTATION performed by Laurel Abraham DPM at [address] [place] [address] Left 8/27/2019    REPEAT INCISION AND DRAINAGE, REVISION OF LEFT FOOT TRANSMETATARSAL AMPUTATION performed by Laurel Abraham DPM at [address] [place] [address] Left 10/11/2019    LEFT FOOT INCISION AND DRAINAGE WITH REVISION OF TRANSMETARSAL AMPUTATION WITH WOUND VAC APPLICATION  performed by Laurel Abraham DPM at [address] Right 1/18/2019    RIGHT BELOW THE KNEE AMPUTATION performed by Angelia Ceballos MD at [address] Left 12/9/2019    LEFT BELOW KNEE AMPUTATION performed by Angelia Ceballos MD at [facility] Left 2/6/2020    DEBRIDEMENT AND PLACEMENT OF WOUND VAC LEFT BELOW THE KNEE AMPUTATION

## 2022-01-28 NOTE — PROGRESS NOTES
Hospital Medicine Care  Progress Note      Chief complain; Cardiac Arrest            Subjective: Intubated  Not on sedation. Review of Systems:     Review of Systems as mentioned above, other ros is negative. Objective:       Medications        Scheduled Meds:   aspirin  81 mg Oral Daily    ferrous sulfate  325 mg Oral Daily with breakfast    pravastatin  20 mg Oral Daily    sodium chloride flush  5-40 mL IntraVENous 2 times per day    famotidine (PEPCID) injection  20 mg IntraVENous Daily    Venelex   Topical BID     Continuous Infusions:   fentaNYL Stopped (01/28/22 1000)    sodium chloride      norepinephrine Stopped (01/27/22 1115)    sodium chloride      dextrose      heparin (PORCINE) Infusion 14 Units/kg/hr (01/28/22 1040)     PRN Meds:.sodium chloride, sodium chloride flush, sodium chloride, ondansetron **OR** ondansetron, acetaminophen **OR** acetaminophen, polyethylene glycol, glucose, dextrose, glucagon (rDNA), dextrose, heparin (porcine), heparin (porcine)      Allergies  No Known Allergies      Vitals:    01/28/22 1015 01/28/22 1030 01/28/22 1045 01/28/22 1100   BP: 137/61 (!) 126/57 135/88 (!) 147/71   Pulse: 60 67 67 76   Resp: 16 16 16 26   Temp:       TempSrc:       SpO2: 100% 100% 100% 100%   Weight:       Height:             Physical exam:         Physical Exam  Constitutional:       Appearance: He is ill-appearing. Comments: Intubated. HENT:      Head: Normocephalic and atraumatic. Cardiovascular:      Rate and Rhythm: Normal rate and regular rhythm. Pulses: Normal pulses. Heart sounds: Normal heart sounds. Pulmonary:      Effort: Pulmonary effort is normal.      Breath sounds: Normal breath sounds. Abdominal:      General: Abdomen is flat. Palpations: Abdomen is soft. Skin:     General: Skin is warm and dry. Capillary Refill: Capillary refill takes less than 2 seconds. Neurological:      Comments: Intubated, non responsive. Labs      Recent Labs     01/27/22  0849 01/28/22  0939   WBC 9.5 7.7   HGB 7.4* 6.2*   HCT 22.5* 18.6*    149   MCV 91.2 88.7        Recent Labs     01/27/22  0849 01/27/22  1421 01/28/22  0832    140 141   K 4.5 3.9 3.7    101 102   CO2 20* 24 28   PHOS  --  3.7  --    BUN 23* 33* 40*   CREATININE 5.8* 6.0* 6.6*       Recent Labs     01/27/22  1421 01/28/22  0832 01/28/22  0940   * 266* 276*   *  --  440*   BILIDIR <0.2  --  <0.2   BILITOT 0.3 0.3 <0.2   ALKPHOS 112  --  85       Recent Labs     01/27/22  1421   MG 2.10       Radiology  CTA PULMONARY W CONTRAST   Final Result      Left-sided pulmonary emboli. Infiltrate in right lower lobe suggesting pneumonia. Borderline RV/LV ratio of approximately 1.02. Results were reported by telephone to the charge nurse, Kamila, at 7:11 PM on 1/27/2020         CT HEAD WO CONTRAST   Final Result   1. Atrophy and superimposed small vessel ischemic disease with remote infarct in the lateral left occipital region. No recent infarct, hemorrhage or mass detected. XR CHEST PORTABLE   Final Result   1. Nasogastric tube coiled in the distal esophagus with gastric air distention. 2. Endotracheal tube in satisfactory position. Critical results reported to Physician: Lei Villavicencio   at 1/27/20229:25 AM on 1/27/2022 by telephone. Assessment and Plan: Active Problems:    PEA (Pulseless electrical activity) (HCC)  Resolved Problems:    * No resolved hospital problems.  *     Cardiac arrest  CTPA showed pulmonary embolism  On heparin drip  ROSC approximately 40 minutes  Concern for hypoxic ischemic injury  Critical care neurology following    ESRD  Dialysis per nephrology    Prognosis appears poor      Fidencio Garcia MD  1/28/2022

## 2022-01-28 NOTE — PROGRESS NOTES
ICU Progress Note    Admit Date: 1/27/2022  Day: 2  Vent Day: None  IV Access:Peripheral  IV Fluids:None  Vasopressors:None                Antibiotics: None  Diet: Diet NPO    CC: cardiac arrest    Interval history:   - CTPA + for PE  - on heparin gtt  - HD today  - Off pressors and sedation      Medications:     Scheduled Meds:   aspirin  81 mg Oral Daily    ferrous sulfate  325 mg Oral Daily with breakfast    pravastatin  20 mg Oral Daily    sodium chloride flush  5-40 mL IntraVENous 2 times per day    famotidine (PEPCID) injection  20 mg IntraVENous Daily    Venelex   Topical BID     Continuous Infusions:   fentaNYL Stopped (01/27/22 2131)    norepinephrine Stopped (01/27/22 1115)    sodium chloride      dextrose      heparin (PORCINE) Infusion 15 Units/kg/hr (01/28/22 0600)     PRN Meds:sodium chloride flush, sodium chloride, ondansetron **OR** ondansetron, acetaminophen **OR** acetaminophen, polyethylene glycol, glucose, dextrose, glucagon (rDNA), dextrose, heparin (porcine), heparin (porcine)    Objective:   Vitals:   T-max:  Patient Vitals for the past 8 hrs:   BP Temp Temp src Pulse Resp SpO2 Weight   01/28/22 0700 (!) 147/65 -- -- 71 15 100 % --   01/28/22 0645 (!) 124/59 -- -- 63 14 100 % 195 lb 8.8 oz (88.7 kg)   01/28/22 0630 (!) 113/53 -- -- 63 15 100 % --   01/28/22 0615 (!) 122/52 -- -- 72 14 100 % --   01/28/22 0600 138/61 -- -- 66 18 99 % --   01/28/22 0545 (!) 148/62 -- -- 66 14 99 % --   01/28/22 0530 (!) 143/63 -- -- 75 18 99 % --   01/28/22 0515 (!) 151/59 -- -- 75 (!) 0 99 % --   01/28/22 0500 (!) 148/65 -- -- 76 13 100 % --   01/28/22 0445 (!) 151/66 -- -- 76 13 98 % --   01/28/22 0430 (!) 158/65 -- -- 74 16 100 % --   01/28/22 0415 (!) 157/71 -- -- 73 24 100 % --   01/28/22 0400 (!) 129/59 99 °F (37.2 °C) Axillary 66 10 100 % --   01/28/22 0345 (!) 149/50 -- -- 70 16 100 % --   01/28/22 0333 -- -- -- 78 20 100 % --   01/28/22 0330 (!) 155/69 -- -- 76 20 100 % --   01/28/22 0315 (!) 163/65 -- -- 77 17 100 % --   01/28/22 0300 (!) 159/67 -- -- 75 17 100 % --   01/28/22 0245 (!) 157/70 -- -- 76 19 100 % --   01/28/22 0230 (!) 165/70 -- -- 77 21 100 % --   01/28/22 0215 (!) 162/71 -- -- 76 19 100 % --   01/28/22 0200 (!) 155/69 -- -- 72 20 100 % --   01/28/22 0145 (!) 140/65 -- -- 69 17 100 % --   01/28/22 0130 (!) 141/64 -- -- 84 24 100 % --   01/28/22 0115 -- -- -- 78 20 99 % --   01/28/22 0100 127/60 -- -- 61 16 100 % --   01/28/22 0045 (!) 123/55 -- -- 58 16 100 % --   01/28/22 0035 -- -- -- 64 16 100 % --   01/28/22 0030 128/61 -- -- 65 16 100 % --   01/28/22 0015 (!) 122/55 -- -- 60 16 100 % --   01/28/22 0000 (!) 114/57 98.8 °F (37.1 °C) Bladder 61 14 100 % --       Intake/Output Summary (Last 24 hours) at 1/28/2022 0719  Last data filed at 1/28/2022 0600  Gross per 24 hour   Intake 438.74 ml   Output 0 ml   Net 438.74 ml       Review of Systems   Unable to perform ROS: Intubated       Physical Exam  Physical Exam  Constitutional:       Appearance: He is ill-appearing. Comments: intubated   HENT:      Head: Normocephalic and atraumatic. Cardiovascular:      Rate and Rhythm: Normal rate and regular rhythm. Pulmonary:      Effort: Pulmonary effort is normal.      Comments: Mechanical breath sounds  Abdominal:      Palpations: Abdomen is soft. Tenderness: There is no abdominal tenderness. There is no guarding or rebound. Musculoskeletal:         General: No swelling. Cervical back: Neck supple.       Comments: Bilateral BKA   Skin:     General: Skin is warm and dry.         LABS:    CBC:   Recent Labs     01/27/22  0849   WBC 9.5   HGB 7.4*   HCT 22.5*      MCV 91.2     Renal:    Recent Labs     01/27/22  0849 01/27/22  1421    140   K 4.5 3.9    101   CO2 20* 24   BUN 23* 33*   CREATININE 5.8* 6.0*   GLUCOSE 94 190*   CALCIUM 10.9* 9.8   MG  --  2.10   PHOS  --  3.7   ANIONGAP 21* 15     Hepatic:   Recent Labs     01/27/22  1421   *   * BILITOT 0.3   BILIDIR <0.2   PROT 6.5   LABALBU 3.6   ALKPHOS 112     Troponin:   Recent Labs     01/27/22  0849   TROPONINI 0.19*     BNP: No results for input(s): BNP in the last 72 hours. Lipids: No results for input(s): CHOL, HDL in the last 72 hours. Invalid input(s): LDLCALCU, TRIGLYCERIDE  ABGs:  No results for input(s): PHART, IHH8NCG, PO2ART, JDJ6XZE, BEART, THGBART, M7POEKYO, ETV7NPS in the last 72 hours. INR:   Recent Labs     01/27/22  0849   INR 1.20*     Lactate:   Recent Labs     01/27/22  1431   LACTATE 0.85     Cultures:  -----------------------------------------------------------------  RAD:   CTA PULMONARY W CONTRAST   Final Result      Left-sided pulmonary emboli. Infiltrate in right lower lobe suggesting pneumonia. Borderline RV/LV ratio of approximately 1.02. Results were reported by telephone to the charge nurse, Kamila, at 7:11 PM on 1/27/2020         CT HEAD WO CONTRAST   Final Result   1. Atrophy and superimposed small vessel ischemic disease with remote infarct in the lateral left occipital region. No recent infarct, hemorrhage or mass detected. XR CHEST PORTABLE   Final Result   1. Nasogastric tube coiled in the distal esophagus with gastric air distention. 2. Endotracheal tube in satisfactory position. Critical results reported to Physician: Cecile Malhotra   at 1/27/20229:25 AM on 1/27/2022 by telephone. Assessment/Plan:   Herlinda Hardy is a 67 y.o. male PMH ESRD on HD, DM2, bl BKA, pAF, CAD (s/p stent in LAD and RCA 2018), HTN, HLD who presented from [facility] after cardiac arrest.      S/p Cardiac Arrest  - suspected down for 20 minutes. Initial rhythm PEA.  Received total of 40 minutes of resuscitation with 5 rounds of CPR and epi  - intubated 1/27  - off pressors and sedation  - 2/2 PE    PE  - heparin gtt     Acute respiratory failure 2/2 cardiac arrest  - s/p intubation in ED  - ABG     ESRD on HD  - gets HD M/W/F and does not make urine  - nephro consulted  - strict I/O, daily weights     Lactic acidosis, resolved  - 2/2 hypoperfusion from cardiac arrest    pAF/HTN- holding home nifedipine, hydralazine, prazosin, and carvedilol  CAD s/p LAD and RCA stent in 2018, continue home ASA and statin  HLD- home statin  Anemia of Chronic Disease- epo three times per week  Depression- holding home mirtazapine  DM2- hypoglycemia, POCT     Code Status:Prior  FEN: NPO  PPX:  subq heparin  DISPO: ICU       Babar AyoDO, PGY-1  01/28/22  7:19 AM    This patient has been staffed and discussed with Dr. Maral Grajeda    [facility] [place]     Patient seen and examined. I agree with Dr. Cece Ramos history, physical, lab findings, assessment and plan. Off Levophed since yesterday    Head CT on admission January 27  Impression   1. Atrophy and superimposed small vessel ischemic disease with remote infarct in the lateral left occipital region. No recent infarct, hemorrhage or mass detected. CTPA January 27  Impression       Left-sided pulmonary emboli.       Infiltrate in right lower lobe suggesting pneumonia.       Borderline RV/LV ratio of approximately 1.02. No clinical signs or symptoms of pneumonia    Assessment  1. PEA cardiac arrest -downtime estimated between 20 and 40 minutes  2. PE with cor pulmonale  3. Acute Respiratory Failure  4. Lactic acidemia  5. ESRD on HD  6. Possible anoxic encephalopathy  7. Myoclonic activity with stimulation    Plan  1. Continue heparin drip  2. Vent: Tidal volume 500, respiratory rate 14, FiO2 30%, PEEP 5  3. Neurology consultation -May need CV EEG  4.  HD today  5. Palliative care consultation to help with goals of care  6. Place central line  7. Pepcid for GI prophylaxis  8.   Full code     Prognosis extremely guarded    Pt has a high probability of imminent or life-threatening deterioration requiring close monitoring, and highly complex decision-making and/or interventions of high intensity to assess, manipulate, and support his critical organ systems to prevent a likely inevitable decline which could occur if left untreated. A total critical care time 33 minutes was used. This includes but not limited to examining patient, collaborating with other physicians, monitoring vital signs, telemetry, continuous pulse oximetry, and clinical response to IV medications, documentation time, review and interpretation of laboratory and radiological data, review of nursing notes and old record review. This time excludes any time that may have been spent performing procedures for life threatening organ failure.     Derrick Shannon MD

## 2022-01-28 NOTE — CONSULTS
Neurology / Neurocritical Care Consult Note    Tiff Peoples MD is requesting this consult. Reason for Consult: post-cardiac arrest  Admission Chief Complaint: found down    History of Present Illness     Noemi Seymour is a 67 y.o. y/o male with PMH significant for ESRD on HD M/W/F, DM2, bl BKA, pAF, CAD (s/p stent in LAD and RCA 2018), HTN, HLD who presented from [facility] after cardiac arrest.     Last known well was 7:15 AM, pt was found down around 20 minutes later. EMS arrived and reported initial rhyhm PEA, pt underwent 5 rounds of CPR with epi and achieved ROSC. Total resuscitation time estimated 40 minutes. Pt was intubated in ED. On labs, BUN, Cr, and troponin lower than baseline. Lactic acid 8.9. CXR and CT head negative for acute abnormality. Pt received 1 amp sodium bicarb, 1g calcium chloride, and 250 mL NS. Pt was initially given levo, ketamine, and propofol. History taken from chart review due to altered mental status.            REVIEW OF SYSTEMS:   AARON due to mental status    Past Medical, Surgical, Family, and Social History   PAST MEDICAL HISTORY:  Past Medical History:   Diagnosis Date    Anemia     Atrial fibrillation ([address] 11/4/2013    CAD (coronary artery disease)     Mi, stent    Chronic kidney disease     DVT (deep venous thrombosis) ([facility])     End stage renal disease (HCC)     Gas gangrene ([address]     Hemodialysis patient ([facility]     M-W-F    Hx of blood clots     Hyperkalemia     Hyperlipidemia 5/30/2012    Hypertension     Hyperthyroidism     Ischemic heart disease 6/90/4227    Metabolic encephalopathy     MI (myocardial infarction) ([address] 10/2018    Type II or unspecified type diabetes mellitus without mention of complication, not stated as uncontrolled      SURGICAL HISTORY:  Past Surgical History:   Procedure Laterality Date    CARDIAC SURGERY      cardiac stent    FEMORAL-TIBIAL BYPASS GRAFT Right 1/9/2019    RIGHT FEMORAL POSTERIOR TIBIAL BYPASS performed by Clementina Kirti Mason MD at [address] [place] [address] Right 1/4/2019    INCISION AND DRAINAGE, TRANSMETATARSAL AMPUTATION ALL ON RIGHT FOOT performed by Jonny Koenig DPM at [address] [place] [address] Right 1/14/2019    REVISION OF TRANSMETATARSAL AMPUTATION RIGHT FOOT performed by Jonny Koenig DPM at [address] [place] [address] Left 08/24/2019    TMA    FOOT AMPUTATION Left 8/24/2019    LEFT FOOT TRANSMETATARSAL AMPUTATION performed by Hyun Hendrix DPM at [address] [place] [address] Left 8/27/2019    REPEAT INCISION AND DRAINAGE, REVISION OF LEFT FOOT TRANSMETATARSAL AMPUTATION performed by Hyun Hendrix DPM at [address] [place] [address] Left 10/11/2019    LEFT FOOT INCISION AND DRAINAGE WITH REVISION OF TRANSMETARSAL AMPUTATION WITH WOUND VAC APPLICATION  performed by Hyun Hendrix DPM at [address] Right 1/18/2019    RIGHT BELOW THE KNEE AMPUTATION performed by Kristian Ahumada, MD at [address] Left 12/9/2019    LEFT BELOW KNEE AMPUTATION performed by Kristian Ahumada, MD at [facility] Left 2/6/2020    DEBRIDEMENT AND PLACEMENT OF WOUND VAC LEFT BELOW THE KNEE AMPUTATION SITE performed by Elaine Tavera MD at [address] [place] [address] Ave:  Family history non-contributory  Family History   Problem Relation Age of Onset    Arthritis Mother      Social History     Tobacco History     Smoking Status  Never Smoker    Smokeless Tobacco Use  Never Used          Alcohol History     Alcohol Use Status  Not Currently          Drug Use     Drug Use Status  No          Sexual Activity     Sexually Active  Not Asked                Allergies & Outpatient Medications   ALLERGIES:  No Known Allergies  HOME MEDICATIONS:  Current Discharge Medication List      CONTINUE these medications which have NOT CHANGED    Details   amLODIPine (NORVASC) 10 MG tablet Take 10 mg by mouth at bedtime      sevelamer (RENVELA) 800 MG tablet Take 1 tablet by mouth 3 times daily (with meals)  Qty: 90 tablet, Refills: 3      hydrALAZINE (APRESOLINE) 50 MG tablet Take 1 tablet by mouth every 8 hours  Qty: 90 tablet, Refills: 3      Cholecalciferol (VITAMIN D3) 50 MCG (2000 UT) CAPS Take 2,000 Units by mouth daily      folic acid (FOLVITE) 1 MG tablet Take 1 mg by mouth daily      ferrous sulfate (IRON 325) 325 (65 Fe) MG tablet Take 325 mg by mouth daily (with breakfast)      vitamin B-12 (CYANOCOBALAMIN) 1000 MCG tablet Take 1,000 mcg by mouth daily      mirtazapine (REMERON) 15 MG tablet Take 15 mg by mouth nightly      pantoprazole sodium (PROTONIX) 40 MG PACK packet Take 40 mg by mouth every morning (before breakfast)      prazosin (MINIPRESS) 2 MG capsule Take 2 mg by mouth nightly      B Complex-C-Folic Acid (JOSUÉ CAPS) 1 MG CAPS Take 1 mg by mouth daily      pravastatin (PRAVACHOL) 20 MG tablet Take 20 mg by mouth daily      carvedilol (COREG) 25 MG tablet Take 25 mg by mouth 2 times daily (with meals)      aspirin 81 MG tablet Take 81 mg by mouth daily       nitroGLYCERIN (NITROSTAT) 0.4 MG SL tablet Place 0.4 mg under the tongue every 5 minutes as needed for Chest pain up to max of 3 total doses. If no relief after 1 dose, call 911. Physical Exam   PHYSICAL EXAM:  Vitals:    01/28/22 0845 01/28/22 0900 01/28/22 0915 01/28/22 0930   BP: (!) 172/83 (!) 154/64 (!) 108/57 (!) 94/53   Pulse: 76 74 61 59   Resp: 21 17 14 17   Temp:       TempSrc:       SpO2: 100% 100% 100% 94%   Weight:       Height:             General: Unresponsive. Getting dialysis. On 25 of fentanyl. Neurologic  Mental status: Opens eyes to voice. Awakens briskly to pain but does not follow commands or track examiner.        Cranial nerves:   CN2: Visual fields full w/o extinction on confrontational testing   CN 3,4,6: Pupils pinpoint  CN7: Face symmetric On rest  CN8: Hearing symmetric to spoken voice        Motor Exam:  RUE: abnormal flexion to pain  LUE: abnormal flexion to pain  RLE: withdraws from pain  LLE: withdraws from pain      OTHER SYSTEMS:  Cardiovascular: Warm, appears well perfused   Respiratory: Easy, non-labored respiratory pattern   Abdominal: Abdomen is without distention   Extremities: Bilateral below knee amputations     Diagnostic Testing Results   IMAGES:  Images personally reviewed and agree w/ radiology interpretation. Head CT w/o Contrast:  Atrophy and superimposed small vessel ischemic disease with remote infarct in the lateral left occipital region. No recent infarct, hemorrhage or mass detected. CTPA:  Left-sided pulmonary emboli. Infiltrate in right lower lobe suggesting pneumonia. Borderline RV/LV ratio of approximately 1.02. LABS:  All results below personally reviewed. Pertinent positives & negatives are addressed in Impression & Recommendations below. LABS   Metabolic Panel Recent Labs     01/27/22  0849 01/27/22  1421    140   K 4.5 3.9    101   CO2 20* 24   BUN 23* 33*   CREATININE 5.8* 6.0*   GLUCOSE 94 190*   CALCIUM 10.9* 9.8   LABALBU  --  3.6   PHOS  --  3.7   MG  --  2.10   ALKPHOS  --  112   ALT  --  731*   AST  --  831*      CBC / Coags Recent Labs     01/27/22  0849   WBC 9.5   RBC 2.47*   HGB 7.4*   HCT 22.5*      INR 1.20*      Other No results for input(s): LABA1C, LDLCALC, TRIG, TSH, [id number], FOLATE, LABSALI, COVID19 in the last 72 hours.   Recent Labs     01/27/22  1421   LACTA 1.0          CURRENT SCHEDULED MEDICATIONS   Inpatient Medications   aspirin, 81 mg, Oral, Daily    ferrous sulfate, 325 mg, Oral, Daily with breakfast    pravastatin, 20 mg, Oral, Daily    sodium chloride flush, 5-40 mL, IntraVENous, 2 times per day    famotidine (PEPCID) injection, 20 mg, IntraVENous, Daily    Venelex, , Topical, BID   Infusions    fentaNYL 12.5 mcg/hr (01/28/22 0941)    norepinephrine Stopped (01/27/22 1115)    sodium chloride      dextrose      heparin (PORCINE) Infusion 15 Units/kg/hr (01/28/22 0600) Antibiotics   Recent Abx Admin      No antibiotic orders with administrations found. IMPRESSION & RECOMMENDATIONS     IMPRESSION:Ishan Soares is a 67 y.o. y/o male with PMH significant for ESRD on HD M/W/F, DM2, bl BKA, pAF, CAD (s/p stent in LAD and RCA 2018), HTN, HLD who presented from [facility] after 40 minute cardiac arrest.    He is doing more than expected from his neurologic exam.  He will require time and correction of his underlying medical and metabolic problems for most accurate prognostication. Can hold off on cvEEG for now but may need later.       RECOMMENDATIONS:  -Treatment of underlying medical and metabolic issues, as you are  -Minimize use of sedatives  -Dialysis per schedule  -Can consider MRI and cvEEG in the future but ok to watch clinically for now  -We will follow       [address] [facility]   Neurology & Neurocritical Care   Neurology Line: [phone]  Perfect[employer]: [facility] Neurology & Neuro Critical Care NPs  1/28/2022 10:17 AM

## 2022-01-28 NOTE — FLOWSHEET NOTE
Treatment time:3.5 hours  Net UF: 1452 ml    Pre weight: 89.7 kg   Post weight: 89.4 kg  EDW: 97 kg    Access used: LLA Fistula  Access function: Good with  ml/min    Medications or blood products given: 1 unit of PRBC    Regular outpatient schedule: MWF    Summary of response to treatment: Tx ended and blood returned with no problems, Cannulation needles d/c'd, hemastasis achieved, Pt remains intubated and on Vent, sedated, tolerated treatment and fluid removal fair. Fluid removal goal changed from 1.5L to keep even r/t hypotension per MD, received 1 unit of PRBC this tx for HGB 6.2, tolerated well, Tx time extended to accommodate transfusion,  VSS post tx, dressings to cannulation sites noted to be clean, dry and intact upon leaving bedside, report given to primary care nurse. Copy of dialysis treatment record placed in chart, to be scanned into EMR.

## 2022-01-28 NOTE — PLAN OF CARE
Problem: Nutrition  Goal: Optimal nutrition therapy  Note: Nutrition Problem #1: Inadequate oral intake  Intervention: Food and/or Nutrient Delivery: Continue NPO,Start Tube Feeding  Nutritional Goals: Pt will tolerate most appropriate form of nutrition while intubated to meet >75% of nutrition needsn

## 2022-01-28 NOTE — PROGRESS NOTES
Component Ref Range & Units 1/28/22 1334         Hemoglobin 13.5 - 17.5 g/dL 7.8 Low           Hematocrit 40.5 - 52.5 % 23.7 Low              Post-Transfusion H&H results, see above. Dr. Juana Hernandez, with ICU Team, updated on results. Patient Child, BODØ, updated on patient condition with all questions answered. Will continue to keep updated.

## 2022-01-28 NOTE — CONSULTS
Comprehensive Nutrition Assessment    RECOMMENDATIONS:  Per ASPEN guidelines, suggest start of alternative nutrition within 24-48 hours intubation, as appropriate. 1. PO Diet: Continue NPO while intubated. 2. Nutrition Support: If aggressive care pursued and able to replace corpak recommend start of EN. NUTRITION ASSESSMENT:    Nutritional summary & status: Pt assessed as new mechanical vent. Intubated last night, no pressors or propofol currently. 26 lb (11%) wt loss noted in the last month. Suspect some weight loss d/t fluid losses as pt w/ ESRD on HD but unable to assess nutritional vs fluid losses. Noted NGT was displaced and unable to be replaced upon multiple attempts. If aggressive care pursued then RD recommending placement of corpak as able and start of EN. RD to monitor nutrition status throughout adm.     Admission/PMH: Cardiac arrest. PMH: ESRD on HD, HTN, CAD, HL, DM2     MALNUTRITION ASSESSMENT  Context of Malnutrition: Acute Illness   Malnutrition Status: Mild malnutrition  Findings of the 6 clinical characteristics of malnutrition (Minimum of 2 out of 6 clinical characteristics is required to make the diagnosis of moderate or severe Protein Calorie Malnutrition based on AND/ASPEN Guidelines):  Energy Intake: Less than/equal to 50% of estimated energy requirements    Energy Intake Time: 1 day   Weight Loss %: Unable to assess  AARON nutritional vs fluid losses  Weight loss Time: Unable to assess     NUTRITION DIAGNOSIS   Inadequate oral intake related to impaired respiratory function as evidenced by intubation    [address] and/or Nutrient Delivery:  Continue NPO,Start Tube Feeding  Nutrition Education/Counseling:  No recommendation at this time   Goals:  Pt will tolerate most appropriate form of nutrition while intubated to meet >75% of nutrition needsn       Nutrition Monitoring and Evaluation:   Food/Nutrient Intake Outcomes:  Diet Advancement/Tolerance,Enteral Nutrition Intake/Tolerance  Physical Signs/Symptoms Outcomes:  Biochemical Data,Hemodynamic Status,Weight     OBJECTIVE DATA: Significant to nutrition assessment  · Nutrition-Focused Physical Findings: lbm 1/28  · Labs: Reviewed  · Meds: Reviewed; iron  · Wounds: None       CURRENT NUTRITION THERAPIES  Diet NPO     PO Intake: NPO   PO Supplement Intake:NPO  Additional Sources of Calories/IVF:n/a     ANTHROPOMETRICS  Current Height: 5' (152.4 cm)  Current Weight: 197 lb 12 oz (89.7 kg)    Admission weight: 220 lb (99.8 kg)  Ideal Body Weight (IBW): 106 lbs  (48 kg)    Usual Bodyweight 227 lb (103 kg)   Weight Changes AARON fluid vs nutritional losses       BMI: 38.7    Wt Readings from Last 50 Encounters:   01/28/22 197 lb 12 oz (89.7 kg)   01/19/22 220 lb (99.8 kg)   12/03/21 223 lb 15.8 oz (101.6 kg)   10/06/21 227 lb 15.3 oz (103.4 kg)   03/09/21 233 lb 3.2 oz (105.8 kg)       COMPARATIVE STANDARDS  Energy (kcal):  990-1260 (11-14); Weight Used for Energy Requirements:  Current (90)     Protein (g):   (2.0-2.2); Weight Used for Protein Requirements:  Ideal (48 kg)        Fluid (ml/day):  1500 ml; Method Used for Fluid Requirements:  Standard Renal      The patient will still be monitored per nutrition standards of care. Consult dietitian if nutrition interventions essential to patient care is needed.      Zari Caicedo, [address] [place] [address], [address] [place] [address] Drive:  [phone]  Office:  [phone]

## 2022-01-29 ENCOUNTER — APPOINTMENT (OUTPATIENT)
Dept: GENERAL RADIOLOGY | Age: 77
DRG: 870 | End: 2022-01-29
Payer: MEDICARE

## 2022-01-29 LAB
ALBUMIN SERPL-MCNC: 2.7 G/DL (ref 3.4–5)
ALP BLD-CCNC: 82 U/L (ref 40–129)
ALT SERPL-CCNC: 339 U/L (ref 10–40)
ANION GAP SERPL CALCULATED.3IONS-SCNC: 13 MMOL/L (ref 3–16)
ANTI-XA UNFRAC HEPARIN: 0.45 IU/ML (ref 0.3–0.7)
ANTI-XA UNFRAC HEPARIN: 0.67 IU/ML (ref 0.3–0.7)
AST SERPL-CCNC: 122 U/L (ref 15–37)
BASOPHILS ABSOLUTE: 0.1 K/UL (ref 0–0.2)
BASOPHILS RELATIVE PERCENT: 0.6 %
BILIRUB SERPL-MCNC: 0.4 MG/DL (ref 0–1)
BILIRUBIN DIRECT: <0.2 MG/DL (ref 0–0.3)
BILIRUBIN, INDIRECT: ABNORMAL MG/DL (ref 0–1)
BUN BLDV-MCNC: 16 MG/DL (ref 7–20)
CALCIUM SERPL-MCNC: 9.4 MG/DL (ref 8.3–10.6)
CHLORIDE BLD-SCNC: 103 MMOL/L (ref 99–110)
CO2: 22 MMOL/L (ref 21–32)
CREAT SERPL-MCNC: 4.1 MG/DL (ref 0.8–1.3)
EOSINOPHILS ABSOLUTE: 0.3 K/UL (ref 0–0.6)
EOSINOPHILS RELATIVE PERCENT: 3.6 %
GFR AFRICAN AMERICAN: 17
GFR NON-AFRICAN AMERICAN: 14
GLUCOSE BLD-MCNC: 85 MG/DL (ref 70–99)
GLUCOSE BLD-MCNC: 87 MG/DL (ref 70–99)
GLUCOSE BLD-MCNC: 89 MG/DL (ref 70–99)
GLUCOSE BLD-MCNC: 92 MG/DL (ref 70–99)
GLUCOSE BLD-MCNC: 93 MG/DL (ref 70–99)
GLUCOSE BLD-MCNC: 98 MG/DL (ref 70–99)
HCT VFR BLD CALC: 24.4 % (ref 40.5–52.5)
HEMOGLOBIN: 7.7 G/DL (ref 13.5–17.5)
LYMPHOCYTES ABSOLUTE: 1.2 K/UL (ref 1–5.1)
LYMPHOCYTES RELATIVE PERCENT: 14.7 %
MCH RBC QN AUTO: 29.6 PG (ref 26–34)
MCHC RBC AUTO-ENTMCNC: 31.3 G/DL (ref 31–36)
MCV RBC AUTO: 94.6 FL (ref 80–100)
MONOCYTES ABSOLUTE: 0.5 K/UL (ref 0–1.3)
MONOCYTES RELATIVE PERCENT: 6.3 %
NEUTROPHILS ABSOLUTE: 6.2 K/UL (ref 1.7–7.7)
NEUTROPHILS RELATIVE PERCENT: 74.8 %
PDW BLD-RTO: 14.6 % (ref 12.4–15.4)
PERFORMED ON: NORMAL
PLATELET # BLD: 156 K/UL (ref 135–450)
PMV BLD AUTO: 8.9 FL (ref 5–10.5)
POTASSIUM REFLEX MAGNESIUM: 4.4 MMOL/L (ref 3.5–5.1)
RBC # BLD: 2.58 M/UL (ref 4.2–5.9)
REPORT: NORMAL
SODIUM BLD-SCNC: 138 MMOL/L (ref 136–145)
TOTAL PROTEIN: 6 G/DL (ref 6.4–8.2)
WBC # BLD: 8.3 K/UL (ref 4–11)

## 2022-01-29 PROCEDURE — 2000000000 HC ICU R&B

## 2022-01-29 PROCEDURE — 85025 COMPLETE CBC W/AUTO DIFF WBC: CPT

## 2022-01-29 PROCEDURE — 74018 RADEX ABDOMEN 1 VIEW: CPT

## 2022-01-29 PROCEDURE — 2580000003 HC RX 258: Performed by: INTERNAL MEDICINE

## 2022-01-29 PROCEDURE — 94761 N-INVAS EAR/PLS OXIMETRY MLT: CPT

## 2022-01-29 PROCEDURE — 80048 BASIC METABOLIC PNL TOTAL CA: CPT

## 2022-01-29 PROCEDURE — 2500000003 HC RX 250 WO HCPCS: Performed by: STUDENT IN AN ORGANIZED HEALTH CARE EDUCATION/TRAINING PROGRAM

## 2022-01-29 PROCEDURE — 6360000002 HC RX W HCPCS: Performed by: INTERNAL MEDICINE

## 2022-01-29 PROCEDURE — 99232 SBSQ HOSP IP/OBS MODERATE 35: CPT | Performed by: NURSE PRACTITIONER

## 2022-01-29 PROCEDURE — 36592 COLLECT BLOOD FROM PICC: CPT

## 2022-01-29 PROCEDURE — 6370000000 HC RX 637 (ALT 250 FOR IP): Performed by: STUDENT IN AN ORGANIZED HEALTH CARE EDUCATION/TRAINING PROGRAM

## 2022-01-29 PROCEDURE — 99291 CRITICAL CARE FIRST HOUR: CPT | Performed by: INTERNAL MEDICINE

## 2022-01-29 PROCEDURE — 6360000002 HC RX W HCPCS

## 2022-01-29 PROCEDURE — 85520 HEPARIN ASSAY: CPT

## 2022-01-29 PROCEDURE — 36415 COLL VENOUS BLD VENIPUNCTURE: CPT

## 2022-01-29 PROCEDURE — 2700000000 HC OXYGEN THERAPY PER DAY

## 2022-01-29 PROCEDURE — 95813 EEG EXTND MNTR 61-119 MIN: CPT

## 2022-01-29 PROCEDURE — 6360000002 HC RX W HCPCS: Performed by: STUDENT IN AN ORGANIZED HEALTH CARE EDUCATION/TRAINING PROGRAM

## 2022-01-29 PROCEDURE — 2580000003 HC RX 258: Performed by: STUDENT IN AN ORGANIZED HEALTH CARE EDUCATION/TRAINING PROGRAM

## 2022-01-29 PROCEDURE — 80076 HEPATIC FUNCTION PANEL: CPT

## 2022-01-29 PROCEDURE — 94003 VENT MGMT INPAT SUBQ DAY: CPT

## 2022-01-29 RX ORDER — HYDRALAZINE HYDROCHLORIDE 20 MG/ML
10 INJECTION INTRAMUSCULAR; INTRAVENOUS EVERY 6 HOURS PRN
Status: DISCONTINUED | OUTPATIENT
Start: 2022-01-29 | End: 2022-02-02

## 2022-01-29 RX ORDER — HYDRALAZINE HYDROCHLORIDE 20 MG/ML
10 INJECTION INTRAMUSCULAR; INTRAVENOUS ONCE
Status: COMPLETED | OUTPATIENT
Start: 2022-01-29 | End: 2022-01-29

## 2022-01-29 RX ADMIN — AMPICILLIN SODIUM AND SULBACTAM SODIUM 1500 MG: 1; .5 INJECTION, POWDER, FOR SOLUTION INTRAMUSCULAR; INTRAVENOUS at 05:02

## 2022-01-29 RX ADMIN — ACETAMINOPHEN 650 MG: 650 SUPPOSITORY RECTAL at 04:21

## 2022-01-29 RX ADMIN — SODIUM CHLORIDE, PRESERVATIVE FREE 10 ML: 5 INJECTION INTRAVENOUS at 09:17

## 2022-01-29 RX ADMIN — HYDRALAZINE HYDROCHLORIDE 10 MG: 20 INJECTION INTRAMUSCULAR; INTRAVENOUS at 20:35

## 2022-01-29 RX ADMIN — SODIUM CHLORIDE, PRESERVATIVE FREE 10 ML: 5 INJECTION INTRAVENOUS at 20:15

## 2022-01-29 RX ADMIN — CASTOR OIL AND BALSAM, PERU: 788; 87 OINTMENT TOPICAL at 20:15

## 2022-01-29 RX ADMIN — CASTOR OIL AND BALSAM, PERU: 788; 87 OINTMENT TOPICAL at 09:17

## 2022-01-29 RX ADMIN — FAMOTIDINE 20 MG: 10 INJECTION, SOLUTION INTRAVENOUS at 18:13

## 2022-01-29 RX ADMIN — HEPARIN SODIUM 13 UNITS/KG/HR: 5000 INJECTION INTRAVENOUS; SUBCUTANEOUS at 13:10

## 2022-01-29 RX ADMIN — HYDRALAZINE HYDROCHLORIDE 10 MG: 20 INJECTION INTRAMUSCULAR; INTRAVENOUS at 01:20

## 2022-01-29 RX ADMIN — AMPICILLIN SODIUM AND SULBACTAM SODIUM 1500 MG: 1; .5 INJECTION, POWDER, FOR SOLUTION INTRAMUSCULAR; INTRAVENOUS at 18:13

## 2022-01-29 RX ADMIN — HYDRALAZINE HYDROCHLORIDE 10 MG: 20 INJECTION INTRAMUSCULAR; INTRAVENOUS at 03:48

## 2022-01-29 RX ADMIN — VANCOMYCIN HYDROCHLORIDE 2000 MG: 10 INJECTION, POWDER, LYOPHILIZED, FOR SOLUTION INTRAVENOUS at 11:20

## 2022-01-29 RX ADMIN — ACETAMINOPHEN 650 MG: 325 TABLET ORAL at 20:14

## 2022-01-29 ASSESSMENT — PULMONARY FUNCTION TESTS
PIF_VALUE: 18
PIF_VALUE: 17
PIF_VALUE: 18
PIF_VALUE: 19
PIF_VALUE: 20
PIF_VALUE: 26
PIF_VALUE: 22
PIF_VALUE: 17
PIF_VALUE: 17
PIF_VALUE: 20
PIF_VALUE: 18
PIF_VALUE: 17
PIF_VALUE: 22
PIF_VALUE: 19
PIF_VALUE: 24
PIF_VALUE: 20
PIF_VALUE: 19
PIF_VALUE: 17
PIF_VALUE: 19
PIF_VALUE: 18
PIF_VALUE: 18
PIF_VALUE: 17
PIF_VALUE: 19
PIF_VALUE: 16
PIF_VALUE: 17
PIF_VALUE: 14
PIF_VALUE: 16
PIF_VALUE: 19
PIF_VALUE: 17
PIF_VALUE: 11
PIF_VALUE: 17
PIF_VALUE: 18
PIF_VALUE: 16
PIF_VALUE: 12
PIF_VALUE: 19
PIF_VALUE: 11
PIF_VALUE: 20
PIF_VALUE: 18
PIF_VALUE: 12
PIF_VALUE: 19
PIF_VALUE: 18
PIF_VALUE: 18
PIF_VALUE: 19
PIF_VALUE: 18
PIF_VALUE: 16
PIF_VALUE: 17
PIF_VALUE: 18
PIF_VALUE: 18
PIF_VALUE: 11
PIF_VALUE: 17
PIF_VALUE: 18
PIF_VALUE: 19
PIF_VALUE: 18
PIF_VALUE: 18
PIF_VALUE: 16
PIF_VALUE: 18
PIF_VALUE: 18
PIF_VALUE: 19
PIF_VALUE: 16
PIF_VALUE: 16
PIF_VALUE: 17
PIF_VALUE: 19
PIF_VALUE: 15
PIF_VALUE: 16

## 2022-01-29 ASSESSMENT — PAIN SCALES - GENERAL
PAINLEVEL_OUTOF10: 0

## 2022-01-29 NOTE — PROGRESS NOTES
NEUROLOGY / NEUROCRITICAL CARE PROGRESS NOTE       Patient Name: Pantera Crawford YOB: 1945   Sex: Male Age: 67 yrs     CC / Reason for Consult: post-cardiac arrest    Interval Hx / Changes over last 24 hours:   Central line placed yesterday afternoon  Remains on heparin gtt for PE  On low dose fentanyl  Blood cultures grew out [address] [place]  Dialyzed 1/28. ROS: unobtainable due to encephalopathy    HISTORY   Admission HPI:     Mr. Al Hines is a 67year old gentleman found down 1/27/22. Initial rhythm was PEA, and he underwent 5 rounds of CPR prior to ROSC. Estimated resuscitation time 40 minutes.  CT-PA showed PE.     PMH Past Medical History:   Diagnosis Date    Anemia     Atrial fibrillation ([facility] [address] 11/4/2013    CAD (coronary artery disease)     Mi, stent    Chronic kidney disease     [facility] thrombosis) ([facility]     End stage renal disease (HCC)     Gas gangrene (HCC)     Hemodialysis patient ([facility] [address]     M-W-F    Hx of blood clots     Hyperkalemia     Hyperlipidemia 5/30/2012    Hypertension     Hyperthyroidism     Ischemic heart disease 0/29/2132    Metabolic encephalopathy     [facility]    Type II or unspecified type diabetes mellitus without mention of complication, not stated as uncontrolled       Allergies No Known Allergies   Diet Diet NPO  ADULT TUBE FEEDING; Orogastric; Standard with Fiber; Continuous; 10; Yes; 10; Q 4 hours; 50; 30; Q 4 hours   Isolation No active isolations     LABS   Metabolic Panel Recent Labs     01/27/22  1421 01/27/22  1421 01/28/22  0832 01/28/22  0940 01/29/22  0416     --  143  141  --  138   K 3.9  --  3.8  3.7  --  4.4     --  103  102  --  103   CO2 24  --  27 28 --  22   BUN 33*  --  40*  40*  --  16   CREATININE 6.0*  --  6.8*  6.6*  --  4.1*   GLUCOSE 190*  --  90  91  --  87   CALCIUM 9.8  --  9.4  9.4  --  9.4   LABALBU 3.6   < > 3.0* 2.9* 2.7*   PHOS 3.7  --   --   --   --    MG 2.10  -- --   --   --    ALKPHOS 112   < > 82 85 82   *   < > 438* 440* 339*   *   < > 266* 276* 122*    < > = values in this interval not displayed. CBC / Coags Recent Labs     01/27/22  0849 01/27/22  0849 01/28/22  0932 01/28/22  0939 01/28/22  1334 01/29/22  0416   WBC 9.5  --   --  7.7  --  8.3   RBC 2.47*  --   --  2.10*  --  2.58*   HGB 7.4*   < >  --  6.2* 7.8* 7.7*   HCT 22.5*   < >  --  18.6* 23.7* 24.4*     --   --  149  --  156   INR 1.20*  --  1.32*  --   --   --     < > = values in this interval not displayed. Other Blood culture #1 gram + cocci   Blood culture #2 gram + cocci       CURRENT SCHEDULED MEDICATIONS   Inpatient Medications   ampicillin-sulbactam, 1,500 mg, IntraVENous, Q12H    vancomycin, 2,000 mg, IntraVENous, Once    vancomycin (VANCOCIN) intermittent dosing (placeholder), , Other, See Admin Instructions    aspirin, 81 mg, Oral, Daily    ferrous sulfate, 325 mg, Oral, Daily with breakfast    pravastatin, 20 mg, Oral, Daily    sodium chloride flush, 5-40 mL, IntraVENous, 2 times per day    famotidine (PEPCID) injection, 20 mg, IntraVENous, Daily    Venelex, , Topical, BID   Infusions    fentaNYL 50 mcg/hr (01/28/22 2224)    sodium chloride      norepinephrine Stopped (01/27/22 1115)    sodium chloride      dextrose      heparin (PORCINE) Infusion 13 Units/kg/hr (01/29/22 0600)      Antibiotics   Recent Abx Admin                   Vancomycin HCl 2,000 mg in dextrose 5 % 500 mL IVPB (mg) 2,000 mg New Bag 01/29/22 1120    ampicillin-sulbactam (UNASYN) 1500 mg IVPB minibag (mg) 1,500 mg New Bag 01/29/22 0502                  DIAGNOSTICS   IMAGES:  Images personally reviewed and agree w/ radiology interpretation. Head CT w/o Contrast 1/27/22  Atrophy and superimposed small vessel ischemic disease with remote infarct in the lateral left occipital region. No recent infarct, hemorrhage or mass detected.     CT-PA: left sided pulmonary emboli    PHYSICAL EXAMINATION     PHYSICAL EXAM:  Vitals:    01/29/22 0814 01/29/22 0900 01/29/22 1000 01/29/22 1100   BP:  (!) 153/72 123/60 (!) 111/59   Pulse: 68 78 64 64   Resp: 14 15 14 14   Temp:       TempSrc:       SpO2: 99% 97% 97% 99%   Weight:       Height:           General: Unresponsive. On fentanyl  Neurologic  Mental status: Does not open eyes to voice   Cranial nerves:   CN2: Does not blink to threat  CN 3,4,6: Pupils equal, round, minimally (if at all) reactive  CN7: Face symmetric On rest  CN8: Hearing AARON given encephalopathy  Motor Exam:  RUE: no response to pain  LUE: no response to pain  RLE: weak withdrawal from proximal skin pinch  LLE: weak withdrawal from proximal skin pinch     OTHER SYSTEMS:  Cardiovascular: Warm, appears well perfused   Respiratory: Easy, non-labored respiratory pattern   Abdominal: Abdomen is without distention   Extremities: Bilateral below knee amputations     ASSESSMENT & RECOMMENDATIONS     Mr. Linda Sims is a 67year old who presented with encephalopathy following 40 minute cardiac arrest and pulmonary embolism. Exam currently limited by sedation. Prognosis guarded. Recommendations   - Stop fentanyl  - Treatment of underlying medical and metabolic issues, as you are  - Dialysis as needed  - Will check cEEG given no exam to follow  - MRI brain Monday if not waking up    ADIA Garcia - CNP   Neurology & Neurocritical Care   Neurology Line: [phone]  Perfect[employer]: [facility] Critical [facility] NPs  1/29/2022 12:06 PM    I spent 25 minutes in the care of this patient. Over 50% of that time was in face-to-face counseling regarding disease process, diagnostic testing, preventative measures, and answering patient and family questions.

## 2022-01-29 NOTE — PROGRESS NOTES
ICU Progress Note    Admit Date: 1/27/2022  Day: 3  Vent Day: None  IV Access:Peripheral  IV Fluids:None  Vasopressors:None                Antibiotics: None  Diet: Diet NPO    CC: cardiac arrest    Interval history:   - blood cx grew staph and strep,  - sed on 50 fent      Medications:     Scheduled Meds:   ampicillin-sulbactam  1,500 mg IntraVENous Q12H    aspirin  81 mg Oral Daily    ferrous sulfate  325 mg Oral Daily with breakfast    pravastatin  20 mg Oral Daily    sodium chloride flush  5-40 mL IntraVENous 2 times per day    famotidine (PEPCID) injection  20 mg IntraVENous Daily    Venelex   Topical BID     Continuous Infusions:   fentaNYL 50 mcg/hr (01/28/22 2224)    sodium chloride      norepinephrine Stopped (01/27/22 1115)    sodium chloride      dextrose      heparin (PORCINE) Infusion 13 Units/kg/hr (01/29/22 0600)     PRN Meds:hydrALAZINE, sodium chloride, sodium chloride flush, sodium chloride, ondansetron **OR** ondansetron, acetaminophen **OR** acetaminophen, polyethylene glycol, glucose, dextrose, glucagon (rDNA), dextrose, heparin (porcine), heparin (porcine)    Objective:   Vitals:   T-max:  Patient Vitals for the past 8 hrs:   BP Pulse Resp SpO2 Weight   01/29/22 0449 -- -- -- -- 193 lb 12.6 oz (87.9 kg)   01/29/22 0401 -- 75 14 98 % --   01/29/22 0300 (!) 145/67 70 14 100 % --   01/29/22 0250 -- 86 16 100 % --   01/29/22 0240 (!) 141/64 70 14 100 % --   01/29/22 0230 (!) 163/70 68 16 98 % --   01/29/22 0220 -- 83 16 97 % --   01/29/22 0210 (!) 183/100 70 14 100 % --   01/29/22 0200 (!) 141/61 70 14 99 % --   01/29/22 0140 (!) 144/92 68 14 99 % --   01/29/22 0130 128/60 67 14 99 % --   01/29/22 0120 -- 66 15 97 % --   01/29/22 0110 (!) 187/85 74 17 95 % --   01/29/22 0100 (!) 182/118 93 19 96 % --   01/29/22 0040 (!) 131/102 63 14 99 % --   01/29/22 0030 (!) 126/55 62 16 98 % --   01/29/22 0020 -- 81 14 100 % --   01/29/22 0010 (!) 111/57 62 14 99 % --   01/29/22 0001 -- 69 14 98 % --       Intake/Output Summary (Last 24 hours) at 1/29/2022 0649  Last data filed at 1/29/2022 0600  Gross per 24 hour   Intake 624.31 ml   Output 0 ml   Net 624.31 ml       Review of Systems   Unable to perform ROS: Intubated       Physical Exam  Physical Exam  Constitutional:       Appearance: He is ill-appearing. Comments: intubated   HENT:      Head: Normocephalic and atraumatic. Cardiovascular:      Rate and Rhythm: Normal rate and regular rhythm. Pulmonary:      Effort: Pulmonary effort is normal.      Comments: Mechanical breath sounds  Abdominal:      Palpations: Abdomen is soft. Tenderness: There is no abdominal tenderness. There is no guarding or rebound. Musculoskeletal:         General: No swelling. Cervical back: Neck supple. Comments: Bilateral BKA   Skin:     General: Skin is warm and dry.         LABS:    CBC:   Recent Labs     01/27/22  0849 01/27/22  0849 01/28/22  0939 01/28/22  1334 01/29/22  0416   WBC 9.5  --  7.7  --  8.3   HGB 7.4*   < > 6.2* 7.8* 7.7*   HCT 22.5*   < > 18.6* 23.7* 24.4*     --  149  --  156   MCV 91.2  --  88.7  --  94.6    < > = values in this interval not displayed. Renal:    Recent Labs     01/27/22  1421 01/28/22  0832 01/29/22  0416    143  141 138   K 3.9 3.8  3.7 4.4    103  102 103   CO2 24 27 28 22   BUN 33* 40*  40* 16   CREATININE 6.0* 6.8*  6.6* 4.1*   GLUCOSE 190* 90  91 87   CALCIUM 9.8 9.4  9.4 9.4   MG 2.10  --   --    PHOS 3.7  --   --    ANIONGAP 15 13  11 13     Hepatic:   Recent Labs     01/28/22  0832 01/28/22  0940 01/29/22  0416   * 276* 122*   * 440* 339*   BILITOT 0.3 <0.2 0.4   BILIDIR <0.2 <0.2 <0.2   PROT 5.9* 5.9* 6.0*   LABALBU 3.0* 2.9* 2.7*   ALKPHOS 82 85 82     Troponin:   Recent Labs     01/27/22  0849   TROPONINI 0.19*     BNP: No results for input(s): BNP in the last 72 hours. Lipids: No results for input(s): CHOL, HDL in the last 72 hours.     Invalid input(s): LDLCALCU, TRIGLYCERIDE  ABGs:  No results for input(s): PHART, CLN5ACF, PO2ART, IPS6TBN, BEART, THGBART, T4BMVHSY, KKE0VDD in the last 72 hours. INR:   Recent Labs     01/27/22  0849 01/28/22  0932   INR 1.20* 1.32*     Lactate:   Recent Labs     01/27/22  1431   LACTATE 0.85     Cultures:  -----------------------------------------------------------------  RAD:   XR CHEST PORTABLE   Final Result      Interval line placement. Persistent infiltrate at right lung base. CTA PULMONARY W CONTRAST   Final Result      Left-sided pulmonary emboli. Infiltrate in right lower lobe suggesting pneumonia. Borderline RV/LV ratio of approximately 1.02. Results were reported by telephone to the charge nurse, Kamila, at 7:11 PM on 1/27/2020         CT HEAD WO CONTRAST   Final Result   1. Atrophy and superimposed small vessel ischemic disease with remote infarct in the lateral left occipital region. No recent infarct, hemorrhage or mass detected. XR CHEST PORTABLE   Final Result   1. Nasogastric tube coiled in the distal esophagus with gastric air distention. 2. Endotracheal tube in satisfactory position. Critical results reported to Physician: Bobbetta Councilman   at 1/27/20229:25 AM on 1/27/2022 by telephone. Assessment/Plan:   Zechariah Bates is a 67 y.o. male PMH ESRD on HD, DM2, bl BKA, pAF, CAD (s/p stent in LAD and RCA 2018), HTN, HLD who presented from [facility] after cardiac arrest.      S/p Cardiac Arrest  - suspected down for 20 minutes. Initial rhythm PEA.  Received total of 40 minutes of resuscitation with 5 rounds of CPR and epi  - intubated 1/27  - off pressors and sedation  - 2/2 PE    PE  - heparin gtt     Acute respiratory failure 2/2 cardiac arrest  - s/p intubation in ED  - ABG     ESRD on HD  - gets HD M/W/F and does not make urine  - nephro consulted  - strict I/O, daily weights     Lactic acidosis, resolved  - 2/2 hypoperfusion from cardiac arrest    pAF/HTN- holding

## 2022-01-29 NOTE — PLAN OF CARE
Problem: Non-Violent Restraints  Goal: Removal from restraints as soon as assessed to be safe  Outcome: Ongoing  Goal: No harm/injury to patient while restraints in use  Outcome: Ongoing  Goal: Patient's dignity will be maintained  Outcome: Ongoing     Problem: Pain:  Goal: Pain level will decrease  Description: Pain level will decrease  Outcome: Ongoing  Goal: Control of acute pain  Description: Control of acute pain  Outcome: Ongoing  Goal: Control of chronic pain  Description: Control of chronic pain  Outcome: Ongoing     Problem: Skin Integrity:  Goal: Will show no infection signs and symptoms  Description: Will show no infection signs and symptoms  Outcome: Ongoing  Goal: Absence of new skin breakdown  Description: Absence of new skin breakdown  Outcome: Ongoing  Goal: Status of oral mucous membranes will improve  Description: Status of oral mucous membranes will improve  Outcome: Ongoing

## 2022-01-29 NOTE — PROGRESS NOTES
Nephrology Note        [facility]    [email or web address]       Phone: [phone]                                                  1/29/2022 9:36 AM     Patient: Melissa Thorpe [id number]  [id number]  Date of Admit: 1/27/2022 LOS: 2 days      Interval History and Plan:  Patient remain intubated. FiO2 30 % with PEEP of 5. Off pressors. Lytes stable. HD done yesterday      No indication for dialysis today. Monitor Hb and transfuse as needed  Avoid nephrotoxins  Monitor input, output  Monitor labs and vitals closely  Renally dose all medications      Thank you for allowing us to participate in this patient's care    In case of any question please call us at our 24 hour answering service [phone] or from 7 AM to 5 PM via [employer] or cell number    Gladys Bird MD  [facility]  [facility]  [place], [address]  Fax: [phone]  Office: [phone]       Assessment & Plan     Renal function:  ESRD  I confirmed from [address] HD unit and last HD was on wednesday and per report         Electrolytes:   Lab Results   Component Value Date    CREATININE 4.1 (H) 01/29/2022    BUN 16 01/29/2022     01/29/2022    K 4.4 01/29/2022     01/29/2022    CO2 22 01/29/2022            # CKD- MBD:   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 Lab Results   Component Value Date    .6 (H) 06/17/2020    CALCIUM 9.4 01/29/2022    CAION 1.22 01/27/2022    PHOS 3.7 01/27/2022         Acid/Base status:  No acidosis on last labs. PH 7.4 on VBG  Last lactate was normal, 0.85      Volume status/BP:  Now off pressors and BP is stable  No acute volume overload concerns. Intake/Output Summary (Last 24 hours) at 1/29/2022 0936  Last data filed at 1/29/2022 0600  Gross per 24 hour   Intake 624.31 ml   Output 0 ml   Net 624.31 ml         Hematology:   CKD related anemia  Goal Hgb 10-11  Monitor and transfuse as needed.      Lab Results Component Value Date    IRON 39 (L) 10/05/2021    TIBC 189 (L) 10/05/2021    FERRITIN 1,665.0 (H) 08/13/2020     Lab Results   Component Value Date    WBC 8.3 01/29/2022    HGB 7.7 (L) 01/29/2022    HCT 24.4 (L) 01/29/2022    MCV 94.6 01/29/2022     01/29/2022             Reason for Consult and Chief Complaint     Reason for consult: ESRD    Chief complaint:   Chief Complaint   Patient presents with    Cardiac Arrest       History of Present Illness   Ella Rush is a 67 y.o. with PMH significant for ESRD on HD M/W/F, DM2, bl BKA, pAF, CAD (s/p stent in LAD and RCA 2018), HTN, HLD admitted after cardiac arrest and now patient is intubated. Patient was on pressors but now weaned off. Labs stable. Review of Systems   Positive in bold or unable to assess     GEN: Fever, chills, night sweats. HEENT: Changes in vision, sore throat, rhinorrhea   CVS: Chest pain, palpitations, swelling or edema in legs  Pulmonary: Cough, hemoptysis, SOB  GI: Nausea, vomiting, diarrhea, constipation, abdominal pain  : Bladder incontinence, dysuria,hematuria. MSK: Muscle or joint or bone pains  Skin: Rashes, ulcers, skin thickness  CNS: Headache, dizziness, confusion, focal weakness, seizure. Psych: Anxiety, agitation, depression. Reviewed all 12 systems, negative except as above.      Past Medical History     Past Medical History:   Diagnosis Date    Anemia     [facility] fibrillation ([facility] [address] 11/4/2013    CAD (coronary artery disease)     Mi, stent    Chronic kidney disease     DVT (deep venous thrombosis) (HCC)     End stage renal disease (HCC)     Gas gangrene ([facility] [address]     Hemodialysis patient ([facility] [address]     M-W-F    Hx of blood clots     Hyperkalemia     Hyperlipidemia 5/30/2012    Hypertension     Hyperthyroidism     Ischemic heart disease 8/13/9104    Metabolic encephalopathy     MI (myocardial infarction) ([facility] [address] 10/2018    Type II or unspecified type diabetes mellitus without mention of complication, not stated as uncontrolled          Past Surgical History     Past Surgical History:   Procedure Laterality Date    CARDIAC SURGERY      cardiac stent    FEMORAL-TIBIAL BYPASS GRAFT Right 1/9/2019    RIGHT FEMORAL POSTERIOR TIBIAL BYPASS performed by Maurilio Arroyo MD at [address] [place] [address] Right 1/4/2019    INCISION AND DRAINAGE, TRANSMETATARSAL AMPUTATION ALL ON RIGHT FOOT performed by Faustino Jimenes DPM at [address] [place] [address] Right 1/14/2019    REVISION OF TRANSMETATARSAL AMPUTATION RIGHT FOOT performed by Faustino Jimenes DPM at [address] [place] [address] Left 08/24/2019    TMA    FOOT AMPUTATION Left 8/24/2019    LEFT FOOT TRANSMETATARSAL AMPUTATION performed by Jocelyne Teague DPM at [address] [place] [address] Left 8/27/2019    REPEAT INCISION AND DRAINAGE, REVISION OF LEFT FOOT TRANSMETATARSAL AMPUTATION performed by Jocelyne Teague DPM at [address] [place] [address] Left 10/11/2019    LEFT FOOT INCISION AND DRAINAGE WITH REVISION OF TRANSMETARSAL AMPUTATION WITH WOUND VAC APPLICATION  performed by Jocelyne Teague DPM at [address] Right 1/18/2019    RIGHT BELOW THE KNEE AMPUTATION performed by Maurilio Arroyo MD at [address] Left 12/9/2019    LEFT BELOW KNEE AMPUTATION performed by Maurilio Arroyo MD at [facility] 2/6/2020    DEBRIDEMENT AND PLACEMENT OF WOUND VAC LEFT BELOW THE KNEE AMPUTATION SITE performed by Marden Rubinstein, MD at [place] [address] History     Family History   Problem Relation Age of Onset    Arthritis Mother          Social History     Social History     Tobacco Use    Smoking status: Never Smoker    Smokeless tobacco: Never Used   Substance Use Topics    Alcohol use: Not Currently          Past medical, family, and social histories were reviewed as previously documented. Updates were made as necessary.       Inpatient Medications and Allergies       Scheduled Meds:   ampicillin-sulbactam  1,500 mg IntraVENous Q12H    vancomycin  2,000 mg IntraVENous Once    vancomycin (VANCOCIN) intermittent dosing (placeholder)   Other See Admin Instructions    aspirin  81 mg Oral Daily    ferrous sulfate  325 mg Oral Daily with breakfast    pravastatin  20 mg Oral Daily    sodium chloride flush  5-40 mL IntraVENous 2 times per day    famotidine (PEPCID) injection  20 mg IntraVENous Daily    Venelex   Topical BID       Allergies: No Known Allergies      Vital Signs     Vitals:    01/29/22 0900   BP: (!) 153/72   Pulse: 78   Resp: 15   Temp:    SpO2: 97%         Intake/Output Summary (Last 24 hours) at 1/29/2022 0936  Last data filed at 1/29/2022 0600  Gross per 24 hour   Intake 624.31 ml   Output 0 ml   Net 624.31 ml         Physical Exam     General appearance: NAD  Head: Normocephalic, without obvious abnormality, atraumatic   Mouth: ETT in mouth  Neck: Supple. Lungs: Intubated. No respiratory distress  Heart: S1, S2.  Abdomen: soft,  non-distended  Extremities: No leg edema, warm to touch   Skin: No concerning rashes noted  Neuro: Sedated.        Laboratory Data     Please see above     Diagnostic Studies   Pertinent images reviewed

## 2022-01-29 NOTE — PROGRESS NOTES
Hospital Medicine Care  Progress Note      Chief complain; Cardiac Arrest            Subjective: Intubated  On Fentanyl      Review of Systems:     Review of Systems as mentioned above, other ros is negative. Objective:       Medications        Scheduled Meds:   ampicillin-sulbactam  1,500 mg IntraVENous Q12H    vancomycin  2,000 mg IntraVENous Once    vancomycin (VANCOCIN) intermittent dosing (placeholder)   Other See Admin Instructions    aspirin  81 mg Oral Daily    ferrous sulfate  325 mg Oral Daily with breakfast    pravastatin  20 mg Oral Daily    sodium chloride flush  5-40 mL IntraVENous 2 times per day    famotidine (PEPCID) injection  20 mg IntraVENous Daily    Venelex   Topical BID     Continuous Infusions:   fentaNYL 50 mcg/hr (01/28/22 2224)    sodium chloride      norepinephrine Stopped (01/27/22 1115)    sodium chloride      dextrose      heparin (PORCINE) Infusion 13 Units/kg/hr (01/29/22 0600)     PRN Meds:.hydrALAZINE, sodium chloride, sodium chloride flush, sodium chloride, ondansetron **OR** ondansetron, acetaminophen **OR** acetaminophen, polyethylene glycol, glucose, dextrose, glucagon (rDNA), dextrose, heparin (porcine), heparin (porcine)      Allergies  No Known Allergies      Vitals:    01/29/22 0814 01/29/22 0900 01/29/22 1000 01/29/22 1100   BP:  (!) 153/72 123/60 (!) 111/59   Pulse: 68 78 64 64   Resp: 14 15 14 14   Temp:       TempSrc:       SpO2: 99% 97% 97% 99%   Weight:       Height:             Physical exam:         Physical Exam  Constitutional:       Appearance: He is ill-appearing. Comments: Intubated. HENT:      Head: Normocephalic and atraumatic. Cardiovascular:      Rate and Rhythm: Normal rate and regular rhythm. Pulses: Normal pulses. Heart sounds: Normal heart sounds. Pulmonary:      Effort: Pulmonary effort is normal.      Breath sounds: Normal breath sounds. Abdominal:      General: Abdomen is flat.       Palpations: Abdomen is soft. Skin:     General: Skin is warm and dry. Capillary Refill: Capillary refill takes less than 2 seconds. Neurological:      Comments: Intubated, non responsive. Labs      Recent Labs     01/27/22  0849 01/27/22  0849 01/28/22  0939 01/28/22  1334 01/29/22  0416   WBC 9.5  --  7.7  --  8.3   HGB 7.4*   < > 6.2* 7.8* 7.7*   HCT 22.5*   < > 18.6* 23.7* 24.4*     --  149  --  156   MCV 91.2  --  88.7  --  94.6    < > = values in this interval not displayed. Recent Labs     01/27/22  1421 01/28/22  0832 01/29/22  0416    143  141 138   K 3.9 3.8  3.7 4.4    103  102 103   CO2 24 27 28 22   PHOS 3.7  --   --    BUN 33* 40*  40* 16   CREATININE 6.0* 6.8*  6.6* 4.1*       Recent Labs     01/28/22  0832 01/28/22  0940 01/29/22  0416   * 276* 122*   * 440* 339*   BILIDIR <0.2 <0.2 <0.2   BILITOT 0.3 <0.2 0.4   ALKPHOS 82 85 82       Recent Labs     01/27/22  1421   MG 2.10       Radiology  XR CHEST PORTABLE   Final Result      Interval line placement. Persistent infiltrate at right lung base. CTA PULMONARY W CONTRAST   Final Result      Left-sided pulmonary emboli. Infiltrate in right lower lobe suggesting pneumonia. Borderline RV/LV ratio of approximately 1.02. Results were reported by telephone to the charge nurse, Kamila, at 7:11 PM on 1/27/2020         CT HEAD WO CONTRAST   Final Result   1. Atrophy and superimposed small vessel ischemic disease with remote infarct in the lateral left occipital region. No recent infarct, hemorrhage or mass detected. XR CHEST PORTABLE   Final Result   1. Nasogastric tube coiled in the distal esophagus with gastric air distention. 2. Endotracheal tube in satisfactory position. Critical results reported to Physician: Lei Villavicencio   at 1/27/20229:25 AM on 1/27/2022 by telephone. Assessment and Plan:    Active Problems:    Cardiac arrest ([address]    Anoxic brain injury ([facility]    Myoclonus    Respiratory failure ([facility]  Resolved Problems:    * No resolved hospital problems.  *     Cardiac arrest  CTPA showed pulmonary embolism  On heparin drip  ROSC approximately 40 minutes  Concern for hypoxic ischemic injury  Critical care neurology following    ESRD  Dialysis per nephrology    Bacteremia  Start Vanc  On Unasyn per ICU team.      Prognosis appears poor      Fidencio Garcia MD  1/29/2022

## 2022-01-29 NOTE — CONSULTS
Comprehensive Nutrition Assessment    RECOMMENDATIONS:   1. PO Diet: Continue NPO while intubated. 2.   Nutrition Support: Start Tube Feeding;  · Recommend EN formula Peptide-Based  Vital AF 1.2 @ goal rate 40 ml/hr   · Initiate EN @ 10 mL/hr and as tolerated, increase by 10 mL/hr q 4 hours until goal of 40 mL/hr is met. · Maintenance water flush of 30 ml every 4 hours or defer to MD  · Recommend protein modular QD via feeding tube. Flush with 30 ml water before/after administration. Do not mix with tube feeding formula. NUTRITION ASSESSMENT:    Nutritional summary & status: RD consulted for tube feeding orders and management. Pt with 26 lb wt loss in last month (11%). Pt with ESRD on HD; some wt suspected to be fluid shifts but unable to identify actual wt vs. fluid shifts. No propofol noted. Recs above as needed; meeting 100% calorie and protein needs. RD to monitor EN initiation and tolerance closely throughout adm.       Admission/PMH: Cardiac arrest. PMH: ESRD on HD, HTN, CAD, HL, DM2     MALNUTRITION ASSESSMENT  Context of Malnutrition: Acute Illness   Malnutrition Status: Mild malnutrition  Findings of the 6 clinical characteristics of malnutrition (Minimum of 2 out of 6 clinical characteristics is required to make the diagnosis of moderate or severe Protein Calorie Malnutrition based on AND/ASPEN Guidelines):  Energy Intake: Less than/equal to 50% of estimated energy requirements    Energy Intake Time: 2 days   Weight Loss %: Unable to assess  AARON nutritional vs fluid losses  Weight loss Time: Unable to assess     NUTRITION DIAGNOSIS   Inadequate oral intake related to impaired respiratory function as evidenced by NPO or clear liquid status due to medical condition,intubation,nutrition support - enteral nutrition    NUTRITION INTERVENTION  Food and/or Nutrient Delivery:  Continue NPO,Start Tube Feeding  Nutrition Education/Counseling:  No recommendation at this time   Goals:  Pt will tolerate most appropriate form of nutrition while intubated to meet >75% of nutrition needsn       Nutrition Monitoring and Evaluation:   Food/Nutrient Intake Outcomes:  Enteral Nutrition Intake/Tolerance  Physical Signs/Symptoms Outcomes:  Biochemical Data,GI Status,Weight     OBJECTIVE DATA: Significant to nutrition assessment  · Nutrition-Focused Physical Findings: lbm 1/29  · Labs: Reviewed  · Meds: Reviewed; heparin, fentanyl, iron  · Wounds: None       CURRENT NUTRITION THERAPIES  Diet NPO  PO Intake: NPO   PO Supplement Intake:NPO  Additional Sources of Calories/IVF:n/a     ANTHROPOMETRICS  Current Height: 5' (152.4 cm)  Current Weight: 193 lb 12.6 oz (87.9 kg)    Admission weight: 220 lb (99.8 kg)  Ideal Body Weight (IBW): 106 lbs  (48 kg)    Usual Bodyweight 227 lb (103 kg)   Weight Changes AARON fluid vs nutritional losses       BMI: 37.9    Wt Readings from Last 50 Encounters:   01/28/22 197 lb 12 oz (89.7 kg)   01/19/22 220 lb (99.8 kg)   12/03/21 223 lb 15.8 oz (101.6 kg)   10/06/21 227 lb 15.3 oz (103.4 kg)   03/09/21 233 lb 3.2 oz (105.8 kg)     COMPARATIVE STANDARDS  Energy (kcal):  990-1260 (11-14); Weight Used for Energy Requirements:  Current (90)     Protein (g):   (2.0-2.2); Weight Used for Protein Requirements:  Ideal (48 kg)        Fluid (ml/day):  1500 ml; Method Used for Fluid Requirements:  Standard Renal      The patient will still be monitored per nutrition standards of care. Consult dietitian if nutrition interventions essential to patient care is needed.      Homa Lopez [address], [address]  [place]:  [phone]  Office:  [phone]

## 2022-01-29 NOTE — PROGRESS NOTES
ICU Progress Note     Admit Date: 1/27/2022  Day: 3  Vent Day: None  IV Access:Peripheral  IV Fluids:None  Vasopressors:None                Antibiotics: None  Diet: Diet NPO     CC: cardiac arrest     Interval history:   - blood cx grew staph and strep,  - sed on 50 fent        Medications:      Scheduled Meds:  Scheduled Medications    ampicillin-sulbactam  1,500 mg IntraVENous Q12H    aspirin  81 mg Oral Daily    ferrous sulfate  325 mg Oral Daily with breakfast    pravastatin  20 mg Oral Daily    sodium chloride flush  5-40 mL IntraVENous 2 times per day    famotidine (PEPCID) injection  20 mg IntraVENous Daily    Venelex   Topical BID         Continuous Infusions:  Infusions Meds    fentaNYL 50 mcg/hr (01/28/22 2224)    sodium chloride      norepinephrine Stopped (01/27/22 1115)    sodium chloride      dextrose      heparin (PORCINE) Infusion 13 Units/kg/hr (01/29/22 0600)         PRN Meds:  PRN Medications   hydrALAZINE, sodium chloride, sodium chloride flush, sodium chloride, ondansetron **OR** ondansetron, acetaminophen **OR** acetaminophen, polyethylene glycol, glucose, dextrose, glucagon (rDNA), dextrose, heparin (porcine), heparin (porcine)        Objective:   Vitals:   T-max:  Patient Vitals for the past 8 hrs:    BP Pulse Resp SpO2 Weight   01/29/22 0449 -- -- -- -- 193 lb 12.6 oz (87.9 kg)   01/29/22 0401 -- 75 14 98 % --   01/29/22 0300 (!) 145/67 70 14 100 % --   01/29/22 0250 -- 86 16 100 % --   01/29/22 0240 (!) 141/64 70 14 100 % --   01/29/22 0230 (!) 163/70 68 16 98 % --   01/29/22 0220 -- 83 16 97 % --   01/29/22 0210 (!) 183/100 70 14 100 % --   01/29/22 0200 (!) 141/61 70 14 99 % --   01/29/22 0140 (!) 144/92 68 14 99 % --   01/29/22 0130 128/60 67 14 99 % --   01/29/22 0120 -- 66 15 97 % --   01/29/22 0110 (!) 187/85 74 17 95 % --   01/29/22 0100 (!) 182/118 93 19 96 % --   01/29/22 0040 (!) 131/102 63 14 99 % --   01/29/22 0030 (!) 126/55 62 16 98 % --   01/29/22 0020 -- 81 14 100 % --   01/29/22 0010 (!) 111/57 62 14 99 % --   01/29/22 0001 -- 69 14 98 % --         Intake/Output Summary (Last 24 hours) at 1/29/2022 2301  Last data filed at 1/29/2022 0600      Gross per 24 hour   Intake 624.31 ml   Output 0 ml   Net 624.31 ml         Review of Systems   Unable to perform ROS: Intubated         Physical Exam  Physical Exam  Constitutional:       Appearance: He is ill-appearing.      Comments: intubated   HENT:      Head: Normocephalic and atraumatic. Cardiovascular:      Rate and Rhythm: Normal rate and regular rhythm. Pulmonary:      Effort: Pulmonary effort is normal.      Comments: Mechanical breath sounds  Abdominal:      Palpations: Abdomen is soft.      Tenderness: There is no abdominal tenderness. There is no guarding or rebound.    Musculoskeletal:         General: No swelling.      Cervical back: Neck supple.      Comments: Bilateral BKA   Skin:     General: Skin is warm and dry.          LABS:     CBC:           Recent Labs     01/27/22  0849 01/27/22  0849 01/28/22  0939 01/28/22  1334 01/29/22  0416   WBC 9.5  --  7.7  --  8.3   HGB 7.4*   < > 6.2* 7.8* 7.7*   HCT 22.5*   < > 18.6* 23.7* 24.4*     --  149  --  156   MCV 91.2  --  88.7  --  94.6    < > = values in this interval not displayed.      Renal:          Recent Labs     01/27/22  1421 01/28/22  0832 01/29/22  0416    143  141 138   K 3.9 3.8  3.7 4.4    103  102 103   CO2 24 27 28 22   BUN 33* 40*  40* 16   CREATININE 6.0* 6.8*  6.6* 4.1*   GLUCOSE 190* 90  91 87   CALCIUM 9.8 9.4  9.4 9.4   MG 2.10  --   --    PHOS 3.7  --   --    ANIONGAP 15 13  11 13      Hepatic:         Recent Labs     01/28/22  0832 01/28/22  0940 01/29/22  0416   * 276* 122*   * 440* 339*   BILITOT 0.3 <0.2 0.4   BILIDIR <0.2 <0.2 <0.2   PROT 5.9* 5.9* 6.0*   LABALBU 3.0* 2.9* 2.7*   ALKPHOS 82 85 82      Troponin:       Recent Labs     01/27/22  0849   TROPONINI 0.19*      BNP: No results for input(s): BNP in the last 72 hours. Lipids: No results for input(s): CHOL, HDL in the last 72 hours.     Invalid input(s): LDLCALCU, TRIGLYCERIDE  ABGs:  No results for input(s): PHART, YAG2YTV, PO2ART, QRG4YEK, BEART, THGBART, B8LHVXPR, DDR3JZX in the last 72 hours. INR:        Recent Labs     01/27/22  0849 01/28/22  0932   INR 1.20* 1.32*      Lactate:       Recent Labs     01/27/22  1431   LACTATE 0.85      Cultures:  -----------------------------------------------------------------  RAD:   XR CHEST PORTABLE   Final Result       Interval line placement.       Persistent infiltrate at right lung base.           CTA PULMONARY W CONTRAST   Final Result       Left-sided pulmonary emboli.       Infiltrate in right lower lobe suggesting pneumonia.       Borderline RV/LV ratio of approximately 1.02.       Results were reported by telephone to the charge nurse, Kamila, at 7:11 PM on 1/27/2020           CT HEAD WO CONTRAST   Final Result   1. Atrophy and superimposed small vessel ischemic disease with remote infarct in the lateral left occipital region. No recent infarct, hemorrhage or mass detected.       XR CHEST PORTABLE   Final Result   1. Nasogastric tube coiled in the distal esophagus with gastric air distention. 2. Endotracheal tube in satisfactory position.       Critical results reported to Physician: Renee Lynn   at 1/27/20229:25 AM on 1/27/2022 by telephone.                Assessment/Plan:   Colt leonard 67 y. o. male PMH ESRD on HD, DM2, bl BKA, pAF, CAD (s/p stent in LAD and RCA 2018), HTN, HLD who presented from [facility] after cardiac arrest.      S/p Cardiac Arrest  - suspected down for 20 minutes. Initial rhythm PEA. Received total of 40 minutes of resuscitation with 5 rounds of CPR and epi  - intubated 1/27  - off pressors and sedation  - 2/2 PE     PE  - heparin gtt     Acute respiratory failure 2/2 cardiac arrest  - s/p intubation in ED  - ABG     ESRD on HD  - gets HD M/W/F and does not high intensity to assess, manipulate, and support his critical organ systems to prevent a likely inevitable decline which could occur if left untreated.      A total critical care time 31 minutes was used. This includes but not limited to examining patient, collaborating with other physicians, monitoring vital signs, telemetry, continuous pulse oximetry, and clinical response to IV medications, documentation time, review and interpretation of laboratory and radiological data, review of nursing notes and old record review.  This time excludes any time that may have been spent performing procedures for life threatening organ failure.     Forrest Walter MD

## 2022-01-29 NOTE — CONSULTS
Clinical Pharmacy Progress Note    Vancomycin - Management by Pharmacy    Consult Date(s): 1/29  Consulting Provider(s): Magnus Suresh    Assessment / Plan    Blood Stream Infection - Vancomycin   Concurrent Antimicrobials: Ampicillin/Sulbactam (Day #1)   Day of Vanc Therapy: 1   Current Dosing Method: Intermittent Dosing by Levels   Therapeutic Goal: ~ 15 mg/L   Current Dose / Frequency: Intermittent with HD   Plan / Rationale:   o Pt has ESRD on HD (MWF at home). o Pt had HD yesterday. Plans appear to be to follow normal HD schedule.  o Will give a 2g LD today (~22.4mg/kg) and check a level tomorrow AM to ensure adequate load.  Will continue to monitor clinical condition and make adjustments to regimen as appropriate. Thank you for consulting Pharmacy! Shreya Chinchilla, PharmD  PGY-1 Pharmacy Resident  [id number]  1/29/2022 8:38 AM        Interval History: Pt admitted for Cardiac arrest, PE. Blood cultures came back positive today. WBC stable at 8.3. Subjective/Objective: Mr. Malick Caputo is a 67 y.o. male with a PMHx significant for ESRD on HD, T2DM, BL BKA, pAF, CAD, HTN, HDL, lives at a ECF, admitted for Cardiac arrest, and PE. Pharmacy has been consulted to dose vancomycin. Height:   Ht Readings from Last 1 Encounters:   01/27/22 5' (1.524 m)     Weight:   Wt Readings from Last 1 Encounters:   01/29/22 193 lb 12.6 oz (87.9 kg)       Current & Prior Antimicrobial Regimen(s):  Ampicillin/Sulbactam (Day #1)  Vancomycin (Day #1)    Level(s) / Doses:    Date Time Dose Level / Type of Level Interpretation   1/30 0600 2g x once Random=? Note: Serum levels collected for AUC-based dosing may be high if collected in close proximity to the dose administered. This is not necessarily an indicator of toxicity.     Cultures & Sensitivities:    Date Site Micro Susceptibility / Result   1/28 Blood X2 Streptococcus DNA Staphylococcus coagulase negative DNA Pending           Labs / Ancillary Data:    Estimated Creatinine Clearance: 14 mL/min (A) (based on SCr of 4.1 mg/dL (H)). Recent Labs     01/27/22  0849 01/27/22  0849 01/27/22  1421 01/28/22  0832 01/28/22  0939 01/29/22  0416   CREATININE 5.8*   < > 6.0* 6.8*  6.6*  --  4.1*   BUN 23*   < > 33* 40*  40*  --  16   WBC 9.5  --   --   --  7.7 8.3    < > = values in this interval not displayed. Additional Lab Values / Findings of Note:    Procalcitonin: No results for input(s): PROCAL in the last 72 hours.

## 2022-01-30 ENCOUNTER — APPOINTMENT (OUTPATIENT)
Dept: CT IMAGING | Age: 77
DRG: 870 | End: 2022-01-30
Payer: MEDICARE

## 2022-01-30 LAB
A/G RATIO: 1.1 (ref 1.1–2.2)
ALBUMIN SERPL-MCNC: 3.3 G/DL (ref 3.4–5)
ALP BLD-CCNC: 83 U/L (ref 40–129)
ALT SERPL-CCNC: 220 U/L (ref 10–40)
ANION GAP SERPL CALCULATED.3IONS-SCNC: 11 MMOL/L (ref 3–16)
ANTI-XA UNFRAC HEPARIN: 0.28 IU/ML (ref 0.3–0.7)
ANTI-XA UNFRAC HEPARIN: 0.42 IU/ML (ref 0.3–0.7)
ANTI-XA UNFRAC HEPARIN: 0.44 IU/ML (ref 0.3–0.7)
AST SERPL-CCNC: 56 U/L (ref 15–37)
BASOPHILS ABSOLUTE: 0 K/UL (ref 0–0.2)
BASOPHILS RELATIVE PERCENT: 0.5 %
BILIRUB SERPL-MCNC: 0.3 MG/DL (ref 0–1)
BILIRUBIN DIRECT: <0.2 MG/DL (ref 0–0.3)
BILIRUBIN, INDIRECT: ABNORMAL MG/DL (ref 0–1)
BUN BLDV-MCNC: 30 MG/DL (ref 7–20)
CALCIUM SERPL-MCNC: 9.8 MG/DL (ref 8.3–10.6)
CHLORIDE BLD-SCNC: 98 MMOL/L (ref 99–110)
CO2: 27 MMOL/L (ref 21–32)
CREAT SERPL-MCNC: 6.1 MG/DL (ref 0.8–1.3)
EOSINOPHILS ABSOLUTE: 0.2 K/UL (ref 0–0.6)
EOSINOPHILS RELATIVE PERCENT: 2.9 %
GFR AFRICAN AMERICAN: 11
GFR NON-AFRICAN AMERICAN: 9
GLUCOSE BLD-MCNC: 109 MG/DL (ref 70–99)
GLUCOSE BLD-MCNC: 96 MG/DL (ref 70–99)
HCT VFR BLD CALC: 21.8 % (ref 40.5–52.5)
HEMOGLOBIN: 7.3 G/DL (ref 13.5–17.5)
LYMPHOCYTES ABSOLUTE: 1.1 K/UL (ref 1–5.1)
LYMPHOCYTES RELATIVE PERCENT: 13.3 %
MAGNESIUM: 1.9 MG/DL (ref 1.8–2.4)
MCH RBC QN AUTO: 29.4 PG (ref 26–34)
MCHC RBC AUTO-ENTMCNC: 33.3 G/DL (ref 31–36)
MCV RBC AUTO: 88.2 FL (ref 80–100)
MONOCYTES ABSOLUTE: 0.5 K/UL (ref 0–1.3)
MONOCYTES RELATIVE PERCENT: 6 %
NEUTROPHILS ABSOLUTE: 6.4 K/UL (ref 1.7–7.7)
NEUTROPHILS RELATIVE PERCENT: 77.3 %
PDW BLD-RTO: 13.8 % (ref 12.4–15.4)
PERFORMED ON: ABNORMAL
PHOSPHORUS: 3 MG/DL (ref 2.5–4.9)
PLATELET # BLD: 150 K/UL (ref 135–450)
PMV BLD AUTO: 8.6 FL (ref 5–10.5)
POTASSIUM REFLEX MAGNESIUM: 4 MMOL/L (ref 3.5–5.1)
POTASSIUM SERPL-SCNC: 4 MMOL/L (ref 3.5–5.1)
RBC # BLD: 2.47 M/UL (ref 4.2–5.9)
SODIUM BLD-SCNC: 136 MMOL/L (ref 136–145)
TOTAL PROTEIN: 6.3 G/DL (ref 6.4–8.2)
VANCOMYCIN RANDOM: 25.7 UG/ML
WBC # BLD: 8.2 K/UL (ref 4–11)

## 2022-01-30 PROCEDURE — 6370000000 HC RX 637 (ALT 250 FOR IP): Performed by: STUDENT IN AN ORGANIZED HEALTH CARE EDUCATION/TRAINING PROGRAM

## 2022-01-30 PROCEDURE — 6360000002 HC RX W HCPCS: Performed by: STUDENT IN AN ORGANIZED HEALTH CARE EDUCATION/TRAINING PROGRAM

## 2022-01-30 PROCEDURE — 85025 COMPLETE CBC W/AUTO DIFF WBC: CPT

## 2022-01-30 PROCEDURE — 99232 SBSQ HOSP IP/OBS MODERATE 35: CPT | Performed by: NURSE PRACTITIONER

## 2022-01-30 PROCEDURE — 99291 CRITICAL CARE FIRST HOUR: CPT | Performed by: INTERNAL MEDICINE

## 2022-01-30 PROCEDURE — 85520 HEPARIN ASSAY: CPT

## 2022-01-30 PROCEDURE — 84100 ASSAY OF PHOSPHORUS: CPT

## 2022-01-30 PROCEDURE — 36415 COLL VENOUS BLD VENIPUNCTURE: CPT

## 2022-01-30 PROCEDURE — 70450 CT HEAD/BRAIN W/O DYE: CPT

## 2022-01-30 PROCEDURE — 80053 COMPREHEN METABOLIC PANEL: CPT

## 2022-01-30 PROCEDURE — 2580000003 HC RX 258: Performed by: STUDENT IN AN ORGANIZED HEALTH CARE EDUCATION/TRAINING PROGRAM

## 2022-01-30 PROCEDURE — 36592 COLLECT BLOOD FROM PICC: CPT

## 2022-01-30 PROCEDURE — 2700000000 HC OXYGEN THERAPY PER DAY

## 2022-01-30 PROCEDURE — 80202 ASSAY OF VANCOMYCIN: CPT

## 2022-01-30 PROCEDURE — 83735 ASSAY OF MAGNESIUM: CPT

## 2022-01-30 PROCEDURE — 95813 EEG EXTND MNTR 61-119 MIN: CPT

## 2022-01-30 PROCEDURE — 6360000002 HC RX W HCPCS: Performed by: INTERNAL MEDICINE

## 2022-01-30 PROCEDURE — 94761 N-INVAS EAR/PLS OXIMETRY MLT: CPT

## 2022-01-30 PROCEDURE — 2000000000 HC ICU R&B

## 2022-01-30 PROCEDURE — 82248 BILIRUBIN DIRECT: CPT

## 2022-01-30 PROCEDURE — 2500000003 HC RX 250 WO HCPCS: Performed by: STUDENT IN AN ORGANIZED HEALTH CARE EDUCATION/TRAINING PROGRAM

## 2022-01-30 PROCEDURE — 94003 VENT MGMT INPAT SUBQ DAY: CPT

## 2022-01-30 RX ORDER — AMLODIPINE BESYLATE 10 MG/1
10 TABLET ORAL DAILY
Status: DISCONTINUED | OUTPATIENT
Start: 2022-01-30 | End: 2022-02-02

## 2022-01-30 RX ADMIN — CASTOR OIL AND BALSAM, PERU: 788; 87 OINTMENT TOPICAL at 09:18

## 2022-01-30 RX ADMIN — FERROUS SULFATE TAB 325 MG (65 MG ELEMENTAL FE) 325 MG: 325 (65 FE) TAB at 09:17

## 2022-01-30 RX ADMIN — HEPARIN SODIUM 3990 UNITS: 1000 INJECTION INTRAVENOUS; SUBCUTANEOUS at 05:10

## 2022-01-30 RX ADMIN — PRAVASTATIN SODIUM 20 MG: 10 TABLET ORAL at 09:17

## 2022-01-30 RX ADMIN — AMLODIPINE BESYLATE 10 MG: 10 TABLET ORAL at 09:17

## 2022-01-30 RX ADMIN — CASTOR OIL AND BALSAM, PERU: 788; 87 OINTMENT TOPICAL at 22:48

## 2022-01-30 RX ADMIN — AMPICILLIN SODIUM AND SULBACTAM SODIUM 1500 MG: 1; .5 INJECTION, POWDER, FOR SOLUTION INTRAMUSCULAR; INTRAVENOUS at 17:58

## 2022-01-30 RX ADMIN — ACETAMINOPHEN 650 MG: 325 TABLET ORAL at 09:23

## 2022-01-30 RX ADMIN — ASPIRIN 81 MG: 81 TABLET, COATED ORAL at 09:17

## 2022-01-30 RX ADMIN — HYDRALAZINE HYDROCHLORIDE 10 MG: 20 INJECTION INTRAMUSCULAR; INTRAVENOUS at 04:27

## 2022-01-30 RX ADMIN — FAMOTIDINE 20 MG: 10 INJECTION, SOLUTION INTRAVENOUS at 17:57

## 2022-01-30 RX ADMIN — SODIUM CHLORIDE, PRESERVATIVE FREE 10 ML: 5 INJECTION INTRAVENOUS at 09:18

## 2022-01-30 RX ADMIN — AMPICILLIN SODIUM AND SULBACTAM SODIUM 1500 MG: 1; .5 INJECTION, POWDER, FOR SOLUTION INTRAMUSCULAR; INTRAVENOUS at 04:34

## 2022-01-30 ASSESSMENT — PULMONARY FUNCTION TESTS
PIF_VALUE: 18
PIF_VALUE: 21
PIF_VALUE: 19
PIF_VALUE: 20
PIF_VALUE: 19
PIF_VALUE: 26
PIF_VALUE: 20
PIF_VALUE: 20
PIF_VALUE: 16
PIF_VALUE: 20
PIF_VALUE: 19
PIF_VALUE: 19
PIF_VALUE: 16
PIF_VALUE: 16
PIF_VALUE: 18
PIF_VALUE: 18
PIF_VALUE: 15
PIF_VALUE: 19
PIF_VALUE: 20
PIF_VALUE: 19
PIF_VALUE: 18
PIF_VALUE: 20
PIF_VALUE: 18
PIF_VALUE: 18
PIF_VALUE: 20
PIF_VALUE: 18
PIF_VALUE: 18
PIF_VALUE: 20
PIF_VALUE: 22
PIF_VALUE: 19
PIF_VALUE: 20
PIF_VALUE: 21
PIF_VALUE: 18
PIF_VALUE: 17
PIF_VALUE: 18
PIF_VALUE: 21
PIF_VALUE: 15
PIF_VALUE: 19
PIF_VALUE: 16
PIF_VALUE: 20
PIF_VALUE: 15
PIF_VALUE: 19
PIF_VALUE: 22
PIF_VALUE: 19
PIF_VALUE: 22
PIF_VALUE: 17
PIF_VALUE: 18
PIF_VALUE: 20
PIF_VALUE: 18
PIF_VALUE: 19
PIF_VALUE: 17

## 2022-01-30 ASSESSMENT — PAIN SCALES - GENERAL
PAINLEVEL_OUTOF10: 0

## 2022-01-30 NOTE — PROGRESS NOTES
ICU Progress Note    Admit Date: 1/27/2022  Day: 3  Vent Day: None  IV Access:Peripheral  IV Fluids:None  Vasopressors:None                Antibiotics: None  Diet: Diet NPO  ADULT TUBE FEEDING; Orogastric; Peptide Based; Continuous; 10; Yes; 10; Q 4 hours; 40; 30; Q 4 hours; Protein; Protein Modular QD; Flush with 30 ml water before/after administration. Do not mix with tube feeding formula.     CC: cardiac arrest    Interval history:   - blood cx grew staph and strep  - no sedation, patient unresponsive      Medications:     Scheduled Meds:   ampicillin-sulbactam  1,500 mg IntraVENous Q12H    vancomycin (VANCOCIN) intermittent dosing (placeholder)   Other See Admin Instructions    aspirin  81 mg Oral Daily    ferrous sulfate  325 mg Oral Daily with breakfast    pravastatin  20 mg Oral Daily    sodium chloride flush  5-40 mL IntraVENous 2 times per day    famotidine (PEPCID) injection  20 mg IntraVENous Daily    Venelex   Topical BID     Continuous Infusions:   fentaNYL 50 mcg/hr (01/28/22 2224)    sodium chloride      norepinephrine Stopped (01/27/22 1115)    sodium chloride      dextrose      heparin (PORCINE) Infusion 15 Units/kg/hr (01/30/22 0600)     PRN Meds:hydrALAZINE, sodium chloride, sodium chloride flush, sodium chloride, ondansetron **OR** ondansetron, acetaminophen **OR** acetaminophen, polyethylene glycol, glucose, dextrose, glucagon (rDNA), dextrose, heparin (porcine), heparin (porcine)    Objective:   Vitals:   T-max:  Patient Vitals for the past 8 hrs:   BP Temp Temp src Pulse Resp SpO2   01/30/22 0530 (!) 133/59 -- -- 70 14 98 %   01/30/22 0510 (!) 175/71 -- -- 96 21 97 %   01/30/22 0500 (!) 188/80 -- -- 105 20 97 %   01/30/22 0440 (!) 183/74 -- -- 90 20 97 %   01/30/22 0430 (!) 161/81 -- -- 79 18 98 %   01/30/22 0410 (!) 175/72 -- -- 108 23 96 %   01/30/22 0400 (!) 159/71 100 °F (37.8 °C) Axillary 85 16 97 %   01/30/22 0340 (!) 164/57 -- -- 94 18 97 %   01/30/22 0330 (!) 193/90 -- -- 94 24 96 %   01/30/22 0310 (!) 197/91 -- -- 115 25 96 %   01/30/22 0300 (!) 196/91 -- -- 109 20 96 %   01/30/22 0240 (!) 184/79 -- -- 106 21 96 %   01/30/22 0230 (!) 191/78 -- -- 104 15 96 %   01/30/22 0210 (!) 163/75 -- -- 94 (!) 5 96 %   01/30/22 0200 (!) 178/77 -- -- 91 14 98 %   01/30/22 0140 134/65 -- -- 64 14 97 %   01/30/22 0130 (!) 155/62 -- -- 71 14 97 %   01/30/22 0120 -- -- -- 66 9 97 %   01/30/22 0110 (!) 145/57 -- -- 82 15 96 %   01/30/22 0100 (!) 175/69 -- -- 85 -- 95 %   01/30/22 0040 (!) 150/64 -- -- 77 -- 100 %   01/30/22 0030 (!) 169/113 -- -- 73 16 100 %   01/30/22 0020 -- -- -- 103 21 99 %   01/30/22 0010 (!) 165/70 -- -- 83 17 99 %   01/30/22 0000 (!) 168/70 100.3 °F (37.9 °C) Axillary 86 20 100 %   01/29/22 2340 (!) 168/71 -- -- 79 14 99 %   01/29/22 2330 (!) 103/48 -- -- 63 14 99 %   01/29/22 2310 (!) 124/55 -- -- 79 15 99 %   01/29/22 2300 (!) 144/57 -- -- 74 14 98 %   01/29/22 2250 -- -- -- 61 14 98 %       Intake/Output Summary (Last 24 hours) at 1/30/2022 4548  Last data filed at 1/30/2022 0600  Gross per 24 hour   Intake 537.17 ml   Output 0 ml   Net 537.17 ml       Review of Systems   Unable to perform ROS: Intubated       Physical Exam  Physical Exam  Constitutional:       Appearance: He is ill-appearing. Comments: intubated   HENT:      Head: Normocephalic and atraumatic. Cardiovascular:      Rate and Rhythm: Normal rate and regular rhythm. Pulmonary:      Effort: Pulmonary effort is normal.      Comments: Mechanical breath sounds  Abdominal:      Palpations: Abdomen is soft. Tenderness: There is no abdominal tenderness. There is no guarding or rebound. Musculoskeletal:         General: No swelling. Cervical back: Neck supple.       Comments: Bilateral BKA   Skin:     General: Skin is warm and dry.         LABS:    CBC:   Recent Labs     01/28/22  0939 01/28/22  0939 01/28/22  1334 01/29/22  0416 01/30/22  0421   WBC 7.7  --   --  8.3 8.2   HGB 6.2*   < > 7.8* 7.7* 7.3*   HCT 18.6*   < > 23.7* 24.4* 21.8*     --   --  156 150   MCV 88.7  --   --  94.6 88.2    < > = values in this interval not displayed. Renal:    Recent Labs     01/27/22  0849 01/27/22  1421 01/28/22  0832 01/28/22  0832 01/29/22  0416 01/30/22  0421   NA   < > 140 143  141  --  138 136   K   < > 3.9 3.8  3.7   < > 4.4 4.0  4.0   CL   < > 101 103  102  --  103 98*   CO2   < > 24 27  28  --  22 27   BUN   < > 33* 40*  40*  --  16 30*   CREATININE   < > 6.0* 6.8*  6.6*  --  4.1* 6.1*   GLUCOSE   < > 190* 90  91  --  87 96   CALCIUM   < > 9.8 9.4  9.4  --  9.4 9.8   MG  --  2.10  --   --   --  1.90   PHOS  --  3.7  --   --   --  3.0   ANIONGAP   < > 15 13  11  --  13 11    < > = values in this interval not displayed. Hepatic:   Recent Labs     01/28/22  0940 01/29/22 0416 01/30/22  0421   * 122* 56*   * 339* 220*   BILITOT <0.2 0.4 0.3   BILIDIR <0.2 <0.2 <0.2   PROT 5.9* 6.0* 6.3*   LABALBU 2.9* 2.7* 3.3*   ALKPHOS 85 82 83     Troponin:   Recent Labs     01/27/22  0849   TROPONINI 0.19*     BNP: No results for input(s): BNP in the last 72 hours. Lipids: No results for input(s): CHOL, HDL in the last 72 hours. Invalid input(s): LDLCALCU, TRIGLYCERIDE  ABGs:  No results for input(s): PHART, CQR9OMV, PO2ART, JKQ3LRX, BEART, THGBART, Y1VPTHHC, PTR2TSE in the last 72 hours. INR:   Recent Labs     01/27/22  0849 01/28/22  0932   INR 1.20* 1.32*     Lactate:   Recent Labs     01/27/22  1431   LACTATE 0.85     Cultures:  -----------------------------------------------------------------  RAD:   CT HEAD WO CONTRAST   Final Result   1. No acute intracranial process. 2. Small remote cortical infarct is identified laterally within the left occipital lobe. This is unchanged. XR ABDOMEN (KUB) (SINGLE AP VIEW)   Final Result       1. Enteric tube tip and side-port project over the gastric body. XR CHEST PORTABLE   Final Result      Interval line placement. Persistent infiltrate at right lung base. CTA PULMONARY W CONTRAST   Final Result      Left-sided pulmonary emboli. Infiltrate in right lower lobe suggesting pneumonia. Borderline RV/LV ratio of approximately 1.02. Results were reported by telephone to the charge nurse, Kamila, at 7:11 PM on 1/27/2020         CT HEAD WO CONTRAST   Final Result   1. Atrophy and superimposed small vessel ischemic disease with remote infarct in the lateral left occipital region. No recent infarct, hemorrhage or mass detected. XR CHEST PORTABLE   Final Result   1. Nasogastric tube coiled in the distal esophagus with gastric air distention. 2. Endotracheal tube in satisfactory position. Critical results reported to Physician: Jeff Begum   at 1/27/20229:25 AM on 1/27/2022 by telephone. Assessment/Plan:   Charanjit Orantes is a 67 y.o. male PMH ESRD on HD, DM2, bl BKA, pAF, CAD (s/p stent in LAD and RCA 2018), HTN, HLD who presented from [facility] after cardiac arrest.      S/p Cardiac Arrest  Possible anoxic brain injury  - suspected down for 20 minutes. Initial rhythm PEA.  Received total of 40 minutes of resuscitation with 5 rounds of CPR and epi  - intubated 1/27  - off pressors and sedation  - 2/2 PE  - Neurology following, cvEEG  - CT head no intracranial process    PE  - heparin gtt    Bacteremia  Blood cx x2 grew staph and strep  - 1 dose vanc  - cont on unasyn     Acute respiratory failure 2/2 cardiac arrest  - s/p intubation in ED  - ABG     ESRD on HD  - gets HD M/W/F and does not make urine  - nephro consulted  - strict I/O, daily weights     Lactic acidosis, resolved  - 2/2 hypoperfusion from cardiac arrest    pAF/HTN- holding home nifedipine, hydralazine, prazosin, and carvedilol  CAD s/p LAD and RCA stent in 2018, continue home ASA and statin  HLD- home statin  Anemia of Chronic Disease- epo three times per week  Depression- holding home mirtazapine  DM2- hypoglycemia, POCT     Code Status:Prior  FEN: NPO  PPX:  subq heparin  DISPO: ICU       Angelo Ness MD, PGY-1  01/30/22  6:47 AM    This patient has been staffed and discussed with Dr. Glo Chavez    THE [facility] AT [place]     Patient seen and examined. I agree with Dr. Ojeda's history, physical, lab findings, assessment and plan.     Off fentanyl x 30 hrs  Remains unresponsive to pain for me,     Head CT on admission January 27  Impression   1. Atrophy and superimposed small vessel ischemic disease with remote infarct in the lateral left occipital region. No recent infarct, hemorrhage or mass detected.      CTPA January 27  Impression       Left-sided pulmonary emboli.       Infiltrate in right lower lobe suggesting pneumonia.       Borderline RV/LV ratio of approximately 1.02.      cv EEG shows mild genralized slowing but no epileptiform discharge, focal slowing, or seizures     Blood Cx  Organism Staphylococcus coagulase negative DNA Detected Abnormal  P    Culture, Blood 2 See additional report for complete BCID panel. P    Organism Streptococcus species DNA Detected Abnormal  P    Culture, Blood 2 See additional report for complete BCID panel. P    Organism Streptococcus viridans group Abnormal  P    Culture, Blood 2 POSITIVE for   This organism is only isolated from one set. Susceptibility testing is not routinely performed. Additional testing can be ordered by calling the   Microbiology Department. Additional blood cultures may be obtained if   clinical suspicion of septicemia is high. Assessment  1. PEA cardiac arrest -downtime estimated between 20 and 40 minutes  2. PE with cor pulmonale  3. Acute Respiratory Failure  4. Lactic acidemia  5. ESRD on HD  6.  Possible anoxic encephalopathy  7.  Myoclonic activity with stimulation  8. Strep/Staph Bacteremia     Plan  1.  Continue heparin drip  2.  Vent: Tidal volume 500, respiratory rate 14, FiO2 30%, PEEP 5  3.  cv EEG  4.  Cont Unasyn and check Vanc trough in AM  5.  Palliative care following  6.  Neuro following  7.  Pepcid for GI prophylaxis  8.  Cont TF  9. Remain off sedation if possible  10. ECHO to assess for endocarditis  11. MRI Brain to assess for anoxia tmrw  12. Full code     Prognosis extremely guarded     Pt has a high probability of imminent or life-threatening deterioration requiring close monitoring, and highly complex decision-making and/or interventions of high intensity to assess, manipulate, and support his critical organ systems to prevent a likely inevitable decline which could occur if left untreated.      A total critical care time 31 minutes was used. This includes but not limited to examining patient, collaborating with other physicians, monitoring vital signs, telemetry, continuous pulse oximetry, and clinical response to IV medications, documentation time, review and interpretation of laboratory and radiological data, review of nursing notes and old record review.  This time excludes any time that may have been spent performing procedures for life threatening organ failure.     Fanny Patino MD

## 2022-01-30 NOTE — CONSULTS
Clinical Pharmacy Progress Note    Vancomycin - Management by Pharmacy    Consult Date(s): 1/29  Consulting Provider(s): Latasha Hanson    Assessment / Plan    Blood Stream Infection - Vancomycin   Concurrent Antimicrobials: Ampicillin/Sulbactam (Day #2)   Day of Vanc Therapy: 2   Current Dosing Method: Intermittent Dosing by Levels   Therapeutic Goal: ~ 15 mg/L   Current Dose / Frequency: Intermittent with HD   Plan / Rationale:   o Pt has ESRD on HD (MWF at home). o Pt given Vancomycin 2g LD yesterday. Level this AM of 25.7mcg/mL. This is an adequate load for the patient. Pt makes no urine at baseline and expect little, if any clearance by Monday. No dose needed today. o Will order a level Monday AM prior to any HD sessions.  Will continue to monitor clinical condition and make adjustments to regimen as appropriate. Thank you for consulting Pharmacy! Karen Garza, PharmD  PGY-1 Pharmacy Resident  [id number]  1/30/2022 7:19 AM        Interval History: Pt admitted for Cardiac arrest, PE. Blood cultures came back positive today. WBC stable at 8.3. Subjective/Objective: Mr. Rosina Hinson is a 67 y.o. male with a PMHx significant for ESRD on HD, T2DM, BL BKA, pAF, CAD, HTN, HDL, lives at a F, admitted for Cardiac arrest, and PE. Pharmacy has been consulted to dose vancomycin. Height:   Ht Readings from Last 1 Encounters:   01/29/22 5' (1.524 m)     Weight:   Wt Readings from Last 1 Encounters:   01/30/22 198 lb 6.6 oz (90 kg)       Current & Prior Antimicrobial Regimen(s):  Ampicillin/Sulbactam (Day #2)  Vancomycin (Day #2)    Level(s) / Doses:    Date Time Dose Level / Type of Level Interpretation   1/30 0600 2g x once Random= 25.7mcg/mL Adequate Load Given          Note: Serum levels collected for AUC-based dosing may be high if collected in close proximity to the dose administered. This is not necessarily an indicator of toxicity.     Cultures & Sensitivities:    Date Site Micro Susceptibility / Result   1/28 Blood X2 Streptococcus DNA Staphylococcus coagulase negative DNA Pending           Labs / Ancillary Data:    Estimated Creatinine Clearance: 9 mL/min (A) (based on SCr of 6.1 mg/dL [facility])). Recent Labs     01/27/22  0849 01/28/22  0832 01/28/22  0939 01/29/22  0416 01/30/22  0421   CREATININE  --  6.8*  6.6*  --  4.1* 6.1*   BUN  --  40*  40*  --  16 30*   WBC   < >  --  7.7 8.3 8.2    < > = values in this interval not displayed. Additional Lab Values / Findings of Note:    Procalcitonin: No results for input(s): PROCAL in the last 72 hours.

## 2022-01-30 NOTE — PROCEDURES
Continuous EEG monitoring record    Patient name: Dexter Galindo    START: 01/29/2022 @ 17:23pm  END: 01/30/2022 @ 10:00am       Electroencephalographer: Radha Carlton MD PhD      CLINICAL DETAILS:  This EEG was performed on this 67 y. o.yo male admitted for Altered mental Status and concer for subclinical seizures      TECHNICAL DETAILS:  Continuous video-EEG monitoring was performed with 27 surface scalp electrodes placed according to the International 10-20 electrode placement system, using a 32-channel [employer] headbox. All EEG and video information was acquired digitally, including the use of automated spike and seizure detection software to detect epileptiform activity. An event button was also available to be depressed during clinical events. 01/29/2022 21:00pm to 10:00am on 01/30/2022  SEIZURES:  None  INTERICTAL:  None  PUSHBUTTONS:  None  BACKGROUND:  Abundant generalized 7.5-8 Hz theta slowing of the background with frequent superimposed EMG artifact that at times obscures the background. EKG:  regular    01/29/2022 17:23pm - 21:00pm   SEIZURES:  None  INTERICTAL:  None  PUSHBUTTONS:  None  BACKGROUND:  Abundant generalized 7.5-8 Hz theta slowing of the background with frequent superimposed EMG artifact that at times obscures the background. EKG:  regular    CLINICAL INTERPRETATION:  This was an abnormal tracing for age and state due to a mild generalized slow wave abnormality indicating diffuse cerebral dysfunction which can be of multiple causes, including structural, or vascular abnormalities, toxic/metabolic conditions, hydrocephalus, or postictal conditions. No epileptiform discharge, focal slowing, or seizures were seen during this recording. Clinical correlation is advised.         Radha Carlton MD PhD

## 2022-01-30 NOTE — PLAN OF CARE
Problem: Non-Violent Restraints  Goal: Removal from restraints as soon as assessed to be safe  Outcome: Ongoing  Goal: No harm/injury to patient while restraints in use  Outcome: Ongoing  Goal: Patient's dignity will be maintained  Outcome: Ongoing     Problem: Pain:  Goal: Pain level will decrease  Description: Pain level will decrease  Outcome: Ongoing  Goal: Control of acute pain  Description: Control of acute pain  Outcome: Ongoing  Goal: Control of chronic pain  Description: Control of chronic pain  Outcome: Ongoing     Problem: Skin Integrity:  Goal: Will show no infection signs and symptoms  Description: Will show no infection signs and symptoms  Outcome: Ongoing  Goal: Absence of new skin breakdown  Description: Absence of new skin breakdown  Outcome: Ongoing  Goal: Status of oral mucous membranes will improve  Description: Status of oral mucous membranes will improve  Outcome: Ongoing  Goal: Skin integrity will be maintained  Description: Skin integrity will be maintained  Outcome: Ongoing

## 2022-01-30 NOTE — PROGRESS NOTES
Hospital Medicine Care  Progress Note      Chief complain; Cardiac Arrest            Subjective: Intubated  Unresponsive. On CvEEG   Febrile overnight. Review of Systems:     Review of Systems as mentioned above, other ros is negative. Objective:       Medications        Scheduled Meds:   amLODIPine  10 mg Oral Daily    ampicillin-sulbactam  1,500 mg IntraVENous Q12H    vancomycin (VANCOCIN) intermittent dosing (placeholder)   Other See Admin Instructions    aspirin  81 mg Oral Daily    ferrous sulfate  325 mg Oral Daily with breakfast    pravastatin  20 mg Oral Daily    sodium chloride flush  5-40 mL IntraVENous 2 times per day    famotidine (PEPCID) injection  20 mg IntraVENous Daily    Venelex   Topical BID     Continuous Infusions:   fentaNYL Stopped (01/29/22 1400)    sodium chloride      sodium chloride      dextrose      heparin (PORCINE) Infusion 15 Units/kg/hr (01/30/22 0600)     PRN Meds:.hydrALAZINE, sodium chloride, sodium chloride flush, sodium chloride, ondansetron **OR** ondansetron, acetaminophen **OR** acetaminophen, polyethylene glycol, glucose, dextrose, glucagon (rDNA), dextrose, heparin (porcine), heparin (porcine)      Allergies  No Known Allergies      Vitals:    01/30/22 1100 01/30/22 1130 01/30/22 1200 01/30/22 1232   BP: (!) 108/49 (!) 129/55 138/61    Pulse: 62 67 76 75   Resp: 14 15 19 15   Temp:  100.8 °F (38.2 °C)     TempSrc:  Axillary     SpO2: 99% 99% 99% 99%   Weight:       Height:             Physical exam:         Physical Exam  Constitutional:       Appearance: He is ill-appearing. Comments: Intubated. HENT:      Head: Normocephalic and atraumatic. Cardiovascular:      Rate and Rhythm: Normal rate and regular rhythm. Pulses: Normal pulses. Heart sounds: Normal heart sounds. Pulmonary:      Effort: Pulmonary effort is normal.      Breath sounds: Normal breath sounds. Abdominal:      General: Abdomen is flat. Palpations: Abdomen is soft. Skin:     General: Skin is warm and dry. Capillary Refill: Capillary refill takes less than 2 seconds. Neurological:      Comments: Intubated, non responsive. Labs      Recent Labs     01/28/22  0939 01/28/22  0939 01/28/22  1334 01/29/22  0416 01/30/22  0421   WBC 7.7  --   --  8.3 8.2   HGB 6.2*   < > 7.8* 7.7* 7.3*   HCT 18.6*   < > 23.7* 24.4* 21.8*     --   --  156 150   MCV 88.7  --   --  94.6 88.2    < > = values in this interval not displayed. Recent Labs     01/27/22  1421 01/27/22  1421 01/28/22  0832 01/28/22  0832 01/29/22  0416 01/30/22  0421      < > 143  141  --  138 136   K 3.9  --  3.8  3.7   < > 4.4 4.0  4.0      < > 103  102  --  103 98*   CO2 24   < > 27  28  --  22 27   PHOS 3.7  --   --   --   --  3.0   BUN 33*   < > 40*  40*  --  16 30*   CREATININE 6.0*   < > 6.8*  6.6*  --  4.1* 6.1*    < > = values in this interval not displayed. Recent Labs     01/28/22  0940 01/29/22  0416 01/30/22  0421   * 122* 56*   * 339* 220*   BILIDIR <0.2 <0.2 <0.2   BILITOT <0.2 0.4 0.3   ALKPHOS 85 82 83       Recent Labs     01/27/22  1421 01/30/22  0421   MG 2.10 1.90       Radiology  CT HEAD WO CONTRAST   Final Result   1. No acute intracranial process. 2. Small remote cortical infarct is identified laterally within the left occipital lobe. This is unchanged. XR ABDOMEN (KUB) (SINGLE AP VIEW)   Final Result       1. Enteric tube tip and side-port project over the gastric body. XR CHEST PORTABLE   Final Result      Interval line placement. Persistent infiltrate at right lung base. CTA PULMONARY W CONTRAST   Final Result      Left-sided pulmonary emboli. Infiltrate in right lower lobe suggesting pneumonia. Borderline RV/LV ratio of approximately 1.02.       Results were reported by telephone to the charge nurse, Kamila, at 7:11 PM on 1/27/2020         CT HEAD WO CONTRAST   Final Result   1. Atrophy and superimposed small vessel ischemic disease with remote infarct in the lateral left occipital region. No recent infarct, hemorrhage or mass detected. XR CHEST PORTABLE   Final Result   1. Nasogastric tube coiled in the distal esophagus with gastric air distention. 2. Endotracheal tube in satisfactory position. Critical results reported to Physician: Murali Sat   at 1/27/20229:25 AM on 1/27/2022 by telephone. MRI BRAIN WO CONTRAST    (Results Pending)           Assessment and Plan: Active Problems:    Cardiac arrest ([address]    Anoxic brain injury ([address]    Myoclonus    Respiratory failure ([address]  Resolved Problems:    * No resolved hospital problems.  *     Cardiac arrest  CTPA showed pulmonary embolism  On heparin drip  ROSC approximately 40 minutes  Concern for hypoxic ischemic injury  Critical care neurology following  MRI in am per neurology    ESRD  Dialysis per nephrology    Bacteremia  On Vanc  On Unasyn per ICU team.      Prognosis appears poor      Linda Stack MD  1/30/2022

## 2022-01-30 NOTE — PLAN OF CARE
Problem: Non-Violent Restraints  Goal: Removal from restraints as soon as assessed to be safe  1/30/2022 1607 by Jose Ray RN  Outcome: Ongoing  1/30/2022 0653 by Flavio Chan RN  Outcome: Ongoing  Goal: No harm/injury to patient while restraints in use  1/30/2022 1607 by Jose Ray RN  Outcome: Ongoing  1/30/2022 0653 by Flaivo Chan RN  Outcome: Ongoing  Goal: Patient's dignity will be maintained  1/30/2022 1607 by Jose Ray RN  Outcome: Ongoing  1/30/2022 0653 by Flavio Chan RN  Outcome: Ongoing     Problem: Pain:  Goal: Pain level will decrease  Description: Pain level will decrease  1/30/2022 1607 by Jose Ray RN  Outcome: Ongoing  1/30/2022 0653 by Flavio Chan RN  Outcome: Ongoing  Goal: Control of acute pain  Description: Control of acute pain  1/30/2022 1607 by Jose Ray RN  Outcome: Ongoing  1/30/2022 0653 by Flavio Chan RN  Outcome: Ongoing  Goal: Control of chronic pain  Description: Control of chronic pain  1/30/2022 1607 by Jose Ray RN  Outcome: Ongoing  1/30/2022 0653 by Flavoi Chan RN  Outcome: Ongoing     Problem: Skin Integrity:  Goal: Will show no infection signs and symptoms  Description: Will show no infection signs and symptoms  1/30/2022 1607 by Jose Ray RN  Outcome: Ongoing  1/30/2022 0653 by Flavio Chan RN  Outcome: Ongoing  Goal: Absence of new skin breakdown  Description: Absence of new skin breakdown  1/30/2022 1607 by Jose Ray RN  Outcome: Ongoing  1/30/2022 0653 by Flavio Chan RN  Outcome: Ongoing  Goal: Status of oral mucous membranes will improve  Description: Status of oral mucous membranes will improve  1/30/2022 1607 by Jose Ray RN  Outcome: Ongoing  1/30/2022 0653 by Flavio Chan RN  Outcome: Ongoing  Goal: Skin integrity will be maintained  Description: Skin integrity will be maintained  1/30/2022 1607 by Jose Ray RN  Outcome: Ongoing  1/30/2022 0653 by Flavio Chan RN  Outcome: Ongoing     Problem: Falls - Risk of:  Goal: Will remain free from falls  Description: Will remain free from falls  Outcome: Ongoing  Goal: Absence of physical injury  Description: Absence of physical injury  Outcome: Ongoing     Problem:  Activity:  Goal: Fatigue will decrease  Description: Fatigue will decrease  Outcome: Ongoing  Goal: Risk for activity intolerance will decrease  Description: Risk for activity intolerance will decrease  Outcome: Ongoing     Problem: Nutrition  Goal: Optimal nutrition therapy  Outcome: Ongoing

## 2022-01-30 NOTE — PROGRESS NOTES
Nephrology Note        [facility]    [email or web address]       Phone: [phone]                                                  1/30/2022 7:22 AM     Patient: Andrea Coles [id number]  [id number]  Date of Admit: 1/27/2022 LOS: 3 days      Interval History and Plan:  Patient remain intubated. FiO2 30 % with PEEP of 5. Off pressors. Getting video EEG. Lytes stable. No indication for dialysis today. Plan for HD in AM.   Monitor Hb and transfuse as needed  Avoid nephrotoxins  Monitor input, output  Monitor labs and vitals closely  Renally dose all medications      Thank you for allowing us to participate in this patient's care    In case of any question please call us at our 24 hour answering service [phone] or from 7 AM to 5 PM via [employer] or cell number    Benson Kelsey MD  [facility]  [facility]  [place], [address]  Fax: [phone]  Office: [phone]       Assessment & Plan     Renal function:  ESRD  I confirmed from [address] unit and last HD was on wednesday and per report         Electrolytes:   Lab Results   Component Value Date    CREATININE 6.1 ([facility]) 01/30/2022    BUN 30 (H) 01/30/2022     01/30/2022    K 4.0 01/30/2022    K 4.0 01/30/2022    CL 98 (L) 01/30/2022    CO2 27 01/30/2022            # CKD- MBD:   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 Lab Results   Component Value Date    .6 (H) 06/17/2020    CALCIUM 9.8 01/30/2022    CAION 1.22 01/27/2022    PHOS 3.0 01/30/2022         Acid/Base status:  No acidosis on last labs. PH 7.4 on VBG  Last lactate was normal, 0.85      Volume status/BP:  Now off pressors and BP is stable  No acute volume overload concerns.        Intake/Output Summary (Last 24 hours) at 1/30/2022 7247  Last data filed at 1/30/2022 0600  Gross per 24 hour   Intake 537.17 ml   Output 0 ml   Net 537.17 ml         Hematology:   CKD related anemia  Goal Hgb 10-11  Monitor and transfuse as needed. Lab Results   Component Value Date    IRON 39 (L) 10/05/2021    TIBC 189 (L) 10/05/2021    FERRITIN 1,665.0 (H) 08/13/2020     Lab Results   Component Value Date    WBC 8.2 01/30/2022    HGB 7.3 (L) 01/30/2022    HCT 21.8 (L) 01/30/2022    MCV 88.2 01/30/2022     01/30/2022             Reason for Consult and Chief Complaint     Reason for consult: ESRD    Chief complaint:   Chief Complaint   Patient presents with    Cardiac Arrest       History of Present Illness   Juliana Lynn is a 67 y.o. with PMH significant for ESRD on HD M/W/F, DM2, bl BKA, pAF, CAD (s/p stent in LAD and RCA 2018), HTN, HLD admitted after cardiac arrest and now patient is intubated. Patient was on pressors but now weaned off. Labs stable. Review of Systems   Positive in bold or unable to assess     GEN: Fever, chills, night sweats. HEENT: Changes in vision, sore throat, rhinorrhea   CVS: Chest pain, palpitations, swelling or edema in legs  Pulmonary: Cough, hemoptysis, SOB  GI: Nausea, vomiting, diarrhea, constipation, abdominal pain  : Bladder incontinence, dysuria,hematuria. MSK: Muscle or joint or bone pains  Skin: Rashes, ulcers, skin thickness  CNS: Headache, dizziness, confusion, focal weakness, seizure. Psych: Anxiety, agitation, depression. Reviewed all 12 systems, negative except as above.      Past Medical History     Past Medical History:   Diagnosis Date    Anemia     Atrial fibrillation ([address] 11/4/2013    CAD (coronary artery disease)     Mi, stent    Chronic kidney disease     DVT (deep venous thrombosis) (HCC)     End stage renal disease (HCC)     Gas gangrene ([address]     Hemodialysis patient ([facility]     M-W-F    Hx of blood clots     Hyperkalemia     Hyperlipidemia 5/30/2012    Hypertension     Hyperthyroidism     Ischemic heart disease 7/62/0886    Metabolic encephalopathy     MI (myocardial infarction) ([address] 10/2018    Type II or unspecified type diabetes mellitus without mention of complication, not stated as uncontrolled          Past Surgical History     Past Surgical History:   Procedure Laterality Date    CARDIAC SURGERY      cardiac stent    FEMORAL-TIBIAL BYPASS GRAFT Right 1/9/2019    RIGHT FEMORAL POSTERIOR TIBIAL BYPASS performed by Viviana Medina MD at [address] [place] [address] Right 1/4/2019    INCISION AND DRAINAGE, TRANSMETATARSAL AMPUTATION ALL ON RIGHT FOOT performed by Ilia Cuevas DPM at [address] [place] [address] Right 1/14/2019    REVISION OF TRANSMETATARSAL AMPUTATION RIGHT FOOT performed by Ilia Cuevas DPM at [address] [place] [address] Left 08/24/2019    TMA    FOOT AMPUTATION Left 8/24/2019    LEFT FOOT TRANSMETATARSAL AMPUTATION performed by Roby Mike DPM at [address] [place] [address] Left 8/27/2019    REPEAT INCISION AND DRAINAGE, REVISION OF LEFT FOOT TRANSMETATARSAL AMPUTATION performed by Roby Mike DPM at [address] [place] [address] Left 10/11/2019    LEFT FOOT INCISION AND DRAINAGE WITH REVISION OF TRANSMETARSAL AMPUTATION WITH WOUND VAC APPLICATION  performed by Roby Mike DPM at [address] Right 1/18/2019    RIGHT BELOW THE KNEE AMPUTATION performed by Viviana Medina MD at [address] Left 12/9/2019    LEFT BELOW KNEE AMPUTATION performed by Viviana Medina MD at [facility] Left 2/6/2020    DEBRIDEMENT AND PLACEMENT OF WOUND VAC LEFT BELOW THE KNEE AMPUTATION SITE performed by Lakia Lopes MD at [place] [address] History     Family History   Problem Relation Age of Onset    Arthritis Mother          Social History     Social History     Tobacco Use    Smoking status: Never Smoker    Smokeless tobacco: Never Used   Substance Use Topics    Alcohol use: Not Currently          Past medical, family, and social histories were reviewed as previously documented. Updates were made as necessary.       Inpatient Medications and Allergies Scheduled Meds:   ampicillin-sulbactam  1,500 mg IntraVENous Q12H    vancomycin (VANCOCIN) intermittent dosing (placeholder)   Other See Admin Instructions    aspirin  81 mg Oral Daily    ferrous sulfate  325 mg Oral Daily with breakfast    pravastatin  20 mg Oral Daily    sodium chloride flush  5-40 mL IntraVENous 2 times per day    famotidine (PEPCID) injection  20 mg IntraVENous Daily    Venelex   Topical BID       Allergies: No Known Allergies      Vital Signs     Vitals:    01/30/22 0710   BP:    Pulse: 100   Resp: 16   Temp:    SpO2: 98%         Intake/Output Summary (Last 24 hours) at 1/30/2022 5846  Last data filed at 1/30/2022 0600  Gross per 24 hour   Intake 537.17 ml   Output 0 ml   Net 537.17 ml         Physical Exam     General appearance: NAD  Head: Normocephalic, without obvious abnormality, atraumatic   Mouth: ETT in mouth  Neck: Supple. Lungs: Intubated. No respiratory distress  Heart: S1, S2.  Abdomen: soft,  non-distended  Extremities: No leg edema, warm to touch   Skin: No concerning rashes noted  Neuro: Sedated.        Laboratory Data     Please see above     Diagnostic Studies   Pertinent images reviewed

## 2022-01-31 ENCOUNTER — APPOINTMENT (OUTPATIENT)
Dept: MRI IMAGING | Age: 77
DRG: 870 | End: 2022-01-31
Payer: MEDICARE

## 2022-01-31 LAB
ALBUMIN SERPL-MCNC: 3.3 G/DL (ref 3.4–5)
ALP BLD-CCNC: 77 U/L (ref 40–129)
ALT SERPL-CCNC: 142 U/L (ref 10–40)
ANION GAP SERPL CALCULATED.3IONS-SCNC: 12 MMOL/L (ref 3–16)
ANTI-XA UNFRAC HEPARIN: 0.44 IU/ML (ref 0.3–0.7)
AST SERPL-CCNC: 37 U/L (ref 15–37)
BASOPHILS ABSOLUTE: 0 K/UL (ref 0–0.2)
BASOPHILS RELATIVE PERCENT: 0.4 %
BILIRUB SERPL-MCNC: 0.3 MG/DL (ref 0–1)
BILIRUBIN DIRECT: <0.2 MG/DL (ref 0–0.3)
BILIRUBIN, INDIRECT: ABNORMAL MG/DL (ref 0–1)
BLOOD CULTURE, ROUTINE: ABNORMAL
BLOOD CULTURE, ROUTINE: ABNORMAL
BUN BLDV-MCNC: 44 MG/DL (ref 7–20)
CALCIUM SERPL-MCNC: 9.6 MG/DL (ref 8.3–10.6)
CHLORIDE BLD-SCNC: 95 MMOL/L (ref 99–110)
CO2: 28 MMOL/L (ref 21–32)
CREAT SERPL-MCNC: 7.6 MG/DL (ref 0.8–1.3)
CULTURE, BLOOD 2: ABNORMAL
EOSINOPHILS ABSOLUTE: 0.2 K/UL (ref 0–0.6)
EOSINOPHILS RELATIVE PERCENT: 2.6 %
GFR AFRICAN AMERICAN: 8
GFR NON-AFRICAN AMERICAN: 7
GLUCOSE BLD-MCNC: 104 MG/DL (ref 70–99)
GLUCOSE BLD-MCNC: 112 MG/DL (ref 70–99)
GLUCOSE BLD-MCNC: 154 MG/DL (ref 70–99)
GLUCOSE BLD-MCNC: 193 MG/DL (ref 70–99)
HCT VFR BLD CALC: 21.5 % (ref 40.5–52.5)
HEMOGLOBIN: 7.1 G/DL (ref 13.5–17.5)
LV EF: 58 %
LVEF MODALITY: NORMAL
LYMPHOCYTES ABSOLUTE: 1.4 K/UL (ref 1–5.1)
LYMPHOCYTES RELATIVE PERCENT: 18 %
MAGNESIUM: 2.1 MG/DL (ref 1.8–2.4)
MCH RBC QN AUTO: 29.1 PG (ref 26–34)
MCHC RBC AUTO-ENTMCNC: 32.8 G/DL (ref 31–36)
MCV RBC AUTO: 88.8 FL (ref 80–100)
MONOCYTES ABSOLUTE: 0.5 K/UL (ref 0–1.3)
MONOCYTES RELATIVE PERCENT: 6.8 %
NEUTROPHILS ABSOLUTE: 5.6 K/UL (ref 1.7–7.7)
NEUTROPHILS RELATIVE PERCENT: 72.2 %
ORGANISM: ABNORMAL
PDW BLD-RTO: 14 % (ref 12.4–15.4)
PERFORMED ON: ABNORMAL
PHOSPHORUS: 3.5 MG/DL (ref 2.5–4.9)
PLATELET # BLD: 135 K/UL (ref 135–450)
PMV BLD AUTO: 9 FL (ref 5–10.5)
POTASSIUM REFLEX MAGNESIUM: 4.2 MMOL/L (ref 3.5–5.1)
POTASSIUM SERPL-SCNC: 4.2 MMOL/L (ref 3.5–5.1)
RBC # BLD: 2.42 M/UL (ref 4.2–5.9)
SODIUM BLD-SCNC: 135 MMOL/L (ref 136–145)
TOTAL PROTEIN: 6.3 G/DL (ref 6.4–8.2)
VANCOMYCIN RANDOM: 22.7 UG/ML
WBC # BLD: 7.8 K/UL (ref 4–11)

## 2022-01-31 PROCEDURE — 6370000000 HC RX 637 (ALT 250 FOR IP): Performed by: STUDENT IN AN ORGANIZED HEALTH CARE EDUCATION/TRAINING PROGRAM

## 2022-01-31 PROCEDURE — 2580000003 HC RX 258: Performed by: INTERNAL MEDICINE

## 2022-01-31 PROCEDURE — 6360000002 HC RX W HCPCS: Performed by: STUDENT IN AN ORGANIZED HEALTH CARE EDUCATION/TRAINING PROGRAM

## 2022-01-31 PROCEDURE — 6360000002 HC RX W HCPCS: Performed by: INTERNAL MEDICINE

## 2022-01-31 PROCEDURE — 93306 TTE W/DOPPLER COMPLETE: CPT

## 2022-01-31 PROCEDURE — 2580000003 HC RX 258: Performed by: STUDENT IN AN ORGANIZED HEALTH CARE EDUCATION/TRAINING PROGRAM

## 2022-01-31 PROCEDURE — 80048 BASIC METABOLIC PNL TOTAL CA: CPT

## 2022-01-31 PROCEDURE — 80076 HEPATIC FUNCTION PANEL: CPT

## 2022-01-31 PROCEDURE — 95813 EEG EXTND MNTR 61-119 MIN: CPT

## 2022-01-31 PROCEDURE — 94761 N-INVAS EAR/PLS OXIMETRY MLT: CPT

## 2022-01-31 PROCEDURE — 94003 VENT MGMT INPAT SUBQ DAY: CPT

## 2022-01-31 PROCEDURE — 2500000003 HC RX 250 WO HCPCS: Performed by: STUDENT IN AN ORGANIZED HEALTH CARE EDUCATION/TRAINING PROGRAM

## 2022-01-31 PROCEDURE — 70551 MRI BRAIN STEM W/O DYE: CPT

## 2022-01-31 PROCEDURE — 36415 COLL VENOUS BLD VENIPUNCTURE: CPT

## 2022-01-31 PROCEDURE — 80202 ASSAY OF VANCOMYCIN: CPT

## 2022-01-31 PROCEDURE — 83735 ASSAY OF MAGNESIUM: CPT

## 2022-01-31 PROCEDURE — 85025 COMPLETE CBC W/AUTO DIFF WBC: CPT

## 2022-01-31 PROCEDURE — 84100 ASSAY OF PHOSPHORUS: CPT

## 2022-01-31 PROCEDURE — 99232 SBSQ HOSP IP/OBS MODERATE 35: CPT | Performed by: NURSE PRACTITIONER

## 2022-01-31 PROCEDURE — 2000000000 HC ICU R&B

## 2022-01-31 PROCEDURE — 85520 HEPARIN ASSAY: CPT

## 2022-01-31 PROCEDURE — 2700000000 HC OXYGEN THERAPY PER DAY

## 2022-01-31 PROCEDURE — 90935 HEMODIALYSIS ONE EVALUATION: CPT

## 2022-01-31 PROCEDURE — 99291 CRITICAL CARE FIRST HOUR: CPT | Performed by: INTERNAL MEDICINE

## 2022-01-31 RX ORDER — ASPIRIN 81 MG/1
81 TABLET, CHEWABLE ORAL DAILY
Status: DISCONTINUED | OUTPATIENT
Start: 2022-02-01 | End: 2022-02-02

## 2022-01-31 RX ORDER — HYDRALAZINE HYDROCHLORIDE 50 MG/1
50 TABLET, FILM COATED ORAL EVERY 8 HOURS SCHEDULED
Status: DISCONTINUED | OUTPATIENT
Start: 2022-01-31 | End: 2022-02-02

## 2022-01-31 RX ADMIN — HEPARIN SODIUM 15 UNITS/KG/HR: 5000 INJECTION INTRAVENOUS; SUBCUTANEOUS at 21:40

## 2022-01-31 RX ADMIN — SODIUM CHLORIDE, PRESERVATIVE FREE 10 ML: 5 INJECTION INTRAVENOUS at 08:06

## 2022-01-31 RX ADMIN — FERROUS SULFATE TAB 325 MG (65 MG ELEMENTAL FE) 325 MG: 325 (65 FE) TAB at 08:05

## 2022-01-31 RX ADMIN — HEPARIN SODIUM 15 UNITS/KG/HR: 5000 INJECTION INTRAVENOUS; SUBCUTANEOUS at 05:28

## 2022-01-31 RX ADMIN — FAMOTIDINE 20 MG: 10 INJECTION, SOLUTION INTRAVENOUS at 17:16

## 2022-01-31 RX ADMIN — EPOETIN ALFA-EPBX 10000 UNITS: 10000 INJECTION, SOLUTION INTRAVENOUS; SUBCUTANEOUS at 15:03

## 2022-01-31 RX ADMIN — HYDRALAZINE HYDROCHLORIDE 50 MG: 50 TABLET, FILM COATED ORAL at 17:16

## 2022-01-31 RX ADMIN — AMLODIPINE BESYLATE 10 MG: 10 TABLET ORAL at 08:05

## 2022-01-31 RX ADMIN — AMPICILLIN SODIUM AND SULBACTAM SODIUM 1500 MG: 1; .5 INJECTION, POWDER, FOR SOLUTION INTRAMUSCULAR; INTRAVENOUS at 05:50

## 2022-01-31 RX ADMIN — ASPIRIN 81 MG: 81 TABLET, COATED ORAL at 08:05

## 2022-01-31 RX ADMIN — SODIUM CHLORIDE 5 MG/HR: 9 INJECTION, SOLUTION INTRAVENOUS at 20:21

## 2022-01-31 RX ADMIN — CASTOR OIL AND BALSAM, PERU: 788; 87 OINTMENT TOPICAL at 08:06

## 2022-01-31 RX ADMIN — CASTOR OIL AND BALSAM, PERU: 788; 87 OINTMENT TOPICAL at 20:18

## 2022-01-31 RX ADMIN — HYDRALAZINE HYDROCHLORIDE 10 MG: 20 INJECTION INTRAMUSCULAR; INTRAVENOUS at 15:03

## 2022-01-31 RX ADMIN — VANCOMYCIN HYDROCHLORIDE 1250 MG: 10 INJECTION, POWDER, LYOPHILIZED, FOR SOLUTION INTRAVENOUS at 11:07

## 2022-01-31 RX ADMIN — PRAVASTATIN SODIUM 20 MG: 10 TABLET ORAL at 08:05

## 2022-01-31 RX ADMIN — HYDRALAZINE HYDROCHLORIDE 50 MG: 50 TABLET, FILM COATED ORAL at 21:35

## 2022-01-31 ASSESSMENT — PULMONARY FUNCTION TESTS
PIF_VALUE: 22
PIF_VALUE: 21
PIF_VALUE: 18
PIF_VALUE: 19
PIF_VALUE: 18
PIF_VALUE: 19
PIF_VALUE: 18
PIF_VALUE: 20
PIF_VALUE: 19
PIF_VALUE: 19
PIF_VALUE: 25
PIF_VALUE: 20
PIF_VALUE: 15
PIF_VALUE: 20
PIF_VALUE: 21
PIF_VALUE: 18
PIF_VALUE: 19
PIF_VALUE: 20
PIF_VALUE: 19
PIF_VALUE: 22
PIF_VALUE: 18
PIF_VALUE: 18
PIF_VALUE: 20
PIF_VALUE: 20
PIF_VALUE: 18
PIF_VALUE: 21
PIF_VALUE: 19
PIF_VALUE: 17
PIF_VALUE: 19
PIF_VALUE: 19
PIF_VALUE: 20
PIF_VALUE: 21
PIF_VALUE: 18
PIF_VALUE: 21
PIF_VALUE: 21
PIF_VALUE: 22
PIF_VALUE: 18
PIF_VALUE: 21
PIF_VALUE: 19
PIF_VALUE: 21
PIF_VALUE: 19
PIF_VALUE: 17
PIF_VALUE: 20
PIF_VALUE: 22
PIF_VALUE: 19
PIF_VALUE: 19
PIF_VALUE: 21
PIF_VALUE: 18
PIF_VALUE: 20
PIF_VALUE: 18

## 2022-01-31 ASSESSMENT — PAIN SCALES - GENERAL
PAINLEVEL_OUTOF10: 0

## 2022-01-31 NOTE — CARE COORDINATION
[phone]      Potential assistance Purchasing Medications:    Does Patient want to participate in local refill/ meds to beds program?:      Meds To Beds General Rules:  1. Can ONLY be done Monday- Friday between 8:30am-5pm  2. Prescription(s) must be in pharmacy by 3pm to be filled same day  3. Copy of patient's insurance/ prescription drug card and patient face sheet must be sent along with the prescription(s)  4. Cost of Rx cannot be added to hospital bill. If financial assistance is needed, please contact unit  or ;  or  CANNOT provide pharmacy voucher for patients co-pays  5. Patients can then  the prescription on their way out of the hospital at discharge, or pharmacy can deliver to the bedside if staff is available. (payment due at time of pick-up or delivery - cash, check, or card accepted)     Able to afford home medications/ co-pay costs: Yes    ADLS  Support Systems:      PT AM-PAC:   /24  OT AM-PAC:   /24    [place]: [place]  Steps: NA    Plans to RETURN to current housing: Yes  Barriers to RETURNING to current housing: NA    Dialysis  Active with HD/PD prior to admission: Yes  Nephrologist: [id number]:  [place]  Address: [address] [facility] [address] [facility]   Phone: [phone]    DISCHARGE PLAN:  Disposition: [facility] (LTC): [place] [facility] [place]  Phone: [phone]  Fax: [phone]    Transportation PLAN for discharge: EMS transportation     Factors facilitating achievement of predicted outcomes: Family support    Barriers to discharge: Not medically stable    Additional Case Management Notes: CM spoke with Nevaeh Powers at [facility]. Patient is under [address] on paid bed hold and can return with no precert, will need Covid test within 48 ours of discharge. Patient remains intubated, on heparin drip, pending echo and MRI.     The Plan for Transition of Care is related to the following treatment goals of Cardiac arrest ([facility] [I46.9]  PEA (Pulseless electrical activity) ([facility] [I46.9]    The Patient and/or patient representative Lenora Freeman and his family were provided with a choice of provider and agrees with the discharge plan No    Freedom of choice list was provided with basic dialogue that supports the patient's individualized plan of care/goals and shares the quality data associated with the providers.  No    Care Transition patient: No    Hannah Og RN  [facility] [employer]  Case Management Department  Ph: [phone]

## 2022-01-31 NOTE — PROGRESS NOTES
Clinical Pharmacy Progress Note    Vancomycin - Management by Pharmacy    Consult Date(s): 1/29  Consulting Provider(s): Dr. Neva Talbert / Plan    Blood Stream Infection - Vancomycin   Concurrent Antimicrobials: Ampicillin/sulbactam - day #3   Day of Vanc Therapy: #3   Current Dosing Method: Intermittent Dosing by Levels   Therapeutic Goal: ~15 mg/L   Current Dose / Frequency: Intermittent dosing with HD   Plan / Rationale:   o Pt has ESRD on HD (MWF). Will continue to dose vancomycin intermittently via levels at this time. o Random vancomycin level this AM was 22.7 mg/L. Will re-dose with vancomycin 1.25g IV x 1 dose after HD today. o Plan to obtain a follow-up random level on Wednesday prior to next HD session.  Will continue to monitor clinical condition and make adjustments to regimen as appropriate. Thank you for consulting Pharmacy! Isac RamosD, [facility]  Wireless: [phone]  1/31/2022 7:56 AM      Interval History: Tmax 101.4F over the past 24 hrs. Plan for MRI today. Subjective/Objective: Mr. Tamara Jay is a 67 y.o. male with a PMHx significant for ESRD on HD, T2DM, BL BKA, pAF, CAD, HTN, HDL, lives at a ECF, admitted for Cardiac arrest, and PE. Pharmacy has been consulted to dose vancomycin. Height:   Ht Readings from Last 1 Encounters:   01/29/22 5' (1.524 m)     Weight:   Wt Readings from Last 1 Encounters:   01/31/22 219 lb 5.7 oz (99.5 kg)       Current & Prior Antimicrobial Regimen(s):  Ampicillin/sulbactam (1/29-current)  Vancomycin (1/29-current)    Level(s) / Doses:    Date Random Level  Dose   1/29 --- 2g IV x 1   1/30 25.7 mg/L ---   1/31 22.7 mg/L 1.25g IV x 1   Note: Serum levels collected for AUC-based dosing may be high if collected in close proximity to the dose administered. This is not necessarily an indicator of toxicity.     Cultures & Sensitivities:    Date Site Micro Susceptibility / Result   1/28 Blood x4 Staph simulans      Strep viridans R = clinda, erythromycin, oxacillin, TCN  S = TMP/SMX, vanc    No sensitivities performed   1/29 Blood x2 Ordered      Labs / Ancillary Data:    Estimated Creatinine Clearance: 8 mL/min (A) (based on SCr of 7.6 mg/dL [facility] Recent Labs     01/29/22  0416 01/30/22  0421 01/31/22  0409 01/31/22  0410   CREATININE 4.1* 6.1* 7.6*  --    BUN 16 30* 44*  --    WBC 8.3 8.2  --  7.8       Additional Lab Values / Findings of Note:    Procalcitonin: No results for input(s): PROCAL in the last 72 hours.

## 2022-01-31 NOTE — PROGRESS NOTES
Hospital Medicine Care  Progress Note      Chief complain; Cardiac Arrest            Subjective: Intubated  Unresponsive. On HD  Fever improved. Review of Systems:     Review of Systems as mentioned above, other ros is negative. Objective:       Medications        Scheduled Meds:   vancomycin  1,250 mg IntraVENous Once    [START ON 2/1/2022] aspirin  81 mg Oral Daily    epoetin paulina-epbx  10,000 Units SubCUTAneous Once    amLODIPine  10 mg Oral Daily    ampicillin-sulbactam  1,500 mg IntraVENous Q12H    vancomycin (VANCOCIN) intermittent dosing (placeholder)   Other See Admin Instructions    ferrous sulfate  325 mg Oral Daily with breakfast    pravastatin  20 mg Oral Daily    sodium chloride flush  5-40 mL IntraVENous 2 times per day    famotidine (PEPCID) injection  20 mg IntraVENous Daily    Venelex   Topical BID     Continuous Infusions:   fentaNYL Stopped (01/29/22 1400)    sodium chloride      dextrose      heparin (PORCINE) Infusion 15 Units/kg/hr (01/31/22 0528)     PRN Meds:.perflutren lipid microspheres, hydrALAZINE, sodium chloride flush, sodium chloride, ondansetron **OR** ondansetron, acetaminophen **OR** acetaminophen, polyethylene glycol, glucose, dextrose, glucagon (rDNA), dextrose, heparin (porcine), heparin (porcine)      Allergies  No Known Allergies      Vitals:    01/31/22 0945 01/31/22 1000 01/31/22 1006 01/31/22 1156   BP: (!) 150/67 137/61 (!) 122/51    Pulse: 68 67 59 61   Resp: 17 20 24    Temp:   99 °F (37.2 °C)    TempSrc:       SpO2: 100% 100% 99% 99%   Weight:   195 lb 12.3 oz (88.8 kg)    Height:             Physical exam:         Physical Exam  Constitutional:       Appearance: He is ill-appearing. Comments: Intubated. HENT:      Head: Normocephalic and atraumatic. Cardiovascular:      Rate and Rhythm: Normal rate and regular rhythm. Pulses: Normal pulses. Heart sounds: Normal heart sounds.    Pulmonary:      Effort: Pulmonary effort is normal.      Breath sounds: Normal breath sounds. Abdominal:      General: Abdomen is flat. Palpations: Abdomen is soft. Skin:     General: Skin is warm and dry. Capillary Refill: Capillary refill takes less than 2 seconds. Neurological:      Comments: Intubated, non responsive. Labs      Recent Labs     01/29/22 0416 01/30/22 0421 01/31/22  0410   WBC 8.3 8.2 7.8   HGB 7.7* 7.3* 7.1*   HCT 24.4* 21.8* 21.5*    150 135   MCV 94.6 88.2 88.8        Recent Labs     01/29/22 0416 01/29/22 0416 01/30/22  0421 01/31/22  0409     --  136 135*   K 4.4   < > 4.0  4.0 4.2  4.2     --  98* 95*   CO2 22  --  27 28   PHOS  --   --  3.0 3.5   BUN 16  --  30* 44*   CREATININE 4.1*  --  6.1* 7.6*    < > = values in this interval not displayed. Recent Labs     01/29/22 0416 01/30/22  0421 01/31/22  0409   * 56* 37   * 220* 142*   BILIDIR <0.2 <0.2 <0.2   BILITOT 0.4 0.3 0.3   ALKPHOS 82 83 77       Recent Labs     01/30/22 0421 01/31/22  0409   MG 1.90 2.10       Radiology  CT HEAD WO CONTRAST   Final Result   1. No acute intracranial process. 2. Small remote cortical infarct is identified laterally within the left occipital lobe. This is unchanged. XR ABDOMEN (KUB) (SINGLE AP VIEW)   Final Result       1. Enteric tube tip and side-port project over the gastric body. XR CHEST PORTABLE   Final Result      Interval line placement. Persistent infiltrate at right lung base. CTA PULMONARY W CONTRAST   Final Result      Left-sided pulmonary emboli. Infiltrate in right lower lobe suggesting pneumonia. Borderline RV/LV ratio of approximately 1.02. Results were reported by telephone to the charge nurse, Kamila, at 7:11 PM on 1/27/2020         CT HEAD WO CONTRAST   Final Result   1. Atrophy and superimposed small vessel ischemic disease with remote infarct in the lateral left occipital region.  No recent infarct, hemorrhage or mass detected. XR CHEST PORTABLE   Final Result   1. Nasogastric tube coiled in the distal esophagus with gastric air distention. 2. Endotracheal tube in satisfactory position. Critical results reported to Physician: Elyssa Cobian   at 1/27/20229:25 AM on 1/27/2022 by telephone. MRI BRAIN WO CONTRAST    (Results Pending)           Assessment and Plan: Active Problems:    Cardiac arrest ([address]    Anoxic brain injury ([address]    Myoclonus    Respiratory failure ([address]  Resolved Problems:    * No resolved hospital problems. *     Cardiac arrest  CTPA showed pulmonary embolism  On heparin drip  ROSC approximately 40 minutes  Concern for hypoxic ischemic injury  Critical care neurology following  MRI per neurology    ESRD  Dialysis per nephrology    Bacteremia  On Vanc  On Unasyn per ICU team.      Prognosis appears poor, MRI pending.        Imelda Ray MD  1/31/2022

## 2022-01-31 NOTE — PROGRESS NOTES
Nephrology Note        [facility]    [email or web address]       Phone: [phone]                                                  1/31/2022 11:49 AM     Patient: Malick Caputo [id number]  [id number]  Date of Admit: 1/27/2022 LOS: 4 days      Interval History and Plan:  Patient remain intubated. FiO2 30 % with PEEP of 5. Off pressors. Getting video EEG. Lytes stable. HD done per his MWF schedule with 2 liter UF  Resume ALLA. D/W Dr Oscar Freed. OK as he is now on Anticoagulation. Monitor Hb and transfuse as needed  Avoid nephrotoxins  Monitor input, output  Monitor labs and vitals closely  Renally dose all medications      Thank you for allowing us to participate in this patient's care    In case of any question please call us at our 24 hour answering service [phone] or from 7 AM to 5 PM via Perfect [employer] or cell number    Loraine Barksdale MD  [facility]  [facility] [address]  [place], [address]  Fax: [phone]  Office: [phone]       Assessment & Plan     Renal function:  ESRD  MWF at [facility]    Electrolytes:   Lab Results   Component Value Date    CREATININE 7.6 ([place]) 01/31/2022    BUN 44 (H) 01/31/2022     (L) 01/31/2022    K 4.2 01/31/2022    K 4.2 01/31/2022    CL 95 (L) 01/31/2022    CO2 28 01/31/2022            # CKD- MBD:   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 Lab Results   Component Value Date    .6 (H) 06/17/2020    CALCIUM 9.6 01/31/2022    CAION 1.22 01/27/2022    PHOS 3.5 01/31/2022         Acid/Base status:  Stable. Volume status/BP:  BP is stable  No acute volume overload concerns. Intake/Output Summary (Last 24 hours) at 1/31/2022 1149  Last data filed at 1/31/2022 1006  Gross per 24 hour   Intake 1839.36 ml   Output 2400 ml   Net -560.64 ml         Hematology:   CKD related anemia  Goal Hgb 10-11  Monitor and transfuse as needed.    ALLA    Lab Results   Component Value Date    IRON 39 (L) 10/05/2021    TIBC 189 (L) 10/05/2021    FERRITIN 1,665.0 (H) 08/13/2020     Lab Results   Component Value Date    WBC 7.8 01/31/2022    HGB 7.1 (L) 01/31/2022    HCT 21.5 (L) 01/31/2022    MCV 88.8 01/31/2022     01/31/2022             Reason for Consult and Chief Complaint     Reason for consult: ESRD    Chief complaint:   Chief Complaint   Patient presents with    Cardiac Arrest       History of Present Illness   Sandeep Foley is a 67 y.o. with PMH significant for ESRD on HD M/W/F, DM2, bl BKA, pAF, CAD (s/p stent in LAD and RCA 2018), HTN, HLD admitted after cardiac arrest and now patient is intubated. Patient was on pressors but now weaned off. Labs stable. Review of Systems   Positive in bold or unable to assess     GEN: Fever, chills, night sweats. HEENT: Changes in vision, sore throat, rhinorrhea   CVS: Chest pain, palpitations, swelling or edema in legs  Pulmonary: Cough, hemoptysis, SOB  GI: Nausea, vomiting, diarrhea, constipation, abdominal pain  : Bladder incontinence, dysuria,hematuria. MSK: Muscle or joint or bone pains  Skin: Rashes, ulcers, skin thickness  CNS: Headache, dizziness, confusion, focal weakness, seizure. Psych: Anxiety, agitation, depression. Reviewed all 12 systems, negative except as above.      Past Medical History     Past Medical History:   Diagnosis Date    Anemia     Atrial fibrillation ([facility] 11/4/2013    CAD (coronary artery disease)     Mi, stent    Chronic kidney disease     DVT (deep venous thrombosis) (HCC)     End stage renal disease (HCC)     Gas gangrene ([facility]     Hemodialysis patient ([facility]     M-W-F    Hx of blood clots     Hyperkalemia     Hyperlipidemia 5/30/2012    Hypertension     Hyperthyroidism     Ischemic heart disease 1/59/3516    Metabolic encephalopathy     MI (myocardial infarction) ([facility] 10/2018    Type II or unspecified type diabetes mellitus without mention of complication, not stated as uncontrolled Past Surgical History     Past Surgical History:   Procedure Laterality Date    CARDIAC SURGERY      cardiac stent    FEMORAL-TIBIAL BYPASS GRAFT Right 1/9/2019    RIGHT FEMORAL POSTERIOR TIBIAL BYPASS performed by Maurilio Arroyo MD at [address] [place] [address] Right 1/4/2019    INCISION AND DRAINAGE, TRANSMETATARSAL AMPUTATION ALL ON RIGHT FOOT performed by Faustino Jimenes DPM at [address] [place] [address] Right 1/14/2019    REVISION OF TRANSMETATARSAL AMPUTATION RIGHT FOOT performed by Faustino Jimenes DPM at [address] [place] [address] Left 08/24/2019    TMA    FOOT AMPUTATION Left 8/24/2019    LEFT FOOT TRANSMETATARSAL AMPUTATION performed by Jocelyne Teague DPM at [address] [place] [address] Left 8/27/2019    REPEAT INCISION AND DRAINAGE, REVISION OF LEFT FOOT TRANSMETATARSAL AMPUTATION performed by Jocelyne Teague DPM at [address] [place] [address] Left 10/11/2019    LEFT FOOT INCISION AND DRAINAGE WITH REVISION OF TRANSMETARSAL AMPUTATION WITH WOUND VAC APPLICATION  performed by Jocelyne Teague DPM at [address] Right 1/18/2019    RIGHT BELOW THE KNEE AMPUTATION performed by Maurilio Arroyo MD at [address] Left 12/9/2019    LEFT BELOW KNEE AMPUTATION performed by Maurilio Arroyo MD at [facility] Left 2/6/2020    DEBRIDEMENT AND PLACEMENT OF WOUND VAC LEFT BELOW THE KNEE AMPUTATION SITE performed by Marden Rubinstein, MD at [place] [address] History     Family History   Problem Relation Age of Onset    Arthritis Mother          Social History     Social History     Tobacco Use    Smoking status: Never Smoker    Smokeless tobacco: Never Used   Substance Use Topics    Alcohol use: Not Currently          Past medical, family, and social histories were reviewed as previously documented. Updates were made as necessary.       Inpatient Medications and Allergies       Scheduled Meds:   vancomycin  1,250 mg IntraVENous Once    [START ON 2/1/2022] aspirin  81 mg Oral Daily    epoetin paulina-epbx  10,000 Units SubCUTAneous Once    amLODIPine  10 mg Oral Daily    ampicillin-sulbactam  1,500 mg IntraVENous Q12H    vancomycin (VANCOCIN) intermittent dosing (placeholder)   Other See Admin Instructions    ferrous sulfate  325 mg Oral Daily with breakfast    pravastatin  20 mg Oral Daily    sodium chloride flush  5-40 mL IntraVENous 2 times per day    famotidine (PEPCID) injection  20 mg IntraVENous Daily    Venelex   Topical BID       Allergies: No Known Allergies      Vital Signs     Vitals:    01/31/22 1006   BP: (!) 122/51   Pulse: 59   Resp: 24   Temp: 99 °F (37.2 °C)   SpO2: 99%         Intake/Output Summary (Last 24 hours) at 1/31/2022 1149  Last data filed at 1/31/2022 1006  Gross per 24 hour   Intake 1839.36 ml   Output 2400 ml   Net -560.64 ml         Physical Exam     General appearance: NAD  Head: Normocephalic, without obvious abnormality, atraumatic   Mouth: ETT in mouth  Neck: Supple. Lungs: Intubated. No respiratory distress  Heart: S1, S2.  Abdomen: soft,  non-distended  Extremities: No leg edema, warm to touch   Skin: No concerning rashes noted  Neuro: Sedated.        Laboratory Data     Please see above     Diagnostic Studies   Pertinent images reviewed

## 2022-01-31 NOTE — PLAN OF CARE
Problem: Nutrition  Goal: Optimal nutrition therapy  Outcome: Ongoing  Note: Nutrition Problem #1: Inadequate oral intake  Intervention: Food and/or Nutrient Delivery: Continue NPO,Start Tube Feeding  Nutritional Goals: Pt will tolerate most appropriate form of nutrition while intubated to meet >75% of nutrition needsn

## 2022-01-31 NOTE — PROCEDURES
Continuous EEG monitoring record    Patient name: Reynaldo Orantes    START: 01/30/2022 @ 10:00am   END: 01/31/2022 @ 13:50pm        Electroencephalographer: Joceline Alvarado MD PhD      CLINICAL DETAILS:  This EEG was performed on this 67 y. o.yo male admitted for Altered mental Status and concer for subclinical seizures      TECHNICAL DETAILS:  Continuous video-EEG monitoring was performed with 27 surface scalp electrodes placed according to the International 10-20 electrode placement system, using a 32-channel My Visual Brief headbox. All EEG and video information was acquired digitally, including the use of automated spike and seizure detection software to detect epileptiform activity. An event button was also available to be depressed during clinical events. 01/30/2022 22:00pm to 13:50pm on 01/31/2022  SEIZURES:  None  INTERICTAL:  None  PUSHBUTTONS:  Frequent brief bifrontal sharps, at times occurring in brief repetitive bursts. BACKGROUND:  Abundant generalized 7.5-8 Hz theta slowing of the background with frequent superimposed delta waves. EKG:  regular    01/30/2022  10:00am to 22:00pm   SEIZURES:  None  INTERICTAL:  None  PUSHBUTTONS:  Frequent brief bifrontal sharps, at times occurring in brief repetitive bursts. BACKGROUND:  Abundant generalized 7.5-8 Hz theta slowing of the background with frequent superimposed delta waves. EKG:  regular    CLINICAL INTERPRETATION:  This was an abnormal tracing for age and state due to a mild generalized slow wave abnormality indicating diffuse cerebral dysfunction which can be of multiple causes, including structural, or vascular abnormalities, toxic/metabolic conditions, hydrocephalus, or postictal conditions. Bifrontal sharps indicate a region of cortical hyperexcitability that may be associated with generalized seizures. No definite seizures were seen during this recording. Clinical correlation is advised.         Joceline Alvarado MD PhD

## 2022-01-31 NOTE — PROGRESS NOTES
Palliative Medicine Progress Note    Admit Date: 1/27/2022  Hospital Day:  Hospital Day: 5     CC: Cardiac arrest  HPI: HPI PMH of ESRD on HD MWF, DMII, BL BKA, pAF, CAD s/p stent in LAD and RCA 2018, HTN, HLD who presented with cardiac arrest from his nursing facility. Total resuscitation time was estimated at 40 minutes. cvEEG has been negative for seizure. MRI is planned for today. Underwent HD today. Recommendations:     Called pt's daughter/HCPOA Vielka Horacio and we got pt's daughter Kimmie Tena on the call as well. Introduced palliative care and had a voluntary discussion about advance care planning. Gave them an overview of pt's admission and current status. They were unaware that he was down for 40 minutes and understood the concern about pt's prognosis. Discussed code status, and Vielka Leonard and Kimmie Tena agreed that we should not attempt CPR in the event of an arrest. We spoke about transitioning to comfort care if there is no improvement, and hospice. They said the pt has 6 siblings who live in [facility], and they'd like to give them the opportunity to visit him. I explained the visiting restrictions and that we'll have to get special approval for more than one visitor per day. They will speak with their aunts/uncles and let me know how many family members want to visit him. 1. Goals of Care/Advanced Care planning/Code status: changed to [facility] per family's wishes. We discussed transitioning to comfort care. Daughters want to give their aunts and uncles who live out of town an opportunity to visit him. 2. Pain: no signs of pain on current sedation. 3. SOB: intubated, s/p cardiac arrest.  4. Cardiac arrest: pt arrested at his nursing facility, likely secondary to bilateral PEs, now on heparin drip. Total down time was 40 mins. Neurology is following, find him to be opening his eyes to noxious stimuli but also some myoclonic activity in his neck. MRI planned for today.   5. Disposition: TBD    Subjective:     Scheduled Meds:   vancomycin  1,250 mg IntraVENous Once    [START ON 2/1/2022] aspirin  81 mg Oral Daily    amLODIPine  10 mg Oral Daily    ampicillin-sulbactam  1,500 mg IntraVENous Q12H    vancomycin (VANCOCIN) intermittent dosing (placeholder)   Other See Admin Instructions    ferrous sulfate  325 mg Oral Daily with breakfast    pravastatin  20 mg Oral Daily    sodium chloride flush  5-40 mL IntraVENous 2 times per day    famotidine (PEPCID) injection  20 mg IntraVENous Daily    Venelex   Topical BID       Continuous Infusions:   fentaNYL Stopped (01/29/22 1400)    sodium chloride      dextrose      heparin (PORCINE) Infusion 15 Units/kg/hr (01/31/22 0528)       PRN Meds:perflutren lipid microspheres, hydrALAZINE, sodium chloride flush, sodium chloride, ondansetron **OR** ondansetron, acetaminophen **OR** acetaminophen, polyethylene glycol, glucose, dextrose, glucagon (rDNA), dextrose, heparin (porcine), heparin (porcine)    Review of Systems -   Review of Systems: Unable to obtain due to intubation/sedation/mental status.     Performance status 10%    Objective:     Patient Vitals for the past 8 hrs:   BP Temp Temp src Pulse Resp SpO2 Weight   01/31/22 1006 (!) 122/51 99 °F (37.2 °C) -- 59 24 99 % 195 lb 12.3 oz (88.8 kg)   01/31/22 1000 137/61 -- -- 67 20 100 % --   01/31/22 0945 (!) 150/67 -- -- 68 17 100 % --   01/31/22 0930 132/64 -- -- 72 17 100 % --   01/31/22 0915 128/63 -- -- 71 16 100 % --   01/31/22 0900 (!) 140/61 -- -- 76 19 100 % --   01/31/22 0852 -- -- -- 78 16 99 % --   01/31/22 0845 (!) 150/69 -- -- 73 13 99 % --   01/31/22 0830 (!) 148/69 -- -- 70 18 99 % --   01/31/22 0815 (!) 166/71 -- -- 74 15 99 % --   01/31/22 0800 (!) 165/74 98.7 °F (37.1 °C) Axillary 79 17 99 % --   01/31/22 0745 (!) 165/74 -- -- 80 18 99 % --   01/31/22 0730 (!) 178/74 -- -- 76 18 100 % --   01/31/22 0715 (!) 178/91 -- -- 77 21 99 % --   01/31/22 0700 (!) 177/82 -- -- 77 17 100 % -- 01/31/22 0636 (!) 158/73 99.5 °F (37.5 °C) -- 72 19 100 % 219 lb 5.7 oz (99.5 kg)   01/31/22 0600 (!) 157/66 -- -- 66 16 99 % --   01/31/22 0500 (!) 152/64 -- -- 65 14 98 % 210 lb 8.6 oz (95.5 kg)   01/31/22 0401 -- -- -- 64 13 98 % --   01/31/22 0400 (!) 147/64 98.7 °F (37.1 °C) Axillary 64 14 98 % --     I/O last 3 completed shifts: In: 1976.5 [I.V.:762.7; NG/GT:1112; IV Piggyback:101.9]  Out: 0   I/O this shift: In: 400   Out: 2400     Physical Exam  Constitutional:       General: He is not in acute distress. HENT:      Head: Normocephalic and atraumatic. Cardiovascular:      Rate and Rhythm: Normal rate and regular rhythm. Pulmonary:      Breath sounds: No wheezing or rales. Abdominal:      General: Bowel sounds are normal.      Palpations: Abdomen is soft. Musculoskeletal:         General: No swelling. Comments: Bilateral BKAs   Skin:     General: Skin is warm and dry. Neurological:      Comments: Sedated     WBC/Hgb/Hct/Plts:  7.8/7.1/21.5/135 (01/31 0410)           Assessment:     Active Problems:    Cardiac arrest ([address]    Anoxic brain injury ([address]    Myoclonus    Respiratory failure ([address]  Resolved Problems:    * No resolved hospital problems. *    Pt [phone], Family [phone]  Time spent with patient and/or family: 13  Time reviewing records: 5  Time communicating with providers: 5    A total of 25 minutes spent with the patient and family on unit greater than 50% face to face time in counseling regarding palliative care and goals of care for the patient.      Shaun Benavidez NP  [address]

## 2022-01-31 NOTE — PROGRESS NOTES
Comprehensive Nutrition Assessment    RECOMMENDATIONS:   1. PO Diet: Continue NPO while intubated. 2. Continue EN formula Peptide-Based  Vital AF 1.2 @ goal rate 40 ml/hr  3. Maintenance water flush of 30 ml every 4 hours or defer to MD  4. Recommend protein modular QD via feeding tube. Flush with 30 ml water before/after administration. Do not mix with tube feeding formula. NUTRITION ASSESSMENT:    Nutritional summary & status: Pt remains intubated, no sedation. Noted per Palliative Care pts code changed to DNR with likely transition to comfort care but first giving family an opportunity to visit him. TF running at goal to meet 100% of nutrition needs.      Admission/PMH: Cardiac arrest. PMH: ESRD on HD, HTN, CAD, HL, DM2     MALNUTRITION ASSESSMENT  Context of Malnutrition: Acute Illness   Malnutrition Status: No malnutrition  Findings of the 6 clinical characteristics of malnutrition (Minimum of 2 out of 6 clinical characteristics is required to make the diagnosis of moderate or severe Protein Calorie Malnutrition based on AND/ASPEN Guidelines):  Energy Intake: Less than/equal to 50% of estimated energy requirements    Energy Intake Time: 2 days -prior to start of TF  Weight Loss %: Unable to assess  AARON nutritional vs fluid losses  Weight loss Time: Unable to assess     NUTRITION DIAGNOSIS   Inadequate oral intake related to impaired respiratory function as evidenced by NPO or clear liquid status due to medical condition,intubation,nutrition support - enteral nutrition    NUTRITION INTERVENTION  Food and/or Nutrient Delivery:  Continue NPO,Start Tube Feeding  Nutrition Education/Counseling:  No recommendation at this time   Goals:  Pt will tolerate most appropriate form of nutrition while intubated to meet >75% of nutrition needsn       Nutrition Monitoring and Evaluation:   Food/Nutrient Intake Outcomes:  Enteral Nutrition Intake/Tolerance  Physical Signs/Symptoms Outcomes:  Biochemical Data,GI Status,Weight     OBJECTIVE DATA: Significant to nutrition assessment  · Nutrition-Focused Physical Findings: lbm 1/30  · Labs: Reviewed; Na 135  · Meds: Reviewed; iron  · Wounds: None       CURRENT NUTRITION THERAPIES  Diet NPO  PO Intake: NPO   PO Supplement Intake:NPO  Additional Sources of Calories/IVF:n/a     ANTHROPOMETRICS  Current Height: 5' (152.4 cm)  Current Weight: 195 lb 12.3 oz (88.8 kg)    Admission weight: 220 lb (99.8 kg)  Ideal Body Weight (IBW): 106 lbs  (48 kg)    Usual Bodyweight 227 lb (103 kg)   Weight Changes AARON fluid vs nutritional losses       BMI: 38.3    Wt Readings from Last 50 Encounters:   01/28/22 197 lb 12 oz (89.7 kg)   01/19/22 220 lb (99.8 kg)   12/03/21 223 lb 15.8 oz (101.6 kg)   10/06/21 227 lb 15.3 oz (103.4 kg)   03/09/21 233 lb 3.2 oz (105.8 kg)     COMPARATIVE STANDARDS  Energy (kcal):  990-1260 (11-14); Weight Used for Energy Requirements:  Current (90)     Protein (g):   (2.0-2.2); Weight Used for Protein Requirements:  Ideal (48 kg)        Fluid (ml/day):  1500 ml; Method Used for Fluid Requirements:  Standard Renal      The patient will still be monitored per nutrition standards of care. Consult dietitian if nutrition interventions essential to patient care is needed.      Daniela Wilson, [address] [place] [address], [address] [place] [address] Drive:  [phone]  Office:  [phone]

## 2022-01-31 NOTE — PROGRESS NOTES
CONTINUOUS EEG    Name:  Adriana Oneil  Medical Record Number:  [id number]  Age: 67 y.o. Gender: male  : 1945  Today's Date:  2022  Room:  [address] [place] [address]  Vital Signs   BP (!) 160/68   Pulse 55   Temp 99 °F (37.2 °C)   Resp 12   Ht 5' (1.524 m)   Wt 195 lb 12.3 oz (88.8 kg)   SpO2 100%   BMI 38.23 kg/m²           Continuous EEG Testing Start Time:      Continuous EEG Testing End Time:   13:21PM    Comments: Maulik Grier pt's test has completed. [facility] contacted via team viewer. Plan of Care: Pt was in CT, when EEG machine was removed from the room.     Electronically signed by Dee Steven on 2022 at 1:55 PM

## 2022-01-31 NOTE — PROGRESS NOTES
NEUROLOGY / NEUROCRITICAL CARE PROGRESS NOTE       Patient Name: Andrea Coles YOB: 1945   Sex: Male Age: 67 yrs     CC / Reason for Consult: post-cardiac arrest    Interval Hx / Changes over last 24 hours:   MRI planned for today  Hemodialysis planned for today  No major improvement in exam  No seizures on cEEG    ROS: unobtainable due to encephalopathy    HISTORY   Admission HPI:   Mr. Rocio Gallegos is a 67year old gentleman found down 1/27/22. Initial rhythm was PEA, and he underwent 5 rounds of CPR prior to ROSC. Estimated resuscitation time 40 minutes. CT-PA showed PE.     1/28: Hemodialysis at [facility]. 1/29: Central line placed. On heparin gtt for PE. Low dose fentanyl stopped. PMH Past Medical History:   Diagnosis Date    Anemia     Atrial fibrillation ([address] 11/4/2013    CAD (coronary artery disease)     Mi, stent    Chronic kidney disease     [facility]     End stage renal disease ([facility])     Gas gangrene ([address]     Hemodialysis patient ([address]     M-W-F    Hx of blood clots     Hyperkalemia     Hyperlipidemia 5/30/2012    Hypertension     Hyperthyroidism     Ischemic heart disease 5/72/7980    Metabolic encephalopathy     MI (myocardial infarction) ([address] 10/2018    Type II or unspecified type diabetes mellitus without mention of complication, not stated as uncontrolled       Allergies No Known Allergies   Diet Diet NPO  ADULT TUBE FEEDING; Orogastric; Peptide Based; Continuous; 10; Yes; 10; Q 4 hours; 40; 30; Q 4 hours; Protein; Protein Modular QD; Flush with 30 ml water before/after administration. Do not mix with tube feeding formula.    Isolation No active isolations     LABS   Metabolic Panel Recent Labs     01/29/22  0416 01/29/22  0416 01/30/22  0421 01/31/22  0409     --  136 135*   K 4.4   < > 4.0  4.0 4.2  4.2     --  98* 95*   CO2 22  --  27 28   BUN 16  --  30* 44*   CREATININE 4.1*  --  6.1* 7.6*   GLUCOSE 87  --  96 104* CALCIUM 9.4  --  9.8 9.6   LABALBU 2.7*  --  3.3* 3.3*   PHOS  --   --  3.0 3.5   MG  --   --  1.90 2.10   ALKPHOS 82  --  83 77   *  --  220* 142*   *  --  56* 37    < > = values in this interval not displayed. CBC / Coags Recent Labs     22  0416 22  0421 22  0410   WBC 8.3 8.2 7.8   RBC 2.58* 2.47* 2.42*   HGB 7.7* 7.3* 7.1*   HCT 24.4* 21.8* 21.5*    150 135      Other Blood culture #1 gram + cocci   Blood culture #2 gram + cocci  Anti Xa 0.44       CURRENT SCHEDULED MEDICATIONS   Inpatient Medications     vancomycin, 1,250 mg, IntraVENous, Once    amLODIPine, 10 mg, Oral, Daily    ampicillin-sulbactam, 1,500 mg, IntraVENous, Q12H    vancomycin (VANCOCIN) intermittent dosing (placeholder), , Other, See Admin Instructions    aspirin, 81 mg, Oral, Daily    ferrous sulfate, 325 mg, Oral, Daily with breakfast    pravastatin, 20 mg, Oral, Daily    sodium chloride flush, 5-40 mL, IntraVENous, 2 times per day    famotidine (PEPCID) injection, 20 mg, IntraVENous, Daily    Venelex, , Topical, BID   Infusions    fentaNYL Stopped (22 1400)    sodium chloride      sodium chloride      dextrose      heparin (PORCINE) Infusion 15 Units/kg/hr (22 0528)      Antibiotics   Recent Abx Admin                   ampicillin-sulbactam (UNASYN) 1500 mg IVPB minibag (mg) 1,500 mg New Bag 22 0550     1,500 mg New Bag 22 1758                  DIAGNOSTICS   IMAGES:  Images personally reviewed and agree w/ radiology interpretation. Head CT w/o Contrast 22  Atrophy and superimposed small vessel ischemic disease with remote infarct in the lateral left occipital region. No recent infarct, hemorrhage or mass detected.     CT-PA: left sided pulmonary emboli    CvEE2022 21:00pm to 10:00am on 2022  SEIZURES:  None  INTERICTAL:  None  PUSHBUTTONS:  None  BACKGROUND:  Abundant generalized 7.5-8 Hz theta slowing of the background with frequent superimposed EMG artifact that at times obscures the background. EKG:  regular  01/29/2022 17:23pm - 21:00pm   SEIZURES:  None  INTERICTAL:  None  PUSHBUTTONS:  None  BACKGROUND:  Abundant generalized 7.5-8 Hz theta slowing of the background with frequent superimposed EMG artifact that at times obscures the background. EKG:  regular  CLINICAL INTERPRETATION:  This was an abnormal tracing for age and state due to a mild generalized slow wave abnormality indicating diffuse cerebral dysfunction which can be of multiple causes, including structural, or vascular abnormalities, toxic/metabolic conditions, hydrocephalus, or postictal conditions. No epileptiform discharge, focal slowing, or seizures were seen during this recording. Clinical correlation is advised. 01/30/2022 22:00pm to 09:00am on 01/31/2022  SEIZURES:  None  INTERICTAL:  None  PUSHBUTTONS:  Frequent brief bifrontal sharps, at times occurring in brief repetitive bursts. BACKGROUND:  Abundant generalized 7.5-8 Hz theta slowing of the background with frequent superimposed delta waves. EKG:  regular  01/30/2022  10:00am to 22:00pm   SEIZURES:  None  INTERICTAL:  None  PUSHBUTTONS:  Frequent brief bifrontal sharps, at times occurring in brief repetitive bursts. BACKGROUND:  Abundant generalized 7.5-8 Hz theta slowing of the background with frequent superimposed delta waves. EKG:  regular  CLINICAL INTERPRETATION:  This was an abnormal tracing for age and state due to a mild generalized slow wave abnormality indicating diffuse cerebral dysfunction which can be of multiple causes, including structural, or vascular abnormalities, toxic/metabolic conditions, hydrocephalus, or postictal conditions. Bifrontal sharps indicate a region of cortical hyperexcitability that may be associated with generalized seizures. No definite seizures were seen during this recording. Clinical correlation is advised.       PHYSICAL EXAMINATION     PHYSICAL EXAM:  Vitals:    01/31/22 0600 01/31/22 0636 01/31/22 0800 01/31/22 0852   BP: (!) 157/66 (!) 158/73 (!) 165/74    Pulse: 66 72 79 78   Resp: 16 19 17 16   Temp:  99.5 °F (37.5 °C) 98.7 °F (37.1 °C)    TempSrc:   Axillary    SpO2: 99% 100% 99% 99%   Weight:  219 lb 5.7 oz (99.5 kg)     Height:           Exam  Constitutional    Vital signs: BP, HR, and RR reviewed   General depressed mental status, no distress, well-nourished  Eyes: fundoscopic exam difficult given inability to cooperate  Cardiovascular: pulses symmetric in all 4 extremities. No peripheral edema. Psychiatric: limited exam given encephalopathy but not agitated and no signs of hallucinations  Neurologic  Mental status: limited exam given encephalopathy  orientation unable to assess given comatose state  Attention poor  Language intubated; makes no attempt to speak   Comprehension not following any commands  CN2: Visual Fields: no consistent blink to threat  CN 3,4,6:  extraocular muscles intact (randomly looking around room); pupils equal round and reactive  CN5: facial sensation limited exam given encephalopathy  CN7:face symmetric but exam limited by ET tube  CN8: hearing limited exam given encephalopathy  Strength: 0/5 in upper limbs; 3/5 in both lower limbs to pain  Sensory: no response to trap pinch or upper limb skin pinch; withdraws from pain in BLE. Tone: normal in all 4 extremities  Gait: limited exam given encephalopathy and intubated with ventilator. ASSESSMENT & RECOMMENDATIONS     Mr. Gardenia Rodriguez is a 67year old who presented with encephalopathy following 40 minute cardiac arrest and pulmonary embolism. Exam notable for spontaneous eye opening but he has yet to follow commands or interact significantly with his surroundings. Recommendations   - d/c cEEG  - MRI brain today (ordered)  - Aggressively treat fevers  - Avoid sedating medications  - Treatment of underlying metabolic/infectious confounders as you are.  Undergoing dialysis at the time of my visit  - Will consult palliative care to guide goals of care discussions    ADIA Garcia - CNP   Neurology & Neurocritical Care   Neurology Line: [phone]  [facility]: [facility] NPs  1/31/2022 9:39 AM

## 2022-01-31 NOTE — PROGRESS NOTES
ICU Progress Note    Admit Date: 1/27/2022  Day: 4  Vent Day: None  IV Access:Peripheral  IV Fluids:None  Vasopressors:None                Antibiotics: None  Diet: Diet NPO  ADULT TUBE FEEDING; Orogastric; Peptide Based; Continuous; 10; Yes; 10; Q 4 hours; 40; 30; Q 4 hours; Protein; Protein Modular QD; Flush with 30 ml water before/after administration. Do not mix with tube feeding formula.     CC: cardiac arrest    Interval history:   - no sedation, patient unresponsive  - cEEG negative for seizure  - Plan for MRI today  - HD today      Medications:     Scheduled Meds:   amLODIPine  10 mg Oral Daily    ampicillin-sulbactam  1,500 mg IntraVENous Q12H    vancomycin (VANCOCIN) intermittent dosing (placeholder)   Other See Admin Instructions    aspirin  81 mg Oral Daily    ferrous sulfate  325 mg Oral Daily with breakfast    pravastatin  20 mg Oral Daily    sodium chloride flush  5-40 mL IntraVENous 2 times per day    famotidine (PEPCID) injection  20 mg IntraVENous Daily    Venelex   Topical BID     Continuous Infusions:   fentaNYL Stopped (01/29/22 1400)    sodium chloride      sodium chloride      dextrose      heparin (PORCINE) Infusion 15 Units/kg/hr (01/31/22 0528)     PRN Meds:perflutren lipid microspheres, hydrALAZINE, sodium chloride, sodium chloride flush, sodium chloride, ondansetron **OR** ondansetron, acetaminophen **OR** acetaminophen, polyethylene glycol, glucose, dextrose, glucagon (rDNA), dextrose, heparin (porcine), heparin (porcine)    Objective:   Vitals:   T-max:  Patient Vitals for the past 8 hrs:   BP Temp Temp src Pulse Resp SpO2 Weight   01/31/22 0600 (!) 157/66 -- -- 66 16 99 % --   01/31/22 0500 (!) 152/64 -- -- 65 14 98 % 210 lb 8.6 oz (95.5 kg)   01/31/22 0401 -- -- -- 64 13 98 % --   01/31/22 0400 (!) 147/64 98.7 °F (37.1 °C) Axillary 64 14 98 % --   01/31/22 0300 137/67 -- -- 61 14 100 % --   01/31/22 0200 (!) 147/59 -- -- 63 14 100 % --   01/31/22 0100 (!) 153/64 -- -- 75 14 100 % --   01/31/22 0001 -- -- -- 72 15 98 % --   01/31/22 0000 (!) 143/68 100 °F (37.8 °C) Axillary 70 15 100 % --   01/30/22 2300 (!) 146/67 -- -- 69 16 100 % --       Intake/Output Summary (Last 24 hours) at 1/31/2022 0648  Last data filed at 1/31/2022 8881  Gross per 24 hour   Intake 1439.36 ml   Output --   Net 1439.36 ml       Review of Systems   Unable to perform ROS: Intubated       Physical Exam  Physical Exam  Constitutional:       Appearance: He is ill-appearing. Comments: intubated   HENT:      Head: Normocephalic and atraumatic. Cardiovascular:      Rate and Rhythm: Normal rate and regular rhythm. Pulmonary:      Effort: Pulmonary effort is normal.      Comments: Mechanical breath sounds  Abdominal:      Palpations: Abdomen is soft. Tenderness: There is no abdominal tenderness. There is no guarding or rebound. Musculoskeletal:         General: No swelling. Cervical back: Neck supple. Comments: Bilateral BKA   Skin:     General: Skin is warm and dry.         LABS:    CBC:   Recent Labs     01/29/22 0416 01/30/22 0421 01/31/22 0410   WBC 8.3 8.2 7.8   HGB 7.7* 7.3* 7.1*   HCT 24.4* 21.8* 21.5*    150 135   MCV 94.6 88.2 88.8     Renal:    Recent Labs     01/29/22 0416 01/29/22 0416 01/30/22 0421 01/31/22 0409     --  136 135*   K 4.4   < > 4.0  4.0 4.2  4.2     --  98* 95*   CO2 22  --  27 28   BUN 16  --  30* 44*   CREATININE 4.1*  --  6.1* 7.6*   GLUCOSE 87  --  96 104*   CALCIUM 9.4  --  9.8 9.6   MG  --   --  1.90 2.10   PHOS  --   --  3.0 3.5   ANIONGAP 13  --  11 12    < > = values in this interval not displayed. Hepatic:   Recent Labs     01/29/22 0416 01/30/22  0421 01/31/22 0409   * 56* 37   * 220* 142*   BILITOT 0.4 0.3 0.3   BILIDIR <0.2 <0.2 <0.2   PROT 6.0* 6.3* 6.3*   LABALBU 2.7* 3.3* 3.3*   ALKPHOS 82 83 77     Troponin:   No results for input(s): TROPONINI in the last 72 hours.   BNP: No results for input(s): BNP in the last 72 hours. Lipids: No results for input(s): CHOL, HDL in the last 72 hours. Invalid input(s): LDLCALCU, TRIGLYCERIDE  ABGs:  No results for input(s): PHART, NNG4STL, PO2ART, ZXY6RGO, BEART, THGBART, B2JDHMUE, YIH5GRI in the last 72 hours. INR:   Recent Labs     01/28/22  0932   INR 1.32*     Lactate:   No results for input(s): LACTATE in the last 72 hours. Cultures:  -----------------------------------------------------------------  RAD:   CT HEAD WO CONTRAST   Final Result   1. No acute intracranial process. 2. Small remote cortical infarct is identified laterally within the left occipital lobe. This is unchanged. XR ABDOMEN (KUB) (SINGLE AP VIEW)   Final Result       1. Enteric tube tip and side-port project over the gastric body. XR CHEST PORTABLE   Final Result      Interval line placement. Persistent infiltrate at right lung base. CTA PULMONARY W CONTRAST   Final Result      Left-sided pulmonary emboli. Infiltrate in right lower lobe suggesting pneumonia. Borderline RV/LV ratio of approximately 1.02. Results were reported by telephone to the charge nurse, Kamila, at 7:11 PM on 1/27/2020         CT HEAD WO CONTRAST   Final Result   1. Atrophy and superimposed small vessel ischemic disease with remote infarct in the lateral left occipital region. No recent infarct, hemorrhage or mass detected. XR CHEST PORTABLE   Final Result   1. Nasogastric tube coiled in the distal esophagus with gastric air distention. 2. Endotracheal tube in satisfactory position. Critical results reported to Physician: Sloane Levin   at 1/27/20229:25 AM on 1/27/2022 by telephone.       MRI BRAIN WO CONTRAST    (Results Pending)         Assessment/Plan:   Mike Webster is a 67 y.o. male PMH ESRD on HD, DM2, bl BKA, pAF, CAD (s/p stent in LAD and RCA 2018), HTN, HLD who presented from [facility] after cardiac arrest.      S/p Cardiac Arrest  Possible anoxic brain injury  - suspected down for 20 minutes. Initial rhythm PEA. Received total of 40 minutes of resuscitation with 5 rounds of CPR and epi  - intubated 1/27  - off pressors and sedation  - 2/2 PE  - Neurology following, cvEEG negative for seizure. Plan for MRI today  - CT head no intracranial process    PE  - heparin gtt    Bacteremia  Blood cx x2 grew staph and strep  - 1 dose vanc  - cont on unasyn     Acute respiratory failure 2/2 cardiac arrest  - s/p intubation in ED     ESRD on HD  - gets HD M/W/F and does not make urine  - nephro consulted  - strict I/O, daily weights     Lactic acidosis, resolved  - 2/2 hypoperfusion from cardiac arrest    pAF/HTN- holding home nifedipine, hydralazine, prazosin, and carvedilol  CAD s/p LAD and RCA stent in 2018, continue home ASA and statin  HLD- home statin  Anemia of Chronic Disease- epo three times per week  Depression- holding home mirtazapine  DM2- hypoglycemia, POCT     Code Status:Prior  FEN: NPO  PPX:  subq heparin  DISPO: ICU       Nidia Kelly DO, PGY-1  01/31/22  6:48 AM     Patient seen, examined and discussed with the resident and I agree with the assessment and plan. Briefly, this is a 67 y.o. male chronically ill man with a PEA arrest 2/2 massive PE with prolonged time to ROSC and likely anoxic brain injury. Vent Mode: AC/PRVC Rate Set: 14 bmp/Vt Ordered: 500 mL/ /FiO2 : 30 %  PEEP 5  No results for input(s): PHART, WWA1IMF, PO2ART in the last 72 hours. Significant down time, off sedation for 3 days but remains unresponsive. MRI to confirm anoxia. Code status changed to DNR-CCA. Exam consistent with anoxia. Critical care time spent reviewing labs/films, examining patient, collaborating with other physicians but excluding procedures for life threatening organ failure is 31 minutes.       Noemi Bryant MD

## 2022-02-01 LAB
ABO/RH: NORMAL
ALBUMIN SERPL-MCNC: 3 G/DL (ref 3.4–5)
ALP BLD-CCNC: 78 U/L (ref 40–129)
ALT SERPL-CCNC: 99 U/L (ref 10–40)
ANION GAP SERPL CALCULATED.3IONS-SCNC: 9 MMOL/L (ref 3–16)
ANTI-XA UNFRAC HEPARIN: 0.45 IU/ML (ref 0.3–0.7)
ANTIBODY SCREEN: NORMAL
AST SERPL-CCNC: 43 U/L (ref 15–37)
BASOPHILS ABSOLUTE: 0 K/UL (ref 0–0.2)
BASOPHILS RELATIVE PERCENT: 0.1 %
BILIRUB SERPL-MCNC: 0.3 MG/DL (ref 0–1)
BILIRUBIN DIRECT: <0.2 MG/DL (ref 0–0.3)
BILIRUBIN, INDIRECT: ABNORMAL MG/DL (ref 0–1)
BLOOD BANK DISPENSE STATUS: NORMAL
BLOOD BANK DISPENSE STATUS: NORMAL
BLOOD BANK PRODUCT CODE: NORMAL
BLOOD BANK PRODUCT CODE: NORMAL
BPU ID: NORMAL
BPU ID: NORMAL
BUN BLDV-MCNC: 28 MG/DL (ref 7–20)
CALCIUM SERPL-MCNC: 9.4 MG/DL (ref 8.3–10.6)
CHLORIDE BLD-SCNC: 96 MMOL/L (ref 99–110)
CO2: 29 MMOL/L (ref 21–32)
CREAT SERPL-MCNC: 4.6 MG/DL (ref 0.8–1.3)
DESCRIPTION BLOOD BANK: NORMAL
DESCRIPTION BLOOD BANK: NORMAL
EOSINOPHILS ABSOLUTE: 0 K/UL (ref 0–0.6)
EOSINOPHILS RELATIVE PERCENT: 0.5 %
GFR AFRICAN AMERICAN: 15
GFR NON-AFRICAN AMERICAN: 12
GLUCOSE BLD-MCNC: 168 MG/DL (ref 70–99)
GLUCOSE BLD-MCNC: 193 MG/DL (ref 70–99)
GLUCOSE BLD-MCNC: 264 MG/DL (ref 70–99)
GLUCOSE BLD-MCNC: 276 MG/DL (ref 70–99)
GLUCOSE BLD-MCNC: 278 MG/DL (ref 70–99)
HCT VFR BLD CALC: 18.4 % (ref 40.5–52.5)
HCT VFR BLD CALC: 18.9 % (ref 40.5–52.5)
HCT VFR BLD CALC: 19.5 % (ref 40.5–52.5)
HCT VFR BLD CALC: 22.9 % (ref 40.5–52.5)
HEMOGLOBIN: 6 G/DL (ref 13.5–17.5)
HEMOGLOBIN: 6.1 G/DL (ref 13.5–17.5)
HEMOGLOBIN: 6.5 G/DL (ref 13.5–17.5)
HEMOGLOBIN: 7.6 G/DL (ref 13.5–17.5)
LYMPHOCYTES ABSOLUTE: 1 K/UL (ref 1–5.1)
LYMPHOCYTES RELATIVE PERCENT: 12 %
MAGNESIUM: 1.8 MG/DL (ref 1.8–2.4)
MCH RBC QN AUTO: 29.3 PG (ref 26–34)
MCHC RBC AUTO-ENTMCNC: 33.5 G/DL (ref 31–36)
MCV RBC AUTO: 87.4 FL (ref 80–100)
MONOCYTES ABSOLUTE: 0.7 K/UL (ref 0–1.3)
MONOCYTES RELATIVE PERCENT: 7.6 %
NEUTROPHILS ABSOLUTE: 6.9 K/UL (ref 1.7–7.7)
NEUTROPHILS RELATIVE PERCENT: 79.8 %
PDW BLD-RTO: 13.8 % (ref 12.4–15.4)
PERFORMED ON: ABNORMAL
PHOSPHORUS: 2.9 MG/DL (ref 2.5–4.9)
PLATELET # BLD: 144 K/UL (ref 135–450)
PMV BLD AUTO: 8.8 FL (ref 5–10.5)
POTASSIUM REFLEX MAGNESIUM: 4 MMOL/L (ref 3.5–5.1)
POTASSIUM SERPL-SCNC: 4 MMOL/L (ref 3.5–5.1)
RBC # BLD: 2.23 M/UL (ref 4.2–5.9)
SODIUM BLD-SCNC: 134 MMOL/L (ref 136–145)
TOTAL PROTEIN: 6.3 G/DL (ref 6.4–8.2)
WBC # BLD: 8.7 K/UL (ref 4–11)

## 2022-02-01 PROCEDURE — 86900 BLOOD TYPING SEROLOGIC ABO: CPT

## 2022-02-01 PROCEDURE — 2500000003 HC RX 250 WO HCPCS: Performed by: STUDENT IN AN ORGANIZED HEALTH CARE EDUCATION/TRAINING PROGRAM

## 2022-02-01 PROCEDURE — 85520 HEPARIN ASSAY: CPT

## 2022-02-01 PROCEDURE — 94003 VENT MGMT INPAT SUBQ DAY: CPT

## 2022-02-01 PROCEDURE — 2000000000 HC ICU R&B

## 2022-02-01 PROCEDURE — 6370000000 HC RX 637 (ALT 250 FOR IP): Performed by: STUDENT IN AN ORGANIZED HEALTH CARE EDUCATION/TRAINING PROGRAM

## 2022-02-01 PROCEDURE — 85025 COMPLETE CBC W/AUTO DIFF WBC: CPT

## 2022-02-01 PROCEDURE — P9016 RBC LEUKOCYTES REDUCED: HCPCS

## 2022-02-01 PROCEDURE — 99291 CRITICAL CARE FIRST HOUR: CPT | Performed by: INTERNAL MEDICINE

## 2022-02-01 PROCEDURE — 84100 ASSAY OF PHOSPHORUS: CPT

## 2022-02-01 PROCEDURE — 80076 HEPATIC FUNCTION PANEL: CPT

## 2022-02-01 PROCEDURE — 94761 N-INVAS EAR/PLS OXIMETRY MLT: CPT

## 2022-02-01 PROCEDURE — 85014 HEMATOCRIT: CPT

## 2022-02-01 PROCEDURE — 6360000002 HC RX W HCPCS: Performed by: STUDENT IN AN ORGANIZED HEALTH CARE EDUCATION/TRAINING PROGRAM

## 2022-02-01 PROCEDURE — 85018 HEMOGLOBIN: CPT

## 2022-02-01 PROCEDURE — 83735 ASSAY OF MAGNESIUM: CPT

## 2022-02-01 PROCEDURE — 2580000003 HC RX 258: Performed by: STUDENT IN AN ORGANIZED HEALTH CARE EDUCATION/TRAINING PROGRAM

## 2022-02-01 PROCEDURE — 86850 RBC ANTIBODY SCREEN: CPT

## 2022-02-01 PROCEDURE — 2700000000 HC OXYGEN THERAPY PER DAY

## 2022-02-01 PROCEDURE — 80048 BASIC METABOLIC PNL TOTAL CA: CPT

## 2022-02-01 PROCEDURE — 86923 COMPATIBILITY TEST ELECTRIC: CPT

## 2022-02-01 PROCEDURE — 99232 SBSQ HOSP IP/OBS MODERATE 35: CPT | Performed by: NURSE PRACTITIONER

## 2022-02-01 PROCEDURE — 86901 BLOOD TYPING SEROLOGIC RH(D): CPT

## 2022-02-01 PROCEDURE — 36415 COLL VENOUS BLD VENIPUNCTURE: CPT

## 2022-02-01 RX ORDER — MAGNESIUM SULFATE IN WATER 40 MG/ML
2000 INJECTION, SOLUTION INTRAVENOUS ONCE
Status: COMPLETED | OUTPATIENT
Start: 2022-02-01 | End: 2022-02-01

## 2022-02-01 RX ORDER — INSULIN LISPRO 100 [IU]/ML
0-6 INJECTION, SOLUTION INTRAVENOUS; SUBCUTANEOUS 3 TIMES DAILY
Status: DISCONTINUED | OUTPATIENT
Start: 2022-02-01 | End: 2022-02-02

## 2022-02-01 RX ORDER — SODIUM CHLORIDE 9 MG/ML
INJECTION, SOLUTION INTRAVENOUS PRN
Status: DISCONTINUED | OUTPATIENT
Start: 2022-02-01 | End: 2022-02-02

## 2022-02-01 RX ADMIN — CASTOR OIL AND BALSAM, PERU: 788; 87 OINTMENT TOPICAL at 21:11

## 2022-02-01 RX ADMIN — PRAVASTATIN SODIUM 20 MG: 10 TABLET ORAL at 09:33

## 2022-02-01 RX ADMIN — MAGNESIUM SULFATE HEPTAHYDRATE 2000 MG: 2 INJECTION, SOLUTION INTRAVENOUS at 09:36

## 2022-02-01 RX ADMIN — FAMOTIDINE 20 MG: 10 INJECTION, SOLUTION INTRAVENOUS at 15:32

## 2022-02-01 RX ADMIN — HYDRALAZINE HYDROCHLORIDE 50 MG: 50 TABLET, FILM COATED ORAL at 06:29

## 2022-02-01 RX ADMIN — CASTOR OIL AND BALSAM, PERU: 788; 87 OINTMENT TOPICAL at 09:33

## 2022-02-01 RX ADMIN — SODIUM CHLORIDE 5 MG/HR: 9 INJECTION, SOLUTION INTRAVENOUS at 01:11

## 2022-02-01 RX ADMIN — FERROUS SULFATE TAB 325 MG (65 MG ELEMENTAL FE) 325 MG: 325 (65 FE) TAB at 06:29

## 2022-02-01 RX ADMIN — INSULIN LISPRO 3 UNITS: 100 INJECTION, SOLUTION INTRAVENOUS; SUBCUTANEOUS at 21:35

## 2022-02-01 RX ADMIN — ASPIRIN 81 MG: 81 TABLET, CHEWABLE ORAL at 09:33

## 2022-02-01 ASSESSMENT — PULMONARY FUNCTION TESTS
PIF_VALUE: 18
PIF_VALUE: 19
PIF_VALUE: 20
PIF_VALUE: 16
PIF_VALUE: 18
PIF_VALUE: 19
PIF_VALUE: 22
PIF_VALUE: 24
PIF_VALUE: 17
PIF_VALUE: 20
PIF_VALUE: 20
PIF_VALUE: 19
PIF_VALUE: 18
PIF_VALUE: 33
PIF_VALUE: 17
PIF_VALUE: 18
PIF_VALUE: 20
PIF_VALUE: 17
PIF_VALUE: 20
PIF_VALUE: 18
PIF_VALUE: 18
PIF_VALUE: 19
PIF_VALUE: 19
PIF_VALUE: 17
PIF_VALUE: 22
PIF_VALUE: 18
PIF_VALUE: 21
PIF_VALUE: 19
PIF_VALUE: 18
PIF_VALUE: 22
PIF_VALUE: 18
PIF_VALUE: 20
PIF_VALUE: 20
PIF_VALUE: 17
PIF_VALUE: 20
PIF_VALUE: 19
PIF_VALUE: 20
PIF_VALUE: 18
PIF_VALUE: 17
PIF_VALUE: 17
PIF_VALUE: 19
PIF_VALUE: 17
PIF_VALUE: 19
PIF_VALUE: 15
PIF_VALUE: 18
PIF_VALUE: 19
PIF_VALUE: 20
PIF_VALUE: 19
PIF_VALUE: 18
PIF_VALUE: 18
PIF_VALUE: 21

## 2022-02-01 ASSESSMENT — PAIN SCALES - GENERAL
PAINLEVEL_OUTOF10: 0

## 2022-02-01 NOTE — PROGRESS NOTES
Effort: Pulmonary effort is normal.      Breath sounds: Normal breath sounds. Abdominal:      General: Abdomen is flat. Palpations: Abdomen is soft. Skin:     General: Skin is warm and dry. Capillary Refill: Capillary refill takes less than 2 seconds. Neurological:      Comments: Intubated, non responsive. Labs      Recent Labs     01/30/22 0421 01/30/22  0421 01/31/22  0410 01/31/22  0410 02/01/22  0544 02/01/22  1345 02/01/22  1514   WBC 8.2  --  7.8  --  8.7  --   --    HGB 7.3*   < > 7.1*   < > 6.5* 6.1* 6.0*   HCT 21.8*   < > 21.5*   < > 19.5* 18.9* 18.4*     --  135  --  144  --   --    MCV 88.2  --  88.8  --  87.4  --   --     < > = values in this interval not displayed. Recent Labs     01/30/22 0421 01/30/22 0421 01/31/22 0409 02/01/22  0544     --  135* 134*   K 4.0  4.0   < > 4.2  4.2 4.0  4.0   CL 98*  --  95* 96*   CO2 27  --  28 29   PHOS 3.0  --  3.5 2.9   BUN 30*  --  44* 28*   CREATININE 6.1*  --  7.6* 4.6*    < > = values in this interval not displayed. Recent Labs     01/30/22 0421 01/31/22 0409 02/01/22  0544   AST 56* 37 43*   * 142* 99*   BILIDIR <0.2 <0.2 <0.2   BILITOT 0.3 0.3 0.3   ALKPHOS 83 77 78       Recent Labs     01/30/22 0421 01/31/22 0409 02/01/22  0544   MG 1.90 2.10 1.80       Radiology  MRI BRAIN WO CONTRAST   Final Result      1. Bilateral areas of cerebral cortical diffusion restriction compatible with  hypoxic-ischemic encephalopathy and history of anoxic brain injury   2. Subarachnoid hemorrhage involving the left sylvian fissure   3. Mild to moderate atrophy and chronic small vessel ischemia   4. Chronic hemorrhagic staining involving area of chronic cortical infarction at the left lateral occipitotemporal lobe               CT HEAD WO CONTRAST   Final Result   1. No acute intracranial process. 2. Small remote cortical infarct is identified laterally within the left occipital lobe.  This is unchanged. XR ABDOMEN (KUB) (SINGLE AP VIEW)   Final Result       1. Enteric tube tip and side-port project over the gastric body. XR CHEST PORTABLE   Final Result      Interval line placement. Persistent infiltrate at right lung base. CTA PULMONARY W CONTRAST   Final Result      Left-sided pulmonary emboli. Infiltrate in right lower lobe suggesting pneumonia. Borderline RV/LV ratio of approximately 1.02. Results were reported by telephone to the charge nurse, Kamila, at 7:11 PM on 1/27/2020         CT HEAD WO CONTRAST   Final Result   1. Atrophy and superimposed small vessel ischemic disease with remote infarct in the lateral left occipital region. No recent infarct, hemorrhage or mass detected. XR CHEST PORTABLE   Final Result   1. Nasogastric tube coiled in the distal esophagus with gastric air distention. 2. Endotracheal tube in satisfactory position. Critical results reported to Physician: Isamar Corrales   at 1/27/20229:25 AM on 1/27/2022 by telephone. Assessment and Plan: Active Problems:    Cardiac arrest ([address]    Anoxic brain injury ([address]    Myoclonus    Respiratory failure ([address]  Resolved Problems:    * No resolved hospital problems. *     Cardiac arrest  CTPA showed pulmonary embolism  On heparin drip  ROSC approximately 40 minutes  MRI confirmed anoxic brain injury. MRI shoed ? SAH, discussed with critical care team. (they will run it with neurology given he is on heparin gtt for PE)  Critical care neurology following      ESRD  Dialysis per nephrology    Bacteremia  On Vanc  On Unasyn per ICU team.      Prognosis guarded. Pending family decision on comfort measures.     Franny Fleming MD  2/1/2022

## 2022-02-01 NOTE — PROGRESS NOTES
NEUROLOGY / NEUROCRITICAL CARE PROGRESS NOTE       Patient Name: Richard Dietz YOB: 1945   Sex: Male Age: 67 yrs     CC / Reason for Consult: post-cardiac arrest    Interval Hx / Changes over last 24 hours:   MRI w/ some expected changes post hypoxic/anoxic event    ROS: unobtainable due to encephalopathy    HISTORY   Admission HPI:   Mr. Moni Raines is a 67year old gentleman found down 1/27/22. Initial rhythm was PEA, and he underwent 5 rounds of CPR prior to ROSC. Estimated resuscitation time 40 minutes. CT-PA showed PE.     1/28: Hemodialysis at [facility]. 1/29: Central line placed. On heparin gtt for PE. Low dose fentanyl stopped. PMH Past Medical History:   Diagnosis Date    Anemia     Atrial fibrillation ([facility] 11/4/2013    CAD (coronary artery disease)     Mi, stent    Chronic kidney disease     [facility]     End stage renal disease ([facility])     Gas gangrene ([facility]     Hemodialysis patient ([facility] [address] [facility]     M-W-F    Hx of blood clots     Hyperkalemia     Hyperlipidemia 5/30/2012    Hypertension     Hyperthyroidism     Ischemic heart disease 0/95/9374    Metabolic encephalopathy     MI (myocardial infarction) ([facility] [address] 10/2018    Type II or unspecified type diabetes mellitus without mention of complication, not stated as uncontrolled       Allergies No Known Allergies   Diet Diet NPO  ADULT TUBE FEEDING; Orogastric; Peptide Based; Continuous; 10; Yes; 10; Q 4 hours; 40; 30; Q 4 hours; Protein; Protein Modular QD; Flush with 30 ml water before/after administration. Do not mix with tube feeding formula.    Isolation No active isolations     LABS   Metabolic Panel Recent Labs     01/30/22  0421 01/30/22  0421 01/31/22  0409 02/01/22  0544     --  135* 134*   K 4.0  4.0   < > 4.2  4.2 4.0  4.0   CL 98*  --  95* 96*   CO2 27  --  28 29   BUN 30*  --  44* 28*   CREATININE 6.1*  --  7.6* 4.6*   GLUCOSE 96  --  104* 168*   CALCIUM 9.8  --  9.6 9.4   LABALBU 3.3* structural, or vascular abnormalities, toxic/metabolic conditions, hydrocephalus, or postictal conditions. Bifrontal sharps indicate a region of cortical hyperexcitability that may be associated with generalized seizures. No definite seizures were seen during this recording. Clinical correlation is advised. PHYSICAL EXAMINATION     PHYSICAL EXAM:  Vitals:    02/01/22 0700 02/01/22 0715 02/01/22 0730 02/01/22 0840   BP: 135/60 (!) 136/58 (!) 140/65    Pulse: 76 67 74    Resp: 22 15 15    Temp:       TempSrc:       SpO2: 100% 100% 100% 100%   Weight:       Height:          Exam  Constitutional    Vital signs: BP, HR, and RR reviewed   General depressed mental status, no distress, well-nourished    Cardiovascular: pulses symmetric in all 4 extremities. No peripheral edema.  s  Neurologic  Mental status: Unresponsive to painful stimulation  No eye opening, no regarding w/ manual eye opening, no attempts to track toward voice  Minimal flexion to BUE to pain  No movement noted to BLE (has B BKA's)    CN2: Visual Fields: no consistent blink to threat  CN 3,4,6:  extraocular muscles intact (randomly looking around room); pupils equal round and reactive  CN5: facial sensation limited exam given encephalopathy  CN7:face symmetric but exam limited by ET tube  CN8: hearing limited exam given encephalopathy    Sensory: no response to trap pinch or upper limb skin pinch; withdraws from pain in BLE. Tone: decreased in all extremities   Gait: limited exam given encephalopathy and intubated with ventilator. ASSESSMENT & RECOMMENDATIONS     Mr. Stephenie Multani is a 67year old who presented with encephalopathy following 40 minute cardiac arrest and pulmonary embolism. Exam notable for spontaneous eye opening at times but he has yet to follow commands or interact significantly with his surroundings. MRI w/ expected findings of diffuse cortical DWI changes consistent w/ diffuse hypoxic / anoxic injury.  With patients age and other co-morbidities would not expect patient to have a meaningful recovery. Recommendations   - Aggressively treat fevers  - Treatment of underlying metabolic/infectious confounders as you are. Undergoing dialysis at the time of my visit  - Consult palliative care to guide goals of care discussions - please call neurology team if you would like our presence at family meetings on prognosis. - Otherwise we will follow peripherally while in ICU.      ADIA Crawford - CNP   Neurology & Neurocritical Care   Neurology Line: [phone]  [facility]: [facility] NPs  2/1/2022 10:05 AM

## 2022-02-01 NOTE — PROGRESS NOTES
ICU Progress Note    Admit Date: 1/27/2022  Day: 5  Vent Day: None  IV Access:Peripheral  IV Fluids:None  Vasopressors:None                Antibiotics: None  Diet: Diet NPO  ADULT TUBE FEEDING; Orogastric; Peptide Based; Continuous; 10; Yes; 10; Q 4 hours; 40; 30; Q 4 hours; Protein; Protein Modular QD; Flush with 30 ml water before/after administration. Do not mix with tube feeding formula.     CC: cardiac arrest    Interval history:   - no sedation, patient unresponsive  - MRI yesterday with anoxic brain injury  - echo yesterday with EF 55-60% and gr II diastolic dysfunction      Medications:     Scheduled Meds:   magnesium sulfate  2,000 mg IntraVENous Once    aspirin  81 mg Oral Daily    hydrALAZINE  50 mg Oral 3 times per day    [Held by provider] amLODIPine  10 mg Oral Daily    vancomycin (VANCOCIN) intermittent dosing (placeholder)   Other See Admin Instructions    ferrous sulfate  325 mg Oral Daily with breakfast    pravastatin  20 mg Oral Daily    sodium chloride flush  5-40 mL IntraVENous 2 times per day    famotidine (PEPCID) injection  20 mg IntraVENous Daily    Venelex   Topical BID     Continuous Infusions:   niCARdipine Stopped (02/01/22 0646)    fentaNYL Stopped (01/29/22 1400)    sodium chloride      dextrose      heparin (PORCINE) Infusion 15 Units/kg/hr (01/31/22 2140)     PRN Meds:perflutren lipid microspheres, hydrALAZINE, sodium chloride flush, sodium chloride, ondansetron **OR** ondansetron, acetaminophen **OR** acetaminophen, polyethylene glycol, glucose, dextrose, glucagon (rDNA), dextrose, heparin (porcine), heparin (porcine)    Objective:   Vitals:   T-max:  Patient Vitals for the past 8 hrs:   BP Pulse Resp SpO2   02/01/22 0600 -- 86 18 97 %   02/01/22 0501 -- -- -- 98 %   02/01/22 0500 (!) 142/58 73 17 98 %   02/01/22 0126 -- -- -- 98 %   02/01/22 0100 (!) 144/62 78 22 97 %   02/01/22 0000 (!) 151/65 77 16 97 %       Intake/Output Summary (Last 24 hours) at 2/1/2022 0710  Last data filed at 2/1/2022 0600  Gross per 24 hour   Intake 3161.06 ml   Output 2400 ml   Net 761.06 ml       Review of Systems   Unable to perform ROS: Intubated       Physical Exam  Physical Exam  Constitutional:       Appearance: He is ill-appearing. Comments: intubated   HENT:      Head: Normocephalic and atraumatic. Cardiovascular:      Rate and Rhythm: Normal rate and regular rhythm. Pulmonary:      Effort: Pulmonary effort is normal.      Comments: Mechanical breath sounds  Abdominal:      Palpations: Abdomen is soft. Tenderness: There is no abdominal tenderness. There is no guarding or rebound. Musculoskeletal:         General: No swelling. Cervical back: Neck supple. Comments: Bilateral BKA   Skin:     General: Skin is warm and dry.         LABS:    CBC:   Recent Labs     01/30/22 0421 01/31/22  0410   WBC 8.2 7.8   HGB 7.3* 7.1*   HCT 21.8* 21.5*    135   MCV 88.2 88.8     Renal:    Recent Labs     01/30/22 0421 01/30/22 0421 01/31/22 0409 02/01/22  0544     --  135* 134*   K 4.0  4.0   < > 4.2  4.2 4.0  4.0   CL 98*  --  95* 96*   CO2 27  --  28 29   BUN 30*  --  44* 28*   CREATININE 6.1*  --  7.6* 4.6*   GLUCOSE 96  --  104* 168*   CALCIUM 9.8  --  9.6 9.4   MG 1.90  --  2.10 1.80   PHOS 3.0  --  3.5 2.9   ANIONGAP 11  --  12 9    < > = values in this interval not displayed. Hepatic:   Recent Labs     01/30/22 0421 01/31/22 0409 02/01/22  0544   AST 56* 37 43*   * 142* 99*   BILITOT 0.3 0.3 0.3   BILIDIR <0.2 <0.2 <0.2   PROT 6.3* 6.3* 6.3*   LABALBU 3.3* 3.3* 3.0*   ALKPHOS 83 77 78     Troponin:   No results for input(s): TROPONINI in the last 72 hours. BNP: No results for input(s): BNP in the last 72 hours. Lipids: No results for input(s): CHOL, HDL in the last 72 hours. Invalid input(s): LDLCALCU, TRIGLYCERIDE  ABGs:  No results for input(s): PHART, FAP7PDG, PO2ART, RLC1XSP, BEART, THGBART, Z6HYIYBR, THV0UYO in the last 72 hours. INR:   No results for input(s): INR in the last 72 hours. Lactate:   No results for input(s): LACTATE in the last 72 hours. Cultures:  -----------------------------------------------------------------  RAD:   MRI BRAIN WO CONTRAST   Final Result      1. Bilateral areas of cerebral cortical diffusion restriction compatible with  hypoxic-ischemic encephalopathy and history of anoxic brain injury   2. Subarachnoid hemorrhage involving the left sylvian fissure   3. Mild to moderate atrophy and chronic small vessel ischemia   4. Chronic hemorrhagic staining involving area of chronic cortical infarction at the left lateral occipitotemporal lobe               CT HEAD WO CONTRAST   Final Result   1. No acute intracranial process. 2. Small remote cortical infarct is identified laterally within the left occipital lobe. This is unchanged. XR ABDOMEN (KUB) (SINGLE AP VIEW)   Final Result       1. Enteric tube tip and side-port project over the gastric body. XR CHEST PORTABLE   Final Result      Interval line placement. Persistent infiltrate at right lung base. CTA PULMONARY W CONTRAST   Final Result      Left-sided pulmonary emboli. Infiltrate in right lower lobe suggesting pneumonia. Borderline RV/LV ratio of approximately 1.02. Results were reported by telephone to the charge nurse, Kamila, at 7:11 PM on 1/27/2020         CT HEAD WO CONTRAST   Final Result   1. Atrophy and superimposed small vessel ischemic disease with remote infarct in the lateral left occipital region. No recent infarct, hemorrhage or mass detected. XR CHEST PORTABLE   Final Result   1. Nasogastric tube coiled in the distal esophagus with gastric air distention. 2. Endotracheal tube in satisfactory position. Critical results reported to Physician: Elyssa Cobian   at 1/27/20229:25 AM on 1/27/2022 by telephone.             Assessment/Plan:   Lali Gregory is a 67 y.o. male PMH ESRD on HD, DM2, bl BKA, pAF, CAD (s/p stent in LAD and RCA 2018), HTN, HLD who presented from [facility] after cardiac arrest.      S/p Cardiac Arrest  Anoxic brain injury  - suspected down for 20 minutes. Initial rhythm PEA. Received total of 40 minutes of resuscitation with 5 rounds of CPR and epi  - intubated 1/27  - off pressors and sedation, unresponsive on exam  - 2/2 PE  - Neurology following, cvEEG negative for seizure. - MRI 1/31 with anoxic brain injury  - palliative care following    PE  - heparin gtt  - MRI yesterday with [facility]- discussed with Neurology and will continue heparin gtt    Bacteremia  Blood cx x2 grew staph and strep  - continue Vanc   - echo 1/31 with EF 55-60% and gr II diastolic dysfunction, no vegetations to suggest endocarditis     Acute respiratory failure 2/2 cardiac arrest  - s/p intubation in ED     ESRD on HD  - gets HD M/W/F and does not make urine  - nephro consulted  - strict I/O, daily weights     Lactic acidosis, resolved  - 2/2 hypoperfusion from cardiac arrest    pAF/HTN- holding home nifedipine, hydralazine, prazosin, and carvedilol  CAD s/p LAD and RCA stent in 2018, continue home ASA and statin  HLD- home statin  Anemia of Chronic Disease- epo three times per week  Depression- holding home mirtazapine  DM2- hypoglycemia, POCT     Code Status: DNR-CCA  FEN: NPO, TF  PPX:  heparin gtt  DISPO: ICU       Mihir Batista DO, PGY-1  02/01/22  7:10 AM       Patient seen, examined and discussed with the resident and I agree with the assessment and plan. Vent Mode: AC/VC Rate Set: 14 bmp/Vt Ordered: 500 mL/ /FiO2 : 25 %  PEEP 5    Mri confirmed suspicion of anoxic brain injury. Has had some worsening anemia but without clear bleeding source. Ox requirement is down to 25%. He has a reasonable cough and gag. He does not awaken or follow any commands. He has been off sedation for several days.   Patient's daughter Trina Saleem is at bedside and says she has been in communication with her Sister Tiarra Manner and they would like to proceed to comfort care but want to be here when they withdraw and be able to be at bedside when he is close to passing. Unfortunately her sister does not drive and she will drive in inclement weather and there is a snowstorm coming. She fears they will have to wait until next week to transition to comfort. I tried to explain that prolonging the inevitable would not help him but that understood her fear of not being able to support him the way that she wants. I did switch him over to pressure support and he did reasonably well although for patient who is torso is appropriate for six-foot for individual his volumes were not what I would think would be adequate to maintain ventilation. It is likely patient is in a persistent vegetative state related to his anoxic brain injury. I believe the bulk of his sepsis related changes should have resolved as he is now normotensive and his ox requirements are low. So it appears that whenever mental status he has now, which is not much, will likely be the end result. With the way he behaved on the pressure support he may live for hours or days off the ventilator. There may be a benefit to stopping dialysis if they want transition to comfort. Certainly will have to be stopped when they do agree to terminal extubation. Critical care time spent reviewing labs/films, examining patient, collaborating with other physicians but excluding procedures for life threatening organ failure is 41 minutes.       Hernandez Loja MD

## 2022-02-01 NOTE — PROGRESS NOTES
Nephrology Note        [facility]    [email or web address]       Phone: [phone]                                                  2/1/2022 12:38 PM     Patient: Mike Webster [id number]  [id number]  Date of Admit: 1/27/2022 LOS: 5 days      Interval History and Plan:  Patient remain intubated. FiO2 25 % with PEEP of 5. Off pressors. Lytes stable. Hb low. Concern for anoxic brain injury. HD tomorrow per his MWF schedule   ALLA for anemia with HD  Monitor Hb and transfuse as needed  Monitor electrolytes  Avoid nephrotoxins  Monitor I/O, vitals closely. Renally dose all medications      Thank you for allowing us to participate in this patient's care    In case of any question please call us at our 24 hour answering service [phone] or from 7 AM to 5 PM via [employer] or cell number    Ignacio Gutierrez MD  [facility]  [address], [address]  Fax: [phone]  Office: [phone]       Assessment & Plan     Renal function:  ESRD  MWF at [facility]    Electrolytes:   Lab Results   Component Value Date    CREATININE 4.6 (H) 02/01/2022    BUN 28 (H) 02/01/2022     (L) 02/01/2022    K 4.0 02/01/2022    K 4.0 02/01/2022    CL 96 (L) 02/01/2022    CO2 29 02/01/2022            # CKD- MBD:   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 Lab Results   Component Value Date    .6 (H) 06/17/2020    CALCIUM 9.4 02/01/2022    CAION 1.22 01/27/2022    PHOS 2.9 02/01/2022         Acid/Base status:  Stable. Volume status/BP:  BP is stable  No acute volume overload concerns. Intake/Output Summary (Last 24 hours) at 2/1/2022 1238  Last data filed at 2/1/2022 0600  Gross per 24 hour   Intake 2761.06 ml   Output 0 ml   Net 2761.06 ml         Hematology:   CKD related anemia  Goal Hgb 10-11  Monitor and transfuse as needed.    ALLA    Lab Results   Component Value Date    IRON 39 (L) 10/05/2021    TIBC 189 (L) 10/05/2021    FERRITIN 1,665.0 (H) 08/13/2020     Lab Results   Component Value Date    WBC 8.7 02/01/2022    HGB 6.5 (LL) 02/01/2022    HCT 19.5 (LL) 02/01/2022    MCV 87.4 02/01/2022     02/01/2022             Reason for Consult and Chief Complaint     Reason for consult: ESRD    Chief complaint:   Chief Complaint   Patient presents with    Cardiac Arrest       History of Present Illness   Terrilyn Peabody is a 67 y.o. with PMH significant for ESRD on HD M/W/F, DM2, bl BKA, pAF, CAD (s/p stent in LAD and RCA 2018), HTN, HLD admitted after cardiac arrest and now patient is intubated. Patient was on pressors but now weaned off. Labs stable. Review of Systems   Positive in bold or unable to assess     GEN: Fever, chills, night sweats. HEENT: Changes in vision, sore throat, rhinorrhea   CVS: Chest pain, palpitations, swelling or edema in legs  Pulmonary: Cough, hemoptysis, SOB  GI: Nausea, vomiting, diarrhea, constipation, abdominal pain  : Bladder incontinence, dysuria,hematuria. MSK: Muscle or joint or bone pains  Skin: Rashes, ulcers, skin thickness  CNS: Headache, dizziness, confusion, focal weakness, seizure. Psych: Anxiety, agitation, depression. Reviewed all 12 systems, negative except as above.      Past Medical History     Past Medical History:   Diagnosis Date    Anemia     Atrial fibrillation ([facility] 11/4/2013    CAD (coronary artery disease)     Mi, stent    Chronic kidney disease     DVT (deep venous thrombosis) (HCC)     End stage renal disease (HCC)     Gas gangrene ([facility] [address] [facility]     Hemodialysis patient ([facility] [address]     M-W-F    Hx of blood clots     Hyperkalemia     Hyperlipidemia 5/30/2012    Hypertension     Hyperthyroidism     Ischemic heart disease 1/93/0678    Metabolic encephalopathy     MI (myocardial infarction) ([facility] [address] 10/2018    Type II or unspecified type diabetes mellitus without mention of complication, not stated as uncontrolled          Past Surgical History     Past Surgical History:   Procedure Laterality Date    CARDIAC SURGERY      cardiac stent    FEMORAL-TIBIAL BYPASS GRAFT Right 1/9/2019    RIGHT FEMORAL POSTERIOR TIBIAL BYPASS performed by Angelia Ceballos MD at [address] [place] [address] Right 1/4/2019    INCISION AND DRAINAGE, TRANSMETATARSAL AMPUTATION ALL ON RIGHT FOOT performed by John Alexander DPM at [address] [place] [address] Right 1/14/2019    REVISION OF TRANSMETATARSAL AMPUTATION RIGHT FOOT performed by John Alexander DPM at [address] [place] [address] Left 08/24/2019    TMA    FOOT AMPUTATION Left 8/24/2019    LEFT FOOT TRANSMETATARSAL AMPUTATION performed by Laurel Abraham DPM at [address] [place] [address] Left 8/27/2019    REPEAT INCISION AND DRAINAGE, REVISION OF LEFT FOOT TRANSMETATARSAL AMPUTATION performed by Laurel Abraham DPM at [address] [place] [address] Left 10/11/2019    LEFT FOOT INCISION AND DRAINAGE WITH REVISION OF TRANSMETARSAL AMPUTATION WITH WOUND VAC APPLICATION  performed by Laurel Abraham DPM at [address] Right 1/18/2019    RIGHT BELOW THE KNEE AMPUTATION performed by Angelia Ceballos MD at [address] Left 12/9/2019    LEFT BELOW KNEE AMPUTATION performed by Angelia Ceballos MD at [facility] 2/6/2020    DEBRIDEMENT AND PLACEMENT OF WOUND VAC LEFT BELOW THE KNEE AMPUTATION SITE performed by Linda Vuong MD at [place] [address] History     Family History   Problem Relation Age of Onset    Arthritis Mother          Social History     Social History     Tobacco Use    Smoking status: Never Smoker    Smokeless tobacco: Never Used   Substance Use Topics    Alcohol use: Not Currently          Past medical, family, and social histories were reviewed as previously documented. Updates were made as necessary.       Inpatient Medications and Allergies       Scheduled Meds:   aspirin  81 mg Oral Daily    hydrALAZINE  50 mg Oral 3 times per day    [Held by provider] amLODIPine  10 mg Oral Daily    vancomycin (VANCOCIN) intermittent dosing (placeholder)   Other See Admin Instructions    ferrous sulfate  325 mg Oral Daily with breakfast    pravastatin  20 mg Oral Daily    sodium chloride flush  5-40 mL IntraVENous 2 times per day    famotidine (PEPCID) injection  20 mg IntraVENous Daily    Venelex   Topical BID       Allergies: No Known Allergies      Vital Signs     Vitals:    02/01/22 1213   BP:    Pulse: 80   Resp: 18   Temp:    SpO2: 100%         Intake/Output Summary (Last 24 hours) at 2/1/2022 1238  Last data filed at 2/1/2022 0600  Gross per 24 hour   Intake 2761.06 ml   Output 0 ml   Net 2761.06 ml         Physical Exam     General appearance: NAD  Head: Normocephalic, without obvious abnormality, atraumatic   Mouth: ETT in mouth  Neck: Supple. Lungs: Intubated. No respiratory distress  Heart: S1, S2.  Abdomen: soft,  non-distended  Extremities: No leg edema, warm to touch   Skin: No concerning rashes noted  Neuro: Sedated.        Laboratory Data     Please see above     Diagnostic Studies   Pertinent images reviewed

## 2022-02-01 NOTE — PLAN OF CARE
Problem: Non-Violent Restraints  Goal: Removal from restraints as soon as assessed to be safe  Outcome: Ongoing  Goal: No harm/injury to patient while restraints in use  Outcome: Ongoing  Goal: Patient's dignity will be maintained  Outcome: Ongoing     Problem: Pain:  Goal: Pain level will decrease  Description: Pain level will decrease  Outcome: Ongoing  Goal: Control of acute pain  Description: Control of acute pain  Outcome: Ongoing  Goal: Control of chronic pain  Description: Control of chronic pain  Outcome: Ongoing     Problem: Skin Integrity:  Goal: Will show no infection signs and symptoms  Description: Will show no infection signs and symptoms  Outcome: Ongoing  Goal: Absence of new skin breakdown  Description: Absence of new skin breakdown  Outcome: Ongoing  Goal: Status of oral mucous membranes will improve  Description: Status of oral mucous membranes will improve  Outcome: Ongoing  Goal: Skin integrity will be maintained  Description: Skin integrity will be maintained  Outcome: Ongoing     Problem: Falls - Risk of:  Goal: Will remain free from falls  Description: Will remain free from falls  Outcome: Ongoing  Goal: Absence of physical injury  Description: Absence of physical injury  Outcome: Ongoing     Problem:  Activity:  Goal: Fatigue will decrease  Description: Fatigue will decrease  Outcome: Ongoing  Goal: Risk for activity intolerance will decrease  Description: Risk for activity intolerance will decrease  Outcome: Ongoing     Problem: Nutrition  Goal: Optimal nutrition therapy  Outcome: Ongoing

## 2022-02-01 NOTE — PROGRESS NOTES
Clinical Pharmacy Progress Note    Vancomycin - Management by Pharmacy    Consult Date(s): 1/29  Consulting Provider(s): Dr. Sparkle Li / Plan    Blood Stream Infection - Vancomycin   Concurrent Antimicrobials: None   Day of Vanc Therapy: #4   Current Dosing Method: Intermittent Dosing by Levels   Therapeutic Goal: ~15 mg/L   Current Dose / Frequency: Intermittent dosing with HD   Plan / Rationale:   o Pt has ESRD on HD (MWF). Will continue to dose vancomycin intermittently via levels at this time. o Vancomycin 1.25g IV x 1 dose given after HD yesterday. No doses needed today. o Random level ordered for tomorrow AM prior to next HD session.  Will continue to monitor clinical condition and make adjustments to regimen as appropriate. Thank you for consulting Pharmacy! Marcia Hernandez, PharmD, [facility]  Wireless: [phone]  2/1/2022 9:05 AM      Interval History: Patient has been afebrile over the past 24 hrs. MRI yesterday demonstrated anoxic brain injury. Subjective/Objective: Mr. Sandeep Foley is a 67 y.o. male with a PMHx significant for ESRD on HD, T2DM, BL BKA, pAF, CAD, HTN, HDL, lives at a [facility], admitted for Cardiac arrest, and PE. Pharmacy has been consulted to dose vancomycin. Height:   Ht Readings from Last 1 Encounters:   01/29/22 5' (1.524 m)     Weight:   Wt Readings from Last 1 Encounters:   01/31/22 195 lb 12.3 oz (88.8 kg)       Current & Prior Antimicrobial Regimen(s):    Vancomycin (1/29-current)    Level(s) / Doses:    Date Random Level  Dose   1/29 --- 2g IV x 1   1/30 25.7 mg/L ---   1/31 22.7 mg/L 1.25g IV x 1   2/1 --- ---   Note: Serum levels collected for AUC-based dosing may be high if collected in close proximity to the dose administered. This is not necessarily an indicator of toxicity.     Cultures & Sensitivities:    Date Site Micro Susceptibility / Result   1/28 Blood x4 Staph simulans      Strep viridans R = clinda, erythromycin, oxacillin, TCN  S = TMP/SMX, vanc    No sensitivities performed     Labs / Ancillary Data:    Estimated Creatinine Clearance: 12 mL/min (A) (based on SCr of 4.6 mg/dL (H)). Recent Labs     01/30/22  0421 01/31/22  0409 01/31/22  0410 02/01/22  0544   CREATININE 6.1* 7.6*  --  4.6*   BUN 30* 44*  --  28*   WBC 8.2  --  7.8 8.7       Additional Lab Values / Findings of Note:    Procalcitonin: No results for input(s): PROCAL in the last 72 hours.

## 2022-02-01 NOTE — PLAN OF CARE
Problem: Skin Integrity:  Goal: Will show no infection signs and symptoms  Description: Will show no infection signs and symptoms  2/1/2022 1844 by Jose F Rodriguez RN  Outcome: Ongoing     Problem: Skin Integrity:  Goal: Absence of new skin breakdown  Description: Absence of new skin breakdown  2/1/2022 1844 by Jose F Rodriguez RN  Outcome: Ongoing     Problem: Skin Integrity:  Goal: Status of oral mucous membranes will improve  Description: Status of oral mucous membranes will improve  2/1/2022 1844 by Jose F Rodriguez RN  Outcome: Ongoing     Problem: Skin Integrity:  Goal: Skin integrity will be maintained  Description: Skin integrity will be maintained  2/1/2022 1844 by Jose F Rodriguez RN  Outcome: Ongoing     Problem: Activity:  Goal: Fatigue will decrease  Description: Fatigue will decrease  2/1/2022 1844 by Jose F Rodriguez RN  Outcome: Ongoing     Problem:  Activity:  Goal: Risk for activity intolerance will decrease  Description: Risk for activity intolerance will decrease  2/1/2022 1844 by Jose F Rodriguez RN  Outcome: Ongoing     Problem: Nutrition  Goal: Optimal nutrition therapy  2/1/2022 1844 by Jose F Rodriguez RN  Outcome: Ongoing

## 2022-02-02 ENCOUNTER — APPOINTMENT (OUTPATIENT)
Dept: GENERAL RADIOLOGY | Age: 77
DRG: 870 | End: 2022-02-02
Payer: MEDICARE

## 2022-02-02 LAB
ALBUMIN SERPL-MCNC: 2.6 G/DL (ref 3.4–5)
ALP BLD-CCNC: 62 U/L (ref 40–129)
ALT SERPL-CCNC: 60 U/L (ref 10–40)
ANION GAP SERPL CALCULATED.3IONS-SCNC: 11 MMOL/L (ref 3–16)
ANTI-XA UNFRAC HEPARIN: <0.04 IU/ML (ref 0.3–0.7)
AST SERPL-CCNC: 36 U/L (ref 15–37)
BASOPHILS ABSOLUTE: 0 K/UL (ref 0–0.2)
BASOPHILS RELATIVE PERCENT: 0.2 %
BILIRUB SERPL-MCNC: 0.3 MG/DL (ref 0–1)
BILIRUBIN DIRECT: <0.2 MG/DL (ref 0–0.3)
BILIRUBIN, INDIRECT: ABNORMAL MG/DL (ref 0–1)
BLOOD BANK DISPENSE STATUS: NORMAL
BLOOD BANK PRODUCT CODE: NORMAL
BPU ID: NORMAL
BUN BLDV-MCNC: 46 MG/DL (ref 7–20)
CALCIUM SERPL-MCNC: 9.1 MG/DL (ref 8.3–10.6)
CHLORIDE BLD-SCNC: 94 MMOL/L (ref 99–110)
CO2: 26 MMOL/L (ref 21–32)
CREAT SERPL-MCNC: 5.6 MG/DL (ref 0.8–1.3)
DESCRIPTION BLOOD BANK: NORMAL
EOSINOPHILS ABSOLUTE: 0 K/UL (ref 0–0.6)
EOSINOPHILS RELATIVE PERCENT: 0.2 %
GFR AFRICAN AMERICAN: 12
GFR NON-AFRICAN AMERICAN: 10
GLUCOSE BLD-MCNC: 200 MG/DL (ref 70–99)
GLUCOSE BLD-MCNC: 222 MG/DL (ref 70–99)
GLUCOSE BLD-MCNC: 228 MG/DL (ref 70–99)
HCT VFR BLD CALC: 20 % (ref 40.5–52.5)
HCT VFR BLD CALC: 22.9 % (ref 40.5–52.5)
HEMOGLOBIN: 6.6 G/DL (ref 13.5–17.5)
HEMOGLOBIN: 7.5 G/DL (ref 13.5–17.5)
LACTIC ACID: 1 MMOL/L (ref 0.4–2)
LYMPHOCYTES ABSOLUTE: 2 K/UL (ref 1–5.1)
LYMPHOCYTES RELATIVE PERCENT: 13.9 %
MAGNESIUM: 2.2 MG/DL (ref 1.8–2.4)
MCH RBC QN AUTO: 28.4 PG (ref 26–34)
MCHC RBC AUTO-ENTMCNC: 33.2 G/DL (ref 31–36)
MCV RBC AUTO: 85.5 FL (ref 80–100)
MONOCYTES ABSOLUTE: 1.2 K/UL (ref 0–1.3)
MONOCYTES RELATIVE PERCENT: 8 %
NEUTROPHILS ABSOLUTE: 11.3 K/UL (ref 1.7–7.7)
NEUTROPHILS RELATIVE PERCENT: 77.7 %
PDW BLD-RTO: 15 % (ref 12.4–15.4)
PERFORMED ON: ABNORMAL
PERFORMED ON: ABNORMAL
PHOSPHORUS: 3.5 MG/DL (ref 2.5–4.9)
PLATELET # BLD: 145 K/UL (ref 135–450)
PMV BLD AUTO: 9.1 FL (ref 5–10.5)
POTASSIUM REFLEX MAGNESIUM: 4 MMOL/L (ref 3.5–5.1)
POTASSIUM SERPL-SCNC: 4 MMOL/L (ref 3.5–5.1)
RBC # BLD: 2.34 M/UL (ref 4.2–5.9)
SODIUM BLD-SCNC: 131 MMOL/L (ref 136–145)
TOTAL PROTEIN: 5.4 G/DL (ref 6.4–8.2)
VANCOMYCIN RANDOM: 22 UG/ML
WBC # BLD: 14.6 K/UL (ref 4–11)

## 2022-02-02 PROCEDURE — 6360000002 HC RX W HCPCS: Performed by: INTERNAL MEDICINE

## 2022-02-02 PROCEDURE — 83735 ASSAY OF MAGNESIUM: CPT

## 2022-02-02 PROCEDURE — 6360000002 HC RX W HCPCS

## 2022-02-02 PROCEDURE — 2700000000 HC OXYGEN THERAPY PER DAY

## 2022-02-02 PROCEDURE — 94761 N-INVAS EAR/PLS OXIMETRY MLT: CPT

## 2022-02-02 PROCEDURE — 6360000002 HC RX W HCPCS: Performed by: STUDENT IN AN ORGANIZED HEALTH CARE EDUCATION/TRAINING PROGRAM

## 2022-02-02 PROCEDURE — 2580000003 HC RX 258: Performed by: INTERNAL MEDICINE

## 2022-02-02 PROCEDURE — 2580000003 HC RX 258: Performed by: STUDENT IN AN ORGANIZED HEALTH CARE EDUCATION/TRAINING PROGRAM

## 2022-02-02 PROCEDURE — 74018 RADEX ABDOMEN 1 VIEW: CPT

## 2022-02-02 PROCEDURE — 84100 ASSAY OF PHOSPHORUS: CPT

## 2022-02-02 PROCEDURE — 85520 HEPARIN ASSAY: CPT

## 2022-02-02 PROCEDURE — 36415 COLL VENOUS BLD VENIPUNCTURE: CPT

## 2022-02-02 PROCEDURE — 1200000000 HC SEMI PRIVATE

## 2022-02-02 PROCEDURE — 2500000003 HC RX 250 WO HCPCS: Performed by: STUDENT IN AN ORGANIZED HEALTH CARE EDUCATION/TRAINING PROGRAM

## 2022-02-02 PROCEDURE — 99233 SBSQ HOSP IP/OBS HIGH 50: CPT | Performed by: NURSE PRACTITIONER

## 2022-02-02 PROCEDURE — 85014 HEMATOCRIT: CPT

## 2022-02-02 PROCEDURE — 80076 HEPATIC FUNCTION PANEL: CPT

## 2022-02-02 PROCEDURE — 85025 COMPLETE CBC W/AUTO DIFF WBC: CPT

## 2022-02-02 PROCEDURE — 6360000002 HC RX W HCPCS: Performed by: NURSE PRACTITIONER

## 2022-02-02 PROCEDURE — 90935 HEMODIALYSIS ONE EVALUATION: CPT

## 2022-02-02 PROCEDURE — 80202 ASSAY OF VANCOMYCIN: CPT

## 2022-02-02 PROCEDURE — 94003 VENT MGMT INPAT SUBQ DAY: CPT

## 2022-02-02 PROCEDURE — 6370000000 HC RX 637 (ALT 250 FOR IP): Performed by: STUDENT IN AN ORGANIZED HEALTH CARE EDUCATION/TRAINING PROGRAM

## 2022-02-02 PROCEDURE — 83605 ASSAY OF LACTIC ACID: CPT

## 2022-02-02 PROCEDURE — 85018 HEMOGLOBIN: CPT

## 2022-02-02 PROCEDURE — 99232 SBSQ HOSP IP/OBS MODERATE 35: CPT | Performed by: NURSE PRACTITIONER

## 2022-02-02 PROCEDURE — 99291 CRITICAL CARE FIRST HOUR: CPT | Performed by: INTERNAL MEDICINE

## 2022-02-02 PROCEDURE — 80048 BASIC METABOLIC PNL TOTAL CA: CPT

## 2022-02-02 PROCEDURE — 36430 TRANSFUSION BLD/BLD COMPNT: CPT

## 2022-02-02 PROCEDURE — 2580000003 HC RX 258: Performed by: NURSE PRACTITIONER

## 2022-02-02 RX ORDER — INSULIN LISPRO 100 [IU]/ML
0-12 INJECTION, SOLUTION INTRAVENOUS; SUBCUTANEOUS 3 TIMES DAILY
Status: DISCONTINUED | OUTPATIENT
Start: 2022-02-02 | End: 2022-02-02

## 2022-02-02 RX ORDER — MORPHINE SULFATE 4 MG/ML
4 INJECTION, SOLUTION INTRAMUSCULAR; INTRAVENOUS
Status: DISCONTINUED | OUTPATIENT
Start: 2022-02-02 | End: 2022-02-03 | Stop reason: HOSPADM

## 2022-02-02 RX ORDER — MORPHINE SULFATE 2 MG/ML
2 INJECTION, SOLUTION INTRAMUSCULAR; INTRAVENOUS
Status: DISCONTINUED | OUTPATIENT
Start: 2022-02-02 | End: 2022-02-03 | Stop reason: HOSPADM

## 2022-02-02 RX ORDER — LORAZEPAM 2 MG/ML
2 INJECTION INTRAMUSCULAR ONCE
Status: COMPLETED | OUTPATIENT
Start: 2022-02-02 | End: 2022-02-02

## 2022-02-02 RX ORDER — LORAZEPAM 2 MG/ML
1 INJECTION INTRAMUSCULAR
Status: DISCONTINUED | OUTPATIENT
Start: 2022-02-02 | End: 2022-02-03 | Stop reason: HOSPADM

## 2022-02-02 RX ORDER — LORAZEPAM 2 MG/ML
INJECTION INTRAMUSCULAR
Status: COMPLETED
Start: 2022-02-02 | End: 2022-02-02

## 2022-02-02 RX ORDER — MORPHINE SULFATE 2 MG/ML
2 INJECTION, SOLUTION INTRAMUSCULAR; INTRAVENOUS
Status: DISCONTINUED | OUTPATIENT
Start: 2022-02-02 | End: 2022-02-02

## 2022-02-02 RX ORDER — MORPHINE SULFATE 4 MG/ML
4 INJECTION, SOLUTION INTRAMUSCULAR; INTRAVENOUS
Status: DISCONTINUED | OUTPATIENT
Start: 2022-02-02 | End: 2022-02-02

## 2022-02-02 RX ORDER — MORPHINE SULFATE 10 MG/5ML
5 SOLUTION ORAL
Status: DISCONTINUED | OUTPATIENT
Start: 2022-02-02 | End: 2022-02-03 | Stop reason: HOSPADM

## 2022-02-02 RX ORDER — LORAZEPAM 2 MG/ML
1 CONCENTRATE ORAL
Status: DISCONTINUED | OUTPATIENT
Start: 2022-02-02 | End: 2022-02-02

## 2022-02-02 RX ORDER — GLYCOPYRROLATE 0.2 MG/ML
0.2 INJECTION INTRAMUSCULAR; INTRAVENOUS EVERY 4 HOURS PRN
Status: DISCONTINUED | OUTPATIENT
Start: 2022-02-02 | End: 2022-02-03 | Stop reason: HOSPADM

## 2022-02-02 RX ORDER — SODIUM CHLORIDE 9 MG/ML
INJECTION, SOLUTION INTRAVENOUS PRN
Status: DISCONTINUED | OUTPATIENT
Start: 2022-02-02 | End: 2022-02-02

## 2022-02-02 RX ADMIN — PRAVASTATIN SODIUM 20 MG: 10 TABLET ORAL at 09:43

## 2022-02-02 RX ADMIN — MORPHINE SULFATE 4 MG: 4 INJECTION INTRAVENOUS at 16:25

## 2022-02-02 RX ADMIN — ACETAMINOPHEN 650 MG: 325 TABLET ORAL at 00:33

## 2022-02-02 RX ADMIN — INSULIN LISPRO 4 UNITS: 100 INJECTION, SOLUTION INTRAVENOUS; SUBCUTANEOUS at 10:05

## 2022-02-02 RX ADMIN — LORAZEPAM 2 MG: 2 INJECTION INTRAMUSCULAR at 06:05

## 2022-02-02 RX ADMIN — VANCOMYCIN HYDROCHLORIDE 1000 MG: 10 INJECTION, POWDER, LYOPHILIZED, FOR SOLUTION INTRAVENOUS at 12:11

## 2022-02-02 RX ADMIN — SODIUM CHLORIDE, PRESERVATIVE FREE 30 ML: 5 INJECTION INTRAVENOUS at 10:34

## 2022-02-02 RX ADMIN — FERROUS SULFATE TAB 325 MG (65 MG ELEMENTAL FE) 325 MG: 325 (65 FE) TAB at 09:43

## 2022-02-02 RX ADMIN — CASTOR OIL AND BALSAM, PERU: 788; 87 OINTMENT TOPICAL at 10:34

## 2022-02-02 RX ADMIN — NOREPINEPHRINE BITARTRATE 2 MCG/MIN: 1 INJECTION, SOLUTION, CONCENTRATE INTRAVENOUS at 05:02

## 2022-02-02 RX ADMIN — INSULIN LISPRO 4 UNITS: 100 INJECTION, SOLUTION INTRAVENOUS; SUBCUTANEOUS at 13:15

## 2022-02-02 RX ADMIN — LORAZEPAM 2 MG: 2 INJECTION INTRAMUSCULAR; INTRAVENOUS at 06:05

## 2022-02-02 RX ADMIN — LORAZEPAM 1 MG: 2 INJECTION INTRAMUSCULAR; INTRAVENOUS at 20:50

## 2022-02-02 RX ADMIN — LEVETIRACETAM 500 MG: 100 INJECTION, SOLUTION INTRAVENOUS at 10:34

## 2022-02-02 RX ADMIN — EPOETIN ALFA-EPBX 10000 UNITS: 10000 INJECTION, SOLUTION INTRAVENOUS; SUBCUTANEOUS at 07:56

## 2022-02-02 ASSESSMENT — PULMONARY FUNCTION TESTS
PIF_VALUE: 24
PIF_VALUE: 23
PIF_VALUE: 21
PIF_VALUE: 24
PIF_VALUE: 23
PIF_VALUE: 15
PIF_VALUE: 23
PIF_VALUE: 19
PIF_VALUE: 19
PIF_VALUE: 21
PIF_VALUE: 21
PIF_VALUE: 22
PIF_VALUE: 19
PIF_VALUE: 23
PIF_VALUE: 25
PIF_VALUE: 27
PIF_VALUE: 20
PIF_VALUE: 22

## 2022-02-02 ASSESSMENT — PAIN SCALES - GENERAL
PAINLEVEL_OUTOF10: 0
PAINLEVEL_OUTOF10: 1
PAINLEVEL_OUTOF10: 0

## 2022-02-02 ASSESSMENT — PAIN SCALES - PAIN ASSESSMENT IN ADVANCED DEMENTIA (PAINAD)
FACIALEXPRESSION: 0
NEGVOCALIZATION: 1
TOTALSCORE: 1
BODYLANGUAGE: 0
BREATHING: 0
CONSOLABILITY: 0

## 2022-02-02 NOTE — CARE COORDINATION
Case Management Assessment           Daily Note                 Date/ Time of Note: 2/2/2022 9:03 AM         Note completed by: Elieser North RN    Patient Name: Sandeep Foley  YOB: 1945    Diagnosis:Cardiac arrest [facility]) [I46.9]  PEA (Pulseless electrical activity) ([facility] [address] [I46.9]  Patient Admission Status: Inpatient    Date of Admission:1/27/2022  8:25 AM Length of Stay: 6 GLOS:      Current Plan of Care: Intubated (FIO2 25%, PEEP 5), pressor support, transfuse PRBC, HD  ________________________________________________________________________________________  PT AM-PAC:   / 24 per last evaluation on: tbd    OT AM-PAC:   / 24 per last evaluation on: tbd    DME Needs for discharge: tbd  ________________________________________________________________________________________  Discharge Plan: [address] [place] [address] ([facility]): [place]    Tentative discharge date: tbd    Current barriers to discharge: intubated    Referrals completed: Not Applicable    Resources/ information provided: Not indicated at this time  ________________________________________________________________________________________  Case Management Notes: continues to follow patient for discharge planning. Patient is from long term care at [place], receives HD at [facility]. Patient can return to long term care with no precert, will need COVID test within 48 hours of discharge. Patient remains intubated, on pressor support, transfuse PRBC, on HD. Brandi Bishop and his family were provided with choice of provider; he and his family are in agreement with the discharge plan.     Care Transition Patient: Yuliya North RN  [facility]  Case Management Department  Ph: [phone]
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
## 2022-02-02 NOTE — FLOWSHEET NOTE
Treatment time: 3.5 Hrs    Net UF: 1.0 L    Pre weight: 95.0 Kg  Post weight: 94.0 Kg  EDW: 97.0 Kg    Access used: LLAF  Access function: Functioned well, prescribed BFR achieved and maintained throughout treatment without pressure alarms. Medications or blood products given: Retacrit 10,000 units. 1 unit PRBC's. Regular outpatient schedule: [facility]    Summary of response to treatment: HD complete. VSS throughout treatment. Rinseback per protocol. All blood returned. No s/s of complications r/t HD or blood transfusion. LLAF needles removed x 2, sites held per RN, hemostasis achieved in appropriate amount of time, positive for thrill and bruit, drsg CDI. Plan next HD for Friday 2.4.22. Report given. Safe pateint handoff achieved. Copy of dialysis treatment record placed in chart, to be scanned into EMR.     NATALIE Garcia, RN       02/02/22 0430 02/02/22 0630 02/02/22 0815   Vital Signs   Temp 99.6 °F (37.6 °C)  --  98.4 °F (36.9 °C)   Temp Source Axillary  --  Axillary   Pulse 66 69 76   Heart Rate Source Monitor Monitor Monitor   Resp 16 19 17   BP (!) 105/51 (!) 100/50 (!) 132/55   BP Location Right upper arm Right upper arm Right upper arm   MAP (mmHg) 67 66 77   Patient Position Semi fowlers Semi fowlers Semi fowlers   Level of Consciousness Responds to Pain (2)  --  Unresponsive (3)   MEWS Score 1  --  1   Patient Currently in Pain Other (comment)  (CPOT)  --  Other (comment)  (CPOT utilized)   Pain Assessment   Pain Assessment CPOT  --  CPOT   Pain Level 0  --  0   CPOT (Critical Patient)   CPOT (Critical Patient) Facial Expression 0  --  0   CPOT (Critical Patient) Body Movement 0  --  0   CPOT (Critical Patient) Muscle Tension 0  --  0   CPOT (Critical Patient) Compiance with Vent 0  --  0   Score CPOT (Vent) 0  --  0   CPOT (Critial Patient) Vent Status Intubated  --  Intubated   Oxygen Therapy   SpO2 100 % 100 % 100 %   Pulse Oximeter Device Mode Continuous Continuous Continuous   Pulse Oximeter Device Location Finger Finger Finger   O2 Device Ventilator Ventilator Ventilator   Skin Assessment Clean, dry, & intact Clean, dry, & intact Clean, dry, & intact   Skin Protection for O2 Device Yes Yes Yes   FiO2  25 % 25 % 25 %   Height and Weight   Weight 209 lb 7 oz (95 kg)  --  207 lb 3.7 oz (94 kg)   Weight Method Bed scale  --   --    BMI (Calculated) 41  --  40.6      02/02/22 0430 02/02/22 0630 02/02/22 0815   Vital Signs   Temp 99.6 °F (37.6 °C)  --  98.4 °F (36.9 °C)   Temp Source Axillary  --  Axillary   Pulse 66 69 76   Heart Rate Source Monitor Monitor Monitor   Resp 16 19 17   BP (!) 105/51 (!) 100/50 (!) 132/55   BP Location Right upper arm Right upper arm Right upper arm   MAP (mmHg) 67 66 77   Patient Position Semi fowlers Semi fowlers Semi fowlers   Level of Consciousness Responds to Pain (2)  --  Unresponsive (3)   MEWS Score 1  --  1   Patient Currently in Pain Other (comment)  (CPOT)  --  Other (comment)  (CPOT utilized)   Pain Assessment   Pain Assessment CPOT  --  CPOT   Pain Level 0  --  0   CPOT (Critical Patient)   CPOT (Critical Patient) Facial Expression 0  --  0   CPOT (Critical Patient) Body Movement 0  --  0   CPOT (Critical Patient) Muscle Tension 0  --  0   CPOT (Critical Patient) Compiance with Vent 0  --  0   Score CPOT (Vent) 0  --  0   CPOT (Critial Patient) Vent Status Intubated  --  Intubated   Oxygen Therapy   SpO2 100 % 100 % 100 %   Pulse Oximeter Device Mode Continuous Continuous Continuous   Pulse Oximeter Device Location Finger Finger Finger   O2 Device Ventilator Ventilator Ventilator   Skin Assessment Clean, dry, & intact Clean, dry, & intact Clean, dry, & intact   Skin Protection for O2 Device Yes Yes Yes   FiO2  25 % 25 % 25 %   Height and Weight   Weight 209 lb 7 oz (95 kg)  --  207 lb 3.7 oz (94 kg)   Weight Method Bed scale  --   --    BMI (Calculated) 41  --  40.6

## 2022-02-02 NOTE — SIGNIFICANT EVENT
Discussion was had with the family today and family has decided to change code status to comfort care and go hospice. Comfort care orders were placed.      Garry Fontenot MD

## 2022-02-02 NOTE — PROGRESS NOTES
Clinical Pharmacy Progress Note    Vancomycin - Management by Pharmacy    Consult Date(s): 1/29  Consulting Provider(s): Dr. Vitaliy Stevens / Plan    Blood Stream Infection - Vancomycin   Concurrent Antimicrobials: None   Day of Vanc Therapy: #5   Current Dosing Method: Intermittent Dosing by Levels   Therapeutic Goal: ~15 mg/L   Current Dose / Frequency: Intermittent dosing with HD   Plan / Rationale:   o Pt has ESRD on HD (MWF). Will continue to dose vancomycin intermittently via levels at this time. o Random vancomycin level this AM was 22 mg/L. Will re-dose with vancomycin 1g IV x 1 dose after HD today. o Plan to obtain another level prior to next HD session on Friday.  Will continue to monitor clinical condition and make adjustments to regimen as appropriate. Thank you for consulting Pharmacy! Ahmet De La Torre, PharmD, [facility]  Wireless: [phone]  2/2/2022 7:26 AM      Interval History: Patient had a Tmax of 100.6F over the past 24 hrs with an increasing leukocytosis. He remains intubated and vasopressors were started last night. Subjective/Objective: Mr. Juliana Lynn is a 67 y.o. male with a PMHx significant for ESRD on HD, T2DM, BL BKA, pAF, CAD, HTN, HDL, lives at a ECF, admitted for Cardiac arrest, and PE. Pharmacy has been consulted to dose vancomycin.     Height:   Ht Readings from Last 1 Encounters:   02/02/22 5' (1.524 m)     Weight:   Wt Readings from Last 1 Encounters:   02/02/22 209 lb 7 oz (95 kg)       Current & Prior Antimicrobial Regimen(s):    Vancomycin (1/29-current)    Level(s) / Doses:    Date Random Level  Dose   1/29 --- 2g IV x 1   1/30 25.7 mg/L ---   1/31 22.7 mg/L 1.25g IV x 1   2/1 --- ---   2/2 22 mg/L 1g IV x 1     Cultures & Sensitivities:    Date Site Micro Susceptibility / Result   1/28 Blood x4 Staph simulans      Strep viridans R = clinda, erythromycin, oxacillin, TCN  S = TMP/SMX, vanc    No sensitivities performed     Labs / Ancillary Data:    Estimated Creatinine Clearance: 11 mL/min (A) (based on SCr of 5.6 mg/dL [facility])). Recent Labs     01/31/22  0409 01/31/22  0410 02/01/22  0544 02/02/22  0255   CREATININE 7.6*  --  4.6* 5.6*   BUN 44*  --  28* 46*   WBC  --  7.8 8.7 14.6*       Additional Lab Values / Findings of Note:    Procalcitonin: No results for input(s): PROCAL in the last 72 hours.

## 2022-02-02 NOTE — PROGRESS NOTES
Nephrology Note        [facility]    [email or web address]       Phone: [phone]                                                  2/2/2022 9:50 AM     Patient: Cayetano Blackwell [id number]  [id number]  Date of Admit: 1/27/2022 LOS: 6 days      Interval History and Plan:  Patient remain intubated. FiO2 25 % with PEEP of 5. Off pressors. Lytes stable. Hb low. Concern for anoxic brain injury. HD done today per his MWF schedule   ALLA for anemia with HD  Monitor Hb and transfuse as needed  Monitor electrolytes  Avoid nephrotoxins  Monitor I/O, vitals closely. Renally dose all medications    D/W ICU team and patient's wife at bedside. Thank you for allowing us to participate in this patient's care    In case of any question please call us at our 24 hour answering service [phone] or from 7 AM to 5 PM via [employer] or cell number    Abdoul David MD  [facility] Nephrology  [address]  [place], [address]  Fax: [phone]  Office: [phone]       Assessment & Plan     Renal function:  ESRD  MWF at [facility]    Electrolytes:   Lab Results   Component Value Date    CREATININE 5.6 ([facility]) 02/02/2022    BUN 46 (H) 02/02/2022     (L) 02/02/2022    K 4.0 02/02/2022    K 4.0 02/02/2022    CL 94 (L) 02/02/2022    CO2 26 02/02/2022            # CKD- MBD:   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 Lab Results   Component Value Date    .6 (H) 06/17/2020    CALCIUM 9.1 02/02/2022    CAION 1.22 01/27/2022    PHOS 3.5 02/02/2022         Acid/Base status:  Stable. Volume status/BP:  BP is stable  No acute volume overload concerns. Intake/Output Summary (Last 24 hours) at 2/2/2022 0950  Last data filed at 2/2/2022 0815  Gross per 24 hour   Intake 4535.1 ml   Output 1600 ml   Net 2935.1 ml         Hematology:   CKD related anemia  Goal Hgb 10-11  Monitor and transfuse as needed.    ALLA    Lab Results   Component Value Date    IRON 39 (L) 10/05/2021    TIBC 189 (L) 10/05/2021    FERRITIN 1,665.0 (H) 08/13/2020     Lab Results   Component Value Date    WBC 14.6 (H) 02/02/2022    HGB 6.6 (LL) 02/02/2022    HCT 20.0 (LL) 02/02/2022    MCV 85.5 02/02/2022     02/02/2022             Reason for Consult and Chief Complaint     Reason for consult: ESRD    Chief complaint:   Chief Complaint   Patient presents with    Cardiac Arrest       History of Present Illness   Reynaldo Orantes is a 67 y.o. with PMH significant for ESRD on HD M/W/F, DM2, bl BKA, pAF, CAD (s/p stent in LAD and RCA 2018), HTN, HLD admitted after cardiac arrest and now patient is intubated. Patient was on pressors but now weaned off. Labs stable. Review of Systems   Positive in bold or unable to assess     GEN: Fever, chills, night sweats. HEENT: Changes in vision, sore throat, rhinorrhea   CVS: Chest pain, palpitations, swelling or edema in legs  Pulmonary: Cough, hemoptysis, SOB  GI: Nausea, vomiting, diarrhea, constipation, abdominal pain  : Bladder incontinence, dysuria,hematuria. MSK: Muscle or joint or bone pains  Skin: Rashes, ulcers, skin thickness  CNS: Headache, dizziness, confusion, focal weakness, seizure. Psych: Anxiety, agitation, depression. Reviewed all 12 systems, negative except as above.      Past Medical History     Past Medical History:   Diagnosis Date    Anemia     Atrial fibrillation ([facility] 11/4/2013    CAD (coronary artery disease)     Mi, stent    Chronic kidney disease     DVT (deep venous thrombosis) (HCC)     End stage renal disease (HCC)     Gas gangrene ([address]     Hemodialysis patient ([facility]     M-W-F    Hx of blood clots     Hyperkalemia     Hyperlipidemia 5/30/2012    Hypertension     Hyperthyroidism     Ischemic heart disease 9/14/4686    Metabolic encephalopathy     MI (myocardial infarction) ([facility] 10/2018    Type II or unspecified type diabetes mellitus without mention of complication, not stated as uncontrolled          Past Surgical History     Past Surgical History:   Procedure Laterality Date    CARDIAC SURGERY      cardiac stent    FEMORAL-TIBIAL BYPASS GRAFT Right 1/9/2019    RIGHT FEMORAL POSTERIOR TIBIAL BYPASS performed by Srinivasa Le MD at [address] [place] [address] Right 1/4/2019    INCISION AND DRAINAGE, TRANSMETATARSAL AMPUTATION ALL ON RIGHT FOOT performed by Jesus Aguilar DPM at [address] [place] [address] Right 1/14/2019    REVISION OF TRANSMETATARSAL AMPUTATION RIGHT FOOT performed by Jesus Aguilar DPM at [address] [place] [address] Left 08/24/2019    TMA    FOOT AMPUTATION Left 8/24/2019    LEFT FOOT TRANSMETATARSAL AMPUTATION performed by Annie Toro DPM at [address] [place] [address] Left 8/27/2019    REPEAT INCISION AND DRAINAGE, REVISION OF LEFT FOOT TRANSMETATARSAL AMPUTATION performed by Annie Toro DPM at [address] [place] [address] Left 10/11/2019    LEFT FOOT INCISION AND DRAINAGE WITH REVISION OF TRANSMETARSAL AMPUTATION WITH WOUND VAC APPLICATION  performed by Annie Toro DPM at [address] Right 1/18/2019    RIGHT BELOW THE KNEE AMPUTATION performed by Srinivasa Le MD at [address] Left 12/9/2019    LEFT BELOW KNEE AMPUTATION performed by Srinivasa Le MD at [facility] 2/6/2020    DEBRIDEMENT AND PLACEMENT OF WOUND VAC LEFT BELOW THE KNEE AMPUTATION SITE performed by Ria Cooper MD at [place] [address] History     Family History   Problem Relation Age of Onset    Arthritis Mother          Social History     Social History     Tobacco Use    Smoking status: Never Smoker    Smokeless tobacco: Never Used   Substance Use Topics    Alcohol use: Not Currently          Past medical, family, and social histories were reviewed as previously documented. Updates were made as necessary.       Inpatient Medications and Allergies       Scheduled Meds:   insulin lispro  0-12 Units SubCUTAneous TID  vancomycin  1,000 mg IntraVENous Once    epoetin paulina-epbx  10,000 Units IntraVENous Q MWF    aspirin  81 mg Oral Daily    hydrALAZINE  50 mg Oral 3 times per day    [Held by provider] amLODIPine  10 mg Oral Daily    vancomycin (VANCOCIN) intermittent dosing (placeholder)   Other See Admin Instructions    ferrous sulfate  325 mg Oral Daily with breakfast    pravastatin  20 mg Oral Daily    sodium chloride flush  5-40 mL IntraVENous 2 times per day    famotidine (PEPCID) injection  20 mg IntraVENous Daily    Venelex   Topical BID       Allergies: No Known Allergies      Vital Signs     Vitals:    02/02/22 0829   BP:    Pulse: 91   Resp:    Temp:    SpO2: 100%         Intake/Output Summary (Last 24 hours) at 2/2/2022 0950  Last data filed at 2/2/2022 0815  Gross per 24 hour   Intake 4535.1 ml   Output 1600 ml   Net 2935.1 ml         Physical Exam     General appearance: NAD  Head: Normocephalic, without obvious abnormality, atraumatic   Mouth: ETT in mouth  Neck: Supple. Lungs: Intubated. No respiratory distress  Heart: S1, S2.  Abdomen: soft,  non-distended  Extremities: No leg edema, warm to touch   Skin: No concerning rashes noted  Neuro: Sedated.        Laboratory Data     Please see above     Diagnostic Studies   Pertinent images reviewed

## 2022-02-02 NOTE — PROGRESS NOTES
Family at bedside/ hospice present/ terminal extubation done by RT/ wife Janki Nicely decide to stay home and not be at bedside for this per Karely Arellano and there children/ cont to support family during end of life care

## 2022-02-02 NOTE — PROGRESS NOTES
ICU Progress Note    Admit Date: 1/27/2022  Day: 6  Vent Day: None  IV Access:Peripheral  IV Fluids:None  Vasopressors:None                Antibiotics: None  Diet: Diet NPO  ADULT TUBE FEEDING; Orogastric; Peptide Based; Continuous; 10; Yes; 10; Q 4 hours; 40; 30; Q 4 hours; Protein; Protein Modular QD; Flush with 30 ml water before/after administration. Do not mix with tube feeding formula.     CC: cardiac arrest    Interval history:   - hypotensive overnight with MAP 60, and developed bilateral seizure like activity with UE shaking that broke with 2mg Ativan  - now on minimal levo  - Tmax 100.6  - no sedation, patient unresponsive  - anemic and getting another unit pRBC with hemodialysis this AM  - holding heparin gtt      Medications:     Scheduled Meds:   epoetin paulina-epbx  10,000 Units IntraVENous Q MWF    insulin lispro  0-6 Units SubCUTAneous TID    aspirin  81 mg Oral Daily    hydrALAZINE  50 mg Oral 3 times per day    [Held by provider] amLODIPine  10 mg Oral Daily    vancomycin (VANCOCIN) intermittent dosing (placeholder)   Other See Admin Instructions    ferrous sulfate  325 mg Oral Daily with breakfast    pravastatin  20 mg Oral Daily    sodium chloride flush  5-40 mL IntraVENous 2 times per day    famotidine (PEPCID) injection  20 mg IntraVENous Daily    Venelex   Topical BID     Continuous Infusions:   norepinephrine 2 mcg/min (02/02/22 0502)    sodium chloride      sodium chloride      sodium chloride      fentaNYL Stopped (01/29/22 1400)    sodium chloride      dextrose      [Held by provider] heparin (PORCINE) Infusion Stopped (02/01/22 1600)     PRN Meds:sodium chloride, sodium chloride, sodium chloride, perflutren lipid microspheres, hydrALAZINE, sodium chloride flush, sodium chloride, ondansetron **OR** ondansetron, acetaminophen **OR** acetaminophen, polyethylene glycol, glucose, dextrose, glucagon (rDNA), dextrose, heparin (porcine), heparin (porcine)    Objective:   Vitals: T-max:  Patient Vitals for the past 8 hrs:   BP Temp Temp src Pulse Resp SpO2 Height Weight   02/02/22 0700 (!) 98/49 98.8 °F (37.1 °C) Axillary 69 14 100 % -- --   02/02/22 0645 (!) 93/47 -- -- 66 16 100 % -- --   02/02/22 0632 (!) 90/48 -- -- 70 22 -- -- --   02/02/22 0630 (!) 100/50 -- -- 69 19 100 % -- --   02/02/22 0615 (!) 103/49 -- -- 69 20 100 % -- --   02/02/22 0605 (!) 112/57 99 °F (37.2 °C) Axillary 71 19 100 % -- --   02/02/22 0600 114/63 -- -- 75 27 100 % -- --   02/02/22 0555 (!) 122/56 99 °F (37.2 °C) Axillary 75 24 100 % -- --   02/02/22 0545 (!) 132/58 99 °F (37.2 °C) Axillary 82 18 100 % -- --   02/02/22 0530 122/62 -- -- 68 18 100 % -- --   02/02/22 0515 125/73 -- -- 67 18 100 % 5' (1.524 m) --   02/02/22 0500 (!) 117/56 -- -- 65 16 100 % -- --   02/02/22 0445 (!) 96/50 -- -- 65 15 100 % -- --   02/02/22 0430 (!) 105/51 99.6 °F (37.6 °C) Axillary 66 16 100 % -- 209 lb 7 oz (95 kg)   02/02/22 0400 (!) 101/50 99.2 °F (37.3 °C) Axillary 70 21 100 % -- --   02/02/22 0300 (!) 116/51 -- -- 71 27 100 % -- --   02/02/22 0200 (!) 111/58 -- -- 71 22 99 % -- --   02/02/22 0115 -- -- -- 71 21 100 % 5' (1.524 m) --   02/02/22 0100 (!) 113/59 -- -- 70 21 100 % -- --   02/02/22 0000 135/65 100.6 °F (38.1 °C) Axillary 71 14 100 % -- --       Intake/Output Summary (Last 24 hours) at 2/2/2022 0707  Last data filed at 2/2/2022 0550  Gross per 24 hour   Intake 3935.1 ml   Output 0 ml   Net 3935.1 ml       Review of Systems   Unable to perform ROS: Intubated   Psychiatric/Behavioral: Positive for decreased concentration. Physical Exam  Physical Exam  Constitutional:       Appearance: He is ill-appearing. Comments: intubated   HENT:      Head: Normocephalic and atraumatic. Cardiovascular:      Rate and Rhythm: Normal rate and regular rhythm. Pulmonary:      Effort: Pulmonary effort is normal.      Comments: Mechanical breath sounds  Abdominal:      Palpations: Abdomen is soft. Tenderness:  There is no abdominal tenderness. There is no guarding or rebound. Musculoskeletal:         General: No swelling. Cervical back: Neck supple. Comments: Bilateral BKA   Skin:     General: Skin is warm and dry.         LABS:    CBC:   Recent Labs     01/31/22  0410 01/31/22  0410 02/01/22  0544 02/01/22  1345 02/01/22  1514 02/01/22  1921 02/02/22  0255   WBC 7.8  --  8.7  --   --   --  14.6*   HGB 7.1*   < > 6.5*   < > 6.0* 7.6* 6.6*   HCT 21.5*   < > 19.5*   < > 18.4* 22.9* 20.0*     --  144  --   --   --  145   MCV 88.8  --  87.4  --   --   --  85.5    < > = values in this interval not displayed. Renal:    Recent Labs     01/31/22  0409 01/31/22  0409 02/01/22  0544 02/02/22  0255   *  --  134* 131*   K 4.2  4.2   < > 4.0  4.0 4.0  4.0   CL 95*  --  96* 94*   CO2 28  --  29 26   BUN 44*  --  28* 46*   CREATININE 7.6*  --  4.6* 5.6*   GLUCOSE 104*  --  168* 222*   CALCIUM 9.6  --  9.4 9.1   MG 2.10  --  1.80 2.20   PHOS 3.5  --  2.9 3.5   ANIONGAP 12  --  9 11    < > = values in this interval not displayed. Hepatic:   Recent Labs     01/31/22 0409 02/01/22  0544 02/02/22  0255   AST 37 43* 36   * 99* 60*   BILITOT 0.3 0.3 0.3   BILIDIR <0.2 <0.2 <0.2   PROT 6.3* 6.3* 5.4*   LABALBU 3.3* 3.0* 2.6*   ALKPHOS 77 78 62     Troponin:   No results for input(s): TROPONINI in the last 72 hours. BNP: No results for input(s): BNP in the last 72 hours. Lipids: No results for input(s): CHOL, HDL in the last 72 hours. Invalid input(s): LDLCALCU, TRIGLYCERIDE  ABGs:  No results for input(s): PHART, QSJ3CEU, PO2ART, BCF8FZW, BEART, THGBART, J8GARUKL, EKD7SHT in the last 72 hours. INR:   No results for input(s): INR in the last 72 hours. Lactate:   No results for input(s): LACTATE in the last 72 hours. Cultures:  -----------------------------------------------------------------  RAD:   XR ABDOMEN (KUB) (SINGLE AP VIEW)   Final Result   1.  Orogastric tube tip in the stomach directed toward the fundus. MRI BRAIN WO CONTRAST   Final Result      1. Bilateral areas of cerebral cortical diffusion restriction compatible with  hypoxic-ischemic encephalopathy and history of anoxic brain injury   2. Subarachnoid hemorrhage involving the left sylvian fissure   3. Mild to moderate atrophy and chronic small vessel ischemia   4. Chronic hemorrhagic staining involving area of chronic cortical infarction at the left lateral occipitotemporal lobe               CT HEAD WO CONTRAST   Final Result   1. No acute intracranial process. 2. Small remote cortical infarct is identified laterally within the left occipital lobe. This is unchanged. XR ABDOMEN (KUB) (SINGLE AP VIEW)   Final Result       1. Enteric tube tip and side-port project over the gastric body. XR CHEST PORTABLE   Final Result      Interval line placement. Persistent infiltrate at right lung base. CTA PULMONARY W CONTRAST   Final Result      Left-sided pulmonary emboli. Infiltrate in right lower lobe suggesting pneumonia. Borderline RV/LV ratio of approximately 1.02. Results were reported by telephone to the charge nurse, Kamila, at 7:11 PM on 1/27/2020         CT HEAD WO CONTRAST   Final Result   1. Atrophy and superimposed small vessel ischemic disease with remote infarct in the lateral left occipital region. No recent infarct, hemorrhage or mass detected. XR CHEST PORTABLE   Final Result   1. Nasogastric tube coiled in the distal esophagus with gastric air distention. 2. Endotracheal tube in satisfactory position. Critical results reported to Physician: Osiel Hunt   at 1/27/20229:25 AM on 1/27/2022 by telephone.             Assessment/Plan:   Crescencio Palma is a 67 y.o. male PMH ESRD on HD, DM2, bl BKA, pAF, CAD (s/p stent in LAD and RCA 2018), HTN, HLD who presented from [facility] after cardiac arrest.      S/p Cardiac Arrest   Anoxic brain injury  - 2/2 PE  - suspected down for 20 minutes. Initial rhythm PEA. Received total of 40 minutes of resuscitation with 5 rounds of CPR and epi  - intubated 1/27  - off pressors and sedation, has cough and gag reflex however unresponsive  - Neurology following  - MRI 1/31 with anoxic brain injury  - palliative care following    Suspected Seizure  - seizure- like activity overnight with BL UE shaking that broke with 2 mg Ativan  - was previously on cEEG which did not show seizure, however seizure activity now may be related to [facility]  - neuro following, will discuss    PE  - currently holding heparin gtt for anemia    Bacteremia  Blood cx x2 grew staph and strep  - febrile with leukocytosis  - requiring minimal levo, titrate for MAP>65  - continue Vanc   - echo 1/31 with EF 55-60% and gr II diastolic dysfunction, no vegetations to suggest endocarditis     Acute respiratory failure 2/2 cardiac arrest  - s/p intubation in ED     ESRD on HD  - gets HD M/W/F and does not make urine  - nephro consulted  - strict I/O, daily weights     Lactic acidosis, resolved  - 2/2 hypoperfusion from cardiac arrest    pAF/HTN- holding home nifedipine, hydralazine, prazosin, and carvedilol  CAD s/p LAD and RCA stent in 2018, continue home ASA and statin  HLD- home statin  Anemia of Chronic Disease- epo three times per week  Depression- holding home mirtazapine  DM2- hypoglycemia, POCT     Code Status: DNR-CCA  FEN: NPO, TF  PPX:  heparin gtt  DISPO: ICU       Sarika Gallardo DO, PGY-1  02/02/22  7:07 AM     Patient seen, examined and discussed with the resident and I agree with the assessment and plan. Vent Mode: AC/PRVC Rate Set: 14 bmp/Vt Ordered: 500 mL/ /FiO2 : 25 %  PEEP 5  No results for input(s): PHART, SDR6HIS, PO2ART in the last 72 hours. Mri confirmed suspicion of anoxic brain injury. Has had some worsening anemia but without clear bleeding source. Vent requirements are low and patient has a reasonable cough and gag.   Had an episode of possible seizure overnight that stopped with ativan. Prognosis is poor. Family setting on timing for comfort care transition. Patient has gotten transfusions last couple of days for hemoglobins under 7. Going to reduce the transfusion threshold to under 6 and with hemodynamic compromise given the poor prognosis and plan to transition to comfort there is a blood charge nurse in 1.     With the way he behaved on the pressure support he may live for hours or days off the ventilator. There may be a benefit to stopping dialysis if they want transition to comfort. Certainly will have to be stopped when they do agree to terminal extubation. Patient's wife at bedside and updated. Critical care time spent reviewing labs/films, examining patient, collaborating with other physicians but excluding procedures for life threatening organ failure is 32 minutes.       Ryann Bernal MD

## 2022-02-02 NOTE — PROGRESS NOTES
NEUROLOGY / NEUROCRITICAL CARE PROGRESS NOTE       Patient Name: Poonam Singh YOB: 1945   Sex: Male Age: 67 yrs     CC / Reason for Consult: post-cardiac arrest    Interval Hx / Changes over last 24 hours:   MRI w/ some expected changes post hypoxic/anoxic event; +trace SAH  Per nursing, had an event this AM concerning for seizure and was given a dose of lorazepam (though he was hypotensive during this episode)  Requiring PRBCs d/t anemia (Hgb 6.6, Hct 20)  Day #6 from cardiac arrest - heparin on hold  Day #4 off continuous sedation  Tmax 100.6  Underwent dialysis this AM.    Long discussion with Ishan's wife, Erika Bonds. Explained to her pathophysiology behind Ishan's brain injury, diagnostic evaluation, and prognosis    ROS: unobtainable due to encephalopathy    HISTORY   Admission HPI:   Mr. Stacy Arrieta is a 67year old gentleman found down 1/27/22. Initial rhythm was PEA, and he underwent 5 rounds of CPR prior to ROSC. Estimated resuscitation time 40 minutes. CT-PA showed PE.     1/28: Hemodialysis at [facility]. 1/29: Central line placed. On heparin gtt for PE. Low dose fentanyl stopped.      PMH Past Medical History:   Diagnosis Date    Anemia     Atrial fibrillation ([facility] 11/4/2013    CAD (coronary artery disease)     Mi, stent    Chronic kidney disease     DVT (deep venous thrombosis) (HCC)     End stage renal disease (HCC)     Gas gangrene ([facility]     Hemodialysis patient ([facility]     M-W-F    Hx of blood clots     Hyperkalemia     Hyperlipidemia 5/30/2012    Hypertension     Hyperthyroidism     Ischemic heart disease 1/07/4740    Metabolic encephalopathy     MI (myocardial infarction) ([facility] 10/2018    Type II or unspecified type diabetes mellitus without mention of complication, not stated as uncontrolled       Allergies No Known Allergies   Diet Diet NPO  ADULT TUBE FEEDING; Orogastric; Peptide Based; Continuous; 10; Yes; 10; Q 4 hours; 40; 30; Q 4 hours; Protein; Protein Modular QD;  Flush with 30 ml water before/after administration. Do not mix with tube feeding formula. Isolation No active isolations     LABS   Metabolic Panel Recent Labs     01/31/22  0409 01/31/22  0409 02/01/22  0544 02/02/22  0255   *  --  134* 131*   K 4.2  4.2   < > 4.0  4.0 4.0  4.0   CL 95*  --  96* 94*   CO2 28  --  29 26   BUN 44*  --  28* 46*   CREATININE 7.6*  --  4.6* 5.6*   GLUCOSE 104*  --  168* 222*   CALCIUM 9.6  --  9.4 9.1   LABALBU 3.3*  --  3.0* 2.6*   PHOS 3.5  --  2.9 3.5   MG 2.10  --  1.80 2.20   ALKPHOS 77  --  78 62   *  --  99* 60*   AST 37  --  43* 36    < > = values in this interval not displayed. CBC / Coags Recent Labs     01/31/22  0410 01/31/22  0410 02/01/22  0544 02/01/22  1345 02/01/22  1514 02/01/22  1921 02/02/22  0255   WBC 7.8  --  8.7  --   --   --  14.6*   RBC 2.42*  --  2.23*  --   --   --  2.34*   HGB 7.1*   < > 6.5*   < > 6.0* 7.6* 6.6*   HCT 21.5*   < > 19.5*   < > 18.4* 22.9* 20.0*     --  144  --   --   --  145    < > = values in this interval not displayed.       Other Blood culture #1 gram + cocci   Blood culture #2 gram + cocci         CURRENT SCHEDULED MEDICATIONS   Inpatient Medications   insulin lispro, 0-12 Units, SubCUTAneous, TID    vancomycin, 1,000 mg, IntraVENous, Once    epoetin paulina-epbx, 10,000 Units, IntraVENous, Q MWF    aspirin, 81 mg, Oral, Daily    hydrALAZINE, 50 mg, Oral, 3 times per day    [Held by provider] amLODIPine, 10 mg, Oral, Daily    vancomycin (VANCOCIN) intermittent dosing (placeholder), , Other, See Admin Instructions    ferrous sulfate, 325 mg, Oral, Daily with breakfast    pravastatin, 20 mg, Oral, Daily    sodium chloride flush, 5-40 mL, IntraVENous, 2 times per day    famotidine (PEPCID) injection, 20 mg, IntraVENous, Daily    Venelex, , Topical, BID   Infusions    norepinephrine 4 mcg/min (02/02/22 0645)    sodium chloride      sodium chloride      sodium chloride      fentaNYL Stopped (01/29/22 1400)    sodium chloride      dextrose      [Held by provider] heparin (PORCINE) Infusion Stopped (02/01/22 1600)      Antibiotics   Recent Abx Admin      No antibiotic orders with administrations found. DIAGNOSTICS   IMAGES:  Images personally reviewed and agree w/ radiology interpretation. Head CT w/o Contrast 1/27/22  Atrophy and superimposed small vessel ischemic disease with remote infarct in the lateral left occipital region. No recent infarct, hemorrhage or mass detected. CT-PA: left sided pulmonary emboli    EEG:  CLINICAL INTERPRETATION:  This was an abnormal tracing for age and state due to a mild generalized slow wave abnormality indicating diffuse cerebral dysfunction which can be of multiple causes, including structural, or vascular abnormalities, toxic/metabolic conditions, hydrocephalus, or postictal conditions. Bifrontal sharps indicate a region of cortical hyperexcitability that may be associated with generalized seizures. No definite seizures were seen during this recording. Clinical correlation is advised. PHYSICAL EXAMINATION     PHYSICAL EXAM:  Vitals:    02/02/22 0745 02/02/22 0800 02/02/22 0815 02/02/22 0829   BP: (!) 110/53 (!) 118/51 (!) 132/55    Pulse: 72 74 76 91   Resp: 18 21 17    Temp:   98.4 °F (36.9 °C)    TempSrc:   Axillary    SpO2: 100% 100% 100% 100%   Weight:   207 lb 3.7 oz (94 kg)    Height:         Exam  Constitutional    Vital signs: BP, HR, and RR reviewed   General depressed mental status, no distress, well-nourished  Eyes: fundoscopic exam difficult given inability to cooperate  Cardiovascular: pulses symmetric in all 4 extremities. No peripheral edema.   Psychiatric: limited exam given encephalopathy but not agitated and no signs of hallucinations  Neurologic  Mental status: limited exam given encephalopathy  orientation unable to assess given comatose state  Attention poor  Language limited exam given encephalopathy  Comprehension not following any commands  CN2: Visual Fields: no blink to either side with threat  CN 3,4,6:  extraocular muscles intact with dolls maneuver; pupils equal, round, reactive  CN7:face symmetric but exam limited by ET tube  CN8: hearing limited exam given encephalopathy; eyes do not open to voice  Strength: 1/5 all four limbs but only to pain  Sensory: flicker of movement in all four limbs to pain    Gait: limited exam given encephalopathy and intubated with ventilator. ASSESSMENT & RECOMMENDATIONS     Mr. Lainey Sr is a 67year old who presented with encephalopathy following 40 minute cardiac arrest and pulmonary embolism. Exam notable for spontaneous eye opening at times but he has yet to follow commands or interact significantly with his surroundings. MRI w/ expected findings of diffuse cortical DWI changes consistent w/ diffuse hypoxic / anoxic injury. With patients age and other co-morbidities would not expect patient to have a meaningful recovery. Discussed at length with his wife, Briana Jo today. She and her family will discuss next steps. Recommendations   - Hold antiplatelets/anticoagulants given SAH as able - complicated given PE. Do not suspect this trace SAH is source of anemia  -  mg daily with 250 mg post dialysis given seizure  - Will follow peripherally, call with questions or if family meetings are planned & we will make every attempt to attend.     ADIA Thurman - CNP   Neurology & Neurocritical Care   Neurology Line: [phone]  [employer]: [facility] Neurology & Neuro Critical Care NPs  2/2/2022 9:43 AM

## 2022-02-02 NOTE — PROGRESS NOTES
Long discussion with Marce Shanks today regarding situation & prognosis. She is considering hospice later in the week when family has time to say goodbye  This is appropriate given his age, comorbidities, and anoxic brain injury. No further neurologic recommendations   Will sign off.  Call with questions or should goals of care change    [place] NP  Neurology

## 2022-02-03 VITALS
SYSTOLIC BLOOD PRESSURE: 157 MMHG | HEIGHT: 60 IN | RESPIRATION RATE: 10 BRPM | WEIGHT: 207.23 LBS | HEART RATE: 82 BPM | TEMPERATURE: 99 F | BODY MASS INDEX: 40.69 KG/M2 | DIASTOLIC BLOOD PRESSURE: 56 MMHG | OXYGEN SATURATION: 95 %

## 2022-02-03 LAB — VANCOMYCIN RANDOM: 26.7 UG/ML

## 2022-02-03 PROCEDURE — 36592 COLLECT BLOOD FROM PICC: CPT

## 2022-02-03 PROCEDURE — 80202 ASSAY OF VANCOMYCIN: CPT

## 2022-02-03 ASSESSMENT — PAIN SCALES - PAIN ASSESSMENT IN ADVANCED DEMENTIA (PAINAD)
BREATHING: 0
FACIALEXPRESSION: 0
CONSOLABILITY: 0
NEGVOCALIZATION: 1
TOTALSCORE: 1
CONSOLABILITY: 0
BODYLANGUAGE: 0
BODYLANGUAGE: 0
TOTALSCORE: 1
TOTALSCORE: 1
FACIALEXPRESSION: 0
NEGVOCALIZATION: 1
TOTALSCORE: 1
NEGVOCALIZATION: 1
BREATHING: 0
BREATHING: 0
BODYLANGUAGE: 0
BODYLANGUAGE: 0
FACIALEXPRESSION: 0
FACIALEXPRESSION: 0
CONSOLABILITY: 0
BREATHING: 0
NEGVOCALIZATION: 1
CONSOLABILITY: 0

## 2022-02-03 ASSESSMENT — PAIN SCALES - GENERAL
PAINLEVEL_OUTOF10: 1

## 2022-02-03 NOTE — PLAN OF CARE
Problem: Pain:  Goal: Pain level will decrease  Description: Pain level will decrease  Outcome: Met This Shift  Note: Pt not showing any signs of pain at this time. Will continue to closely monitor. Problem: Pain:  Goal: Control of acute pain  Description: Control of acute pain  Outcome: Met This Shift  Note: Pt not showing any signs of pain at this time. Will continue to closely monitor. Problem: Pain:  Goal: Control of chronic pain  Description: Control of chronic pain  Outcome: Met This Shift  Note: Pt not showing any signs of pain at this time. Will continue to closely monitor. Problem: Skin Integrity:  Goal: Will show no infection signs and symptoms  Description: Will show no infection signs and symptoms  Outcome: Met This Shift  Note: Pt did not show signs of infection during shift. Will continue to closely monitor. Problem: Skin Integrity:  Goal: Absence of new skin breakdown  Description: Absence of new skin breakdown  Outcome: Met This Shift  Note: Pt remained free from new skin breakdown during shift. Problem: Skin Integrity:  Goal: Skin integrity will be maintained  Description: Skin integrity will be maintained  Outcome: Met This Shift  Note: Pt maintained skin during shift. Problem: Falls - Risk of:  Goal: Will remain free from falls  Description: Will remain free from falls  Outcome: Met This Shift  Note: Pt remained free from falls during shift. Pt's bed is locked in lowest position with alarm on, call light, bedside table, and personal belongings within reach. Problem: Falls - Risk of:  Goal: Absence of physical injury  Description: Absence of physical injury  Outcome: Met This Shift  Note: Pt remained free from physical injury. Problem: Falls - Risk of:  Goal: Absence of physical injury  Description: Absence of physical injury  Outcome: Met This Shift  Note: Pt remained free from physical injury.

## 2022-02-03 NOTE — DISCHARGE INSTR - COC
Continuity of Care Form    Patient Name: Cherie Remy   :  1945  MRN:  [id number]    Admit date:  2022  Discharge date:  ***    Code Status Order: [facility]   Advance Directives:      Admitting Physician:  No admitting provider for patient encounter.   PCP: Mary Erazo MD    Discharging Nurse: [facility] Unit/Room#: 4738/5823-98  Discharging Unit Phone Number: ***    Emergency Contact:   Extended Emergency Contact Information  Primary Emergency Contact: [address] [place] [address] Phone: [phone]  Relation: Child  Secondary Emergency Contact: Jinny Schwarz   [address] Phone: [phone]  Relation: Domestic Partner    Past Surgical History:  Past Surgical History:   Procedure Laterality Date    CARDIAC SURGERY      cardiac stent    FEMORAL-TIBIAL BYPASS GRAFT Right 2019    RIGHT FEMORAL POSTERIOR TIBIAL BYPASS performed by Casie Horvath MD at [address] [place] [address]. Right 2019    INCISION AND DRAINAGE, TRANSMETATARSAL AMPUTATION ALL ON RIGHT FOOT performed by Leigh Horn DPM at [address] [place] [address]. Right 2019    REVISION OF TRANSMETATARSAL AMPUTATION RIGHT FOOT performed by Leigh Horn DPM at [address] [place] [address]. Left 2019    TMA    FOOT AMPUTATION Left 2019    LEFT FOOT TRANSMETATARSAL AMPUTATION performed by Jana Guerrero DPM at [address] [place] [address]. Left 2019    REPEAT INCISION AND DRAINAGE, REVISION OF LEFT FOOT TRANSMETATARSAL AMPUTATION performed by Jana Guerrero DPM at [address] [place] [address]. Left 10/11/2019    LEFT FOOT INCISION AND DRAINAGE WITH REVISION OF TRANSMETARSAL AMPUTATION WITH WOUND VAC APPLICATION  performed by Jana Guerrero DPM at [facility] Right 2019    RIGHT BELOW THE KNEE AMPUTATION performed by Casie Horvath MD at [facility] Left 2019    LEFT BELOW KNEE AMPUTATION performed by Casie Horvath MD at [facility] [address] Left 2/6/2020    DEBRIDEMENT AND PLACEMENT OF WOUND VAC LEFT BELOW THE KNEE AMPUTATION SITE performed by Daphne Johnson MD at [facility] 1       Immunization History:   Immunization History   Administered Date(s) Administered    COVID-19, Avvo top, DILUTE for use, 12+ yrs, 30mcg/0.3mL dose 12/30/2020, 02/10/2021, 09/16/2021    Influenza Vaccine, unspecified formulation 10/05/2016    Influenza, High Dose (Fluzone 65 yrs and older) 10/05/2011, 10/07/2014, 11/17/2015, 10/15/2017, 10/09/2018    Pneumococcal Conjugate 13-valent (Icznakt17) 11/15/2016    Pneumococcal Polysaccharide (Bsiawhpge20) 10/07/2014    Tdap (Boostrix, Adacel) 12/04/2018       Active Problems:  Patient Active Problem List   Diagnosis Code    HTN (hypertension) I10    Diabetes mellitus ([facility] E11.9    Anemia D64.9    Diabetic retinopathy ([facility] E11.319    Cataracts, bilateral H26.9    Mixed hyperlipidemia E78.2    Atrial fibrillation ([facility]) I48.91    ESRD on dialysis ([address] N18.6, Z99.2    Ischemic heart disease I25.9    Acute anterolateral wall MI ([address] I21.09    Acute ST elevation myocardial infarction (STEMI) involving left anterior descending (LAD) coronary artery ([facility]) I21.02    CKD (chronic kidney disease) stage 4, GFR 15-29 ml/min ([facility]) N18.4    DM (diabetes mellitus) type II uncontrolled with renal manifestation E11.29, E11.65    Fungal infection of foot B35.3    Hemodialysis patient ([facility] Z99.2    Malignant essential hypertension I10    Right second toe ulcer, with necrosis of bone ([facility]) L97.514    Hyperkalemia E87.5    CAD (coronary artery disease) s/p stent to LAD and RCA 10/2018 W50.70    Acute metabolic encephalopathy Q37.34    Pain in right foot M79.671    Somnolence R40.0    Encounter for palliative care Z51.5    Advance directive discussed with patient Z71.89    Gangrene of right foot ([address] I96    Gas gangrene ([address] A48.0    Sepsis due to pneumonia ([facility] J18.9, A41.9    Advance care planning Z71.89    Abscess or cellulitis of toe, left L03.032, L02.612    Gangrene of toe of left foot ([facility]) I96    PVD (peripheral vascular disease) ([facility]) I73.9    Type 2 diabetes mellitus, with long-term current use of insulin ([facility]) E11.9, Z79.4    Fever R50.9    Wound, open T14. 8XXA    Pseudomonas infection A49.8    Diabetic foot infection ([facility] E11.628, L08.9    Surgical wound dehiscence T81.31XA    Surgical wound, non healing T81.89XA    Osteomyelitis ([address] M86.9    Necrosis of amputation stump ([address] T87.50    Dehiscence of closure of skin, sequela T81.31XS    Acute on chronic anemia D64.9    AMS (altered mental status) R41.82    Cardiac arrest ([facility]) I46.9    Anoxic brain injury ([facility] G93.1    Myoclonus G25.3    Respiratory failure ([facility] J96.90       Isolation/Infection:   Isolation            No Isolation          Patient Infection Status       Infection Onset Added Last Indicated Last Indicated By Review Planned Expiration Resolved Resolved By    None active    Resolved    COVID-19 (Rule Out) 10/05/21 10/05/21 10/05/21 COVID-19 (Ordered)   10/06/21 Rule-Out Test Resulted    COVID-19 (Rule Out) 08/13/20 08/13/20 08/13/20 COVID-19 (Ordered)   08/15/20 Rule-Out Test Resulted    COVID-19 (Rule Out) 06/16/20 06/16/20 06/16/20 COVID-19 (Ordered)   06/17/20 Rule-Out Test Resulted    VRE 10/08/19 10/12/19 10/11/19 Surgical Culture   06/18/20 Abran Powers RN    MRSA 08/21/19 08/23/19 08/24/19 Body Fluid Culture   06/18/20 Abran Powers RN            Nurse Assessment:  Last Vital Signs: BP (!) 157/56   Pulse 82   Temp 99 °F (37.2 °C) (Axillary)   Resp 10 Comment: frequent apnea- 11 seconds after around 5 breaths  Ht 5' (1.524 m)   Wt 207 lb 3.7 oz (94 kg)   SpO2 95%   BMI 40.47 kg/m²     Last documented pain score (0-10 scale): Pain Level: 1  Last Weight:   Wt Readings from Last 1 Encounters:   02/02/22 207 lb 3.7 oz (94 kg)     Mental Status:  disoriented    IV Access:  - None    Nursing Mobility/ADLs:  Walking Dependent  Transfer  Dependent  Bathing  Dependent  Dressing  Dependent  Toileting  Dependent  Feeding  Dependent  Med Admin  Dependent  Med Delivery   none    Wound Care Documentation and Therapy:  Wound 06/16/20 Buttocks Left;Right skin flaking off, moisture related (Active)   Number of days: 596       Wound 02/02/22 Buttocks Medial;Left;Right Stage 2 pressure injury (Active)   Wound Etiology Pressure Stage  2 02/03/22 0558   Dressing Status New dressing applied 02/03/22 0558   Wound Cleansed Cleansed with saline 02/03/22 0558   Dressing/Treatment Foam 02/03/22 0558   Wound Assessment Rentz/red;Slough 02/03/22 0558   Drainage Amount Small 02/02/22 2305   Drainage Description Thin;Serosanguinous; Yellow 02/02/22 2305   Margins Defined edges 02/03/22 0558   Number of days: 0        Elimination:  Continence: Bowel: No  Bladder: No  Urinary Catheter: None   Colostomy/Ileostomy/Ileal Conduit: No       Date of Last BM: 02/02/2022    Intake/Output Summary (Last 24 hours) at 2/3/2022 0956  Last data filed at 2/3/2022 0558  Gross per 24 hour   Intake 0 ml   Output 0 ml   Net 0 ml     I/O last 3 completed shifts: In: 3040.8 [I.V.:1105.8; Blood:600; NG/GT:735]  Out: 1600     Safety Concerns: At Risk for Falls and Aspiration Risk    Impairments/Disabilities:      None    Nutrition Therapy:  Current Nutrition Therapy:   - Oral Diet:  NPO    Routes of Feeding: None  Liquids: No Liquids  Daily Fluid Restriction: no  Last Modified Barium Swallow with Video (Video Swallowing Test): not done    Treatments at the Time of Hospital Discharge:   Respiratory Treatments:   Oxygen Therapy:  is not on home oxygen therapy.   Ventilator:    - No ventilator support    Rehab Therapies: ***  Weight Bearing Status/Restrictions: Non-weight bearing on right leg  Other Medical Equipment (for information only, NOT a DME order):  hospital bed  Other Treatments:       Patient's personal belongings (please select all that are sent with patient):  None    RN SIGNATURE:  Electronically signed by Jessica Hartley RN on 2/3/22 at 9:59 AM EST    CASE MANAGEMENT/SOCIAL WORK SECTION    Inpatient Status Date: ***    Readmission Risk Assessment Score:  Readmission Risk              Risk of Unplanned Readmission:  30           Discharging to Facility/ Agency   Name:   Address:  Phone:  Fax:    Dialysis Facility (if applicable)   Name:  Address:  Dialysis Schedule:  Phone:  Fax:    / signature: {Esignature:[id number]}    PHYSICIAN SECTION    Prognosis: {Prognosis:2272253889}    Condition at Discharge: [facility] Patient Condition:521294671}    Rehab Potential (if transferring to Rehab): {Prognosis:2337398326}    Recommended Labs or Other Treatments After Discharge: ***    Physician Certification: I certify the above information and transfer of Barbara Perez  is necessary for the continuing treatment of the diagnosis listed and that he requires {Admit to Appropriate Level of Care:13533} for {GREATER/LESS:915511813} 30 days.      Update Admission H&P: {CHP DME Changes in RUTMA:568734992}    PHYSICIAN SIGNATURE:  {Esignature:[id number]}

## 2022-02-03 NOTE — PROGRESS NOTES
Nutrition Note    Patient's chart reviewed today and RD will hold nutrition follow-up d/t change in status. Nutrition comfort care to be provided unless aggressive tx is desired. No additional nutrition intervention will be initiated at this time. Consult dietitian if additional nutrition intervention desired.       Efrain Castaneda MS, RD, LD RDN, LD  Contact Number: [phone]

## 2022-02-03 NOTE — DISCHARGE INSTR - DIET
Good nutrition is important when healing from an illness, injury, or surgery. Follow any nutrition recommendations given to you during your hospital stay. If you were given an oral nutrition supplement while in the hospital, continue to take this supplement at home. You can take it with meals, in-between meals, and/or before bedtime. These supplements can be purchased at most local grocery stores, pharmacies, and chain [employer]-stores. If you have any questions about your diet or nutrition, call the hospital and ask for the dietitian.   Nothing by mouth

## 2022-02-03 NOTE — PLAN OF CARE
Problem: Skin Integrity:  Goal: Will show no infection signs and symptoms  Description: Will show no infection signs and symptoms  2/3/2022 0953 by Mari Gauthier RN  Outcome: Ongoing     Problem: Skin Integrity:  Goal: Absence of new skin breakdown  Description: Absence of new skin breakdown  2/3/2022 0953 by Mari Gauthier RN  Outcome: Ongoing     Problem: Skin Integrity:  Goal: Status of oral mucous membranes will improve  Description: Status of oral mucous membranes will improve  Outcome: Ongoing

## 2022-02-03 NOTE — PROGRESS NOTES
Discharge order received, patient to go to hospice. AVS completed and put in transport packet. IV, IJ and telemetry removed. Report given to ambulance team. Belongings with patient, patient discharged.

## 2022-02-03 NOTE — PROGRESS NOTES
4 Eyes Admission Assessment     I agree as the admission nurse that 2 RN's have performed a thorough Head to Toe Skin Assessment on the patient. ALL assessment sites listed below have been assessed on admission. Areas assessed by both nurses: Caroline Grise  [x]   Head, Face, and Ears   [x]   Shoulders, Back, and Chest  [x]   Arms, Elbows, and Hands   [x]   Coccyx, Sacrum, and Ischium  [x]   Legs, Feet, and Heels        Does the Patient have Skin Breakdown?   Yes a wound was noted on the Admission Assessment and an LDA was Initiated documentation include the Krysta-wound, Wound Assessment, Measurements, Dressing Treatment, Drainage, and Color\",         Freddie Prevention initiated:  Yes   Wound Care Orders initiated:  No; already completed      [id number] nurse consulted for Pressure Injury (Stage 3,4, Unstageable, DTI, NWPT, and Complex wounds) or Freddie score 18 or lower:  Yes      Nurse 1 eSignature: Electronically signed by Katia Green RN on 2/2/22 at 11:38 PM EST    **SHARE this note so that the co-signing nurse is able to place an eSignature**    Nurse 2 eSignature: Electronically signed by Crystal Howard RN on 2/2/22 at 11:39 PM EST

## 2022-02-03 NOTE — PROGRESS NOTES
Patient's daughter, Phi Kirkland, called. RN updated Phi Kirkland, questions answered. Phi Kirkland reminded which room her father was in.

## 2022-02-03 NOTE — PROGRESS NOTES
Pt appears comfortable in bed. Breathing is unlabored and no visible or audible secretions. HR maintaining in the 60s and RR at 15. Absence of movements. Will provide PRNs as needed. Will continue to monitor.      Electronically signed by Linda Woodward RN on 2/2/2022 at 7:33 PM

## 2022-02-03 NOTE — DISCHARGE SUMMARY
Hospital Medicine Discharge Summary    Patient: Pantera Crawford     Gender: male  : 1945   Age: 67 y.o. MRN: [id number]    Admitting Physician: No admitting provider for patient encounter. Discharge Physician: Ana Porras,     Code Status: DNR-CC     Admit Date: 2022   Discharge Date:  2/3/2022     Disposition:  Hospice    Discharge Diagnoses: Active Hospital Problems    Diagnosis Date Noted    Anoxic brain injury ([facility] [address] [G93.1] 2022    Myoclonus [G25.3] 2022    Respiratory failure ([facility] [address] [J96.90] 2022    Cardiac arrest ([facility] [address] [I46.9] 2022       Condition at Discharge: [facility] Course:   Julia Gallo a 67 y. o. male PMH ESRD on HD, DM2, bl BKA, pAF, CAD (s/p stent in LAD and RCA ), HTN, HLD who presented from [facility] after cardiac arrest.     Found to have anoxic brain injury and bacteremia. After discussion with family he was transitioned to comfort care and discharged to hospice.      Additional findings or notes to primary provider:  None at this time    Discharge Medications:   Current Discharge Medication List        Current Discharge Medication List        Current Discharge Medication List        Current Discharge Medication List      STOP taking these medications       amLODIPine (NORVASC) 10 MG tablet Comments:   Reason for Stopping:         sevelamer (RENVELA) 800 MG tablet Comments:   Reason for Stopping:         hydrALAZINE (APRESOLINE) 50 MG tablet Comments:   Reason for Stopping:         Cholecalciferol (VITAMIN D3) 50 MCG ( UT) CAPS Comments:   Reason for Stopping:         folic acid (FOLVITE) 1 MG tablet Comments:   Reason for Stopping:         ferrous sulfate (IRON 325) 325 (65 Fe) MG tablet Comments:   Reason for Stopping:         vitamin B-12 (CYANOCOBALAMIN) 1000 MCG tablet Comments:   Reason for Stopping:         mirtazapine (REMERON) 15 MG tablet Comments:   Reason for Stopping:         pantoprazole sodium (PROTONIX) 40 MG PACK packet Comments:   Reason for Stopping:         prazosin (MINIPRESS) 2 MG capsule Comments:   Reason for Stopping:         B Complex-C-Folic Acid (JOSUÉ CAPS) 1 MG CAPS Comments:   Reason for Stopping:         pravastatin (PRAVACHOL) 20 MG tablet Comments:   Reason for Stopping:         carvedilol (COREG) 25 MG tablet Comments:   Reason for Stopping:         aspirin 81 MG tablet Comments:   Reason for Stopping:         nitroGLYCERIN (NITROSTAT) 0.4 MG SL tablet Comments:   Reason for Stopping:               Discharge Exam:    BP (!) 157/56   Pulse 82   Temp 99 °F (37.2 °C) (Axillary)   Resp 10 Comment: frequent apnea- 11 seconds after around 5 breaths  Ht 5' (1.524 m)   Wt 207 lb 3.7 oz (94 kg)   SpO2 95%   BMI 40.47 kg/m²   Appearance: He is ill-appearing.      Comments: intubated   HENT:      Head: Normocephalic and atraumatic. Cardiovascular:      Rate and Rhythm: Normal rate and regular rhythm. Pulmonary:      Effort: Pulmonary effort is normal.      Comments: Mechanical breath sounds  Abdominal:      Palpations: Abdomen is soft.      Tenderness: There is no abdominal tenderness. There is no guarding or rebound. Musculoskeletal:         General: No swelling.      Cervical back: Neck supple.      Comments: Bilateral BKA   Skin:     General: Skin is warm and dry.      Labs:  For convenience and continuity at follow-up the following most recent labs are provided:    Lab Results   Component Value Date    WBC 14.6 02/02/2022    HGB 7.5 02/02/2022    HCT 22.9 02/02/2022    MCV 85.5 02/02/2022     02/02/2022     02/02/2022    K 4.0 02/02/2022    K 4.0 02/02/2022    CL 94 02/02/2022    CO2 26 02/02/2022    BUN 46 02/02/2022    CREATININE 5.6 02/02/2022    CALCIUM 9.1 02/02/2022    PHOS 3.5 02/02/2022    ALKPHOS 62 02/02/2022    ALT 60 02/02/2022    AST 36 02/02/2022    BILITOT 0.3 02/02/2022    BILIDIR <0.2 02/02/2022    LABALBU 2.6 02/02/2022    LDLCALC 87 12/19/2017    TRIG 78 12/19/2017 Lab Results   Component Value Date    INR 1.32 (H) 01/28/2022    INR 1.20 (H) 01/27/2022    INR 1.05 06/16/2020       Radiology:  Echo Complete    Result Date: 1/31/2022  Transthoracic Echocardiography Report (TTE)  Demographics   Patient Name       Fuentes Triana   Date of Study      01/31/2022        Gender              Male   Patient Number     [id number]        Date of Birth       1945   Visit Number       [id number]         Age                 67 year(s)   Accession Number   [id number]        Room Number         4917   Corporate ID       [id number]          Sonographer         Polo Hernandez RVT   Ordering Physician Minerva Saenz MD Interpreting        [facility]                                       Physician           Lenora Davis MD  Procedure Type of Study   TTE procedure:ECHOCARDIOGRAM COMPLETE 2D W DOPPLER W COLOR. Procedure Date Date: 01/31/2022 Start: 02:33 PM Study Location: [address] Echo Lab Technical Quality: Adequate visualization Indications:Endocarditis. Patient Status: Routine Height: 60 inches Weight: 198 pounds BSA: 1.86 m2 BMI: 38.67 kg/m2 BP: 157/66 mmHg  Conclusions   Summary  Normal left ventricle size. There is moderate concentric left ventricular  hypertrophy. Overall left ventricular systolic function appears normal with  an ejection fraction of 55-60%. No regional wall motion abnormalities are  noted. Diastolic filling parameters suggest grade II diastolic dysfunction. The left atrium is moderately dilated. No gross evidence of vegetations; however, mitral valve leaflets appear  thickened. Signature   ------------------------------------------------------------------  Electronically signed by Lenora Davis MD (Interpreting  physician) on 01/31/2022 at 05:45 PM  ------------------------------------------------------------------   Findings   Left Ventricle  Normal left ventricle size.  There is moderate concentric left ventricular  hypertrophy. Overall left ventricular systolic function appears normal with  an ejection fraction of 55-60%. No regional wall motion abnormalities are  noted. Diastolic filling parameters suggest grade II diastolic dysfunction. Mitral Valve  Mitral annular calcification is present. Mitral valve leaflets appear calcific/thickened but open adequately. Trace mitral regurgitation is present. No evidence of mitral valve stenosis. Left Atrium  The left atrium is moderately dilated. Aortic Valve  The aortic valve appears tricuspid. The aortic valve is structurally normal.  No evidence of aortic valve regurgitation. No evidence of aortic valve stenosis. Aorta  The aortic root is normal in size. Right Ventricle  Normal right ventricular size and function. TAPSE 1.83 cm  RV S velocity 14.8 cm/s   Tricuspid Valve  Tricuspid valve is structurally normal.  Mild tricuspid regurgitation. Estimated pulmonary artery systolic pressure is 47 mmHg assuming a right  atrial pressure of 3 mmHg. No evidence of tricuspid stenosis. Right Atrium  The right atrium is normal in size. Pulmonic Valve  The pulmonic valve is not well visualized. No evidence of pulmonic valve regurgitation. No evidence of pulmonic valve stenosis. Pericardial Effusion  No evidence of any pericardial effusion. Pleural Effusion  There is no pleural effusion.    Miscellaneous  Inferior vena cava appears normal .  M-Mode/2D Measurements (cm)   LV Diastolic Dimension: 3.71 cm LV Systolic Dimension: 2.44 cm  LV Septum Diastolic: 1.99 cm    LV Septum Systolic: 0.76 cm  LV PW Diastolic: 2.64 cm        LV PW Systolic: 7.27 cm  RV Diastolic Dimension: 1.82 cm AO Root Dimension: 3.8 cm                                  LA Dimension: 3.4 cm                                  LA Area: 25.6 cm2                                  LA volume/Index: 80.9 ml /43 ml/m2  Doppler Measurements                                 MV identified laterally within the left occipital lobe. This is unchanged. CT HEAD WO CONTRAST    Result Date: 1/27/2022  CT abdomen without contrast 1/27/2022 CLINICAL HISTORY: Cardiac Chest. Comparisons: 10/4/2021. PROCEDURE: Helical noncontrast CT imaging evaluation through the brain with multiplanar reformats. Individualized dose optimization technique was used in order to meet ALARA standards for radiation dose reduction. In addition to vendor specific dose reduction algorithms, the dose reduction techniques vary based on the specific scanner utilized but frequently include automated exposure control, adjustment of the mA and/or kV according to patient size, and use of iterative reconstruction technique. FINDINGS: No evidence of depressed skull fracture. Paranasal sinuses demonstrate mild mucosal thickening within the right frontal and bilateral ethmoid sinuses. No evidence of acute sinusitis. Mastoid air cells are clear. Mild diffuse cerebral atrophy. Low-attenuation in the bilateral cerebral hemisphere periventricular white matter. Remote infarct in the left occipital region. No extra-axial collections. No interval change. 1. Atrophy and superimposed small vessel ischemic disease with remote infarct in the lateral left occipital region. No recent infarct, hemorrhage or mass detected. XR CHEST PORTABLE    Result Date: 1/28/2022  AP CHEST HISTORY: Central venous catheter placement COMPARISON 1/27/2022 FINDINGS: Heart size appears stable. Persistent right basilar infiltrate is seen. Left lung appears grossly clear. ET tube remains with tip at level of mid trachea. Left IJ central line is been placed with tip extending to lower SVC region. No pneumothorax is seen. Interval line placement. Persistent infiltrate at right lung base.      XR CHEST PORTABLE    Result Date: 1/27/2022  Chest AP portable at 0858 INDICATIONS: Intubation, respiratory distress, respiratory management Endotracheal tube has been introduced with its tip in satisfactory position just below the head of the clavicle. Cardiomegaly, unchanged. No evidence pneumothorax. No effusion or consolidation. Nasogastric tube is coiled of the GE junction redirected the lower esophagus. Gastric distention. 1. Nasogastric tube coiled in the distal esophagus with gastric air distention. 2. Endotracheal tube in satisfactory position. Critical results reported to Physician: Holly Bueno at 1/27/20229:25 AM on 1/27/2022 by telephone. CTA PULMONARY W CONTRAST    Result Date: 1/27/2022  CT ANGIOGRAPHY CHEST WITH CONTRAST HISTORY: Cardiac arrest with PVA Comparison: none TECHNICAL FACTORS: Following administration of 80 cc of Isovue 370,CT angiography protocol was performed with postcontrast axial images performed through the pulmonary arteries with coronal reformatted images obtained. Maximum intensity projection (MIP) image set was included. Underexposure controls were utilized during the CT examination to meet ALARA standards for radiation dose reduction. FINDINGS: Filling defects are seen within the upper and lower lobe pulmonary arteries on the left consistent with pulmonary emboli. No evidence of thoracic aortic dissection is seen. Focal area of parenchymal consolidation is seen in the right lower lobe. No mediastinal or hilar adenopathy is seen. No definite abnormal mass lesions are identified. Limited views of the upper abdomen show no acute abnormality. RV/LV ratio is approximately 1.02. Left-sided pulmonary emboli. Infiltrate in right lower lobe suggesting pneumonia. Borderline RV/LV ratio of approximately 1.02.  Results were reported by telephone to the charge nurse, [address] [place] [address], at 7:11 PM on 1/27/2020     MRI BRAIN WO CONTRAST    Result Date: 1/31/2022  EXAM: MRI BRAIN WO CONTRAST INDICATION: Anoxic brain injury COMPARISON: Head CT 1/30/2022 TECHNIQUE: Multiplanar, multisequence MR imaging of the head obtained without contrast. IV contrast: None. FINDINGS: MIDLINE STRUCTURES: The sella turcica and pituitary gland are normal. Craniovertebral alignment and cerebellar tonsils are normal. VENTRICLES: Normal size and configuration for patient age. INTRACRANIAL HEMORRHAGE: Clustered foci of susceptibility in the left sylvian fissure centered along the insula compatible subarachnoid hemorrhage. There is also hemorrhagic staining involving area of left lateral occipitotemporal cortical encephalomalacia. Punctate focus of susceptibility is present involving right frontal subcortical white matter. BRAIN PARENCHYMA: There is mild to moderate generalized atrophy present. There is mild to moderate periventricular and subcortical hyperintense T2/FLAIR signal. There is also involvement of the jean-paul. Small chronic cerebellar infarctions are present. The  diffusion images show scattered areas of bilateral cerebral cortical acute diffusion restriction, including involvement of frontal, parietal and occipitotemporal lobes. The distribution is compatible with clinical history of anoxic brain injury. Deep gray matter structures are spared. There is some restricted diffusion involving a component of the sylvian fissure subarachnoid hemorrhage. INTRACRANIAL VASCULATURE: Major intracranial vessels are grossly patent. ORBITS: Normal. BONE MARROW: Bone marrow signal within normal limits. VISUALIZED CERVICAL SPINE: Normal PARANASAL SINUSES/MASTOID BONES: There is paranasal sinus opacification and also fluid within the nasopharynx in the setting of intubation. EXTRACRANIAL SOFT TISSUES: Normal     1. Bilateral areas of cerebral cortical diffusion restriction compatible with  hypoxic-ischemic encephalopathy and history of anoxic brain injury 2. Subarachnoid hemorrhage involving the left sylvian fissure 3. Mild to moderate atrophy and chronic small vessel ischemia 4.  Chronic hemorrhagic staining involving area of chronic cortical infarction at the left lateral occipitotemporal lobe The patient was seen and examined on day of discharge and this discharge summary is in conjunction with any daily progress note from day of discharge. Time Spent on discharge is more than 30 minutes in the examination, evaluation, counseling and review of medications and discharge plan. Grant Florez DO   2/3/2022      Thank you Rita Almeida MD for the opportunity to be involved in this patient's care. If you have any questions or concerns please feel free to contact me at [employer].

## 2022-02-03 NOTE — PROGRESS NOTES
Unable to see patient as patient was discharged. Overnight events noted and chart reviewed. Patient was terminally extubated and discharged to hospice care. Patient had anoxic brain injury and unfortunately has poor prognosis. I called and discussed with patient's daughter Fabi Rodríguez and offered condolences and she agree to stop dialysis. Agree with comfort/Hospice care.  Will inform our dialysis team.    Dallas Garcia MD

## 2022-02-07 ASSESSMENT — ENCOUNTER SYMPTOMS
DIARRHEA: 0
NAUSEA: 0
SHORTNESS OF BREATH: 0
ABDOMINAL PAIN: 0
VOMITING: 0

## 2022-02-07 NOTE — ED PROVIDER NOTES
[address] ENCOUNTER          PHYSICIAN ASSISTANT NOTE       Date of evaluation: 1/19/2022    Chief Complaint     Abnormal Lab (low H&H per nursing home)      History of Present Illness     Herlinda Hardy is a 67 y.o. male with a past medical history as noted below who presents to the Emergency Department with a complaint of an abnormal lab level. The patient has a history of end-stage renal disease and chronic anemia and presents to the emergency department with a report of hemoglobin of below 7, which is close to the patient's baseline, but was concerning to the [facility] nursing [facility] who opted to have the patient brought to the emergency department by EMS for evaluation. The patient has an extensive comorbid history, including altered mental status and dementia. The patient can answer simple questions, but cannot provide an accurate history and ultimately does not have capacity for medical care at this time. He denies any lightheadedness or dizziness at the moment. The patient is nonambulatory given his comorbidities at the [facility] nursing facility. The patient is otherwise pleasant and conversational.    Review of Systems     Review of Systems   Unable to perform ROS: Dementia   Constitutional: Negative for fever. Respiratory: Negative for shortness of breath. Cardiovascular: Negative for chest pain. Gastrointestinal: Negative for abdominal pain, diarrhea, nausea and vomiting. Neurological: Negative for dizziness and light-headedness. Physical Exam     INITIAL VITALS: BP: (!) 162/62, Temp: 98.2 °F (36.8 °C), Pulse: 81, Resp: 20, SpO2: 98 %     Nursing note and vitals reviewed. Physical Exam  Vitals and nursing note reviewed. Constitutional:       General: He is awake. He is not in acute distress. Appearance: He is well-developed. He is cachectic. He is not ill-appearing or diaphoretic. HENT:      Head: Normocephalic and atraumatic.    Eyes: General: No visual field deficit. Pupils: Pupils are equal, round, and reactive to light. Neck:      Vascular: No JVD. Cardiovascular:      Rate and Rhythm: Normal rate and regular rhythm. Pulmonary:      Effort: Pulmonary effort is normal. No respiratory distress. Breath sounds: Normal breath sounds. No wheezing or rales. Chest:      Chest wall: No tenderness. Abdominal:      General: There is no distension. Palpations: Abdomen is soft. Tenderness: There is no abdominal tenderness. Musculoskeletal:         General: Normal range of motion. Cervical back: Normal range of motion and neck supple. Skin:     General: Skin is warm and dry. Coloration: Skin is not pale. Findings: No erythema or rash. Neurological:      Mental Status: He is alert. Mental status is at baseline. GCS: GCS eye subscore is 4. GCS verbal subscore is 5. GCS motor subscore is 6. Cranial Nerves: No cranial nerve deficit, dysarthria or facial asymmetry. Coordination: Coordination normal.   Psychiatric:         Behavior: Behavior is cooperative. Procedures     N/A    MEDICAL DECISION MAKING     Dexter Galindo is admitted to the Emergency Department for evaluation of his chief complaint as described in the history of present illness. Complete history and physical was performed by me and my attending. Nursing notes, past medical history, surgical history, family history and social history were reviewed and addressed in the HPI. Cesario Palmer is a 67 y.o. male who presents to the emergency department with a complaint of abnormal lab levels. The patient was sent to the emergency department from skilled nursing facility due to hematocrit and hemoglobin levels that were below 7 and 20 respectively.   The nursing facility was not able to provide information as to whether the the patient was symptomatically anemic, and he does have a history of anemia of chronic disease secondary to end-stage renal failure. The patient is awake and conversational, but is a poor historian. He is hemodynamically stable and within normal limits on arrival to the emergency department. The patient does not technically stand or ambulate on his own, so it is difficult to get a determination of whether the patient is experiencing symptomatic anemia, versus his baseline anemia. However, it is likely that the patient would not be accepted for readmittance to the skilled nursing facility if his hemoglobin levels continue to stay below 7. A repeat CBC was performed which demonstrates a hemoglobin of 6.8 with hematocrit of 20. His red blood cell count is also low at 2.29. No leukocytosis or thrombocytopenia. His BMP demonstrates a creatinine of 7.2, consistent with his baseline. The patient was italicized earlier in the day and has been to dialysis 2 days in a row as a result of the holiday. His anion gap and bicarbonate levels as well as his electrolyte levels are normal.  The plan is to transfuse a single unit of packed red blood cells with repeat hematocrit and hemoglobin and if above 7, the patient can be discharged back to skilled nursing facility. The patient was pending the arrival of blood products at the time of the end of my shift. The patient will be turned over to the attending physician who will repeat the hemoglobin and hematocrit levels after transfusion, reevaluate the patient and provide ultimate disposition. I discussed this plan at length the patient who verbalizes understanding and is in agreement. The patient was seen and evaluated by myself and the attending physician, Alondra Mosquera MD, who agrees with my assessment, treatment and plan. Clinical Impression     1. Anemia due to chronic kidney disease, on chronic dialysis ([facility]        Disposition     DISPOSITION pending      Elizabeth Leung.  MADI Duong  7:54 AM    Relevant History and Diagnostic Information     Past Medical, Surgical, Family, and Social History     He has a past medical history of Anemia, Atrial fibrillation ([facility] CAD (coronary artery disease), Chronic kidney disease, DVT (deep venous thrombosis) ([facility] End stage renal disease ([facility] Gas gangrene ([facility] Hemodialysis patient ([facility] Hx of blood clots, Hyperkalemia, Hyperlipidemia, Hypertension, Hyperthyroidism, Ischemic heart disease, Metabolic encephalopathy, MI (myocardial infarction) ([facility] and Type II or unspecified type diabetes mellitus without mention of complication, not stated as uncontrolled. He has a past surgical history that includes Cardiac surgery; Foot Amputation (Right, 1/4/2019); Femoral-tibial Bypass Graft (Right, 1/9/2019); Foot Amputation (Right, 1/14/2019); Leg amputation below knee (Right, 1/18/2019); Foot Amputation (Left, 08/24/2019); Foot Amputation (Left, 8/24/2019); Foot Amputation (Left, 8/27/2019); Foot Amputation (Left, 10/11/2019); Leg amputation below knee (Left, 12/9/2019); and Leg Surgery (Left, 2/6/2020). His family history includes Arthritis in his mother. He reports that he has never smoked. He has never used smokeless tobacco. He reports previous alcohol use. He reports that he does not use drugs.     Medications     Discharge Medication List as of 1/19/2022  6:43 AM      CONTINUE these medications which have NOT CHANGED    Details   sevelamer (RENVELA) 800 MG tablet Take 1 tablet by mouth 3 times daily (with meals), Disp-90 tablet, R-3NO PRINT      NIFEdipine (PROCARDIA XL) 90 MG extended release tablet Take 1 tablet by mouth daily, Disp-90 tablet, R-1NO PRINT      hydrALAZINE (APRESOLINE) 50 MG tablet Take 1 tablet by mouth every 8 hours, Disp-90 tablet, R-3NO PRINT      epoetin paulina-epbx (RETACRIT) 78061 UNIT/ML SOLN injection Inject 1.5 mLs into the skin three times a week, Disp-6.6 mL, R-1Print      Cholecalciferol (VITAMIN D3) 50 MCG (2000 UT) CAPS Take 2,000 Units by mouth dailyHistorical Med      folic acid (FOLVITE) 1 MG tablet Take 1 mg by mouth dailyHistorical Med      ferrous sulfate (IRON 325) 325 (65 Fe) MG tablet Take 325 mg by mouth daily (with breakfast)Historical Med      vitamin B-12 (CYANOCOBALAMIN) 1000 MCG tablet Take 1,000 mcg by mouth dailyHistorical Med      mirtazapine (REMERON) 15 MG tablet Take 15 mg by mouth nightlyHistorical Med      pantoprazole sodium (PROTONIX) 40 MG PACK packet Take 40 mg by mouth every morning (before breakfast)Historical Med      prazosin (MINIPRESS) 2 MG capsule Take 2 mg by mouth nightlyHistorical Med      B Complex-C-Folic Acid (JOSUÉ CAPS) 1 MG CAPS Take 1 mg by mouth dailyHistorical Med      pravastatin (PRAVACHOL) 20 MG tablet Take 20 mg by mouth dailyHistorical Med      carvedilol (COREG) 25 MG tablet Take 25 mg by mouth 2 times daily (with meals)Historical Med      Petrolatum-Zinc Oxide (PHYTOPLEX Z-GUARD) 57-17 % PSTE Apply topically Apply to scrotum every day and nightHistorical Med      aspirin 81 MG tablet Take 81 mg by mouth daily Historical Med      nitroGLYCERIN (NITROSTAT) 0.4 MG SL tablet Place 0.4 mg under the tongue every 5 minutes as needed for Chest pain up to max of 3 total doses. If no relief after 1 dose, call 911. Historical Med             Allergies     He has No Known Allergies. ED Course     Nursing Notes, Past Medical Hx, Past Surgical Hx, Social Hx,Allergies, and Family Hx were reviewed.     Patient was given the following medications:  Orders Placed This Encounter   Medications    0.9 % sodium chloride infusion       Diagnostic Results     ED BEDSIDE ULTRASOUND:  N/A    RECENT VITALS:  BP: (!) 167/75,Temp: 98.3 °F (36.8 °C), Pulse: 75, Resp: 20, SpO2: 95 %     RADIOLOGY:  No orders to display       LABS:   Results for orders placed or performed during the hospital encounter of 01/19/22   CBC Auto Differential   Result Value Ref Range    WBC 5.0 4.0 - 11.0 K/uL    RBC 2.29 (L) 4.20 - 5.90 M/uL    Hemoglobin 6.8 (LL) 13.5 - 17.5 g/dL    Hematocrit 20.0 (LL) 40.5 - 52.5 %    MCV 87.2 80.0 - 100.0 fL    MCH 29.5 26.0 - 34.0 pg    MCHC 33.8 31.0 - 36.0 g/dL    RDW 13.5 12.4 - 15.4 %    Platelets 422 689 - 254 K/uL    MPV 8.4 5.0 - 10.5 fL    Neutrophils % 61.8 %    Lymphocytes % 28.2 %    Monocytes % 5.7 %    Eosinophils % 3.4 %    Basophils % 0.9 %    Neutrophils Absolute 3.1 1.7 - 7.7 K/uL    Lymphocytes Absolute 1.4 1.0 - 5.1 K/uL    Monocytes Absolute 0.3 0.0 - 1.3 K/uL    Eosinophils Absolute 0.2 0.0 - 0.6 K/uL    Basophils Absolute 0.0 0.0 - 0.2 K/uL   Magnesium   Result Value Ref Range    Magnesium 2.20 1.80 - 2.40 mg/dL   Phosphorus   Result Value Ref Range    Phosphorus 3.7 2.5 - 4.9 mg/dL   Basic Metabolic Panel   Result Value Ref Range    Sodium 140 136 - 145 mmol/L    Potassium 4.3 3.5 - 5.1 mmol/L    Chloride 101 99 - 110 mmol/L    CO2 28 21 - 32 mmol/L    Anion Gap 11 3 - 16    Glucose 96 70 - 99 mg/dL    BUN 35 (H) 7 - 20 mg/dL    CREATININE 7.2 (HH) 0.8 - 1.3 mg/dL    GFR Non-African American 7 (A) >60    GFR  9 (A) >60    Calcium 9.3 8.3 - 10.6 mg/dL   Hemoglobin and hematocrit, blood   Result Value Ref Range    Hemoglobin 7.2 (L) 13.5 - 17.5 g/dL    Hematocrit 21.6 (L) 40.5 - 52.5 %   TYPE AND SCREEN   Result Value Ref Range    ABO/Rh B POS     Antibody Screen NEG    PREPARE RBC (CROSSMATCH), 1 Units   Result Value Ref Range    Product Code Blood Bank [id number]     Description Blood Bank Red Blood Cells, Leuko-reduced     Unit Number [id number]     Dispense Status Blood Bank transfused        CONSULTS:  None    PATIENT REFERRED TO:  Primary care provider            DISCHARGE MEDICATIONS:  Discharge Medication List as of 1/19/2022  6:43 AM             [address] [place] [address]  02/07/22 [id number]

## 2022-04-06 PROBLEM — Z51.5 HOSPICE CARE: Status: ACTIVE | Noted: 2022-02-03

## 2023-12-08 NOTE — PROGRESS NOTES
[facility] calling to ask PCP  if she wants micro- bacteria done on all 3 sputem samples for this patient .  Please advise.  [phone]   Pt. Off of sedation since 1430. No purposeful responses. Frequent twitching movements. Frequent PVC's/ectopy on heart monitor. Large amount of bloody secretions from mouth. Towel placed to monitor discharge. Daughter at bedside. Will cont. To monitor.

## (undated) DEVICE — DUAL CUT SAGITTAL BLADE

## (undated) DEVICE — PADDING,UNDERCAST,COTTON, 4"X4YD STERILE: Brand: MEDLINE

## (undated) DEVICE — SUTURE VCRL SZ 3-0 L36IN ABSRB UD L36MM CT-1 1/2 CIR J944H

## (undated) DEVICE — SUTURE ETHLN SZ 2-0 L30IN NONABSORBABLE BLK L36MM PSLX 3/8 1697H

## (undated) DEVICE — Z DISCONTINUED USE 2275676 GLOVE SURG SZ 65 L12IN FNGR THK87MIL DK GRN LTX FREE ISOLEX

## (undated) DEVICE — STANDARD HYPODERMIC NEEDLE,POLYPROPYLENE HUB: Brand: MONOJECT

## (undated) DEVICE — PODIATRY PK

## (undated) DEVICE — SUTURE PERMAHAND SZ 3-0 L30IN NONABSORBABLE BLK SILK BRAID A304H

## (undated) DEVICE — SYRINGE MED 10ML TRNSLUC BRL PLUNG BLK MRK POLYPR CTRL

## (undated) DEVICE — BANDAGE COBAN 4 IN COMPR W4INXL5YD FOAM COHESIVE QUIK STK SELF ADH SFT

## (undated) DEVICE — DECANTER BAG 9": Brand: MEDLINE INDUSTRIES, INC.

## (undated) DEVICE — SOLUTION IV IRRIG POUR BRL 0.9% SODIUM CHL 2F7124

## (undated) DEVICE — TRAY PREP DRY W/ PREM GLV 2 APPL 6 SPNG 2 UNDPD 1 OVERWRAP

## (undated) DEVICE — SYRINGE, LUER LOCK, 10ML: Brand: MEDLINE

## (undated) DEVICE — DRESSING WND VAC MED GRANUFOAM SENSATRAC

## (undated) DEVICE — Z INACTIVE NO SUPPLIER SOLUTIONIRRIG 3000ML 0.9% SOD CHL FLX CONT [79720808] [HOSPIRA WORLDWIDE INC]

## (undated) DEVICE — GOWN,SIRUS,POLYRNF,BRTHSLV,LG,30/CS: Brand: MEDLINE

## (undated) DEVICE — SOLUTION IV 1000ML 0.9% SOD CHL

## (undated) DEVICE — COUNTER NDL 40 COUNT HLD 70 NUM FOAM BLK SGL MAG W BLDE REMV

## (undated) DEVICE — SUTURE VCRL SZ 2-0 L18IN ABSRB UD CT-1 L36MM 1/2 CIR J839D

## (undated) DEVICE — PACK,UNIVERSAL,NO GOWNS: Brand: MEDLINE

## (undated) DEVICE — INTENDED FOR TISSUE SEPARATION, AND OTHER PROCEDURES THAT REQUIRE A SHARP SURGICAL BLADE TO PUNCTURE OR CUT.: Brand: BARD-PARKER ® CARBON RIB-BACK BLADES

## (undated) DEVICE — SYRINGE IRRIG 60ML SFT PLIABLE BLB EZ TO GRP 1 HND USE W/

## (undated) DEVICE — JEWISH HOSPITAL TURNOVER KIT: Brand: MEDLINE INDUSTRIES, INC.

## (undated) DEVICE — Device

## (undated) DEVICE — GLOVE SURG SZ 6 L11.2IN FNGR THK9.8MIL STRW LTX POLYMER

## (undated) DEVICE — COVER LT HNDL BLU PLAS

## (undated) DEVICE — 6 ML SYRINGE LUER-LOCK TIP: Brand: MONOJECT

## (undated) DEVICE — SUTURE PERMAHAND SZ 3-0 L18IN NONABSORBABLE BLK L26MM SH C013D

## (undated) DEVICE — ELECTRODE PT RET AD L9FT HI MOIST COND ADH HYDRGEL CORDED

## (undated) DEVICE — ST FLUFF LG 1 PLY: Brand: DEROYAL

## (undated) DEVICE — PLATE ES AD W 9FT CRD 2

## (undated) DEVICE — MEDI-VAC NON-CONDUCTIVE SUCTION TUBING: Brand: CARDINAL HEALTH

## (undated) DEVICE — 1.5MM HYDRO LEMAITRE VALVULOTOME (98 CM): Brand: HYDRO LEMAITRE VALVULOTOME

## (undated) DEVICE — HOLDER SCALP PLAS G STD

## (undated) DEVICE — SYRINGE MED 20ML STD CLR PLAS LUERLOCK TIP N CTRL DISP

## (undated) DEVICE — STANDARD HYPODERMIC NEEDLE,ALUMINUM HUB: Brand: MONOJECT

## (undated) DEVICE — GLOVE SURG SZ 8 L11.77IN FNGR THK9.8MIL STRW LTX POLYMER

## (undated) DEVICE — GOWN SIRUS NONREIN XL W/TWL: Brand: MEDLINE INDUSTRIES, INC.

## (undated) DEVICE — SPONGE,LAP,18"X18",DLX,XR,ST,5/PK,40/PK: Brand: MEDLINE

## (undated) DEVICE — HANDPIECE SET WITH HIGH FLOW TIP AND SUCTION TUBE: Brand: INTERPULSE

## (undated) DEVICE — 3M™ STERI-DRAPE™ ISOLATION BAG, 10 PER CARTON / 4 CARTONS PER CASE, 1003: Brand: 3M™ STERI-DRAPE™

## (undated) DEVICE — STRIP WND PK W0.25INXL5YD IODO GZ TIGHTLY WVN CURAD

## (undated) DEVICE — BLADE SAW SM W10XL18.5MM THK4MM OSC

## (undated) DEVICE — DRAPE C ARM UNIV W41XL74IN CLR PLAS XR VELC CLSR POLY STRP

## (undated) DEVICE — PENCIL ES L3M ROCK SWCH S STL HEX LOK BLDE ELECTRD HOLSTER

## (undated) DEVICE — SUTURE ETHLN SZ 5-0 L18IN NONABSORBABLE BLK L19MM PS-2 3/8 1666G

## (undated) DEVICE — BASIC SINGLE BASIN 1-LF: Brand: MEDLINE INDUSTRIES, INC.

## (undated) DEVICE — 3M™ COBAN™ NL STERILE NON-LATEX SELF-ADHERENT WRAP, 2084S, 4 IN X 5 YD (10 CM X 4,5 M), 18 ROLLS/CASE: Brand: 3M™ COBAN™

## (undated) DEVICE — SUTURE VCRL SZ 3-0 L27IN ABSRB UD L26MM CT-2 1/2 CIR J232H

## (undated) DEVICE — GAUZE,SPONGE,4"X4",8PLY,STRL,LF,10/TRAY: Brand: MEDLINE

## (undated) DEVICE — HANDPIECE SUCTION TUBING INTERPULSE 10FT

## (undated) DEVICE — SUTURE COAT VCRL SZ 4-0 L18IN ABSRB UD L19MM PS-2 1/2 CIR J496G

## (undated) DEVICE — TRAY CATHETER 16FR F INCLUDE BARDX IC COMPLT CARE DRNGE BG

## (undated) DEVICE — GOWN,SIRUS,POLYRNF,SETINSLV,L,20/CS: Brand: MEDLINE

## (undated) DEVICE — BLANKET WRM W29.9XL79.1IN UP BODY FORC AIR MISTRAL-AIR

## (undated) DEVICE — SUTURE VCRL UD BR CT 3-0 18IN CT1 J838D

## (undated) DEVICE — LOOP,VESSEL,MAXI,BLUE,2/PK,STERILE: Brand: MEDLINE

## (undated) DEVICE — SUTURE ETHLN SZ 2-0 L18IN NONABSORBABLE BLK L19MM PS-2 PRIM 593H

## (undated) DEVICE — BANDAGE COMPR W4INXL15FT BGE E SGL LAYERED CLP CLSR

## (undated) DEVICE — SUTURE PROL SZ 5-0 L36IN NONABSORBABLE BLU L13MM C-1 3/8 8720H

## (undated) DEVICE — SUTURE ETHLN SZ 5-0 L18IN NONABSORBABLE BLK P-3 L13MM 3/8 698G

## (undated) DEVICE — COAXIAL HIGH FLOW TIP WITH SOFT SHIELD

## (undated) DEVICE — GARMENT,MEDLINE,DVT,INT,CALF,MED, GEN2: Brand: MEDLINE

## (undated) DEVICE — Z INACTIVE USE 2641837 CLIP LIG M BLU TI HRT SHP WIRE HORZ 600 PER BX

## (undated) DEVICE — Z DISCONTINUED NO SUB IDED GLOVE SURG BEAD CUF 8 STD PF WHT STRL TRIUMPH LT LTX

## (undated) DEVICE — APPLICATOR PREP 26ML 0.7% IOD POVACRYLEX 74% ISO ALC ST

## (undated) DEVICE — TURNOVER KIT RM INF CTRL TECH

## (undated) DEVICE — KIT NEG PRSS M W12.5XH3.2XL18CM 2 SHT STD DRP 1 SENSATRAC

## (undated) DEVICE — STAPLER SKIN H3.9MM WIRE DIA0.58MM CRWN 6.9MM 35 STPL ROT

## (undated) DEVICE — E-Z CLEAN, NON-STICK, PTFE COATED, ELECTROSURGICAL BLADE ELECTRODE, 2.5 INCH (6.35 CM): Brand: EZ CLEAN

## (undated) DEVICE — SURGICAL SET UP - SURE SET: Brand: MEDLINE INDUSTRIES, INC.

## (undated) DEVICE — SURE SET-DOUBLE BASIN-LF: Brand: MEDLINE INDUSTRIES, INC.

## (undated) DEVICE — MICRO SAGITTAL BLADE (9.4 X 0.4 X 26.2MM)

## (undated) DEVICE — GAUZE,PACKING STRIP,IODOFORM,1/2"X5YD,ST: Brand: CURAD

## (undated) DEVICE — CANISTER NEG PRSS 500ML WHT SENSATRAC TBNG CLMP CONN

## (undated) DEVICE — SUTURE PERMAHAND SZ 2-0 L30IN NONABSORBABLE BLK SILK W/O A305H

## (undated) DEVICE — VELCLOSE LATEX FREE VELCRO ELASTIC BANDAGE 6INX5YD: Brand: VELCLOSE

## (undated) DEVICE — SUTURE PERMAHAND SZ 4-0 L12X30IN NONABSORBABLE BLK SILK A303H

## (undated) DEVICE — 3M™ WARMING BLANKET, UPPER BODY, 10 PER CASE, 42268: Brand: BAIR HUGGER™

## (undated) DEVICE — GOWN,PREVENTION PLUS,XLN/XL,ST,24/CS: Brand: MEDLINE

## (undated) DEVICE — CURITY STRETCH BANDAGE: Brand: CURITY

## (undated) DEVICE — SUTURE VCRL SZ 3-0 L27IN ABSRB UD L26MM SH 1/2 CIR J416H

## (undated) DEVICE — 2108 SERIES SAGITTAL BLADE FAN, OFFSET  (29.0 X 0.89 X 73.0MM)

## (undated) DEVICE — CHLORAPREP 26ML ORANGE

## (undated) DEVICE — SHEET,DRAPE,53X77,STERILE: Brand: MEDLINE

## (undated) DEVICE — COVER,TABLE,HEAVY DUTY,77"X90",STRL: Brand: MEDLINE

## (undated) DEVICE — FOGARTY ARTERIAL EMBOLECTOMY CATHETER 4F 80CM: Brand: FOGARTY

## (undated) DEVICE — DRAIN WND SIL FLAT RADIOPAQUE 10MM FULL FLUTED

## (undated) DEVICE — CLIP SM RED INTERN HMOCLP TITAN LIGATING

## (undated) DEVICE — BANDAGE,GAUZE,4.5"X4.1YD,STERILE,LF: Brand: MEDLINE

## (undated) DEVICE — SUTURE PROL SZ 6-0 L24IN NONABSORBABLE BLU L9.3MM BV-1 3/8 8805H

## (undated) DEVICE — GAUZE,SPONGE,4"X4",16PLY,XRAY,STRL,LF: Brand: MEDLINE

## (undated) DEVICE — SCANLAN® SURG-I-LOOP® SILICONE LOOPS - RED MINI, 1.5X.88 MM, 16"/40 CM LONG (2/PKG): Brand: SCANLAN® SURG-I-LOOP® SILICONE LOOPS

## (undated) DEVICE — MARKER,SKIN,WI/RULER AND LABELS: Brand: MEDLINE

## (undated) DEVICE — COVER,MAYO STAND,XL,STERILE: Brand: MEDLINE

## (undated) DEVICE — CANNULA PERF L1.3IN TIP L2MM S STL POLYUR TB ARTOTMY BLB

## (undated) DEVICE — SUTURE NONABSORBABLE MONOFILAMENT 7-0 BV-1 1X24 IN PROLENE 8702H

## (undated) DEVICE — GOWN,SIRUS,POLYRNF,BRTHSLV,XL,30/CS: Brand: MEDLINE

## (undated) DEVICE — SUTURE VCRL SZ 3-0 L18IN ABSRB UD PS-2 L19MM 1/2 CIR J497G

## (undated) DEVICE — SUTURE N ABSRB MONOFILAMENT 6-0 BV1 30 IN BLU PROLENE EPM8709

## (undated) DEVICE — BLADE ES L2.75IN ELASTOMERIC COAT DURABLE BEND UPTO 90DEG

## (undated) DEVICE — PAD,ABDOMINAL,5"X9",ST,LF,25/BX: Brand: MEDLINE INDUSTRIES, INC.

## (undated) DEVICE — SUTURE ETHLN SZ 3-0 L18IN NONABSORBABLE BLK PS-2 L19MM 3/8 1669H

## (undated) DEVICE — SUTURE MCRYL SZ 4-0 L27IN ABSRB UD L19MM PS-2 1/2 CIR PRIM Y426H

## (undated) DEVICE — SKIN AFFIX SURG ADHESIVE 72/CS 0.55ML: Brand: MEDLINE

## (undated) DEVICE — SCANLAN® SUTURE BOOT™ INSTRUMENT JAW COVERS - ORIGINAL YELLOW, STANDARD PKG (5 PAIR/CARTRIDGE, 1 CARTRIDGE/PKG): Brand: SCANLAN® SUTURE BOOT™ INSTRUMENT JAW COVERS

## (undated) DEVICE — DRESSING,GAUZE,XEROFORM,CURAD,5"X9",ST: Brand: CURAD

## (undated) DEVICE — BANDAGE,ELASTIC,ESMARK,STERILE,6"X9',LF: Brand: MEDLINE

## (undated) DEVICE — SUTURE PERMAHAND SZ 2-0 L18IN NONABSORBABLE BLK L26MM SH C012D

## (undated) DEVICE — SUTURE ETHLN SZ 3-0 L18IN NONABSORBABLE BLK L24MM FS-1 3/8 663G

## (undated) DEVICE — TOWEL,OR,DSP,ST,BLUE,DLX,8/PK,10PK/CS: Brand: MEDLINE

## (undated) DEVICE — GEL US 20GM NONIRRITATING OVERWRAPPED FILE PCH TRNSMIT

## (undated) DEVICE — SOLUTION IV 500ML 0.9% SOD CHL PH 5 INJ USP VIAFLX PLAS

## (undated) DEVICE — SUTURE NONABSORBABLE MONOFILAMENT 4-0 RB-1 36 IN BLU PROLENE 8557H